# Patient Record
Sex: MALE | Race: WHITE | Employment: OTHER | URBAN - NONMETROPOLITAN AREA
[De-identification: names, ages, dates, MRNs, and addresses within clinical notes are randomized per-mention and may not be internally consistent; named-entity substitution may affect disease eponyms.]

---

## 2017-07-25 ENCOUNTER — APPOINTMENT (OUTPATIENT)
Dept: GENERAL RADIOLOGY | Age: 82
End: 2017-07-25
Payer: MEDICARE

## 2017-07-25 ENCOUNTER — HOSPITAL ENCOUNTER (OUTPATIENT)
Age: 82
Setting detail: OBSERVATION
Discharge: HOME | End: 2017-07-28
Attending: EMERGENCY MEDICINE | Admitting: FAMILY MEDICINE
Payer: MEDICARE

## 2017-07-25 DIAGNOSIS — R00.1 SINUS BRADYCARDIA: ICD-10-CM

## 2017-07-25 DIAGNOSIS — R61 DIAPHORESIS: ICD-10-CM

## 2017-07-25 DIAGNOSIS — I45.10 RBBB: ICD-10-CM

## 2017-07-25 DIAGNOSIS — Z95.1 HX OF CABG: ICD-10-CM

## 2017-07-25 DIAGNOSIS — R55 NEAR SYNCOPE: Primary | ICD-10-CM

## 2017-07-25 DIAGNOSIS — I50.9 CONGESTIVE HEART FAILURE, UNSPECIFIED CONGESTIVE HEART FAILURE CHRONICITY, UNSPECIFIED CONGESTIVE HEART FAILURE TYPE: ICD-10-CM

## 2017-07-25 LAB
ALBUMIN SERPL-MCNC: 3.8 G/DL (ref 3.5–5.2)
ALP BLD-CCNC: 87 U/L (ref 40–130)
ALT SERPL-CCNC: 7 U/L (ref 5–41)
ANION GAP SERPL CALCULATED.3IONS-SCNC: 13 MMOL/L (ref 7–19)
AST SERPL-CCNC: 12 U/L (ref 5–40)
BASOPHILS ABSOLUTE: 0.1 K/UL (ref 0–0.2)
BASOPHILS RELATIVE PERCENT: 0.7 % (ref 0–1)
BILIRUB SERPL-MCNC: 0.5 MG/DL (ref 0.2–1.2)
BUN BLDV-MCNC: 15 MG/DL (ref 8–23)
CALCIUM SERPL-MCNC: 9.1 MG/DL (ref 8.8–10.2)
CHLORIDE BLD-SCNC: 99 MMOL/L (ref 98–111)
CO2: 27 MMOL/L (ref 22–29)
CREAT SERPL-MCNC: 1.1 MG/DL (ref 0.5–1.2)
EOSINOPHILS ABSOLUTE: 0.2 K/UL (ref 0–0.6)
EOSINOPHILS RELATIVE PERCENT: 3.2 % (ref 0–5)
GFR NON-AFRICAN AMERICAN: >60
GLUCOSE BLD-MCNC: 124 MG/DL (ref 74–109)
HCT VFR BLD CALC: 43.1 % (ref 42–52)
HEMOGLOBIN: 14 G/DL (ref 14–18)
LIPASE: 29 U/L (ref 13–60)
LYMPHOCYTES ABSOLUTE: 2 K/UL (ref 1.1–4.5)
LYMPHOCYTES RELATIVE PERCENT: 28.3 % (ref 20–40)
MCH RBC QN AUTO: 29 PG (ref 27–31)
MCHC RBC AUTO-ENTMCNC: 32.5 G/DL (ref 33–37)
MCV RBC AUTO: 89.2 FL (ref 80–94)
MONOCYTES ABSOLUTE: 0.5 K/UL (ref 0–0.9)
MONOCYTES RELATIVE PERCENT: 7 % (ref 0–10)
NEUTROPHILS ABSOLUTE: 4.3 K/UL (ref 1.5–7.5)
NEUTROPHILS RELATIVE PERCENT: 60.4 % (ref 50–65)
PDW BLD-RTO: 13.9 % (ref 11.5–14.5)
PERFORMED ON: NORMAL
PLATELET # BLD: 156 K/UL (ref 130–400)
PMV BLD AUTO: 10.8 FL (ref 9.4–12.4)
POC TROPONIN I: 0.01 NG/ML (ref 0–0.08)
POTASSIUM SERPL-SCNC: 4 MMOL/L (ref 3.5–5)
PRO-BNP: 3514 PG/ML (ref 0–1800)
RBC # BLD: 4.83 M/UL (ref 4.7–6.1)
SODIUM BLD-SCNC: 139 MMOL/L (ref 136–145)
TOTAL PROTEIN: 7 G/DL (ref 6.6–8.7)
WBC # BLD: 7.2 K/UL (ref 4.8–10.8)

## 2017-07-25 PROCEDURE — 93005 ELECTROCARDIOGRAM TRACING: CPT

## 2017-07-25 PROCEDURE — 71010 XR CHEST PORTABLE: CPT

## 2017-07-25 PROCEDURE — 99285 EMERGENCY DEPT VISIT HI MDM: CPT

## 2017-07-25 RX ORDER — METOPROLOL TARTRATE 50 MG/1
50 TABLET, FILM COATED ORAL DAILY
Status: ON HOLD | COMMUNITY
End: 2019-12-09

## 2017-07-25 RX ORDER — OMEPRAZOLE 20 MG/1
40 CAPSULE, DELAYED RELEASE ORAL DAILY
Status: ON HOLD | COMMUNITY
End: 2019-12-09

## 2017-07-25 ASSESSMENT — ENCOUNTER SYMPTOMS
ABDOMINAL PAIN: 0
VOMITING: 0
DIARRHEA: 0
COUGH: 0
SHORTNESS OF BREATH: 0
NAUSEA: 0

## 2017-07-26 ENCOUNTER — APPOINTMENT (OUTPATIENT)
Dept: CT IMAGING | Age: 82
End: 2017-07-26
Payer: MEDICARE

## 2017-07-26 PROBLEM — I25.10 CAD (CORONARY ARTERY DISEASE): Status: ACTIVE | Noted: 2017-07-26

## 2017-07-26 PROBLEM — R55 NEUROCARDIOGENIC PRE-SYNCOPE: Status: ACTIVE | Noted: 2017-07-26

## 2017-07-26 LAB
ALBUMIN SERPL-MCNC: 3.6 G/DL (ref 3.5–5.2)
ALP BLD-CCNC: 86 U/L (ref 40–130)
ALT SERPL-CCNC: 6 U/L (ref 5–41)
ANION GAP SERPL CALCULATED.3IONS-SCNC: 12 MMOL/L (ref 7–19)
AST SERPL-CCNC: 10 U/L (ref 5–40)
BASE EXCESS ARTERIAL: -0.3 MMOL/L (ref -2–2)
BASOPHILS ABSOLUTE: 0 K/UL (ref 0–0.2)
BASOPHILS RELATIVE PERCENT: 0.5 % (ref 0–1)
BILIRUB SERPL-MCNC: 0.4 MG/DL (ref 0.2–1.2)
BUN BLDV-MCNC: 17 MG/DL (ref 8–23)
CALCIUM SERPL-MCNC: 8.9 MG/DL (ref 8.8–10.2)
CARBOXYHEMOGLOBIN ARTERIAL: 1.4 % (ref 0–5)
CHLORIDE BLD-SCNC: 99 MMOL/L (ref 98–111)
CO2: 26 MMOL/L (ref 22–29)
CREAT SERPL-MCNC: 0.9 MG/DL (ref 0.5–1.2)
EKG P AXIS: 18 DEGREES
EKG P AXIS: 36 DEGREES
EKG P AXIS: 43 DEGREES
EKG P-R INTERVAL: 166 MS
EKG P-R INTERVAL: 194 MS
EKG P-R INTERVAL: 202 MS
EKG Q-T INTERVAL: 508 MS
EKG Q-T INTERVAL: 520 MS
EKG Q-T INTERVAL: 534 MS
EKG QRS DURATION: 144 MS
EKG QRS DURATION: 146 MS
EKG QRS DURATION: 150 MS
EKG QTC CALCULATION (BAZETT): 500 MS
EKG QTC CALCULATION (BAZETT): 506 MS
EKG QTC CALCULATION (BAZETT): 517 MS
EKG T AXIS: 102 DEGREES
EKG T AXIS: 85 DEGREES
EKG T AXIS: 94 DEGREES
EOSINOPHILS ABSOLUTE: 0.2 K/UL (ref 0–0.6)
EOSINOPHILS RELATIVE PERCENT: 3.3 % (ref 0–5)
GFR NON-AFRICAN AMERICAN: >60
GLUCOSE BLD-MCNC: 124 MG/DL (ref 74–109)
HBA1C MFR BLD: 5.8 %
HCO3 ARTERIAL: 24.1 MMOL/L (ref 22–26)
HCT VFR BLD CALC: 40.3 % (ref 42–52)
HEMOGLOBIN, ART, EXTENDED: 14.5 G/DL (ref 14–18)
HEMOGLOBIN: 13 G/DL (ref 14–18)
INR BLD: 1.14 (ref 0.88–1.18)
LV EF: 50 %
LVEF MODALITY: NORMAL
LYMPHOCYTES ABSOLUTE: 1.6 K/UL (ref 1.1–4.5)
LYMPHOCYTES RELATIVE PERCENT: 25.7 % (ref 20–40)
MAGNESIUM: 1.8 MG/DL (ref 1.6–2.4)
MCH RBC QN AUTO: 28.4 PG (ref 27–31)
MCHC RBC AUTO-ENTMCNC: 32.3 G/DL (ref 33–37)
MCV RBC AUTO: 88 FL (ref 80–94)
METHEMOGLOBIN ARTERIAL: 0.5 %
MONOCYTES ABSOLUTE: 0.4 K/UL (ref 0–0.9)
MONOCYTES RELATIVE PERCENT: 6.2 % (ref 0–10)
NEUTROPHILS ABSOLUTE: 4 K/UL (ref 1.5–7.5)
NEUTROPHILS RELATIVE PERCENT: 63.8 % (ref 50–65)
O2 CONTENT ARTERIAL: 18.8 ML/DL
O2 SAT, ARTERIAL: 92.2 %
O2 THERAPY: ABNORMAL
PCO2 ARTERIAL: 38 MMHG (ref 35–45)
PDW BLD-RTO: 13.8 % (ref 11.5–14.5)
PERFORMED ON: NORMAL
PH ARTERIAL: 7.41 (ref 7.35–7.45)
PHOSPHORUS: 3.6 MG/DL (ref 2.5–4.5)
PLATELET # BLD: 133 K/UL (ref 130–400)
PMV BLD AUTO: 10.5 FL (ref 9.4–12.4)
PO2 ARTERIAL: 63 MMHG (ref 80–100)
POC TROPONIN I: 0.01 NG/ML (ref 0–0.08)
POTASSIUM SERPL-SCNC: 3.9 MMOL/L (ref 3.5–5)
POTASSIUM, WHOLE BLOOD: 3.9
PROTHROMBIN TIME: 14.5 SEC (ref 12–14.6)
RBC # BLD: 4.58 M/UL (ref 4.7–6.1)
SODIUM BLD-SCNC: 137 MMOL/L (ref 136–145)
TOTAL PROTEIN: 6.2 G/DL (ref 6.6–8.7)
TROPONIN: <0.01 NG/ML (ref 0–0.03)
WBC # BLD: 6.3 K/UL (ref 4.8–10.8)

## 2017-07-26 PROCEDURE — 83735 ASSAY OF MAGNESIUM: CPT

## 2017-07-26 PROCEDURE — 85025 COMPLETE CBC W/AUTO DIFF WBC: CPT

## 2017-07-26 PROCEDURE — 93005 ELECTROCARDIOGRAM TRACING: CPT

## 2017-07-26 PROCEDURE — G0378 HOSPITAL OBSERVATION PER HR: HCPCS

## 2017-07-26 PROCEDURE — 84132 ASSAY OF SERUM POTASSIUM: CPT

## 2017-07-26 PROCEDURE — 95819 EEG AWAKE AND ASLEEP: CPT

## 2017-07-26 PROCEDURE — 84100 ASSAY OF PHOSPHORUS: CPT

## 2017-07-26 PROCEDURE — 99224 PR SBSQ OBSERVATION CARE/DAY 15 MINUTES: CPT | Performed by: NURSE PRACTITIONER

## 2017-07-26 PROCEDURE — 6360000004 HC RX CONTRAST MEDICATION: Performed by: FAMILY MEDICINE

## 2017-07-26 PROCEDURE — 99219 PR INITIAL OBSERVATION CARE/DAY 50 MINUTES: CPT | Performed by: INTERNAL MEDICINE

## 2017-07-26 PROCEDURE — 83880 ASSAY OF NATRIURETIC PEPTIDE: CPT

## 2017-07-26 PROCEDURE — 36415 COLL VENOUS BLD VENIPUNCTURE: CPT

## 2017-07-26 PROCEDURE — 6370000000 HC RX 637 (ALT 250 FOR IP): Performed by: INTERNAL MEDICINE

## 2017-07-26 PROCEDURE — 83036 HEMOGLOBIN GLYCOSYLATED A1C: CPT

## 2017-07-26 PROCEDURE — 96366 THER/PROPH/DIAG IV INF ADDON: CPT

## 2017-07-26 PROCEDURE — 82803 BLOOD GASES ANY COMBINATION: CPT

## 2017-07-26 PROCEDURE — 95819 EEG AWAKE AND ASLEEP: CPT | Performed by: PSYCHIATRY & NEUROLOGY

## 2017-07-26 PROCEDURE — 84484 ASSAY OF TROPONIN QUANT: CPT

## 2017-07-26 PROCEDURE — 96365 THER/PROPH/DIAG IV INF INIT: CPT

## 2017-07-26 PROCEDURE — 36600 WITHDRAWAL OF ARTERIAL BLOOD: CPT

## 2017-07-26 PROCEDURE — 99284 EMERGENCY DEPT VISIT MOD MDM: CPT | Performed by: EMERGENCY MEDICINE

## 2017-07-26 PROCEDURE — 6360000002 HC RX W HCPCS: Performed by: CLINICAL NURSE SPECIALIST

## 2017-07-26 PROCEDURE — 85610 PROTHROMBIN TIME: CPT

## 2017-07-26 PROCEDURE — 6370000000 HC RX 637 (ALT 250 FOR IP)

## 2017-07-26 PROCEDURE — 93306 TTE W/DOPPLER COMPLETE: CPT

## 2017-07-26 PROCEDURE — 70450 CT HEAD/BRAIN W/O DYE: CPT

## 2017-07-26 PROCEDURE — 2700000000 HC OXYGEN THERAPY PER DAY

## 2017-07-26 PROCEDURE — 96372 THER/PROPH/DIAG INJ SC/IM: CPT

## 2017-07-26 PROCEDURE — 80053 COMPREHEN METABOLIC PANEL: CPT

## 2017-07-26 PROCEDURE — 71275 CT ANGIOGRAPHY CHEST: CPT

## 2017-07-26 PROCEDURE — 6370000000 HC RX 637 (ALT 250 FOR IP): Performed by: CLINICAL NURSE SPECIALIST

## 2017-07-26 PROCEDURE — 99232 SBSQ HOSP IP/OBS MODERATE 35: CPT | Performed by: CLINICAL NURSE SPECIALIST

## 2017-07-26 PROCEDURE — 99205 OFFICE O/P NEW HI 60 MIN: CPT | Performed by: INTERNAL MEDICINE

## 2017-07-26 PROCEDURE — 83690 ASSAY OF LIPASE: CPT

## 2017-07-26 PROCEDURE — 2500000003 HC RX 250 WO HCPCS

## 2017-07-26 RX ORDER — FINASTERIDE 5 MG/1
5 TABLET, FILM COATED ORAL DAILY
Status: DISCONTINUED | OUTPATIENT
Start: 2017-07-26 | End: 2017-07-28 | Stop reason: HOSPADM

## 2017-07-26 RX ORDER — ASPIRIN 81 MG/1
324 TABLET, CHEWABLE ORAL ONCE
Status: COMPLETED | OUTPATIENT
Start: 2017-07-26 | End: 2017-07-26

## 2017-07-26 RX ORDER — NITROGLYCERIN 0.4 MG/1
0.4 TABLET SUBLINGUAL EVERY 5 MIN PRN
Status: DISCONTINUED | OUTPATIENT
Start: 2017-07-26 | End: 2017-07-28 | Stop reason: HOSPADM

## 2017-07-26 RX ORDER — ASPIRIN 81 MG/1
TABLET, CHEWABLE ORAL
Status: DISPENSED
Start: 2017-07-26 | End: 2017-07-27

## 2017-07-26 RX ORDER — CLOPIDOGREL BISULFATE 75 MG/1
75 TABLET ORAL DAILY
Status: DISCONTINUED | OUTPATIENT
Start: 2017-07-26 | End: 2017-07-28 | Stop reason: HOSPADM

## 2017-07-26 RX ORDER — ACETAMINOPHEN 325 MG/1
650 TABLET ORAL EVERY 4 HOURS PRN
Status: DISCONTINUED | OUTPATIENT
Start: 2017-07-26 | End: 2017-07-28 | Stop reason: HOSPADM

## 2017-07-26 RX ORDER — METOPROLOL TARTRATE 50 MG/1
50 TABLET, FILM COATED ORAL 2 TIMES DAILY
Status: DISCONTINUED | OUTPATIENT
Start: 2017-07-26 | End: 2017-07-27 | Stop reason: SDUPTHER

## 2017-07-26 RX ORDER — ISOSORBIDE MONONITRATE 30 MG/1
30 TABLET, EXTENDED RELEASE ORAL DAILY
Status: DISCONTINUED | OUTPATIENT
Start: 2017-07-26 | End: 2017-07-28 | Stop reason: HOSPADM

## 2017-07-26 RX ORDER — ALLOPURINOL 100 MG/1
200 TABLET ORAL 2 TIMES DAILY
Status: DISCONTINUED | OUTPATIENT
Start: 2017-07-26 | End: 2017-07-28 | Stop reason: HOSPADM

## 2017-07-26 RX ORDER — NITROGLYCERIN 0.4 MG/1
TABLET SUBLINGUAL
Status: COMPLETED
Start: 2017-07-26 | End: 2017-07-26

## 2017-07-26 RX ORDER — SODIUM CHLORIDE 9 MG/ML
INJECTION, SOLUTION INTRAVENOUS CONTINUOUS
Status: DISCONTINUED | OUTPATIENT
Start: 2017-07-26 | End: 2017-07-26

## 2017-07-26 RX ORDER — NITROGLYCERIN 20 MG/100ML
INJECTION INTRAVENOUS
Status: COMPLETED
Start: 2017-07-26 | End: 2017-07-26

## 2017-07-26 RX ORDER — LISINOPRIL 20 MG/1
40 TABLET ORAL DAILY
Status: DISCONTINUED | OUTPATIENT
Start: 2017-07-26 | End: 2017-07-27 | Stop reason: SDUPTHER

## 2017-07-26 RX ORDER — SODIUM CHLORIDE 0.9 % (FLUSH) 0.9 %
10 SYRINGE (ML) INJECTION EVERY 12 HOURS SCHEDULED
Status: DISCONTINUED | OUTPATIENT
Start: 2017-07-26 | End: 2017-07-28 | Stop reason: HOSPADM

## 2017-07-26 RX ORDER — PANTOPRAZOLE SODIUM 40 MG/1
40 TABLET, DELAYED RELEASE ORAL
Status: DISCONTINUED | OUTPATIENT
Start: 2017-07-26 | End: 2017-07-27

## 2017-07-26 RX ORDER — SODIUM CHLORIDE 0.9 % (FLUSH) 0.9 %
10 SYRINGE (ML) INJECTION PRN
Status: DISCONTINUED | OUTPATIENT
Start: 2017-07-26 | End: 2017-07-28 | Stop reason: HOSPADM

## 2017-07-26 RX ORDER — NITROGLYCERIN 20 MG/100ML
5 INJECTION INTRAVENOUS CONTINUOUS
Status: DISCONTINUED | OUTPATIENT
Start: 2017-07-26 | End: 2017-07-28 | Stop reason: HOSPADM

## 2017-07-26 RX ORDER — ONDANSETRON 2 MG/ML
4 INJECTION INTRAMUSCULAR; INTRAVENOUS EVERY 6 HOURS PRN
Status: DISCONTINUED | OUTPATIENT
Start: 2017-07-26 | End: 2017-07-28 | Stop reason: HOSPADM

## 2017-07-26 RX ORDER — ACETAMINOPHEN 325 MG/1
650 TABLET ORAL EVERY 6 HOURS PRN
Status: DISCONTINUED | OUTPATIENT
Start: 2017-07-26 | End: 2017-07-26 | Stop reason: SDUPTHER

## 2017-07-26 RX ADMIN — METOPROLOL TARTRATE 50 MG: 50 TABLET, FILM COATED ORAL at 20:25

## 2017-07-26 RX ADMIN — ASPIRIN 81 MG CHEWABLE TABLET 324 MG: 81 TABLET CHEWABLE at 18:15

## 2017-07-26 RX ADMIN — PANTOPRAZOLE SODIUM 40 MG: 40 TABLET, DELAYED RELEASE ORAL at 05:59

## 2017-07-26 RX ADMIN — NITROGLYCERIN 5 MCG/MIN: 20 INJECTION INTRAVENOUS at 18:19

## 2017-07-26 RX ADMIN — FINASTERIDE 5 MG: 5 TABLET, FILM COATED ORAL at 20:24

## 2017-07-26 RX ADMIN — ENOXAPARIN SODIUM 40 MG: 40 INJECTION SUBCUTANEOUS at 20:23

## 2017-07-26 RX ADMIN — LISINOPRIL 40 MG: 20 TABLET ORAL at 20:25

## 2017-07-26 RX ADMIN — IOVERSOL 90 ML: 741 INJECTION INTRA-ARTERIAL; INTRAVENOUS at 14:30

## 2017-07-26 RX ADMIN — CLOPIDOGREL BISULFATE 75 MG: 75 TABLET, FILM COATED ORAL at 09:48

## 2017-07-26 RX ADMIN — NITROGLYCERIN: 0.4 TABLET SUBLINGUAL at 17:50

## 2017-07-26 RX ADMIN — ISOSORBIDE MONONITRATE 30 MG: 30 TABLET, EXTENDED RELEASE ORAL at 20:25

## 2017-07-26 RX ADMIN — ALLOPURINOL 200 MG: 100 TABLET ORAL at 20:24

## 2017-07-26 ASSESSMENT — PAIN SCALES - GENERAL
PAINLEVEL_OUTOF10: 0

## 2017-07-27 ENCOUNTER — APPOINTMENT (OUTPATIENT)
Dept: NUCLEAR MEDICINE | Age: 82
End: 2017-07-27
Payer: MEDICARE

## 2017-07-27 PROBLEM — I25.700 CORONARY ARTERY DISEASE INVOLVING CORONARY BYPASS GRAFT OF NATIVE HEART WITH UNSTABLE ANGINA PECTORIS (HCC): Status: ACTIVE | Noted: 2017-07-26

## 2017-07-27 PROBLEM — R09.02 HYPOXIA: Status: ACTIVE | Noted: 2017-07-27

## 2017-07-27 PROBLEM — K21.9 GASTROESOPHAGEAL REFLUX DISEASE WITHOUT ESOPHAGITIS: Status: ACTIVE | Noted: 2017-07-27

## 2017-07-27 PROBLEM — M1A.9XX0 CHRONIC GOUT WITHOUT TOPHUS: Status: ACTIVE | Noted: 2017-07-27

## 2017-07-27 PROBLEM — R07.9 CHEST PAIN: Status: ACTIVE | Noted: 2017-07-27

## 2017-07-27 PROBLEM — E78.5 DYSLIPIDEMIA: Status: ACTIVE | Noted: 2017-07-27

## 2017-07-27 PROBLEM — I10 ESSENTIAL HYPERTENSION: Status: ACTIVE | Noted: 2017-07-27

## 2017-07-27 PROBLEM — N40.1 BENIGN PROSTATIC HYPERPLASIA WITH LOWER URINARY TRACT SYMPTOMS: Status: ACTIVE | Noted: 2017-07-27

## 2017-07-27 LAB
ALBUMIN SERPL-MCNC: 3.5 G/DL (ref 3.5–5.2)
ALP BLD-CCNC: 78 U/L (ref 40–130)
ALT SERPL-CCNC: <5 U/L (ref 5–41)
ANION GAP SERPL CALCULATED.3IONS-SCNC: 10 MMOL/L (ref 7–19)
AST SERPL-CCNC: 9 U/L (ref 5–40)
BASOPHILS ABSOLUTE: 0 K/UL (ref 0–0.2)
BASOPHILS RELATIVE PERCENT: 0.7 % (ref 0–1)
BILIRUB SERPL-MCNC: 0.4 MG/DL (ref 0.2–1.2)
BILIRUBIN URINE: NEGATIVE
BLOOD, URINE: NEGATIVE
BUN BLDV-MCNC: 14 MG/DL (ref 8–23)
CALCIUM SERPL-MCNC: 8.5 MG/DL (ref 8.8–10.2)
CHLORIDE BLD-SCNC: 102 MMOL/L (ref 98–111)
CHOLESTEROL, TOTAL: 190 MG/DL (ref 160–199)
CLARITY: CLEAR
CO2: 27 MMOL/L (ref 22–29)
COLOR: YELLOW
CREAT SERPL-MCNC: 0.8 MG/DL (ref 0.5–1.2)
EKG P AXIS: 30 DEGREES
EKG P AXIS: 32 DEGREES
EKG P-R INTERVAL: 188 MS
EKG P-R INTERVAL: 210 MS
EKG Q-T INTERVAL: 426 MS
EKG Q-T INTERVAL: 500 MS
EKG QRS DURATION: 146 MS
EKG QRS DURATION: 150 MS
EKG QTC CALCULATION (BAZETT): 445 MS
EKG QTC CALCULATION (BAZETT): 486 MS
EKG T AXIS: 95 DEGREES
EKG T AXIS: 99 DEGREES
EOSINOPHILS ABSOLUTE: 0.3 K/UL (ref 0–0.6)
EOSINOPHILS RELATIVE PERCENT: 5 % (ref 0–5)
GFR NON-AFRICAN AMERICAN: >60
GLUCOSE BLD-MCNC: 98 MG/DL (ref 74–109)
GLUCOSE URINE: NEGATIVE MG/DL
HCT VFR BLD CALC: 40.6 % (ref 42–52)
HDLC SERPL-MCNC: 42 MG/DL (ref 55–121)
HEMOGLOBIN: 13 G/DL (ref 14–18)
KETONES, URINE: NEGATIVE MG/DL
LDL CHOLESTEROL CALCULATED: 124 MG/DL
LEUKOCYTE ESTERASE, URINE: NEGATIVE
LV EF: 33 %
LVEF MODALITY: NORMAL
LYMPHOCYTES ABSOLUTE: 2.3 K/UL (ref 1.1–4.5)
LYMPHOCYTES RELATIVE PERCENT: 37.6 % (ref 20–40)
MAGNESIUM: 1.8 MG/DL (ref 1.6–2.4)
MCH RBC QN AUTO: 28.4 PG (ref 27–31)
MCHC RBC AUTO-ENTMCNC: 32 G/DL (ref 33–37)
MCV RBC AUTO: 88.8 FL (ref 80–94)
MONOCYTES ABSOLUTE: 0.5 K/UL (ref 0–0.9)
MONOCYTES RELATIVE PERCENT: 7.4 % (ref 0–10)
NEUTROPHILS ABSOLUTE: 3 K/UL (ref 1.5–7.5)
NEUTROPHILS RELATIVE PERCENT: 49.1 % (ref 50–65)
NITRITE, URINE: NEGATIVE
PDW BLD-RTO: 14 % (ref 11.5–14.5)
PH UA: 6
PLATELET # BLD: 145 K/UL (ref 130–400)
PMV BLD AUTO: 10.8 FL (ref 9.4–12.4)
POTASSIUM SERPL-SCNC: 4 MMOL/L (ref 3.5–5)
PROTEIN UA: NEGATIVE MG/DL
RBC # BLD: 4.57 M/UL (ref 4.7–6.1)
SODIUM BLD-SCNC: 139 MMOL/L (ref 136–145)
SPECIFIC GRAVITY UA: 1.03
TOTAL PROTEIN: 6.1 G/DL (ref 6.6–8.7)
TRIGL SERPL-MCNC: 121 MG/DL (ref 150–199)
TROPONIN: <0.01 NG/ML (ref 0–0.03)
TROPONIN: <0.01 NG/ML (ref 0–0.03)
UROBILINOGEN, URINE: 1 E.U./DL
WBC # BLD: 6.1 K/UL (ref 4.8–10.8)

## 2017-07-27 PROCEDURE — 81003 URINALYSIS AUTO W/O SCOPE: CPT

## 2017-07-27 PROCEDURE — 84484 ASSAY OF TROPONIN QUANT: CPT

## 2017-07-27 PROCEDURE — 6360000002 HC RX W HCPCS: Performed by: INTERNAL MEDICINE

## 2017-07-27 PROCEDURE — 36415 COLL VENOUS BLD VENIPUNCTURE: CPT

## 2017-07-27 PROCEDURE — 83735 ASSAY OF MAGNESIUM: CPT

## 2017-07-27 PROCEDURE — G0378 HOSPITAL OBSERVATION PER HR: HCPCS

## 2017-07-27 PROCEDURE — 2700000000 HC OXYGEN THERAPY PER DAY

## 2017-07-27 PROCEDURE — 6370000000 HC RX 637 (ALT 250 FOR IP): Performed by: CLINICAL NURSE SPECIALIST

## 2017-07-27 PROCEDURE — 93005 ELECTROCARDIOGRAM TRACING: CPT

## 2017-07-27 PROCEDURE — 6370000000 HC RX 637 (ALT 250 FOR IP): Performed by: INTERNAL MEDICINE

## 2017-07-27 PROCEDURE — 96372 THER/PROPH/DIAG INJ SC/IM: CPT

## 2017-07-27 PROCEDURE — 6360000002 HC RX W HCPCS: Performed by: CLINICAL NURSE SPECIALIST

## 2017-07-27 PROCEDURE — A9500 TC99M SESTAMIBI: HCPCS | Performed by: INTERNAL MEDICINE

## 2017-07-27 PROCEDURE — 99226 PR SBSQ OBSERVATION CARE/DAY 35 MINUTES: CPT | Performed by: FAMILY MEDICINE

## 2017-07-27 PROCEDURE — 93017 CV STRESS TEST TRACING ONLY: CPT

## 2017-07-27 PROCEDURE — 3430000000 HC RX DIAGNOSTIC RADIOPHARMACEUTICAL: Performed by: INTERNAL MEDICINE

## 2017-07-27 PROCEDURE — 78452 HT MUSCLE IMAGE SPECT MULT: CPT

## 2017-07-27 PROCEDURE — 94762 N-INVAS EAR/PLS OXIMTRY CONT: CPT

## 2017-07-27 PROCEDURE — 80053 COMPREHEN METABOLIC PANEL: CPT

## 2017-07-27 PROCEDURE — 96366 THER/PROPH/DIAG IV INF ADDON: CPT

## 2017-07-27 PROCEDURE — 80061 LIPID PANEL: CPT

## 2017-07-27 PROCEDURE — 85025 COMPLETE CBC W/AUTO DIFF WBC: CPT

## 2017-07-27 RX ORDER — METOPROLOL TARTRATE 100 MG/1
50 TABLET ORAL 2 TIMES DAILY
Status: DISCONTINUED | OUTPATIENT
Start: 2017-07-27 | End: 2017-07-28 | Stop reason: HOSPADM

## 2017-07-27 RX ORDER — LISINOPRIL 40 MG/1
40 TABLET ORAL DAILY
Status: DISCONTINUED | OUTPATIENT
Start: 2017-07-27 | End: 2017-07-28 | Stop reason: HOSPADM

## 2017-07-27 RX ORDER — OMEPRAZOLE 20 MG/1
40 CAPSULE, DELAYED RELEASE ORAL DAILY
Status: DISCONTINUED | OUTPATIENT
Start: 2017-07-27 | End: 2017-07-28 | Stop reason: HOSPADM

## 2017-07-27 RX ADMIN — METOPROLOL TARTRATE 50 MG: 100 TABLET ORAL at 20:18

## 2017-07-27 RX ADMIN — OMEPRAZOLE 40 MG: 20 CAPSULE, DELAYED RELEASE ORAL at 06:06

## 2017-07-27 RX ADMIN — REGADENOSON 0.4 MG: 0.08 INJECTION, SOLUTION INTRAVENOUS at 13:29

## 2017-07-27 RX ADMIN — TETRAKIS(2-METHOXYISOBUTYLISOCYANIDE)COPPER(I) TETRAFLUOROBORATE 30 MILLICURIE: 1 INJECTION, POWDER, LYOPHILIZED, FOR SOLUTION INTRAVENOUS at 13:29

## 2017-07-27 RX ADMIN — ALLOPURINOL 200 MG: 100 TABLET ORAL at 08:50

## 2017-07-27 RX ADMIN — ISOSORBIDE MONONITRATE 30 MG: 30 TABLET, EXTENDED RELEASE ORAL at 08:49

## 2017-07-27 RX ADMIN — LISINOPRIL 40 MG: 40 TABLET ORAL at 08:50

## 2017-07-27 RX ADMIN — CLOPIDOGREL BISULFATE 75 MG: 75 TABLET, FILM COATED ORAL at 08:49

## 2017-07-27 RX ADMIN — FINASTERIDE 5 MG: 5 TABLET, FILM COATED ORAL at 08:49

## 2017-07-27 RX ADMIN — ALLOPURINOL 200 MG: 100 TABLET ORAL at 20:18

## 2017-07-27 RX ADMIN — TETRAKIS(2-METHOXYISOBUTYLISOCYANIDE)COPPER(I) TETRAFLUOROBORATE 10 MILLICURIE: 1 INJECTION, POWDER, LYOPHILIZED, FOR SOLUTION INTRAVENOUS at 13:29

## 2017-07-27 RX ADMIN — ASPIRIN 325 MG: 325 TABLET, DELAYED RELEASE ORAL at 08:49

## 2017-07-27 RX ADMIN — ENOXAPARIN SODIUM 40 MG: 40 INJECTION SUBCUTANEOUS at 17:47

## 2017-07-27 ASSESSMENT — PAIN SCALES - GENERAL
PAINLEVEL_OUTOF10: 0

## 2017-07-28 VITALS
TEMPERATURE: 97 F | BODY MASS INDEX: 25.96 KG/M2 | SYSTOLIC BLOOD PRESSURE: 151 MMHG | HEIGHT: 68 IN | OXYGEN SATURATION: 96 % | RESPIRATION RATE: 20 BRPM | HEART RATE: 52 BPM | WEIGHT: 171.3 LBS | DIASTOLIC BLOOD PRESSURE: 78 MMHG

## 2017-07-28 PROBLEM — J96.11 CHRONIC HYPOXEMIC RESPIRATORY FAILURE (HCC): Status: ACTIVE | Noted: 2017-07-27

## 2017-07-28 LAB
ALBUMIN SERPL-MCNC: 3.8 G/DL (ref 3.5–5.2)
ALP BLD-CCNC: 83 U/L (ref 40–130)
ALT SERPL-CCNC: 6 U/L (ref 5–41)
ANION GAP SERPL CALCULATED.3IONS-SCNC: 11 MMOL/L (ref 7–19)
AST SERPL-CCNC: 10 U/L (ref 5–40)
BASOPHILS ABSOLUTE: 0 K/UL (ref 0–0.2)
BASOPHILS RELATIVE PERCENT: 0.6 % (ref 0–1)
BILIRUB SERPL-MCNC: 0.5 MG/DL (ref 0.2–1.2)
BUN BLDV-MCNC: 11 MG/DL (ref 8–23)
CALCIUM SERPL-MCNC: 8.8 MG/DL (ref 8.8–10.2)
CHLORIDE BLD-SCNC: 103 MMOL/L (ref 98–111)
CO2: 25 MMOL/L (ref 22–29)
CREAT SERPL-MCNC: 0.8 MG/DL (ref 0.5–1.2)
EOSINOPHILS ABSOLUTE: 0.3 K/UL (ref 0–0.6)
EOSINOPHILS RELATIVE PERCENT: 5 % (ref 0–5)
GFR NON-AFRICAN AMERICAN: >60
GLUCOSE BLD-MCNC: 101 MG/DL (ref 74–109)
HCT VFR BLD CALC: 42.8 % (ref 42–52)
HEMOGLOBIN: 13.9 G/DL (ref 14–18)
LYMPHOCYTES ABSOLUTE: 2.2 K/UL (ref 1.1–4.5)
LYMPHOCYTES RELATIVE PERCENT: 34.4 % (ref 20–40)
MCH RBC QN AUTO: 28.6 PG (ref 27–31)
MCHC RBC AUTO-ENTMCNC: 32.5 G/DL (ref 33–37)
MCV RBC AUTO: 88.1 FL (ref 80–94)
MONOCYTES ABSOLUTE: 0.4 K/UL (ref 0–0.9)
MONOCYTES RELATIVE PERCENT: 6.8 % (ref 0–10)
NEUTROPHILS ABSOLUTE: 3.4 K/UL (ref 1.5–7.5)
NEUTROPHILS RELATIVE PERCENT: 52.9 % (ref 50–65)
PDW BLD-RTO: 14 % (ref 11.5–14.5)
PLATELET # BLD: 148 K/UL (ref 130–400)
PMV BLD AUTO: 10.5 FL (ref 9.4–12.4)
POTASSIUM SERPL-SCNC: 4.1 MMOL/L (ref 3.5–5)
RBC # BLD: 4.86 M/UL (ref 4.7–6.1)
SODIUM BLD-SCNC: 139 MMOL/L (ref 136–145)
TOTAL PROTEIN: 6.5 G/DL (ref 6.6–8.7)
TROPONIN: <0.01 NG/ML (ref 0–0.03)
WBC # BLD: 6.5 K/UL (ref 4.8–10.8)

## 2017-07-28 PROCEDURE — 2580000003 HC RX 258: Performed by: CLINICAL NURSE SPECIALIST

## 2017-07-28 PROCEDURE — G0378 HOSPITAL OBSERVATION PER HR: HCPCS

## 2017-07-28 PROCEDURE — 2700000000 HC OXYGEN THERAPY PER DAY

## 2017-07-28 PROCEDURE — 80053 COMPREHEN METABOLIC PANEL: CPT

## 2017-07-28 PROCEDURE — 96366 THER/PROPH/DIAG IV INF ADDON: CPT

## 2017-07-28 PROCEDURE — 6370000000 HC RX 637 (ALT 250 FOR IP): Performed by: INTERNAL MEDICINE

## 2017-07-28 PROCEDURE — 94761 N-INVAS EAR/PLS OXIMETRY MLT: CPT

## 2017-07-28 PROCEDURE — 36415 COLL VENOUS BLD VENIPUNCTURE: CPT

## 2017-07-28 PROCEDURE — 99999 PR OFFICE/OUTPT VISIT,PROCEDURE ONLY: CPT | Performed by: FAMILY MEDICINE

## 2017-07-28 PROCEDURE — 99217 PR OBSERVATION CARE DISCHARGE MANAGEMENT: CPT | Performed by: FAMILY MEDICINE

## 2017-07-28 PROCEDURE — 85025 COMPLETE CBC W/AUTO DIFF WBC: CPT

## 2017-07-28 PROCEDURE — 6370000000 HC RX 637 (ALT 250 FOR IP): Performed by: CLINICAL NURSE SPECIALIST

## 2017-07-28 PROCEDURE — 84484 ASSAY OF TROPONIN QUANT: CPT

## 2017-07-28 RX ORDER — NICOTINE 21 MG/24HR
1 PATCH, TRANSDERMAL 24 HOURS TRANSDERMAL DAILY
Status: DISCONTINUED | OUTPATIENT
Start: 2017-07-28 | End: 2017-07-28 | Stop reason: HOSPADM

## 2017-07-28 RX ORDER — ROSUVASTATIN CALCIUM 40 MG/1
40 TABLET, COATED ORAL EVERY EVENING
Qty: 30 TABLET | Refills: 3 | Status: SHIPPED | OUTPATIENT
Start: 2017-07-28 | End: 2019-11-25

## 2017-07-28 RX ADMIN — ASPIRIN 325 MG: 325 TABLET, DELAYED RELEASE ORAL at 08:20

## 2017-07-28 RX ADMIN — FINASTERIDE 5 MG: 5 TABLET, FILM COATED ORAL at 10:17

## 2017-07-28 RX ADMIN — LISINOPRIL 40 MG: 40 TABLET ORAL at 10:17

## 2017-07-28 RX ADMIN — ISOSORBIDE MONONITRATE 30 MG: 30 TABLET, EXTENDED RELEASE ORAL at 08:21

## 2017-07-28 RX ADMIN — Medication 10 ML: at 08:20

## 2017-07-28 RX ADMIN — ALLOPURINOL 200 MG: 100 TABLET ORAL at 10:07

## 2017-07-28 RX ADMIN — METOPROLOL TARTRATE 50 MG: 100 TABLET ORAL at 10:08

## 2017-07-28 RX ADMIN — OMEPRAZOLE 40 MG: 20 CAPSULE, DELAYED RELEASE ORAL at 05:59

## 2017-07-28 RX ADMIN — CLOPIDOGREL BISULFATE 75 MG: 75 TABLET, FILM COATED ORAL at 08:20

## 2017-07-28 ASSESSMENT — PAIN SCALES - GENERAL
PAINLEVEL_OUTOF10: 0
PAINLEVEL_OUTOF10: 0

## 2018-03-23 ENCOUNTER — APPOINTMENT (OUTPATIENT)
Dept: GENERAL RADIOLOGY | Age: 83
End: 2018-03-23
Payer: MEDICARE

## 2018-03-23 ENCOUNTER — HOSPITAL ENCOUNTER (EMERGENCY)
Age: 83
Discharge: HOME | End: 2018-03-23
Attending: EMERGENCY MEDICINE
Payer: MEDICARE

## 2018-03-23 VITALS
BODY MASS INDEX: 23.11 KG/M2 | SYSTOLIC BLOOD PRESSURE: 136 MMHG | HEART RATE: 55 BPM | WEIGHT: 156 LBS | HEIGHT: 69 IN | TEMPERATURE: 97.7 F | RESPIRATION RATE: 16 BRPM | OXYGEN SATURATION: 95 % | DIASTOLIC BLOOD PRESSURE: 70 MMHG

## 2018-03-23 DIAGNOSIS — R55 NEAR SYNCOPE: Primary | ICD-10-CM

## 2018-03-23 LAB
ALBUMIN SERPL-MCNC: 3.6 G/DL (ref 3.5–5.2)
ALP BLD-CCNC: 89 U/L (ref 40–130)
ALT SERPL-CCNC: 9 U/L (ref 5–41)
ANION GAP SERPL CALCULATED.3IONS-SCNC: 11 MMOL/L (ref 7–19)
AST SERPL-CCNC: 12 U/L (ref 5–40)
BACTERIA: NEGATIVE /HPF
BASOPHILS ABSOLUTE: 0.1 K/UL (ref 0–0.2)
BASOPHILS RELATIVE PERCENT: 1 % (ref 0–1)
BILIRUB SERPL-MCNC: 0.4 MG/DL (ref 0.2–1.2)
BILIRUBIN URINE: ABNORMAL
BLOOD, URINE: NEGATIVE
BUN BLDV-MCNC: 15 MG/DL (ref 8–23)
CALCIUM SERPL-MCNC: 8.4 MG/DL (ref 8.8–10.2)
CHLORIDE BLD-SCNC: 100 MMOL/L (ref 98–111)
CLARITY: ABNORMAL
CO2: 26 MMOL/L (ref 22–29)
COLOR: YELLOW
CREAT SERPL-MCNC: 1 MG/DL (ref 0.5–1.2)
EOSINOPHILS ABSOLUTE: 0.2 K/UL (ref 0–0.6)
EOSINOPHILS RELATIVE PERCENT: 3 % (ref 0–5)
EPITHELIAL CELLS, UA: 3 /HPF (ref 0–5)
GFR NON-AFRICAN AMERICAN: >60
GLUCOSE BLD-MCNC: 98 MG/DL (ref 74–109)
GLUCOSE URINE: NEGATIVE MG/DL
HCT VFR BLD CALC: 43 % (ref 42–52)
HEMOGLOBIN: 13.7 G/DL (ref 14–18)
HYALINE CASTS: 12 /HPF (ref 0–8)
KETONES, URINE: NEGATIVE MG/DL
LEUKOCYTE ESTERASE, URINE: ABNORMAL
LYMPHOCYTES ABSOLUTE: 2.3 K/UL (ref 1.1–4.5)
LYMPHOCYTES RELATIVE PERCENT: 35.9 % (ref 20–40)
MCH RBC QN AUTO: 28 PG (ref 27–31)
MCHC RBC AUTO-ENTMCNC: 31.9 G/DL (ref 33–37)
MCV RBC AUTO: 87.8 FL (ref 80–94)
MONOCYTES ABSOLUTE: 0.5 K/UL (ref 0–0.9)
MONOCYTES RELATIVE PERCENT: 8.6 % (ref 0–10)
NEUTROPHILS ABSOLUTE: 3.2 K/UL (ref 1.5–7.5)
NEUTROPHILS RELATIVE PERCENT: 51 % (ref 50–65)
NITRITE, URINE: NEGATIVE
PDW BLD-RTO: 14.6 % (ref 11.5–14.5)
PERFORMED ON: NORMAL
PH UA: 6
PLATELET # BLD: 169 K/UL (ref 130–400)
PMV BLD AUTO: 10.4 FL (ref 9.4–12.4)
POC TROPONIN I: 0.02 NG/ML (ref 0–0.08)
POTASSIUM SERPL-SCNC: 3.4 MMOL/L (ref 3.5–5)
PROTEIN UA: ABNORMAL MG/DL
RBC # BLD: 4.9 M/UL (ref 4.7–6.1)
RBC UA: 2 /HPF (ref 0–4)
SODIUM BLD-SCNC: 137 MMOL/L (ref 136–145)
SPECIFIC GRAVITY UA: 1.02
TOTAL PROTEIN: 6.4 G/DL (ref 6.6–8.7)
UROBILINOGEN, URINE: 1 E.U./DL
WBC # BLD: 6.3 K/UL (ref 4.8–10.8)
WBC UA: 29 /HPF (ref 0–5)

## 2018-03-23 PROCEDURE — 85025 COMPLETE CBC W/AUTO DIFF WBC: CPT

## 2018-03-23 PROCEDURE — 84484 ASSAY OF TROPONIN QUANT: CPT

## 2018-03-23 PROCEDURE — 80053 COMPREHEN METABOLIC PANEL: CPT

## 2018-03-23 PROCEDURE — 81001 URINALYSIS AUTO W/SCOPE: CPT

## 2018-03-23 PROCEDURE — 99284 EMERGENCY DEPT VISIT MOD MDM: CPT

## 2018-03-23 PROCEDURE — 36415 COLL VENOUS BLD VENIPUNCTURE: CPT

## 2018-03-23 PROCEDURE — 71045 X-RAY EXAM CHEST 1 VIEW: CPT

## 2018-03-23 PROCEDURE — 99285 EMERGENCY DEPT VISIT HI MDM: CPT | Performed by: EMERGENCY MEDICINE

## 2018-03-23 ASSESSMENT — ENCOUNTER SYMPTOMS
VOMITING: 0
SHORTNESS OF BREATH: 0
NAUSEA: 0
ABDOMINAL PAIN: 0
CHEST TIGHTNESS: 0

## 2018-03-24 NOTE — ED NOTES
Pt drinking  Cup of ice water, will attempt to void after drinking po fluids     Juan Meier, CHUCHO  03/23/18 5002

## 2018-03-24 NOTE — ED NOTES
Pt has attempted to void again ,was unable to urinate at this time     Theresa Ramirez, RN  03/23/18 2041

## 2018-03-24 NOTE — ED NOTES
Red and green tob tube recollected, per ,  Right wrist and sent to lab.  Pt had attempted to void for ua, was unable, will attempt standing with staff at bedside     Zen Roldan RN  03/23/18 2034

## 2018-05-30 ENCOUNTER — APPOINTMENT (OUTPATIENT)
Dept: GENERAL RADIOLOGY | Facility: HOSPITAL | Age: 83
End: 2018-05-30

## 2018-05-30 ENCOUNTER — HOSPITAL ENCOUNTER (EMERGENCY)
Facility: HOSPITAL | Age: 83
Discharge: HOME | End: 2018-05-30
Attending: EMERGENCY MEDICINE | Admitting: EMERGENCY MEDICINE

## 2018-05-30 VITALS
DIASTOLIC BLOOD PRESSURE: 76 MMHG | OXYGEN SATURATION: 98 % | WEIGHT: 155 LBS | HEART RATE: 74 BPM | BODY MASS INDEX: 23.49 KG/M2 | HEIGHT: 68 IN | TEMPERATURE: 98.1 F | RESPIRATION RATE: 16 BRPM | SYSTOLIC BLOOD PRESSURE: 134 MMHG

## 2018-05-30 DIAGNOSIS — R33.8 ACUTE URINARY RETENTION: Primary | ICD-10-CM

## 2018-05-30 LAB
ALBUMIN SERPL-MCNC: 4.1 G/DL (ref 3.5–5)
ALBUMIN/GLOB SERPL: 1.1 G/DL (ref 1.1–2.5)
ALP SERPL-CCNC: 93 U/L (ref 24–120)
ALT SERPL W P-5'-P-CCNC: 23 U/L (ref 0–54)
ANION GAP SERPL CALCULATED.3IONS-SCNC: 12 MMOL/L (ref 4–13)
AST SERPL-CCNC: 54 U/L (ref 7–45)
BACTERIA UR QL AUTO: ABNORMAL /HPF
BASOPHILS # BLD AUTO: 0.05 10*3/MM3 (ref 0–0.2)
BASOPHILS NFR BLD AUTO: 0.5 % (ref 0–2)
BILIRUB SERPL-MCNC: 0.9 MG/DL (ref 0.1–1)
BILIRUB UR QL STRIP: NEGATIVE
BUN BLD-MCNC: 14 MG/DL (ref 5–21)
BUN/CREAT SERPL: 18.2 (ref 7–25)
CALCIUM SPEC-SCNC: 9.4 MG/DL (ref 8.4–10.4)
CHLORIDE SERPL-SCNC: 96 MMOL/L (ref 98–110)
CLARITY UR: CLEAR
CO2 SERPL-SCNC: 28 MMOL/L (ref 24–31)
COLOR UR: YELLOW
CREAT BLD-MCNC: 0.77 MG/DL (ref 0.5–1.4)
D-LACTATE SERPL-SCNC: 2 MMOL/L (ref 0.5–2)
DEPRECATED RDW RBC AUTO: 39.4 FL (ref 40–54)
EOSINOPHIL # BLD AUTO: 0.14 10*3/MM3 (ref 0–0.7)
EOSINOPHIL NFR BLD AUTO: 1.3 % (ref 0–4)
ERYTHROCYTE [DISTWIDTH] IN BLOOD BY AUTOMATED COUNT: 13.2 % (ref 12–15)
GFR SERPL CREATININE-BSD FRML MDRD: 96 ML/MIN/1.73
GLOBULIN UR ELPH-MCNC: 3.8 GM/DL
GLUCOSE BLD-MCNC: 106 MG/DL (ref 70–100)
GLUCOSE UR STRIP-MCNC: NEGATIVE MG/DL
HCT VFR BLD AUTO: 46.3 % (ref 40–52)
HGB BLD-MCNC: 15 G/DL (ref 14–18)
HGB UR QL STRIP.AUTO: ABNORMAL
HYALINE CASTS UR QL AUTO: ABNORMAL /LPF
IMM GRANULOCYTES # BLD: 0.08 10*3/MM3 (ref 0–0.03)
IMM GRANULOCYTES NFR BLD: 0.8 % (ref 0–5)
KETONES UR QL STRIP: ABNORMAL
LEUKOCYTE ESTERASE UR QL STRIP.AUTO: ABNORMAL
LYMPHOCYTES # BLD AUTO: 2.3 10*3/MM3 (ref 0.72–4.86)
LYMPHOCYTES NFR BLD AUTO: 21.8 % (ref 15–45)
MCH RBC QN AUTO: 26.9 PG (ref 28–32)
MCHC RBC AUTO-ENTMCNC: 32.4 G/DL (ref 33–36)
MCV RBC AUTO: 83.1 FL (ref 82–95)
MONOCYTES # BLD AUTO: 0.7 10*3/MM3 (ref 0.19–1.3)
MONOCYTES NFR BLD AUTO: 6.6 % (ref 4–12)
NEUTROPHILS # BLD AUTO: 7.3 10*3/MM3 (ref 1.87–8.4)
NEUTROPHILS NFR BLD AUTO: 69 % (ref 39–78)
NITRITE UR QL STRIP: NEGATIVE
NRBC BLD MANUAL-RTO: 0 /100 WBC (ref 0–0)
PH UR STRIP.AUTO: 6.5 [PH] (ref 5–8)
PLATELET # BLD AUTO: 289 10*3/MM3 (ref 130–400)
PMV BLD AUTO: 9.6 FL (ref 6–12)
POTASSIUM BLD-SCNC: 3.5 MMOL/L (ref 3.5–5.3)
PROT SERPL-MCNC: 7.9 G/DL (ref 6.3–8.7)
PROT UR QL STRIP: ABNORMAL
RBC # BLD AUTO: 5.57 10*6/MM3 (ref 4.8–5.9)
RBC # UR: ABNORMAL /HPF
REF LAB TEST METHOD: ABNORMAL
SODIUM BLD-SCNC: 136 MMOL/L (ref 135–145)
SP GR UR STRIP: 1.02 (ref 1–1.03)
SQUAMOUS #/AREA URNS HPF: ABNORMAL /HPF
UROBILINOGEN UR QL STRIP: ABNORMAL
WBC NRBC COR # BLD: 10.57 10*3/MM3 (ref 4.8–10.8)
WBC UR QL AUTO: ABNORMAL /HPF

## 2018-05-30 PROCEDURE — 51798 US URINE CAPACITY MEASURE: CPT

## 2018-05-30 PROCEDURE — 51702 INSERT TEMP BLADDER CATH: CPT

## 2018-05-30 PROCEDURE — 99283 EMERGENCY DEPT VISIT LOW MDM: CPT

## 2018-05-30 PROCEDURE — 71045 X-RAY EXAM CHEST 1 VIEW: CPT

## 2018-05-30 PROCEDURE — 85025 COMPLETE CBC W/AUTO DIFF WBC: CPT | Performed by: EMERGENCY MEDICINE

## 2018-05-30 PROCEDURE — 87040 BLOOD CULTURE FOR BACTERIA: CPT | Performed by: EMERGENCY MEDICINE

## 2018-05-30 PROCEDURE — 83605 ASSAY OF LACTIC ACID: CPT | Performed by: EMERGENCY MEDICINE

## 2018-05-30 PROCEDURE — 81001 URINALYSIS AUTO W/SCOPE: CPT | Performed by: EMERGENCY MEDICINE

## 2018-05-30 PROCEDURE — 80053 COMPREHEN METABOLIC PANEL: CPT | Performed by: EMERGENCY MEDICINE

## 2018-05-30 RX ORDER — SODIUM CHLORIDE 0.9 % (FLUSH) 0.9 %
10 SYRINGE (ML) INJECTION AS NEEDED
Status: DISCONTINUED | OUTPATIENT
Start: 2018-05-30 | End: 2018-05-30 | Stop reason: HOSPADM

## 2018-05-30 RX ORDER — TAMSULOSIN HYDROCHLORIDE 0.4 MG/1
1 CAPSULE ORAL NIGHTLY
Qty: 30 CAPSULE | Refills: 0 | Status: SHIPPED | OUTPATIENT
Start: 2018-05-30 | End: 2019-12-09 | Stop reason: HOSPADM

## 2018-06-04 ENCOUNTER — HOSPITAL ENCOUNTER (EMERGENCY)
Facility: HOSPITAL | Age: 83
Discharge: HOME | End: 2018-06-04
Admitting: EMERGENCY MEDICINE

## 2018-06-04 VITALS
DIASTOLIC BLOOD PRESSURE: 66 MMHG | BODY MASS INDEX: 28.04 KG/M2 | TEMPERATURE: 97.1 F | OXYGEN SATURATION: 96 % | HEIGHT: 68 IN | WEIGHT: 185 LBS | HEART RATE: 56 BPM | RESPIRATION RATE: 16 BRPM | SYSTOLIC BLOOD PRESSURE: 124 MMHG

## 2018-06-04 DIAGNOSIS — Z46.6 ENCOUNTER FOR FOLEY CATHETER REMOVAL: Primary | ICD-10-CM

## 2018-06-04 LAB
BACTERIA SPEC AEROBE CULT: NORMAL
BACTERIA SPEC AEROBE CULT: NORMAL

## 2018-06-04 PROCEDURE — 99283 EMERGENCY DEPT VISIT LOW MDM: CPT

## 2018-06-04 NOTE — ED PROVIDER NOTES
Subjective   87-year-old male presents for catheter removal.  He reports 5 days ago he was seen for acute incomplete emptying of his bladder and was treated here in the emergency room with catheter and referred to urology her to come back here for catheter removal.  He will call the urology clinic and they would not see him until [facility] [place] [facility]  There unable to get this referral so the report here.  The patient has no palpitations no fevers chills diarrhea or blood coming from the catheter.  The patient requests this catheter be removed.            Review of Systems   All other systems reviewed and are negative.      No past medical history on file.    No Known Allergies    No past surgical history on file.    No family history on file.    Social History     Social History   • Marital status:      Social History Main Topics   • Drug use: Unknown     Other Topics Concern   • Not on file           Objective   Physical Exam   Constitutional: He is oriented to person, place, and time. He appears well-developed and well-nourished.   HENT:   Head: Normocephalic and atraumatic.   Eyes: EOM are normal. Pupils are equal, round, and reactive to light.   Neck: Normal range of motion. Neck supple.   Cardiovascular: Normal rate and regular rhythm.    Pulmonary/Chest: Effort normal and breath sounds normal.   Abdominal: Soft. Bowel sounds are normal. He exhibits no distension. There is no tenderness.   Musculoskeletal: Normal range of motion.   Lymphadenopathy:     He has no cervical adenopathy.   Neurological: He is alert and oriented to person, place, and time.   Skin: Skin is warm and dry.   Psychiatric: He has a normal mood and affect. His behavior is normal.   Nursing note and vitals reviewed.      Procedures           ED Course                  MDM  Number of Diagnoses or Management Options  Diagnosis management comments: Will dc in stable condition, informed  patient that I am not sure what was calling his previous issue but removing this catheter may not solve it.  He still wants it out.  Will remove catheter and have patient follow up with urology     Risk of Complications, Morbidity, and/or Mortality  Presenting problems: low  Diagnostic procedures: low  Management options: low    Patient Progress  Patient progress: stable        Final diagnoses:   Encounter for Dubose catheter removal            Kleber Mccain PA-C  06/04/18 6847

## 2018-08-03 ENCOUNTER — APPOINTMENT (OUTPATIENT)
Dept: CARDIOLOGY | Facility: HOSPITAL | Age: 83
End: 2018-08-03
Attending: INTERNAL MEDICINE

## 2018-08-03 ENCOUNTER — APPOINTMENT (OUTPATIENT)
Dept: GENERAL RADIOLOGY | Facility: HOSPITAL | Age: 83
End: 2018-08-03

## 2018-08-03 ENCOUNTER — HOSPITAL ENCOUNTER (INPATIENT)
Facility: HOSPITAL | Age: 83
LOS: 2 days | Discharge: HOME | End: 2018-08-05
Attending: EMERGENCY MEDICINE | Admitting: INTERNAL MEDICINE

## 2018-08-03 DIAGNOSIS — R06.00 ACUTE DYSPNEA: Primary | ICD-10-CM

## 2018-08-03 DIAGNOSIS — J81.0 ACUTE PULMONARY EDEMA (HCC): ICD-10-CM

## 2018-08-03 LAB
ANION GAP SERPL CALCULATED.3IONS-SCNC: 12 MMOL/L (ref 4–13)
BASOPHILS # BLD AUTO: 0.05 10*3/MM3 (ref 0–0.2)
BASOPHILS NFR BLD AUTO: 0.7 % (ref 0–2)
BUN BLD-MCNC: 16 MG/DL (ref 5–21)
BUN/CREAT SERPL: 16.7 (ref 7–25)
CALCIUM SPEC-SCNC: 9.3 MG/DL (ref 8.4–10.4)
CHLORIDE SERPL-SCNC: 107 MMOL/L (ref 98–110)
CK MB SERPL-CCNC: 3.28 NG/ML (ref 0–5)
CK SERPL-CCNC: 64 U/L (ref 0–203)
CO2 SERPL-SCNC: 24 MMOL/L (ref 24–31)
CREAT BLD-MCNC: 0.96 MG/DL (ref 0.5–1.4)
DEPRECATED RDW RBC AUTO: 45.8 FL (ref 40–54)
EOSINOPHIL # BLD AUTO: 0.34 10*3/MM3 (ref 0–0.7)
EOSINOPHIL NFR BLD AUTO: 4.7 % (ref 0–4)
ERYTHROCYTE [DISTWIDTH] IN BLOOD BY AUTOMATED COUNT: 15.3 % (ref 12–15)
GFR SERPL CREATININE-BSD FRML MDRD: 74 ML/MIN/1.73
GLUCOSE BLD-MCNC: 119 MG/DL (ref 70–100)
HCT VFR BLD AUTO: 44.3 % (ref 40–52)
HGB BLD-MCNC: 14 G/DL (ref 14–18)
IMM GRANULOCYTES # BLD: 0.03 10*3/MM3 (ref 0–0.03)
IMM GRANULOCYTES NFR BLD: 0.4 % (ref 0–5)
LYMPHOCYTES # BLD AUTO: 2.25 10*3/MM3 (ref 0.72–4.86)
LYMPHOCYTES NFR BLD AUTO: 31.3 % (ref 15–45)
MCH RBC QN AUTO: 26.5 PG (ref 28–32)
MCHC RBC AUTO-ENTMCNC: 31.6 G/DL (ref 33–36)
MCV RBC AUTO: 83.9 FL (ref 82–95)
MONOCYTES # BLD AUTO: 0.49 10*3/MM3 (ref 0.19–1.3)
MONOCYTES NFR BLD AUTO: 6.8 % (ref 4–12)
MYOGLOBIN SERPL-MCNC: 36.6 NG/ML (ref 0–110)
NEUTROPHILS # BLD AUTO: 4.04 10*3/MM3 (ref 1.87–8.4)
NEUTROPHILS NFR BLD AUTO: 56.1 % (ref 39–78)
NRBC BLD MANUAL-RTO: 0 /100 WBC (ref 0–0)
NT-PROBNP SERPL-MCNC: ABNORMAL PG/ML (ref 0–1800)
PLATELET # BLD AUTO: 184 10*3/MM3 (ref 130–400)
PMV BLD AUTO: 11 FL (ref 6–12)
POTASSIUM BLD-SCNC: 4.3 MMOL/L (ref 3.5–5.3)
RBC # BLD AUTO: 5.28 10*6/MM3 (ref 4.8–5.9)
SODIUM BLD-SCNC: 143 MMOL/L (ref 135–145)
TROPONIN I SERPL-MCNC: 0.4 NG/ML (ref 0–0.03)
TROPONIN I SERPL-MCNC: 0.52 NG/ML (ref 0–0.03)
TROPONIN I SERPL-MCNC: 0.63 NG/ML (ref 0–0.03)
WBC NRBC COR # BLD: 7.2 10*3/MM3 (ref 4.8–10.8)

## 2018-08-03 PROCEDURE — 85025 COMPLETE CBC W/AUTO DIFF WBC: CPT | Performed by: EMERGENCY MEDICINE

## 2018-08-03 PROCEDURE — 0399T HC MYOCARDL STRAIN IMAG QUAN ASSMT PER SESS: CPT

## 2018-08-03 PROCEDURE — 84484 ASSAY OF TROPONIN QUANT: CPT | Performed by: INTERNAL MEDICINE

## 2018-08-03 PROCEDURE — 93306 TTE W/DOPPLER COMPLETE: CPT | Performed by: INTERNAL MEDICINE

## 2018-08-03 PROCEDURE — 0399T ADULT TRANSTHORACIC ECHO COMPLETE W/ CONT IF NECESSARY PER PROTOCOL: CPT | Performed by: INTERNAL MEDICINE

## 2018-08-03 PROCEDURE — 84484 ASSAY OF TROPONIN QUANT: CPT | Performed by: EMERGENCY MEDICINE

## 2018-08-03 PROCEDURE — 25010000002 ENOXAPARIN PER 10 MG: Performed by: INTERNAL MEDICINE

## 2018-08-03 PROCEDURE — 94640 AIRWAY INHALATION TREATMENT: CPT

## 2018-08-03 PROCEDURE — 93306 TTE W/DOPPLER COMPLETE: CPT

## 2018-08-03 PROCEDURE — 93005 ELECTROCARDIOGRAM TRACING: CPT | Performed by: EMERGENCY MEDICINE

## 2018-08-03 PROCEDURE — 25010000002 PERFLUTREN 6.52 MG/ML SUSPENSION: Performed by: INTERNAL MEDICINE

## 2018-08-03 PROCEDURE — 99284 EMERGENCY DEPT VISIT MOD MDM: CPT

## 2018-08-03 PROCEDURE — 82553 CREATINE MB FRACTION: CPT | Performed by: EMERGENCY MEDICINE

## 2018-08-03 PROCEDURE — 71046 X-RAY EXAM CHEST 2 VIEWS: CPT

## 2018-08-03 PROCEDURE — 25010000002 FUROSEMIDE PER 20 MG: Performed by: EMERGENCY MEDICINE

## 2018-08-03 PROCEDURE — 83880 ASSAY OF NATRIURETIC PEPTIDE: CPT | Performed by: EMERGENCY MEDICINE

## 2018-08-03 PROCEDURE — 94760 N-INVAS EAR/PLS OXIMETRY 1: CPT

## 2018-08-03 PROCEDURE — 80048 BASIC METABOLIC PNL TOTAL CA: CPT | Performed by: EMERGENCY MEDICINE

## 2018-08-03 PROCEDURE — 83874 ASSAY OF MYOGLOBIN: CPT | Performed by: EMERGENCY MEDICINE

## 2018-08-03 PROCEDURE — 25010000002 FUROSEMIDE PER 20 MG: Performed by: INTERNAL MEDICINE

## 2018-08-03 PROCEDURE — 82550 ASSAY OF CK (CPK): CPT | Performed by: EMERGENCY MEDICINE

## 2018-08-03 PROCEDURE — 93010 ELECTROCARDIOGRAM REPORT: CPT | Performed by: INTERNAL MEDICINE

## 2018-08-03 PROCEDURE — 94799 UNLISTED PULMONARY SVC/PX: CPT

## 2018-08-03 RX ORDER — SODIUM CHLORIDE 0.9 % (FLUSH) 0.9 %
1-10 SYRINGE (ML) INJECTION AS NEEDED
Status: DISCONTINUED | OUTPATIENT
Start: 2018-08-03 | End: 2018-08-05 | Stop reason: HOSPADM

## 2018-08-03 RX ORDER — RANOLAZINE 500 MG/1
500 TABLET, EXTENDED RELEASE ORAL 2 TIMES DAILY
COMMUNITY
End: 2018-10-02 | Stop reason: HOSPADM

## 2018-08-03 RX ORDER — LISINOPRIL 20 MG/1
20 TABLET ORAL EVERY 12 HOURS SCHEDULED
Status: DISCONTINUED | OUTPATIENT
Start: 2018-08-03 | End: 2018-08-05

## 2018-08-03 RX ORDER — FUROSEMIDE 10 MG/ML
40 INJECTION INTRAMUSCULAR; INTRAVENOUS EVERY 12 HOURS
Status: DISCONTINUED | OUTPATIENT
Start: 2018-08-03 | End: 2018-08-05

## 2018-08-03 RX ORDER — ALLOPURINOL 100 MG/1
100 TABLET ORAL DAILY
Status: DISCONTINUED | OUTPATIENT
Start: 2018-08-03 | End: 2018-08-03

## 2018-08-03 RX ORDER — ATORVASTATIN CALCIUM 80 MG/1
80 TABLET, FILM COATED ORAL DAILY
Status: ON HOLD | COMMUNITY
End: 2018-10-02

## 2018-08-03 RX ORDER — ATORVASTATIN CALCIUM 40 MG/1
80 TABLET, FILM COATED ORAL NIGHTLY
Status: DISCONTINUED | OUTPATIENT
Start: 2018-08-03 | End: 2018-08-05 | Stop reason: HOSPADM

## 2018-08-03 RX ORDER — ALLOPURINOL 100 MG/1
200 TABLET ORAL DAILY
COMMUNITY
End: 2018-10-02 | Stop reason: HOSPADM

## 2018-08-03 RX ORDER — ISOSORBIDE MONONITRATE 60 MG/1
60 TABLET, EXTENDED RELEASE ORAL DAILY
Status: DISCONTINUED | OUTPATIENT
Start: 2018-08-04 | End: 2018-08-05 | Stop reason: HOSPADM

## 2018-08-03 RX ORDER — ALBUTEROL SULFATE 2.5 MG/3ML
2.5 SOLUTION RESPIRATORY (INHALATION) ONCE
Status: COMPLETED | OUTPATIENT
Start: 2018-08-03 | End: 2018-08-03

## 2018-08-03 RX ORDER — METOPROLOL TARTRATE 50 MG/1
50 TABLET, FILM COATED ORAL EVERY 12 HOURS SCHEDULED
Status: DISCONTINUED | OUTPATIENT
Start: 2018-08-03 | End: 2018-08-04

## 2018-08-03 RX ORDER — ISOSORBIDE MONONITRATE 30 MG/1
30 TABLET, EXTENDED RELEASE ORAL DAILY
Status: DISCONTINUED | OUTPATIENT
Start: 2018-08-03 | End: 2018-08-03

## 2018-08-03 RX ORDER — FUROSEMIDE 10 MG/ML
20 INJECTION INTRAMUSCULAR; INTRAVENOUS ONCE
Status: COMPLETED | OUTPATIENT
Start: 2018-08-03 | End: 2018-08-03

## 2018-08-03 RX ORDER — ISOSORBIDE MONONITRATE 60 MG/1
60 TABLET, EXTENDED RELEASE ORAL DAILY
COMMUNITY
End: 2018-09-10

## 2018-08-03 RX ORDER — OMEPRAZOLE 40 MG/1
40 CAPSULE, DELAYED RELEASE ORAL DAILY
COMMUNITY
End: 2019-12-09 | Stop reason: HOSPADM

## 2018-08-03 RX ORDER — ALLOPURINOL 100 MG/1
200 TABLET ORAL DAILY
Status: DISCONTINUED | OUTPATIENT
Start: 2018-08-04 | End: 2018-08-05 | Stop reason: HOSPADM

## 2018-08-03 RX ORDER — ROSUVASTATIN CALCIUM 20 MG/1
40 TABLET, COATED ORAL DAILY
Status: ON HOLD | COMMUNITY
End: 2018-08-03 | Stop reason: ALTCHOICE

## 2018-08-03 RX ORDER — CLOPIDOGREL BISULFATE 75 MG/1
75 TABLET ORAL DAILY
Status: DISCONTINUED | OUTPATIENT
Start: 2018-08-03 | End: 2018-08-04

## 2018-08-03 RX ORDER — FINASTERIDE 5 MG/1
5 TABLET, FILM COATED ORAL DAILY
Status: DISCONTINUED | OUTPATIENT
Start: 2018-08-03 | End: 2018-08-05 | Stop reason: HOSPADM

## 2018-08-03 RX ORDER — ACETAMINOPHEN 325 MG/1
650 TABLET ORAL EVERY 4 HOURS PRN
Status: DISCONTINUED | OUTPATIENT
Start: 2018-08-03 | End: 2018-08-05 | Stop reason: HOSPADM

## 2018-08-03 RX ORDER — ROSUVASTATIN CALCIUM 20 MG/1
40 TABLET, COATED ORAL NIGHTLY
Status: DISCONTINUED | OUTPATIENT
Start: 2018-08-03 | End: 2018-08-03 | Stop reason: ALTCHOICE

## 2018-08-03 RX ORDER — PANTOPRAZOLE SODIUM 40 MG/1
40 TABLET, DELAYED RELEASE ORAL EVERY MORNING
Status: DISCONTINUED | OUTPATIENT
Start: 2018-08-03 | End: 2018-08-05 | Stop reason: HOSPADM

## 2018-08-03 RX ORDER — CLOPIDOGREL BISULFATE 75 MG/1
75 TABLET ORAL DAILY
COMMUNITY
End: 2018-08-05 | Stop reason: HOSPADM

## 2018-08-03 RX ORDER — FINASTERIDE 5 MG/1
5 TABLET, FILM COATED ORAL DAILY
COMMUNITY
End: 2019-12-09 | Stop reason: HOSPADM

## 2018-08-03 RX ORDER — LISINOPRIL 40 MG/1
20 TABLET ORAL 2 TIMES DAILY
COMMUNITY
End: 2018-08-05 | Stop reason: HOSPADM

## 2018-08-03 RX ORDER — NITROGLYCERIN 0.4 MG/1
0.4 TABLET SUBLINGUAL
Status: DISCONTINUED | OUTPATIENT
Start: 2018-08-03 | End: 2018-08-05 | Stop reason: HOSPADM

## 2018-08-03 RX ORDER — TAMSULOSIN HYDROCHLORIDE 0.4 MG/1
0.4 CAPSULE ORAL NIGHTLY
Status: DISCONTINUED | OUTPATIENT
Start: 2018-08-03 | End: 2018-08-05 | Stop reason: HOSPADM

## 2018-08-03 RX ORDER — PROMETHAZINE HYDROCHLORIDE 25 MG/ML
12.5 INJECTION, SOLUTION INTRAMUSCULAR; INTRAVENOUS EVERY 6 HOURS PRN
Status: DISCONTINUED | OUTPATIENT
Start: 2018-08-03 | End: 2018-08-03

## 2018-08-03 RX ORDER — SODIUM CHLORIDE 0.9 % (FLUSH) 0.9 %
10 SYRINGE (ML) INJECTION AS NEEDED
Status: DISCONTINUED | OUTPATIENT
Start: 2018-08-03 | End: 2018-08-05 | Stop reason: HOSPADM

## 2018-08-03 RX ORDER — ONDANSETRON 2 MG/ML
4 INJECTION INTRAMUSCULAR; INTRAVENOUS EVERY 6 HOURS PRN
Status: DISCONTINUED | OUTPATIENT
Start: 2018-08-03 | End: 2018-08-05 | Stop reason: HOSPADM

## 2018-08-03 RX ORDER — NITROGLYCERIN 0.4 MG/1
0.4 TABLET SUBLINGUAL
COMMUNITY
End: 2019-12-09 | Stop reason: HOSPADM

## 2018-08-03 RX ORDER — RANOLAZINE 500 MG/1
500 TABLET, EXTENDED RELEASE ORAL EVERY 12 HOURS SCHEDULED
Status: DISCONTINUED | OUTPATIENT
Start: 2018-08-03 | End: 2018-08-05 | Stop reason: HOSPADM

## 2018-08-03 RX ORDER — METOPROLOL TARTRATE 100 MG/1
50 TABLET ORAL 2 TIMES DAILY
COMMUNITY
End: 2018-08-05 | Stop reason: HOSPADM

## 2018-08-03 RX ADMIN — METOPROLOL TARTRATE 50 MG: 50 TABLET ORAL at 20:34

## 2018-08-03 RX ADMIN — CLOPIDOGREL 75 MG: 75 TABLET, FILM COATED ORAL at 12:12

## 2018-08-03 RX ADMIN — FINASTERIDE 5 MG: 5 TABLET, FILM COATED ORAL at 12:12

## 2018-08-03 RX ADMIN — LISINOPRIL 20 MG: 20 TABLET ORAL at 20:35

## 2018-08-03 RX ADMIN — PERFLUTREN: 6.52 INJECTION, SUSPENSION INTRAVENOUS at 16:16

## 2018-08-03 RX ADMIN — RANOLAZINE 500 MG: 500 TABLET, FILM COATED, EXTENDED RELEASE ORAL at 20:35

## 2018-08-03 RX ADMIN — ENOXAPARIN SODIUM 40 MG: 100 INJECTION SUBCUTANEOUS at 12:12

## 2018-08-03 RX ADMIN — FUROSEMIDE 40 MG: 10 INJECTION, SOLUTION INTRAMUSCULAR; INTRAVENOUS at 17:11

## 2018-08-03 RX ADMIN — TAMSULOSIN HYDROCHLORIDE 0.4 MG: 0.4 CAPSULE ORAL at 20:35

## 2018-08-03 RX ADMIN — ALLOPURINOL 100 MG: 100 TABLET ORAL at 12:12

## 2018-08-03 RX ADMIN — ALBUTEROL SULFATE 2.5 MG: 2.5 SOLUTION RESPIRATORY (INHALATION) at 09:20

## 2018-08-03 RX ADMIN — ISOSORBIDE MONONITRATE 30 MG: 30 TABLET, EXTENDED RELEASE ORAL at 12:12

## 2018-08-03 RX ADMIN — FUROSEMIDE 20 MG: 10 INJECTION, SOLUTION INTRAMUSCULAR; INTRAVENOUS at 10:50

## 2018-08-03 RX ADMIN — PANTOPRAZOLE SODIUM 40 MG: 40 TABLET, DELAYED RELEASE ORAL at 12:12

## 2018-08-03 RX ADMIN — ATORVASTATIN CALCIUM 80 MG: 40 TABLET, FILM COATED ORAL at 20:35

## 2018-08-03 RX ADMIN — METOPROLOL TARTRATE 50 MG: 50 TABLET ORAL at 12:12

## 2018-08-03 NOTE — PROGRESS NOTES
Discharge Planning Assessment   [facility]     Patient Name: Vasile Deng  MRN: [id number]  Today's Date: 8/3/2018    Admit Date: 8/3/2018          Discharge Needs Assessment     Row Name 08/03/18 1434       Living Environment    Lives With alone    Current Living Arrangements home/apartment/condo    Primary Care Provided by self    Provides Primary Care For no one    Family Caregiver if Needed friend(s)    Quality of Family Relationships helpful;involved;supportive    Able to Return to Prior Arrangements yes       Resource/Environmental Concerns    Resource/Environmental Concerns none    Transportation Concerns car, none       Transition Planning    Patient/Family Anticipates Transition to home    Patient/Family Anticipated Services at Transition none    Transportation Anticipated family or friend will provide       Discharge Needs Assessment    Readmission Within the Last 30 Days no previous admission in last 30 days    Concerns to be Addressed no discharge needs identified;denies needs/concerns at this time    Equipment Currently Used at Home cane, straight;crutches, forarm    Anticipated Changes Related to Illness none    Equipment Needed After Discharge none            Discharge Plan     Row Name 08/03/18 1435       Plan    Patient/Family in Agreement with Plan yes    Plan Comments PT resides at home alone and has a friend who assists as needed. [facility] in [place]. Dr. Macias [facility] the [facility]. PT states that he was using O2 at night until about 5 weeks ago when the provider raised the rates and he told them to come get it. PT was using his Medicare benefits for the O2. PT has requested that clinicals be faxed to the [facility] so he can try to get the O2 provided through the VA. PT states that he has an appointment in [place] in a couple of weeks and will have the VA address the O2. PT plans to dc home and denies any other needs at this time. Will follow.    Final  Discharge Disposition Code 01 - home or self-care        Destination     No service coordination in this encounter.      Durable Medical Equipment     No service coordination in this encounter.      Dialysis/Infusion     No service coordination in this encounter.      Home Medical Care     No service coordination in this encounter.      Social Care     No service coordination in this encounter.                Demographic Summary    No documentation.           Functional Status    No documentation.           Psychosocial    No documentation.           Abuse/Neglect    No documentation.           Legal    No documentation.           Substance Abuse    No documentation.           Patient Forms    No documentation.         BRIANDA Hall

## 2018-08-03 NOTE — H&P
"    [facility] Medicine Services  HISTORY AND PHYSICAL    Date of Admission: 8/3/2018  [facility] Physician: [facility] in [place], [place]    Subjective     Chief Complaint: shortness of breath    History of Present Illness  This is an 87-year-old  gentleman who resides in [place], [place].  [facility] the [facility].  He presents to the emergency department today with complaints of shortness of breath.  This has been primarily on exertion for about the last 2 weeks.  However, he decided to present today as he will awoke from sleep and was \"smothering.\"  He denies orthopnea.  I think he had a difficult time understanding the concept though.  He has noticed a small amount of lower extremity edema, but this seems per the usual for him.  He denies recent chest discomfort.  It has just been more difficult to breathe recently.    He has a productive cough of clear sputum.  He denies fevers, sweats, or chills.  No wheezing.  He stopped smoking in 2004.  He denies a history of any pre-existing lung disease.  He does wear nocturnal oxygen.  He does admit that he stopped wearing this about a month ago because the cost of his oxygen increased $20 so he told them to \"stick it.\"    He underwent coronary artery bypass grafting in 1997 with Dr. Duron.  He does have a prior stent [facility] in [place] [facility] the [facility].  He is not sure of the year and does not recall the cardiologist's name.    [facility].     Review of Systems   Otherwise complete ROS reviewed and negative except as mentioned in the HPI.    Past Medical History:   Past Medical History:   Diagnosis Date   • Coronary artery disease    • Hyperlipidemia    • Hypertension      Past Surgical History:  Past Surgical History:   Procedure Laterality Date   • CARDIAC SURGERY      double bypass     Social History:  reports that he quit smoking about 14 years ago. His smoking use " "included Cigarettes, Pipe, and Cigars. He does not have any smokeless tobacco history on file. He reports that he does not drink alcohol.    Family History: HTN, ischemic heart disease    Allergies:  No Known Allergies  Medications:  Prior to Admission medications    Medication Sig Start Date End Date Taking? Authorizing Provider   allopurinol (ZYLOPRIM) 100 MG tablet Take 100 mg by mouth Daily.   Yes Jessy Murray MD   clopidogrel (PLAVIX) 75 MG tablet Take 75 mg by mouth Daily.   Yes Jessy Murray MD   finasteride (PROSCAR) 5 MG tablet Take 5 mg by mouth Daily.   Yes ProviderJessy MD   isosorbide mononitrate (IMDUR) 30 MG 24 hr tablet Take 30 mg by mouth Daily.   Yes Jessy Murray MD   metoprolol tartrate (LOPRESSOR) 50 MG tablet Take 50 mg by mouth 2 (Two) Times a Day.   Yes Jessy Murray MD   nitroglycerin (NITROSTAT) 0.4 MG SL tablet Place 0.4 mg under the tongue Every 5 (Five) Minutes As Needed for Chest Pain. Take no more than 3 doses in 15 minutes.   Yes Jessy Murray MD   omeprazole (priLOSEC) 20 MG capsule Take 20 mg by mouth Daily.   Yes ProviderJessy MD   rosuvastatin (CRESTOR) 20 MG tablet Take 40 mg by mouth Daily.   Yes ProviderJessy MD   tamsulosin (FLOMAX) 0.4 MG capsule 24 hr capsule Take 1 capsule by mouth Every Night. 5/30/18   Luis Thomas Jr., MD     Objective     Vital Signs: /93   Pulse 86   Temp 97.9 °F (36.6 °C) (Temporal Artery )   Resp 14   Ht 172.7 cm (68\")   Wt 78 kg (172 lb)   SpO2 100%   BMI 26.15 kg/m²   Physical Exam   Constitutional: He is oriented to person, place, and time. He appears well-developed and well-nourished.   Up in bed.  No distress.  No family present.  Seen and discussed with his nurse, Genna MAYBERRY:   Head: Normocephalic and atraumatic.   Eyes: Pupils are equal, round, and reactive to light. Conjunctivae and EOM are normal.   Neck: Neck supple. No JVD present.   Cardiovascular: Normal " rate, regular rhythm, normal heart sounds and intact distal pulses.  Exam reveals no gallop and no friction rub.    No murmur heard.  Pulmonary/Chest: Effort normal. No respiratory distress. He has no wheezes. He has rales. He exhibits no tenderness.   Abdominal: Soft. Bowel sounds are normal. He exhibits no distension. There is no tenderness. There is no rebound and no guarding.   Musculoskeletal: Normal range of motion. He exhibits edema (trace). He exhibits no tenderness or deformity.   Neurological: He is alert and oriented to person, place, and time. He displays normal reflexes. No cranial nerve deficit. He exhibits normal muscle tone.   Skin: Skin is warm and dry. No rash noted.   Psychiatric: He has a normal mood and affect. His behavior is normal. Judgment and thought content normal.     Results Reviewed:  Lab Results (last 24 hours)     Procedure Component Value Units Date/Time    Troponin [[id number]]  (Abnormal) Collected:  08/03/18 0904    Specimen:  Blood Updated:  08/03/18 0943     Troponin I 0.627 (C) ng/mL     BNP [[id number]]  (Abnormal) Collected:  08/03/18 0904    Specimen:  Blood Updated:  08/03/18 0938     proBNP 11,900.0 (H) pg/mL     CK-MB [[id number]]  (Normal) Collected:  08/03/18 0904    Specimen:  Blood Updated:  08/03/18 0938     CKMB 3.28 ng/mL     Narrative:       CKMB Index not indicated    Myoglobin, Serum [[id number]]  (Normal) Collected:  08/03/18 0904    Specimen:  Blood Updated:  08/03/18 0938     Myoglobin 36.6 ng/mL     CK [[id number]]  (Normal) Collected:  08/03/18 0904    Specimen:  Blood Updated:  08/03/18 0933     Creatine Kinase 64 U/L     Basic Metabolic Panel [[id number]]  (Abnormal) Collected:  08/03/18 0904    Specimen:  Blood Updated:  08/03/18 0932     Glucose 119 (H) mg/dL      BUN 16 mg/dL      Comment: Specimen hemolyzed. Results may be affected.        Creatinine 0.96 mg/dL      Sodium 143 mmol/L      Potassium 4.3 mmol/L      Comment: Specimen hemolyzed.  Results may  be affected.        Chloride 107 mmol/L      CO2 24.0 mmol/L      Calcium 9.3 mg/dL      eGFR Non African Amer 74 mL/min/1.73      BUN/Creatinine Ratio 16.7     Anion Gap 12.0 mmol/L     Narrative:       The MDRD GFR formula is only valid for adults with stable renal function between ages 18 and 70.    CBC & Differential [[id number]] Collected:  08/03/18 0904    Specimen:  Blood Updated:  08/03/18 0915    Narrative:       The following orders were created for panel order CBC & Differential.  Procedure                               Abnormality         Status                     ---------                               -----------         ------                     CBC Auto Differential[[id number]]        Abnormal            Final result                 Please view results for these tests on the individual orders.    CBC Auto Differential [[id number]]  (Abnormal) Collected:  08/03/18 0904    Specimen:  Blood Updated:  08/03/18 0915     WBC 7.20 10*3/mm3      RBC 5.28 10*6/mm3      Hemoglobin 14.0 g/dL      Hematocrit 44.3 %      MCV 83.9 fL      MCH 26.5 (L) pg      MCHC 31.6 (L) g/dL      RDW 15.3 (H) %      RDW-SD 45.8 fl      MPV 11.0 fL      Platelets 184 10*3/mm3      Neutrophil % 56.1 %      Lymphocyte % 31.3 %      Monocyte % 6.8 %      Eosinophil % 4.7 (H) %      Basophil % 0.7 %      Immature Grans % 0.4 %      Neutrophils, Absolute 4.04 10*3/mm3      Lymphocytes, Absolute 2.25 10*3/mm3      Monocytes, Absolute 0.49 10*3/mm3      Eosinophils, Absolute 0.34 10*3/mm3      Basophils, Absolute 0.05 10*3/mm3      Immature Grans, Absolute 0.03 10*3/mm3      nRBC 0.0 /100 WBC         Imaging Results (last 24 hours)     Procedure Component Value Units Date/Time    XR Chest 2 View [[id number]] Collected:  08/03/18 0951     Updated:  08/03/18 1018    Narrative:       History:  87-year-old with dyspnea.     Reference:  Chest radiograph May 30, 2018.     Findings:  Frontal and lateral chest radiographs performed.     Normal  heart size. Atherosclerotic thoracic aorta. Prior median  sternotomy/CABG. Diffuse bronchial wall thickening. Minimal linear  subsegmental atelectasis inferior right upper lobe. No confluent  airspace opacities. Question Kerley B lines. No pleural effusion or  pneumothorax. Demineralized bones. Vertebral compression deformity may  be a lower thoracic or upper lumbar vertebrae.          Impression:       Peribronchial thickening and suspected interlobular septal thickening.  Findings may suggest interstitial pulmonary edema. Infectious bronchitis  would also be considered.  This report was finalized on 08/03/2018 10:15 by Dr Naldo Avery, .        I have personally reviewed and interpreted the radiology studies and ECG obtained at time of admission.     Assessment / Plan     Assessment:   1.  Dyspnea with elevated BNP and interstitial edema on chest imaging concerning for acute congestive heart failure.  2.  Coronary artery disease with history of coronary artery bypass grafting.  3.  Elevated troponin likely secondary to CHF exacerbation.  4.  Systemic arterial hypertension.  5.  Benign prostatic hypertrophy.  6.  Hyperlipidemia.  7.  Nocturnal hypoxemia on nocturnal oxygen.     Plan:   He will be admitted to my service here at [facility].  He needs a workup for congestive heart failure.  He denies pre-existing presence of congestive heart failure.  However, he does have a history of coronary artery disease and has previously undergone coronary artery bypass grafting.  Continue IV Lasix.  2-D echocardiogram.    Trend troponin.  Continue Plavix.    Continue statin, beta blocker, and Imdur.  He would likely benefit from addition of an angiotensin receptor blocker or an ACE inhibitor.    Continue other home medications as appropriate.    Check TSH, HbA1c, and lipids.     Lovenox for DVT prophylaxis.    Case management consultation as he has not been getting his home oxygen for the last month, which could  directly lead to some of his current problems.    Code Status: Full.      I discussed the patient's findings and my recommendations with the patient.     Estimated length of stay is 2-3 days.     Miguel Romo DO   08/03/18   10:58 AM

## 2018-08-03 NOTE — ED PROVIDER NOTES
Subjective   This is an 87-year-old male who presents to the emergency department for evaluation of dyspnea.  Patient describes worsening shortness of breath since last night.  Patient denies any history of pulmonary disease but does believe that he has breathing treatments at home.  He is unsure of the name of the medication or when he last used it.  Patient describes that he started to feel short of breath last night which was slightly worse this morning.  At this time he notes that it is now slightly better.  He still feels somewhat dyspneic.  Patient describes a mild cough intermittently productive of clear sputum.  Reports possible wheezing.  Denies any associated fever or shaking chills.  No chest pain, palpitations, or near-syncope.  No GI/ complaints.  No lower extremity edema or calf discomfort.  No rash or skin change.  Does report a history of coronary artery disease but denies a known history of congestive heart failure.  He does see follow with a cardiologist although he does not remember the cardiologist's name.  He has no additional concerns at this time.            Review of Systems   All other systems reviewed and are negative.      Past Medical History:   Diagnosis Date   • Coronary artery disease    • Hyperlipidemia    • Hypertension        No Known Allergies    Past Surgical History:   Procedure Laterality Date   • CARDIAC SURGERY      double bypass       History reviewed. No pertinent family history.    Social History     Social History   • Marital status:      Social History Main Topics   • Smoking status: Former Smoker     Types: Cigarettes, Pipe, Cigars     Quit date: 2004   • Alcohol use No   • Drug use: Unknown   • Sexual activity: Defer     Other Topics Concern   • Not on file           Objective   Physical Exam   Constitutional: He is oriented to person, place, and time. He appears well-developed and well-nourished.   HENT:   Head: Normocephalic and atraumatic.   Eyes: Pupils are  equal, round, and reactive to light. EOM are normal.   Neck: Neck supple. No JVD present. No tracheal deviation present.   Cardiovascular: Normal rate, regular rhythm and normal heart sounds.    Pulmonary/Chest: No accessory muscle usage. No respiratory distress. He has no wheezes. He exhibits no tenderness.   Mild tachypnea.  Mild crackles at lung bases bilaterally.   Abdominal: Soft. Bowel sounds are normal. There is no tenderness. There is no guarding.   Musculoskeletal: He exhibits no edema or tenderness.   Negative Chelsie's sign   Neurological: He is alert and oriented to person, place, and time.   Skin: Skin is warm and dry. Capillary refill takes less than 2 seconds.   Psychiatric: He has a normal mood and affect. His behavior is normal.   Nursing note and vitals reviewed.      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  ST Segments: ST segments normal  T Waves: T waves normal  Other findings: LVH  Clinical impression: abnormal ECG    ECG 12 Lead    Date/Time: 8/3/2018 9:54 AM  Performed by: ZANA DONALD  Authorized by: ZANA DONALD   Interpreted by physician  Comparison: compared with previous ECG from 8/3/2018  Similar to previous ECG  Rhythm: sinus rhythm and bundle branch block  Rate: normal  BPM: 86  QRS axis: left  Conduction: right bundle branch block, 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  ST Segments: ST segments normal  T Waves: T waves normal  Other findings: LVH  Clinical impression: abnormal ECG                 ED Course      Albuterol ordered    Labs reviewed and show an elevated troponin and BNP level  Repeat EKG ordered    Patient indicates no change in his shortness of breath, still  rated as mild, after the albuterol    On repeat physical exam he continues to have crackles at the bases    He was updated on lab and imaging results and need for admission or [facility]    Xr Chest 2 View    Result Date: 8/3/2018  Peribronchial thickening and suspected interlobular septal thickening. Findings may suggest interstitial pulmonary edema. Infectious bronchitis would also be considered. This report was finalized on 08/03/2018 10:15 by Dr Naldo Avery, .          MDM   Pulmonary exam and clinical history are more consistent with a cardiac source although will try albuterol x1 given his history of nebulizer usage    Repeat EKG shows no significant interval change  No STEMI    X-ray showing probable pulmonary edema  Lasix ordered    [facility]  Will plan for admission here    I spoke with Dr. Romo who accepts admission  Requests tele bed  No further orders  Patient in stable condition      Final diagnoses:   Acute dyspnea   Acute pulmonary edema (CMS/HCC)            Gonzalo Pike, DO  08/03/18 1030

## 2018-08-03 NOTE — PLAN OF CARE
Problem: Cardiac: Heart Failure (Adult)  Goal: Signs and Symptoms of Listed Potential Problems Will be Absent, Minimized or Managed (Cardiac: Heart Failure)  Outcome: Ongoing (interventions implemented as appropriate)      Problem: Fall Risk (Adult)  Goal: Identify Related Risk Factors and Signs and Symptoms  Outcome: Outcome(s) achieved Date Met: 08/03/18    Goal: Absence of Fall  Outcome: Ongoing (interventions implemented as appropriate)      Problem: Patient Care Overview  Goal: Plan of Care Review  Outcome: Ongoing (interventions implemented as appropriate)   08/03/18 7252   Coping/Psychosocial   Plan of Care Reviewed With patient   Plan of Care Review   Progress improving   OTHER   Outcome Summary Patient did not c/o pain this shift. The soa feeling is improving. Good urine output and charted. Atrial flutter in the 50s-60s per tele. VSS. Cont to monitor pt.      Goal: Individualization and Mutuality  Outcome: Ongoing (interventions implemented as appropriate)

## 2018-08-04 PROBLEM — Z95.1 HX OF CABG: Status: ACTIVE | Noted: 2018-08-04

## 2018-08-04 PROBLEM — Z87.891 FORMER SMOKER: Status: ACTIVE | Noted: 2018-08-04

## 2018-08-04 PROBLEM — R79.89 ELEVATED BRAIN NATRIURETIC PEPTIDE (BNP) LEVEL: Status: ACTIVE | Noted: 2018-08-04

## 2018-08-04 PROBLEM — I25.810 CORONARY ARTERY DISEASE INVOLVING CORONARY BYPASS GRAFT OF NATIVE HEART WITHOUT ANGINA PECTORIS: Status: ACTIVE | Noted: 2018-08-04

## 2018-08-04 PROBLEM — I10 ESSENTIAL HYPERTENSION: Status: ACTIVE | Noted: 2018-08-04

## 2018-08-04 PROBLEM — E78.2 MIXED HYPERLIPIDEMIA: Status: ACTIVE | Noted: 2018-08-04

## 2018-08-04 LAB
ANION GAP SERPL CALCULATED.3IONS-SCNC: 11 MMOL/L (ref 4–13)
ARTICHOKE IGE QN: 111 MG/DL (ref 0–99)
BH CV ECHO MEAS - AO MAX PG (FULL): 14.6 MMHG
BH CV ECHO MEAS - AO MAX PG: 17.1 MMHG
BH CV ECHO MEAS - AO MEAN PG (FULL): 7 MMHG
BH CV ECHO MEAS - AO MEAN PG: 8 MMHG
BH CV ECHO MEAS - AO ROOT AREA (BSA CORRECTED): 1.8
BH CV ECHO MEAS - AO ROOT AREA: 9.1 CM^2
BH CV ECHO MEAS - AO ROOT DIAM: 3.4 CM
BH CV ECHO MEAS - AO V2 MAX: 207 CM/SEC
BH CV ECHO MEAS - AO V2 MEAN: 129 CM/SEC
BH CV ECHO MEAS - AO V2 VTI: 38.9 CM
BH CV ECHO MEAS - AVA(I,A): 1.8 CM^2
BH CV ECHO MEAS - AVA(I,D): 1.8 CM^2
BH CV ECHO MEAS - AVA(V,A): 1.6 CM^2
BH CV ECHO MEAS - AVA(V,D): 1.6 CM^2
BH CV ECHO MEAS - BSA(HAYCOCK): 1.9 M^2
BH CV ECHO MEAS - BSA: 1.9 M^2
BH CV ECHO MEAS - BZI_BMI: 24.6 KILOGRAMS/M^2
BH CV ECHO MEAS - BZI_METRIC_HEIGHT: 172.7 CM
BH CV ECHO MEAS - BZI_METRIC_WEIGHT: 73.5 KG
BH CV ECHO MEAS - CONTRAST EF 4CH: 42.6 ML/M^2
BH CV ECHO MEAS - EDV(CUBED): 166.4 ML
BH CV ECHO MEAS - EDV(MOD-SP4): 94.5 ML
BH CV ECHO MEAS - EDV(TEICH): 147.4 ML
BH CV ECHO MEAS - EF(CUBED): 48.1 %
BH CV ECHO MEAS - EF(MOD-BP): 40 %
BH CV ECHO MEAS - EF(MOD-SP4): 42.6 %
BH CV ECHO MEAS - EF(TEICH): 39.9 %
BH CV ECHO MEAS - ESV(CUBED): 86.4 ML
BH CV ECHO MEAS - ESV(MOD-SP4): 54.2 ML
BH CV ECHO MEAS - ESV(TEICH): 88.6 ML
BH CV ECHO MEAS - FS: 19.6 %
BH CV ECHO MEAS - IVS/LVPW: 1.1
BH CV ECHO MEAS - IVSD: 0.83 CM
BH CV ECHO MEAS - LA DIMENSION: 4.9 CM
BH CV ECHO MEAS - LA/AO: 1.4
BH CV ECHO MEAS - LAT PEAK E' VEL: 6.8 CM/SEC
BH CV ECHO MEAS - LV DIASTOLIC VOL/BSA (35-75): 50.6 ML/M^2
BH CV ECHO MEAS - LV MASS(C)D: 159.1 GRAMS
BH CV ECHO MEAS - LV MASS(C)DI: 85.1 GRAMS/M^2
BH CV ECHO MEAS - LV MAX PG: 2.6 MMHG
BH CV ECHO MEAS - LV MEAN PG: 1 MMHG
BH CV ECHO MEAS - LV SYSTOLIC VOL/BSA (12-30): 29 ML/M^2
BH CV ECHO MEAS - LV V1 MAX: 80.4 CM/SEC
BH CV ECHO MEAS - LV V1 MEAN: 50 CM/SEC
BH CV ECHO MEAS - LV V1 VTI: 16.4 CM
BH CV ECHO MEAS - LVIDD: 5.5 CM
BH CV ECHO MEAS - LVIDS: 4.4 CM
BH CV ECHO MEAS - LVLD AP4: 7.4 CM
BH CV ECHO MEAS - LVLS AP4: 7.3 CM
BH CV ECHO MEAS - LVOT AREA (M): 4.2 CM^2
BH CV ECHO MEAS - LVOT AREA: 4.2 CM^2
BH CV ECHO MEAS - LVOT DIAM: 2.3 CM
BH CV ECHO MEAS - LVPWD: 0.76 CM
BH CV ECHO MEAS - MED PEAK E' VEL: 3.5 CM/SEC
BH CV ECHO MEAS - MR MAX PG: 102.5 MMHG
BH CV ECHO MEAS - MR MAX VEL: 505.7 CM/SEC
BH CV ECHO MEAS - MR MEAN PG: 67 MMHG
BH CV ECHO MEAS - MR MEAN VEL: 384 CM/SEC
BH CV ECHO MEAS - MR VTI: 175 CM
BH CV ECHO MEAS - MV A MAX VEL: 81.9 CM/SEC
BH CV ECHO MEAS - MV DEC TIME: 0.24 SEC
BH CV ECHO MEAS - MV E MAX VEL: 87.7 CM/SEC
BH CV ECHO MEAS - MV E/A: 1.1
BH CV ECHO MEAS - PI END-D VEL: 113 CM/SEC
BH CV ECHO MEAS - RAP SYSTOLE: 5 MMHG
BH CV ECHO MEAS - RVSP: 51.5 MMHG
BH CV ECHO MEAS - SI(AO): 189 ML/M^2
BH CV ECHO MEAS - SI(CUBED): 42.8 ML/M^2
BH CV ECHO MEAS - SI(LVOT): 36.5 ML/M^2
BH CV ECHO MEAS - SI(MOD-SP4): 21.6 ML/M^2
BH CV ECHO MEAS - SI(TEICH): 31.5 ML/M^2
BH CV ECHO MEAS - SV(AO): 353.2 ML
BH CV ECHO MEAS - SV(CUBED): 80 ML
BH CV ECHO MEAS - SV(LVOT): 68.1 ML
BH CV ECHO MEAS - SV(MOD-SP4): 40.3 ML
BH CV ECHO MEAS - SV(TEICH): 58.8 ML
BH CV ECHO MEAS - TR MAX VEL: 341 CM/SEC
BH CV ECHO MEASUREMENTS AVERAGE E/E' RATIO: 17.03
BUN BLD-MCNC: 20 MG/DL (ref 5–21)
BUN/CREAT SERPL: 20.8 (ref 7–25)
CALCIUM SPEC-SCNC: 9.2 MG/DL (ref 8.4–10.4)
CHLORIDE SERPL-SCNC: 101 MMOL/L (ref 98–110)
CHOLEST SERPL-MCNC: 177 MG/DL (ref 130–200)
CO2 SERPL-SCNC: 28 MMOL/L (ref 24–31)
CREAT BLD-MCNC: 0.96 MG/DL (ref 0.5–1.4)
DEPRECATED RDW RBC AUTO: 44.9 FL (ref 40–54)
ERYTHROCYTE [DISTWIDTH] IN BLOOD BY AUTOMATED COUNT: 15.1 % (ref 12–15)
GFR SERPL CREATININE-BSD FRML MDRD: 74 ML/MIN/1.73
GLUCOSE BLD-MCNC: 116 MG/DL (ref 70–100)
HBA1C MFR BLD: 5.5 %
HCT VFR BLD AUTO: 41.5 % (ref 40–52)
HDLC SERPL-MCNC: 45 MG/DL
HGB BLD-MCNC: 13.4 G/DL (ref 14–18)
LDLC/HDLC SERPL: 2.39 {RATIO}
LEFT ATRIUM VOLUME INDEX: 48.9 ML/M2
LEFT ATRIUM VOLUME: 91.5 CM3
MCH RBC QN AUTO: 26.6 PG (ref 28–32)
MCHC RBC AUTO-ENTMCNC: 32.3 G/DL (ref 33–36)
MCV RBC AUTO: 82.5 FL (ref 82–95)
PLATELET # BLD AUTO: 163 10*3/MM3 (ref 130–400)
PMV BLD AUTO: 10.1 FL (ref 6–12)
POTASSIUM BLD-SCNC: 3.5 MMOL/L (ref 3.5–5.3)
RBC # BLD AUTO: 5.03 10*6/MM3 (ref 4.8–5.9)
SODIUM BLD-SCNC: 140 MMOL/L (ref 135–145)
TRIGL SERPL-MCNC: 123 MG/DL (ref 0–149)
TSH SERPL DL<=0.05 MIU/L-ACNC: 1.98 MIU/ML (ref 0.47–4.68)
WBC NRBC COR # BLD: 6.84 10*3/MM3 (ref 4.8–10.8)

## 2018-08-04 PROCEDURE — 80061 LIPID PANEL: CPT | Performed by: INTERNAL MEDICINE

## 2018-08-04 PROCEDURE — 80048 BASIC METABOLIC PNL TOTAL CA: CPT | Performed by: INTERNAL MEDICINE

## 2018-08-04 PROCEDURE — 94760 N-INVAS EAR/PLS OXIMETRY 1: CPT

## 2018-08-04 PROCEDURE — 94799 UNLISTED PULMONARY SVC/PX: CPT

## 2018-08-04 PROCEDURE — 25010000002 ENOXAPARIN PER 10 MG: Performed by: INTERNAL MEDICINE

## 2018-08-04 PROCEDURE — 99222 1ST HOSP IP/OBS MODERATE 55: CPT | Performed by: INTERNAL MEDICINE

## 2018-08-04 PROCEDURE — 85027 COMPLETE CBC AUTOMATED: CPT | Performed by: INTERNAL MEDICINE

## 2018-08-04 PROCEDURE — 83036 HEMOGLOBIN GLYCOSYLATED A1C: CPT | Performed by: INTERNAL MEDICINE

## 2018-08-04 PROCEDURE — 25010000002 FUROSEMIDE PER 20 MG: Performed by: INTERNAL MEDICINE

## 2018-08-04 PROCEDURE — 84443 ASSAY THYROID STIM HORMONE: CPT | Performed by: INTERNAL MEDICINE

## 2018-08-04 RX ORDER — SPIRONOLACTONE 25 MG/1
25 TABLET ORAL DAILY
Status: DISCONTINUED | OUTPATIENT
Start: 2018-08-04 | End: 2018-08-05 | Stop reason: HOSPADM

## 2018-08-04 RX ORDER — METOPROLOL SUCCINATE 100 MG/1
100 TABLET, EXTENDED RELEASE ORAL
Status: DISCONTINUED | OUTPATIENT
Start: 2018-08-04 | End: 2018-08-04

## 2018-08-04 RX ORDER — METOPROLOL SUCCINATE 50 MG/1
50 TABLET, EXTENDED RELEASE ORAL ONCE
Status: COMPLETED | OUTPATIENT
Start: 2018-08-04 | End: 2018-08-04

## 2018-08-04 RX ORDER — POTASSIUM CHLORIDE 750 MG/1
20 CAPSULE, EXTENDED RELEASE ORAL DAILY
Status: DISCONTINUED | OUTPATIENT
Start: 2018-08-04 | End: 2018-08-05 | Stop reason: HOSPADM

## 2018-08-04 RX ORDER — METOPROLOL SUCCINATE 100 MG/1
100 TABLET, EXTENDED RELEASE ORAL
Status: DISCONTINUED | OUTPATIENT
Start: 2018-08-05 | End: 2018-08-05 | Stop reason: HOSPADM

## 2018-08-04 RX ADMIN — METOPROLOL TARTRATE 50 MG: 50 TABLET ORAL at 09:16

## 2018-08-04 RX ADMIN — RANOLAZINE 500 MG: 500 TABLET, FILM COATED, EXTENDED RELEASE ORAL at 20:18

## 2018-08-04 RX ADMIN — ISOSORBIDE MONONITRATE 60 MG: 60 TABLET, EXTENDED RELEASE ORAL at 09:16

## 2018-08-04 RX ADMIN — LISINOPRIL 20 MG: 20 TABLET ORAL at 09:16

## 2018-08-04 RX ADMIN — RANOLAZINE 500 MG: 500 TABLET, FILM COATED, EXTENDED RELEASE ORAL at 09:16

## 2018-08-04 RX ADMIN — ALLOPURINOL 200 MG: 100 TABLET ORAL at 09:16

## 2018-08-04 RX ADMIN — LISINOPRIL 20 MG: 20 TABLET ORAL at 20:18

## 2018-08-04 RX ADMIN — CLOPIDOGREL 75 MG: 75 TABLET, FILM COATED ORAL at 09:44

## 2018-08-04 RX ADMIN — ATORVASTATIN CALCIUM 80 MG: 40 TABLET, FILM COATED ORAL at 20:19

## 2018-08-04 RX ADMIN — TAMSULOSIN HYDROCHLORIDE 0.4 MG: 0.4 CAPSULE ORAL at 20:19

## 2018-08-04 RX ADMIN — POTASSIUM CHLORIDE 20 MEQ: 750 CAPSULE, EXTENDED RELEASE ORAL at 11:13

## 2018-08-04 RX ADMIN — FUROSEMIDE 40 MG: 10 INJECTION, SOLUTION INTRAMUSCULAR; INTRAVENOUS at 17:39

## 2018-08-04 RX ADMIN — SPIRONOLACTONE 25 MG: 25 TABLET ORAL at 11:13

## 2018-08-04 RX ADMIN — PANTOPRAZOLE SODIUM 40 MG: 40 TABLET, DELAYED RELEASE ORAL at 09:15

## 2018-08-04 RX ADMIN — ENOXAPARIN SODIUM 40 MG: 100 INJECTION SUBCUTANEOUS at 12:34

## 2018-08-04 RX ADMIN — FUROSEMIDE 40 MG: 10 INJECTION, SOLUTION INTRAMUSCULAR; INTRAVENOUS at 05:13

## 2018-08-04 RX ADMIN — FINASTERIDE 5 MG: 5 TABLET, FILM COATED ORAL at 09:16

## 2018-08-04 RX ADMIN — METOPROLOL SUCCINATE 50 MG: 50 TABLET, FILM COATED, EXTENDED RELEASE ORAL at 11:13

## 2018-08-04 NOTE — PLAN OF CARE
Problem: Patient Care Overview  Goal: Plan of Care Review  Outcome: Ongoing (interventions implemented as appropriate)   08/04/18 0108   Coping/Psychosocial   Plan of Care Reviewed With patient   Plan of Care Review   Progress improving   OTHER   Outcome Summary VSS, no c/o pain. SOA improved. Adequate UOP. Cont to monitor     Goal: Individualization and Mutuality  Outcome: Ongoing (interventions implemented as appropriate)    Goal: Discharge Needs Assessment  Outcome: Ongoing (interventions implemented as appropriate)

## 2018-08-04 NOTE — PLAN OF CARE
Problem: Cardiac: Heart Failure (Adult)  Goal: Signs and Symptoms of Listed Potential Problems Will be Absent, Minimized or Managed (Cardiac: Heart Failure)  Outcome: Ongoing (interventions implemented as appropriate)   08/04/18 1806   Goal/Outcome Evaluation   Problems Assessed (Heart Failure) all   Problems Present (Heart Failure) cardiac pump dysfunction;decreased quality of life;dysrhythmia/arrhythmia;fluid/electrolyte imbalance;functional decline/self-care deficit;respiratory compromise;situational response;sleep-disordered breathing       Problem: Fall Risk (Adult)  Goal: Absence of Fall  Outcome: Ongoing (interventions implemented as appropriate)   08/04/18 1806   Fall Risk (Adult)   Absence of Fall achieves outcome       Problem: Patient Care Overview  Goal: Plan of Care Review  Outcome: Ongoing (interventions implemented as appropriate)   08/04/18 1806   Coping/Psychosocial   Plan of Care Reviewed With patient;spouse   Plan of Care Review   Progress improving   OTHER   Outcome Summary VSS. NO C/O PAIN. REPORTS IMPROVED SOA. GOOD URINE OUTPUT WITH IV LASIX. CHANGES MADE TO MEDS PER CARDIOLOGY. SAFETY MAINTAINED. CONTINUE TO MONITOR.     Goal: Individualization and Mutuality  Outcome: Ongoing (interventions implemented as appropriate)   08/04/18 1806   Individualization   Patient Specific Goals (Include Timeframe) NO SOA   Patient Specific Interventions STRICT I/O, DAILY WEIGHTS   Mutuality/Individual Preferences   What Anxieties, Fears, Concerns, or Questions Do You Have About Your Care? NONE

## 2018-08-04 NOTE — CONSULTS
[facility] HEART GROUP CONSULT NOTE    Referring Provider: Miguel Romo DO    Reason for Consultation: Shortness of breath    Chief Complaint   Patient presents with   • Shortness of Breath       Subjective .     History of present illness:  Vasile Deng is a 87 y.o. male with a known PMH significant for coronary artery disease s/p remote history of coronary artery bypass grafting, hypertension, hyperlipidemia and former smoking history. He presented to [facility] with complaints of two week history of non-productive cough, dyspnea with exertion, shortness of breath and orthopnea. He reports an episode of PND that awoke him from sleep 8/3/18, prompting presentation to the emergency department. He denies chest pain, palpitations, dizziness, syncope, swelling, weight gain or decreased stamina. EKG on presentation revealed sinus rhythm with 1st degree AV block, right bundle branch block and left anterior fascicular block. Initial troponin level elevated at 0.627 and trended down to 0.399. BNP elevated at 11,900. . Other labs unremarkable. Chest xray revealed findings consistent with pulmonary edema versus bronchitis. 2D echo pending.     History  Past Medical History:   Diagnosis Date   • Coronary artery disease    • Hyperlipidemia    • Hypertension    ,   Past Surgical History:   Procedure Laterality Date   • CARDIAC SURGERY      double bypass   ,   History reviewed. No pertinent family history.,   Social History   Substance Use Topics   • Smoking status: Former Smoker     Types: Cigarettes, Pipe, Cigars     Quit date: 2004   • Smokeless tobacco: Not on file   • Alcohol use No   ,     Medications  Current Facility-Administered Medications   Medication Dose Route Frequency Provider Last Rate Last Dose   • acetaminophen (TYLENOL) tablet 650 mg  650 mg Oral Q4H PRN Miguel Romo DO       • allopurinol (ZYLOPRIM) tablet 200 mg  200 mg Oral Daily Miguel Romo DO   200 mg at  08/04/18 0916   • atorvastatin (LIPITOR) tablet 80 mg  80 mg Oral Nightly Miguel Romo, DO   80 mg at 08/03/18 2035   • clopidogrel (PLAVIX) tablet 75 mg  75 mg Oral Daily Miguel Romo, DO   75 mg at 08/03/18 1212   • enoxaparin (LOVENOX) syringe 40 mg  40 mg Subcutaneous Q24H Miguel Romo, DO   40 mg at 08/03/18 1212   • finasteride (PROSCAR) tablet 5 mg  5 mg Oral Daily Miguel Romo, DO   5 mg at 08/04/18 0916   • furosemide (LASIX) injection 40 mg  40 mg Intravenous Q12H Miguel Romo, DO   40 mg at 08/04/18 0513   • isosorbide mononitrate (IMDUR) 24 hr tablet 60 mg  60 mg Oral Daily Miguel Romo, DO   60 mg at 08/04/18 0916   • lisinopril (PRINIVIL,ZESTRIL) tablet 20 mg  20 mg Oral Q12H Miguel Romo, DO   20 mg at 08/04/18 0916   • metoprolol succinate XL (TOPROL-XL) 24 hr tablet 100 mg  100 mg Oral Q24H Padmini Maldonado APRN       • nitroglycerin (NITROSTAT) SL tablet 0.4 mg  0.4 mg Sublingual Q5 Min PRN Miguel Romo, DO       • ondansetron (ZOFRAN) injection 4 mg  4 mg Intravenous Q6H PRN Miguel Romo, DO       • pantoprazole (PROTONIX) EC tablet 40 mg  40 mg Oral QAM Miguel Romo DO   40 mg at 08/04/18 0915   • ranolazine (RANEXA) 12 hr tablet 500 mg  500 mg Oral Q12H Miguel Romo, DO   500 mg at 08/04/18 0916   • sodium chloride 0.9 % flush 1-10 mL  1-10 mL Intravenous PRN Miguel Romo DO       • sodium chloride 0.9 % flush 10 mL  10 mL Intravenous PRN Gonzalo Pike, DO       • tamsulosin (FLOMAX) 24 hr capsule 0.4 mg  0.4 mg Oral Nightly Miguel Romo DO   0.4 mg at 08/03/18 2035       Allergies:  Patient has no known allergies.    Review of Systems  Review of Systems   Constitution: Negative for chills, decreased appetite, fever, weakness, malaise/fatigue, night sweats, weight gain and weight loss.   HENT: Negative for nosebleeds.    Eyes: Negative for visual disturbance.   Cardiovascular: Positive for dyspnea on exertion, orthopnea and paroxysmal nocturnal  "dyspnea. Negative for chest pain, leg swelling, near-syncope, palpitations and syncope.   Respiratory: Positive for cough and shortness of breath. Negative for hemoptysis, snoring and wheezing.    Endocrine: Negative for cold intolerance and heat intolerance.   Hematologic/Lymphatic: Does not bruise/bleed easily.   Skin: Negative for rash.   Musculoskeletal: Negative for back pain and falls.   Gastrointestinal: Negative for abdominal pain, change in bowel habit, constipation, diarrhea, dysphagia, heartburn, nausea and vomiting.   Genitourinary: Negative for hematuria.   Neurological: Negative for dizziness, headaches and light-headedness.   Psychiatric/Behavioral: Negative for altered mental status.   Allergic/Immunologic: Negative for persistent infections.       Objective     Physical Exam:  Patient Vitals for the past 24 hrs:   BP Temp Temp src Pulse Resp SpO2 Height Weight   08/04/18 0810 132/67 97.8 °F (36.6 °C) Temporal Art 64 18 97 % - -   08/04/18 0800 - - - - - 95 % - -   08/04/18 0350 112/68 97.7 °F (36.5 °C) Oral 63 18 94 % - -   08/03/18 2338 132/74 98.7 °F (37.1 °C) Oral 65 19 96 % - -   08/03/18 2146 - - - - - 96 % - -   08/03/18 2145 - - - - - (!) 89 % - -   08/03/18 1900 138/83 97.5 °F (36.4 °C) Oral 64 18 - - -   08/03/18 1400 - - - - - 96 % - -   08/03/18 1130 150/95 97.8 °F (36.6 °C) Temporal Art 87 16 94 % 172.7 cm (68\") 76.3 kg (168 lb 3.2 oz)   08/03/18 1050 150/93 - - 86 - - - -   08/03/18 0925 - - - 72 14 - - -     Physical Exam   Constitutional: He is oriented to person, place, and time. Vital signs are normal. He appears well-developed and well-nourished. No distress.   HENT:   Head: Normocephalic and atraumatic.   Right Ear: External ear normal.   Left Ear: External ear normal.   Eyes: Pupils are equal, round, and reactive to light. Conjunctivae are normal. Right eye exhibits no discharge. Left eye exhibits no discharge.   Neck: Normal range of motion. Neck supple. No JVD present. Carotid " bruit is not present. No thyromegaly present.   Cardiovascular: Normal rate, regular rhythm, normal heart sounds and intact distal pulses.  PMI is not displaced.  Exam reveals no gallop, no friction rub and no decreased pulses.    No murmur heard.  Pulses:       Radial pulses are 2+ on the right side, and 2+ on the left side.        Dorsalis pedis pulses are 2+ on the right side, and 2+ on the left side.        Posterior tibial pulses are 2+ on the right side, and 2+ on the left side.   Pulmonary/Chest: Effort normal. No respiratory distress. He has no decreased breath sounds. He has no wheezes. He has no rhonchi. He has rales in the right lower field, the left middle field and the left lower field. He exhibits no tenderness.   Abdominal: Soft. Bowel sounds are normal. He exhibits no distension. There is no tenderness.   Musculoskeletal: Normal range of motion. He exhibits no edema.   Neurological: He is alert and oriented to person, place, and time.   Skin: Skin is warm and dry. No rash noted. He is not diaphoretic. No erythema. No pallor.   Psychiatric: He has a normal mood and affect. His behavior is normal. Judgment and thought content normal.   Vitals reviewed.      Results Review:   I reviewed the patient's new clinical results.    Lab Results (last 72 hours)     Procedure Component Value Units Date/Time    TSH [[id number]]  (Normal) Collected:  08/04/18 0637    Specimen:  Blood Updated:  08/04/18 0805     TSH 1.980 mIU/mL     Lipid Panel [[id number]]  (Abnormal) Collected:  08/04/18 0637    Specimen:  Blood Updated:  08/04/18 0737     Total Cholesterol 177 mg/dL      Triglycerides 123 mg/dL      HDL Cholesterol 45 mg/dL      LDL Cholesterol  111 (H) mg/dL      LDL/HDL Ratio 2.39    Basic Metabolic Panel [[id number]]  (Abnormal) Collected:  08/04/18 0637    Specimen:  Blood Updated:  08/04/18 0727     Glucose 116 (H) mg/dL      BUN 20 mg/dL      Creatinine 0.96 mg/dL      Sodium 140 mmol/L      Potassium 3.5  mmol/L      Chloride 101 mmol/L      CO2 28.0 mmol/L      Calcium 9.2 mg/dL      eGFR Non African Amer 74 mL/min/1.73      BUN/Creatinine Ratio 20.8     Anion Gap 11.0 mmol/L     Narrative:       The MDRD GFR formula is only valid for adults with stable renal function between ages 18 and 70.    CBC (No Diff) [[id number]]  (Abnormal) Collected:  08/04/18 0637    Specimen:  Blood Updated:  08/04/18 0709     WBC 6.84 10*3/mm3      RBC 5.03 10*6/mm3      Hemoglobin 13.4 (L) g/dL      Hematocrit 41.5 %      MCV 82.5 fL      MCH 26.6 (L) pg      MCHC 32.3 (L) g/dL      RDW 15.1 (H) %      RDW-SD 44.9 fl      MPV 10.1 fL      Platelets 163 10*3/mm3     Hemoglobin A1c [[id number]] Collected:  08/04/18 0637    Specimen:  Blood Updated:  08/04/18 0703    Troponin [[id number]]  (Abnormal) Collected:  08/03/18 1708    Specimen:  Blood Updated:  08/03/18 1809     Troponin I 0.399 (H) ng/mL     Troponin [[id number]]  (Abnormal) Collected:  08/03/18 1134    Specimen:  Blood Updated:  08/03/18 1240     Troponin I 0.523 (H) ng/mL     Troponin [[id number]]  (Abnormal) Collected:  08/03/18 0904    Specimen:  Blood Updated:  08/03/18 0943     Troponin I 0.627 (C) ng/mL     BNP [[id number]]  (Abnormal) Collected:  08/03/18 0904    Specimen:  Blood Updated:  08/03/18 0938     proBNP 11,900.0 (H) pg/mL     CK-MB [[id number]]  (Normal) Collected:  08/03/18 0904    Specimen:  Blood Updated:  08/03/18 0938     CKMB 3.28 ng/mL     Narrative:       CKMB Index not indicated    Myoglobin, Serum [[id number]]  (Normal) Collected:  08/03/18 0904    Specimen:  Blood Updated:  08/03/18 0938     Myoglobin 36.6 ng/mL     CK [[id number]]  (Normal) Collected:  08/03/18 0904    Specimen:  Blood Updated:  08/03/18 0933     Creatine Kinase 64 U/L     Basic Metabolic Panel [[id number]]  (Abnormal) Collected:  08/03/18 0904    Specimen:  Blood Updated:  08/03/18 0932     Glucose 119 (H) mg/dL      BUN 16 mg/dL      Comment: Specimen hemolyzed. Results may be affected.         Creatinine 0.96 mg/dL      Sodium 143 mmol/L      Potassium 4.3 mmol/L      Comment: Specimen hemolyzed.  Results may be affected.        Chloride 107 mmol/L      CO2 24.0 mmol/L      Calcium 9.3 mg/dL      eGFR Non African Amer 74 mL/min/1.73      BUN/Creatinine Ratio 16.7     Anion Gap 12.0 mmol/L     Narrative:       The MDRD GFR formula is only valid for adults with stable renal function between ages 18 and 70.    CBC & Differential [[id number]] Collected:  08/03/18 0904    Specimen:  Blood Updated:  08/03/18 0915    Narrative:       The following orders were created for panel order CBC & Differential.  Procedure                               Abnormality         Status                     ---------                               -----------         ------                     CBC Auto Differential[[id number]]        Abnormal            Final result                 Please view results for these tests on the individual orders.    CBC Auto Differential [[id number]]  (Abnormal) Collected:  08/03/18 0904    Specimen:  Blood Updated:  08/03/18 0915     WBC 7.20 10*3/mm3      RBC 5.28 10*6/mm3      Hemoglobin 14.0 g/dL      Hematocrit 44.3 %      MCV 83.9 fL      MCH 26.5 (L) pg      MCHC 31.6 (L) g/dL      RDW 15.3 (H) %      RDW-SD 45.8 fl      MPV 11.0 fL      Platelets 184 10*3/mm3      Neutrophil % 56.1 %      Lymphocyte % 31.3 %      Monocyte % 6.8 %      Eosinophil % 4.7 (H) %      Basophil % 0.7 %      Immature Grans % 0.4 %      Neutrophils, Absolute 4.04 10*3/mm3      Lymphocytes, Absolute 2.25 10*3/mm3      Monocytes, Absolute 0.49 10*3/mm3      Eosinophils, Absolute 0.34 10*3/mm3      Basophils, Absolute 0.05 10*3/mm3      Immature Grans, Absolute 0.03 10*3/mm3      nRBC 0.0 /100 WBC             Imaging Results (last 72 hours)     Procedure Component Value Units Date/Time    XR Chest 2 View [[id number]] Collected:  08/03/18 0951     Updated:  08/03/18 1018    Narrative:       History:  87-year-old with  dyspnea.     Reference:  Chest radiograph May 30, 2018.     Findings:  Frontal and lateral chest radiographs performed.     Normal heart size. Atherosclerotic thoracic aorta. Prior median  sternotomy/CABG. Diffuse bronchial wall thickening. Minimal linear  subsegmental atelectasis inferior right upper lobe. No confluent  airspace opacities. Question Kerley B lines. No pleural effusion or  pneumothorax. Demineralized bones. Vertebral compression deformity may  be a lower thoracic or upper lumbar vertebrae.          Impression:       Peribronchial thickening and suspected interlobular septal thickening.  Findings may suggest interstitial pulmonary edema. Infectious bronchitis  would also be considered.  This report was finalized on 08/03/2018 10:15 by Dr Naldo Avery, .        Assessment     Principal Problem:    Acute dyspnea  Active Problems:    Elevated brain natriuretic peptide (BNP) level    Coronary artery disease involving coronary bypass graft of native heart without angina pectoris    Hx of CABG    Essential hypertension    Mixed hyperlipidemia    Former smoker      Plan      1. Likely acute congestive heart failure, 2D echo report pending. Continue IV diuresis.  2. BMP in am.  3. Stop metoprolol tartrate. Start metoprolol succinate 100 mg by mouth daily.  4. Daily weights.  5. Continue to monitor intake and output.  6. Cardiac/low sodium diet.  7. 1500 ml daily fluid restriction.   8. Continue lisinopril.  9. Consider adding Zetia at discharge, as LDL is 111 on max dose high-intensity statin with history of CAD and CABG.     Further orders per Dr. Wilkins upon his evaluation of the patient.     Thank you for the consultation, cardiology will gladly continue to follow.     LOC Lopez  8/4/2018  9:24 AM      Please note this cardiology consultation note is the result of a face to face consultation with the patient, in addition to reviewing medical records at length by myself, LOC Lopez.        Time: 45 minutes

## 2018-08-04 NOTE — PROGRESS NOTES
[facility] Medicine Services  INPATIENT PROGRESS NOTE    Patient Name: Vasile Dneg  Date of Admission: 8/3/2018  Today's Date: 08/04/18  Length of Stay: 1  [facility] [place] [place]     Subjective   Chief Complaint: shortness of breath  HPI   He feels like he can take deeper breaths today.  Less fatigued.  No complaints of chest discomfort.    He hopes to go home soon.     Review of Systems   All pertinent negatives and positives are as above. All other systems have been reviewed and are negative unless otherwise stated.     Objective    Temp:  [97.5 °F (36.4 °C)-98.7 °F (37.1 °C)] 97.8 °F (36.6 °C)  Heart Rate:  [63-87] 64  Resp:  [16-19] 18  BP: (112-150)/(67-95) 132/67  Physical Exam  Constitutional: He is oriented to person, place, and time. He appears well-developed and well-nourished.   Up in bed.  No distress. Girlfriend (Janeth) present.  Discussed with his nurse, Vanita.   Head: Normocephalic and atraumatic.   Eyes: Pupils are equal, round, and reactive to light. Conjunctivae and EOM are normal.   Neck: Neck supple. No JVD present.   Cardiovascular: Normal rate, regular rhythm, normal heart sounds and intact distal pulses.  Exam reveals no gallop and no friction rub.  No murmur heard.  Pulmonary/Chest: Effort normal. No respiratory distress. He has no wheezes. He has rales (improved). He exhibits no tenderness.   Abdominal: Soft. Bowel sounds are normal. He exhibits no distension. There is no tenderness. There is no rebound and no guarding.   Musculoskeletal: Normal range of motion. He exhibits edema (trace). He exhibits no tenderness or deformity.   Neurological: He is alert and oriented to person, place, and time. He displays normal reflexes. No cranial nerve deficit. He exhibits normal muscle tone.   Skin: Skin is warm and dry. No rash noted.   Psychiatric: He has a normal mood and affect. His behavior is normal. Judgment and thought  content normal.     Results Review:  I have reviewed the labs, radiology results, and diagnostic studies.    Laboratory Data:     Results from last 7 days  Lab Units 08/04/18  0637 08/03/18  0904   WBC 10*3/mm3 6.84 7.20   HEMOGLOBIN g/dL 13.4* 14.0   HEMATOCRIT % 41.5 44.3   PLATELETS 10*3/mm3 163 184       Results from last 7 days  Lab Units 08/04/18  0637 08/03/18  0904   SODIUM mmol/L 140 143   POTASSIUM mmol/L 3.5 4.3   CHLORIDE mmol/L 101 107   CO2 mmol/L 28.0 24.0   BUN mg/dL 20 16   CREATININE mg/dL 0.96 0.96   CALCIUM mg/dL 9.2 9.3   GLUCOSE mg/dL 116* 119*       Lab Results  Lab Value Date/Time   TROPONINI 0.399 (H) 08/03/2018 1708   TROPONINI 0.523 (H) 08/03/2018 1134   TROPONINI 0.627 (C) 08/03/2018 0904     Results for orders placed during the hospital encounter of 08/03/18   Adult Transthoracic Echo Complete W/ Cont if Necessary Per Protocol    Narrative · The left ventricular cavity is mildly dilated.  · Left atrial cavity size is moderately dilated.  · Right ventricular cavity is mildly dilated.  · Mild mitral valve regurgitation is present  · Left ventricular diastolic dysfunction.  · Mild aortic valve stenosis is present.  · The findings are consistent with dilated cardiomyopathy.  · The following left ventricular wall segments are hypokinetic: mid   anterolateral, apical lateral, apical inferior, mid inferior and basal   inferior.     DILATED CARDIOMYOPATHY WITH FOUR CHAMBER ENLARGEMENT  MILD AORTIC VALVE STENOSIS  MODERATE ISCHEMIC LV DYSFUNCTION  MILD AORTIC VALVE STENOSIS  MODERATE PULMONARY HTN  POSSIBLE PFO OR AVM         I have reviewed the patient's current medications.     Assessment/Plan   Assessment:   1.  Acute systolic heart failure with an ejection fraction of 40%.  2.  Ischemic cardiomyopathy.  3.  Coronary artery disease with history of coronary artery bypass grafting and stent placement.  4.  Elevated troponin likely secondary to CHF exacerbation.  5.  Mild aortic stenosis.  6.   Moderate pulmonary hypertension.  7.  Systemic arterial hypertension.  8.  Benign prostatic hypertrophy.  9.  Hyperlipidemia.  10.  Nocturnal hypoxemia on nocturnal oxygen.   11.  Remote tobacco abuse with likely underlying chronic obstructive pulmonary disease.     Plan:   Continue IV Lasix.  2-D echocardiogram shows ischemic cardiomyopathy with an ejection fraction of 40%.  He is already on lisinopril.  Metoprolol tartrate transitioned to metoprolol succinate. Continue Imdur.      Trended troponin; flat.  Continue Plavix. Dr. Wilkins to see as well.      TSH normal. HbA1c normal. Lipids show an LDL of 111 with normal TG. Continue statin and consider addition of Zetia at discharge.     Continue other home medications as appropriate.     Lovenox for DVT prophylaxis.     Case management consulted as he has not been getting his home oxygen for the last month, which could directly lead to some of his current problems. [facility].     Discharge Planning: I expect the patient to be discharged to home in 1-2 days.    Miguel Romo,    08/04/18   10:41 AM

## 2018-08-05 VITALS
TEMPERATURE: 98.2 F | BODY MASS INDEX: 25.76 KG/M2 | WEIGHT: 170 LBS | OXYGEN SATURATION: 93 % | DIASTOLIC BLOOD PRESSURE: 50 MMHG | SYSTOLIC BLOOD PRESSURE: 98 MMHG | RESPIRATION RATE: 18 BRPM | HEIGHT: 68 IN | HEART RATE: 65 BPM

## 2018-08-05 PROBLEM — I48.92 ATRIAL FLUTTER (HCC): Status: ACTIVE | Noted: 2018-08-05

## 2018-08-05 PROBLEM — I50.41 ACUTE COMBINED SYSTOLIC AND DIASTOLIC CONGESTIVE HEART FAILURE (HCC): Status: ACTIVE | Noted: 2018-08-04

## 2018-08-05 PROBLEM — I50.21 ACUTE SYSTOLIC CONGESTIVE HEART FAILURE (HCC): Status: ACTIVE | Noted: 2018-08-04

## 2018-08-05 LAB
ANION GAP SERPL CALCULATED.3IONS-SCNC: 12 MMOL/L (ref 4–13)
BUN BLD-MCNC: 26 MG/DL (ref 5–21)
BUN/CREAT SERPL: 22.4 (ref 7–25)
CALCIUM SPEC-SCNC: 8.9 MG/DL (ref 8.4–10.4)
CHLORIDE SERPL-SCNC: 99 MMOL/L (ref 98–110)
CO2 SERPL-SCNC: 28 MMOL/L (ref 24–31)
CREAT BLD-MCNC: 1.16 MG/DL (ref 0.5–1.4)
GFR SERPL CREATININE-BSD FRML MDRD: 60 ML/MIN/1.73
GLUCOSE BLD-MCNC: 106 MG/DL (ref 70–100)
POTASSIUM BLD-SCNC: 3.8 MMOL/L (ref 3.5–5.3)
SODIUM BLD-SCNC: 139 MMOL/L (ref 135–145)

## 2018-08-05 PROCEDURE — 25010000002 FUROSEMIDE PER 20 MG: Performed by: INTERNAL MEDICINE

## 2018-08-05 PROCEDURE — 80048 BASIC METABOLIC PNL TOTAL CA: CPT | Performed by: NURSE PRACTITIONER

## 2018-08-05 PROCEDURE — 99232 SBSQ HOSP IP/OBS MODERATE 35: CPT | Performed by: INTERNAL MEDICINE

## 2018-08-05 PROCEDURE — 94760 N-INVAS EAR/PLS OXIMETRY 1: CPT

## 2018-08-05 PROCEDURE — 94799 UNLISTED PULMONARY SVC/PX: CPT

## 2018-08-05 RX ORDER — LISINOPRIL 10 MG/1
10 TABLET ORAL EVERY 12 HOURS SCHEDULED
Status: DISCONTINUED | OUTPATIENT
Start: 2018-08-05 | End: 2018-08-05 | Stop reason: HOSPADM

## 2018-08-05 RX ORDER — METOPROLOL SUCCINATE 100 MG/1
100 TABLET, EXTENDED RELEASE ORAL
Qty: 30 TABLET | Refills: 1 | Status: SHIPPED | OUTPATIENT
Start: 2018-08-06 | End: 2018-09-21 | Stop reason: DRUGHIGH

## 2018-08-05 RX ORDER — LISINOPRIL 10 MG/1
10 TABLET ORAL EVERY 12 HOURS SCHEDULED
Qty: 60 TABLET | Refills: 1 | Status: SHIPPED | OUTPATIENT
Start: 2018-08-05 | End: 2018-09-03 | Stop reason: HOSPADM

## 2018-08-05 RX ORDER — SPIRONOLACTONE 25 MG/1
25 TABLET ORAL DAILY
Qty: 30 TABLET | Refills: 1 | Status: SHIPPED | OUTPATIENT
Start: 2018-08-06 | End: 2018-09-02

## 2018-08-05 RX ORDER — FUROSEMIDE 40 MG/1
40 TABLET ORAL DAILY
Qty: 30 TABLET | Refills: 1 | Status: ON HOLD | OUTPATIENT
Start: 2018-08-06 | End: 2018-09-30

## 2018-08-05 RX ORDER — FUROSEMIDE 40 MG/1
40 TABLET ORAL DAILY
Status: DISCONTINUED | OUTPATIENT
Start: 2018-08-06 | End: 2018-08-05 | Stop reason: HOSPADM

## 2018-08-05 RX ADMIN — ISOSORBIDE MONONITRATE 60 MG: 60 TABLET, EXTENDED RELEASE ORAL at 08:58

## 2018-08-05 RX ADMIN — METOPROLOL SUCCINATE 100 MG: 100 TABLET, FILM COATED, EXTENDED RELEASE ORAL at 08:58

## 2018-08-05 RX ADMIN — RANOLAZINE 500 MG: 500 TABLET, FILM COATED, EXTENDED RELEASE ORAL at 08:58

## 2018-08-05 RX ADMIN — FUROSEMIDE 40 MG: 10 INJECTION, SOLUTION INTRAMUSCULAR; INTRAVENOUS at 06:32

## 2018-08-05 RX ADMIN — SPIRONOLACTONE 25 MG: 25 TABLET ORAL at 08:58

## 2018-08-05 RX ADMIN — ALLOPURINOL 200 MG: 100 TABLET ORAL at 08:58

## 2018-08-05 RX ADMIN — PANTOPRAZOLE SODIUM 40 MG: 40 TABLET, DELAYED RELEASE ORAL at 08:58

## 2018-08-05 RX ADMIN — POTASSIUM CHLORIDE 20 MEQ: 750 CAPSULE, EXTENDED RELEASE ORAL at 08:58

## 2018-08-05 RX ADMIN — FINASTERIDE 5 MG: 5 TABLET, FILM COATED ORAL at 08:58

## 2018-08-05 RX ADMIN — LISINOPRIL 20 MG: 20 TABLET ORAL at 08:58

## 2018-08-05 NOTE — DISCHARGE INSTRUCTIONS
Reviewed signs and symptoms of CHF and what to report with the patient. Patient instructed to restrict sodium and weigh daily. Report weight gain of greater than 2 lbs overnight or 5 lbs in 1 week. Pt verbalized understanding of instructions and plan of care.   Report any signs of bleeding.  Report any worsening symptoms.

## 2018-08-05 NOTE — PLAN OF CARE
Problem: Cardiac: Heart Failure (Adult)  Goal: Signs and Symptoms of Listed Potential Problems Will be Absent, Minimized or Managed (Cardiac: Heart Failure)  Outcome: Ongoing (interventions implemented as appropriate)   08/04/18 1806   Goal/Outcome Evaluation   Problems Assessed (Heart Failure) all   Problems Present (Heart Failure) cardiac pump dysfunction;decreased quality of life;dysrhythmia/arrhythmia;fluid/electrolyte imbalance;functional decline/self-care deficit;respiratory compromise;situational response;sleep-disordered breathing      08/04/18 1806   Goal/Outcome Evaluation   Problems Assessed (Heart Failure) all   Problems Present (Heart Failure) cardiac pump dysfunction;decreased quality of life;dysrhythmia/arrhythmia;fluid/electrolyte imbalance;functional decline/self-care deficit;respiratory compromise;situational response;sleep-disordered breathing       Problem: Fall Risk (Adult)  Goal: Absence of Fall  Outcome: Ongoing (interventions implemented as appropriate)   08/05/18 0117   Fall Risk (Adult)   Absence of Fall achieves outcome       Problem: Patient Care Overview  Goal: Plan of Care Review  Outcome: Ongoing (interventions implemented as appropriate)   08/04/18 1806 08/04/18 1929 08/05/18 0117   Coping/Psychosocial   Plan of Care Reviewed With --  patient;spouse;family --    Plan of Care Review   Progress improving --  --    OTHER   Outcome Summary --  --  VSS, no c/o pain. lasix ongoing with adequate UOP. Meds changes tolerated. Possibly home soon. Cont to monitor     Goal: Individualization and Mutuality  Outcome: Ongoing (interventions implemented as appropriate)    Goal: Discharge Needs Assessment  Outcome: Ongoing (interventions implemented as appropriate)

## 2018-08-05 NOTE — PROGRESS NOTES
[facility] -  Progress Note     LOS: 2 days   Patient Care Team:  Provider, No Known as PCP - General    Chief Complaint: Shortness of breath    Subjective     Interval History: 2D echo revealed findings consistent with dilated, ischemic cardiomyopathy with ejection fraction of 40%, left ventricular diastolic dysfunction, mild valvular disease, moderate pulmonary hypertension and possible PFO or AVM. Continues in atrial flutter with controlled rates. He reports shortness of breath has improved. He denies chest pain, palpitations, dizziness, syncope, orthopnea, PND, swelling or signs of bleeding.     Patient Complaints: none        Review of Systems:     Review of Systems   Constitutional: Negative for chills, fatigue and fever.   HENT: Negative.    Eyes: Negative.    Respiratory: Negative for cough, chest tightness, shortness of breath, wheezing and stridor.    Cardiovascular: Negative for chest pain, palpitations and leg swelling.   Gastrointestinal: Negative for abdominal distention, abdominal pain, blood in stool, constipation, diarrhea, nausea and vomiting.   Endocrine: Negative.    Genitourinary: Negative for difficulty urinating, dysuria, flank pain and hematuria.   Musculoskeletal: Negative.    Skin: Negative for rash and wound.   Allergic/Immunologic: Negative.    Neurological: Negative for dizziness, syncope, weakness, light-headedness and headaches.   Hematological: Does not bruise/bleed easily.   Psychiatric/Behavioral: Negative for agitation, behavioral problems, confusion, hallucinations, sleep disturbance and suicidal ideas. The patient is not nervous/anxious.      Objective     Vital Sign Min/Max for last 24 hours  Temp  Min: 97.1 °F (36.2 °C)  Max: 98.6 °F (37 °C)   BP  Min: 98/50  Max: 129/68   Pulse  Min: 56  Max: 103   Resp  Min: 18  Max: 20   SpO2  Min: 93 %  Max: 98 %   No Data Recorded   Weight  Min: 77.1 kg (170 lb)  Max: 77.1 kg (170 lb)     Telemetry: Atrial flutter  with right bundle branch block 61-80     1    08/04/18 2000   Weight: 77.1 kg (170 lb)     1    08/03/18  0840 08/03/18  1130 08/04/18 2000   Weight: 78 kg (172 lb) 76.3 kg (168 lb 3.2 oz) 77.1 kg (170 lb)       Intake/Output Summary (Last 24 hours) at 08/05/18 1256  Last data filed at 08/05/18 0900   Gross per 24 hour   Intake             1160 ml   Output             2025 ml   Net             -865 ml         Physical Exam:    Physical Exam   Constitutional: He is oriented to person, place, and time. Vital signs are normal. He appears well-developed and well-nourished. No distress.   HENT:   Head: Normocephalic and atraumatic.   Right Ear: External ear normal.   Left Ear: External ear normal.   Eyes: Pupils are equal, round, and reactive to light. Conjunctivae are normal. Right eye exhibits no discharge. Left eye exhibits no discharge.   Neck: Normal range of motion. Neck supple. No JVD present. Carotid bruit is not present. No thyromegaly present.   Cardiovascular: Normal rate, normal heart sounds and intact distal pulses.  An irregular rhythm present. PMI is not displaced.  Exam reveals no gallop, no friction rub and no decreased pulses.    No murmur heard.  Pulses:       Radial pulses are 2+ on the right side, and 2+ on the left side.        Dorsalis pedis pulses are 2+ on the right side, and 2+ on the left side.        Posterior tibial pulses are 2+ on the right side, and 2+ on the left side.   Pulmonary/Chest: Effort normal and breath sounds normal. No respiratory distress. He has no decreased breath sounds. He has no wheezes. He has no rhonchi. He has no rales. He exhibits no tenderness.   Abdominal: Soft. Bowel sounds are normal. He exhibits no distension. There is no tenderness.   Musculoskeletal: Normal range of motion. He exhibits no edema.   Neurological: He is alert and oriented to person, place, and time.   Skin: Skin is warm and dry. No rash noted. He is not diaphoretic. No erythema. No pallor.    Psychiatric: He has a normal mood and affect. His behavior is normal. Judgment and thought content normal.   Vitals reviewed.    Results Review:   Lab Results (last 72 hours)     Procedure Component Value Units Date/Time    Basic Metabolic Panel [[id number]]  (Abnormal) Collected:  08/05/18 0526    Specimen:  Blood Updated:  08/05/18 0646     Glucose 106 (H) mg/dL      BUN 26 (H) mg/dL      Creatinine 1.16 mg/dL      Sodium 139 mmol/L      Potassium 3.8 mmol/L      Chloride 99 mmol/L      CO2 28.0 mmol/L      Calcium 8.9 mg/dL      eGFR Non African Amer 60 (L) mL/min/1.73      BUN/Creatinine Ratio 22.4     Anion Gap 12.0 mmol/L     Narrative:       The MDRD GFR formula is only valid for adults with stable renal function between ages 18 and 70.    Hemoglobin A1c [[id number]] Collected:  08/04/18 0637    Specimen:  Blood Updated:  08/04/18 0906     Hemoglobin A1C 5.5 %     Narrative:       Less than 6.0           Non-Diabetic Range  6.0-7.0                 ADA Therapeutic Target  Greater than 7.0        Action Suggested    TSH [[id number]]  (Normal) Collected:  08/04/18 0637    Specimen:  Blood Updated:  08/04/18 0805     TSH 1.980 mIU/mL     Lipid Panel [[id number]]  (Abnormal) Collected:  08/04/18 0637    Specimen:  Blood Updated:  08/04/18 0737     Total Cholesterol 177 mg/dL      Triglycerides 123 mg/dL      HDL Cholesterol 45 mg/dL      LDL Cholesterol  111 (H) mg/dL      LDL/HDL Ratio 2.39    Basic Metabolic Panel [[id number]]  (Abnormal) Collected:  08/04/18 0637    Specimen:  Blood Updated:  08/04/18 0727     Glucose 116 (H) mg/dL      BUN 20 mg/dL      Creatinine 0.96 mg/dL      Sodium 140 mmol/L      Potassium 3.5 mmol/L      Chloride 101 mmol/L      CO2 28.0 mmol/L      Calcium 9.2 mg/dL      eGFR Non African Amer 74 mL/min/1.73      BUN/Creatinine Ratio 20.8     Anion Gap 11.0 mmol/L     Narrative:       The MDRD GFR formula is only valid for adults with stable renal function between ages 18 and 70.    CBC  (No Diff) [[id number]]  (Abnormal) Collected:  08/04/18 0637    Specimen:  Blood Updated:  08/04/18 0709     WBC 6.84 10*3/mm3      RBC 5.03 10*6/mm3      Hemoglobin 13.4 (L) g/dL      Hematocrit 41.5 %      MCV 82.5 fL      MCH 26.6 (L) pg      MCHC 32.3 (L) g/dL      RDW 15.1 (H) %      RDW-SD 44.9 fl      MPV 10.1 fL      Platelets 163 10*3/mm3     Troponin [[id number]]  (Abnormal) Collected:  08/03/18 1708    Specimen:  Blood Updated:  08/03/18 1809     Troponin I 0.399 (H) ng/mL     Troponin [[id number]]  (Abnormal) Collected:  08/03/18 1134    Specimen:  Blood Updated:  08/03/18 1240     Troponin I 0.523 (H) ng/mL     Troponin [[id number]]  (Abnormal) Collected:  08/03/18 0904    Specimen:  Blood Updated:  08/03/18 0943     Troponin I 0.627 (C) ng/mL     BNP [[id number]]  (Abnormal) Collected:  08/03/18 0904    Specimen:  Blood Updated:  08/03/18 0938     proBNP 11,900.0 (H) pg/mL     CK-MB [[id number]]  (Normal) Collected:  08/03/18 0904    Specimen:  Blood Updated:  08/03/18 0938     CKMB 3.28 ng/mL     Narrative:       CKMB Index not indicated    Myoglobin, Serum [[id number]]  (Normal) Collected:  08/03/18 0904    Specimen:  Blood Updated:  08/03/18 0938     Myoglobin 36.6 ng/mL     CK [[id number]]  (Normal) Collected:  08/03/18 0904    Specimen:  Blood Updated:  08/03/18 0933     Creatine Kinase 64 U/L     Basic Metabolic Panel [[id number]]  (Abnormal) Collected:  08/03/18 0904    Specimen:  Blood Updated:  08/03/18 0932     Glucose 119 (H) mg/dL      BUN 16 mg/dL      Comment: Specimen hemolyzed. Results may be affected.        Creatinine 0.96 mg/dL      Sodium 143 mmol/L      Potassium 4.3 mmol/L      Comment: Specimen hemolyzed.  Results may be affected.        Chloride 107 mmol/L      CO2 24.0 mmol/L      Calcium 9.3 mg/dL      eGFR Non African Amer 74 mL/min/1.73      BUN/Creatinine Ratio 16.7     Anion Gap 12.0 mmol/L     Narrative:       The MDRD GFR formula is only valid for adults with stable renal  function between ages 18 and 70.    CBC & Differential [[id number]] Collected:  08/03/18 0904    Specimen:  Blood Updated:  08/03/18 0915    Narrative:       The following orders were created for panel order CBC & Differential.  Procedure                               Abnormality         Status                     ---------                               -----------         ------                     CBC Auto Differential[[id number]]        Abnormal            Final result                 Please view results for these tests on the individual orders.    CBC Auto Differential [[id number]]  (Abnormal) Collected:  08/03/18 0904    Specimen:  Blood Updated:  08/03/18 0915     WBC 7.20 10*3/mm3      RBC 5.28 10*6/mm3      Hemoglobin 14.0 g/dL      Hematocrit 44.3 %      MCV 83.9 fL      MCH 26.5 (L) pg      MCHC 31.6 (L) g/dL      RDW 15.3 (H) %      RDW-SD 45.8 fl      MPV 11.0 fL      Platelets 184 10*3/mm3      Neutrophil % 56.1 %      Lymphocyte % 31.3 %      Monocyte % 6.8 %      Eosinophil % 4.7 (H) %      Basophil % 0.7 %      Immature Grans % 0.4 %      Neutrophils, Absolute 4.04 10*3/mm3      Lymphocytes, Absolute 2.25 10*3/mm3      Monocytes, Absolute 0.49 10*3/mm3      Eosinophils, Absolute 0.34 10*3/mm3      Basophils, Absolute 0.05 10*3/mm3      Immature Grans, Absolute 0.03 10*3/mm3      nRBC 0.0 /100 WBC           Medication Review: yes  Current Facility-Administered Medications   Medication Dose Route Frequency Provider Last Rate Last Dose   • acetaminophen (TYLENOL) tablet 650 mg  650 mg Oral Q4H PRN Miguel Romo DO       • allopurinol (ZYLOPRIM) tablet 200 mg  200 mg Oral Daily Miguel Romo DO   200 mg at 08/05/18 0858   • atorvastatin (LIPITOR) tablet 80 mg  80 mg Oral Nightly Miguel Romo DO   80 mg at 08/04/18 2019   • finasteride (PROSCAR) tablet 5 mg  5 mg Oral Daily Miguel Romo DO   5 mg at 08/05/18 0858   • [START ON 8/6/2018] furosemide (LASIX) tablet 40 mg  40 mg Oral Daily Demetrius  Miguel WHITMAN DO       • isosorbide mononitrate (IMDUR) 24 hr tablet 60 mg  60 mg Oral Daily Miguel Romo DO   60 mg at 08/05/18 0858   • lisinopril (PRINIVIL,ZESTRIL) tablet 10 mg  10 mg Oral Q12H Miguel Romo DO       • metoprolol succinate XL (TOPROL-XL) 24 hr tablet 100 mg  100 mg Oral Q24H Padmini Maldonado APRN   100 mg at 08/05/18 0858   • nitroglycerin (NITROSTAT) SL tablet 0.4 mg  0.4 mg Sublingual Q5 Min PRN Miguel Romo DO       • ondansetron (ZOFRAN) injection 4 mg  4 mg Intravenous Q6H PRN Miguel Romo DO       • pantoprazole (PROTONIX) EC tablet 40 mg  40 mg Oral QAM Miguel Romo DO   40 mg at 08/05/18 0858   • potassium chloride (MICRO-K) CR capsule 20 mEq  20 mEq Oral Daily Miguel Romo DO   20 mEq at 08/05/18 0858   • ranolazine (RANEXA) 12 hr tablet 500 mg  500 mg Oral Q12H Miguel Romo DO   500 mg at 08/05/18 0858   • rivaroxaban (XARELTO) tablet 20 mg  20 mg Oral Daily With Dinner Huey Wilkins MD       • sodium chloride 0.9 % flush 1-10 mL  1-10 mL Intravenous PRN Miguel Romo DO       • sodium chloride 0.9 % flush 10 mL  10 mL Intravenous PRN Gonzalo Pike DO       • spironolactone (ALDACTONE) tablet 25 mg  25 mg Oral Daily Huey Wilkins MD   25 mg at 08/05/18 0858   • tamsulosin (FLOMAX) 24 hr capsule 0.4 mg  0.4 mg Oral Nightly Miguel Romo DO   0.4 mg at 08/04/18 2019         Assessment/Plan     Principal Problem:    Acute dyspnea  Active Problems:    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 Stable for discharge from cardiology standpoint.  2. Continue atorvastatin, lisinopril, imdur, lasix, metoprolol succinate, ranexa and spironolactone.   3. Reviewed signs and symptoms of CHF and  what to report with the patient. Patient instructed to restrict sodium and weigh daily. Report weight gain of greater than 2 lbs overnight or 5 lbs in 1 week. Pt verbalized understanding of instructions and plan of care.   4. [id number] score is 5, making pt high risk for thrombotic event with 6.7% yearly risk of stroke. Pt started on Xarelto 20 mg daily with dinner. Based on Cockroft-Gault method, creatinine clearance is 48.93. Will reduce Xarelto to 15 mg daily with dinner. Pt educated on taking Xarelto with biggest meal of the day. Reviewed signs and symptoms of bleeding. Pt instructed to report any signs of bleeding. He verbalized understanding.   5. Will need 2 week follow up with cardiology office of pt's choice. He is currently deciding between follow up with [facility] cardiology versus [facility]. Pt to contact heart group office Monday 8/6/18 for appointment, if he wishes to follow with the heart group.     Padmini Maldonado, LOC  08/05/18  12:56 PM    I have discussed the pt with the Midlevel practitioner. The pt was discharged prior to my arrival or not otherwise available for me to exam and talk to. I agree with the plans and assessment.     Huey Wilkins MD

## 2018-08-05 NOTE — DISCHARGE SUMMARY
[facility] Medicine Services  DISCHARGE SUMMARY       Date of Admission: 8/3/2018  Date of Discharge:  8/5/2018  [facility] Physician: [facility] [place] [place]     Presenting Problem/History of Present Illness:  Shortness of breath.     Final Discharge Diagnoses:  1.  Acute (likely chronic to a degree) systolic heart failure with an ejection fraction of 40%.  2.  Ischemic cardiomyopathy.  3.  Coronary artery disease with history of coronary artery bypass grafting and stent placement.  4.  Elevated troponin likely secondary to CHF exacerbation.  5.  Mild aortic stenosis.  6.  Moderate pulmonary hypertension.  7.  Systemic arterial hypertension.  8.  Benign prostatic hypertrophy.  9.  Hyperlipidemia.  10.  Nocturnal hypoxemia on nocturnal oxygen.   11.  Remote tobacco abuse with likely underlying chronic obstructive pulmonary disease.  12.  Paroxysmal atrial flutter to be anticoagulated with Xarelto per Dr. Wilkins.     Consults: Dr. Wilkins with cardiology.     Procedures Performed: None.     Pertinent Test Results:   Results for orders placed during the hospital encounter of 08/03/18   Adult Transthoracic Echo Complete W/ Cont if Necessary Per Protocol    Narrative · The left ventricular cavity is mildly dilated.  · Left atrial cavity size is moderately dilated.  · Right ventricular cavity is mildly dilated.  · Mild mitral valve regurgitation is present  · Left ventricular diastolic dysfunction.  · Mild aortic valve stenosis is present.  · The findings are consistent with dilated cardiomyopathy.  · The following left ventricular wall segments are hypokinetic: mid   anterolateral, apical lateral, apical inferior, mid inferior and basal   inferior.     DILATED CARDIOMYOPATHY WITH FOUR CHAMBER ENLARGEMENT  MILD AORTIC VALVE STENOSIS  MODERATE ISCHEMIC LV DYSFUNCTION  MILD AORTIC VALVE STENOSIS  MODERATE PULMONARY HTN  POSSIBLE PFO OR AVM         Imaging Results  (last 7 days)     Procedure Component Value Units Date/Time    XR Chest 2 View [[id number]] Collected:  08/03/18 0951     Updated:  08/03/18 1018    Narrative:       History:  87-year-old with dyspnea.     Reference:  Chest radiograph May 30, 2018.     Findings:  Frontal and lateral chest radiographs performed.     Normal heart size. Atherosclerotic thoracic aorta. Prior median  sternotomy/CABG. Diffuse bronchial wall thickening. Minimal linear  subsegmental atelectasis inferior right upper lobe. No confluent  airspace opacities. Question Kerley B lines. No pleural effusion or  pneumothorax. Demineralized bones. Vertebral compression deformity may  be a lower thoracic or upper lumbar vertebrae.          Impression:       Peribronchial thickening and suspected interlobular septal thickening.  Findings may suggest interstitial pulmonary edema. Infectious bronchitis  would also be considered.  This report was finalized on 08/03/2018 10:15 by Dr Naldo Avery, .        Lab Results (last 7 days)     Procedure Component Value Units Date/Time    Basic Metabolic Panel [[id number]]  (Abnormal) Collected:  08/05/18 0526    Specimen:  Blood Updated:  08/05/18 0646     Glucose 106 (H) mg/dL      BUN 26 (H) mg/dL      Creatinine 1.16 mg/dL      Sodium 139 mmol/L      Potassium 3.8 mmol/L      Chloride 99 mmol/L      CO2 28.0 mmol/L      Calcium 8.9 mg/dL      eGFR Non African Amer 60 (L) mL/min/1.73      BUN/Creatinine Ratio 22.4     Anion Gap 12.0 mmol/L     Narrative:       The MDRD GFR formula is only valid for adults with stable renal function between ages 18 and 70.    Hemoglobin A1c [[id number]] Collected:  08/04/18 0637    Specimen:  Blood Updated:  08/04/18 0906     Hemoglobin A1C 5.5 %     Narrative:       Less than 6.0           Non-Diabetic Range  6.0-7.0                 ADA Therapeutic Target  Greater than 7.0        Action Suggested    TSH [[id number]]  (Normal) Collected:  08/04/18 0637    Specimen:  Blood Updated:   08/04/18 0805     TSH 1.980 mIU/mL     Lipid Panel [[id number]]  (Abnormal) Collected:  08/04/18 0637    Specimen:  Blood Updated:  08/04/18 0737     Total Cholesterol 177 mg/dL      Triglycerides 123 mg/dL      HDL Cholesterol 45 mg/dL      LDL Cholesterol  111 (H) mg/dL      LDL/HDL Ratio 2.39    Basic Metabolic Panel [[id number]]  (Abnormal) Collected:  08/04/18 0637    Specimen:  Blood Updated:  08/04/18 0727     Glucose 116 (H) mg/dL      BUN 20 mg/dL      Creatinine 0.96 mg/dL      Sodium 140 mmol/L      Potassium 3.5 mmol/L      Chloride 101 mmol/L      CO2 28.0 mmol/L      Calcium 9.2 mg/dL      eGFR Non African Amer 74 mL/min/1.73      BUN/Creatinine Ratio 20.8     Anion Gap 11.0 mmol/L     Narrative:       The MDRD GFR formula is only valid for adults with stable renal function between ages 18 and 70.    CBC (No Diff) [[id number]]  (Abnormal) Collected:  08/04/18 0637    Specimen:  Blood Updated:  08/04/18 0709     WBC 6.84 10*3/mm3      RBC 5.03 10*6/mm3      Hemoglobin 13.4 (L) g/dL      Hematocrit 41.5 %      MCV 82.5 fL      MCH 26.6 (L) pg      MCHC 32.3 (L) g/dL      RDW 15.1 (H) %      RDW-SD 44.9 fl      MPV 10.1 fL      Platelets 163 10*3/mm3     Troponin [[id number]]  (Abnormal) Collected:  08/03/18 1708    Specimen:  Blood Updated:  08/03/18 1809     Troponin I 0.399 (H) ng/mL     Troponin [[id number]]  (Abnormal) Collected:  08/03/18 1134    Specimen:  Blood Updated:  08/03/18 1240     Troponin I 0.523 (H) ng/mL     Troponin [[id number]]  (Abnormal) Collected:  08/03/18 0904    Specimen:  Blood Updated:  08/03/18 0943     Troponin I 0.627 (C) ng/mL     BNP [[id number]]  (Abnormal) Collected:  08/03/18 0904    Specimen:  Blood Updated:  08/03/18 0938     proBNP 11,900.0 (H) pg/mL     CK-MB [[id number]]  (Normal) Collected:  08/03/18 0904    Specimen:  Blood Updated:  08/03/18 0938     CKMB 3.28 ng/mL     Narrative:       CKMB Index not indicated    Myoglobin, Serum [[id number]]  (Normal) Collected:   08/03/18 0904    Specimen:  Blood Updated:  08/03/18 0938     Myoglobin 36.6 ng/mL     CK [[id number]]  (Normal) Collected:  08/03/18 0904    Specimen:  Blood Updated:  08/03/18 0933     Creatine Kinase 64 U/L     Basic Metabolic Panel [[id number]]  (Abnormal) Collected:  08/03/18 0904    Specimen:  Blood Updated:  08/03/18 0932     Glucose 119 (H) mg/dL      BUN 16 mg/dL      Comment: Specimen hemolyzed. Results may be affected.        Creatinine 0.96 mg/dL      Sodium 143 mmol/L      Potassium 4.3 mmol/L      Comment: Specimen hemolyzed.  Results may be affected.        Chloride 107 mmol/L      CO2 24.0 mmol/L      Calcium 9.3 mg/dL      eGFR Non African Amer 74 mL/min/1.73      BUN/Creatinine Ratio 16.7     Anion Gap 12.0 mmol/L     Narrative:       The MDRD GFR formula is only valid for adults with stable renal function between ages 18 and 70.    CBC Auto Differential [[id number]]  (Abnormal) Collected:  08/03/18 0904    Specimen:  Blood Updated:  08/03/18 0915     WBC 7.20 10*3/mm3      RBC 5.28 10*6/mm3      Hemoglobin 14.0 g/dL      Hematocrit 44.3 %      MCV 83.9 fL      MCH 26.5 (L) pg      MCHC 31.6 (L) g/dL      RDW 15.3 (H) %      RDW-SD 45.8 fl      MPV 11.0 fL      Platelets 184 10*3/mm3      Neutrophil % 56.1 %      Lymphocyte % 31.3 %      Monocyte % 6.8 %      Eosinophil % 4.7 (H) %      Basophil % 0.7 %      Immature Grans % 0.4 %      Neutrophils, Absolute 4.04 10*3/mm3      Lymphocytes, Absolute 2.25 10*3/mm3      Monocytes, Absolute 0.49 10*3/mm3      Eosinophils, Absolute 0.34 10*3/mm3      Basophils, Absolute 0.05 10*3/mm3      Immature Grans, Absolute 0.03 10*3/mm3      nRBC 0.0 /100 WBC         Hospital Course:  Initial Visit:   This is an 87-year-old  gentleman who resides in [place], [place].  [facility]  He presents to the emergency department today with complaints of shortness of breath.  This has been primarily on exertion for about the last 2 weeks.   "However, he decided to present today as he will awoke from sleep and was \"smothering.\"  He denies orthopnea.  I think he had a difficult time understanding the concept though.  He has noticed a small amount of lower extremity edema, but this seems per the usual for him.  He denies recent chest discomfort.  It has just been more difficult to breathe recently.     He has a productive cough of clear sputum.  He denies fevers, sweats, or chills.  No wheezing.  He stopped smoking in 2004.  He denies a history of any pre-existing lung disease.  He does wear nocturnal oxygen.  He does admit that he stopped wearing this about a month ago because the cost of his oxygen increased $20 so he told them to \"stick it.\"     He underwent coronary artery bypass grafting in 1997 with Dr. Duron.  He does have [facility] in [place] [facility].  He is not sure of the year and does not recall the cardiologist's name.     [facility].     Subsequently:   He was admitted and diuresed with IV Lasix (which has been decreased and changed to oral).  2-D echocardiogram shows ischemic cardiomyopathy with an ejection fraction of 40%.  He is already on lisinopril, which has been decreased a bit as well to accommodate for the addition of spironolactone by cardiology. Metoprolol tartrate transitioned to metoprolol succinate. Continue Imdur and Ranexa.      Trended troponin; flat. No further ischemic workup planned at present.     Dr. Wilkins stopped his antiplatelets and placed him on Xarelto.      TSH normal. HbA1c normal. Lipids show an LDL of 111 with normal TG. Continue statin and adding Zetia at discharge.      Continued other home medications as appropriate.     Lovenox was used for DVT prophylaxis.     Case management consulted as he has not been getting his home oxygen for the last month, which could directly lead to some of his current problems. They are sending information to the [facility] to " "try and get this set up again.     He otherwise feels much better and wants to go home today. No chest pain. He will need to follow with his PCP at the [facility] in 1 week and either Dr. Wilkins or his cardiologist at the [facility] in 2-4 weeks.     Physical Exam on Discharge:  BP 98/50 (BP Location: Left arm, Patient Position: Lying)   Pulse 65   Temp 98.2 °F (36.8 °C) (Oral)   Resp 18   Ht 172.7 cm (68\")   Wt 77.1 kg (170 lb)   SpO2 93%   BMI 25.85 kg/m²   Physical Exam  Constitutional: He is oriented to person, place, and time. He appears well-developed and well-nourished.   Up in bed.  No distress. Niece and her  present. Seen and discussed with LOC Montoya with cardiology. Discussed with his nurse, Vanita.   Head: Normocephalic and atraumatic.   Eyes: Pupils are equal, round, and reactive to light. Conjunctivae and EOM are normal.   Neck: Neck supple. No JVD present.   Cardiovascular: Normal rate, regular rhythm, normal heart sounds and intact distal pulses.  Exam reveals no gallop and no friction rub.  No murmur heard.  Pulmonary/Chest: Effort normal. No respiratory distress. He has no wheezes. He has rales (no longer present). He exhibits no tenderness.   Abdominal: Soft. Bowel sounds are normal. He exhibits no distension. There is no tenderness. There is no rebound and no guarding.   Musculoskeletal: Normal range of motion. He exhibits edema (trace). He exhibits no tenderness or deformity.   Neurological: He is alert and oriented to person, place, and time. He displays normal reflexes. No cranial nerve deficit. He exhibits normal muscle tone.   Skin: Skin is warm and dry. No rash noted.   Psychiatric: He has a normal mood and affect. His behavior is normal. Judgment and thought content normal.     Condition on Discharge: Good.     Discharge Disposition: Home or Self Care     Discharge Medications:     Discharge Medications      New Medications      Instructions Start Date   furosemide 40 MG " tablet  Commonly known as:  LASIX   40 mg, Oral, Daily      metoprolol succinate  MG 24 hr tablet  Commonly known as:  TOPROL-XL   100 mg, Oral, Every 24 Hours Scheduled      rivaroxaban 15 MG tablet  Commonly known as:  XARELTO   15 mg, Oral, Daily With Dinner      spironolactone 25 MG tablet  Commonly known as:  ALDACTONE   25 mg, Oral, Daily         Changes to Medications      Instructions Start Date   lisinopril 10 MG tablet  Commonly known as:  PRINIVILZESTRIL  What changed:  · medication strength  · how much to take  · when to take this   10 mg, Oral, Every 12 Hours Scheduled         Continue These Medications      Instructions Start Date   allopurinol 100 MG tablet  Commonly known as:  ZYLOPRIM   200 mg, Oral, Daily      atorvastatin 80 MG tablet  Commonly known as:  LIPITOR   80 mg, Oral, Daily      finasteride 5 MG tablet  Commonly known as:  PROSCAR   5 mg, Oral, Daily      isosorbide mononitrate 120 MG 24 hr tablet  Commonly known as:  IMDUR   60 mg, Oral, Daily      nitroglycerin 0.4 MG SL tablet  Commonly known as:  NITROSTAT   0.4 mg, Sublingual, Every 5 Minutes PRN, Take no more than 3 doses in 15 minutes.      omeprazole 20 MG capsule  Commonly known as:  priLOSEC   40 mg, Oral, Daily      ranolazine 500 MG 12 hr tablet  Commonly known as:  RANEXA   500 mg, Oral, 2 Times Daily      tamsulosin 0.4 MG capsule 24 hr capsule  Commonly known as:  FLOMAX   1 capsule, Oral, Nightly         Stop These Medications    clopidogrel 75 MG tablet  Commonly known as:  PLAVIX     metoprolol tartrate 100 MG tablet  Commonly known as:  LOPRESSOR          Discharge Diet:   Diet Instructions     Diet: Cardiac, Specialty Diet; Thin Liquids, No Restrictions; Low Sodium; Thin       Discharge Diet:   Cardiac  Specialty Diet       Fluid Consistency:  Thin Liquids, No Restrictions    Specialty Diets:  Low Sodium    Fluid Consistency:  Thin         Activity at Discharge:   Activity Instructions     Activity as Tolerated            Follow-up Appointments:   1. [facility] [place] in 1 week.   2. Dr. Wilkins or [facility] cardiology in 2-4 weeks.     Test Results Pending at Discharge: None.     Miguel Romo DO  08/05/18  2:14 PM    Time: 35 minutes.

## 2018-08-14 ENCOUNTER — TELEPHONE (OUTPATIENT)
Dept: CARDIOLOGY | Facility: CLINIC | Age: 83
End: 2018-08-14

## 2018-08-14 NOTE — TELEPHONE ENCOUNTER
Ange,    Xarelto shouldn't cause dizziness, however it may be an issue to be on Xarelto if pt is having significant dizziness and/or falls. We will address this at office visit.

## 2018-08-14 NOTE — TELEPHONE ENCOUNTER
Friend of patient has called in today stating patient has been very dizzy here lately. I don't see where this patient has a cardiologist yet, and has appointment with NP soon. Would Xarelto cause this problem?

## 2018-08-27 ENCOUNTER — TELEPHONE (OUTPATIENT)
Dept: CARDIOLOGY | Facility: CLINIC | Age: 83
End: 2018-08-27

## 2018-09-02 ENCOUNTER — APPOINTMENT (OUTPATIENT)
Dept: GENERAL RADIOLOGY | Facility: HOSPITAL | Age: 83
End: 2018-09-02

## 2018-09-02 ENCOUNTER — HOSPITAL ENCOUNTER (OUTPATIENT)
Facility: HOSPITAL | Age: 83
Setting detail: OBSERVATION
Discharge: HOME | End: 2018-09-03
Attending: EMERGENCY MEDICINE | Admitting: INTERNAL MEDICINE

## 2018-09-02 ENCOUNTER — HOSPITAL ENCOUNTER (EMERGENCY)
Facility: HOSPITAL | Age: 83
Discharge: HOME | End: 2018-09-02
Attending: EMERGENCY MEDICINE | Admitting: EMERGENCY MEDICINE

## 2018-09-02 ENCOUNTER — APPOINTMENT (OUTPATIENT)
Dept: CT IMAGING | Facility: HOSPITAL | Age: 83
End: 2018-09-02

## 2018-09-02 VITALS
HEIGHT: 64 IN | OXYGEN SATURATION: 94 % | HEART RATE: 91 BPM | TEMPERATURE: 98.1 F | WEIGHT: 155.1 LBS | SYSTOLIC BLOOD PRESSURE: 106 MMHG | BODY MASS INDEX: 26.48 KG/M2 | DIASTOLIC BLOOD PRESSURE: 64 MMHG | RESPIRATION RATE: 18 BRPM

## 2018-09-02 DIAGNOSIS — R55 SYNCOPE, UNSPECIFIED SYNCOPE TYPE: Primary | ICD-10-CM

## 2018-09-02 DIAGNOSIS — I95.2 HYPOTENSION DUE TO DRUGS: ICD-10-CM

## 2018-09-02 LAB
ALBUMIN SERPL-MCNC: 3.8 G/DL (ref 3.5–5)
ALBUMIN/GLOB SERPL: 1.3 G/DL (ref 1.1–2.5)
ALP SERPL-CCNC: 87 U/L (ref 24–120)
ALT SERPL W P-5'-P-CCNC: <15 U/L (ref 0–54)
ANION GAP SERPL CALCULATED.3IONS-SCNC: 10 MMOL/L (ref 4–13)
APTT PPP: 29.8 SECONDS (ref 24.1–34.8)
AST SERPL-CCNC: 18 U/L (ref 7–45)
BASOPHILS # BLD AUTO: 0.05 10*3/MM3 (ref 0–0.2)
BASOPHILS NFR BLD AUTO: 0.7 % (ref 0–2)
BILIRUB SERPL-MCNC: 0.8 MG/DL (ref 0.1–1)
BUN BLD-MCNC: 18 MG/DL (ref 5–21)
BUN/CREAT SERPL: 16.8 (ref 7–25)
CALCIUM SPEC-SCNC: 8.9 MG/DL (ref 8.4–10.4)
CHLORIDE SERPL-SCNC: 105 MMOL/L (ref 98–110)
CO2 SERPL-SCNC: 24 MMOL/L (ref 24–31)
CREAT BLD-MCNC: 1.07 MG/DL (ref 0.5–1.4)
DEPRECATED RDW RBC AUTO: 46 FL (ref 40–54)
EOSINOPHIL # BLD AUTO: 0.2 10*3/MM3 (ref 0–0.7)
EOSINOPHIL NFR BLD AUTO: 2.6 % (ref 0–4)
ERYTHROCYTE [DISTWIDTH] IN BLOOD BY AUTOMATED COUNT: 15.3 % (ref 12–15)
GFR SERPL CREATININE-BSD FRML MDRD: 65 ML/MIN/1.73
GLOBULIN UR ELPH-MCNC: 3 GM/DL
GLUCOSE BLD-MCNC: 131 MG/DL (ref 70–100)
HCT VFR BLD AUTO: 41 % (ref 40–52)
HGB BLD-MCNC: 13.3 G/DL (ref 14–18)
IMM GRANULOCYTES # BLD: 0.03 10*3/MM3 (ref 0–0.03)
IMM GRANULOCYTES NFR BLD: 0.4 % (ref 0–5)
INR PPP: 1.03 (ref 0.91–1.09)
LYMPHOCYTES # BLD AUTO: 1.79 10*3/MM3 (ref 0.72–4.86)
LYMPHOCYTES NFR BLD AUTO: 23.6 % (ref 15–45)
MAGNESIUM SERPL-MCNC: 1.5 MG/DL (ref 1.4–2.2)
MCH RBC QN AUTO: 27.1 PG (ref 28–32)
MCHC RBC AUTO-ENTMCNC: 32.4 G/DL (ref 33–36)
MCV RBC AUTO: 83.5 FL (ref 82–95)
MONOCYTES # BLD AUTO: 0.45 10*3/MM3 (ref 0.19–1.3)
MONOCYTES NFR BLD AUTO: 5.9 % (ref 4–12)
NEUTROPHILS # BLD AUTO: 5.07 10*3/MM3 (ref 1.87–8.4)
NEUTROPHILS NFR BLD AUTO: 66.8 % (ref 39–78)
NRBC BLD MANUAL-RTO: 0 /100 WBC (ref 0–0)
NT-PROBNP SERPL-MCNC: 2710 PG/ML (ref 0–1800)
PLATELET # BLD AUTO: 153 10*3/MM3 (ref 130–400)
PMV BLD AUTO: 10.7 FL (ref 6–12)
POTASSIUM BLD-SCNC: 3.8 MMOL/L (ref 3.5–5.3)
PROT SERPL-MCNC: 6.8 G/DL (ref 6.3–8.7)
PROTHROMBIN TIME: 13.8 SECONDS (ref 11.9–14.6)
RBC # BLD AUTO: 4.91 10*6/MM3 (ref 4.8–5.9)
SODIUM BLD-SCNC: 139 MMOL/L (ref 135–145)
TROPONIN I SERPL-MCNC: <0.012 NG/ML (ref 0–0.03)
WBC NRBC COR # BLD: 7.59 10*3/MM3 (ref 4.8–10.8)

## 2018-09-02 PROCEDURE — 71045 X-RAY EXAM CHEST 1 VIEW: CPT

## 2018-09-02 PROCEDURE — 93005 ELECTROCARDIOGRAM TRACING: CPT | Performed by: EMERGENCY MEDICINE

## 2018-09-02 PROCEDURE — 85025 COMPLETE CBC W/AUTO DIFF WBC: CPT | Performed by: EMERGENCY MEDICINE

## 2018-09-02 PROCEDURE — 70450 CT HEAD/BRAIN W/O DYE: CPT

## 2018-09-02 PROCEDURE — G0378 HOSPITAL OBSERVATION PER HR: HCPCS

## 2018-09-02 PROCEDURE — 93010 ELECTROCARDIOGRAM REPORT: CPT | Performed by: INTERNAL MEDICINE

## 2018-09-02 PROCEDURE — 93005 ELECTROCARDIOGRAM TRACING: CPT

## 2018-09-02 PROCEDURE — 85730 THROMBOPLASTIN TIME PARTIAL: CPT | Performed by: EMERGENCY MEDICINE

## 2018-09-02 PROCEDURE — 84484 ASSAY OF TROPONIN QUANT: CPT | Performed by: EMERGENCY MEDICINE

## 2018-09-02 PROCEDURE — 85610 PROTHROMBIN TIME: CPT | Performed by: EMERGENCY MEDICINE

## 2018-09-02 PROCEDURE — 80053 COMPREHEN METABOLIC PANEL: CPT | Performed by: EMERGENCY MEDICINE

## 2018-09-02 PROCEDURE — 99285 EMERGENCY DEPT VISIT HI MDM: CPT

## 2018-09-02 PROCEDURE — 83880 ASSAY OF NATRIURETIC PEPTIDE: CPT | Performed by: EMERGENCY MEDICINE

## 2018-09-02 PROCEDURE — 83735 ASSAY OF MAGNESIUM: CPT | Performed by: EMERGENCY MEDICINE

## 2018-09-02 PROCEDURE — 96360 HYDRATION IV INFUSION INIT: CPT

## 2018-09-02 RX ORDER — LISINOPRIL 10 MG/1
10 TABLET ORAL
Status: DISCONTINUED | OUTPATIENT
Start: 2018-09-03 | End: 2018-09-03 | Stop reason: HOSPADM

## 2018-09-02 RX ORDER — PANTOPRAZOLE SODIUM 40 MG/1
40 TABLET, DELAYED RELEASE ORAL
Status: DISCONTINUED | OUTPATIENT
Start: 2018-09-03 | End: 2018-09-03 | Stop reason: HOSPADM

## 2018-09-02 RX ORDER — FINASTERIDE 5 MG/1
5 TABLET, FILM COATED ORAL DAILY
Status: DISCONTINUED | OUTPATIENT
Start: 2018-09-03 | End: 2018-09-03 | Stop reason: HOSPADM

## 2018-09-02 RX ORDER — SODIUM CHLORIDE 0.9 % (FLUSH) 0.9 %
1-10 SYRINGE (ML) INJECTION AS NEEDED
Status: DISCONTINUED | OUTPATIENT
Start: 2018-09-02 | End: 2018-09-03 | Stop reason: HOSPADM

## 2018-09-02 RX ORDER — ISOSORBIDE MONONITRATE 60 MG/1
60 TABLET, EXTENDED RELEASE ORAL DAILY
Status: DISCONTINUED | OUTPATIENT
Start: 2018-09-03 | End: 2018-09-03 | Stop reason: HOSPADM

## 2018-09-02 RX ORDER — RANOLAZINE 500 MG/1
500 TABLET, EXTENDED RELEASE ORAL EVERY 12 HOURS SCHEDULED
Status: DISCONTINUED | OUTPATIENT
Start: 2018-09-02 | End: 2018-09-03 | Stop reason: HOSPADM

## 2018-09-02 RX ORDER — METOPROLOL SUCCINATE 100 MG/1
100 TABLET, EXTENDED RELEASE ORAL
Status: DISCONTINUED | OUTPATIENT
Start: 2018-09-02 | End: 2018-09-03 | Stop reason: HOSPADM

## 2018-09-02 RX ORDER — ONDANSETRON 2 MG/ML
4 INJECTION INTRAMUSCULAR; INTRAVENOUS EVERY 6 HOURS PRN
Status: DISCONTINUED | OUTPATIENT
Start: 2018-09-02 | End: 2018-09-03 | Stop reason: HOSPADM

## 2018-09-02 RX ORDER — SODIUM CHLORIDE 0.9 % (FLUSH) 0.9 %
10 SYRINGE (ML) INJECTION AS NEEDED
Status: DISCONTINUED | OUTPATIENT
Start: 2018-09-02 | End: 2018-09-02 | Stop reason: HOSPADM

## 2018-09-02 RX ORDER — TAMSULOSIN HYDROCHLORIDE 0.4 MG/1
0.4 CAPSULE ORAL NIGHTLY
Status: DISCONTINUED | OUTPATIENT
Start: 2018-09-02 | End: 2018-09-03 | Stop reason: HOSPADM

## 2018-09-02 RX ORDER — ATORVASTATIN CALCIUM 40 MG/1
80 TABLET, FILM COATED ORAL NIGHTLY
Status: DISCONTINUED | OUTPATIENT
Start: 2018-09-02 | End: 2018-09-03 | Stop reason: HOSPADM

## 2018-09-02 RX ORDER — ALLOPURINOL 100 MG/1
200 TABLET ORAL DAILY
Status: DISCONTINUED | OUTPATIENT
Start: 2018-09-03 | End: 2018-09-03 | Stop reason: HOSPADM

## 2018-09-02 RX ORDER — ACETAMINOPHEN 325 MG/1
650 TABLET ORAL EVERY 4 HOURS PRN
Status: DISCONTINUED | OUTPATIENT
Start: 2018-09-02 | End: 2018-09-03 | Stop reason: HOSPADM

## 2018-09-02 RX ORDER — FUROSEMIDE 20 MG/1
20 TABLET ORAL DAILY
Status: DISCONTINUED | OUTPATIENT
Start: 2018-09-03 | End: 2018-09-03 | Stop reason: HOSPADM

## 2018-09-02 RX ORDER — NITROGLYCERIN 0.4 MG/1
0.4 TABLET SUBLINGUAL
Status: DISCONTINUED | OUTPATIENT
Start: 2018-09-02 | End: 2018-09-03 | Stop reason: HOSPADM

## 2018-09-02 RX ADMIN — SODIUM CHLORIDE 500 ML: 9 INJECTION, SOLUTION INTRAVENOUS at 11:49

## 2018-09-02 RX ADMIN — RANOLAZINE 500 MG: 500 TABLET, FILM COATED, EXTENDED RELEASE ORAL at 20:11

## 2018-09-02 RX ADMIN — RIVAROXABAN 15 MG: 15 TABLET, FILM COATED ORAL at 20:09

## 2018-09-02 RX ADMIN — TAMSULOSIN HYDROCHLORIDE 0.4 MG: 0.4 CAPSULE ORAL at 20:08

## 2018-09-02 RX ADMIN — ATORVASTATIN CALCIUM 80 MG: 40 TABLET, FILM COATED ORAL at 20:09

## 2018-09-02 NOTE — ED PROVIDER NOTES
Subjective   Patient was at a restaurant with his wife when she says he suddenly became sweaty and then passed out and became unresponsive.  This lasted for 2-3 minutes.  When the paramedics arrived the patient was hypotensive and they gave him a bolus of fluids.  He feels fine now but this is the second time this is happened today.  He was seen in the emergency room earlier with a negative workup and felt to be due to medication but he is back now.        History provided by:  Patient and spouse   used: No    Syncope   Episode history:  Single  Most recent episode:  Today  Duration:  3 minutes  Timing:  Constant  Progression:  Resolved  Chronicity:  Recurrent  Context: sitting down    Context: not blood draw, not bowel movement, not dehydration, not exertion, not inactivity, not medication change, not with normal activity, not sight of blood, not standing up and not urination    Witnessed: yes    Relieved by:  Nothing  Worsened by:  Nothing  Ineffective treatments:  None tried  Associated symptoms: diaphoresis    Associated symptoms: no anxiety, no chest pain, no confusion, no difficulty breathing, no dizziness, no fever, no focal sensory loss, no focal weakness, no headaches, no malaise/fatigue, no palpitations, no recent injury, no seizures, no shortness of breath and no visual change    Risk factors: no congenital heart disease, no coronary artery disease, no seizures and no vascular disease        Review of Systems   Constitutional: Positive for diaphoresis. Negative for fever and malaise/fatigue.   HENT: Negative.    Respiratory: Negative.  Negative for shortness of breath.    Cardiovascular: Positive for syncope. Negative for chest pain and palpitations.   Gastrointestinal: Negative.    Genitourinary: Negative.    Musculoskeletal: Negative.    Skin: Negative.    Neurological: Negative for dizziness, focal weakness, seizures and headaches.   Hematological: Negative.     Psychiatric/Behavioral: Negative.  Negative for confusion.   All other systems reviewed and are negative.      Past Medical History:   Diagnosis Date   • Atrial fibrillation (CMS/HCC)    • Coronary artery disease    • Hyperlipidemia    • Hypertension        No Known Allergies    Past Surgical History:   Procedure Laterality Date   • CARDIAC SURGERY      double bypass       History reviewed. No pertinent family history.    Social History     Social History   • Marital status:      Social History Main Topics   • Smoking status: Former Smoker     Types: Cigarettes, Pipe, Cigars     Quit date: 2004   • Alcohol use No   • Drug use: Unknown   • Sexual activity: Defer     Other Topics Concern   • Not on file       Prior to Admission medications    Medication Sig Start Date End Date Taking? Authorizing Provider   allopurinol (ZYLOPRIM) 100 MG tablet Take 200 mg by mouth Daily.   Yes Jessy Murray MD   atorvastatin (LIPITOR) 80 MG tablet Take 80 mg by mouth Daily.   Yes Jessy Murray MD   finasteride (PROSCAR) 5 MG tablet Take 5 mg by mouth Daily.   Yes Jessy Murray MD   furosemide (LASIX) 40 MG tablet Take 1 tablet by mouth Daily. 8/6/18  Yes Miguel Romo,    isosorbide mononitrate (IMDUR) 120 MG 24 hr tablet Take 60 mg by mouth Daily.   Yes Jessy Murray MD   lisinopril (PRINIVIL,ZESTRIL) 10 MG tablet Take 1 tablet by mouth Every 12 (Twelve) Hours. 8/5/18  Yes Miguel Romo DO   metoprolol succinate XL (TOPROL-XL) 100 MG 24 hr tablet Take 1 tablet by mouth Daily. 8/6/18  Yes Miguel Romo DO   nitroglycerin (NITROSTAT) 0.4 MG SL tablet Place 0.4 mg under the tongue Every 5 (Five) Minutes As Needed for Chest Pain. Take no more than 3 doses in 15 minutes.   Yes Jessy Murray MD   omeprazole (priLOSEC) 20 MG capsule Take 40 mg by mouth Daily.   Yes Jessy Murray MD   ranolazine (RANEXA) 500 MG 12 hr tablet Take 500 mg by mouth 2 (Two) Times a Day.   Yes  Provider, MD Jessy   rivaroxaban (XARELTO) 15 MG tablet Take 1 tablet by mouth Daily With Dinner. 8/23/18  Yes Huey Wilkins MD   spironolactone (ALDACTONE) 25 MG tablet Take 1 tablet by mouth Daily.  Patient taking differently: Take 25 mg by mouth Daily. Taking 1/2 12.5mg 8/6/18  Yes Miguel Romo DO   tamsulosin (FLOMAX) 0.4 MG capsule 24 hr capsule Take 1 capsule by mouth Every Night. 5/30/18  Yes Luis Thomas Jr., MD       Medications - No data to display    Vitals:    09/02/18 1514   BP: 101/72   Pulse: 87   Resp: 15   Temp: 97.1 °F (36.2 °C)   SpO2: 94%         Objective   Physical Exam   Constitutional: He is oriented to person, place, and time. He appears well-developed and well-nourished.   HENT:   Head: Normocephalic and atraumatic.   Neck: Normal range of motion. Neck supple.   Cardiovascular: Normal rate and regular rhythm.    Patient does have frequent ectopy.   Pulmonary/Chest: Effort normal and breath sounds normal.   Abdominal: Soft. Bowel sounds are normal.   Musculoskeletal: Normal range of motion.   Neurological: He is alert and oriented to person, place, and time.   Skin: Skin is warm and dry.   Psychiatric: He has a normal mood and affect. His behavior is normal.   Nursing note and vitals reviewed.      Procedures         Lab Results (last 24 hours)     Procedure Component Value Units Date/Time    CBC & Differential [[id number]] Collected:  09/02/18 1147    Specimen:  Blood Updated:  09/02/18 1209    Narrative:       The following orders were created for panel order CBC & Differential.  Procedure                               Abnormality         Status                     ---------                               -----------         ------                     CBC Auto Differential[[id number]]        Abnormal            Final result                 Please view results for these tests on the individual orders.    Comprehensive Metabolic Panel [[id number]]  (Abnormal) Collected:   09/02/18 1147    Specimen:  Blood Updated:  09/02/18 1214     Glucose 131 (H) mg/dL      BUN 18 mg/dL      Creatinine 1.07 mg/dL      Sodium 139 mmol/L      Potassium 3.8 mmol/L      Chloride 105 mmol/L      CO2 24.0 mmol/L      Calcium 8.9 mg/dL      Total Protein 6.8 g/dL      Albumin 3.80 g/dL      ALT (SGPT) <15 U/L      AST (SGOT) 18 U/L      Alkaline Phosphatase 87 U/L      Total Bilirubin 0.8 mg/dL      eGFR Non African Amer 65 mL/min/1.73      Globulin 3.0 gm/dL      A/G Ratio 1.3 g/dL      BUN/Creatinine Ratio 16.8     Anion Gap 10.0 mmol/L     Narrative:       The MDRD GFR formula is only valid for adults with stable renal function between ages 18 and 70.    Protime-INR [[id number]]  (Normal) Collected:  09/02/18 1147    Specimen:  Blood Updated:  09/02/18 1213     Protime 13.8 Seconds      INR 1.03    aPTT [[id number]]  (Normal) Collected:  09/02/18 1147    Specimen:  Blood Updated:  09/02/18 1213     PTT 29.8 seconds     Troponin [[id number]]  (Normal) Collected:  09/02/18 1147    Specimen:  Blood Updated:  09/02/18 1226     Troponin I <0.012 ng/mL     BNP [[id number]]  (Abnormal) Collected:  09/02/18 1147    Specimen:  Blood Updated:  09/02/18 1226     proBNP 2,710.0 (H) pg/mL     Magnesium [[id number]]  (Normal) Collected:  09/02/18 1147    Specimen:  Blood Updated:  09/02/18 1214     Magnesium 1.5 mg/dL     CBC Auto Differential [[id number]]  (Abnormal) Collected:  09/02/18 1147    Specimen:  Blood Updated:  09/02/18 1209     WBC 7.59 10*3/mm3      RBC 4.91 10*6/mm3      Hemoglobin 13.3 (L) g/dL      Hematocrit 41.0 %      MCV 83.5 fL      MCH 27.1 (L) pg      MCHC 32.4 (L) g/dL      RDW 15.3 (H) %      RDW-SD 46.0 fl      MPV 10.7 fL      Platelets 153 10*3/mm3      Neutrophil % 66.8 %      Lymphocyte % 23.6 %      Monocyte % 5.9 %      Eosinophil % 2.6 %      Basophil % 0.7 %      Immature Grans % 0.4 %      Neutrophils, Absolute 5.07 10*3/mm3      Lymphocytes, Absolute 1.79 10*3/mm3      Monocytes,  Absolute 0.45 10*3/mm3      Eosinophils, Absolute 0.20 10*3/mm3      Basophils, Absolute 0.05 10*3/mm3      Immature Grans, Absolute 0.03 10*3/mm3      nRBC 0.0 /100 WBC           No orders to display       ED Course  ED Course as of Sep 02 1546   Sun Sep 02, 2018   1545 Patient's EKG does reveal a more definite atrial flutter but this is a chronic problem from him.  The rate is well controlled.  Since this is his second time today to the emergency room for the same complaint we will put an observation bed overnight.  I spoke with Dr. Romo and he is aware.  [TR]      ED Course User Index  [TR] Luis Thomas Jr., MD          MDM  Number of Diagnoses or Management Options  Syncope, unspecified syncope type: new and requires workup     Amount and/or Complexity of Data Reviewed  Tests in the medicine section of CPT®: reviewed and ordered  Discuss the patient with other providers: yes    Risk of Complications, Morbidity, and/or Mortality  Presenting problems: moderate  Diagnostic procedures: moderate  Management options: moderate    Patient Progress  Patient progress: stable      Final diagnoses:   Syncope, unspecified syncope type          Luis Thomas Jr., MD  09/02/18 1546

## 2018-09-02 NOTE — ED NOTES
Pt is an 87 year old male who presented to the ED via EMS for a syncopal episode. Pt was accompanied by his girlfriend and his cousin. Pt girlfriend and EMS stated that pt was found unresponsive in his car in the [facility] parking lot this morning. Pt girlfriend stated that when people from the [facility] found him in his car the patient did not have a pulse so they began CPR. EMS stated that when they arrived pt was still unresponsive but did have a pulse and a non shockable rhythm, and within a minute or 2 the patient slowly came back around. Pt has a chronic history of A-fib, and an extensive cardiac history. Pt states he felt ok this morning just really dizzy and doesn't remember much after arriving at [facility].      Arelis Salinas RN  09/02/18 8900

## 2018-09-02 NOTE — ED PROVIDER NOTES
Subjective   Patient is brought to the emergency room with a report of a syncopal episode.  The patient says he remembers [place] and then he does not murmurs anything until he was waking up in the ambulance.  Family apparently found him passed out in his car.  He denies any chest pain or palpitations.  They do say he was sweaty in the car.  His family says that since he has had 2 new medications added which or Xarelto and Aldactone that he has been having dizzy spells over the past couple weeks.  They are concerned that may be part of the problem.        History provided by:  Patient and relative   used: No    Syncope   Episode history:  Single  Most recent episode:  Today  Duration:  5 minutes  Timing:  Constant  Progression:  Resolved  Chronicity:  New  Context: sitting down    Context: not blood draw, not bowel movement, not dehydration, not exertion, not inactivity, not medication change, not with normal activity, not sight of blood, not standing up and not urination    Witnessed: yes    Relieved by:  Nothing  Worsened by:  Nothing  Ineffective treatments:  None tried  Associated symptoms: no anxiety, no chest pain, no confusion, no diaphoresis, no difficulty breathing, no dizziness, no fever, no focal sensory loss, no focal weakness, no headaches, no malaise/fatigue, no nausea, no palpitations, no recent fall, no recent injury, no recent surgery, no rectal bleeding, no seizures, no shortness of breath, no visual change, no vomiting and no weakness    Risk factors: no congenital heart disease, no coronary artery disease, no seizures and no vascular disease        Review of Systems   Constitutional: Negative.  Negative for diaphoresis, fever and malaise/fatigue.   HENT: Negative.    Respiratory: Negative.  Negative for shortness of breath.    Cardiovascular: Positive for syncope. Negative for chest pain and palpitations.   Gastrointestinal: Negative.  Negative for  nausea and vomiting.   Genitourinary: Negative.    Musculoskeletal: Negative.    Skin: Negative.    Neurological: Negative for dizziness, focal weakness, seizures, weakness and headaches.   Hematological: Negative.    Psychiatric/Behavioral: Negative.  Negative for confusion.   All other systems reviewed and are negative.      Past Medical History:   Diagnosis Date   • Atrial fibrillation (CMS/HCC)    • Coronary artery disease    • Hyperlipidemia    • Hypertension        No Known Allergies    Past Surgical History:   Procedure Laterality Date   • CARDIAC SURGERY      double bypass       History reviewed. No pertinent family history.    Social History     Social History   • Marital status:      Social History Main Topics   • Smoking status: Former Smoker     Types: Cigarettes, Pipe, Cigars     Quit date: 2004   • Alcohol use No   • Drug use: Unknown   • Sexual activity: Defer     Other Topics Concern   • Not on file       Prior to Admission medications    Medication Sig Start Date End Date Taking? Authorizing Provider   allopurinol (ZYLOPRIM) 100 MG tablet Take 200 mg by mouth Daily.    Jessy Murray MD   atorvastatin (LIPITOR) 80 MG tablet Take 80 mg by mouth Daily.    Jessy Murray MD   finasteride (PROSCAR) 5 MG tablet Take 5 mg by mouth Daily.    Jessy Murray MD   furosemide (LASIX) 40 MG tablet Take 1 tablet by mouth Daily. 8/6/18   Miguel Romo,    isosorbide mononitrate (IMDUR) 120 MG 24 hr tablet Take 60 mg by mouth Daily.    Jesys Murray MD   lisinopril (PRINIVIL,ZESTRIL) 10 MG tablet Take 1 tablet by mouth Every 12 (Twelve) Hours. 8/5/18   Miguel Romo, DO   metoprolol succinate XL (TOPROL-XL) 100 MG 24 hr tablet Take 1 tablet by mouth Daily. 8/6/18   Miguel Romo, DO   nitroglycerin (NITROSTAT) 0.4 MG SL tablet Place 0.4 mg under the tongue Every 5 (Five) Minutes As Needed for Chest Pain. Take no more than 3 doses in 15 minutes.    Jessy Murray  MD   omeprazole (priLOSEC) 20 MG capsule Take 40 mg by mouth Daily.    Provider, MD Jessy   ranolazine (RANEXA) 500 MG 12 hr tablet Take 500 mg by mouth 2 (Two) Times a Day.    Provider, MD Jessy   rivaroxaban (XARELTO) 15 MG tablet Take 1 tablet by mouth Daily With Dinner. 8/23/18   Huey Wilkins MD   spironolactone (ALDACTONE) 25 MG tablet Take 1 tablet by mouth Daily. 8/6/18   Miguel Romo DO   tamsulosin (FLOMAX) 0.4 MG capsule 24 hr capsule Take 1 capsule by mouth Every Night. 5/30/18   Luis Thomas Jr., MD       Medications   sodium chloride 0.9 % flush 10 mL (not administered)   sodium chloride 0.9 % bolus 500 mL (0 mL Intravenous Stopped 9/2/18 1300)       Vitals:    09/02/18 1331   BP: 102/60   Pulse: 94   Resp:    Temp:    SpO2: (!) 76%         Objective   Physical Exam   Constitutional: He is oriented to person, place, and time. He appears well-developed and well-nourished.   HENT:   Head: Normocephalic and atraumatic.   Neck: Normal range of motion. Neck supple.   Cardiovascular: Normal rate and regular rhythm.    Patient does have frequent ectopy.   Pulmonary/Chest: Effort normal and breath sounds normal.   Abdominal: Soft. Bowel sounds are normal.   Musculoskeletal: Normal range of motion.   Neurological: He is alert and oriented to person, place, and time.   Skin: Skin is warm and dry.   Psychiatric: He has a normal mood and affect. His behavior is normal.   Nursing note and vitals reviewed.      Procedures         Lab Results (last 24 hours)     Procedure Component Value Units Date/Time    CBC & Differential [[id number]] Collected:  09/02/18 1147    Specimen:  Blood Updated:  09/02/18 1209    Narrative:       The following orders were created for panel order CBC & Differential.  Procedure                               Abnormality         Status                     ---------                               -----------         ------                     CBC Auto  Differential[[id number]]        Abnormal            Final result                 Please view results for these tests on the individual orders.    Comprehensive Metabolic Panel [[id number]]  (Abnormal) Collected:  09/02/18 1147    Specimen:  Blood Updated:  09/02/18 1214     Glucose 131 (H) mg/dL      BUN 18 mg/dL      Creatinine 1.07 mg/dL      Sodium 139 mmol/L      Potassium 3.8 mmol/L      Chloride 105 mmol/L      CO2 24.0 mmol/L      Calcium 8.9 mg/dL      Total Protein 6.8 g/dL      Albumin 3.80 g/dL      ALT (SGPT) <15 U/L      AST (SGOT) 18 U/L      Alkaline Phosphatase 87 U/L      Total Bilirubin 0.8 mg/dL      eGFR Non African Amer 65 mL/min/1.73      Globulin 3.0 gm/dL      A/G Ratio 1.3 g/dL      BUN/Creatinine Ratio 16.8     Anion Gap 10.0 mmol/L     Narrative:       The MDRD GFR formula is only valid for adults with stable renal function between ages 18 and 70.    Protime-INR [[id number]]  (Normal) Collected:  09/02/18 1147    Specimen:  Blood Updated:  09/02/18 1213     Protime 13.8 Seconds      INR 1.03    aPTT [[id number]]  (Normal) Collected:  09/02/18 1147    Specimen:  Blood Updated:  09/02/18 1213     PTT 29.8 seconds     Troponin [[id number]]  (Normal) Collected:  09/02/18 1147    Specimen:  Blood Updated:  09/02/18 1226     Troponin I <0.012 ng/mL     BNP [[id number]]  (Abnormal) Collected:  09/02/18 1147    Specimen:  Blood Updated:  09/02/18 1226     proBNP 2,710.0 (H) pg/mL     Magnesium [[id number]]  (Normal) Collected:  09/02/18 1147    Specimen:  Blood Updated:  09/02/18 1214     Magnesium 1.5 mg/dL     CBC Auto Differential [[id number]]  (Abnormal) Collected:  09/02/18 1147    Specimen:  Blood Updated:  09/02/18 1209     WBC 7.59 10*3/mm3      RBC 4.91 10*6/mm3      Hemoglobin 13.3 (L) g/dL      Hematocrit 41.0 %      MCV 83.5 fL      MCH 27.1 (L) pg      MCHC 32.4 (L) g/dL      RDW 15.3 (H) %      RDW-SD 46.0 fl      MPV 10.7 fL      Platelets 153 10*3/mm3      Neutrophil % 66.8 %       Lymphocyte % 23.6 %      Monocyte % 5.9 %      Eosinophil % 2.6 %      Basophil % 0.7 %      Immature Grans % 0.4 %      Neutrophils, Absolute 5.07 10*3/mm3      Lymphocytes, Absolute 1.79 10*3/mm3      Monocytes, Absolute 0.45 10*3/mm3      Eosinophils, Absolute 0.20 10*3/mm3      Basophils, Absolute 0.05 10*3/mm3      Immature Grans, Absolute 0.03 10*3/mm3      nRBC 0.0 /100 WBC           XR Chest 1 View   Final Result   1. Perihilar vascular prominence without overt edema. No focal   consolidation.   This report was finalized on 09/02/2018 12:53 by Dr Dilcia Connelly MD.      CT Head Without Contrast   Final Result   1. Prominence of the ventricles slightly greater than that of the   overlying sulci. Changes may be due to central atrophy. Normal pressure   hydrocephalus considered..   2. Periventricular hypodensities may be due to chronic small vessel   ischemia. Subependymal resorption of fluid a second possibility.   3. No intracranial hemorrhage, mass, or acute signs of ischemia.   This report was finalized on 09/02/2018 12:51 by Dr. Marquita Blanco MD.          ED Course  ED Course as of Sep 02 1351   Sun Sep 02, 2018   1350 I told the patient that his testing right now looks okay.  I can be pretty sure about what made him pass out and that is the fact that he was hypotensive but I cannot be sure what made him hypotensive.  It may very well be the addition of the Aldactone.  He was already on lisinopril and accommodation may be making him weaker.  Offer to put him in the hospital to watch him for a while since he was hypotensive initially but they seem not want to do that.  Instead we told him to cut his Aldactone in half and see if that does not resolve the problem and otherwise to follow-up with his regular physician for further evaluation.  [TR]      ED Course User Index  [TR] Luis Thomas Jr., MD          MDM  Number of Diagnoses or Management Options  Hypotension due to drugs: new and requires  workup  Syncope, unspecified syncope type:      Amount and/or Complexity of Data Reviewed  Clinical lab tests: ordered and reviewed  Tests in the radiology section of CPT®: ordered and reviewed  Tests in the medicine section of CPT®: reviewed and ordered    Risk of Complications, Morbidity, and/or Mortality  Presenting problems: moderate  Diagnostic procedures: moderate  Management options: moderate    Patient Progress  Patient progress: stable      Final diagnoses:   Syncope, unspecified syncope type   Hypotension due to drugs          Luis Thomas Jr., MD  09/02/18 6814

## 2018-09-02 NOTE — H&P
"    [facility]  HISTORY AND PHYSICAL    Date of Admission: 9/2/2018  Primary Care Physician: System, Provider Not In: [facility] [place]    Subjective     Chief Complaint: syncope     History of Present Illness  This is an 87-year-old  gentleman who resides in [place], [place].  [facility].  He presents to the emergency department today with complaints of recurrent syncope today in the setting of recent nearly daily dizziness.  I am familiar with the patient as I cared for him from 8/3 through 8/5.  He was also seen by Dr. Wilkins during the course of that admission. New medications to him on that stay were furosemide, spironolactone, lisinopril, Toprol-XL, and Xarelto.  We stopped his Lopressor and Plavix.    He came into the emergency department first thing this morning complaining of a syncopal episode.  This occurred at [employer].  He does not remember much of it. Dr. Thomas did a workup with nothing being out of order at that point in time.  He gave the patient 500 mL bolus of normal saline.  The patient felt better and ultimately wanted to go ahead and go home.  However, he and his girlfriend went to eat lunch and he had another syncopal episode.  This concerned him and he came back.    He tells me that he has been having dizzy spells intermittently over the time since he was last discharged.  It could be that he is being over diuresed for having too aggressive blood pressure coverage at this point in time.  Has dizziness is typically worse in the morning after \"taking a handful of medication.\"  He is unsure if his dizziness worsens with changes in posture.  His dizziness seems to dissipate by the mid afternoon.  His girlfriend tells me that they actually went dancing last night.  He will be admitted observation status for adjustment of his medications and monitoring.    He does not describe any increased shortness " of breath recently.  No orthopnea or paroxysmal nocturnal dyspnea.  No increased lower extremity edema.  No chest pain recently or in the last several weeks.    No rectal or urinary bleeding.  No change in weight or appetite.    No fever, sweats, or chills.    No weakness or sensation problems.    Review of Systems   Otherwise complete ROS reviewed and negative except as mentioned in the HPI.    Past Medical History:   Past Medical History:   Diagnosis Date   • Atrial fibrillation (CMS/HCC)    • Coronary artery disease    • Hyperlipidemia    • Hypertension      Past Surgical History:  Past Surgical History:   Procedure Laterality Date   • CARDIAC SURGERY      double bypass     Social History:  reports that he quit smoking about 14 years ago. His smoking use included Cigarettes, Pipe, and Cigars. He does not have any smokeless tobacco history on file. He reports that he does not drink alcohol.    Family History: HTN, ischemic heart disease    Allergies:  No Known Allergies  Medications:  Prior to Admission medications    Medication Sig Start Date End Date Taking? Authorizing Provider   allopurinol (ZYLOPRIM) 100 MG tablet Take 200 mg by mouth Daily.   Yes ProviderJessy MD   atorvastatin (LIPITOR) 80 MG tablet Take 80 mg by mouth Daily.   Yes ProviderJessy MD   finasteride (PROSCAR) 5 MG tablet Take 5 mg by mouth Daily.   Yes ProviderJessy MD   furosemide (LASIX) 40 MG tablet Take 1 tablet by mouth Daily. 8/6/18  Yes Miguel Romo DO   isosorbide mononitrate (IMDUR) 120 MG 24 hr tablet Take 60 mg by mouth Daily.   Yes ProviderJessy MD   lisinopril (PRINIVIL,ZESTRIL) 10 MG tablet Take 1 tablet by mouth Every 12 (Twelve) Hours. 8/5/18  Yes Miguel Romo DO   metoprolol succinate XL (TOPROL-XL) 100 MG 24 hr tablet Take 1 tablet by mouth Daily. 8/6/18  Yes Miguel Romo DO   nitroglycerin (NITROSTAT) 0.4 MG SL tablet Place 0.4 mg under the tongue Every 5 (Five) Minutes As Needed  "for Chest Pain. Take no more than 3 doses in 15 minutes.   Yes ProviderJessy MD   omeprazole (priLOSEC) 20 MG capsule Take 40 mg by mouth Daily.   Yes Jessy Murray MD   ranolazine (RANEXA) 500 MG 12 hr tablet Take 500 mg by mouth 2 (Two) Times a Day.   Yes Jessy Murray MD   rivaroxaban (XARELTO) 15 MG tablet Take 1 tablet by mouth Daily With Dinner. 8/23/18  Yes Huey Wilkins MD   spironolactone (ALDACTONE) 25 MG tablet Take 1 tablet by mouth Daily.  Patient taking differently: Take 25 mg by mouth Daily. Taking 1/2 12.5mg 8/6/18  Yes Miguel Romo DO   tamsulosin (FLOMAX) 0.4 MG capsule 24 hr capsule Take 1 capsule by mouth Every Night. 5/30/18  Yes Luis Thomas Jr., MD     Objective     Vital Signs: /50   Pulse 85   Temp 97.1 °F (36.2 °C) (Oral)   Resp 15   Ht 172.7 cm (68\")   Wt 75.3 kg (166 lb)   SpO2 99%   BMI 25.24 kg/m²   Physical Exam   Constitutional: He is oriented to person, place, and time. He appears well-developed and well-nourished.   Up in bed.  No distress.  His girlfriend is present with him.  He has on his [employer] clothes.   HENT:   Head: Normocephalic and atraumatic.   Somewhat hard of hearing even with hearing aids.   Eyes: Pupils are equal, round, and reactive to light. Conjunctivae and EOM are normal.   Neck: Neck supple. No JVD present.   Cardiovascular: Normal rate, regular rhythm, normal heart sounds and intact distal pulses.  Exam reveals no gallop and no friction rub.    No murmur heard.  Pulmonary/Chest: Effort normal and breath sounds normal. No respiratory distress. He has no wheezes. He has no rales. He exhibits no tenderness.   Abdominal: Soft. Bowel sounds are normal. He exhibits no distension. There is no tenderness. There is no rebound and no guarding.   Musculoskeletal: Normal range of motion. He exhibits no edema, tenderness or deformity.   Neurological: He is alert and oriented to person, place, and time. He displays " normal reflexes. No cranial nerve deficit. He exhibits normal muscle tone.   Skin: Skin is warm and dry. No rash noted.   Psychiatric: He has a normal mood and affect. His behavior is normal. Judgment and thought content normal.     Results Reviewed:    Results from last 7 days  Lab Units 09/02/18  1147   WBC 10*3/mm3 7.59   HEMOGLOBIN g/dL 13.3*   HEMATOCRIT % 41.0   PLATELETS 10*3/mm3 153       Results from last 7 days  Lab Units 09/02/18  1147   SODIUM mmol/L 139   POTASSIUM mmol/L 3.8   CHLORIDE mmol/L 105   CO2 mmol/L 24.0   BUN mg/dL 18   CREATININE mg/dL 1.07   CALCIUM mg/dL 8.9   BILIRUBIN mg/dL 0.8   ALK PHOS U/L 87   ALT (SGPT) U/L <15   AST (SGOT) U/L 18   GLUCOSE mg/dL 131*       Results from last 7 days  Lab Units 09/02/18  1147   INR  1.03     Magnesium 1.5.   BNP 2710.   Troponin < 0.012.     CT Head Without Contrast [[id number]] Fidencio as Reviewed   Order Status: Completed Collected: 09/02/18 1248    Updated: 09/02/18 1254   Narrative:     CT HEAD WO CONTRAST- 9/2/2018 12:14 PM CDT     HISTORY: Syncope/fainting      COMPARISON: None     DOSE LENGTH PRODUCT: 653 mGy cm. Automated exposure control was also  utilized to decrease patient radiation dose.     TECHNIQUE: Axial images of the brain are obtained without IV contrast.     FINDINGS:  There is a prominence of the ventricles slightly greater than  that of the overlying sulci. Central atrophy is favored. Normal pressure  hydrocephalus considered. Periventricular hypodensities are likely due  to chronic small vessel ischemia. Subependymal resorption of fluid a  second possibility. There is no intracranial hemorrhage. There are no  acute signs of ischemia. There is no extra-axial hematoma or  subarachnoid hemorrhage.     There is mucosal thickening of the posterior right ethmoid air cells.  The mastoid air cells are well-aerated. The bony calvarium appears  intact.      Impression:     1. Prominence of the ventricles slightly greater than that of  the  overlying sulci. Changes may be due to central atrophy. Normal pressure  hydrocephalus considered..  2. Periventricular hypodensities may be due to chronic small vessel  ischemia. Subependymal resorption of fluid a second possibility.  3. No intracranial hemorrhage, mass, or acute signs of ischemia.  This report was finalized on 09/02/2018 12:51 by Dr. Marquita Blanco MD.   XR Chest 1 View [[id number]] Fidencio as Reviewed   Order Status: Completed Collected: 09/02/18 1250    Updated: 09/02/18 1256   Narrative:     EXAM: XR CHEST 1 VW- - 9/2/2018 11:48 AM CDT     HISTORY: syncope       COMPARISON: 8/3/2019.      TECHNIQUE:  1 images.  Frontal view of the chest.     FINDINGS:    Perihilar vascular prominence without overt edema. No pneumothorax or  pleural effusion. No focal consolidation. Cardiac and mediastinal  silhouette within normal limits.      Fractured sternotomy wires. Surgical clips at the left cardiac border.  Calcified aortic atherosclerosis. No acute bony finding.      Impression:     1. Perihilar vascular prominence without overt edema. No focal  consolidation.  This report was finalized on 09/02/2018 12:53 by Dr Dilcia Connelly MD.        I have personally reviewed and interpreted the radiology studies and ECG obtained at time of admission.     Assessment / Plan     Assessment:   1.  Syncopal episodes, likely over treated blood pressure.   2.  Chronic systolic heart failure with an ejection fraction of 40%.  3.  Ischemic cardiomyopathy.  4.  Coronary artery disease with history of coronary artery bypass grafting and stent placement.  5.  Paroxysmal atrial flutter, on anticoagulation with Xarelto.  6.  Mild aortic stenosis.  7.  Moderate pulmonary hypertension.  8.  Systemic arterial hypertension.  9.  Benign prostatic hypertrophy.  10.  Hyperlipidemia.  11.  Nocturnal hypoxemia on nocturnal oxygen.   12.  Remote tobacco abuse with likely underlying chronic obstructive pulmonary disease.    Plan:    Admitted to observation status on my service [facility] at [facility].  There are concerns that the patient has been overmedicated with regards to blood pressure or even possibly over diuresed.  Prior to his last admission he was not on an ACE inhibitor, Aldactone, or diuretics.  His Lopressor was changed to Toprol-XL.  He was continued on his Imdur.  I would favor stopping Aldactone completely in decreasing his Lasix and lisinopril for now.  We may also need to change the timing of how he takes his antihypertensives.  It sounds like he is taking all most all of his medication together in the morning.  Check orthostatic vital signs.  Monitor on telemetry.  No need to repeat echocardiogram for now.  Additionally, his renal function is stable from last stay.    Dr. Thomas gave him a small bolus of IV fluid on his first ER visit today.  I do not want to continue additional fluids at this point in time.    Continue the bulk of his other home medications.    Continue on Xarelto.  Additionally, his hemoglobin is completely stable from his last stay.    Code Status: Full.      I discussed the patient's findings and my recommendations with the patient.     Estimated length of stay is at least overnight.     Miguel Romo,    09/02/18   4:29 PM

## 2018-09-03 VITALS
OXYGEN SATURATION: 99 % | BODY MASS INDEX: 25.16 KG/M2 | RESPIRATION RATE: 16 BRPM | HEART RATE: 79 BPM | DIASTOLIC BLOOD PRESSURE: 76 MMHG | WEIGHT: 166 LBS | SYSTOLIC BLOOD PRESSURE: 124 MMHG | TEMPERATURE: 98.4 F | HEIGHT: 68 IN

## 2018-09-03 LAB
ANION GAP SERPL CALCULATED.3IONS-SCNC: 8 MMOL/L (ref 4–13)
BUN BLD-MCNC: 20 MG/DL (ref 5–21)
BUN/CREAT SERPL: 19 (ref 7–25)
CALCIUM SPEC-SCNC: 8.5 MG/DL (ref 8.4–10.4)
CHLORIDE SERPL-SCNC: 107 MMOL/L (ref 98–110)
CO2 SERPL-SCNC: 24 MMOL/L (ref 24–31)
CREAT BLD-MCNC: 1.05 MG/DL (ref 0.5–1.4)
DEPRECATED RDW RBC AUTO: 45.3 FL (ref 40–54)
ERYTHROCYTE [DISTWIDTH] IN BLOOD BY AUTOMATED COUNT: 15.4 % (ref 12–15)
GFR SERPL CREATININE-BSD FRML MDRD: 67 ML/MIN/1.73
GLUCOSE BLD-MCNC: 106 MG/DL (ref 70–100)
HCT VFR BLD AUTO: 34.9 % (ref 40–52)
HGB BLD-MCNC: 11.5 G/DL (ref 14–18)
MCH RBC QN AUTO: 27.3 PG (ref 28–32)
MCHC RBC AUTO-ENTMCNC: 33 G/DL (ref 33–36)
MCV RBC AUTO: 82.7 FL (ref 82–95)
PLATELET # BLD AUTO: 128 10*3/MM3 (ref 130–400)
PMV BLD AUTO: 10.6 FL (ref 6–12)
POTASSIUM BLD-SCNC: 3.8 MMOL/L (ref 3.5–5.3)
RBC # BLD AUTO: 4.22 10*6/MM3 (ref 4.8–5.9)
SODIUM BLD-SCNC: 139 MMOL/L (ref 135–145)
WBC NRBC COR # BLD: 6.62 10*3/MM3 (ref 4.8–10.8)

## 2018-09-03 PROCEDURE — 80048 BASIC METABOLIC PNL TOTAL CA: CPT | Performed by: INTERNAL MEDICINE

## 2018-09-03 PROCEDURE — 85027 COMPLETE CBC AUTOMATED: CPT | Performed by: INTERNAL MEDICINE

## 2018-09-03 PROCEDURE — G0378 HOSPITAL OBSERVATION PER HR: HCPCS

## 2018-09-03 RX ORDER — LISINOPRIL 10 MG/1
10 TABLET ORAL DAILY
Qty: 30 TABLET | Refills: 2 | Status: ON HOLD | OUTPATIENT
Start: 2018-09-03 | End: 2019-12-03

## 2018-09-03 RX ADMIN — METOPROLOL SUCCINATE 100 MG: 100 TABLET, FILM COATED, EXTENDED RELEASE ORAL at 09:44

## 2018-09-03 RX ADMIN — FUROSEMIDE 20 MG: 20 TABLET ORAL at 09:44

## 2018-09-03 RX ADMIN — RANOLAZINE 500 MG: 500 TABLET, FILM COATED, EXTENDED RELEASE ORAL at 09:51

## 2018-09-03 RX ADMIN — PANTOPRAZOLE SODIUM 40 MG: 40 TABLET, DELAYED RELEASE ORAL at 06:23

## 2018-09-03 RX ADMIN — FINASTERIDE 5 MG: 5 TABLET, FILM COATED ORAL at 09:43

## 2018-09-03 RX ADMIN — ISOSORBIDE MONONITRATE 60 MG: 60 TABLET, EXTENDED RELEASE ORAL at 09:44

## 2018-09-03 RX ADMIN — ALLOPURINOL 200 MG: 100 TABLET ORAL at 09:49

## 2018-09-03 NOTE — DISCHARGE SUMMARY
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  He has had no further evidence of hypotension or position related blood pressure change.  He has had no further symptoms of syncope or presyncope since lisinopril dosage was decreased and Aldactone discontinued.  The patient is stable for discharge home.    Hospital Course  Patient is a 87 y.o. male presented with syncope.  This is an 87-year-old  gentleman who resides in [place], [place].  [facility].  He presents to the emergency department today with complaints of recurrent syncope today in the setting of recent nearly daily dizziness.  I am familiar with the patient as I cared for him from 8/3 through 8/5.  He was also seen by Dr. Wilkins during the course of that admission. New medications to him on that stay were furosemide, spironolactone, lisinopril, Toprol-XL, and Xarelto.  We stopped his Lopressor and Plavix.     He came into the emergency department first thing this morning complaining of a syncopal episode.  This occurred at [facility].  He does not remember much of it. Dr. Thomas did a workup with nothing being out of order at that point  "in time.  He gave the patient 500 mL bolus of normal saline.  The patient felt better and ultimately wanted to go ahead and go home.  However, he and his girlfriend went to eat lunch and he had another syncopal episode.  This concerned him and he came back.     He tells me that he has been having dizzy spells intermittently over the time since he was last discharged.  It could be that he is being over diuresed for having too aggressive blood pressure coverage at this point in time.  Has dizziness is typically worse in the morning after \"taking a handful of medication.\"  He is unsure if his dizziness worsens with changes in posture.  His dizziness seems to dissipate by the mid afternoon.  His girlfriend tells me that they actually went dancing last night.  He will be admitted observation status for adjustment of his medications and monitoring.     He does not describe any increased shortness of breath recently.  No orthopnea or paroxysmal nocturnal dyspnea.  No increased lower extremity edema.  No chest pain recently or in the last several weeks.    Plan:   Admitted to observation status on my service here at [facility].  There are concerns that the patient has been overmedicated with regards to blood pressure or even possibly over diuresed.  Prior to his last admission he was not on an ACE inhibitor, Aldactone, or diuretics.  His Lopressor was changed to Toprol-XL.  He was continued on his Imdur.  I would favor stopping Aldactone completely in decreasing his Lasix and lisinopril for now.  We may also need to change the timing of how he takes his antihypertensives.  It sounds like he is taking all most all of his medication together in the morning.  Check orthostatic vital signs.  Monitor on telemetry.  No need to repeat echocardiogram for now.  Additionally, his renal function is stable from last stay.     Dr. Thomas gave him a small bolus of IV fluid on his first ER visit today.  I do not want to " continue additional fluids at this point in time.     Continue the bulk of his other home medications.     Continue on Xarelto.  Additionally, his hemoglobin is completely stable from his last stay.    The patient is feeling much better.  He has had no further evidence of hypotension or position related blood pressure change.  He has had no further symptoms of syncope or presyncope since lisinopril dosage was decreased and Aldactone discontinued.  The patient is stable for discharge home.      Pertinent Test Results:   Basic Metabolic Panel [[id number]] (Abnormal) Collected: 09/03/18 0424   Lab Status: Final result Specimen: Blood Updated: 09/03/18 0512    Glucose 106 (H) mg/dL     BUN 20 mg/dL     Creatinine 1.05 mg/dL     Sodium 139 mmol/L     Potassium 3.8 mmol/L     Chloride 107 mmol/L     CO2 24.0 mmol/L     Calcium 8.5 mg/dL     eGFR Non African Amer 67 mL/min/1.73     BUN/Creatinine Ratio 19.0    Anion Gap 8.0 mmol/L    Narrative:     The MDRD GFR formula is only valid for adults with stable renal function between ages 18 and 70.   CBC (No Diff) [[id number]] (Abnormal) Collected: 09/03/18 0424   Lab Status: Final result Specimen: Blood Updated: 09/03/18 0455    WBC 6.62 10*3/mm3     RBC 4.22 (L) 10*6/mm3     Hemoglobin 11.5 (L) g/dL     Hematocrit 34.9 (L) %     MCV 82.7 fL     MCH 27.3 (L) pg     MCHC 33.0 g/dL     RDW 15.4 (H) %     RDW-SD 45.3 fl     MPV 10.6 fL     Platelets 128 (L) 10*3/mm3    Comprehensive Metabolic Panel [[id number]] (Abnormal) Collected: 09/02/18 1147   Lab Status: Final result Specimen: Blood Updated: 09/02/18 1214    Glucose 131 (H) mg/dL     BUN 18 mg/dL     Creatinine 1.07 mg/dL     Sodium 139 mmol/L     Potassium 3.8 mmol/L     Chloride 105 mmol/L     CO2 24.0 mmol/L     Calcium 8.9 mg/dL     Total Protein 6.8 g/dL     Albumin 3.80 g/dL     ALT (SGPT) <15 U/L     AST (SGOT) 18 U/L     Alkaline Phosphatase 87 U/L     Total Bilirubin 0.8 mg/dL     eGFR Non African Amer 65 mL/min/1.73      Globulin 3.0 gm/dL     A/G Ratio 1.3 g/dL     BUN/Creatinine Ratio 16.8    Anion Gap 10.0 mmol/L    Narrative:     The MDRD GFR formula is only valid for adults with stable renal function between ages 18 and 70.   Protime-INR [[id number]] (Normal) Collected: 09/02/18 1147   Lab Status: Final result Specimen: Blood Updated: 09/02/18 1213    Protime 13.8 Seconds     INR 1.03   aPTT [[id number]] (Normal) Collected: 09/02/18 1147   Lab Status: Final result Specimen: Blood Updated: 09/02/18 1213    PTT 29.8 seconds    Troponin [[id number]] (Normal) Collected: 09/02/18 1147   Lab Status: Final result Specimen: Blood Updated: 09/02/18 1226    Troponin I <0.012 ng/mL    BNP [[id number]] (Abnormal) Collected: 09/02/18 1147   Lab Status: Final result Specimen: Blood Updated: 09/02/18 1226    proBNP 2,710.0 (H) pg/mL    Magnesium [[id number]] (Normal) Collected: 09/02/18 1147   Lab Status: Final result Specimen: Blood Updated: 09/02/18 1214    Magnesium 1.5 mg/dL    CBC Auto Differential [[id number]] (Abnormal) Collected: 09/02/18 1147   Lab Status: Final result Specimen: Blood Updated: 09/02/18 1209    WBC 7.59 10*3/mm3     RBC 4.91 10*6/mm3     Hemoglobin 13.3 (L) g/dL     Hematocrit 41.0 %     MCV 83.5 fL     MCH 27.1 (L) pg     MCHC 32.4 (L) g/dL     RDW 15.3 (H) %     RDW-SD 46.0 fl     MPV 10.7 fL     Platelets 153 10*3/mm3     Neutrophil % 66.8 %     Lymphocyte % 23.6 %     Monocyte % 5.9 %     Eosinophil % 2.6 %     Basophil % 0.7 %     Immature Grans % 0.4 %     Neutrophils, Absolute 5.07 10*3/mm3     Lymphocytes, Absolute 1.79 10*3/mm3     Monocytes, Absolute 0.45 10*3/mm3     Eosinophils, Absolute 0.20 10*3/mm3     Basophils, Absolute 0.05 10*3/mm3     Immature Grans, Absolute 0.03 10*3/mm3     nRBC 0.0 /100 WBC            Narrative:     CT HEAD WO CONTRAST- 9/2/2018 12:14 PM CDT     HISTORY: Syncope/fainting      COMPARISON: None     DOSE LENGTH PRODUCT: 653 mGy cm. Automated exposure control was also  utilized to  decrease patient radiation dose.     TECHNIQUE: Axial images of the brain are obtained without IV contrast.     FINDINGS:  There is a prominence of the ventricles slightly greater than  that of the overlying sulci. Central atrophy is favored. Normal pressure  hydrocephalus considered. Periventricular hypodensities are likely due  to chronic small vessel ischemia. Subependymal resorption of fluid a  second possibility. There is no intracranial hemorrhage. There are no  acute signs of ischemia. There is no extra-axial hematoma or  subarachnoid hemorrhage.     There is mucosal thickening of the posterior right ethmoid air cells.  The mastoid air cells are well-aerated. The bony calvarium appears  intact.      Impression:     1. Prominence of the ventricles slightly greater than that of the  overlying sulci. Changes may be due to central atrophy. Normal pressure  hydrocephalus considered..  2. Periventricular hypodensities may be due to chronic small vessel  ischemia. Subependymal resorption of fluid a second possibility.  3. No intracranial hemorrhage, mass, or acute signs of ischemia.  This report was finalized on 09/02/2018 12:51 by Dr. Marquita Blanco MD.   XR Chest 1 View [[id number]] Fidencio as Reviewed   Order Status: Completed Collected: 09/02/18 1250    Updated: 09/02/18 1256   Narrative:     EXAM: XR CHEST 1 VW- - 9/2/2018 11:48 AM CDT     HISTORY: syncope       COMPARISON: 8/3/2019.      TECHNIQUE:  1 images.  Frontal view of the chest.     FINDINGS:    Perihilar vascular prominence without overt edema. No pneumothorax or  pleural effusion. No focal consolidation. Cardiac and mediastinal  silhouette within normal limits.      Fractured sternotomy wires. Surgical clips at the left cardiac border.  Calcified aortic atherosclerosis. No acute bony finding.      Impression:     1. Perihilar vascular prominence without overt edema. No focal  consolidation.  This report was finalized on 09/02/2018 12:53 by Dr Ha  "MD Godwin.     Condition on Discharge:    Stable    Physical Exam on Discharge:  /76 (BP Location: Left arm, Patient Position: Lying)   Pulse 79   Temp 98.4 °F (36.9 °C) (Oral)   Resp 16   Ht 172.7 cm (68\")   Wt 75.3 kg (166 lb)   SpO2 99%   BMI 25.24 kg/m²   Physical Exam  Constitutional: He is oriented to person, place, and time. He appears well-developed and well-nourished.   Up in bed.  No distress.  His girlfriend is present with him.   HENT:   Head: Normocephalic and atraumatic.   Somewhat hard of hearing even with hearing aids.   Eyes: Pupils are equal, round, and reactive to light. Conjunctivae and EOM are normal.   Neck: Neck supple. No JVD present.   Cardiovascular: Normal rate, regular rhythm, normal heart sounds and intact distal pulses.  Exam reveals no gallop and no friction rub.    No murmur heard.  Pulmonary/Chest: Effort normal and breath sounds normal. No respiratory distress. He has no wheezes. He has no rales. He exhibits no tenderness.   Abdominal: Soft. Bowel sounds are normal. He exhibits no distension. There is no tenderness. There is no rebound and no guarding.   Musculoskeletal: Normal range of motion. He exhibits no edema, tenderness or deformity.   Neurological: He is alert and oriented to person, place, and time. He displays normal reflexes. No cranial nerve deficit. He exhibits normal muscle tone.   Skin: Skin is warm and dry. No rash noted.   Psychiatric: He has a normal mood and affect. His behavior is normal. Judgment and thought content normal.     Discharge Disposition:  Home or Self Care    Discharge Medications:     Discharge Medications      Changes to Medications      Instructions Start Date   lisinopril 10 MG tablet  Commonly known as:  PRINIVIL,ZESTRIL  What changed:  when to take this   10 mg, Oral, Daily         Continue These Medications      Instructions Start Date   allopurinol 100 MG tablet  Commonly known as:  ZYLOPRIM   200 mg, Oral, Daily    "   atorvastatin 80 MG tablet  Commonly known as:  LIPITOR   80 mg, Oral, Daily      finasteride 5 MG tablet  Commonly known as:  PROSCAR   5 mg, Oral, Daily      furosemide 40 MG tablet  Commonly known as:  LASIX   40 mg, Oral, Daily      isosorbide mononitrate 120 MG 24 hr tablet  Commonly known as:  IMDUR   60 mg, Oral, Daily      metoprolol succinate  MG 24 hr tablet  Commonly known as:  TOPROL-XL   100 mg, Oral, Every 24 Hours Scheduled      nitroglycerin 0.4 MG SL tablet  Commonly known as:  NITROSTAT   0.4 mg, Sublingual, Every 5 Minutes PRN, Take no more than 3 doses in 15 minutes.      omeprazole 20 MG capsule  Commonly known as:  priLOSEC   40 mg, Oral, Daily      ranolazine 500 MG 12 hr tablet  Commonly known as:  RANEXA   500 mg, Oral, 2 Times Daily      rivaroxaban 15 MG tablet  Commonly known as:  XARELTO   15 mg, Oral, Daily With Dinner      tamsulosin 0.4 MG capsule 24 hr capsule  Commonly known as:  FLOMAX   1 capsule, Oral, Nightly             Discharge Diet:   Diet Instructions     Diet: Regular; Thin       Discharge Diet:  Regular    Fluid Consistency:  Thin          Discharge Care Plan / Instructions:   Discharge home    Activity at Discharge:   Activity Instructions     Activity as Tolerated             Follow-up Appointments:  Follow-up with Dr. Wilkins at the previously scheduled appointment       Demetrius Martínez DO  09/03/18  3:45 PM    Time: Discharge Less than 30 min    Please note that portions of this note may have been completed with a voice recognition program. Efforts were made to edit the dictations, but occasionally words are mistranscribed.

## 2018-09-03 NOTE — PLAN OF CARE
Problem: Patient Care Overview  Goal: Plan of Care Review  Outcome: Ongoing (interventions implemented as appropriate)   09/03/18 0306   Coping/Psychosocial   Plan of Care Reviewed With patient   Plan of Care Review   Progress no change   OTHER   Outcome Summary pt's ortho BPs were positive tonight, precautions have been taken to prevent any falls, pt resting well at this time, on 2l O2, safety maintained, will continue to monitor.       Problem: Fall Risk (Adult)  Goal: Identify Related Risk Factors and Signs and Symptoms  Outcome: Outcome(s) achieved Date Met: 09/03/18    Goal: Absence of Fall  Outcome: Ongoing (interventions implemented as appropriate)      Problem: Arrhythmia/Dysrhythmia (Symptomatic) (Adult)  Goal: Signs and Symptoms of Listed Potential Problems Will be Absent, Minimized or Managed (Arrhythmia/Dysrhythmia)  Outcome: Ongoing (interventions implemented as appropriate)

## 2018-09-04 ENCOUNTER — READMISSION MANAGEMENT (OUTPATIENT)
Dept: CALL CENTER | Facility: HOSPITAL | Age: 83
End: 2018-09-04

## 2018-09-04 NOTE — OUTREACH NOTE
Prep Survey      Responses   Facility patient discharged from?  [facility]   Is patient eligible?  Yes   Discharge diagnosis  CAD,  HTN,  dyspnea,  CHF   Does the patient have one of the following disease processes/diagnoses(primary or secondary)?  CHF   Does the patient have Home health ordered?  No   Is there a DME ordered?  No   Prep survey completed?  Yes          Cynthia Cruz RN

## 2018-09-05 ENCOUNTER — READMISSION MANAGEMENT (OUTPATIENT)
Dept: CALL CENTER | Facility: HOSPITAL | Age: 83
End: 2018-09-05

## 2018-09-05 NOTE — OUTREACH NOTE
CHF Week 1 Survey      Responses   Facility patient discharged from?  [facility]   Does the patient have one of the following disease processes/diagnoses(primary or secondary)?  CHF   Is there a successful TCM telephone encounter documented?  No   CHF Week 1 attempt successful?  No   Unsuccessful attempts  Attempt 1          Lacy Arce, RN

## 2018-09-07 ENCOUNTER — READMISSION MANAGEMENT (OUTPATIENT)
Dept: CALL CENTER | Facility: HOSPITAL | Age: 83
End: 2018-09-07

## 2018-09-07 NOTE — OUTREACH NOTE
CHF Week 1 Survey      Responses   Facility patient discharged from?  [facility]   Does the patient have one of the following disease processes/diagnoses(primary or secondary)?  CHF   Is there a successful TCM telephone encounter documented?  No   CHF Week 1 attempt successful?  No   Unsuccessful attempts  Attempt 2          Zofia De Jesus RN

## 2018-09-08 ENCOUNTER — READMISSION MANAGEMENT (OUTPATIENT)
Dept: CALL CENTER | Facility: HOSPITAL | Age: 83
End: 2018-09-08

## 2018-09-08 NOTE — OUTREACH NOTE
CHF Week 1 Survey      Responses   Facility patient discharged from?  [facility]   Does the patient have one of the following disease processes/diagnoses(primary or secondary)?  CHF   Is there a successful TCM telephone encounter documented?  No   CHF Week 1 attempt successful?  No   Unsuccessful attempts  Attempt 3          Lacy Arce, RN

## 2018-09-10 ENCOUNTER — OFFICE VISIT (OUTPATIENT)
Dept: CARDIOLOGY | Facility: CLINIC | Age: 83
End: 2018-09-10

## 2018-09-10 VITALS
DIASTOLIC BLOOD PRESSURE: 65 MMHG | HEIGHT: 68 IN | OXYGEN SATURATION: 98 % | BODY MASS INDEX: 24.55 KG/M2 | WEIGHT: 162 LBS | HEART RATE: 60 BPM | RESPIRATION RATE: 18 BRPM | SYSTOLIC BLOOD PRESSURE: 115 MMHG

## 2018-09-10 DIAGNOSIS — E78.2 MIXED HYPERLIPIDEMIA: ICD-10-CM

## 2018-09-10 DIAGNOSIS — I25.810 CORONARY ARTERY DISEASE INVOLVING CORONARY BYPASS GRAFT OF NATIVE HEART WITHOUT ANGINA PECTORIS: Primary | ICD-10-CM

## 2018-09-10 DIAGNOSIS — I48.3 TYPICAL ATRIAL FLUTTER (HCC): ICD-10-CM

## 2018-09-10 DIAGNOSIS — I10 ESSENTIAL HYPERTENSION: ICD-10-CM

## 2018-09-10 DIAGNOSIS — I50.42 CHRONIC COMBINED SYSTOLIC AND DIASTOLIC CONGESTIVE HEART FAILURE (HCC): ICD-10-CM

## 2018-09-10 PROCEDURE — 93000 ELECTROCARDIOGRAM COMPLETE: CPT | Performed by: PHYSICIAN ASSISTANT

## 2018-09-10 PROCEDURE — 99214 OFFICE O/P EST MOD 30 MIN: CPT | Performed by: PHYSICIAN ASSISTANT

## 2018-09-10 NOTE — PROGRESS NOTES
Subjective:     Encounter Date:09/10/2018      Patient ID: Vasile Deng is a 87 y.o. male who presents for 1 month discharge follow-up. He has been seen multiple times in our ED for syncope. He notes that he has been dizzy now for about 2-3 months. He states he is always a little dizzy, but this significantly worsens with position change. He notes average SBP of 90s at home. His syncopal episodes occurred just after feeling very dizzy, thus I suspect this to be secondary to his orthostasis. He did have work-up that was unrevealing in hospital.     Chief Complaint:  Coronary Artery Disease   Presents for follow-up visit. Symptoms include dizziness. Pertinent negatives include no chest pain, chest pressure, leg swelling, palpitations, shortness of breath or weight gain. His past medical history is significant for CHF. The symptoms have been stable. Compliance with diet is good. Compliance with exercise is variable. Compliance with medications is good.   Congestive Heart Failure   Presents for follow-up visit. Associated symptoms include near-syncope. Pertinent negatives include no chest pain, chest pressure, claudication, edema, orthopnea, palpitations or shortness of breath. The symptoms have been stable. His past medical history is significant for CAD. Compliance problems include adherence to exercise.    Dizziness   This is a recurrent problem. The current episode started more than 1 month ago. The problem occurs constantly. The problem has been unchanged. Pertinent negatives include no chest pain, myalgias, nausea or vomiting. The symptoms are aggravated by walking, twisting and exertion. He has tried rest and immobilization for the symptoms. The treatment provided significant relief.       The following portions of the patient's history were reviewed and updated as appropriate: allergies, current medications, past family history, past medical history, past social history, past surgical history and problem  list.    Review of Systems   Constitution: Positive for malaise/fatigue. Negative for weight gain.   Cardiovascular: Positive for near-syncope and syncope. Negative for chest pain, claudication, dyspnea on exertion, irregular heartbeat, leg swelling, orthopnea, palpitations and paroxysmal nocturnal dyspnea.   Respiratory: Negative for hemoptysis and shortness of breath.    Hematologic/Lymphatic: Negative for bleeding problem.   Skin: Negative for poor wound healing.   Musculoskeletal: Negative for myalgias.   Gastrointestinal: Negative for melena, nausea and vomiting.   Genitourinary: Negative for hematuria.   Neurological: Positive for dizziness and light-headedness. Negative for focal weakness.   Psychiatric/Behavioral: Negative for memory loss.   All other systems reviewed and are negative.        ECG 12 Lead  Date/Time: 9/10/2018 12:19 PM  Performed by: NEY FIGUEROA  Authorized by: NEY FIGUEROA   Comparison: compared with previous ECG from 9/2/2018  Similar to previous ECG  Rhythm: atrial flutter  Rate: normal  QRS axis: left  Clinical impression: abnormal ECG               Objective:     Physical Exam   Constitutional: He is oriented to person, place, and time. He appears well-developed and well-nourished.   HENT:   Head: Normocephalic and atraumatic.   Eyes: Pupils are equal, round, and reactive to light. Conjunctivae and EOM are normal.   Neck: Normal range of motion. Neck supple. No JVD present.   Cardiovascular: Normal rate, S1 normal, S2 normal, normal heart sounds, intact distal pulses and normal pulses.  An irregular rhythm present.   No murmur heard.  Pulmonary/Chest: Effort normal and breath sounds normal. No respiratory distress.   Abdominal: Soft. Bowel sounds are normal. He exhibits no distension.   Musculoskeletal: He exhibits no edema or tenderness.   Neurological: He is alert and oriented to person, place, and time.   Skin: Skin is warm and dry.   Psychiatric: He has a normal mood and affect.  "Judgment normal.   Vitals reviewed.      /65 (BP Location: Right arm, Patient Position: Sitting, Cuff Size: Adult)   Pulse 60   Resp 18   Ht 172.7 cm (68\")   Wt 73.5 kg (162 lb)   SpO2 98%   BMI 24.63 kg/m²     Current Outpatient Prescriptions:   •  atorvastatin (LIPITOR) 80 MG tablet, Take 80 mg by mouth Daily., Disp: , Rfl:   •  finasteride (PROSCAR) 5 MG tablet, Take 5 mg by mouth Daily., Disp: , Rfl:   •  furosemide (LASIX) 40 MG tablet, Take 1 tablet by mouth Daily., Disp: 30 tablet, Rfl: 1  •  lisinopril (PRINIVIL,ZESTRIL) 10 MG tablet, Take 1 tablet by mouth Daily., Disp: 30 tablet, Rfl: 2  •  metoprolol succinate XL (TOPROL-XL) 100 MG 24 hr tablet, Take 1 tablet by mouth Daily., Disp: 30 tablet, Rfl: 1  •  nitroglycerin (NITROSTAT) 0.4 MG SL tablet, Place 0.4 mg under the tongue Every 5 (Five) Minutes As Needed for Chest Pain. Take no more than 3 doses in 15 minutes., Disp: , Rfl:   •  omeprazole (priLOSEC) 40 MG capsule, Take 40 mg by mouth Daily., Disp: , Rfl:   •  ranolazine (RANEXA) 500 MG 12 hr tablet, Take 500 mg by mouth 2 (Two) Times a Day., Disp: , Rfl:   •  rivaroxaban (XARELTO) 15 MG tablet, Take 1 tablet by mouth Daily With Dinner., Disp: 90 tablet, Rfl: 3  •  tamsulosin (FLOMAX) 0.4 MG capsule 24 hr capsule, Take 1 capsule by mouth Every Night., Disp: 30 capsule, Rfl: 0  •  allopurinol (ZYLOPRIM) 100 MG tablet, Take 200 mg by mouth 2 (Two) Times a Day., Disp: , Rfl:   Past Medical History:   Diagnosis Date   • Atrial fibrillation (CMS/HCC)    • Coronary artery disease    • Hyperlipidemia    • Hypertension      Past Surgical History:   Procedure Laterality Date   • CARDIAC SURGERY      double bypass   • KNEE SURGERY       No Known Allergies  Social History     Social History   • Marital status:      Spouse name: N/A   • Number of children: N/A   • Years of education: N/A     Occupational History   • Not on file.     Social History Main Topics   • Smoking status: Former Smoker "     Types: Cigarettes, Pipe, Cigars     Quit date: 2004   • Smokeless tobacco: Never Used   • Alcohol use No   • Drug use: No   • Sexual activity: Defer     Other Topics Concern   • Not on file     Social History Narrative   • No narrative on file     History reviewed. No pertinent family history.     Lab review: 9/3/18 H/H: 11.5/34.9, BMP unremarkable        Assessment:          Diagnosis Plan   1. Coronary artery disease involving coronary bypass graft of native heart without angina pectoris      No signs/sxs of ischemia    2. Essential hypertension      Now with severe orthostatic hypotension and multiple syncopal episodes as a result   3. Mixed hyperlipidemia      On statin  Followed by PCP   4. Chronic combined systolic and diastolic congestive heart failure (CMS/HCC)      Compensated   5. Typical atrial flutter (CMS/HCC)      Rate controlled  Anticoagulated on Xarelto without bleeding issues           Plan:       1. Stop Imdur given orthostatic hypotension. Use Lasix only as needed for leg swelling, abdominal swelling, dyspnea, orthopnea or weight gain > 2 lbs overnight   2. Begin use of compression stockings 18-20 mmHg  3. Consider decreasing Metoprolol or lisinopril at next visit pending response to above.   4. Follow-up in 2 weeks, unless needed sooner. If syncope recurs call or present to ED.   5. Verbalized understanding of instructions.

## 2018-09-11 ENCOUNTER — READMISSION MANAGEMENT (OUTPATIENT)
Dept: CALL CENTER | Facility: HOSPITAL | Age: 83
End: 2018-09-11

## 2018-09-11 ENCOUNTER — TELEPHONE (OUTPATIENT)
Dept: CARDIOLOGY | Facility: CLINIC | Age: 83
End: 2018-09-11

## 2018-09-11 NOTE — TELEPHONE ENCOUNTER
Patients POA calling in wondering if the Lisinopril is once or twice daily?  - looked through chart did not see anything different in LOV than I did in the med list, I advised her it showed just once daily 10 mg.

## 2018-09-11 NOTE — OUTREACH NOTE
CHF Week 1 Survey      Responses   Facility patient discharged from?  [facility]   Does the patient have one of the following disease processes/diagnoses(primary or secondary)?  CHF   Is there a successful TCM telephone encounter documented?  No   CHF Week 1 attempt successful?  No   Unsuccessful attempts  Attempt 4          Kalin Montenegro RN

## 2018-09-18 ENCOUNTER — READMISSION MANAGEMENT (OUTPATIENT)
Dept: CALL CENTER | Facility: HOSPITAL | Age: 83
End: 2018-09-18

## 2018-09-18 NOTE — OUTREACH NOTE
CHF Week 2 Survey      Responses   Facility patient discharged from?  [facility]   Does the patient have one of the following disease processes/diagnoses(primary or secondary)?  CHF   Week 2 attempt successful?  No   Unsuccessful attempts  Attempt 1          Lacy Arce RN

## 2018-09-20 ENCOUNTER — READMISSION MANAGEMENT (OUTPATIENT)
Dept: CALL CENTER | Facility: HOSPITAL | Age: 83
End: 2018-09-20

## 2018-09-20 ENCOUNTER — TELEPHONE (OUTPATIENT)
Dept: CARDIOLOGY | Facility: CLINIC | Age: 83
End: 2018-09-20

## 2018-09-20 NOTE — OUTREACH NOTE
CHF Week 2 Survey      Responses   Facility patient discharged from?  [place]   Does the patient have one of the following disease processes/diagnoses(primary or secondary)?  CHF   Week 2 attempt successful?  No   Unsuccessful attempts  Attempt 2          Josue Cruz RN

## 2018-09-21 ENCOUNTER — READMISSION MANAGEMENT (OUTPATIENT)
Dept: CALL CENTER | Facility: HOSPITAL | Age: 83
End: 2018-09-21

## 2018-09-21 DIAGNOSIS — I10 ESSENTIAL HYPERTENSION: Primary | ICD-10-CM

## 2018-09-21 RX ORDER — METOPROLOL SUCCINATE 50 MG/1
50 TABLET, EXTENDED RELEASE ORAL DAILY
Qty: 30 TABLET | Refills: 11 | Status: ON HOLD | OUTPATIENT
Start: 2018-09-21 | End: 2019-12-03

## 2018-09-21 NOTE — OUTREACH NOTE
CHF Week 2 Survey      Responses   Facility patient discharged from?  [place]   Does the patient have one of the following disease processes/diagnoses(primary or secondary)?  CHF   Week 2 attempt successful?  No   Unsuccessful attempts  Attempt 3          Kalin Montenegro RN

## 2018-09-24 ENCOUNTER — OFFICE VISIT (OUTPATIENT)
Dept: CARDIOLOGY | Facility: CLINIC | Age: 83
End: 2018-09-24

## 2018-09-24 ENCOUNTER — TELEPHONE (OUTPATIENT)
Dept: CARDIOLOGY | Facility: CLINIC | Age: 83
End: 2018-09-24

## 2018-09-24 VITALS
OXYGEN SATURATION: 98 % | DIASTOLIC BLOOD PRESSURE: 75 MMHG | SYSTOLIC BLOOD PRESSURE: 118 MMHG | HEIGHT: 66 IN | WEIGHT: 168 LBS | BODY MASS INDEX: 27 KG/M2 | RESPIRATION RATE: 18 BRPM | HEART RATE: 64 BPM

## 2018-09-24 DIAGNOSIS — I10 ESSENTIAL HYPERTENSION: ICD-10-CM

## 2018-09-24 DIAGNOSIS — E78.2 MIXED HYPERLIPIDEMIA: ICD-10-CM

## 2018-09-24 DIAGNOSIS — I50.42 CHRONIC COMBINED SYSTOLIC AND DIASTOLIC CONGESTIVE HEART FAILURE (HCC): ICD-10-CM

## 2018-09-24 DIAGNOSIS — I48.3 TYPICAL ATRIAL FLUTTER (HCC): ICD-10-CM

## 2018-09-24 DIAGNOSIS — R55 SYNCOPE, UNSPECIFIED SYNCOPE TYPE: Primary | ICD-10-CM

## 2018-09-24 DIAGNOSIS — I25.810 CORONARY ARTERY DISEASE INVOLVING CORONARY BYPASS GRAFT OF NATIVE HEART WITHOUT ANGINA PECTORIS: ICD-10-CM

## 2018-09-24 DIAGNOSIS — R42 DIZZINESS: ICD-10-CM

## 2018-09-24 PROCEDURE — 99214 OFFICE O/P EST MOD 30 MIN: CPT | Performed by: NURSE PRACTITIONER

## 2018-09-24 PROCEDURE — 93000 ELECTROCARDIOGRAM COMPLETE: CPT | Performed by: NURSE PRACTITIONER

## 2018-09-24 NOTE — TELEPHONE ENCOUNTER
Patients wife came to our office and I gave her samples of Xarelto 20mg and told her to have him to stop his 15mg.She verbalized understanding and [facility] if not they will use [facility]    ----- Message from LOC Davison sent at 9/24/2018 11:36 AM CDT -----  Please let patient know that his renal function has improved he now needs to be on Xarelto 20 mg- I have sent script in he can also come to office and get more samples

## 2018-09-24 NOTE — PROGRESS NOTES
Subjective:     Encounter Date:09/24/2018      Patient ID: Vasile Deng is a 87 y.o. male.    Chief Complaint: Dizziness and Syncope  Coronary Artery Disease   Presents for follow-up visit. Symptoms include dizziness. Pertinent negatives include no chest pain, chest pressure, chest tightness, leg swelling, muscle weakness, palpitations, shortness of breath or weight gain. His past medical history is significant for CHF. The symptoms have been stable. Compliance with diet is good. Compliance with exercise is good. Compliance with medications is good.   Dizziness   This is a recurrent problem. The current episode started more than 1 month ago. The problem occurs intermittently. The problem has been gradually improving. Associated symptoms include fatigue. Pertinent negatives include no chest pain.   Congestive Heart Failure   Presents for follow-up visit. Associated symptoms include fatigue and near-syncope. Pertinent negatives include no chest pain, chest pressure, claudication, edema, muscle weakness, palpitations or shortness of breath. The symptoms have been stable. His past medical history is significant for CAD. Compliance with total regimen is %. Compliance problems include medication side effects (hypotension and dizziness due to medications).  Compliance with diet is %. Compliance with exercise is %. Compliance with medications is %.   Atrial Fibrillation   Presents for follow-up visit. Symptoms include dizziness and syncope. Symptoms are negative for an AICD problem, bradycardia, chest pain, palpitations, shortness of breath and tachycardia. The symptoms have been stable. Past medical history includes atrial fibrillation, CAD and CHF. There are no medication compliance problems.     Patient is here for follow up for congestive heart failure, atrial flutter, dizziness, hypertension, orthostatic hypotension, dizziness and syncope. Patient states he has been feeling some better.  Still with dizziness. There is some confusion on beta blockers and what dose he is to be taking. New script was sent in for Toprol XL 50- however patient is taking twice a day. No issues with swelling. Mild shortness of breath. Denies chest pain. No issues while on Xarelto- was started on Xarelto 15 mg due to reduced CrCl-  But most recent labs CrCl greater than 50. Blood Pressure seems to have improved off Imdur.     The following portions of the patient's history were reviewed and updated as appropriate: allergies, current medications, past family history, past medical history, past social history, past surgical history and problem list.   Prior to Admission medications    Medication Sig Start Date End Date Taking? Authorizing Provider   allopurinol (ZYLOPRIM) 100 MG tablet Take 100 mg by mouth 2 (Two) Times a Day.   Yes Jessy Murray MD   atorvastatin (LIPITOR) 80 MG tablet Take 80 mg by mouth Daily.   Yes Jessy Murray MD   finasteride (PROSCAR) 5 MG tablet Take 5 mg by mouth Daily.   Yes Jessy Murray MD   furosemide (LASIX) 40 MG tablet Take 1 tablet by mouth Daily.  Patient taking differently: Take 40 mg by mouth As Needed. 8/6/18  Yes Miguel Romo, DO   lisinopril (PRINIVIL,ZESTRIL) 10 MG tablet Take 1 tablet by mouth Daily. 9/3/18  Yes Demetrius Martínez, DO   metoprolol succinate XL (TOPROL-XL) 50 MG 24 hr tablet Take 1 tablet by mouth Daily.  Patient taking differently: Take 50 mg by mouth 2 (Two) Times a Day. 9/21/18  Yes Huey Wilkins MD   nitroglycerin (NITROSTAT) 0.4 MG SL tablet Place 0.4 mg under the tongue Every 5 (Five) Minutes As Needed for Chest Pain. Take no more than 3 doses in 15 minutes.   Yes Jessy Murray MD   omeprazole (priLOSEC) 40 MG capsule Take 40 mg by mouth Daily.   Yes Jessy Murray MD   ranolazine (RANEXA) 500 MG 12 hr tablet Take 500 mg by mouth 2 (Two) Times a Day.   Yes Jessy Murray MD   rivaroxaban (XARELTO) 15  MG tablet Take 1 tablet by mouth Daily With Dinner. 8/23/18  Yes Huey Wilkins MD   tamsulosin (FLOMAX) 0.4 MG capsule 24 hr capsule Take 1 capsule by mouth Every Night. 5/30/18  Yes Luis Thomas Jr., MD     Past Medical History:   Diagnosis Date   • Atrial fibrillation (CMS/HCC)    • Coronary artery disease    • Dizziness    • Hyperlipidemia    • Hypertension        Review of Systems   Constitution: Positive for fatigue. Negative for decreased appetite, malaise/fatigue, weight gain and weight loss.   HENT: Negative for nosebleeds.    Eyes: Negative for visual disturbance.   Cardiovascular: Positive for near-syncope and syncope. Negative for chest pain, claudication, dyspnea on exertion, leg swelling, orthopnea, palpitations and paroxysmal nocturnal dyspnea.   Respiratory: Negative for chest tightness, hemoptysis, shortness of breath and snoring.    Endocrine: Negative for cold intolerance and heat intolerance.   Hematologic/Lymphatic: Negative for bleeding problem. Does not bruise/bleed easily.   Musculoskeletal: Negative for back pain, falls and muscle weakness.   Gastrointestinal: Negative for constipation, diarrhea, heartburn and melena.   Genitourinary: Negative for hematuria.   Neurological: Positive for dizziness. Negative for light-headedness.   Psychiatric/Behavioral: Negative for altered mental status.   Allergic/Immunologic: Negative for persistent infections.         ECG 12 Lead  Date/Time: 9/24/2018 11:41 AM  Performed by: MARSHA MAHMOOD  Authorized by: MARSHA MAHMOOD   Comparison: compared with previous ECG from 9/10/2018  Similar to previous ECG  Rhythm: atrial flutter               Objective:     Physical Exam   Constitutional: He is oriented to person, place, and time. He appears well-developed and well-nourished.   HENT:   Head: Normocephalic and atraumatic.   Eyes: Pupils are equal, round, and reactive to light.   Neck: Normal range of motion. Neck supple. No JVD present. Carotid  "bruit is not present.   Cardiovascular: Normal rate, regular rhythm, normal heart sounds and intact distal pulses.    Pulmonary/Chest: Effort normal and breath sounds normal.   Abdominal: Soft. Bowel sounds are normal.   Musculoskeletal: Normal range of motion.   Neurological: He is alert and oriented to person, place, and time. He has normal reflexes.   Skin: Skin is warm and dry.   Psychiatric: He has a normal mood and affect. His behavior is normal. Judgment and thought content normal.     Blood pressure 118/75, pulse 64, resp. rate 18, height 167.6 cm (66\"), weight 76.2 kg (168 lb), SpO2 98 %.      Lab Review:       Assessment:          Diagnosis Plan   1. Syncope, unspecified syncope type  [facility]   2. Dizziness  [facility]   3. Typical atrial flutter (CMS/HCC)     4. Chronic combined systolic and diastolic congestive heart failure (CMS/HCC)     5. Coronary artery disease involving coronary bypass graft of native heart without angina pectoris     6. Essential hypertension     7. Mixed hyperlipidemia            Plan:       1. Syncope- patient had an additional admission for syncope since our consult. Will place patient in OT. Follow up in 3-4 weeks.   2. Dizziness- has improved some- much confusion on Toprol dosing. Will have patient take Toprol XL 50 at bedtime and reassess. OT  3. Atrial Flutter- rate controlled. Will increase Xarelto to 20 mg- will need to monitor renal function closely as Cr. Cl is borderline but is 53 today. Discussed importance of compliance with Xarelto with dinner meal- has missed multiple doses. Would consider cardioversion at next OV. Could be contributing to dizziness and syncope  4. Chronic combined CHF- euvolemic. Class II-III symptoms. On Toprol and Lisinopril. Educated patient on the importance of daily weights. Call office if 2-3 pound weight gain overnight or 5 pounds in a week. Discussed low sodium diet, less than 2g " daily.   5. Coronary Artery Disease- no clinical evidence of ischemia. Aspirin stopped once NOAC started.   6. Blood Pressure borderline low- issues with orthostatic hypotension.  7. Mixed hyperlipidemia- managed by PCP on statin

## 2018-09-26 ENCOUNTER — DOCUMENTATION (OUTPATIENT)
Dept: CARDIOLOGY | Facility: CLINIC | Age: 83
End: 2018-09-26

## 2018-09-26 NOTE — PROGRESS NOTES
Patient's wife has came to the office today regarding Xarelto. She was wondering since patient was increased to 20mg if he needed to. I spoke with JUNO Leonard regarding this and she explained that the increased dose is best for this patient. Wife was notified.

## 2018-09-28 ENCOUNTER — HOSPITAL ENCOUNTER (INPATIENT)
Facility: HOSPITAL | Age: 83
LOS: 3 days | Discharge: HOME | End: 2018-10-02
Attending: EMERGENCY MEDICINE | Admitting: FAMILY MEDICINE

## 2018-09-28 ENCOUNTER — NURSE TRIAGE (OUTPATIENT)
Dept: CALL CENTER | Facility: HOSPITAL | Age: 83
End: 2018-09-28

## 2018-09-28 ENCOUNTER — APPOINTMENT (OUTPATIENT)
Dept: GENERAL RADIOLOGY | Facility: HOSPITAL | Age: 83
End: 2018-09-28

## 2018-09-28 DIAGNOSIS — I25.810 CORONARY ARTERY DISEASE INVOLVING CORONARY BYPASS GRAFT OF NATIVE HEART WITHOUT ANGINA PECTORIS: ICD-10-CM

## 2018-09-28 DIAGNOSIS — R06.00 DYSPNEA, UNSPECIFIED TYPE: Primary | ICD-10-CM

## 2018-09-28 DIAGNOSIS — I21.4 NSTEMI (NON-ST ELEVATED MYOCARDIAL INFARCTION) (HCC): ICD-10-CM

## 2018-09-28 DIAGNOSIS — I48.91 ATRIAL FIBRILLATION WITH RAPID VENTRICULAR RESPONSE (HCC): ICD-10-CM

## 2018-09-28 DIAGNOSIS — Z95.1 HX OF CABG: ICD-10-CM

## 2018-09-28 DIAGNOSIS — I10 ESSENTIAL HYPERTENSION: Chronic | ICD-10-CM

## 2018-09-28 DIAGNOSIS — I50.43 ACUTE ON CHRONIC COMBINED SYSTOLIC AND DIASTOLIC CONGESTIVE HEART FAILURE (HCC): ICD-10-CM

## 2018-09-28 DIAGNOSIS — R77.8 ELEVATED TROPONIN: ICD-10-CM

## 2018-09-28 DIAGNOSIS — R00.0 TACHYARRHYTHMIA: ICD-10-CM

## 2018-09-28 DIAGNOSIS — E78.2 MIXED HYPERLIPIDEMIA: Chronic | ICD-10-CM

## 2018-09-28 DIAGNOSIS — R55 SYNCOPE, UNSPECIFIED SYNCOPE TYPE: ICD-10-CM

## 2018-09-28 DIAGNOSIS — R06.02 SHORTNESS OF BREATH: ICD-10-CM

## 2018-09-28 DIAGNOSIS — Z74.09 IMPAIRED FUNCTIONAL MOBILITY, BALANCE, GAIT, AND ENDURANCE: ICD-10-CM

## 2018-09-28 DIAGNOSIS — Z87.891 FORMER SMOKER: ICD-10-CM

## 2018-09-28 DIAGNOSIS — J18.9 PNEUMONIA OF BOTH LUNGS DUE TO INFECTIOUS ORGANISM, UNSPECIFIED PART OF LUNG: ICD-10-CM

## 2018-09-28 LAB
ANION GAP SERPL CALCULATED.3IONS-SCNC: 15 MMOL/L (ref 4–13)
APTT PPP: 51.8 SECONDS (ref 24.1–34.8)
BASOPHILS # BLD AUTO: 0.03 10*3/MM3 (ref 0–0.2)
BASOPHILS NFR BLD AUTO: 0.3 % (ref 0–2)
BUN BLD-MCNC: 13 MG/DL (ref 5–21)
BUN/CREAT SERPL: 14.4 (ref 7–25)
CALCIUM SPEC-SCNC: 9 MG/DL (ref 8.4–10.4)
CHLORIDE SERPL-SCNC: 99 MMOL/L (ref 98–110)
CK MB SERPL-CCNC: 5.95 NG/ML (ref 0–5)
CK MB SERPL-RTO: 8.2 % (ref 0–5.7)
CK SERPL-CCNC: 73 U/L (ref 0–203)
CO2 SERPL-SCNC: 22 MMOL/L (ref 24–31)
CREAT BLD-MCNC: 0.9 MG/DL (ref 0.5–1.4)
D DIMER PPP FEU-MCNC: 0.57 MG/L (FEU) (ref 0–0.5)
DEPRECATED RDW RBC AUTO: 46.5 FL (ref 40–54)
EOSINOPHIL # BLD AUTO: 0.01 10*3/MM3 (ref 0–0.7)
EOSINOPHIL NFR BLD AUTO: 0.1 % (ref 0–4)
ERYTHROCYTE [DISTWIDTH] IN BLOOD BY AUTOMATED COUNT: 15.5 % (ref 12–15)
GFR SERPL CREATININE-BSD FRML MDRD: 80 ML/MIN/1.73
GLUCOSE BLD-MCNC: 155 MG/DL (ref 70–100)
HCT VFR BLD AUTO: 38.9 % (ref 40–52)
HGB BLD-MCNC: 13 G/DL (ref 14–18)
IMM GRANULOCYTES # BLD: 0.05 10*3/MM3 (ref 0–0.03)
IMM GRANULOCYTES NFR BLD: 0.5 % (ref 0–5)
INR PPP: 1.62 (ref 0.91–1.09)
LYMPHOCYTES # BLD AUTO: 1.07 10*3/MM3 (ref 0.72–4.86)
LYMPHOCYTES NFR BLD AUTO: 10.8 % (ref 15–45)
MCH RBC QN AUTO: 27.7 PG (ref 28–32)
MCHC RBC AUTO-ENTMCNC: 33.4 G/DL (ref 33–36)
MCV RBC AUTO: 82.9 FL (ref 82–95)
MONOCYTES # BLD AUTO: 1.14 10*3/MM3 (ref 0.19–1.3)
MONOCYTES NFR BLD AUTO: 11.5 % (ref 4–12)
NEUTROPHILS # BLD AUTO: 7.64 10*3/MM3 (ref 1.87–8.4)
NEUTROPHILS NFR BLD AUTO: 76.8 % (ref 39–78)
NRBC BLD MANUAL-RTO: 0 /100 WBC (ref 0–0)
NT-PROBNP SERPL-MCNC: 9780 PG/ML (ref 0–1800)
PLATELET # BLD AUTO: 184 10*3/MM3 (ref 130–400)
PMV BLD AUTO: 10.7 FL (ref 6–12)
POTASSIUM BLD-SCNC: 3.8 MMOL/L (ref 3.5–5.3)
PROTHROMBIN TIME: 19.8 SECONDS (ref 11.9–14.6)
RBC # BLD AUTO: 4.69 10*6/MM3 (ref 4.8–5.9)
SODIUM BLD-SCNC: 136 MMOL/L (ref 135–145)
TROPONIN I SERPL-MCNC: 0.56 NG/ML (ref 0–0.03)
WBC NRBC COR # BLD: 9.94 10*3/MM3 (ref 4.8–10.8)

## 2018-09-28 PROCEDURE — 85025 COMPLETE CBC W/AUTO DIFF WBC: CPT | Performed by: EMERGENCY MEDICINE

## 2018-09-28 PROCEDURE — 82550 ASSAY OF CK (CPK): CPT | Performed by: EMERGENCY MEDICINE

## 2018-09-28 PROCEDURE — 82553 CREATINE MB FRACTION: CPT | Performed by: EMERGENCY MEDICINE

## 2018-09-28 PROCEDURE — 84484 ASSAY OF TROPONIN QUANT: CPT | Performed by: EMERGENCY MEDICINE

## 2018-09-28 PROCEDURE — 93010 ELECTROCARDIOGRAM REPORT: CPT | Performed by: INTERNAL MEDICINE

## 2018-09-28 PROCEDURE — 99285 EMERGENCY DEPT VISIT HI MDM: CPT

## 2018-09-28 PROCEDURE — 80048 BASIC METABOLIC PNL TOTAL CA: CPT | Performed by: EMERGENCY MEDICINE

## 2018-09-28 PROCEDURE — 83880 ASSAY OF NATRIURETIC PEPTIDE: CPT | Performed by: EMERGENCY MEDICINE

## 2018-09-28 PROCEDURE — 85730 THROMBOPLASTIN TIME PARTIAL: CPT | Performed by: EMERGENCY MEDICINE

## 2018-09-28 PROCEDURE — 85610 PROTHROMBIN TIME: CPT | Performed by: EMERGENCY MEDICINE

## 2018-09-28 PROCEDURE — 71045 X-RAY EXAM CHEST 1 VIEW: CPT

## 2018-09-28 PROCEDURE — 85379 FIBRIN DEGRADATION QUANT: CPT | Performed by: EMERGENCY MEDICINE

## 2018-09-28 PROCEDURE — 93005 ELECTROCARDIOGRAM TRACING: CPT | Performed by: EMERGENCY MEDICINE

## 2018-09-28 RX ORDER — SODIUM CHLORIDE 9 MG/ML
20 INJECTION, SOLUTION INTRAVENOUS CONTINUOUS
Status: DISCONTINUED | OUTPATIENT
Start: 2018-09-28 | End: 2018-09-29

## 2018-09-28 RX ORDER — SODIUM CHLORIDE 0.9 % (FLUSH) 0.9 %
10 SYRINGE (ML) INJECTION AS NEEDED
Status: DISCONTINUED | OUTPATIENT
Start: 2018-09-28 | End: 2018-09-30

## 2018-09-28 RX ORDER — ASPIRIN 325 MG
325 TABLET ORAL ONCE
Status: COMPLETED | OUTPATIENT
Start: 2018-09-28 | End: 2018-09-28

## 2018-09-28 RX ADMIN — ASPIRIN 325 MG: 325 TABLET ORAL at 23:08

## 2018-09-28 RX ADMIN — SODIUM CHLORIDE 500 ML: 9 INJECTION, SOLUTION INTRAVENOUS at 23:08

## 2018-09-28 RX ADMIN — SODIUM CHLORIDE 125 ML/HR: 9 INJECTION, SOLUTION INTRAVENOUS at 23:41

## 2018-09-29 ENCOUNTER — APPOINTMENT (OUTPATIENT)
Dept: GENERAL RADIOLOGY | Facility: HOSPITAL | Age: 83
End: 2018-09-29

## 2018-09-29 ENCOUNTER — APPOINTMENT (OUTPATIENT)
Dept: CT IMAGING | Facility: HOSPITAL | Age: 83
End: 2018-09-29

## 2018-09-29 ENCOUNTER — APPOINTMENT (OUTPATIENT)
Dept: CARDIOLOGY | Facility: HOSPITAL | Age: 83
End: 2018-09-29
Attending: FAMILY MEDICINE

## 2018-09-29 PROBLEM — I25.810 CORONARY ARTERY DISEASE INVOLVING CORONARY BYPASS GRAFT OF NATIVE HEART WITHOUT ANGINA PECTORIS: Chronic | Status: ACTIVE | Noted: 2018-08-04

## 2018-09-29 PROBLEM — I48.91 ATRIAL FIBRILLATION WITH RAPID VENTRICULAR RESPONSE (HCC): Status: ACTIVE | Noted: 2018-08-05

## 2018-09-29 PROBLEM — E78.2 MIXED HYPERLIPIDEMIA: Chronic | Status: ACTIVE | Noted: 2018-08-04

## 2018-09-29 PROBLEM — R77.8 ELEVATED TROPONIN: Status: ACTIVE | Noted: 2018-09-29

## 2018-09-29 PROBLEM — I50.43 ACUTE ON CHRONIC COMBINED SYSTOLIC AND DIASTOLIC CONGESTIVE HEART FAILURE (HCC): Status: ACTIVE | Noted: 2018-09-29

## 2018-09-29 PROBLEM — I10 ESSENTIAL HYPERTENSION: Chronic | Status: ACTIVE | Noted: 2018-08-04

## 2018-09-29 PROBLEM — R06.00 DYSPNEA: Status: ACTIVE | Noted: 2018-09-29

## 2018-09-29 PROBLEM — I50.42 CHRONIC COMBINED SYSTOLIC AND DIASTOLIC CONGESTIVE HEART FAILURE (HCC): Chronic | Status: ACTIVE | Noted: 2018-09-29

## 2018-09-29 PROBLEM — I50.9 CHF (CONGESTIVE HEART FAILURE) (HCC): Status: ACTIVE | Noted: 2018-09-29

## 2018-09-29 LAB
ALBUMIN SERPL-MCNC: 4.1 G/DL (ref 3.5–5)
ALBUMIN/GLOB SERPL: 1.2 G/DL (ref 1.1–2.5)
ALP SERPL-CCNC: 88 U/L (ref 24–120)
ALT SERPL W P-5'-P-CCNC: 15 U/L (ref 0–54)
ANION GAP SERPL CALCULATED.3IONS-SCNC: 14 MMOL/L (ref 4–13)
AST SERPL-CCNC: 79 U/L (ref 7–45)
BASOPHILS # BLD AUTO: 0.03 10*3/MM3 (ref 0–0.2)
BASOPHILS NFR BLD AUTO: 0.3 % (ref 0–2)
BH CV ECHO MEAS - AO MAX PG: 14.7 MMHG
BH CV ECHO MEAS - AO V2 MAX: 192 CM/SEC
BH CV ECHO MEAS - BSA(HAYCOCK): 1.9 M^2
BH CV ECHO MEAS - BSA: 1.8 M^2
BH CV ECHO MEAS - BZI_BMI: 26.3 KILOGRAMS/M^2
BH CV ECHO MEAS - BZI_METRIC_HEIGHT: 167.6 CM
BH CV ECHO MEAS - BZI_METRIC_WEIGHT: 73.9 KG
BH CV ECHO MEAS - EDV(MOD-SP4): 149 ML
BH CV ECHO MEAS - EF(MOD-SP4): 20.1 %
BH CV ECHO MEAS - ESV(MOD-SP4): 119 ML
BH CV ECHO MEAS - LV DIASTOLIC VOL/BSA (35-75): 81.3 ML/M^2
BH CV ECHO MEAS - LV SYSTOLIC VOL/BSA (12-30): 64.9 ML/M^2
BH CV ECHO MEAS - LVLD AP4: 7.8 CM
BH CV ECHO MEAS - LVLS AP4: 7.3 CM
BH CV ECHO MEAS - MR ALIAS VEL: 28.6 CM/SEC
BH CV ECHO MEAS - MR ERO: 0.13 CM^2
BH CV ECHO MEAS - MR FLOW RATE: 64.7 CM^3/SEC
BH CV ECHO MEAS - MR MAX PG: 98 MMHG
BH CV ECHO MEAS - MR MAX VEL: 495 CM/SEC
BH CV ECHO MEAS - MR MEAN PG: 63 MMHG
BH CV ECHO MEAS - MR MEAN VEL: 371 CM/SEC
BH CV ECHO MEAS - MR PISA RADIUS: 0.6 CM
BH CV ECHO MEAS - MR PISA: 2.3 CM^2
BH CV ECHO MEAS - MR VOLUME: 20 ML
BH CV ECHO MEAS - MR VTI: 153 CM
BH CV ECHO MEAS - RAP SYSTOLE: 10 MMHG
BH CV ECHO MEAS - RVSP: 56.5 MMHG
BH CV ECHO MEAS - SI(MOD-SP4): 16.4 ML/M^2
BH CV ECHO MEAS - SV(MOD-SP4): 30 ML
BH CV ECHO MEAS - TR MAX VEL: 341 CM/SEC
BILIRUB SERPL-MCNC: 1.5 MG/DL (ref 0.1–1)
BUN BLD-MCNC: 14 MG/DL (ref 5–21)
BUN/CREAT SERPL: 15.6 (ref 7–25)
CALCIUM SPEC-SCNC: 8.8 MG/DL (ref 8.4–10.4)
CHLORIDE SERPL-SCNC: 101 MMOL/L (ref 98–110)
CO2 SERPL-SCNC: 23 MMOL/L (ref 24–31)
CREAT BLD-MCNC: 0.9 MG/DL (ref 0.5–1.4)
D-LACTATE SERPL-SCNC: 1.5 MMOL/L (ref 0.5–2)
DEPRECATED RDW RBC AUTO: 47.5 FL (ref 40–54)
EOSINOPHIL # BLD AUTO: 0.01 10*3/MM3 (ref 0–0.7)
EOSINOPHIL NFR BLD AUTO: 0.1 % (ref 0–4)
ERYTHROCYTE [DISTWIDTH] IN BLOOD BY AUTOMATED COUNT: 15.5 % (ref 12–15)
GFR SERPL CREATININE-BSD FRML MDRD: 80 ML/MIN/1.73
GLOBULIN UR ELPH-MCNC: 3.4 GM/DL
GLUCOSE BLD-MCNC: 131 MG/DL (ref 70–100)
HCT VFR BLD AUTO: 39.3 % (ref 40–52)
HGB BLD-MCNC: 12.9 G/DL (ref 14–18)
HOLD SPECIMEN: NORMAL
HOLD SPECIMEN: NORMAL
IMM GRANULOCYTES # BLD: 0.04 10*3/MM3 (ref 0–0.03)
IMM GRANULOCYTES NFR BLD: 0.4 % (ref 0–5)
LYMPHOCYTES # BLD AUTO: 1.56 10*3/MM3 (ref 0.72–4.86)
LYMPHOCYTES NFR BLD AUTO: 17 % (ref 15–45)
MAGNESIUM SERPL-MCNC: 1.6 MG/DL (ref 1.4–2.2)
MAXIMAL PREDICTED HEART RATE: 133 BPM
MCH RBC QN AUTO: 27.7 PG (ref 28–32)
MCHC RBC AUTO-ENTMCNC: 32.8 G/DL (ref 33–36)
MCV RBC AUTO: 84.5 FL (ref 82–95)
MONOCYTES # BLD AUTO: 1.22 10*3/MM3 (ref 0.19–1.3)
MONOCYTES NFR BLD AUTO: 13.3 % (ref 4–12)
NEUTROPHILS # BLD AUTO: 6.32 10*3/MM3 (ref 1.87–8.4)
NEUTROPHILS NFR BLD AUTO: 68.9 % (ref 39–78)
NRBC BLD MANUAL-RTO: 0 /100 WBC (ref 0–0)
PHOSPHATE SERPL-MCNC: 3.5 MG/DL (ref 2.5–4.5)
PLATELET # BLD AUTO: 187 10*3/MM3 (ref 130–400)
PMV BLD AUTO: 11.1 FL (ref 6–12)
POTASSIUM BLD-SCNC: 3.7 MMOL/L (ref 3.5–5.3)
PROT SERPL-MCNC: 7.5 G/DL (ref 6.3–8.7)
RBC # BLD AUTO: 4.65 10*6/MM3 (ref 4.8–5.9)
SODIUM BLD-SCNC: 138 MMOL/L (ref 135–145)
STRESS TARGET HR: 113 BPM
TROPONIN I SERPL-MCNC: 14.5 NG/ML (ref 0–0.03)
TROPONIN I SERPL-MCNC: 19.1 NG/ML (ref 0–0.03)
WBC NRBC COR # BLD: 9.18 10*3/MM3 (ref 4.8–10.8)
WHOLE BLOOD HOLD SPECIMEN: NORMAL
WHOLE BLOOD HOLD SPECIMEN: NORMAL

## 2018-09-29 PROCEDURE — 94760 N-INVAS EAR/PLS OXIMETRY 1: CPT

## 2018-09-29 PROCEDURE — 0399T HC MYOCARDL STRAIN IMAG QUAN ASSMT PER SESS: CPT

## 2018-09-29 PROCEDURE — 25010000002 AZITHROMYCIN PER 500 MG: Performed by: EMERGENCY MEDICINE

## 2018-09-29 PROCEDURE — 25010000002 FUROSEMIDE PER 20 MG: Performed by: EMERGENCY MEDICINE

## 2018-09-29 PROCEDURE — 85025 COMPLETE CBC W/AUTO DIFF WBC: CPT | Performed by: FAMILY MEDICINE

## 2018-09-29 PROCEDURE — 83605 ASSAY OF LACTIC ACID: CPT | Performed by: EMERGENCY MEDICINE

## 2018-09-29 PROCEDURE — 25010000002 ENOXAPARIN PER 10 MG: Performed by: EMERGENCY MEDICINE

## 2018-09-29 PROCEDURE — 71275 CT ANGIOGRAPHY CHEST: CPT

## 2018-09-29 PROCEDURE — 93321 DOPPLER ECHO F-UP/LMTD STD: CPT

## 2018-09-29 PROCEDURE — 87040 BLOOD CULTURE FOR BACTERIA: CPT | Performed by: FAMILY MEDICINE

## 2018-09-29 PROCEDURE — 25010000002 CEFTRIAXONE PER 250 MG: Performed by: EMERGENCY MEDICINE

## 2018-09-29 PROCEDURE — 80053 COMPREHEN METABOLIC PANEL: CPT | Performed by: FAMILY MEDICINE

## 2018-09-29 PROCEDURE — 93321 DOPPLER ECHO F-UP/LMTD STD: CPT | Performed by: INTERNAL MEDICINE

## 2018-09-29 PROCEDURE — 94799 UNLISTED PULMONARY SVC/PX: CPT

## 2018-09-29 PROCEDURE — 93010 ELECTROCARDIOGRAM REPORT: CPT | Performed by: INTERNAL MEDICINE

## 2018-09-29 PROCEDURE — 0399T ADULT TRANSTHORACIC ECHO LIMITED W/ CONT IF NECESSARY PER PROTOCOL: CPT | Performed by: INTERNAL MEDICINE

## 2018-09-29 PROCEDURE — 93325 DOPPLER ECHO COLOR FLOW MAPG: CPT

## 2018-09-29 PROCEDURE — 99222 1ST HOSP IP/OBS MODERATE 55: CPT | Performed by: INTERNAL MEDICINE

## 2018-09-29 PROCEDURE — 83735 ASSAY OF MAGNESIUM: CPT | Performed by: FAMILY MEDICINE

## 2018-09-29 PROCEDURE — 84484 ASSAY OF TROPONIN QUANT: CPT | Performed by: FAMILY MEDICINE

## 2018-09-29 PROCEDURE — 93308 TTE F-UP OR LMTD: CPT

## 2018-09-29 PROCEDURE — 0 IOPAMIDOL PER 1 ML: Performed by: EMERGENCY MEDICINE

## 2018-09-29 PROCEDURE — 25010000002 CEFTRIAXONE PER 250 MG: Performed by: FAMILY MEDICINE

## 2018-09-29 PROCEDURE — 71045 X-RAY EXAM CHEST 1 VIEW: CPT

## 2018-09-29 PROCEDURE — 25010000002 PERFLUTREN 6.52 MG/ML SUSPENSION: Performed by: FAMILY MEDICINE

## 2018-09-29 PROCEDURE — 93005 ELECTROCARDIOGRAM TRACING: CPT | Performed by: FAMILY MEDICINE

## 2018-09-29 PROCEDURE — 84100 ASSAY OF PHOSPHORUS: CPT | Performed by: FAMILY MEDICINE

## 2018-09-29 PROCEDURE — 87040 BLOOD CULTURE FOR BACTERIA: CPT | Performed by: EMERGENCY MEDICINE

## 2018-09-29 PROCEDURE — 93325 DOPPLER ECHO COLOR FLOW MAPG: CPT | Performed by: INTERNAL MEDICINE

## 2018-09-29 PROCEDURE — 93308 TTE F-UP OR LMTD: CPT | Performed by: INTERNAL MEDICINE

## 2018-09-29 PROCEDURE — 36415 COLL VENOUS BLD VENIPUNCTURE: CPT | Performed by: FAMILY MEDICINE

## 2018-09-29 RX ORDER — FAMOTIDINE 20 MG/1
40 TABLET, FILM COATED ORAL DAILY
Status: DISCONTINUED | OUTPATIENT
Start: 2018-09-29 | End: 2018-09-29 | Stop reason: ALTCHOICE

## 2018-09-29 RX ORDER — FINASTERIDE 5 MG/1
5 TABLET, FILM COATED ORAL DAILY
Status: DISCONTINUED | OUTPATIENT
Start: 2018-09-29 | End: 2018-09-30

## 2018-09-29 RX ORDER — BUMETANIDE 0.25 MG/ML
2 INJECTION INTRAMUSCULAR; INTRAVENOUS EVERY 12 HOURS
Status: DISCONTINUED | OUTPATIENT
Start: 2018-09-29 | End: 2018-09-30

## 2018-09-29 RX ORDER — ALLOPURINOL 100 MG/1
100 TABLET ORAL 2 TIMES DAILY
Status: DISCONTINUED | OUTPATIENT
Start: 2018-09-29 | End: 2018-09-30

## 2018-09-29 RX ORDER — PANTOPRAZOLE SODIUM 40 MG/1
40 TABLET, DELAYED RELEASE ORAL EVERY MORNING
Status: DISCONTINUED | OUTPATIENT
Start: 2018-09-29 | End: 2018-09-30

## 2018-09-29 RX ORDER — SODIUM CHLORIDE 0.9 % (FLUSH) 0.9 %
1-10 SYRINGE (ML) INJECTION AS NEEDED
Status: DISCONTINUED | OUTPATIENT
Start: 2018-09-29 | End: 2018-09-30

## 2018-09-29 RX ORDER — RANOLAZINE 500 MG/1
500 TABLET, EXTENDED RELEASE ORAL EVERY 12 HOURS SCHEDULED
Status: DISCONTINUED | OUTPATIENT
Start: 2018-09-29 | End: 2018-09-30

## 2018-09-29 RX ORDER — FUROSEMIDE 10 MG/ML
20 INJECTION INTRAMUSCULAR; INTRAVENOUS DAILY
Status: DISCONTINUED | OUTPATIENT
Start: 2018-09-29 | End: 2018-09-29

## 2018-09-29 RX ORDER — ATORVASTATIN CALCIUM 40 MG/1
80 TABLET, FILM COATED ORAL DAILY
Status: DISCONTINUED | OUTPATIENT
Start: 2018-09-29 | End: 2018-09-30

## 2018-09-29 RX ORDER — METOPROLOL SUCCINATE 50 MG/1
50 TABLET, EXTENDED RELEASE ORAL DAILY
Status: DISCONTINUED | OUTPATIENT
Start: 2018-09-29 | End: 2018-10-02 | Stop reason: HOSPADM

## 2018-09-29 RX ORDER — FUROSEMIDE 10 MG/ML
20 INJECTION INTRAMUSCULAR; INTRAVENOUS ONCE
Status: COMPLETED | OUTPATIENT
Start: 2018-09-29 | End: 2018-09-29

## 2018-09-29 RX ORDER — ONDANSETRON 2 MG/ML
4 INJECTION INTRAMUSCULAR; INTRAVENOUS EVERY 6 HOURS PRN
Status: DISCONTINUED | OUTPATIENT
Start: 2018-09-29 | End: 2018-09-30

## 2018-09-29 RX ORDER — TAMSULOSIN HYDROCHLORIDE 0.4 MG/1
0.4 CAPSULE ORAL NIGHTLY
Status: DISCONTINUED | OUTPATIENT
Start: 2018-09-29 | End: 2018-09-30

## 2018-09-29 RX ORDER — LISINOPRIL 10 MG/1
10 TABLET ORAL DAILY
Status: DISCONTINUED | OUTPATIENT
Start: 2018-09-29 | End: 2018-10-02 | Stop reason: HOSPADM

## 2018-09-29 RX ORDER — DILTIAZEM HYDROCHLORIDE 5 MG/ML
15 INJECTION INTRAVENOUS ONCE
Status: COMPLETED | OUTPATIENT
Start: 2018-09-29 | End: 2018-09-29

## 2018-09-29 RX ADMIN — ATORVASTATIN CALCIUM 80 MG: 40 TABLET, FILM COATED ORAL at 08:31

## 2018-09-29 RX ADMIN — PERFLUTREN 8.48 MG: 6.52 INJECTION, SUSPENSION INTRAVENOUS at 14:21

## 2018-09-29 RX ADMIN — FINASTERIDE 5 MG: 5 TABLET, FILM COATED ORAL at 08:31

## 2018-09-29 RX ADMIN — PANTOPRAZOLE SODIUM 40 MG: 40 TABLET, DELAYED RELEASE ORAL at 08:31

## 2018-09-29 RX ADMIN — BUMETANIDE 2 MG: 0.25 INJECTION INTRAMUSCULAR; INTRAVENOUS at 09:04

## 2018-09-29 RX ADMIN — AZITHROMYCIN MONOHYDRATE 500 MG: 500 INJECTION, POWDER, LYOPHILIZED, FOR SOLUTION INTRAVENOUS at 01:37

## 2018-09-29 RX ADMIN — CEFTRIAXONE SODIUM 1 G: 1 INJECTION, POWDER, FOR SOLUTION INTRAMUSCULAR; INTRAVENOUS at 21:31

## 2018-09-29 RX ADMIN — RANOLAZINE 500 MG: 500 TABLET, FILM COATED, EXTENDED RELEASE ORAL at 08:31

## 2018-09-29 RX ADMIN — ENOXAPARIN SODIUM 70 MG: 80 INJECTION SUBCUTANEOUS at 02:32

## 2018-09-29 RX ADMIN — FUROSEMIDE 20 MG: 10 INJECTION, SOLUTION INTRAMUSCULAR; INTRAVENOUS at 01:37

## 2018-09-29 RX ADMIN — RANOLAZINE 500 MG: 500 TABLET, FILM COATED, EXTENDED RELEASE ORAL at 20:35

## 2018-09-29 RX ADMIN — TAMSULOSIN HYDROCHLORIDE 0.4 MG: 0.4 CAPSULE ORAL at 20:35

## 2018-09-29 RX ADMIN — DILTIAZEM HYDROCHLORIDE 5 MG/HR: 5 INJECTION INTRAVENOUS at 06:08

## 2018-09-29 RX ADMIN — FAMOTIDINE 40 MG: 20 TABLET, FILM COATED ORAL at 08:31

## 2018-09-29 RX ADMIN — CEFTRIAXONE SODIUM 2 G: 2 INJECTION, POWDER, FOR SOLUTION INTRAMUSCULAR; INTRAVENOUS at 01:35

## 2018-09-29 RX ADMIN — IOPAMIDOL 100 ML: 755 INJECTION, SOLUTION INTRAVENOUS at 00:10

## 2018-09-29 RX ADMIN — LISINOPRIL 10 MG: 10 TABLET ORAL at 08:31

## 2018-09-29 RX ADMIN — METOPROLOL SUCCINATE 50 MG: 50 TABLET, FILM COATED, EXTENDED RELEASE ORAL at 08:31

## 2018-09-29 RX ADMIN — DILTIAZEM HYDROCHLORIDE 15 MG: 5 INJECTION INTRAVENOUS at 00:24

## 2018-09-29 RX ADMIN — ALLOPURINOL 100 MG: 100 TABLET ORAL at 08:31

## 2018-09-29 RX ADMIN — ALLOPURINOL 100 MG: 100 TABLET ORAL at 20:35

## 2018-09-29 RX ADMIN — BUMETANIDE 2 MG: 0.25 INJECTION INTRAMUSCULAR; INTRAVENOUS at 20:36

## 2018-09-29 NOTE — TELEPHONE ENCOUNTER
"Reviewed guideline with caller, advised Mr Deng be seen in ED for evaluation of symptomatic tachycardia.    Reason for Disposition  • Dizziness, lightheadedness, or weakness    Additional Information  • Negative: Passed out (i.e., lost consciousness, collapsed and was not responding)  • Negative: Shock suspected (e.g., cold/pale/clammy skin, too weak to stand, low BP, rapid pulse)  • Negative: Difficult to awaken or acting confused (e.g., disoriented, slurred speech)  • Negative: Visible sweat on face or sweat dripping down face  • Negative: Unable to walk, or can only walk with assistance (e.g., requires support)  • Negative: [1] Received SHOCK from implantable cardiac defibrillator AND [2] persisting symptoms (i.e., palpitations, lightheadedness)  • Negative: Sounds like a life-threatening emergency to the triager  • Negative: Chest pain  • Negative: Difficulty breathing    Answer Assessment - Initial Assessment Questions  1. DESCRIPTION: \"Please describe your heart rate or heart beat that you are having\" (e.g., fast/slow, regular/irregular, skipped or extra beats, \"palpitations\")        2. ONSET: \"When did it start?\" (Minutes, hours or days)       30 minutes ago  3. DURATION: \"How long does it last\" (e.g., seconds, minutes, hours)      unknown  4. PATTERN \"Does it come and go, or has it been constant since it started?\"  \"Does it get worse with exertion?\"   \"Are you feeling it now?\"      Constant, feeling weak  5. TAP: \"Using your hand, can you tap out what you are feeling on a chair or table in front of you, so that I can hear?\" (Note: not all patients can do this)        no  6. HEART RATE: \"Can you tell me your heart rate?\" \"How many beats in 15 seconds?\"  (Note: not all patients can do this)        130  7. RECURRENT SYMPTOM: \"Have you ever had this before?\" If so, ask: \"When was the last time?\" and \"What happened that time?\"       NO  8. CAUSE: \"What do you think is causing the palpitations?\"      " "unknown  9. CARDIAC HISTORY: \"Do you have any history of heart disease?\" (e.g., heart attack, angina, bypass surgery, angioplasty, arrhythmia)       Heart attack, CABG 1985  10. OTHER SYMPTOMS: \"Do you have any other symptoms?\" (e.g., dizziness, chest pain, sweating, difficulty breathing)        Weakness,   11. PREGNANCY: \"Is there any chance you are pregnant?\" \"When was your last menstrual period?\"        NA    Protocols used: HEART RATE AND HEARTBEAT QUESTIONS-ADULT-      "

## 2018-09-30 PROBLEM — I21.4 NSTEMI (NON-ST ELEVATED MYOCARDIAL INFARCTION) (HCC): Status: ACTIVE | Noted: 2018-09-28

## 2018-09-30 LAB
ALBUMIN SERPL-MCNC: 3.6 G/DL (ref 3.5–5)
ALBUMIN/GLOB SERPL: 1.2 G/DL (ref 1.1–2.5)
ALP SERPL-CCNC: 82 U/L (ref 24–120)
ALT SERPL W P-5'-P-CCNC: 27 U/L (ref 0–54)
ANION GAP SERPL CALCULATED.3IONS-SCNC: 14 MMOL/L (ref 4–13)
AST SERPL-CCNC: 95 U/L (ref 7–45)
BILIRUB SERPL-MCNC: 1.4 MG/DL (ref 0.1–1)
BUN BLD-MCNC: 20 MG/DL (ref 5–21)
BUN/CREAT SERPL: 21.7 (ref 7–25)
CALCIUM SPEC-SCNC: 8.6 MG/DL (ref 8.4–10.4)
CHLORIDE SERPL-SCNC: 99 MMOL/L (ref 98–110)
CO2 SERPL-SCNC: 25 MMOL/L (ref 24–31)
CREAT BLD-MCNC: 0.92 MG/DL (ref 0.5–1.4)
GFR SERPL CREATININE-BSD FRML MDRD: 78 ML/MIN/1.73
GLOBULIN UR ELPH-MCNC: 3 GM/DL
GLUCOSE BLD-MCNC: 131 MG/DL (ref 70–100)
POTASSIUM BLD-SCNC: 3.8 MMOL/L (ref 3.5–5.3)
PROT SERPL-MCNC: 6.6 G/DL (ref 6.3–8.7)
SODIUM BLD-SCNC: 138 MMOL/L (ref 135–145)

## 2018-09-30 PROCEDURE — 51701 INSERT BLADDER CATHETER: CPT

## 2018-09-30 PROCEDURE — 4A023N7 MEASUREMENT OF CARDIAC SAMPLING AND PRESSURE, LEFT HEART, PERCUTANEOUS APPROACH: ICD-10-PCS | Performed by: INTERNAL MEDICINE

## 2018-09-30 PROCEDURE — 25010000002 HEPARIN (PORCINE) PER 1000 UNITS: Performed by: INTERNAL MEDICINE

## 2018-09-30 PROCEDURE — 94799 UNLISTED PULMONARY SVC/PX: CPT

## 2018-09-30 PROCEDURE — C1760 CLOSURE DEV, VASC: HCPCS | Performed by: INTERNAL MEDICINE

## 2018-09-30 PROCEDURE — 94760 N-INVAS EAR/PLS OXIMETRY 1: CPT

## 2018-09-30 PROCEDURE — C1769 GUIDE WIRE: HCPCS | Performed by: INTERNAL MEDICINE

## 2018-09-30 PROCEDURE — 99152 MOD SED SAME PHYS/QHP 5/>YRS: CPT | Performed by: INTERNAL MEDICINE

## 2018-09-30 PROCEDURE — 93459 L HRT ART/GRFT ANGIO: CPT | Performed by: INTERNAL MEDICINE

## 2018-09-30 PROCEDURE — 51798 US URINE CAPACITY MEASURE: CPT

## 2018-09-30 PROCEDURE — 0 IOPAMIDOL PER 1 ML: Performed by: INTERNAL MEDICINE

## 2018-09-30 PROCEDURE — B2151ZZ FLUOROSCOPY OF LEFT HEART USING LOW OSMOLAR CONTRAST: ICD-10-PCS | Performed by: INTERNAL MEDICINE

## 2018-09-30 PROCEDURE — B2111ZZ FLUOROSCOPY OF MULTIPLE CORONARY ARTERIES USING LOW OSMOLAR CONTRAST: ICD-10-PCS | Performed by: INTERNAL MEDICINE

## 2018-09-30 PROCEDURE — 25010000002 MIDAZOLAM PER 1 MG: Performed by: INTERNAL MEDICINE

## 2018-09-30 PROCEDURE — C1894 INTRO/SHEATH, NON-LASER: HCPCS | Performed by: INTERNAL MEDICINE

## 2018-09-30 PROCEDURE — 80053 COMPREHEN METABOLIC PANEL: CPT | Performed by: NURSE PRACTITIONER

## 2018-09-30 PROCEDURE — 25010000002 FENTANYL CITRATE (PF) 100 MCG/2ML SOLUTION: Performed by: INTERNAL MEDICINE

## 2018-09-30 PROCEDURE — 25010000002 ADENOSINE PER 6 MG: Performed by: INTERNAL MEDICINE

## 2018-09-30 RX ORDER — DIPHENHYDRAMINE HCL 25 MG
25 CAPSULE ORAL EVERY 6 HOURS PRN
Status: DISCONTINUED | OUTPATIENT
Start: 2018-09-30 | End: 2018-10-02 | Stop reason: HOSPADM

## 2018-09-30 RX ORDER — NITROGLYCERIN 0.4 MG/1
0.4 TABLET SUBLINGUAL
Status: DISCONTINUED | OUTPATIENT
Start: 2018-09-30 | End: 2018-09-30 | Stop reason: HOSPADM

## 2018-09-30 RX ORDER — SODIUM CHLORIDE 9 MG/ML
1-3 INJECTION, SOLUTION INTRAVENOUS CONTINUOUS
Status: DISCONTINUED | OUTPATIENT
Start: 2018-09-30 | End: 2018-10-02 | Stop reason: HOSPADM

## 2018-09-30 RX ORDER — LIDOCAINE HYDROCHLORIDE 20 MG/ML
INJECTION, SOLUTION INFILTRATION; PERINEURAL AS NEEDED
Status: DISCONTINUED | OUTPATIENT
Start: 2018-09-30 | End: 2018-09-30 | Stop reason: HOSPADM

## 2018-09-30 RX ORDER — ASPIRIN 81 MG/1
81 TABLET ORAL DAILY
Status: DISCONTINUED | OUTPATIENT
Start: 2018-09-30 | End: 2018-10-02 | Stop reason: HOSPADM

## 2018-09-30 RX ORDER — ONDANSETRON 2 MG/ML
4 INJECTION INTRAMUSCULAR; INTRAVENOUS EVERY 6 HOURS PRN
Status: DISCONTINUED | OUTPATIENT
Start: 2018-09-30 | End: 2018-09-30 | Stop reason: HOSPADM

## 2018-09-30 RX ORDER — FUROSEMIDE 40 MG/1
40 TABLET ORAL DAILY PRN
COMMUNITY
End: 2018-10-02 | Stop reason: HOSPADM

## 2018-09-30 RX ORDER — ACETAMINOPHEN 325 MG/1
650 TABLET ORAL EVERY 4 HOURS PRN
Status: DISCONTINUED | OUTPATIENT
Start: 2018-09-30 | End: 2018-09-30 | Stop reason: HOSPADM

## 2018-09-30 RX ORDER — SENNA AND DOCUSATE SODIUM 50; 8.6 MG/1; MG/1
2 TABLET, FILM COATED ORAL NIGHTLY
Status: DISCONTINUED | OUTPATIENT
Start: 2018-09-30 | End: 2018-10-02 | Stop reason: HOSPADM

## 2018-09-30 RX ORDER — SODIUM CHLORIDE 9 MG/ML
3 INJECTION, SOLUTION INTRAVENOUS CONTINUOUS
Status: DISPENSED | OUTPATIENT
Start: 2018-09-30 | End: 2018-09-30

## 2018-09-30 RX ORDER — SODIUM CHLORIDE 0.9 % (FLUSH) 0.9 %
1-10 SYRINGE (ML) INJECTION AS NEEDED
Status: DISCONTINUED | OUTPATIENT
Start: 2018-09-30 | End: 2018-09-30 | Stop reason: HOSPADM

## 2018-09-30 RX ORDER — ACETAMINOPHEN 325 MG/1
650 TABLET ORAL EVERY 4 HOURS PRN
Status: DISCONTINUED | OUTPATIENT
Start: 2018-09-30 | End: 2018-10-02 | Stop reason: HOSPADM

## 2018-09-30 RX ORDER — ADENOSINE 3 MG/ML
INJECTION, SOLUTION INTRAVENOUS AS NEEDED
Status: DISCONTINUED | OUTPATIENT
Start: 2018-09-30 | End: 2018-09-30 | Stop reason: HOSPADM

## 2018-09-30 RX ORDER — ONDANSETRON 4 MG/1
4 TABLET, ORALLY DISINTEGRATING ORAL EVERY 6 HOURS PRN
Status: DISCONTINUED | OUTPATIENT
Start: 2018-09-30 | End: 2018-10-02 | Stop reason: HOSPADM

## 2018-09-30 RX ORDER — MIDAZOLAM HYDROCHLORIDE 1 MG/ML
INJECTION INTRAMUSCULAR; INTRAVENOUS AS NEEDED
Status: DISCONTINUED | OUTPATIENT
Start: 2018-09-30 | End: 2018-09-30 | Stop reason: HOSPADM

## 2018-09-30 RX ORDER — ONDANSETRON 2 MG/ML
4 INJECTION INTRAMUSCULAR; INTRAVENOUS EVERY 6 HOURS PRN
Status: DISCONTINUED | OUTPATIENT
Start: 2018-09-30 | End: 2018-10-02 | Stop reason: HOSPADM

## 2018-09-30 RX ORDER — CALCIUM CARBONATE 200(500)MG
2 TABLET,CHEWABLE ORAL 2 TIMES DAILY PRN
Status: DISCONTINUED | OUTPATIENT
Start: 2018-09-30 | End: 2018-10-02 | Stop reason: HOSPADM

## 2018-09-30 RX ORDER — LIDOCAINE HYDROCHLORIDE 10 MG/ML
0.1 INJECTION, SOLUTION EPIDURAL; INFILTRATION; INTRACAUDAL; PERINEURAL ONCE AS NEEDED
Status: DISCONTINUED | OUTPATIENT
Start: 2018-09-30 | End: 2018-09-30 | Stop reason: HOSPADM

## 2018-09-30 RX ORDER — SODIUM CHLORIDE 9 MG/ML
100 INJECTION, SOLUTION INTRAVENOUS CONTINUOUS
Status: DISCONTINUED | OUTPATIENT
Start: 2018-09-30 | End: 2018-09-30

## 2018-09-30 RX ORDER — FENTANYL CITRATE 50 UG/ML
INJECTION, SOLUTION INTRAMUSCULAR; INTRAVENOUS AS NEEDED
Status: DISCONTINUED | OUTPATIENT
Start: 2018-09-30 | End: 2018-09-30 | Stop reason: HOSPADM

## 2018-09-30 RX ORDER — ONDANSETRON 4 MG/1
4 TABLET, FILM COATED ORAL EVERY 6 HOURS PRN
Status: DISCONTINUED | OUTPATIENT
Start: 2018-09-30 | End: 2018-10-02 | Stop reason: HOSPADM

## 2018-09-30 RX ADMIN — RANOLAZINE 500 MG: 500 TABLET, FILM COATED, EXTENDED RELEASE ORAL at 11:07

## 2018-09-30 RX ADMIN — METOPROLOL SUCCINATE 50 MG: 50 TABLET, FILM COATED, EXTENDED RELEASE ORAL at 08:05

## 2018-09-30 RX ADMIN — SODIUM CHLORIDE 3 ML/KG/HR: 9 INJECTION, SOLUTION INTRAVENOUS at 08:05

## 2018-09-30 RX ADMIN — SODIUM CHLORIDE 100 ML/HR: 9 INJECTION, SOLUTION INTRAVENOUS at 10:22

## 2018-09-30 RX ADMIN — ATORVASTATIN CALCIUM 80 MG: 40 TABLET, FILM COATED ORAL at 11:07

## 2018-09-30 RX ADMIN — ASPIRIN 81 MG: 81 TABLET ORAL at 11:07

## 2018-09-30 RX ADMIN — FINASTERIDE 5 MG: 5 TABLET, FILM COATED ORAL at 11:07

## 2018-09-30 RX ADMIN — LISINOPRIL 10 MG: 10 TABLET ORAL at 11:08

## 2018-09-30 RX ADMIN — ALLOPURINOL 100 MG: 100 TABLET ORAL at 11:07

## 2018-09-30 RX ADMIN — DOCUSATE SODIUM -SENNOSIDES 2 TABLET: 50; 8.6 TABLET, COATED ORAL at 21:01

## 2018-09-30 RX ADMIN — SODIUM CHLORIDE 100 ML/HR: 9 INJECTION, SOLUTION INTRAVENOUS at 13:02

## 2018-10-01 ENCOUNTER — READMISSION MANAGEMENT (OUTPATIENT)
Dept: CALL CENTER | Facility: HOSPITAL | Age: 83
End: 2018-10-01

## 2018-10-01 LAB
ANION GAP SERPL CALCULATED.3IONS-SCNC: 11 MMOL/L (ref 4–13)
BUN BLD-MCNC: 16 MG/DL (ref 5–21)
BUN/CREAT SERPL: 21.6 (ref 7–25)
CALCIUM SPEC-SCNC: 8.9 MG/DL (ref 8.4–10.4)
CHLORIDE SERPL-SCNC: 103 MMOL/L (ref 98–110)
CO2 SERPL-SCNC: 26 MMOL/L (ref 24–31)
CREAT BLD-MCNC: 0.74 MG/DL (ref 0.5–1.4)
DEPRECATED RDW RBC AUTO: 49 FL (ref 40–54)
ERYTHROCYTE [DISTWIDTH] IN BLOOD BY AUTOMATED COUNT: 15.6 % (ref 12–15)
GFR SERPL CREATININE-BSD FRML MDRD: 100 ML/MIN/1.73
GLUCOSE BLD-MCNC: 130 MG/DL (ref 70–100)
HCT VFR BLD AUTO: 37.5 % (ref 40–52)
HGB BLD-MCNC: 12.3 G/DL (ref 14–18)
MCH RBC QN AUTO: 28.2 PG (ref 28–32)
MCHC RBC AUTO-ENTMCNC: 32.8 G/DL (ref 33–36)
MCV RBC AUTO: 86 FL (ref 82–95)
PLATELET # BLD AUTO: 202 10*3/MM3 (ref 130–400)
PMV BLD AUTO: 10.6 FL (ref 6–12)
POTASSIUM BLD-SCNC: 3.5 MMOL/L (ref 3.5–5.3)
RBC # BLD AUTO: 4.36 10*6/MM3 (ref 4.8–5.9)
SODIUM BLD-SCNC: 140 MMOL/L (ref 135–145)
WBC NRBC COR # BLD: 9.74 10*3/MM3 (ref 4.8–10.8)

## 2018-10-01 PROCEDURE — 94760 N-INVAS EAR/PLS OXIMETRY 1: CPT

## 2018-10-01 PROCEDURE — 94640 AIRWAY INHALATION TREATMENT: CPT

## 2018-10-01 PROCEDURE — 80048 BASIC METABOLIC PNL TOTAL CA: CPT | Performed by: INTERNAL MEDICINE

## 2018-10-01 PROCEDURE — 94799 UNLISTED PULMONARY SVC/PX: CPT

## 2018-10-01 PROCEDURE — 85027 COMPLETE CBC AUTOMATED: CPT | Performed by: INTERNAL MEDICINE

## 2018-10-01 PROCEDURE — 99233 SBSQ HOSP IP/OBS HIGH 50: CPT | Performed by: INTERNAL MEDICINE

## 2018-10-01 PROCEDURE — 94762 N-INVAS EAR/PLS OXIMTRY CONT: CPT

## 2018-10-01 RX ORDER — AMIODARONE HYDROCHLORIDE 200 MG/1
200 TABLET ORAL EVERY 12 HOURS SCHEDULED
Status: DISCONTINUED | OUTPATIENT
Start: 2018-10-01 | End: 2018-10-02 | Stop reason: HOSPADM

## 2018-10-01 RX ORDER — TAMSULOSIN HYDROCHLORIDE 0.4 MG/1
0.4 CAPSULE ORAL DAILY
Status: DISCONTINUED | OUTPATIENT
Start: 2018-10-01 | End: 2018-10-02 | Stop reason: HOSPADM

## 2018-10-01 RX ORDER — AMLODIPINE BESYLATE 5 MG/1
5 TABLET ORAL
Status: DISCONTINUED | OUTPATIENT
Start: 2018-10-01 | End: 2018-10-02 | Stop reason: HOSPADM

## 2018-10-01 RX ORDER — FUROSEMIDE 20 MG/1
20 TABLET ORAL DAILY
Status: DISCONTINUED | OUTPATIENT
Start: 2018-10-01 | End: 2018-10-02 | Stop reason: HOSPADM

## 2018-10-01 RX ADMIN — RIVAROXABAN 15 MG: 15 TABLET, FILM COATED ORAL at 17:40

## 2018-10-01 RX ADMIN — AMIODARONE HYDROCHLORIDE 200 MG: 200 TABLET ORAL at 20:22

## 2018-10-01 RX ADMIN — LISINOPRIL 10 MG: 10 TABLET ORAL at 09:33

## 2018-10-01 RX ADMIN — FUROSEMIDE 20 MG: 20 TABLET ORAL at 10:45

## 2018-10-01 RX ADMIN — AMIODARONE HYDROCHLORIDE 200 MG: 200 TABLET ORAL at 10:45

## 2018-10-01 RX ADMIN — IPRATROPIUM BROMIDE 0.5 MG: 0.5 SOLUTION RESPIRATORY (INHALATION) at 13:57

## 2018-10-01 RX ADMIN — METOPROLOL SUCCINATE 50 MG: 50 TABLET, FILM COATED, EXTENDED RELEASE ORAL at 09:33

## 2018-10-01 RX ADMIN — ASPIRIN 81 MG: 81 TABLET ORAL at 09:33

## 2018-10-01 RX ADMIN — AMLODIPINE BESYLATE 5 MG: 5 TABLET ORAL at 10:45

## 2018-10-01 RX ADMIN — TAMSULOSIN HYDROCHLORIDE 0.4 MG: 0.4 CAPSULE ORAL at 13:37

## 2018-10-01 NOTE — PROGRESS NOTES
[facility] Medicine Services  INPATIENT PROGRESS NOTE    Length of Stay: 2  Date of Admission: 9/28/2018  Primary Care Physician: Provider, No Known    Subjective   Chief Complaint: Weakness    HPI   Shortness of breath is much better.  Patient still with very weak.  Patient used to take oxygen at night.  Plan to reevaluate overnight O2 sat discussed with nursing.  Continue physical therapy.    Review of Systems   Constitutional: Positive for activity change, appetite change and fatigue. Negative for chills and fever.   HENT: Negative for hearing loss, nosebleeds, tinnitus and trouble swallowing.    Eyes: Negative for visual disturbance.   Respiratory: Positive for shortness of breath. Negative for cough, chest tightness and wheezing.    Cardiovascular: Negative for chest pain, palpitations and leg swelling.   Gastrointestinal: Negative for abdominal distention, abdominal pain, blood in stool, constipation, diarrhea, nausea and vomiting.   Endocrine: Negative for cold intolerance, heat intolerance, polydipsia, polyphagia and polyuria.   Genitourinary: Negative for decreased urine volume, difficulty urinating, dysuria, flank pain, frequency and hematuria.   Musculoskeletal: Negative for arthralgias, joint swelling and myalgias.   Skin: Negative for rash.   Allergic/Immunologic: Negative for immunocompromised state.   Neurological: Positive for weakness. Negative for dizziness, syncope, light-headedness and headaches.   Hematological: Negative for adenopathy. Does not bruise/bleed easily.   Psychiatric/Behavioral: Negative for confusion and sleep disturbance. The patient is not nervous/anxious.           All pertinent negatives and positives are as above. All other systems have been reviewed and are negative unless otherwise stated.     Objective    Temp:  [97.7 °F (36.5 °C)-98.2 °F (36.8 °C)] 98.2 °F (36.8 °C)  Heart Rate:  [] 85  Resp:  [16-18] 16  BP: ()/(53-83)  121/68    Intake/Output Summary (Last 24 hours) at 10/01/18 1437  Last data filed at 10/01/18 1337   Gross per 24 hour   Intake                0 ml   Output              925 ml   Net             -925 ml     Physical Exam   Constitutional: He is oriented to person, place, and time. He appears well-developed.   HENT:   Head: Normocephalic and atraumatic.   Eyes: Pupils are equal, round, and reactive to light. Conjunctivae and EOM are normal. No scleral icterus.   Neck: Normal range of motion. Neck supple. No JVD present. Carotid bruit is not present. No tracheal deviation present. No thyromegaly present.   Cardiovascular: Normal rate, normal heart sounds and intact distal pulses.  Exam reveals no gallop and no friction rub.    No murmur heard.  Irregular regular, rates control   Pulmonary/Chest: Effort normal and breath sounds normal. No respiratory distress. He has no wheezes. He has no rales. He exhibits no tenderness.   Diminish with some bilateral   Abdominal: Soft. Bowel sounds are normal. He exhibits no distension. There is no tenderness. There is no rebound and no guarding.   Musculoskeletal: Normal range of motion. He exhibits no edema, tenderness or deformity.   Generalized weakness.  History of multiple back fracture.   Lymphadenopathy:     He has no cervical adenopathy.   Neurological: He is alert and oriented to person, place, and time. He displays normal reflexes. No cranial nerve deficit. He exhibits abnormal muscle tone. Coordination abnormal.   Skin: Skin is warm and dry. Capillary refill takes 2 to 3 seconds. No rash noted.   Psychiatric: He has a normal mood and affect. His behavior is normal. Thought content normal.       Results Review:  Lab Results (last 24 hours)     Procedure Component Value Units Date/Time    Blood Culture - Blood, [[id number]]  (Normal) Collected:  09/29/18 0545    Specimen:  Blood from Arm, Right Updated:  10/01/18 0630     Blood Culture No growth at 2 days    Basic Metabolic  Panel [[id number]]  (Abnormal) Collected:  10/01/18 0547    Specimen:  Blood Updated:  10/01/18 0621     Glucose 130 (H) mg/dL      BUN 16 mg/dL      Creatinine 0.74 mg/dL      Sodium 140 mmol/L      Potassium 3.5 mmol/L      Chloride 103 mmol/L      CO2 26.0 mmol/L      Calcium 8.9 mg/dL      eGFR Non African Amer 100 mL/min/1.73      BUN/Creatinine Ratio 21.6     Anion Gap 11.0 mmol/L     Narrative:       The MDRD GFR formula is only valid for adults with stable renal function between ages 18 and 70.    CBC (No Diff) [[id number]]  (Abnormal) Collected:  10/01/18 0547    Specimen:  Blood Updated:  10/01/18 0610     WBC 9.74 10*3/mm3      RBC 4.36 (L) 10*6/mm3      Hemoglobin 12.3 (L) g/dL      Hematocrit 37.5 (L) %      MCV 86.0 fL      MCH 28.2 pg      MCHC 32.8 (L) g/dL      RDW 15.6 (H) %      RDW-SD 49.0 fl      MPV 10.6 fL      Platelets 202 10*3/mm3     Blood Culture - Blood, [[id number]]  (Normal) Collected:  09/29/18 0450    Specimen:  Blood from Arm, Left Updated:  10/01/18 0516     Blood Culture No growth at 2 days    Blood Culture - Blood, Blood, Venous Line [[id number]]  (Normal) Collected:  09/29/18 0134    Specimen:  Blood from Arm, Left Updated:  10/01/18 0146     Blood Culture No growth at 2 days    Blood Culture - Blood, Blood, Venous Line [[id number]]  (Normal) Collected:  09/29/18 0134    Specimen:  Blood from Arm, Left Updated:  10/01/18 0146     Blood Culture No growth at 2 days           Cultures:  Blood Culture   Date Value Ref Range Status   09/29/2018 No growth at 2 days  Preliminary   09/29/2018 No growth at 2 days  Preliminary   09/29/2018 No growth at 2 days  Preliminary   09/29/2018 No growth at 2 days  Preliminary       Radiology Data:    Imaging Results (last 24 hours)     ** No results found for the last 24 hours. **          No Known Allergies    Scheduled meds:     amiodarone 200 mg Oral Q12H   amLODIPine 5 mg Oral Q24H   aspirin 81 mg Oral Daily   furosemide 20 mg Oral Daily    lisinopril 10 mg Oral Daily   metoprolol succinate XL 50 mg Oral Daily   rivaroxaban 15 mg Oral Daily With Dinner   sennosides-docusate sodium 2 tablet Oral Nightly   tamsulosin 0.4 mg Oral Daily       PRN meds:  •  acetaminophen  •  calcium carbonate  •  diphenhydrAMINE  •  influenza vaccine  •  ipratropium  •  magnesium hydroxide  •  ondansetron **OR** ondansetron ODT **OR** ondansetron    Assessment/Plan     Principal Problem:    Dyspnea  Active Problems:    Coronary artery disease involving coronary bypass graft of native heart without angina pectoris    Essential hypertension    Mixed hyperlipidemia    Atrial fibrillation with rapid ventricular response (CMS/HCC)    Acute on chronic combined systolic and diastolic congestive heart failure (CMS/HCC)    Elevated troponin    NSTEMI (non-ST elevated myocardial infarction) (CMS/[facility])      Plan:  Dyspnea/CHF exacerbation.  CTA shows bilateral pleural effusion with possible underlying infiltrate, no pulmonary embolus. Continue Lasix.  Atrovent nebs    Atrial fibrillation/elevated troponin/hypertension/CAD/CHF exacerbation- hx status post CABG.  Continue amiodarone, Norvasc, aspirin, Toprol,  Xarelto, lasix  .  Continue telemetry.  Cardiology consult.  Echocardiogram-ejection fraction 26%, left ventricle- global hypokinesis, moderate calcification of aortic valve, left atrial cavity moderately dilated, severe dilated cardiomyopathy, biatrial enlargement, MR and TR appear to be secondary.  Statin intolerance.    Status post heart cath 18.  Severe and diffuse and INOP CAD, patent grafts, moderate to severe ischemic left ventricle dysfunction, normal hemodynamic including LVEDP.    Benign prostate hypertrophy.  Continue Flomax.    Blood culture no growth in 2 days.    Discharge Plannin-3 days.  Patient probably need home health.    Daniel Haile MD   10/01/18   2:37 PM

## 2018-10-01 NOTE — NURSING NOTE
Pt returned from cath lab with auto MAR holds. I was unsure of how to resume these auto held orders because there was not a release transfer orders button available d/t meds not being resumed per provider on transfer back to floor. Contacted Dr. Wilkins to resume the holds, was instructed to resume them or he would on 10/01/18 and he had no access to this at home. I somehow was able to release them, but I do not think it was through the appropriate channel and he and I did not discuss specific meds or dosages. I d/c the meds that I had unheld just for safety precaution, and will let him resume those meds or put in new orders for those on 10/01/2018.

## 2018-10-01 NOTE — PLAN OF CARE
Problem: Patient Care Overview  Goal: Plan of Care Review   10/01/18 6534   Coping/Psychosocial   Plan of Care Reviewed With patient   Plan of Care Review   Progress improving   OTHER   Outcome Summary overnight sleep study tonight, patient also currently is wearing MCOT from previous office visit with Dr. Wilkins, patient is currently on room air with saturations in the mid 90s, no complaints of pain, patient is stable with a poor prognosis, will continue to monitor       Problem: Cardiac: Heart Failure (Adult)  Goal: Signs and Symptoms of Listed Potential Problems Will be Absent, Minimized or Managed (Cardiac: Heart Failure)  Outcome: Ongoing (interventions implemented as appropriate)      Problem: Pneumonia (Adult)  Goal: Signs and Symptoms of Listed Potential Problems Will be Absent, Minimized or Managed (Pneumonia)  Outcome: Ongoing (interventions implemented as appropriate)      Problem: Fall Risk (Adult)  Goal: Absence of Fall  Outcome: Ongoing (interventions implemented as appropriate)

## 2018-10-01 NOTE — PLAN OF CARE
Problem: Patient Care Overview  Goal: Plan of Care Review  Outcome: Ongoing (interventions implemented as appropriate)   10/01/18 3044   Coping/Psychosocial   Plan of Care Reviewed With patient   Plan of Care Review   Progress no change   OTHER   Outcome Summary VSS, pt cath site cdi. Pt has had output this shift, minimal but output. Please see nursing note regarding home medications that need to be resumed. Will continue to monitor

## 2018-10-01 NOTE — PROGRESS NOTES
[facility] -  Progress Note     LOS: 2 days   Patient Care Team:  Provider, No Known as PCP - General    Chief Complaint: shortness of breath    Subjective     Interval History: shortness of breath has improved minimally. He is post cardiac catherization with severe cad and patent grafts. Rates improved currently atrial fibrillation per monitor    Patient Complaints: shortness of breath      Review of Systems:     Review of Systems   Constitutional: Positive for fatigue. Negative for chills and diaphoresis.   Respiratory: Positive for cough and shortness of breath. Negative for chest tightness.    Cardiovascular: Negative for chest pain, palpitations and leg swelling.   Gastrointestinal: Negative for abdominal distention and abdominal pain.   Skin: Negative for color change and pallor.   Neurological: Positive for dizziness (with position changes) and weakness. Negative for syncope and light-headedness.   Hematological: Does not bruise/bleed easily.   Psychiatric/Behavioral: Negative for behavioral problems and confusion.   All other systems reviewed and are negative.    Objective     Vital Sign Min/Max for last 24 hours  Temp  Min: 97.7 °F (36.5 °C)  Max: 98.2 °F (36.8 °C)   BP  Min: 98/59  Max: 142/83   Pulse  Min: 78  Max: 101   Resp  Min: 18  Max: 18   SpO2  Min: 93 %  Max: 98 %   No Data Recorded   Weight  Min: 76.7 kg (169 lb)  Max: 76.7 kg (169 lb)     1    09/30/18  1900   Weight: 76.7 kg (169 lb)       Physical Exam:    Physical Exam   Constitutional: He is oriented to person, place, and time. Vital signs are normal. He appears well-developed and well-nourished. No distress. Nasal cannula in place.   HENT:   Head: Normocephalic and atraumatic.   Nose: Nose normal.   Mouth/Throat: Oropharynx is clear and moist.   Eyes: Conjunctivae are normal. No scleral icterus.   Neck: Normal range of motion. Neck supple. No thyromegaly present.   Cardiovascular: Normal rate.  Exam reveals no gallop  and no friction rub.    No murmur heard.  Pulmonary/Chest: Effort normal. No respiratory distress. He has wheezes.   Abdominal: Soft. Normal appearance and bowel sounds are normal. He exhibits no distension. There is no tenderness.   Musculoskeletal: Normal range of motion. He exhibits no edema.   Neurological: He is alert and oriented to person, place, and time.   Skin: Skin is warm, dry and intact. No rash noted. He is not diaphoretic. No erythema. No pallor.   Psychiatric: He has a normal mood and affect. His behavior is normal.   Vitals reviewed.    Results Review:   Lab Results (last 72 hours)     Procedure Component Value Units Date/Time    Blood Culture - Blood, [[id number]]  (Normal) Collected:  09/29/18 0545    Specimen:  Blood from Arm, Right Updated:  10/01/18 0630     Blood Culture No growth at 2 days    Basic Metabolic Panel [[id number]]  (Abnormal) Collected:  10/01/18 0547    Specimen:  Blood Updated:  10/01/18 0621     Glucose 130 (H) mg/dL      BUN 16 mg/dL      Creatinine 0.74 mg/dL      Sodium 140 mmol/L      Potassium 3.5 mmol/L      Chloride 103 mmol/L      CO2 26.0 mmol/L      Calcium 8.9 mg/dL      eGFR Non African Amer 100 mL/min/1.73      BUN/Creatinine Ratio 21.6     Anion Gap 11.0 mmol/L     Narrative:       The MDRD GFR formula is only valid for adults with stable renal function between ages 18 and 70.    CBC (No Diff) [[id number]]  (Abnormal) Collected:  10/01/18 0547    Specimen:  Blood Updated:  10/01/18 0610     WBC 9.74 10*3/mm3      RBC 4.36 (L) 10*6/mm3      Hemoglobin 12.3 (L) g/dL      Hematocrit 37.5 (L) %      MCV 86.0 fL      MCH 28.2 pg      MCHC 32.8 (L) g/dL      RDW 15.6 (H) %      RDW-SD 49.0 fl      MPV 10.6 fL      Platelets 202 10*3/mm3     Blood Culture - Blood, [[id number]]  (Normal) Collected:  09/29/18 0450    Specimen:  Blood from Arm, Left Updated:  10/01/18 0516     Blood Culture No growth at 2 days    Blood Culture - Blood, Blood, Venous Line [[id number]]  (Normal)  Collected:  09/29/18 0134    Specimen:  Blood from Arm, Left Updated:  10/01/18 0146     Blood Culture No growth at 2 days    Blood Culture - Blood, Blood, Venous Line [[id number]]  (Normal) Collected:  09/29/18 0134    Specimen:  Blood from Arm, Left Updated:  10/01/18 0146     Blood Culture No growth at 2 days    Comprehensive Metabolic Panel [[id number]]  (Abnormal) Collected:  09/30/18 0712    Specimen:  Blood Updated:  09/30/18 0736     Glucose 131 (H) mg/dL      BUN 20 mg/dL      Creatinine 0.92 mg/dL      Sodium 138 mmol/L      Potassium 3.8 mmol/L      Chloride 99 mmol/L      CO2 25.0 mmol/L      Calcium 8.6 mg/dL      Total Protein 6.6 g/dL      Albumin 3.60 g/dL      ALT (SGPT) 27 U/L      AST (SGOT) 95 (H) U/L      Alkaline Phosphatase 82 U/L      Total Bilirubin 1.4 (H) mg/dL      eGFR Non African Amer 78 mL/min/1.73      Globulin 3.0 gm/dL      A/G Ratio 1.2 g/dL      BUN/Creatinine Ratio 21.7     Anion Gap 14.0 (H) mmol/L     Narrative:       The MDRD GFR formula is only valid for adults with stable renal function between ages 18 and 70.    Troponin [[id number]]  (Abnormal) Collected:  09/29/18 1501    Specimen:  Blood Updated:  09/29/18 1530     Troponin I 19.100 (C) ng/mL     Troponin [[id number]]  (Abnormal) Collected:  09/29/18 0459    Specimen:  Blood Updated:  09/29/18 0532     Troponin I 14.500 (C) ng/mL     Comprehensive Metabolic Panel [[id number]]  (Abnormal) Collected:  09/29/18 0459    Specimen:  Blood Updated:  09/29/18 0516     Glucose 131 (H) mg/dL      BUN 14 mg/dL      Creatinine 0.90 mg/dL      Sodium 138 mmol/L      Potassium 3.7 mmol/L      Chloride 101 mmol/L      CO2 23.0 (L) mmol/L      Calcium 8.8 mg/dL      Total Protein 7.5 g/dL      Albumin 4.10 g/dL      ALT (SGPT) 15 U/L      AST (SGOT) 79 (H) U/L      Alkaline Phosphatase 88 U/L      Total Bilirubin 1.5 (H) mg/dL      eGFR Non African Amer 80 mL/min/1.73      Globulin 3.4 gm/dL      A/G Ratio 1.2 g/dL      BUN/Creatinine Ratio  15.6     Anion Gap 14.0 (H) mmol/L     Narrative:       The MDRD GFR formula is only valid for adults with stable renal function between ages 18 and 70.    Magnesium [[id number]]  (Normal) Collected:  09/29/18 0459    Specimen:  Blood Updated:  09/29/18 0516     Magnesium 1.6 mg/dL     Phosphorus [[id number]]  (Normal) Collected:  09/29/18 0459    Specimen:  Blood Updated:  09/29/18 0516     Phosphorus 3.5 mg/dL     CBC Auto Differential [[id number]]  (Abnormal) Collected:  09/29/18 0459    Specimen:  Blood Updated:  09/29/18 0503     WBC 9.18 10*3/mm3      RBC 4.65 (L) 10*6/mm3      Hemoglobin 12.9 (L) g/dL      Hematocrit 39.3 (L) %      MCV 84.5 fL      MCH 27.7 (L) pg      MCHC 32.8 (L) g/dL      RDW 15.5 (H) %      RDW-SD 47.5 fl      MPV 11.1 fL      Platelets 187 10*3/mm3      Neutrophil % 68.9 %      Lymphocyte % 17.0 %      Monocyte % 13.3 (H) %      Eosinophil % 0.1 %      Basophil % 0.3 %      Immature Grans % 0.4 %      Neutrophils, Absolute 6.32 10*3/mm3      Lymphocytes, Absolute 1.56 10*3/mm3      Monocytes, Absolute 1.22 10*3/mm3      Eosinophils, Absolute 0.01 10*3/mm3      Basophils, Absolute 0.03 10*3/mm3      Immature Grans, Absolute 0.04 (H) 10*3/mm3      nRBC 0.0 /100 WBC     Lactic Acid, Plasma [[id number]]  (Normal) Collected:  09/29/18 0134    Specimen:  Blood Updated:  09/29/18 0157     Lactate 1.5 mmol/L     BNP [[id number]]  (Abnormal) Collected:  09/28/18 2259    Specimen:  Blood Updated:  09/28/18 2328     proBNP 9,780.0 (H) pg/mL     CK [[id number]]  (Normal) Collected:  09/28/18 2259    Specimen:  Blood Updated:  09/28/18 2328     Creatine Kinase 73 U/L     CK-MB [[id number]]  (Abnormal) Collected:  09/28/18 2259    Specimen:  Blood Updated:  09/28/18 2328     CKMB 5.95 (H) ng/mL     Troponin [[id number]]  (Abnormal) Collected:  09/28/18 2259    Specimen:  Blood Updated:  09/28/18 2328     Troponin I 0.556 (H) ng/mL     CK-MB Index [[id number]]  (Abnormal) Collected:  09/28/18 2259     Specimen:  Blood Updated:  09/28/18 2328     CK-MB Index 8.2 (H) %     Protime-INR [[id number]]  (Abnormal) Collected:  09/28/18 2259    Specimen:  Blood Updated:  09/28/18 2317     Protime 19.8 (H) Seconds      INR 1.62 (H)    aPTT [[id number]]  (Abnormal) Collected:  09/28/18 2259    Specimen:  Blood Updated:  09/28/18 2317     PTT 51.8 (H) seconds     D-dimer, Quantitative [[id number]]  (Abnormal) Collected:  09/28/18 2259    Specimen:  Blood Updated:  09/28/18 2317     D-Dimer, Quantitative 0.57 (H) mg/L (FEU)     Narrative:       Reference Range is 0-0.50 mg/L FEU. However, results <0.50 mg/L FEU tends to rule out DVT or PE. Results >0.50 mg/L FEU are not useful in predicting absence or presence of DVT or PE.    Basic Metabolic Panel [[id number]]  (Abnormal) Collected:  09/28/18 2259    Specimen:  Blood Updated:  09/28/18 2316     Glucose 155 (H) mg/dL      BUN 13 mg/dL      Creatinine 0.90 mg/dL      Sodium 136 mmol/L      Potassium 3.8 mmol/L      Chloride 99 mmol/L      CO2 22.0 (L) mmol/L      Calcium 9.0 mg/dL      eGFR Non African Amer 80 mL/min/1.73      BUN/Creatinine Ratio 14.4     Anion Gap 15.0 (H) mmol/L     Narrative:       The MDRD GFR formula is only valid for adults with stable renal function between ages 18 and 70.    CBC & Differential [[id number]] Collected:  09/28/18 2259    Specimen:  Blood Updated:  09/28/18 2309    Narrative:       The following orders were created for panel order CBC & Differential.  Procedure                               Abnormality         Status                     ---------                               -----------         ------                     CBC Auto Differential[[id number]]        Abnormal            Final result                 Please view results for these tests on the individual orders.    CBC Auto Differential [[id number]]  (Abnormal) Collected:  09/28/18 2259    Specimen:  Blood Updated:  09/28/18 2309     WBC 9.94 10*3/mm3      RBC 4.69 (L) 10*6/mm3       Hemoglobin 13.0 (L) g/dL      Hematocrit 38.9 (L) %      MCV 82.9 fL      MCH 27.7 (L) pg      MCHC 33.4 g/dL      RDW 15.5 (H) %      RDW-SD 46.5 fl      MPV 10.7 fL      Platelets 184 10*3/mm3      Neutrophil % 76.8 %      Lymphocyte % 10.8 (L) %      Monocyte % 11.5 %      Eosinophil % 0.1 %      Basophil % 0.3 %      Immature Grans % 0.5 %      Neutrophils, Absolute 7.64 10*3/mm3      Lymphocytes, Absolute 1.07 10*3/mm3      Monocytes, Absolute 1.14 10*3/mm3      Eosinophils, Absolute 0.01 10*3/mm3      Basophils, Absolute 0.03 10*3/mm3      Immature Grans, Absolute 0.05 (H) 10*3/mm3      nRBC 0.0 /100 WBC              Medication Review: yes  Current Facility-Administered Medications   Medication Dose Route Frequency Provider Last Rate Last Dose   • acetaminophen (TYLENOL) tablet 650 mg  650 mg Oral Q4H PRN Huey Wilkins MD       • amiodarone (PACERONE) tablet 200 mg  200 mg Oral Q12H Huey Wilkins MD   200 mg at 10/01/18 1045   • amLODIPine (NORVASC) tablet 5 mg  5 mg Oral Q24H Huey Wilkins MD   5 mg at 10/01/18 1045   • aspirin EC tablet 81 mg  81 mg Oral Daily Huey Wilkins MD   81 mg at 10/01/18 0933   • calcium carbonate (TUMS) chewable tablet 500 mg (200 mg elemental)  2 tablet Oral BID PRN Huey Wilkins MD       • diphenhydrAMINE (BENADRYL) capsule 25 mg  25 mg Oral Q6H PRN Huey Wilkins MD       • furosemide (LASIX) tablet 20 mg  20 mg Oral Daily Huey Wilkins MD   20 mg at 10/01/18 1045   • Influenza Vac Subunit Quad (FLUCELVAX) injection 0.5 mL  0.5 mL Intramuscular During Hospitalization Colleen Mcnair MD       • lisinopril (PRINIVIL,ZESTRIL) tablet 10 mg  10 mg Oral Daily Colleen Mcnair MD   10 mg at 10/01/18 0933   • magnesium hydroxide (MILK OF MAGNESIA) suspension 2400 mg/10mL 10 mL  10 mL Oral Daily PRN Huey Wilkins MD       • metoprolol succinate XL (TOPROL-XL) 24 hr tablet 50 mg  50 mg Oral Daily  Colleen Mcnair MD   50 mg at 10/01/18 0933   • ondansetron (ZOFRAN) tablet 4 mg  4 mg Oral Q6H PRN Huey Wilkins MD        Or   • ondansetron ODT (ZOFRAN-ODT) disintegrating tablet 4 mg  4 mg Oral Q6H PRN Huey Wilkins MD        Or   • ondansetron (ZOFRAN) injection 4 mg  4 mg Intravenous Q6H PRN Huey Wilkins MD       • rivaroxaban (XARELTO) tablet 15 mg  15 mg Oral Daily With Dinner Huey Wilkins MD       • sennosides-docusate sodium (SENOKOT-S) 8.6-50 MG tablet 2 tablet  2 tablet Oral Nightly Huey Wilkins MD   2 tablet at 09/30/18 2101   • sodium chloride 0.9 % infusion  1-3 mL/kg/hr Intravenous Continuous Huey Wilkins .9 mL/hr at 09/30/18 0805 3 mL/kg/hr at 09/30/18 0805   • tamsulosin (FLOMAX) 24 hr capsule 0.4 mg  0.4 mg Oral Daily Daniel Haile MD             Assessment/Plan     1. Acute systolic heart failure: compensated  2. Dyspnea: secondary to #1 and #2  3. Paroxysmal Atrial Fibrillation  4. Native Coronary Artery Disease: s/p CABG  5. Essential Hypertension: with c/o orthostatic hypotension  6. Dyslipidemia:   7. Statin intolerance    Plan  - continue OMT with ace, beta blocker, diuretic  - continue amiodarone, xarelto, and aspirin  - per Dr. Wilkins continue xarelto dose of 15 mg with the addition of aspirin 81 mg following recent catherization  - medical therapy for CAD and sysCHF  - continue monitor of heart rate (patient also currently is wearing MCOT from previous office visit)  - atrovent prn wheezing, with a treatment now  - encouraged ambulation  - post cath routine orders            Terri Dumont, LOC  10/01/18  12:46 PM

## 2018-10-02 VITALS
DIASTOLIC BLOOD PRESSURE: 68 MMHG | RESPIRATION RATE: 18 BRPM | TEMPERATURE: 97.7 F | BODY MASS INDEX: 25.75 KG/M2 | OXYGEN SATURATION: 94 % | HEART RATE: 82 BPM | SYSTOLIC BLOOD PRESSURE: 116 MMHG | HEIGHT: 66 IN | WEIGHT: 160.2 LBS

## 2018-10-02 LAB
ALBUMIN SERPL-MCNC: 3.4 G/DL (ref 3.5–5)
ALBUMIN/GLOB SERPL: 1.1 G/DL (ref 1.1–2.5)
ALP SERPL-CCNC: 86 U/L (ref 24–120)
ALT SERPL W P-5'-P-CCNC: 27 U/L (ref 0–54)
ANION GAP SERPL CALCULATED.3IONS-SCNC: 12 MMOL/L (ref 4–13)
ARTICHOKE IGE QN: 68 MG/DL (ref 0–99)
AST SERPL-CCNC: 36 U/L (ref 7–45)
BASOPHILS # BLD AUTO: 0.04 10*3/MM3 (ref 0–0.2)
BASOPHILS NFR BLD AUTO: 0.4 % (ref 0–2)
BILIRUB SERPL-MCNC: 1.2 MG/DL (ref 0.1–1)
BUN BLD-MCNC: 15 MG/DL (ref 5–21)
BUN/CREAT SERPL: 21.1 (ref 7–25)
CALCIUM SPEC-SCNC: 8.7 MG/DL (ref 8.4–10.4)
CHLORIDE SERPL-SCNC: 101 MMOL/L (ref 98–110)
CHOLEST SERPL-MCNC: 122 MG/DL (ref 130–200)
CO2 SERPL-SCNC: 25 MMOL/L (ref 24–31)
CREAT BLD-MCNC: 0.71 MG/DL (ref 0.5–1.4)
DEPRECATED RDW RBC AUTO: 47.4 FL (ref 40–54)
EOSINOPHIL # BLD AUTO: 0.13 10*3/MM3 (ref 0–0.7)
EOSINOPHIL NFR BLD AUTO: 1.4 % (ref 0–4)
ERYTHROCYTE [DISTWIDTH] IN BLOOD BY AUTOMATED COUNT: 15.4 % (ref 12–15)
GFR SERPL CREATININE-BSD FRML MDRD: 105 ML/MIN/1.73
GLOBULIN UR ELPH-MCNC: 3.1 GM/DL
GLUCOSE BLD-MCNC: 134 MG/DL (ref 70–100)
HBA1C MFR BLD: 5.7 %
HCT VFR BLD AUTO: 34.8 % (ref 40–52)
HDLC SERPL-MCNC: 35 MG/DL
HGB BLD-MCNC: 11.4 G/DL (ref 14–18)
IMM GRANULOCYTES # BLD: 0.05 10*3/MM3 (ref 0–0.03)
IMM GRANULOCYTES NFR BLD: 0.6 % (ref 0–5)
LDLC/HDLC SERPL: 2.21 {RATIO}
LYMPHOCYTES # BLD AUTO: 1.37 10*3/MM3 (ref 0.72–4.86)
LYMPHOCYTES NFR BLD AUTO: 15.1 % (ref 15–45)
MCH RBC QN AUTO: 27.8 PG (ref 28–32)
MCHC RBC AUTO-ENTMCNC: 32.8 G/DL (ref 33–36)
MCV RBC AUTO: 84.9 FL (ref 82–95)
MONOCYTES # BLD AUTO: 0.99 10*3/MM3 (ref 0.19–1.3)
MONOCYTES NFR BLD AUTO: 10.9 % (ref 4–12)
NEUTROPHILS # BLD AUTO: 6.51 10*3/MM3 (ref 1.87–8.4)
NEUTROPHILS NFR BLD AUTO: 71.6 % (ref 39–78)
NRBC BLD MANUAL-RTO: 0 /100 WBC (ref 0–0)
PLATELET # BLD AUTO: 190 10*3/MM3 (ref 130–400)
PMV BLD AUTO: 10.9 FL (ref 6–12)
POTASSIUM BLD-SCNC: 3.2 MMOL/L (ref 3.5–5.3)
PROT SERPL-MCNC: 6.5 G/DL (ref 6.3–8.7)
RBC # BLD AUTO: 4.1 10*6/MM3 (ref 4.8–5.9)
SODIUM BLD-SCNC: 138 MMOL/L (ref 135–145)
TRIGL SERPL-MCNC: 48 MG/DL (ref 0–149)
TSH SERPL DL<=0.05 MIU/L-ACNC: 1.04 MIU/ML (ref 0.47–4.68)
WBC NRBC COR # BLD: 9.09 10*3/MM3 (ref 4.8–10.8)

## 2018-10-02 PROCEDURE — 84443 ASSAY THYROID STIM HORMONE: CPT | Performed by: FAMILY MEDICINE

## 2018-10-02 PROCEDURE — 25010000002 INFLUENZA VAC SUBUNIT QUAD 0.5 ML SUSPENSION PREFILLED SYRINGE: Performed by: FAMILY MEDICINE

## 2018-10-02 PROCEDURE — G0008 ADMIN INFLUENZA VIRUS VAC: HCPCS | Performed by: FAMILY MEDICINE

## 2018-10-02 PROCEDURE — 85025 COMPLETE CBC W/AUTO DIFF WBC: CPT | Performed by: FAMILY MEDICINE

## 2018-10-02 PROCEDURE — G8978 MOBILITY CURRENT STATUS: HCPCS | Performed by: PHYSICAL THERAPIST

## 2018-10-02 PROCEDURE — 94799 UNLISTED PULMONARY SVC/PX: CPT

## 2018-10-02 PROCEDURE — G8979 MOBILITY GOAL STATUS: HCPCS | Performed by: PHYSICAL THERAPIST

## 2018-10-02 PROCEDURE — 80061 LIPID PANEL: CPT | Performed by: FAMILY MEDICINE

## 2018-10-02 PROCEDURE — 80053 COMPREHEN METABOLIC PANEL: CPT | Performed by: FAMILY MEDICINE

## 2018-10-02 PROCEDURE — 83036 HEMOGLOBIN GLYCOSYLATED A1C: CPT | Performed by: FAMILY MEDICINE

## 2018-10-02 PROCEDURE — 90661 CCIIV3 VAC ABX FR 0.5 ML IM: CPT | Performed by: FAMILY MEDICINE

## 2018-10-02 PROCEDURE — 97161 PT EVAL LOW COMPLEX 20 MIN: CPT

## 2018-10-02 PROCEDURE — 99232 SBSQ HOSP IP/OBS MODERATE 35: CPT | Performed by: INTERNAL MEDICINE

## 2018-10-02 RX ORDER — AMLODIPINE BESYLATE 5 MG/1
5 TABLET ORAL
Qty: 30 TABLET | Refills: 2 | Status: ON HOLD | OUTPATIENT
Start: 2018-10-03 | End: 2019-12-03

## 2018-10-02 RX ORDER — SENNA AND DOCUSATE SODIUM 50; 8.6 MG/1; MG/1
2 TABLET, FILM COATED ORAL AS NEEDED
Qty: 60 TABLET | Refills: 0 | Status: ON HOLD | OUTPATIENT
Start: 2018-10-02 | End: 2019-12-03

## 2018-10-02 RX ORDER — CALCIUM CARBONATE 200(500)MG
2 TABLET,CHEWABLE ORAL 2 TIMES DAILY PRN
Qty: 60 TABLET | Refills: 2 | Status: SHIPPED | OUTPATIENT
Start: 2018-10-02

## 2018-10-02 RX ORDER — POTASSIUM CHLORIDE 750 MG/1
40 CAPSULE, EXTENDED RELEASE ORAL 2 TIMES DAILY
Status: DISCONTINUED | OUTPATIENT
Start: 2018-10-02 | End: 2018-10-02 | Stop reason: HOSPADM

## 2018-10-02 RX ORDER — ASPIRIN 81 MG/1
81 TABLET ORAL DAILY
Qty: 30 TABLET | Refills: 2 | Status: SHIPPED | OUTPATIENT
Start: 2018-10-03 | End: 2019-03-11

## 2018-10-02 RX ORDER — POTASSIUM CHLORIDE 750 MG/1
10 CAPSULE, EXTENDED RELEASE ORAL DAILY
Qty: 30 CAPSULE | Refills: 2 | Status: SHIPPED | OUTPATIENT
Start: 2018-10-02 | End: 2018-10-03

## 2018-10-02 RX ORDER — ATORVASTATIN CALCIUM 80 MG/1
40 TABLET, FILM COATED ORAL DAILY
Qty: 30 TABLET | Refills: 2 | Status: SHIPPED | OUTPATIENT
Start: 2018-10-02 | End: 2019-12-09 | Stop reason: HOSPADM

## 2018-10-02 RX ORDER — AMIODARONE HYDROCHLORIDE 200 MG/1
200 TABLET ORAL EVERY 12 HOURS SCHEDULED
Qty: 60 TABLET | Refills: 2 | Status: SHIPPED | OUTPATIENT
Start: 2018-10-02 | End: 2019-12-09 | Stop reason: HOSPADM

## 2018-10-02 RX ORDER — FUROSEMIDE 20 MG/1
20 TABLET ORAL DAILY
Qty: 30 TABLET | Refills: 2 | Status: SHIPPED | OUTPATIENT
Start: 2018-10-03 | End: 2019-12-09 | Stop reason: HOSPADM

## 2018-10-02 RX ADMIN — INFLUENZA A VIRUS A/SINGAPORE/GP1908/2015 IVR-180 (H1N1) ANTIGEN (MDCK CELL DERIVED, PROPIOLACTONE INACTIVATED), INFLUENZA A VIRUS A/NORTH CAROLINA/04/2016 (H3N2) HEMAGGLUTININ ANTIGEN (MDCK CELL DERIVED, PROPIOLACTONE INACTIVATED), INFLUENZA B VIRUS B/IOWA/06/2017 HEMAGGLUTININ ANTIGEN (MDCK CELL DERIVED, PROPIOLACTONE INACTIVATED), INFLUENZA B VIRUS B/SINGAPORE/INFTT-16-0610/2016 HEMAGGLUTININ ANTIGEN (MDCK CELL DERIVED, PROPIOLACTONE INACTIVATED) 0.5 ML: 15; 15; 15; 15 INJECTION, SUSPENSION INTRAMUSCULAR at 15:40

## 2018-10-02 RX ADMIN — AMLODIPINE BESYLATE 5 MG: 5 TABLET ORAL at 08:22

## 2018-10-02 RX ADMIN — LISINOPRIL 10 MG: 10 TABLET ORAL at 08:22

## 2018-10-02 RX ADMIN — FUROSEMIDE 20 MG: 20 TABLET ORAL at 08:22

## 2018-10-02 RX ADMIN — ASPIRIN 81 MG: 81 TABLET ORAL at 08:22

## 2018-10-02 RX ADMIN — AMIODARONE HYDROCHLORIDE 200 MG: 200 TABLET ORAL at 08:22

## 2018-10-02 RX ADMIN — POTASSIUM CHLORIDE 40 MEQ: 750 CAPSULE, EXTENDED RELEASE ORAL at 10:53

## 2018-10-02 RX ADMIN — TAMSULOSIN HYDROCHLORIDE 0.4 MG: 0.4 CAPSULE ORAL at 08:22

## 2018-10-02 RX ADMIN — METOPROLOL SUCCINATE 50 MG: 50 TABLET, FILM COATED, EXTENDED RELEASE ORAL at 08:22

## 2018-10-02 NOTE — PROGRESS NOTES
"[facility], [facility] Progress Note     LOS: 3 days   Patient Care Team:  Provider, No Known as PCP - General    Chief Complaint:  SOB    Subjective     Improved and ready to go home    Review of Systems:   A 10-point review of systems is obtained and negative except for otherwise mentioned above.    Objective     Vital Sign Min/Max for last 24 hours  Temp  Min: 97.6 °F (36.4 °C)  Max: 98.6 °F (37 °C)   BP  Min: 112/72  Max: 123/58   Pulse  Min: 82  Max: 87   Resp  Min: 16  Max: 18   SpO2  Min: 94 %  Max: 96 %   No Data Recorded   Weight  Min: 72.7 kg (160 lb 3.2 oz)  Max: 72.7 kg (160 lb 3.2 oz)     Flowsheet Rows      First Filed Value   Admission Height  172.7 cm (68\") Documented at 09/28/2018 2252   Admission Weight  72.6 kg (160 lb) Documented at 09/28/2018 2252          Physical Exam:   General Appearance: alert, appears stated age and cooperative  Lungs: clear to auscultation, respirations regular, respirations even and respirations unlabored  Heart: regular rhythm & normal rate, normal S1, S2, no murmur, no gallop, no rub and no click     Results Review:     I reviewed the patient's new clinical results.      Results from last 7 days  Lab Units 10/02/18  0542   WBC 10*3/mm3 9.09   HEMOGLOBIN g/dL 11.4*   HEMATOCRIT % 34.8*   PLATELETS 10*3/mm3 190       Results from last 7 days  Lab Units 10/02/18  0542   SODIUM mmol/L 138   POTASSIUM mmol/L 3.2*   CHLORIDE mmol/L 101   CO2 mmol/L 25.0   BUN mg/dL 15   CREATININE mg/dL 0.71   GLUCOSE mg/dL 134*   CALCIUM mg/dL 8.7       Results from last 7 days  Lab Units 10/02/18  0542   SODIUM mmol/L 138   POTASSIUM mmol/L 3.2*   CHLORIDE mmol/L 101   CO2 mmol/L 25.0   BUN mg/dL 15   CREATININE mg/dL 0.71   CALCIUM mg/dL 8.7   BILIRUBIN mg/dL 1.2*   ALK PHOS U/L 86   ALT (SGPT) U/L 27   AST (SGOT) U/L 36   GLUCOSE mg/dL 134*       Results from last 7 days  Lab Units 09/29/18  1501 09/29/18  0459 09/28/18  4099   CK TOTAL U/L  --   --  73 "   TROPONIN I ng/mL 19.100* 14.500* 0.556*   CK MB INDEX %  --   --  8.2*       Results from last 7 days  Lab Units 10/02/18  0542   TSH mIU/mL 1.040       Results from last 7 days  Lab Units 10/02/18  0542   CHOLESTEROL mg/dL 122*   TRIGLYCERIDES mg/dL 48   HDL CHOL mg/dL 35*   LDL CHOL mg/dL 68       Medication Review: yes    Assessment/Plan     Principal Problem:    Dyspnea  Active Problems:    Coronary artery disease involving coronary bypass graft of native heart without angina pectoris, inoperable    Essential hypertension    Mixed hyperlipidemia    Atrial fibrillation with rapid ventricular response (CMS/HCC)    Acute on chronic combined systolic and diastolic congestive heart failure (CMS/HCC), compensated    Elevated troponin    NSTEMI (non-ST elevated myocardial infarction) (CMS/HCC)    Continue OMT.  Ok to home from cardiology standpoint.  F/u in 4-6 weeks with Dr. Wilkins.    Melissa Toledo PA-C  10/02/18  11:52 AM

## 2018-10-02 NOTE — THERAPY EVALUATION
Acute Care - Physical Therapy Initial Evaluation  [facility]     Patient Name: Vasile Deng  : 3/27/1931  MRN: [id number]  Today's Date: 10/2/2018   Onset of Illness/Injury or Date of Surgery: 18  Date of Referral to PT: 10/01/18  Referring Physician: Dr. Haile      Admit Date: 2018    Visit Dx:     ICD-10-CM ICD-9-CM   1. Dyspnea, unspecified type R06.00 786.09   2. Tachyarrhythmia R00.0 785.0   3. Pneumonia of both lungs due to infectious organism, unspecified part of lung J18.9 483.8   4. Acute on chronic combined systolic and diastolic congestive heart failure (CMS/HCC) I50.43 428.43     428.0   5. NSTEMI (non-ST elevated myocardial infarction) (CMS/HCC) I21.4 410.70   6. Coronary artery disease involving coronary bypass graft of native heart without angina pectoris I25.810 414.05   7. Atrial fibrillation with rapid ventricular response (CMS/HCC) I48.91 427.31   8. Impaired functional mobility, balance, gait, and endurance Z74.09 V49.89     Patient Active Problem List   Diagnosis   • Coronary artery disease involving coronary bypass graft of native heart without angina pectoris   • Hx of CABG   • Essential hypertension   • Mixed hyperlipidemia   • Former smoker   • Atrial fibrillation with rapid ventricular response (CMS/HCC)   • Syncope   • Acute on chronic combined systolic and diastolic congestive heart failure (CMS/HCC)   • Dyspnea   • Elevated troponin   • NSTEMI (non-ST elevated myocardial infarction) (CMS/HCC)     Past Medical History:   Diagnosis Date   • Atrial fibrillation (CMS/HCC)    • Coronary artery disease    • Dizziness    • Hyperlipidemia    • Hypertension      Past Surgical History:   Procedure Laterality Date   • CARDIAC CATHETERIZATION Left 2018    Procedure: Cardiac Catheterization/Vascular Study;  Surgeon: Huey Wilkins MD;  Location: [facility] INVASIVE LOCATION;  Service: Cardiology   • CARDIAC CATHETERIZATION N/A 2018    Procedure: Left  ventriculography;  Surgeon: Huey Wilkins MD;  Location:  [facility] CATH INVASIVE LOCATION;  Service: Cardiology   • CARDIAC SURGERY      double bypass   • KNEE SURGERY          PT ASSESSMENT (last 12 hours)      Physical Therapy Evaluation     Row Name 10/02/18 0800          PT Evaluation Time/Intention    Subjective Information complains of;fatigue  -MS (r) TD (t) MS (c)     Document Type evaluation  -MS (r) TD (t) MS (c)     Mode of Treatment physical therapy  -MS (r) TD (t) MS (c)     Patient Effort good  -MS (r) TD (t) MS (c)     Comment  Pt reports being really tired. Hasn't been able to sleep the past two nights. Evaluation 8:50 am  -MS (r) TD (t) MS (c)     Row Name 10/02/18 0800          General Information    Patient Profile Reviewed? yes  -MS (r) TD (t) MS (c)     Onset of Illness/Injury or Date of Surgery 09/28/18  -MS (r) TD (t) MS (c)     Referring Physician Dr. Haile  -MS (r) TD (t) MS (c)     Patient Observations alert;cooperative;agree to therapy  -MS (r) TD (t) MS (c)     Patient/Family Observations Family is present  -MS (r) TD (t) MS (c)     General Observations of Patient fowlers in bed. 2.5 L of O2 via nc  -MS (r) TD (t) MS (c)     Prior Level of Function independent:;all household mobility;community mobility;transfer;bed mobility;ADL's;gait  -MS (r) TD (t) MS (c)     Equipment Currently Used at Home cane, straight  -MS (r) TD (t) MS (c)     Pertinent History of Current Functional Problem Pt presents to ED for eval of SOB on 9/28. Pt reports having chronic dizziness in last 3 months. PMH: atrial fibrillation, coronary artery disease, dizziness, hyperlipedemia, HTN. Previous surgeries include cardiac and knee.   -MS (r) TD (t) MS (c)     Equipment Issued to Patient --   none  -MS (r) TD (t) MS (c)     Risks Reviewed patient and family:;LOB;nausea/vomiting;dizziness;increased discomfort;change in vital signs  -MS (r) TD (t) MS (c)     Benefits Reviewed patient and family:;improve  function;increase strength;increase independence;decrease pain;increase balance;decrease risk of DVT  -MS (r) TD (t) MS (c)     Barriers to Rehab none identified  -MS (r) TD (t) MS (c)     Row Name 10/02/18 0800          Relationship/Environment    Primary Source of Support/Comfort --   family  -MS (r) TD (t) MS (c)     Lives With alone  -MS (r) TD (t) MS (c)     Row Name 10/02/18 0800          Resource/Environmental Concerns    Current Living Arrangements home/apartment/condo  -MS (r) TD (t) MS (c)     Resource/Environmental Concerns none  -MS (r) TD (t) MS (c)     Row Name 10/02/18 00          Home Main Entrance    Number of Stairs, Main Entrance one  -MS (r) TD (t) MS (c)     Row Name 10/02/18 0800          Cognitive Assessment/Interventions    Additional Documentation Cognitive Assessment/Intervention (Group)  -MS (r) TD (t) MS (c)     Row Name 10/02/18 00          Cognitive Assessment/Intervention- PT/OT    Affect/Mental Status (Cognitive) WNL  -MS (r) TD (t) MS (c)     Orientation Status (Cognition) oriented x 4  -MS (r) TD (t) MS (c)     Follows Commands (Cognition) WNL  -MS (r) TD (t) MS (c)     Personal Safety Interventions fall prevention program maintained;gait belt;muscle strengthening facilitated;nonskid shoes/slippers when out of bed;supervised activity  -MS (r) TD (t) MS (c)     Row Name 10/02/18 0800          Bed Mobility Assessment/Treatment    Bed Mobility Assessment/Treatment supine-sit;sit-supine  -MS (r) TD (t) MS (c)     Supine-Sit [place] (Bed Mobility) supervision  -MS (r) TD (t) MS (c)     Sit-Supine [place] (Bed Mobility) supervision  -MS (r) TD (t) MS (c)     Assistive Device (Bed Mobility) head of bed elevated  -MS (r) TD (t) MS (c)     Row Name 10/02/18 0800          Transfer Assessment/Treatment    Transfer Assessment/Treatment sit-stand transfer;stand-sit transfer  -MS (r) TD (t) MS (c)     Sit-Stand [place] (Transfers) contact guard  -MS (r) TD (t) MS (c)      Stand-Sit [place] (Transfers) contact guard  -MS (r) TD (t) MS (c)     Row Name 10/02/18 0800          Gait/Stairs Assessment/Training    Gait/Stairs Assessment/Training gait/ambulation independence  -MS (r) TD (t) MS (c)     [place] Level (Gait) contact guard  -MS (r) TD (t) MS (c)     Distance in Feet (Gait) 50 feet  -MS (r) TD (t) MS (c)     Pattern (Gait) step-through  -MS (r) TD (t) MS (c)     Deviations/Abnormal Patterns (Gait) base of support, wide;stride length decreased  -MS (r) TD (t) MS (c)     Comment (Gait/Stairs) Pt was able to ambulate 25 feet with CGA before requesting to turn around.   -MS (r) TD (t) MS (c)     Row Name 10/02/18 0800          General ROM    GENERAL ROM COMMENTS BUE and BLE AROM WFL  -MS (r) TD (t) MS (c)     Row Name 10/02/18 0800          MMT (Manual Muscle Testing)    General MMT Comments BUE strength 4+/5, BLE strength 4/5  -MS (r) TD (t) MS (c)     Row Name 10/02/18 0800          Motor Assessment/Intervention    Additional Documentation Balance (Group)  -MS (r) TD (t) MS (c)     Row Name 10/02/18 0800          Balance    Balance static sitting balance;static standing balance;dynamic sitting balance;dynamic standing balance  -MS (r) TD (t) MS (c)     Row Name 10/02/18 0800          Static Sitting Balance    Level of [place] (Unsupported Sitting, Static Balance) supervision  -MS (r) TD (t) MS (c)     Sitting Position (Unsupported Sitting, Static Balance) sitting on edge of bed  -MS (r) TD (t) MS (c)     Row Name 10/02/18 0800          Dynamic Sitting Balance    Level of [place], Reaches Outside Midline (Sitting, Dynamic Balance) contact guard assist  -MS (r) TD (t) MS (c)     Sitting Position, Reaches Outside Midline (Sitting, Dynamic Balance) sitting on edge of bed  -MS (r) TD (t) MS (c)     Comment, Reaches Outside Midline (Sitting, Dynamic Balance) Demonstrated posterior lean during LE MMT. was able to reach outside midline with CGA.   -MS (r) TD (t) MS (c)      Row Name 10/02/18 08          Static Standing Balance    Level of [place] (Supported Standing, Static Balance) contact guard assist  -MS (r) TD (t) MS (c)     Row Name 10/02/18 08          Dynamic Standing Balance    Level of [place], Reaches Outside Midline (Standing, Dynamic Balance) contact guard assist  -MS (r) TD (t) MS (c)     Row Name 10/02/18 08          Sensory Assessment/Intervention    Sensory General Assessment no sensation deficits identified  -MS (r) TD (t) MS (c)     Row Name 10/02/18 [facility]          Pain Assessment    Additional Documentation Pain Scale: Numbers Pre/Post-Treatment (Group)  -MS (r) TD (t) MS (c)     Row Name 10/02/18 08          Pain Scale: Numbers Pre/Post-Treatment    Pain Scale: Numbers, Pretreatment 0/10 - no pain  -MS (r) TD (t) MS (c)     Pain Scale: Numbers, Post-Treatment 0/10 - no pain  -MS (r) TD (t) MS (c)     Row Name 10/02/18 [facility]          Plan of Care Review    Plan of Care Reviewed With patient;family  -MS (r) TD (t) MS (c)     Row Name 10/02/18 [facility]          Physical Therapy Clinical Impression    Date of Referral to PT 10/01/18  -MS (r) TD (t) MS (c)     PT Diagnosis (PT Clinical Impression) Impaired endurance, gait, and balance  -MS (r) TD (t) MS (c)     Patient/Family Goals Statement (PT Clinical Impression) Return home  -MS (r) TD (t) MS (c)     Criteria for Skilled Interventions Met (PT Clinical Impression) yes;treatment indicated  -MS (r) TD (t) MS (c)     Pathology/Pathophysiology Noted (Describe Specifically for Each System) musculoskeletal;cardiovascular  -MS (r) TD (t) MS (c)     Impairments Found (describe specific impairments) aerobic capacity/endurance;gait, locomotion, and balance  -MS (r) TD (t) MS (c)     Rehab Potential (PT Clinical Summary) good, to achieve stated therapy goals  -MS (r) TD (t) MS (c)     Predicted Duration of Therapy (PT) until d/c  -MS (r) TD (t) MS (c)     Care Plan Review (PT) evaluation/treatment results  reviewed;care plan/treatment goals reviewed;risks/benefits reviewed;current/potential barriers reviewed;patient/other agree to care plan  -MS (r) TD (t) MS (c)     Care Plan Review, Other Participant (PT Clinical Impression) family  -MS (r) TD (t) MS (c)     Row Name 10/02/18 0800          Physical Therapy Goals    Transfer Goal Selection (PT) transfer, PT goal 1  -MS (r) TD (t) MS (c)     Gait Training Goal Selection (PT) gait training, PT goal 1  -MS (r) TD (t) MS (c)     Balance Goal Selection (PT) balance, PT goal 1  -MS (r) TD (t) MS (c)     Additional Documentation Balance Goal Selection (PT) (Row)  -MS (r) TD (t) MS (c)     Row Name 10/02/18 0800          Transfer Goal 1 (PT)    Activity/Assistive Device (Transfer Goal 1, PT) sit-to-stand/stand-to-sit  -MS (r) TD (t) MS (c)     [place] Level/Cues Needed (Transfer Goal 1, PT) independent  -MS (r) TD (t) MS (c)     Time Frame (Transfer Goal 1, PT) by discharge  -MS (r) TD (t) MS (c)     Progress/Outcome (Transfer Goal 1, PT) goal ongoing  -MS (r) TD (t) MS (c)     Row Name 10/02/18 0800          Gait Training Goal 1 (PT)    Activity/Assistive Device (Gait Training Goal 1, PT) gait (walking locomotion);improve balance and speed;increase endurance/gait distance;increase energy conservation;normalize weight shifts;decrease fall risk;diminish gait deviation  -MS (r) TD (t) MS (c)     [place] Level (Gait Training Goal 1, PT) independent  -MS (r) TD (t) MS (c)     Distance (Gait Goal 1, PT) 150 feet  -MS (r) TD (t) MS (c)     Time Frame (Gait Training Goal 1, PT) by discharge  -MS (r) TD (t) MS (c)     Progress/Outcome (Gait Training Goal 1, PT) goal ongoing  -MS (r) TD (t) MS (c)     Row Name 10/02/18 0800          Balance Goal 1 (PT)    Activity/Assistive Device (Balance Goal 1, PT) standing, static;sitting, dynamic;standing, dynamic  -MS (r) TD (t) MS (c)     [place] Level/Cues Needed (Balance Goal 1, PT) independent  -MS (r) TD (t) MS (c)     Time  Frame (Balance Goal 1, PT) by discharge  -MS (r) TD (t) MS (c)     Progress/Outcomes (Balance Goal 1, PT) goal ongoing  -MS (r) TD (t) MS (c)     Row Name 10/02/18 0800          Positioning and Restraints    Pre-Treatment Position in bed  -MS (r) TD (t) MS (c)     Post Treatment Position bed  -MS (r) TD (t) MS (c)     In Bed fowlers;call light within reach;encouraged to call for assist  -MS (r) TD (t) MS (c)     Row Name 10/02/18 0800          Living Environment    Home Accessibility stairs to enter home;tub/shower is not walk in  -MS (r) TD (t) MS (c)       User Key  (r) = Recorded By, (t) = Taken By, (c) = Cosigned By    Initials Name Provider Type    MS Dunn Rosaura HEIDI, PT, DPT, NCS Physical Therapist    TD Zara Martínez, PT Student PT Student          Physical Therapy Education     Title: PT OT SLP Therapies (Done)     Topic: Physical Therapy (Done)     Point: Mobility training (Done)    Learning Progress Summary     Learner Status Readiness Method Response Comment Documented by    Patient Done Acceptance E VU Pt was edcuated on benefits of PT and PT POC. Pt was educated on safety precautions when getting in and out of bed as well as body mechanics. TD 10/02/18 0948    Family Done Acceptance E VU Pt was edcuated on benefits of PT and PT POC. Pt was educated on safety precautions when getting in and out of bed as well as body mechanics. TD 10/02/18 0948          Point: Body mechanics (Done)    Learning Progress Summary     Learner Status Readiness Method Response Comment Documented by    Patient Done Acceptance E VU Pt was edcuated on benefits of PT and PT POC. Pt was educated on safety precautions when getting in and out of bed as well as body mechanics. TD 10/02/18 0948    Family Done Acceptance E VU Pt was edcuated on benefits of PT and PT POC. Pt was educated on safety precautions when getting in and out of bed as well as body mechanics. TD 10/02/18 0948          Point: Precautions (Done)    Learning  Progress Summary     Learner Status Readiness Method Response Comment Documented by    Patient Done Acceptance E VU Pt was edcuated on benefits of PT and PT POC. Pt was educated on safety precautions when getting in and out of bed as well as body mechanics. TD 10/02/18 0948    Family Done Acceptance E VU Pt was edcuated on benefits of PT and PT POC. Pt was educated on safety precautions when getting in and out of bed as well as body mechanics. TD 10/02/18 0948                      User Key     Initials Effective Dates Name Provider Type Discipline    TD 09/07/18 -  Zara Martínez, PT Student PT Student PT                PT Recommendation and Plan  Anticipated Discharge Disposition (PT): home with assist, home with home health  Planned Therapy Interventions (PT Eval): balance training, gait training, transfer training, strengthening  Therapy Frequency (PT Clinical Impression): 2 times/day  Outcome Summary/Treatment Plan (PT)  Anticipated Discharge Disposition (PT): home with assist, home with home health  Plan of Care Reviewed With: patient, family  Progress: improving  Outcome Summary: PT eval complete. Pt demonstrates decreased endurance, balance, and gait impairments. Pt demonstrates a step-through, wide base of support and decreased stride length. Pt required CGA with transfers, dynamic sitting/standing balance, and ambulation. Pt  would benefit from skilled PT in order to improved pt's safety, gait pattern, and independence before returning home. Recommend pt return home with assist or home health after d/c at this time.           Outcome Measures     Row Name 10/02/18 0900             How much help from another person do you currently need...    Turning from your back to your side while in flat bed without using bedrails? 4  -MS (r) TD (t) MS (c)      Moving from lying on back to sitting on the side of a flat bed without bedrails? 4  -MS (r) TD (t) MS (c)      Moving to and from a bed to a chair (including a  wheelchair)? 4  -MS (r) TD (t) MS (c)      Standing up from a chair using your arms (e.g., wheelchair, bedside chair)? 4  -MS (r) TD (t) MS (c)      Climbing 3-5 steps with a railing? 3  -MS (r) TD (t) MS (c)      To walk in hospital room? 3  -MS (r) TD (t) MS (c)      AM-PAC 6 Clicks Score 22  -MS (r) TD (t)         Functional Assessment    Outcome Measure Options AM-PAC 6 Clicks Basic Mobility (PT)  -MS (r) TD (t) MS (c)        User Key  (r) = Recorded By, (t) = Taken By, (c) = Cosigned By    Initials Name Provider Type    MS Rosaura Dunn, PT, DPT, NCS Physical Therapist    Zara Peña, PT Student PT Student           Time Calculation:         PT Charges     Row Name 10/02/18 0949             Time Calculation    Start Time 0850   Chart review: 7:57 - 8:10  -MS (r) TD (t) MS (c)      Stop Time 0915  -MS (r) TD (t) MS (c)      Time Calculation (min) 25 min  -MS (r) TD (t)      PT Received On 10/02/18  -MS (r) TD (t) MS (c)      PT Goal Re-Cert Due Date 10/12/18  -MS (r) TD (t) MS (c)        User Key  (r) = Recorded By, (t) = Taken By, (c) = Cosigned By    Initials Name Provider Type    MS Rosaura Dunn, PT, DPT, NCS Physical Therapist    Zara Peña, PT Student PT Student        Therapy Suggested Charges     Code   Minutes Charges    None           Therapy Charges for Today     Code Description Service Date Service Provider Modifiers Qty    71560864099 HC PT EVAL LOW COMPLEXITY 3 10/2/2018 Zara Martínez, PT Student GP 1          PT G-Codes  PT Professional Judgement Used?: Yes  Outcome Measure Options: AM-PAC 6 Clicks Basic Mobility (PT)  AM-PAC 6 Clicks Score: 22  Functional Limitation: Mobility: Walking and moving around  Mobility: Walking and Moving Around Current Status (): At least 20 percent but less than 40 percent impaired, limited or restricted  Mobility: Walking and Moving Around Goal Status (): At least 1 percent but less than 20 percent impaired, limited or  rianna Martínez, PT Student  10/2/2018

## 2018-10-02 NOTE — NURSING NOTE
Pt wife states Pt primary care physician is Marisabel Ribeiro from [facility] in [place], [place].

## 2018-10-02 NOTE — PLAN OF CARE
Problem: Patient Care Overview  Goal: Plan of Care Review   10/02/18 0951   Coping/Psychosocial   Plan of Care Reviewed With patient;family   Plan of Care Review   Progress improving   OTHER   Outcome Summary PT eval complete. Pt demonstrates decreased endurance, balance, and gait impairments. Pt demonstrates a step-through, wide base of support and decreased stride length. Pt required CGA with transfers, dynamic sitting/standing balance, and ambulation. Pt would benefit from skilled PT in order to improved pt's safety, gait pattern, and independence before returning home. Recommend pt return home with assist or home health after d/c at this time.

## 2018-10-02 NOTE — PROGRESS NOTES
Continued Stay Note   [facility]     Patient Name: Vasile Deng  MRN: [id number]  Today's Date: 10/2/2018    Admit Date: 9/28/2018          Discharge Plan     Row Name 10/02/18 1538       Plan    Plan Home    Patient/Family in Agreement with Plan yes    Plan Comments [facility]s along with need for O2 at [facility], Dr. Aiken [phone].    Final Discharge Disposition Code 01 - home or self-care    Row Name 10/02/18 1411       Plan    Plan Home    Patient/Family in Agreement with Plan yes    Plan Comments Spoke with pt to assess for home needs. chadd' at bedside.  Pt does live alone at present time.  He says he does not feel HH needed for him at d/c.  He denies need for further DME at present time.  He does say he gets meds via [facility], d/c summary will need faxed there at d/c.  MD saying O2 will need needed at d/c, please order to see if he qualifies so this can be set up. Will follow.               Discharge Codes    No documentation.       Expected Discharge Date and Time     Expected Discharge Date Expected Discharge Time    Oct 2, 2018             ATA Garcia

## 2018-10-02 NOTE — DISCHARGE SUMMARY
[facility] Medicine Services  DISCHARGE SUMMARY       Date of Admission: 9/28/2018  Date of Discharge:  10/2/2018  Primary Care Physician: Provider, No Known    Presenting Problem/History of Present Illness:  CHF (congestive heart failure) (CMS/[facility]) [I50.9]  Dyspnea, unspecified type [R06.00]     Final Discharge Diagnoses:  Hospital Problem List     * (Principal)Dyspnea    Coronary artery disease involving coronary bypass graft of native heart without angina pectoris (Chronic)    Essential hypertension (Chronic)    Mixed hyperlipidemia (Chronic)    Atrial fibrillation with rapid ventricular response (CMS/[facility])    Acute on chronic combined systolic and diastolic congestive heart failure (CMS/[facility])    Elevated troponin    NSTEMI (non-ST elevated myocardial infarction) (CMS/[facility])    Overview Signed 9/30/2018  8:42 AM by Huey Wilkins MD     Added automatically from request for surgery [id number]               Consults: Cardiology.  Impression: Heart cath  1. SEVERE AND DIFFUSE AND INOP CAD  2. PATENT GRAFTS  3. MODERATE TO SEVERE ISCHEMIC LV DYSFUNCTION  4. NORMAL HEMODYNAMICS INCLUDING LVEDP    Pertinent Test Results:   IMPRESSION: Chest x-ray  Mixed interstitial lung disease and interstitial edema, not  significantly changed from 12 hours previous.    IMPRESSION: CT angiogram of the chest  1. No pulmonary embolus.  2. Acute CHF.  3. Minimal change of a lingular nodule compared to July 2017 CT at  [facility]. Continued follow-up is advised however. New tiny 4 mm nodule  right apex. Attention at follow-up.  4. Increasing mediastinal and hilar adenopathy may be reactive nodes  superimposed upon granulomatous nodes. Reevaluate at follow-up    Interpretation Summary echocardiogram       · There is moderate calcification of the aortic valve mainly affecting the non coronary cusp(s).  · Mild mitral valve regurgitation is present  · Mild to moderate tricuspid valve regurgitation is  present.  · The left ventricular cavity is mildly dilated.  · Right ventricular cavity is mildly dilated.  · Left atrial cavity size is moderately dilated.  · Mild dilation of the aortic root is present.     SEVERE DILATED CARDIOMYOPATHY  MR AND TR APPEAR TO BE SECONDARY  BI-ATRIAL ENLARGEMENT   Echocardiogram Findings     Left Ventricle Estimated EF appears to be in the range of 26 - 30%. Global Longitudinal LV strain = -7%. Normal left ventricular wall thickness noted. Global left ventricular wall motion appears abnormal. There is left ventricular global hypokinesis noted.   The left ventricular cavity is mildly dilated.        Chief Complaint on Day of Discharge: none    History of Present Illness on Day of Discharge:   87-year-old male comes to the ER with complaints of shortness of breath.  Patient states this started yesterday and progressively getting worse.  In the ER patient was noted to be tachypnea.  ProBNP and d-dimer was elevated.  Troponin was also noted to be elevated.  EKG showed some nonspecific changes.  On-call cardiologist looked at the EKG and recommended rate control.  Patient was noted to be on A. fib.  Patient was given Cardizem in the ER which controlled his heart rate.  CTA showed bilateral pleural effusion and possible underlying infiltrate.  Patient received IV antibiotic, aspirin, IV therapeutic dose of Lovenox and IV Lasix in the ER.  Patient will be admitted for further evaluation and treatment.    Hospital Course:  The patient is a 87 y.o. male who presented to  with dyspnea/CHF exacerbation.  Patient's Lasix and change to by mouth.      Dyspnea/CHF exacerbation.  CTA shows bilateral pleural effusion with possible underlying infiltrate, no pulmonary embolus.  Patient started an IV Lasix and Atrovent nebs.  Patient's symptoms continued to improve with IV Lasix.  Patient breathing also continued improvement.  Patient continue by mouth Lasix at home.     Atrial  "fibrillation/elevated troponin/hypertension/CAD/CHF exacerbation-  patient was admitted to the telemetry.  Patient with history of status post CABG. Patient continue  amiodarone, Norvasc, aspirin, Toprol,  Xarelto, lasix  .    Cardiology was consulted.  Echocardiogram-ejection fraction 26%, left ventricle- global hypokinesis, moderate calcification of aortic valve, left atrial cavity moderately dilated, severe dilated cardiomyopathy, biatrial enlargement, MR and TR appear to be secondary.  Statin intolerance. Patient underwent heart cath 9/30/18.  Severe and diffuse and INOP CAD, patent grafts, moderate to severe ischemic left ventricle dysfunction, normal hemodynamic including LVEDP.  Follow with cardiology one month.     Hypokalemia.   Patient continue by mouth potassium at home.     Benign prostate hypertrophy.  Patient seen with and Proscar.     Follow-up with PCP in 1 week.  Follow with cardiology one month.  Patient go home with home health.    Condition on Discharge:  stable    Physical Exam on Discharge:  /68 (BP Location: Left arm, Patient Position: Lying)   Pulse 82   Temp 97.7 °F (36.5 °C) (Oral)   Resp 18   Ht 167.6 cm (65.98\")   Wt 72.7 kg (160 lb 3.2 oz)   SpO2 94%   BMI 25.87 kg/m²   Physical Exam  Head: Normocephalic and atraumatic.   Eyes: Pupils are equal, round, and reactive to light. Conjunctivae and EOM are normal. No scleral icterus.   Neck: Normal range of motion. Neck supple. No JVD present. Carotid bruit is not present. No tracheal deviation present. No thyromegaly present.   Cardiovascular: Normal rate, normal heart sounds and intact distal pulses.  Exam reveals no gallop and no friction rub.    No murmur heard.  Irregular regular, rates control   Pulmonary/Chest: Effort normal and breath sounds normal. No respiratory distress. He has no wheezes. He has no rales. He exhibits no tenderness.   Diminish with some bilateral   Abdominal: Soft. Bowel sounds are normal. He exhibits no " distension. There is no tenderness. There is no rebound and no guarding.   Musculoskeletal: Normal range of motion. He exhibits no edema, tenderness or deformity.   Generalized weakness.  History of multiple back fracture.   Lymphadenopathy:     He has no cervical adenopathy.   Neurological: He is alert and oriented to person, place, and time. He displays normal reflexes. No cranial nerve deficit. He exhibits abnormal muscle tone. Coordination abnormal.   Skin: Skin is warm and dry. Capillary refill takes 2 to 3 seconds. No rash noted.   Psychiatric: He has a normal mood and affect. His behavior is normal. Thought content normal.     Discharge Disposition:  Home or Self Care    Discharge Medications:     Discharge Medications      New Medications      Instructions Start Date   amiodarone 200 MG tablet  Commonly known as:  PACERONE   200 mg, Oral, Every 12 Hours Scheduled      amLODIPine 5 MG tablet  Commonly known as:  NORVASC   5 mg, Oral, Every 24 Hours Scheduled      aspirin 81 MG EC tablet   81 mg, Oral, Daily      calcium carbonate 500 MG chewable tablet  Commonly known as:  TUMS   2 tablets, Oral, 2 Times Daily PRN      Influenza Vac Subunit Quad 0.5 ML suspension prefilled syringe injection  Commonly known as:  FLUCELVAX   0.5 mL, Intramuscular, Once      potassium chloride 10 MEQ CR capsule  Commonly known as:  MICRO-K   10 mEq, Oral, Daily      sennosides-docusate sodium 8.6-50 MG tablet  Commonly known as:  SENOKOT-S   2 tablets, Oral, As Needed         Changes to Medications      Instructions Start Date   atorvastatin 80 MG tablet  Commonly known as:  LIPITOR  What changed:  how much to take   40 mg, Oral, Daily      furosemide 20 MG tablet  Commonly known as:  LASIX  What changed:  · medication strength  · how much to take  · when to take this  · reasons to take this   20 mg, Oral, Daily      rivaroxaban 15 MG tablet  Commonly known as:  XARELTO  What changed:  · medication strength  · how much to take    15 mg, Oral, Daily With Dinner         Continue These Medications      Instructions Start Date   finasteride 5 MG tablet  Commonly known as:  PROSCAR   5 mg, Oral, Daily      lisinopril 10 MG tablet  Commonly known as:  PRINIVIL,ZESTRIL   10 mg, Oral, Daily      metoprolol succinate XL 50 MG 24 hr tablet  Commonly known as:  TOPROL-XL   50 mg, Oral, Daily      nitroglycerin 0.4 MG SL tablet  Commonly known as:  NITROSTAT   0.4 mg, Sublingual, Every 5 Minutes PRN, Take no more than 3 doses in 15 minutes.      omeprazole 40 MG capsule  Commonly known as:  priLOSEC   40 mg, Oral, Daily      tamsulosin 0.4 MG capsule 24 hr capsule  Commonly known as:  FLOMAX   1 capsule, Oral, Nightly         Stop These Medications    allopurinol 100 MG tablet  Commonly known as:  ZYLOPRIM     ranolazine 500 MG 12 hr tablet  Commonly known as:  RANEXA            Discharge Diet:   Diet Instructions     Advance Diet As Tolerated             Activity at Discharge:   Activity Instructions     Activity as Tolerated             Discharge Care Plan/Instructions: Patient go home with home health.  Patient will go home with home oxygen.  Patient also states will be scheduled for outpatient sleep study.    Follow-up Appointments:   Future Appointments  Date Time Provider Department Center   10/15/2018 8:30 AM [facility]   For cardiology one month.  Follow up primary care doctor 1 week.  Patient will go home with home health.    Daniel Haile MD  10/02/18  3:00 PM    Time: Greater than 30 minutes

## 2018-10-02 NOTE — PROGRESS NOTES
[facility] Medicine Services  INPATIENT PROGRESS NOTE    Length of Stay: 3  Date of Admission: 9/28/2018  Primary Care Physician: Provider, No Known    Subjective   Chief Complaint: Weakness    HPI   Patient's going resting.  Patient still remained weak.  Patient denies any chest pain or shortness of breath.    Review of Systems   Constitutional: Positive for activity change, appetite change and fatigue. Negative for chills and fever.   HENT: Negative for hearing loss, nosebleeds, tinnitus and trouble swallowing.    Eyes: Negative for visual disturbance.   Respiratory: Positive for shortness of breath with ambulating. Negative for cough, chest tightness and wheezing.    Cardiovascular: Negative for chest pain, palpitations and leg swelling.   Gastrointestinal: Negative for abdominal distention, abdominal pain, blood in stool, constipation, diarrhea, nausea and vomiting.   Endocrine: Negative for cold intolerance, heat intolerance, polydipsia, polyphagia and polyuria.   Genitourinary: Negative for decreased urine volume, difficulty urinating, dysuria, flank pain, frequency and hematuria.   Musculoskeletal: Negative for arthralgias, joint swelling and myalgias.   Skin: Negative for rash.   Allergic/Immunologic: Negative for immunocompromised state.   Neurological: Positive for weakness. Negative for dizziness, syncope, light-headedness and headaches.   Hematological: Negative for adenopathy. Does not bruise/bleed easily.   Psychiatric/Behavioral: Negative for confusion and sleep disturbance. The patient is not nervous/anxious.      All pertinent negatives and positives are as above. All other systems have been reviewed and are negative unless otherwise stated.     Objective    Temp:  [97.6 °F (36.4 °C)-98.6 °F (37 °C)] 97.6 °F (36.4 °C)  Heart Rate:  [84-94] 87  Resp:  [16-18] 18  BP: (112-142)/(58-83) 123/58    Intake/Output Summary (Last 24 hours) at 10/02/18 1004  Last data  filed at 10/02/18 0900   Gross per 24 hour   Intake              120 ml   Output              725 ml   Net             -605 ml     Physical Exam  Head: Normocephalic and atraumatic.   Eyes: Pupils are equal, round, and reactive to light. Conjunctivae and EOM are normal. No scleral icterus.   Neck: Normal range of motion. Neck supple. No JVD present. Carotid bruit is not present. No tracheal deviation present. No thyromegaly present.   Cardiovascular: Normal rate, normal heart sounds and intact distal pulses.  Exam reveals no gallop and no friction rub.    No murmur heard.  Irregular regular, rates control   Pulmonary/Chest: Effort normal and breath sounds normal. No respiratory distress. He has no wheezes. He has no rales. He exhibits no tenderness.   Diminish with some bilateral   Abdominal: Soft. Bowel sounds are normal. He exhibits no distension. There is no tenderness. There is no rebound and no guarding.   Musculoskeletal: Normal range of motion. He exhibits no edema, tenderness or deformity.   Generalized weakness.  History of multiple back fracture.   Lymphadenopathy:     He has no cervical adenopathy.   Neurological: He is alert and oriented to person, place, and time. He displays normal reflexes. No cranial nerve deficit. He exhibits abnormal muscle tone. Coordination abnormal.   Skin: Skin is warm and dry. Capillary refill takes 2 to 3 seconds. No rash noted.   Psychiatric: He has a normal mood and affect. His behavior is normal. Thought content normal.   Results Review:  Lab Results (last 24 hours)     Procedure Component Value Units Date/Time    TSH [[id number]]  (Normal) Collected:  10/02/18 0542    Specimen:  Blood Updated:  10/02/18 0711     TSH 1.040 mIU/mL     Hemoglobin A1c [[id number]] Collected:  10/02/18 0542    Specimen:  Blood Updated:  10/02/18 0707     Hemoglobin A1C 5.7 %     Narrative:       Less than 6.0           Non-Diabetic Range  6.0-7.0                 ADA Therapeutic Target  Greater  than 7.0        Action Suggested    Lipid Panel [[id number]]  (Abnormal) Collected:  10/02/18 0542    Specimen:  Blood Updated:  10/02/18 0651     Total Cholesterol 122 (L) mg/dL      Triglycerides 48 mg/dL      HDL Cholesterol 35 (L) mg/dL      LDL Cholesterol  68 mg/dL      LDL/HDL Ratio 2.21    Comprehensive Metabolic Panel [[id number]]  (Abnormal) Collected:  10/02/18 0542    Specimen:  Blood Updated:  10/02/18 0640     Glucose 134 (H) mg/dL      BUN 15 mg/dL      Creatinine 0.71 mg/dL      Sodium 138 mmol/L      Potassium 3.2 (L) mmol/L      Chloride 101 mmol/L      CO2 25.0 mmol/L      Calcium 8.7 mg/dL      Total Protein 6.5 g/dL      Albumin 3.40 (L) g/dL      ALT (SGPT) 27 U/L      AST (SGOT) 36 U/L      Alkaline Phosphatase 86 U/L      Total Bilirubin 1.2 (H) mg/dL      eGFR Non African Amer 105 mL/min/1.73      Globulin 3.1 gm/dL      A/G Ratio 1.1 g/dL      BUN/Creatinine Ratio 21.1     Anion Gap 12.0 mmol/L     Narrative:       The MDRD GFR formula is only valid for adults with stable renal function between ages 18 and 70.    CBC & Differential [[id number]] Collected:  10/02/18 0542    Specimen:  Blood Updated:  10/02/18 0635    Narrative:       The following orders were created for panel order CBC & Differential.  Procedure                               Abnormality         Status                     ---------                               -----------         ------                     CBC Auto Differential[[id number]]        Abnormal            Final result                 Please view results for these tests on the individual orders.    CBC Auto Differential [[id number]]  (Abnormal) Collected:  10/02/18 0542    Specimen:  Blood Updated:  10/02/18 0635     WBC 9.09 10*3/mm3      RBC 4.10 (L) 10*6/mm3      Hemoglobin 11.4 (L) g/dL      Hematocrit 34.8 (L) %      MCV 84.9 fL      MCH 27.8 (L) pg      MCHC 32.8 (L) g/dL      RDW 15.4 (H) %      RDW-SD 47.4 fl      MPV 10.9 fL      Platelets 190 10*3/mm3       Neutrophil % 71.6 %      Lymphocyte % 15.1 %      Monocyte % 10.9 %      Eosinophil % 1.4 %      Basophil % 0.4 %      Immature Grans % 0.6 %      Neutrophils, Absolute 6.51 10*3/mm3      Lymphocytes, Absolute 1.37 10*3/mm3      Monocytes, Absolute 0.99 10*3/mm3      Eosinophils, Absolute 0.13 10*3/mm3      Basophils, Absolute 0.04 10*3/mm3      Immature Grans, Absolute 0.05 (H) 10*3/mm3      nRBC 0.0 /100 WBC     Blood Culture - Blood, [[id number]]  (Normal) Collected:  09/29/18 0545    Specimen:  Blood from Arm, Right Updated:  10/02/18 0631     Blood Culture No growth at 3 days    Blood Culture - Blood, [[id number]]  (Normal) Collected:  09/29/18 0450    Specimen:  Blood from Arm, Left Updated:  10/02/18 0515     Blood Culture No growth at 3 days    Blood Culture - Blood, Blood, Venous Line [[id number]]  (Normal) Collected:  09/29/18 0134    Specimen:  Blood from Arm, Left Updated:  10/02/18 0146     Blood Culture No growth at 3 days    Blood Culture - Blood, Blood, Venous Line [[id number]]  (Normal) Collected:  09/29/18 0134    Specimen:  Blood from Arm, Left Updated:  10/02/18 0146     Blood Culture No growth at 3 days           Cultures:  Blood Culture   Date Value Ref Range Status   09/29/2018 No growth at 3 days  Preliminary   09/29/2018 No growth at 3 days  Preliminary   09/29/2018 No growth at 3 days  Preliminary   09/29/2018 No growth at 3 days  Preliminary       Radiology Data:    Imaging Results (last 24 hours)     ** No results found for the last 24 hours. **          No Known Allergies    Scheduled meds:     amiodarone 200 mg Oral Q12H   amLODIPine 5 mg Oral Q24H   aspirin 81 mg Oral Daily   furosemide 20 mg Oral Daily   lisinopril 10 mg Oral Daily   metoprolol succinate XL 50 mg Oral Daily   rivaroxaban 15 mg Oral Daily With Dinner   sennosides-docusate sodium 2 tablet Oral Nightly   tamsulosin 0.4 mg Oral Daily       PRN meds:  •  acetaminophen  •  calcium carbonate  •  diphenhydrAMINE  •  influenza  vaccine  •  ipratropium  •  magnesium hydroxide  •  ondansetron **OR** ondansetron ODT **OR** ondansetron    Assessment/Plan     Principal Problem:    Dyspnea  Active Problems:    Coronary artery disease involving coronary bypass graft of native heart without angina pectoris    Essential hypertension    Mixed hyperlipidemia    Atrial fibrillation with rapid ventricular response (CMS/HCC)    Acute on chronic combined systolic and diastolic congestive heart failure (CMS/HCC)    Elevated troponin    NSTEMI (non-ST elevated myocardial infarction) (CMS/[facility])      Plan:  Dyspnea/CHF exacerbation.  CTA shows bilateral pleural effusion with possible underlying infiltrate, no pulmonary embolus. Continue Lasix.  Atrovent nebs     Atrial fibrillation/elevated troponin/hypertension/CAD/CHF exacerbation- hx status post CABG.  Continue amiodarone, Norvasc, aspirin, Toprol,  Xarelto, lasix  .  Continue telemetry.  Cardiology consult.  Echocardiogram-ejection fraction 26%, left ventricle- global hypokinesis, moderate calcification of aortic valve, left atrial cavity moderately dilated, severe dilated cardiomyopathy, biatrial enlargement, MR and TR appear to be secondary.  Statin intolerance.     Status post heart cath 9/30/18.  Severe and diffuse and INOP CAD, patent grafts, moderate to severe ischemic left ventricle dysfunction, normal hemodynamic including LVEDP.    Hypokalemia.  Magnesium level in a.m.  Potassium by mouth.     Benign prostate hypertrophy.  Continue Flomax.     Blood culture no growth in 3 days.     Discharge Planning:  Pending cardiology.  Patient will  need home health.    Daniel Haile MD   10/02/18   10:04 AM

## 2018-10-03 ENCOUNTER — READMISSION MANAGEMENT (OUTPATIENT)
Dept: CALL CENTER | Facility: HOSPITAL | Age: 83
End: 2018-10-03

## 2018-10-03 NOTE — THERAPY DISCHARGE NOTE
Acute Care - Physical Therapy Discharge Summary  [facility]       Patient Name: Vasile Deng  : 3/27/1931  MRN: [id number]    Today's Date: 10/3/2018  Onset of Illness/Injury or Date of Surgery: 18    Date of Referral to PT: 10/01/18  Referring Physician: Dr. Haile      Admit Date: 2018      PT Recommendation and Plan    Visit Dx:    ICD-10-CM ICD-9-CM   1. Dyspnea, unspecified type R06.00 786.09   2. Tachyarrhythmia R00.0 785.0   3. Pneumonia of both lungs due to infectious organism, unspecified part of lung J18.9 483.8   4. Acute on chronic combined systolic and diastolic congestive heart failure (CMS/[facility]) I50.43 428.43     428.0   5. NSTEMI (non-ST elevated myocardial infarction) (CMS/[facility]) I21.4 410.70   6. Coronary artery disease involving coronary bypass graft of native heart without angina pectoris I25.810 414.05   7. Atrial fibrillation with rapid ventricular response (CMS/[facility]) I48.91 427.31   8. Impaired functional mobility, balance, gait, and endurance Z74.09 V49.89   9. Essential hypertension I10 401.9   10. Mixed hyperlipidemia E78.2 272.2   11. Shortness of breath R06.02 786.05   12. Elevated troponin R74.8 790.6   13. Hx of CABG Z95.1 V45.81   14. Former smoker Z87.891 V15.82   15. Syncope, unspecified syncope type R55 780.2             Outcome Measures     Row Name 10/02/18 0900             How much help from another person do you currently need...    Turning from your back to your side while in flat bed without using bedrails? 4  -MS (r) TD (t) MS (c)      Moving from lying on back to sitting on the side of a flat bed without bedrails? 4  -MS (r) TD (t) MS (c)      Moving to and from a bed to a chair (including a wheelchair)? 4  -MS (r) TD (t) MS (c)      Standing up from a chair using your arms (e.g., wheelchair, bedside chair)? 4  -MS (r) TD (t) MS (c)      Climbing 3-5 steps with a railing? 3  -MS (r) TD (t) MS (c)      To walk in hospital room? 3  -MS (r) TD (t) MS (c)       AM-PAC 6 Clicks Score 22  -MS (r) TD (t)         Functional Assessment    Outcome Measure Options AM-PAC 6 Clicks Basic Mobility (PT)  -MS (r) TD (t) MS (c)        User Key  (r) = Recorded By, (t) = Taken By, (c) = Cosigned By    Initials Name Provider Type    Rosaura Santana R, PT, DPT, NCS Physical Therapist    TD Zara Martínez, PT Student PT Student            Therapy Suggested Charges     Code   Minutes Charges    None                   PT Rehab Goals     Row Name 10/03/18 0856             Transfer Goal 1 (PT)    Activity/Assistive Device (Transfer Goal 1, PT) sit-to-stand/stand-to-sit  -NW      [place] Level/Cues Needed (Transfer Goal 1, PT) independent  -NW      Time Frame (Transfer Goal 1, PT) by discharge  -NW      Progress/Outcome (Transfer Goal 1, PT) goal not met  -NW         Gait Training Goal 1 (PT)    Activity/Assistive Device (Gait Training Goal 1, PT) gait (walking locomotion);improve balance and speed;increase endurance/gait distance;increase energy conservation;normalize weight shifts;decrease fall risk;diminish gait deviation  -NW      [place] Level (Gait Training Goal 1, PT) independent  -NW      Distance (Gait Goal 1, PT) 150 feet  -NW      Time Frame (Gait Training Goal 1, PT) by discharge  -NW      Progress/Outcome (Gait Training Goal 1, PT) goal not met  -NW        User Key  (r) = Recorded By, (t) = Taken By, (c) = Cosigned By    Initials Name Provider Type Discipline    Sherine Montana PTA Physical Therapy Assistant PT              PT Discharge Summary  Anticipated Discharge Disposition (PT): home  Reason for Discharge: Discharge from facility  Outcomes Achieved: Discharge from facility occurred on same date as evluation  Discharge Destination: Home      Sherine Myers PTA   10/3/2018

## 2018-10-03 NOTE — OUTREACH NOTE
Prep Survey      Responses   Facility patient discharged from?  [place]   Is patient eligible?  Yes   Discharge diagnosis  Dyspnea, CAD involving coronary bypass graft of native heart without angina pectoris, Essential HTN, mixed hyperlipidemia, Afib with RVR, Acute on chronic combined systolic and diastolic CHF, Elevated troponin, NSTEM   Does the patient have one of the following disease processes/diagnoses(primary or secondary)?  CHF   Does the patient have Home health ordered?  Yes   What is the Home health agency?   No agency named in AVS.   Is there a DME ordered?  Yes   What DME was ordered?  Oxygen therapy, no agency named.    Comments regarding appointments  See AVS   Prep survey completed?  Yes          Zofia Julian RN

## 2018-10-03 NOTE — PAYOR COMM NOTE
"MA HOME 10-2-18  UR PHONE     Vasile Deng (87 y.o. Male)     Date of Birth Social Security Number Address Home Phone MRN    03/27/1931  [facility] [address] [phone] [id number]    Oriental orthodox Marital Status          Other        Admission Date Admission Type Admitting Provider Attending Provider Department, Room/Bed    9/28/18 Emergency Daniel Haile MD  [facility] 4C, 487/1    Discharge Date Discharge Disposition Discharge Destination        10/2/2018 Home or Self Care              Attending Provider:  (none)   Allergies:  No Known Allergies    Isolation:  None   Infection:  None   Code Status:  Prior    Ht:  167.6 cm (65.98\")   Wt:  72.7 kg (160 lb 3.2 oz)    Admission Cmt:  None   Principal Problem:  Dyspnea [R06.00]                 Active Insurance as of 9/28/2018     Primary Coverage     Payor Plan Insurance Group Employer/Plan Group    [facility]      Payor Plan Address Payor Plan Phone Number Effective From Effective To    [address] [phone] 6/1/2018     [facility] [address]       Subscriber Name Subscriber Birth Date Member ID       VASILE DENG 3/27/1931 [id number]                 Emergency Contacts      (Rel.) Home Phone Work Phone Mobile Phone    Leida Rollins (Power of ) [phone] -- --               Discharge Summary      Daniel Haile MD at 10/2/2018  3:00 PM              [facility]  DISCHARGE SUMMARY       Date of Admission: 9/28/2018  Date of Discharge:  10/2/2018  Primary Care Physician: Provider, No Known    Presenting Problem/History of Present Illness:  CHF (congestive heart failure) (CMS/HCC) [I50.9]  Dyspnea, unspecified type [R06.00]     Final Discharge Diagnoses:  Hospital Problem List     * (Principal)Dyspnea    Coronary artery disease involving coronary bypass graft of native heart without angina pectoris " (Chronic)    Essential hypertension (Chronic)    Mixed hyperlipidemia (Chronic)    Atrial fibrillation with rapid ventricular response (CMS/HCC)    Acute on chronic combined systolic and diastolic congestive heart failure (CMS/HCC)    Elevated troponin    NSTEMI (non-ST elevated myocardial infarction) (CMS/HCC)    Overview Signed 9/30/2018  8:42 AM by Huey Wilkins MD     Added automatically from request for surgery [id number]               Consults: Cardiology.  Impression: Heart cath  1. SEVERE AND DIFFUSE AND INOP CAD  2. PATENT GRAFTS  3. MODERATE TO SEVERE ISCHEMIC LV DYSFUNCTION  4. NORMAL HEMODYNAMICS INCLUDING LVEDP    Pertinent Test Results:   IMPRESSION: Chest x-ray  Mixed interstitial lung disease and interstitial edema, not  significantly changed from 12 hours previous.    IMPRESSION: CT angiogram of the chest  1. No pulmonary embolus.  2. Acute CHF.  3. Minimal change of a lingular nodule compared to July 2017 CT at  [facility]. Continued follow-up is advised however. New tiny 4 mm nodule  right apex. Attention at follow-up.  4. Increasing mediastinal and hilar adenopathy may be reactive nodes  superimposed upon granulomatous nodes. Reevaluate at follow-up    Interpretation Summary echocardiogram       · There is moderate calcification of the aortic valve mainly affecting the non coronary cusp(s).  · Mild mitral valve regurgitation is present  · Mild to moderate tricuspid valve regurgitation is present.  · The left ventricular cavity is mildly dilated.  · Right ventricular cavity is mildly dilated.  · Left atrial cavity size is moderately dilated.  · Mild dilation of the aortic root is present.     SEVERE DILATED CARDIOMYOPATHY  MR AND TR APPEAR TO BE SECONDARY  BI-ATRIAL ENLARGEMENT   Echocardiogram Findings     Left Ventricle Estimated EF appears to be in the range of 26 - 30%. Global Longitudinal LV strain = -7%. Normal left ventricular wall thickness noted. Global left ventricular wall motion  appears abnormal. There is left ventricular global hypokinesis noted.   The left ventricular cavity is mildly dilated.        Chief Complaint on Day of Discharge: none    History of Present Illness on Day of Discharge:   87-year-old male comes to the ER with complaints of shortness of breath.  Patient states this started yesterday and progressively getting worse.  In the ER patient was noted to be tachypnea.  ProBNP and d-dimer was elevated.  Troponin was also noted to be elevated.  EKG showed some nonspecific changes.  On-call cardiologist looked at the EKG and recommended rate control.  Patient was noted to be on A. fib.  Patient was given Cardizem in the ER which controlled his heart rate.  CTA showed bilateral pleural effusion and possible underlying infiltrate.  Patient received IV antibiotic, aspirin, IV therapeutic dose of Lovenox and IV Lasix in the ER.  Patient will be admitted for further evaluation and treatment.    Hospital Course:  The patient is a 87 y.o. male who presented to [facility] with dyspnea/CHF exacerbation.  Patient's Lasix and change to by mouth.      Dyspnea/CHF exacerbation.  CTA shows bilateral pleural effusion with possible underlying infiltrate, no pulmonary embolus.  Patient started an IV Lasix and Atrovent nebs.  Patient's symptoms continued to improve with IV Lasix.  Patient breathing also continued improvement.  Patient continue by mouth Lasix at home.     Atrial fibrillation/elevated troponin/hypertension/CAD/CHF exacerbation-   patient was admitted to the telemetry.  Patient with history of status post CABG. Patient continue  amiodarone, Norvasc, aspirin, Toprol,  Xarelto, lasix  .    Cardiology was consulted.  Echocardiogram-ejection fraction 26%, left ventricle- global hypokinesis, moderate calcification of aortic valve, left atrial cavity moderately dilated, severe dilated cardiomyopathy, biatrial enlargement, MR and TR appear to be secondary.  Statin intolerance.  "Patient underwent heart cath 9/30/18.  Severe and diffuse and INOP CAD, patent grafts, moderate to severe ischemic left ventricle dysfunction, normal hemodynamic including LVEDP.  Follow with cardiology one month.     Hypokalemia.   Patient continue by mouth potassium at home.     Benign prostate hypertrophy.  Patient seen with and Proscar.     Follow-up with PCP in 1 week.  Follow with cardiology one month.  Patient go home with home health.    Condition on Discharge:  stable    Physical Exam on Discharge:  /68 (BP Location: Left arm, Patient Position: Lying)   Pulse 82   Temp 97.7 °F (36.5 °C) (Oral)   Resp 18   Ht 167.6 cm (65.98\")   Wt 72.7 kg (160 lb 3.2 oz)   SpO2 94%   BMI 25.87 kg/m²    Physical Exam  Head: Normocephalic and atraumatic.   Eyes: Pupils are equal, round, and reactive to light. Conjunctivae and EOM are normal. No scleral icterus.   Neck: Normal range of motion. Neck supple. No JVD present. Carotid bruit is not present. No tracheal deviation present. No thyromegaly present.   Cardiovascular: Normal rate, normal heart sounds and intact distal pulses.  Exam reveals no gallop and no friction rub.    No murmur heard.  Irregular regular, rates control   Pulmonary/Chest: Effort normal and breath sounds normal. No respiratory distress. He has no wheezes. He has no rales. He exhibits no tenderness.   Diminish with some bilateral   Abdominal: Soft. Bowel sounds are normal. He exhibits no distension. There is no tenderness. There is no rebound and no guarding.   Musculoskeletal: Normal range of motion. He exhibits no edema, tenderness or deformity.   Generalized weakness.  History of multiple back fracture.   Lymphadenopathy:     He has no cervical adenopathy.   Neurological: He is alert and oriented to person, place, and time. He displays normal reflexes. No cranial nerve deficit. He exhibits abnormal muscle tone. Coordination abnormal.   Skin: Skin is warm and dry. Capillary refill takes 2 " to 3 seconds. No rash noted.   Psychiatric: He has a normal mood and affect. His behavior is normal. Thought content normal.     Discharge Disposition:  Home or Self Care    Discharge Medications:     Discharge Medications      New Medications      Instructions Start Date   amiodarone 200 MG tablet  Commonly known as:  PACERONE   200 mg, Oral, Every 12 Hours Scheduled      amLODIPine 5 MG tablet  Commonly known as:  NORVASC   5 mg, Oral, Every 24 Hours Scheduled      aspirin 81 MG EC tablet   81 mg, Oral, Daily      calcium carbonate 500 MG chewable tablet  Commonly known as:  TUMS   2 tablets, Oral, 2 Times Daily PRN      Influenza Vac Subunit Quad 0.5 ML suspension prefilled syringe injection  Commonly known as:  FLUCELVAX   0.5 mL, Intramuscular, Once      potassium chloride 10 MEQ CR capsule  Commonly known as:  MICRO-K   10 mEq, Oral, Daily      sennosides-docusate sodium 8.6-50 MG tablet  Commonly known as:  SENOKOT-S   2 tablets, Oral, As Needed         Changes to Medications      Instructions Start Date   atorvastatin 80 MG tablet  Commonly known as:  LIPITOR  What changed:  how much to take   40 mg, Oral, Daily      furosemide 20 MG tablet  Commonly known as:  LASIX  What changed:  · medication strength  · how much to take  · when to take this  · reasons to take this   20 mg, Oral, Daily      rivaroxaban 15 MG tablet  Commonly known as:  XARELTO  What changed:  · medication strength  · how much to take   15 mg, Oral, Daily With Dinner         Continue These Medications      Instructions Start Date   finasteride 5 MG tablet  Commonly known as:  PROSCAR   5 mg, Oral, Daily      lisinopril 10 MG tablet  Commonly known as:  PRINIVIL,ZESTRIL   10 mg, Oral, Daily      metoprolol succinate XL 50 MG 24 hr tablet  Commonly known as:  TOPROL-XL   50 mg, Oral, Daily      nitroglycerin 0.4 MG SL tablet  Commonly known as:  NITROSTAT   0.4 mg, Sublingual, Every 5 Minutes PRN, Take no more than 3 doses in 15 minutes.       omeprazole 40 MG capsule  Commonly known as:  priLOSEC   40 mg, Oral, Daily      tamsulosin 0.4 MG capsule 24 hr capsule  Commonly known as:  FLOMAX   1 capsule, Oral, Nightly         Stop These Medications    allopurinol 100 MG tablet  Commonly known as:  ZYLOPRIM     ranolazine 500 MG 12 hr tablet  Commonly known as:  RANEXA            Discharge Diet:   Diet Instructions     Advance Diet As Tolerated             Activity at Discharge:   Activity Instructions     Activity as Tolerated             Discharge Care Plan/Instructions: Patient go home with home health.  Patient will go home with home oxygen.  Patient also states will be scheduled for outpatient sleep study.    Follow-up Appointments:   Future Appointments  Date Time Provider Department Center   10/15/2018 8:30 AM [facility]   For cardiology one month.  Follow up primary care doctor 1 week.  Patient will go home with home health.    Daniel Haile MD  10/02/18  3:00 PM    Time: Greater than 30 minutes        Electronically signed by Daniel Haile MD at 10/2/2018  3:14 PM

## 2018-10-04 LAB
BACTERIA SPEC AEROBE CULT: NORMAL

## 2018-10-06 ENCOUNTER — READMISSION MANAGEMENT (OUTPATIENT)
Dept: CALL CENTER | Facility: HOSPITAL | Age: 83
End: 2018-10-06

## 2018-10-06 NOTE — OUTREACH NOTE
CHF Week 1 Survey      Responses   Facility patient discharged from?  [facility]   Does the patient have one of the following disease processes/diagnoses(primary or secondary)?  CHF   Is there a successful TCM telephone encounter documented?  No   CHF Week 1 attempt successful?  Yes   Call start time  1103   Call end time  1115   Discharge diagnosis  Dyspnea, CAD involving coronary bypass graft of native heart without angina pectoris, Essential HTN, mixed hyperlipidemia, Afib with RVR, Acute on chronic combined systolic and diastolic CHF, Elevated troponin, NSTEM   Is patient permission given to speak with other caregiver?  Yes   List who call center can speak with  POA   Person spoke with today (if not patient) and relationship  POA   Meds reviewed with patient/caregiver?  Yes   Is the patient having any side effects they believe may be caused by any medication additions or changes?  No   Does the patient have all medications ordered at discharge?  Yes   Is the patient taking all medications as directed (includes completed medication regime)?  No   What is preventing the patient from taking all medications as directed?  Other   Nursing Interventions  Nurse provided patient education   Medication comments  POA was unaware that the DC instructions were to stop the Allopurinol and Ranexa. She will hold until f/u appt 10-11.   Does the patient have a primary care provider?   Yes   Does the patient have an appointment with their PCP within 7 days of discharge?  Yes   Has the patient kept scheduled appointments due by today?  N/A   What D[facility] was [facility]   Has all DME been delivered?  No   DME interventions  Other   Psychosocial issues?  No   Notified Case Management  DME   Did the patient receive a copy of their discharge instructions?  Yes   Nursing interventions  Reviewed instructions with patient   What is the patient's perception of their health status since discharge?  Improving   Nursing  interventions  Nurse provided patient education, Advised patient to call provider   Is the patient weighing daily?  Yes   Does the patient have scales?  Yes   Daily weight interventions  Education provided on importance of daily weight   Is the patient able to teach back Heart Failure diet management?  Yes   Is the patient able to teach back Heart Failure Zones?  Yes   Is the patient able to teach back signs and symptoms of worsening condition? (i.e. weight gain, shortness of air, etc.)  Yes   Is the patient/caregiver able to teach back the hierarchy of who to call/visit for symptoms/problems? PCP, Specialist, Home health nurse, Urgent Care, ED, 911  Yes    CHF Week 1 call completed?  Yes          Lacy Arce RN

## 2018-10-09 NOTE — PROGRESS NOTES
Continued Stay Note  ELI Miramontes     Patient Name: Vasile Deng  MRN: [id number]  Today's Date: 10/9/2018    Admit Date: 9/28/2018          Discharge Plan     Row Name 10/09/18 1336       Plan    Plan Comments [facility] AT [phone] ASKING HER TO RETURN MY CALL ABOUT OXYGEN APPROVAL FOR HOME              Discharge Codes    No documentation.       Expected Discharge Date and Time     Expected Discharge Date Expected Discharge Time    Oct 2, 2018             Amaya Javed RN

## 2018-10-11 NOTE — PROGRESS NOTES
Continued Stay Note   Kul[facility]     Patient Name: Vasile Deng  MRN: [id number]  Today's Date: 10/11/2018    Admit Date: 9/28/2018          Discharge Plan     Row Name 10/11/18 1425       Plan    Plan Comments [facility] AND [facility] NOW DEANDRE WILL MAKE SURE OXYGEN IS ORDERED FOR PATIENT AT NIGHT              Discharge Codes    No documentation.       Expected Discharge Date and Time     Expected Discharge Date Expected Discharge Time    Oct 2, 2018             Amaya Javed RN

## 2018-10-11 NOTE — PROGRESS NOTES
Continued Stay Note  [facility]     Patient Name: Vasile Deng  MRN: [id number]  Today's Date: 10/11/2018    Admit Date: 9/28/2018          Discharge Plan    No documentation.             Discharge Codes    No documentation.       Expected Discharge Date and Time     Expected Discharge Date Expected Discharge Time    Oct 2, 2018             Amaya Javed RN

## 2018-10-15 ENCOUNTER — READMISSION MANAGEMENT (OUTPATIENT)
Dept: CALL CENTER | Facility: HOSPITAL | Age: 83
End: 2018-10-15

## 2018-10-15 NOTE — OUTREACH NOTE
CHF Week 2 Survey      Responses   Facility patient discharged from?  [facility]   Does the patient have one of the following disease processes/diagnoses(primary or secondary)?  CHF   Week 2 attempt successful?  No   Call start time  1125   Unsuccessful attempts  Attempt 1          Kami Ortiz RN

## 2018-10-17 ENCOUNTER — READMISSION MANAGEMENT (OUTPATIENT)
Dept: CALL CENTER | Facility: HOSPITAL | Age: 83
End: 2018-10-17

## 2018-10-17 NOTE — OUTREACH NOTE
CHF Week 2 Survey      Responses   Facility patient discharged from?  [facility]   Does the patient have one of the following disease processes/diagnoses(primary or secondary)?  CHF   Week 2 attempt successful?  No   Unsuccessful attempts  Attempt 2          Kalin Montenegro RN

## 2018-10-22 ENCOUNTER — READMISSION MANAGEMENT (OUTPATIENT)
Dept: CALL CENTER | Facility: HOSPITAL | Age: 83
End: 2018-10-22

## 2018-11-07 DIAGNOSIS — I48.19 PERSISTENT ATRIAL FIBRILLATION (HCC): Primary | ICD-10-CM

## 2018-11-21 ENCOUNTER — OFFICE VISIT (OUTPATIENT)
Dept: CARDIOLOGY | Facility: CLINIC | Age: 83
End: 2018-11-21

## 2018-11-21 VITALS
WEIGHT: 173.8 LBS | DIASTOLIC BLOOD PRESSURE: 72 MMHG | HEART RATE: 61 BPM | SYSTOLIC BLOOD PRESSURE: 140 MMHG | BODY MASS INDEX: 27.93 KG/M2 | HEIGHT: 66 IN

## 2018-11-21 DIAGNOSIS — R42 DIZZINESS: ICD-10-CM

## 2018-11-21 DIAGNOSIS — I25.810 CORONARY ARTERY DISEASE INVOLVING CORONARY BYPASS GRAFT OF NATIVE HEART WITHOUT ANGINA PECTORIS: Primary | Chronic | ICD-10-CM

## 2018-11-21 DIAGNOSIS — I50.22 CHRONIC SYSTOLIC CONGESTIVE HEART FAILURE (HCC): ICD-10-CM

## 2018-11-21 DIAGNOSIS — E78.2 MIXED HYPERLIPIDEMIA: Chronic | ICD-10-CM

## 2018-11-21 DIAGNOSIS — I48.0 PAF (PAROXYSMAL ATRIAL FIBRILLATION) (HCC): ICD-10-CM

## 2018-11-21 DIAGNOSIS — I10 ESSENTIAL HYPERTENSION: Chronic | ICD-10-CM

## 2018-11-21 PROBLEM — I50.43 ACUTE ON CHRONIC COMBINED SYSTOLIC AND DIASTOLIC CONGESTIVE HEART FAILURE (HCC): Status: RESOLVED | Noted: 2018-09-29 | Resolved: 2018-11-21

## 2018-11-21 PROCEDURE — 93000 ELECTROCARDIOGRAM COMPLETE: CPT | Performed by: PHYSICIAN ASSISTANT

## 2018-11-21 PROCEDURE — 99214 OFFICE O/P EST MOD 30 MIN: CPT | Performed by: PHYSICIAN ASSISTANT

## 2018-11-21 RX ORDER — RANOLAZINE 1000 MG/1
1000 TABLET, EXTENDED RELEASE ORAL 2 TIMES DAILY
Qty: 60 TABLET | Refills: 11 | Status: SHIPPED | OUTPATIENT
Start: 2018-11-21 | End: 2019-03-11 | Stop reason: DRUGHIGH

## 2018-11-21 RX ORDER — RANOLAZINE 500 MG/1
500 TABLET, EXTENDED RELEASE ORAL 2 TIMES DAILY
COMMUNITY
End: 2018-11-21 | Stop reason: SDUPTHER

## 2018-11-21 NOTE — PROGRESS NOTES
Subjective:     Encounter Date:11/21/2018      Patient ID: Vasile Deng is a 87 y.o. male who presents for 3 week hospital follow-up. He had a cardiac cath that revealed severe inoperable disease. A 1 week monitor was placed due to occasional fast heart rates and this has not yet been resulted. He reports continued occasional chest discomfort with exertion, relieved with rest. He describes this as substernal without radiation. He denies any significant increase in his chronic, stable exertional dyspnea.     Chief Complaint: chest pain    Coronary Artery Disease   Presents for follow-up visit. Symptoms include chest pain. Pertinent negatives include no dizziness (with position), leg swelling, palpitations, shortness of breath or weight gain. His past medical history is significant for CHF. The symptoms have been stable. Compliance with diet is good. Compliance with exercise is variable. Compliance with medications is good.   Atrial Fibrillation   Presents for follow-up visit. Symptoms include chest pain. Symptoms are negative for dizziness (with position), palpitations, shortness of breath and syncope. The symptoms have been stable. Past medical history includes atrial fibrillation, CAD and CHF. There are no medication compliance problems.   Congestive Heart Failure   Presents for follow-up visit. Associated symptoms include chest pain. Pertinent negatives include no claudication, near-syncope, palpitations or shortness of breath. The symptoms have been stable. His past medical history is significant for CAD. Compliance problems include adherence to exercise.        The following portions of the patient's history were reviewed and updated as appropriate: allergies, current medications, past family history, past medical history, past social history, past surgical history and problem list.    Review of Systems   Constitution: Positive for malaise/fatigue. Negative for weight gain.   Cardiovascular: Positive for  chest pain and dyspnea on exertion. Negative for claudication, irregular heartbeat, leg swelling, near-syncope, orthopnea, palpitations, paroxysmal nocturnal dyspnea and syncope.   Respiratory: Negative for hemoptysis and shortness of breath.    Hematologic/Lymphatic: Negative for bleeding problem.   Skin: Negative for poor wound healing.   Musculoskeletal: Negative for myalgias.   Gastrointestinal: Negative for melena, nausea and vomiting.   Genitourinary: Negative for hematuria.   Neurological: Positive for light-headedness. Negative for dizziness (with position) and focal weakness.   Psychiatric/Behavioral: Negative for memory loss.   All other systems reviewed and are negative.        ECG 12 Lead  Date/Time: 11/21/2018 11:43 AM  Performed by: Mickie Brandt PA-C  Authorized by: Mickie Brandt PA-C   Comparison: compared with previous ECG from 9/29/2018  Comparison to previous ECG: NSR has replaced AF  Rhythm: sinus rhythm  Rate: normal  Conduction: right bundle branch block  QRS axis: left  Clinical impression: abnormal ECG               Objective:     Physical Exam   Constitutional: He is oriented to person, place, and time. He appears well-developed and well-nourished.   HENT:   Head: Normocephalic and atraumatic.   Eyes: Conjunctivae and EOM are normal. Pupils are equal, round, and reactive to light.   Neck: Normal range of motion. Neck supple. No JVD present.   Cardiovascular: Normal rate, regular rhythm, S1 normal, S2 normal, normal heart sounds, intact distal pulses and normal pulses.   No murmur heard.  Pulmonary/Chest: Effort normal and breath sounds normal. No respiratory distress.   Abdominal: Soft. Bowel sounds are normal. He exhibits no distension.   Musculoskeletal: He exhibits no edema or tenderness.   Neurological: He is alert and oriented to person, place, and time.   Skin: Skin is warm and dry.   Psychiatric: He has a normal mood and affect. Judgment normal.   Vitals reviewed.      /72   " Pulse 61   Ht 167.6 cm (66\")   Wt 78.8 kg (173 lb 12.8 oz)   BMI 28.05 kg/m²     Current Outpatient Medications:   •  amiodarone (PACERONE) 200 MG tablet, Take 1 tablet by mouth Every 12 (Twelve) Hours., Disp: 60 tablet, Rfl: 2  •  amLODIPine (NORVASC) 5 MG tablet, Take 1 tablet by mouth Daily., Disp: 30 tablet, Rfl: 2  •  atorvastatin (LIPITOR) 80 MG tablet, Take 0.5 tablets by mouth Daily., Disp: 30 tablet, Rfl: 2  •  finasteride (PROSCAR) 5 MG tablet, Take 5 mg by mouth Daily., Disp: , Rfl:   •  furosemide (LASIX) 20 MG tablet, Take 1 tablet by mouth Daily. (Patient taking differently: Take 20 mg by mouth As Needed.), Disp: 30 tablet, Rfl: 2  •  lisinopril (PRINIVIL,ZESTRIL) 10 MG tablet, Take 1 tablet by mouth Daily., Disp: 30 tablet, Rfl: 2  •  metoprolol succinate XL (TOPROL-XL) 50 MG 24 hr tablet, Take 1 tablet by mouth Daily., Disp: 30 tablet, Rfl: 11  •  nitroglycerin (NITROSTAT) 0.4 MG SL tablet, Place 0.4 mg under the tongue Every 5 (Five) Minutes As Needed for Chest Pain. Take no more than 3 doses in 15 minutes., Disp: , Rfl:   •  omeprazole (priLOSEC) 40 MG capsule, Take 40 mg by mouth Daily., Disp: , Rfl:   •  ranolazine (RANEXA) 1000 MG 12 hr tablet, Take 1 tablet by mouth 2 (Two) Times a Day., Disp: 60 tablet, Rfl: 11  •  rivaroxaban (XARELTO) 15 MG tablet, Take 1 tablet by mouth Daily With Dinner., Disp: 30 tablet, Rfl: 2  •  sennosides-docusate sodium (SENOKOT-S) 8.6-50 MG tablet, Take 2 tablets by mouth As Needed for Constipation., Disp: 60 tablet, Rfl: 0  •  tamsulosin (FLOMAX) 0.4 MG capsule 24 hr capsule, Take 1 capsule by mouth Every Night., Disp: 30 capsule, Rfl: 0  •  aspirin 81 MG EC tablet, Take 1 tablet by mouth Daily., Disp: 30 tablet, Rfl: 2  •  calcium carbonate (TUMS) 500 MG chewable tablet, Chew 1,000 mg 2 (Two) Times a Day As Needed for Heartburn., Disp: 60 tablet, Rfl: 2  Past Medical History:   Diagnosis Date   • Acute on chronic combined systolic and diastolic congestive " heart failure (CMS/[facility]) 2018   • Atrial fibrillation (CMS/[facility])    • Coronary artery disease    • Dizziness    • Hyperlipidemia    • Hypertension      Past Surgical History:   Procedure Laterality Date   • CARDIAC SURGERY      double bypass   • KNEE SURGERY       No Known Allergies  Social History     Socioeconomic History   • Marital status:      Spouse name: Not on file   • Number of children: Not on file   • Years of education: Not on file   • Highest education level: Not on file   Social Needs   • Financial resource strain: Not on file   • Food insecurity - worry: Not on file   • Food insecurity - inability: Not on file   • Transportation needs - medical: Not on file   • Transportation needs - non-medical: Not on file   Occupational History   • Not on file   Tobacco Use   • Smoking status: Former Smoker     Types: Cigarettes, Pipe, Cigars     Last attempt to quit: 2004     Years since quittin.8   • Smokeless tobacco: Never Used   Substance and Sexual Activity   • Alcohol use: No   • Drug use: No   • Sexual activity: Defer   Other Topics Concern   • Not on file   Social History Narrative   • Not on file     History reviewed. No pertinent family history.        Assessment:          Diagnosis Plan   1. Coronary artery disease involving coronary bypass graft of native heart without angina pectoris      No signs/sxs of ischemia    2. Essential hypertension      Well controlled  Isolated high reading today      3. Mixed hyperlipidemia     4. PAF (paroxysmal atrial fibrillation) (CMS/[facility])      On Xarelto without bleeding issue.    5. Chronic systolic congestive heart failure (CMS/[facility])      Compensated.   6. Dizziness      Likely orthostatic hypotension  Patient to monitor at home and let us know what his BP are           Plan:       1. Increase Ranexa to 1000 mg BID. Continue present therapy otherwise  2. Counseled on slow position changes. Monitor BP and record. Will call with holter results once  resulted  3.  Follow-up in 3 months, unless needed sooner.   4. Verbalized understanding of instructions.

## 2018-12-07 ENCOUNTER — HOSPITAL ENCOUNTER (OUTPATIENT)
Dept: SLEEP MEDICINE | Facility: HOSPITAL | Age: 83
End: 2018-12-07
Attending: FAMILY MEDICINE

## 2019-03-11 ENCOUNTER — OFFICE VISIT (OUTPATIENT)
Dept: CARDIOLOGY | Facility: CLINIC | Age: 84
End: 2019-03-11

## 2019-03-11 VITALS
BODY MASS INDEX: 27.58 KG/M2 | HEART RATE: 58 BPM | WEIGHT: 182 LBS | HEIGHT: 68 IN | SYSTOLIC BLOOD PRESSURE: 132 MMHG | DIASTOLIC BLOOD PRESSURE: 60 MMHG

## 2019-03-11 DIAGNOSIS — I50.22 CHRONIC SYSTOLIC CONGESTIVE HEART FAILURE (HCC): ICD-10-CM

## 2019-03-11 DIAGNOSIS — I10 ESSENTIAL HYPERTENSION: Chronic | ICD-10-CM

## 2019-03-11 DIAGNOSIS — I48.0 PAF (PAROXYSMAL ATRIAL FIBRILLATION) (HCC): ICD-10-CM

## 2019-03-11 DIAGNOSIS — E78.2 MIXED HYPERLIPIDEMIA: Chronic | ICD-10-CM

## 2019-03-11 DIAGNOSIS — Z95.1 HX OF CABG: ICD-10-CM

## 2019-03-11 DIAGNOSIS — R42 DIZZINESS: ICD-10-CM

## 2019-03-11 DIAGNOSIS — Z79.01 ON CONTINUOUS ORAL ANTICOAGULATION: ICD-10-CM

## 2019-03-11 DIAGNOSIS — I25.810 CORONARY ARTERY DISEASE INVOLVING CORONARY BYPASS GRAFT OF NATIVE HEART WITHOUT ANGINA PECTORIS: Primary | Chronic | ICD-10-CM

## 2019-03-11 PROCEDURE — 93000 ELECTROCARDIOGRAM COMPLETE: CPT | Performed by: NURSE PRACTITIONER

## 2019-03-11 PROCEDURE — 99214 OFFICE O/P EST MOD 30 MIN: CPT | Performed by: NURSE PRACTITIONER

## 2019-03-11 RX ORDER — ALLOPURINOL 100 MG/1
100 TABLET ORAL DAILY
COMMUNITY

## 2019-03-11 RX ORDER — ACYCLOVIR 400 MG/1
400 TABLET ORAL
Status: ON HOLD | COMMUNITY
End: 2019-12-03

## 2019-03-11 RX ORDER — RANOLAZINE 500 MG/1
500 TABLET, EXTENDED RELEASE ORAL 2 TIMES DAILY
Status: ON HOLD | COMMUNITY
End: 2019-12-03 | Stop reason: ALTCHOICE

## 2019-03-11 NOTE — PROGRESS NOTES
Subjective:     Encounter Date:03/11/2019      Patient ID: Vasile Deng is a 87 y.o. male with a hx of CAD, chronic systolic heart failure, HTN, HLD, PAF, chronic anticoagulation with Xarelto, and recent dizziness for months.  He presents today for follow up.     Chief Complaint: Follow up  Dizziness   This is a chronic problem. The current episode started more than 1 year ago. Associated symptoms include weakness. Pertinent negatives include no abdominal pain, chest pain, chills, coughing, diaphoresis, fever, headaches, nausea or vomiting.   Coronary Artery Disease   Presents for follow-up visit. Symptoms include dizziness. Pertinent negatives include no chest pain, chest pressure, chest tightness, leg swelling, palpitations or shortness of breath. His past medical history is significant for CHF. The symptoms have been stable. Compliance with diet is good. Compliance with exercise is good. Compliance with medications is good.   Hypertension   This is a chronic problem. The current episode started more than 1 year ago. The problem is controlled. Pertinent negatives include no chest pain, headaches, malaise/fatigue, orthopnea, palpitations, peripheral edema, PND or shortness of breath. Risk factors for coronary artery disease include dyslipidemia and male gender. Current antihypertension treatment includes beta blockers, alpha 1 blockers, ACE inhibitors, diuretics and calcium channel blockers. Hypertensive end-organ damage includes CAD/MI and heart failure. There is no history of angina.   Congestive Heart Failure   Presents for follow-up visit. Pertinent negatives include no abdominal pain, chest pain, chest pressure, edema, near-syncope, palpitations, shortness of breath or unexpected weight change. The symptoms have been stable. His past medical history is significant for CAD. Compliance problems include adherence to diet.  Compliance with diet is %. Compliance with exercise is 0-25%. Compliance with  medications is 51-75%.     Mr. Deng presents for follow up. He complains of ongoing dizziness. This has been occurring for 5 months or so, worse with change of position. He uses cane for gait assistance. He has to take minutes at a time before position changes early in the morning. He denies chest pain. He denies shortness of breath. He denies swelling or weight gain. He has had no orthopnea or PND.    The following portions of the patient's history were reviewed and updated as appropriate: allergies, current medications, past family history, past medical history, past social history and past surgical history.     No Known Allergies      Current Outpatient Medications:   •  acyclovir (ZOVIRAX) 400 MG tablet, Take 400 mg by mouth Every 4 (Four) Hours While Awake. Take no more than 5 doses a day., Disp: , Rfl:   •  allopurinol (ZYLOPRIM) 100 MG tablet, Take 100 mg by mouth Daily., Disp: , Rfl:   •  amiodarone (PACERONE) 200 MG tablet, Take 1 tablet by mouth Every 12 (Twelve) Hours., Disp: 60 tablet, Rfl: 2  •  amLODIPine (NORVASC) 5 MG tablet, Take 1 tablet by mouth Daily., Disp: 30 tablet, Rfl: 2  •  atorvastatin (LIPITOR) 80 MG tablet, Take 0.5 tablets by mouth Daily., Disp: 30 tablet, Rfl: 2  •  calcium carbonate (TUMS) 500 MG chewable tablet, Chew 1,000 mg 2 (Two) Times a Day As Needed for Heartburn., Disp: 60 tablet, Rfl: 2  •  finasteride (PROSCAR) 5 MG tablet, Take 5 mg by mouth Daily., Disp: , Rfl:   •  furosemide (LASIX) 20 MG tablet, Take 1 tablet by mouth Daily. (Patient taking differently: Take 20 mg by mouth As Needed.), Disp: 30 tablet, Rfl: 2  •  lisinopril (PRINIVIL,ZESTRIL) 10 MG tablet, Take 1 tablet by mouth Daily., Disp: 30 tablet, Rfl: 2  •  metoprolol succinate XL (TOPROL-XL) 50 MG 24 hr tablet, Take 1 tablet by mouth Daily., Disp: 30 tablet, Rfl: 11  •  nitroglycerin (NITROSTAT) 0.4 MG SL tablet, Place 0.4 mg under the tongue Every 5 (Five) Minutes As Needed for Chest Pain. Take no more than 3  doses in 15 minutes., Disp: , Rfl:   •  omeprazole (priLOSEC) 40 MG capsule, Take 40 mg by mouth Daily., Disp: , Rfl:   •  ranolazine (RANEXA) 500 MG 12 hr tablet, Take 500 mg by mouth 2 (Two) Times a Day., Disp: , Rfl:   •  rivaroxaban (XARELTO) 15 MG tablet, Take 1 tablet by mouth Daily With Dinner., Disp: 30 tablet, Rfl: 2  •  sennosides-docusate sodium (SENOKOT-S) 8.6-50 MG tablet, Take 2 tablets by mouth As Needed for Constipation., Disp: 60 tablet, Rfl: 0  •  tamsulosin (FLOMAX) 0.4 MG capsule 24 hr capsule, Take 1 capsule by mouth Every Night., Disp: 30 capsule, Rfl: 0    Past Medical History:   Diagnosis Date   • Acute on chronic combined systolic and diastolic congestive heart failure (CMS/HCC) 9/29/2018   • Atrial fibrillation (CMS/HCC)    • Coronary artery disease    • Dizziness    • Hyperlipidemia    • Hypertension      History reviewed. No pertinent family history.  Social History     Socioeconomic History   • Marital status:      Spouse name: Not on file   • Number of children: Not on file   • Years of education: Not on file   • Highest education level: Not on file   Social Needs   • Financial resource strain: Not on file   • Food insecurity - worry: Not on file   • Food insecurity - inability: Not on file   • Transportation needs - medical: Not on file   • Transportation needs - non-medical: Not on file   Occupational History   • Not on file   Tobacco Use   • Smoking status: Former Smoker     Types: Cigarettes, Pipe, Cigars     Last attempt to quit: 2004     Years since quitting: 15.2   • Smokeless tobacco: Never Used   Substance and Sexual Activity   • Alcohol use: No   • Drug use: No   • Sexual activity: Defer   Other Topics Concern   • Not on file   Social History Narrative   • Not on file     Past Surgical History:   Procedure Laterality Date   • CARDIAC CATHETERIZATION Left 9/30/2018    Procedure: Cardiac Catheterization/Vascular Study;  Surgeon: Huey Wilkins MD;  Location:   "[facility];  Service: Cardiology   • CARDIAC CATHETERIZATION N/A 9/30/2018    Procedure: Left ventriculography;  Surgeon: Huey Wilkins MD;  Location:  [facility];  Service: Cardiology   • CARDIAC SURGERY      double bypass   • KNEE SURGERY         Review of Systems   Constitution: Positive for weakness. Negative for chills, diaphoresis, fever, malaise/fatigue and unexpected weight change.   HENT: Positive for hearing loss.    Eyes: Negative for visual disturbance.   Cardiovascular: Negative for chest pain, dyspnea on exertion, leg swelling, near-syncope, orthopnea, palpitations, paroxysmal nocturnal dyspnea and syncope.   Respiratory: Negative for chest tightness, cough, shortness of breath, sputum production and wheezing.    Hematologic/Lymphatic: Negative for bleeding problem. Bruises/bleeds easily.   Gastrointestinal: Negative for bloating, abdominal pain, hematochezia, melena, nausea and vomiting.   Genitourinary: Negative for hematuria.   Neurological: Positive for dizziness. Negative for focal weakness and headaches.   Psychiatric/Behavioral: Negative for altered mental status.         ECG 12 Lead  Date/Time: 3/11/2019 4:25 PM  Performed by: Terri Dumont APRN  Authorized by: Terri Dumont APRN   Comparison: compared with previous ECG from 11/21/2018  Similar to previous ECG  Rhythm: sinus bradycardia  Rate: bradycardic  BPM: 58  Conduction: right bundle branch block  ST Segments: ST segments normal  T Waves: T waves normal  QRS axis: left    Clinical impression: abnormal EKG          /60   Pulse 58   Ht 172.7 cm (68\")   Wt 82.6 kg (182 lb)   BMI 27.67 kg/m²        Objective:     Physical Exam   Constitutional: He is oriented to person, place, and time. He appears well-developed and well-nourished. No distress.   HENT:   Head: Normocephalic and atraumatic.   Mouth/Throat: Oropharynx is clear and moist. No oropharyngeal exudate.   Eyes: Conjunctivae " are normal. No scleral icterus.   Neck: Normal range of motion. Neck supple.   Cardiovascular: Normal rate and regular rhythm. Exam reveals no gallop and no friction rub.   No murmur heard.  Pulmonary/Chest: Effort normal and breath sounds normal. No respiratory distress. He has no wheezes. He has no rales.   Abdominal: Soft. Normal appearance. He exhibits no distension. There is no tenderness.   Musculoskeletal: Normal range of motion. He exhibits no edema.   Neurological: He is alert and oriented to person, place, and time.   Skin: Skin is warm, dry and intact. No rash noted. He is not diaphoretic. No erythema. No pallor.   Psychiatric: He has a normal mood and affect. His behavior is normal.   Vitals reviewed.      Lab Review:   Cardiac Catherization 09.30.2018  Impression:  1. SEVERE AND DIFFUSE AND INOP CAD  2. PATENT GRAFTS  3. MODERATE TO SEVERE ISCHEMIC LV DYSFUNCTION  4. NORMAL HEMODYNAMICS INCLUDING LVEDP    Interpretation Summary   Echo 09.29.2018     · There is moderate calcification of the aortic valve mainly affecting the non coronary cusp(s).  · Mild mitral valve regurgitation is present  · Mild to moderate tricuspid valve regurgitation is present.  · The left ventricular cavity is mildly dilated.  · Right ventricular cavity is mildly dilated.  · Left atrial cavity size is moderately dilated.  · Mild dilation of the aortic root is present.     SEVERE DILATED CARDIOMYOPATHY  MR AND TR APPEAR TO BE SECONDARY  BI-ATRIAL ENLARGEMENT           Assessment:          Diagnosis Plan   1. Coronary artery disease involving coronary bypass graft of native heart without angina pectoris     2. Chronic systolic congestive heart failure (CMS/HCC)     3. Essential hypertension     4. Mixed hyperlipidemia     5. PAF (paroxysmal atrial fibrillation) (CMS/HCC)     6. Hx of CABG     7. On continuous oral anticoagulation            Plan:       - CAD: stable. On ranexa. Without chest pain. No clinical indication of  ischemia. Off asa due to anticoagulation. No need for NTG use recently. Ranexa was at 1000 mg BID. His friend who is with him today states that [facility] the [facility].    - CHF: no weight gain, c/o shortness of breath, orthopnea. Stable. He is on beta blocker and ace inhibitor. He takes lasix as needed.    -HTN: well controlled    - HLD: on statin    - PAF: on amiodarone. SB per EKG, anticoagulated.    - CABG: remote    - anticoagulation: Xarelto.  Patient POA states that due to bruising he has been taking it once every other day with dinner. Discussed rationale behind anticoagulation and need for daily dosing. Patient and POA verbalized understanding.     - dizziness: chronic complaint. Could be a side effect from meds. Worse with position change. Orthostatic hypotension, probable.  Discuss discontinuation of flomax or proscar to see if this improves symptoms with PCP. Patient has had Ranexa cut back. Also rule out inner ear issue vs vertigo. [facility] in [place], [place].    Follow up in 3 months

## 2019-03-11 NOTE — PATIENT INSTRUCTIONS
Coronary Artery Disease, Male  Coronary artery disease (CAD) is a condition in which the arteries that lead to the heart (coronary arteries) become narrow or blocked. The narrowing or blockage can lead to decreased blood flow to the heart. Prolonged reduced blood flow can cause a heart attack (myocardial infarction or MI). This condition may also be called coronary heart disease.  Because CAD is the leading cause of death in men, it is important to understand what causes this condition and how it is treated.  What are the causes?  CAD is most often caused by atherosclerosis. This is the buildup of fat and cholesterol (plaque) on the inside of the arteries. Over time, the plaque may narrow or block the artery, reducing blood flow to the heart. Plaque can also become weak and break off within a coronary artery and cause a sudden blockage. Other less common causes of CAD include:  · An embolism or blood clot in a coronary artery.  · A tearing of the artery (spontaneous coronary artery dissection).  · An aneurysm.  · Inflammation (vasculitis) in the artery wall.    What increases the risk?  The following factors may make you more likely to develop this condition:  · Age. Men over age 45 are at a greater risk of CAD.  · Family history of CAD.  · Gender. Men often develop CAD earlier in life than women.  · High blood pressure (hypertension).  · Diabetes.  · High cholesterol levels.  · Tobacco use.  · Excessive alcohol use.  · Lack of exercise.  · A diet high in saturated and trans fats, such as fried food and processed meat.    Other possible risk factors include:  · High stress levels.  · Depression.  · Obesity.  · Sleep apnea.    What are the signs or symptoms?  Many people do not have any symptoms during the early stages of CAD. As the condition progresses, symptoms may include:  · Chest pain (angina). The pain can:  ? Feel like a crushing or squeezing, or a tightness, pressure, fullness, or heaviness in the  chest.  ? Last more than a few minutes or can stop and recur. The pain tends to get worse with exercise or stress and to fade with rest.  · Pain in the arms, neck, jaw, or back.  · Unexplained heartburn or indigestion.  · Shortness of breath.  · Nausea or vomiting.  · Sudden light-headedness.  · Sudden cold sweats.  · Fluttering or fast heartbeat (palpitations).    How is this diagnosed?  This condition is diagnosed based on:  · Your family and medical history.  · A physical exam.  · Tests, including:  ? A test to check the electrical signals in your heart (electrocardiogram).  ? Exercise stress test. This looks for signs of blockage when the heart is stressed with exercise, such as running on a treadmill.  ? Pharmacologic stress test. This test looks for signs of blockage when the heart is being stressed with a medicine.  ? Blood tests.  ? Coronary angiogram. This is a procedure to look at the coronary arteries to see if there is any blockage. During this test, a dye is injected into your arteries so they appear on an X-ray.  ? A test that uses sound waves to take a picture of your heart (echocardiogram).  ? Chest X-ray.    How is this treated?  This condition may be treated by:  · Healthy lifestyle changes to reduce risk factors.  · Medicines such as:  ? Antiplatelet medicines and blood-thinning medicines, such as aspirin. These help to prevent blood clots.  ? Nitroglycerin.  ? Blood pressure medicines.  ? Cholesterol-lowering medicine.  · Coronary angioplasty and stenting. During this procedure, a thin, flexible tube is inserted through a blood vessel and into a blocked artery. A balloon or similar device on the end of the tube is inflated to open up the artery. In some cases, a small, mesh tube (stent) is inserted into the artery to keep it open.  · Coronary artery bypass surgery. During this surgery, veins or arteries from other parts of the body are used to create a bypass around the blockage and allow blood  to reach your heart.    Follow these instructions at home:  Medicines  · Take over-the-counter and prescription medicines only as told by your health care provider.  · Do not take the following medicines unless your health care provider approves:  ? NSAIDs, such as ibuprofen, naproxen, or celecoxib.  ? Vitamin supplements that contain vitamin A, vitamin E, or both.  Lifestyle  · Follow an exercise program approved by your health care provider. Aim for 150 minutes of moderate exercise or 75 minutes of vigorous exercise each week.  · Maintain a healthy weight or lose weight as approved by your health care provider.  · Rest when you are tired.  · Learn to manage stress or try to limit your stress. Ask your health care provider for suggestions if you need help.  · Get screened for depression and seek treatment, if needed.  · Do not use any products that contain nicotine or tobacco, such as cigarettes and e-cigarettes. If you need help quitting, ask your health care provider.  · Do not use illegal drugs.  Eating and drinking  · Follow a heart-healthy diet. A dietitian can help educate you about healthy food options and changes. In general, eat plenty of fruits and vegetables, lean meats, and whole grains.  · Avoid foods high in:  ? Sugar.  ? Salt (sodium).  ? Saturated fat, such as processed or fatty meat.  ? Trans fat, such as fried foods.  · Use healthy cooking methods such as roasting, grilling, broiling, baking, poaching, steaming, or stir-frying.  · If you drink alcohol, and your health care provider approves, limit your alcohol intake to no more than 2 drinks per day. One drink equals 12 ounces of beer, 5 ounces of wine, or 1½ ounces of hard liquor.  General instructions  · Manage any other health conditions, such as hypertension and diabetes. These conditions affect your heart.  · Your health care provider may ask you to monitor your blood pressure. Ideally, your blood pressure should be below 130/80.  · Keep all  "follow-up visits as told by your health care provider. This is important.  Get help right away if:  · You have pain in your chest, neck, arm, jaw, stomach, or back that:  ? Lasts more than a few minutes.  ? Is recurring.  ? Is not relieved by taking medicine under your tongue (sublingualnitroglycerin).  · You have too much (profuse) sweating without cause.  · You have unexplained:  ? Heartburn or indigestion.  ? Shortness of breath or difficulty breathing.  ? Fluttering or fast heartbeat (palpitations).  ? Nausea or vomiting.  ? Fatigue.  ? Feelings of nervousness or anxiety.  ? Weakness.  ? Diarrhea.  · You have sudden light-headedness or dizziness.  · You faint.  · You feel like hurting yourself or think about taking your own life.  These symptoms may represent a serious problem that is an emergency. Do not wait to see if the symptoms will go away. Get medical help right away. Call your local emergency services (911 in the [place] Do not drive yourself to the hospital.  Summary  · Coronary artery disease (CAD) is a process in which the arteries that lead to the heart (coronary arteries) become narrow or blocked. The narrowing or blockage can lead to a heart attack.  · Many people do not have any symptoms during the early stages of CAD. This is called \"silent CAD.\"  · CAD can be treated with lifestyle changes, medicines, surgery, or a combination of these treatments.  This information is not intended to replace advice given to you by your health care provider. Make sure you discuss any questions you have with your health care provider.  Document Released: 07/15/2015 Document Revised: 12/08/2017 Document Reviewed: 12/08/2017  [employer] Interactive Patient Education © 2019 [employer]  Bleeding Precautions When on Anticoagulant Therapy  WHAT IS ANTICOAGULANT THERAPY?  Anticoagulant therapy is taking medicine to prevent or reduce blood clots. It is also called blood thinner therapy. Blood clots that form in your blood " vessels can be dangerous. They can break loose and travel to your heart, lungs, or brain. This increases your risk of a heart attack or stroke. Anticoagulant therapy causes blood to clot more slowly.  You may need anticoagulant therapy if you have:  · A medical condition that increases the likelihood that blood clots will form.  · A heart defect or a problem with heart rhythm.  It is also a common treatment after heart surgery, such as valve replacement.  WHAT ARE COMMON TYPES OF ANTICOAGULANT THERAPY?  Anticoagulant medicine can be injected or taken by mouth. If you need anticoagulant therapy quickly at the hospital, the medicine may be injected under your skin or given through an IV tube. Heparin is a common example of an anticoagulant that you may get at the hospital.  Most anticoagulant therapy is in the form of pills that you take at home every day. These may include:  · Aspirin. This common blood thinner works by preventing blood cells (platelets) from sticking together to form a clot. Aspirin is not as strong as anticoagulants that slow down the time that it takes for your body to form a clot.  · Clopidogrel. This is a newer type of drug that affects platelets. It is stronger than aspirin.  · Warfarin. This is the most common anticoagulant. It changes the way your body uses vitamin K, a vitamin that helps your blood to clot. The risk of bleeding is higher with warfarin than with aspirin. You will need frequent blood tests to make sure you are taking the safest amount.  · New anticoagulants. Several new drugs have been approved. They are all taken by mouth. Studies show that these drugs work as well as warfarin. They do not require blood testing. They may cause less bleeding risk than warfarin.  WHAT DO I NEED TO REMEMBER WHEN TAKING ANTICOAGULANT THERAPY?  Anticoagulant therapy decreases your risk of forming a blood clot, but it increases your risk of bleeding. Work closely with your health care provider to  make sure you are taking your medicine safely. These tips can help:  · Learn ways to reduce your risk of bleeding.  · If you are taking warfarin:  ? Have blood tests as ordered by your health care provider.  ? Do not make any sudden changes to your diet. Vitamin K in your diet can make warfarin less effective.  ? Do not get pregnant. This medicine may cause birth defects.  · Take your medicine at the same time every day. If you forget to take your medicine, take it as soon as you remember. If you miss a whole day, do not double your dose of medicine. Take your normal dose and call your health care provider to check in.  · Do not stop taking your medicine on your own.  · Tell your health care provider before you start taking any new medicine, vitamin, or herbal product. Some of these could interfere with your therapy.  · Tell all of your health care providers that you are on anticoagulant therapy.  · Do not have surgery, medical procedures, or dental work until you tell your health care provider that you are on anticoagulant therapy.  WHAT CAN AFFECT HOW ANTICOAGULANTS WORK?  Certain foods, vitamins, medicines, supplements, and herbal medicines change the way that anticoagulant therapy works. They may increase or decrease the effects of your anticoagulant therapy. Either result can be dangerous for you.  · Many over-the-counter medicines for pain, colds, or stomach problems interfere with anticoagulant therapy. Take these only as told by your health care provider.  · Do not drink alcohol. It can interfere with your medicine and increase your risk of an injury that causes bleeding.  · If you are taking warfarin, do not begin eating more foods that contain vitamin K. These include leafy green vegetables. Ask your health care provider if you should avoid any foods.  WHAT ARE SOME WAYS TO PREVENT BLEEDING?  You can prevent bleeding by taking certain precautions:  · Be extra careful when you use knives, scissors, or other  sharp objects.  · Use an electric razor instead of a blade.  · Do not use toothpicks.  · Use a soft toothbrush.  · Wear shoes that have nonskid soles.  · Use bath mats and handrails in your bathroom.  · Wear gloves while you do yard work.  · Wear a helmet when you ride a bike.  · Wear your seat belt.  · Prevent falls by removing loose rugs and extension cords from areas where you walk.  · Do not play contact sports or participate in other activities that have a high risk of injury.  WHEN SHOULD I CONTACT MY HEALTH CARE PROVIDER?  Call your health care provider if:  · You miss a dose of medicine:  ? And you are not sure what to do.  ? For more than one day.  · You have:  ? Menstrual bleeding that is heavier than normal.  ? Blood in your urine.  ? A bloody nose or bleeding gums.  ? Easy bruising.  ? Blood in your stool (feces) or have black and tarry stool.  ? Side effects from your medicine.  · You feel weak or dizzy.  · You become pregnant.  Seek immediate medical care if:  · You have bleeding that will not stop.  · You have sudden and severe headache or belly pain.  · You vomit or you cough up bright red blood.  · You have a severe blow to your head.  WHAT ARE SOME QUESTIONS TO ASK MY HEALTH CARE PROVIDER?  · What is the best anticoagulant therapy for my condition?  · What side effects should I watch for?  · When should I take my medicine? What should I do if I forget to take it?  · Will I need to have regular blood tests?  · Do I need to change my diet? Are there foods or drinks that I should avoid?  · What activities are safe for me?  · What should I do if I want to get pregnant?  This information is not intended to replace advice given to you by your health care provider. Make sure you discuss any questions you have with your health care provider.  Document Released: 11/28/2016 Document Reviewed: 11/28/2016  [employer] Interactive Patient Education © 2017 [employer]

## 2019-03-24 NOTE — TELEPHONE ENCOUNTER
Patient seen PCP recently and they were wanting to change metoprolol to 50 mg BID with tartrate because BP has been running low. Do you agree with these changes?   Patient/Caregiver provided printed discharge information.

## 2019-05-03 ENCOUNTER — TELEPHONE (OUTPATIENT)
Dept: CARDIOLOGY | Facility: CLINIC | Age: 84
End: 2019-05-03

## 2019-05-03 NOTE — TELEPHONE ENCOUNTER
POA has called in today wondering if we could place order for [facility], patient has been having trouble with light headedness/dizziness and balance. Please advise.

## 2019-11-25 ENCOUNTER — APPOINTMENT (OUTPATIENT)
Dept: GENERAL RADIOLOGY | Age: 84
End: 2019-11-25
Payer: MEDICARE

## 2019-11-25 ENCOUNTER — APPOINTMENT (OUTPATIENT)
Dept: CT IMAGING | Age: 84
End: 2019-11-25
Payer: MEDICARE

## 2019-11-25 ENCOUNTER — HOSPITAL ENCOUNTER (EMERGENCY)
Age: 84
Discharge: HOME | End: 2019-11-25
Attending: EMERGENCY MEDICINE
Payer: MEDICARE

## 2019-11-25 VITALS
TEMPERATURE: 98.2 F | RESPIRATION RATE: 18 BRPM | OXYGEN SATURATION: 95 % | HEART RATE: 64 BPM | SYSTOLIC BLOOD PRESSURE: 120 MMHG | HEIGHT: 68 IN | DIASTOLIC BLOOD PRESSURE: 68 MMHG | BODY MASS INDEX: 27.28 KG/M2 | WEIGHT: 180 LBS

## 2019-11-25 DIAGNOSIS — S70.01XA CONTUSION OF RIGHT HIP, INITIAL ENCOUNTER: Primary | ICD-10-CM
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
## 2019-11-25 LAB
ALBUMIN SERPL-MCNC: 4 G/DL (ref 3.5–5.2)
ALP BLD-CCNC: 110 U/L (ref 40–130)
ALT SERPL-CCNC: 41 U/L (ref 5–41)
ANION GAP SERPL CALCULATED.3IONS-SCNC: 10 MMOL/L (ref 7–19)
AST SERPL-CCNC: 38 U/L (ref 5–40)
BASOPHILS ABSOLUTE: 0 K/UL (ref 0–0.2)
BASOPHILS RELATIVE PERCENT: 0.4 % (ref 0–1)
BILIRUB SERPL-MCNC: 0.6 MG/DL (ref 0.2–1.2)
BUN BLDV-MCNC: 16 MG/DL (ref 8–23)
CALCIUM SERPL-MCNC: 8.9 MG/DL (ref 8.8–10.2)
CHLORIDE BLD-SCNC: 97 MMOL/L (ref 98–111)
CO2: 25 MMOL/L (ref 22–29)
CREAT SERPL-MCNC: 0.9 MG/DL (ref 0.5–1.2)
EOSINOPHILS ABSOLUTE: 0.2 K/UL (ref 0–0.6)
EOSINOPHILS RELATIVE PERCENT: 3 % (ref 0–5)
GFR NON-AFRICAN AMERICAN: >60
GLUCOSE BLD-MCNC: 106 MG/DL (ref 74–109)
HCT VFR BLD CALC: 40.1 % (ref 42–52)
HEMOGLOBIN: 13.1 G/DL (ref 14–18)
IMMATURE GRANULOCYTES #: 0 K/UL
INR BLD: 1.21 (ref 0.88–1.18)
LACTIC ACID: 1.3 MMOL/L (ref 0.5–1.9)
LYMPHOCYTES ABSOLUTE: 1.5 K/UL (ref 1.1–4.5)
LYMPHOCYTES RELATIVE PERCENT: 21.9 % (ref 20–40)
MCH RBC QN AUTO: 28.5 PG (ref 27–31)
MCHC RBC AUTO-ENTMCNC: 32.7 G/DL (ref 33–37)
MCV RBC AUTO: 87.4 FL (ref 80–94)
MONOCYTES ABSOLUTE: 0.5 K/UL (ref 0–0.9)
MONOCYTES RELATIVE PERCENT: 6.9 % (ref 0–10)
NEUTROPHILS ABSOLUTE: 4.7 K/UL (ref 1.5–7.5)
NEUTROPHILS RELATIVE PERCENT: 67.4 % (ref 50–65)
PDW BLD-RTO: 14.3 % (ref 11.5–14.5)
PLATELET # BLD: 149 K/UL (ref 130–400)
PMV BLD AUTO: 9.7 FL (ref 9.4–12.4)
POTASSIUM SERPL-SCNC: 4.7 MMOL/L (ref 3.5–5)
PROTHROMBIN TIME: 14.7 SEC (ref 12–14.6)
RBC # BLD: 4.59 M/UL (ref 4.7–6.1)
SODIUM BLD-SCNC: 132 MMOL/L (ref 136–145)
TOTAL PROTEIN: 7.7 G/DL (ref 6.6–8.7)
TROPONIN: <0.01 NG/ML (ref 0–0.03)
WBC # BLD: 7 K/UL (ref 4.8–10.8)

## 2019-11-25 PROCEDURE — 84484 ASSAY OF TROPONIN QUANT: CPT

## 2019-11-25 PROCEDURE — 93005 ELECTROCARDIOGRAM TRACING: CPT | Performed by: NURSE PRACTITIONER

## 2019-11-25 PROCEDURE — 99284 EMERGENCY DEPT VISIT MOD MDM: CPT

## 2019-11-25 PROCEDURE — 83605 ASSAY OF LACTIC ACID: CPT

## 2019-11-25 PROCEDURE — 85025 COMPLETE CBC W/AUTO DIFF WBC: CPT

## 2019-11-25 PROCEDURE — 73502 X-RAY EXAM HIP UNI 2-3 VIEWS: CPT

## 2019-11-25 PROCEDURE — 71045 X-RAY EXAM CHEST 1 VIEW: CPT

## 2019-11-25 PROCEDURE — 96374 THER/PROPH/DIAG INJ IV PUSH: CPT

## 2019-11-25 PROCEDURE — 85610 PROTHROMBIN TIME: CPT

## 2019-11-25 PROCEDURE — 70450 CT HEAD/BRAIN W/O DYE: CPT

## 2019-11-25 PROCEDURE — 6360000002 HC RX W HCPCS: Performed by: EMERGENCY MEDICINE

## 2019-11-25 PROCEDURE — 72192 CT PELVIS W/O DYE: CPT

## 2019-11-25 PROCEDURE — 6370000000 HC RX 637 (ALT 250 FOR IP): Performed by: NURSE PRACTITIONER

## 2019-11-25 PROCEDURE — 6360000002 HC RX W HCPCS: Performed by: NURSE PRACTITIONER

## 2019-11-25 PROCEDURE — 80053 COMPREHEN METABOLIC PANEL: CPT

## 2019-11-25 PROCEDURE — 36415 COLL VENOUS BLD VENIPUNCTURE: CPT

## 2019-11-25 RX ORDER — ONDANSETRON 4 MG/1
4 TABLET, ORALLY DISINTEGRATING ORAL ONCE
Status: COMPLETED | OUTPATIENT
Start: 2019-11-25 | End: 2019-11-25

## 2019-11-25 RX ORDER — AMIODARONE HYDROCHLORIDE 200 MG/1
200 TABLET ORAL 2 TIMES DAILY
Status: ON HOLD | COMMUNITY
End: 2019-12-09

## 2019-11-25 RX ORDER — ACETAMINOPHEN 500 MG
1000 TABLET ORAL ONCE
Status: COMPLETED | OUTPATIENT
Start: 2019-11-25 | End: 2019-11-25

## 2019-11-25 RX ORDER — AMLODIPINE BESYLATE 5 MG/1
5 TABLET ORAL NIGHTLY
Status: ON HOLD | COMMUNITY
End: 2019-12-09

## 2019-11-25 RX ORDER — TAMSULOSIN HYDROCHLORIDE 0.4 MG/1
0.4 CAPSULE ORAL NIGHTLY
Status: ON HOLD | COMMUNITY
End: 2020-01-05 | Stop reason: HOSPADM

## 2019-11-25 RX ORDER — FUROSEMIDE 20 MG/1
20 TABLET ORAL DAILY PRN
Status: ON HOLD | COMMUNITY
End: 2019-12-09

## 2019-11-25 RX ADMIN — ACETAMINOPHEN 1000 MG: 500 TABLET ORAL at 02:32

## 2019-11-25 RX ADMIN — HYDROMORPHONE HYDROCHLORIDE 0.5 MG: 1 INJECTION, SOLUTION INTRAMUSCULAR; INTRAVENOUS; SUBCUTANEOUS at 04:11

## 2019-11-25 RX ADMIN — ONDANSETRON 4 MG: 4 TABLET, ORALLY DISINTEGRATING ORAL at 04:11

## 2019-11-25 RX ADMIN — HYDROMORPHONE HYDROCHLORIDE 1 MG: 1 INJECTION, SOLUTION INTRAMUSCULAR; INTRAVENOUS; SUBCUTANEOUS at 07:22

## 2019-11-25 ASSESSMENT — PAIN SCALES - GENERAL
PAINLEVEL_OUTOF10: 3
PAINLEVEL_OUTOF10: 4
PAINLEVEL_OUTOF10: 3
PAINLEVEL_OUTOF10: 4
PAINLEVEL_OUTOF10: 8

## 2019-11-25 ASSESSMENT — ENCOUNTER SYMPTOMS
ABDOMINAL PAIN: 0
SHORTNESS OF BREATH: 0

## 2019-11-25 ASSESSMENT — PAIN DESCRIPTION - LOCATION: LOCATION: HIP

## 2019-11-25 ASSESSMENT — PAIN DESCRIPTION - ORIENTATION: ORIENTATION: RIGHT

## 2019-11-25 ASSESSMENT — PAIN DESCRIPTION - PAIN TYPE: TYPE: ACUTE PAIN

## 2019-11-26 LAB
EKG P AXIS: -3 DEGREES
EKG P-R INTERVAL: 164 MS
EKG Q-T INTERVAL: 484 MS
EKG QRS DURATION: 156 MS
EKG QTC CALCULATION (BAZETT): 487 MS
EKG T AXIS: 86 DEGREES

## 2019-11-26 PROCEDURE — 93010 ELECTROCARDIOGRAM REPORT: CPT | Performed by: INTERNAL MEDICINE

## 2019-11-27 ENCOUNTER — APPOINTMENT (OUTPATIENT)
Dept: CT IMAGING | Facility: HOSPITAL | Age: 84
End: 2019-11-27

## 2019-11-27 ENCOUNTER — HOSPITAL ENCOUNTER (INPATIENT)
Facility: HOSPITAL | Age: 84
LOS: 12 days | Discharge: NURSING, REHAB OR LONG-TERM CARE | End: 2019-12-09
Attending: EMERGENCY MEDICINE | Admitting: ORTHOPAEDIC SURGERY

## 2019-11-27 DIAGNOSIS — Z74.09 IMPAIRED MOBILITY: ICD-10-CM

## 2019-11-27 DIAGNOSIS — S72.141A CLOSED INTERTROCHANTERIC FRACTURE OF HIP, RIGHT, INITIAL ENCOUNTER (HCC): ICD-10-CM

## 2019-11-27 DIAGNOSIS — Z74.09 IMPAIRED MOBILITY AND ADLS: ICD-10-CM

## 2019-11-27 DIAGNOSIS — R55 SYNCOPE, UNSPECIFIED SYNCOPE TYPE: ICD-10-CM

## 2019-11-27 DIAGNOSIS — R42 POSTURAL DIZZINESS WITH NEAR SYNCOPE: ICD-10-CM

## 2019-11-27 DIAGNOSIS — M25.551 RIGHT HIP PAIN: ICD-10-CM

## 2019-11-27 DIAGNOSIS — Z79.01 ON CONTINUOUS ORAL ANTICOAGULATION: ICD-10-CM

## 2019-11-27 DIAGNOSIS — I25.810 CORONARY ARTERY DISEASE INVOLVING CORONARY BYPASS GRAFT OF NATIVE HEART WITHOUT ANGINA PECTORIS: Chronic | ICD-10-CM

## 2019-11-27 DIAGNOSIS — Z78.9 IMPAIRED MOBILITY AND ADLS: ICD-10-CM

## 2019-11-27 DIAGNOSIS — I10 ESSENTIAL HYPERTENSION: Chronic | ICD-10-CM

## 2019-11-27 DIAGNOSIS — R55 POSTURAL DIZZINESS WITH NEAR SYNCOPE: ICD-10-CM
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
## 2019-11-27 DIAGNOSIS — E87.1 HYPONATREMIA: ICD-10-CM

## 2019-11-27 DIAGNOSIS — M25.551 ACUTE PAIN OF RIGHT HIP: ICD-10-CM

## 2019-11-27 DIAGNOSIS — I21.4 NSTEMI (NON-ST ELEVATED MYOCARDIAL INFARCTION) (HCC): ICD-10-CM

## 2019-11-27 DIAGNOSIS — Z78.9 DECREASED ACTIVITIES OF DAILY LIVING (ADL): ICD-10-CM

## 2019-11-27 DIAGNOSIS — R06.02 SHORTNESS OF BREATH: ICD-10-CM

## 2019-11-27 DIAGNOSIS — I48.0 PAF (PAROXYSMAL ATRIAL FIBRILLATION) (HCC): ICD-10-CM

## 2019-11-27 DIAGNOSIS — S72.001C: ICD-10-CM

## 2019-11-27 DIAGNOSIS — Z87.891 FORMER SMOKER: ICD-10-CM

## 2019-11-27 DIAGNOSIS — R52 INTRACTABLE PAIN: ICD-10-CM

## 2019-11-27 DIAGNOSIS — Z95.1 HX OF CABG: ICD-10-CM

## 2019-11-27 DIAGNOSIS — I50.22 CHRONIC SYSTOLIC CONGESTIVE HEART FAILURE (HCC): ICD-10-CM
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
## 2019-11-27 DIAGNOSIS — R77.8 ELEVATED TROPONIN: ICD-10-CM

## 2019-11-27 DIAGNOSIS — E78.2 MIXED HYPERLIPIDEMIA: Chronic | ICD-10-CM

## 2019-11-27 LAB
ALBUMIN SERPL-MCNC: 3.7 G/DL (ref 3.5–5.2)
ALBUMIN/GLOB SERPL: 1 G/DL
ALP SERPL-CCNC: 110 U/L (ref 39–117)
ALT SERPL W P-5'-P-CCNC: 35 U/L (ref 1–41)
ANION GAP SERPL CALCULATED.3IONS-SCNC: 10 MMOL/L (ref 5–15)
AST SERPL-CCNC: 29 U/L (ref 1–40)
BASOPHILS # BLD AUTO: 0.03 10*3/MM3 (ref 0–0.2)
BASOPHILS NFR BLD AUTO: 0.5 % (ref 0–1.5)
BILIRUB SERPL-MCNC: 0.7 MG/DL (ref 0.2–1.2)
BUN BLD-MCNC: 16 MG/DL (ref 8–23)
BUN/CREAT SERPL: 20 (ref 7–25)
CALCIUM SPEC-SCNC: 8.7 MG/DL (ref 8.6–10.5)
CHLORIDE SERPL-SCNC: 93 MMOL/L (ref 98–107)
CO2 SERPL-SCNC: 27 MMOL/L (ref 22–29)
CREAT BLD-MCNC: 0.8 MG/DL (ref 0.76–1.27)
DEPRECATED RDW RBC AUTO: 42.8 FL (ref 37–54)
EOSINOPHIL # BLD AUTO: 0.26 10*3/MM3 (ref 0–0.4)
EOSINOPHIL NFR BLD AUTO: 4 % (ref 0.3–6.2)
ERYTHROCYTE [DISTWIDTH] IN BLOOD BY AUTOMATED COUNT: 14.1 % (ref 12.3–15.4)
GFR SERPL CREATININE-BSD FRML MDRD: 91 ML/MIN/1.73
GLOBULIN UR ELPH-MCNC: 3.7 GM/DL
GLUCOSE BLD-MCNC: 126 MG/DL (ref 65–99)
HCT VFR BLD AUTO: 40 % (ref 37.5–51)
HGB BLD-MCNC: 13.7 G/DL (ref 13–17.7)
IMM GRANULOCYTES # BLD AUTO: 0.03 10*3/MM3 (ref 0–0.05)
IMM GRANULOCYTES NFR BLD AUTO: 0.5 % (ref 0–0.5)
LYMPHOCYTES # BLD AUTO: 1.5 10*3/MM3 (ref 0.7–3.1)
LYMPHOCYTES NFR BLD AUTO: 23 % (ref 19.6–45.3)
MCH RBC QN AUTO: 28.6 PG (ref 26.6–33)
MCHC RBC AUTO-ENTMCNC: 34.3 G/DL (ref 31.5–35.7)
MCV RBC AUTO: 83.5 FL (ref 79–97)
MONOCYTES # BLD AUTO: 0.72 10*3/MM3 (ref 0.1–0.9)
MONOCYTES NFR BLD AUTO: 11.1 % (ref 5–12)
NEUTROPHILS # BLD AUTO: 3.97 10*3/MM3 (ref 1.7–7)
NEUTROPHILS NFR BLD AUTO: 60.9 % (ref 42.7–76)
NRBC BLD AUTO-RTO: 0 /100 WBC (ref 0–0.2)
PLATELET # BLD AUTO: 172 10*3/MM3 (ref 140–450)
PMV BLD AUTO: 9.3 FL (ref 6–12)
POTASSIUM BLD-SCNC: 4 MMOL/L (ref 3.5–5.2)
PROT SERPL-MCNC: 7.4 G/DL (ref 6–8.5)
RBC # BLD AUTO: 4.79 10*6/MM3 (ref 4.14–5.8)
SODIUM BLD-SCNC: 130 MMOL/L (ref 136–145)
TROPONIN T SERPL-MCNC: <0.01 NG/ML (ref 0–0.03)
WBC NRBC COR # BLD: 6.51 10*3/MM3 (ref 3.4–10.8)

## 2019-11-27 PROCEDURE — 80053 COMPREHEN METABOLIC PANEL: CPT | Performed by: EMERGENCY MEDICINE

## 2019-11-27 PROCEDURE — 93010 ELECTROCARDIOGRAM REPORT: CPT | Performed by: INTERNAL MEDICINE

## 2019-11-27 PROCEDURE — 72131 CT LUMBAR SPINE W/O DYE: CPT

## 2019-11-27 PROCEDURE — 99285 EMERGENCY DEPT VISIT HI MDM: CPT

## 2019-11-27 PROCEDURE — 72192 CT PELVIS W/O DYE: CPT

## 2019-11-27 PROCEDURE — G0378 HOSPITAL OBSERVATION PER HR: HCPCS

## 2019-11-27 PROCEDURE — 84484 ASSAY OF TROPONIN QUANT: CPT | Performed by: EMERGENCY MEDICINE

## 2019-11-27 PROCEDURE — 85025 COMPLETE CBC W/AUTO DIFF WBC: CPT | Performed by: EMERGENCY MEDICINE

## 2019-11-27 PROCEDURE — 93005 ELECTROCARDIOGRAM TRACING: CPT | Performed by: EMERGENCY MEDICINE

## 2019-11-27 PROCEDURE — 73700 CT LOWER EXTREMITY W/O DYE: CPT

## 2019-11-27 PROCEDURE — 94799 UNLISTED PULMONARY SVC/PX: CPT

## 2019-11-27 PROCEDURE — 94760 N-INVAS EAR/PLS OXIMETRY 1: CPT

## 2019-11-27 RX ORDER — METOPROLOL SUCCINATE 25 MG/1
25 TABLET, EXTENDED RELEASE ORAL
Status: DISCONTINUED | OUTPATIENT
Start: 2019-11-28 | End: 2019-12-08

## 2019-11-27 RX ORDER — ONDANSETRON 4 MG/1
4 TABLET, FILM COATED ORAL EVERY 6 HOURS PRN
Status: DISCONTINUED | OUTPATIENT
Start: 2019-11-27 | End: 2019-12-09 | Stop reason: HOSPADM

## 2019-11-27 RX ORDER — IPRATROPIUM BROMIDE AND ALBUTEROL SULFATE 2.5; .5 MG/3ML; MG/3ML
3 SOLUTION RESPIRATORY (INHALATION) EVERY 6 HOURS PRN
Status: DISCONTINUED | OUTPATIENT
Start: 2019-11-27 | End: 2019-12-09 | Stop reason: HOSPADM

## 2019-11-27 RX ORDER — SODIUM CHLORIDE 9 MG/ML
50 INJECTION, SOLUTION INTRAVENOUS CONTINUOUS
Status: DISCONTINUED | OUTPATIENT
Start: 2019-11-28 | End: 2019-12-04

## 2019-11-27 RX ORDER — ACETAMINOPHEN 160 MG/5ML
650 SOLUTION ORAL EVERY 4 HOURS PRN
Status: DISCONTINUED | OUTPATIENT
Start: 2019-11-27 | End: 2019-12-09 | Stop reason: HOSPADM

## 2019-11-27 RX ORDER — ACETAMINOPHEN 650 MG/1
650 SUPPOSITORY RECTAL EVERY 4 HOURS PRN
Status: DISCONTINUED | OUTPATIENT
Start: 2019-11-27 | End: 2019-12-09 | Stop reason: HOSPADM

## 2019-11-27 RX ORDER — SODIUM CHLORIDE 0.9 % (FLUSH) 0.9 %
10 SYRINGE (ML) INJECTION AS NEEDED
Status: DISCONTINUED | OUTPATIENT
Start: 2019-11-27 | End: 2019-12-09 | Stop reason: HOSPADM

## 2019-11-27 RX ORDER — PANTOPRAZOLE SODIUM 40 MG/1
40 TABLET, DELAYED RELEASE ORAL DAILY
Status: DISCONTINUED | OUTPATIENT
Start: 2019-11-28 | End: 2019-12-09 | Stop reason: HOSPADM

## 2019-11-27 RX ORDER — ONDANSETRON 2 MG/ML
4 INJECTION INTRAMUSCULAR; INTRAVENOUS EVERY 6 HOURS PRN
Status: DISCONTINUED | OUTPATIENT
Start: 2019-11-27 | End: 2019-12-09 | Stop reason: HOSPADM

## 2019-11-27 RX ORDER — NITROGLYCERIN 0.4 MG/1
0.4 TABLET SUBLINGUAL
Status: DISCONTINUED | OUTPATIENT
Start: 2019-11-27 | End: 2019-12-09 | Stop reason: HOSPADM

## 2019-11-27 RX ORDER — SODIUM CHLORIDE 0.9 % (FLUSH) 0.9 %
10 SYRINGE (ML) INJECTION EVERY 12 HOURS SCHEDULED
Status: DISCONTINUED | OUTPATIENT
Start: 2019-11-27 | End: 2019-12-09 | Stop reason: HOSPADM

## 2019-11-27 RX ORDER — AMIODARONE HYDROCHLORIDE 200 MG/1
200 TABLET ORAL EVERY 12 HOURS SCHEDULED
Status: DISCONTINUED | OUTPATIENT
Start: 2019-11-27 | End: 2019-11-29

## 2019-11-27 RX ORDER — ASPIRIN 300 MG/1
300 SUPPOSITORY RECTAL DAILY
Status: DISCONTINUED | OUTPATIENT
Start: 2019-11-28 | End: 2019-11-29

## 2019-11-27 RX ORDER — CALCIUM CARBONATE 200(500)MG
2 TABLET,CHEWABLE ORAL 2 TIMES DAILY PRN
Status: DISCONTINUED | OUTPATIENT
Start: 2019-11-27 | End: 2019-12-09 | Stop reason: HOSPADM

## 2019-11-27 RX ORDER — ACETAMINOPHEN 325 MG/1
650 TABLET ORAL EVERY 4 HOURS PRN
Status: DISCONTINUED | OUTPATIENT
Start: 2019-11-27 | End: 2019-12-09 | Stop reason: HOSPADM

## 2019-11-27 RX ORDER — ALLOPURINOL 100 MG/1
100 TABLET ORAL DAILY
Status: DISCONTINUED | OUTPATIENT
Start: 2019-11-28 | End: 2019-12-09 | Stop reason: HOSPADM

## 2019-11-27 RX ORDER — FINASTERIDE 5 MG/1
5 TABLET, FILM COATED ORAL DAILY
Status: DISCONTINUED | OUTPATIENT
Start: 2019-11-28 | End: 2019-12-09 | Stop reason: HOSPADM

## 2019-11-27 RX ORDER — SENNA AND DOCUSATE SODIUM 50; 8.6 MG/1; MG/1
2 TABLET, FILM COATED ORAL DAILY PRN
Status: DISCONTINUED | OUTPATIENT
Start: 2019-11-28 | End: 2019-12-09 | Stop reason: HOSPADM

## 2019-11-27 RX ORDER — LISINOPRIL 10 MG/1
10 TABLET ORAL DAILY
Status: DISCONTINUED | OUTPATIENT
Start: 2019-11-28 | End: 2019-11-29

## 2019-11-27 RX ORDER — ASPIRIN 325 MG
325 TABLET ORAL DAILY
Status: DISCONTINUED | OUTPATIENT
Start: 2019-11-28 | End: 2019-11-29

## 2019-11-27 RX ORDER — HYDROCODONE BITARTRATE AND ACETAMINOPHEN 5; 325 MG/1; MG/1
1 TABLET ORAL EVERY 6 HOURS PRN
Status: DISPENSED | OUTPATIENT
Start: 2019-11-27 | End: 2019-12-07

## 2019-11-27 RX ORDER — TAMSULOSIN HYDROCHLORIDE 0.4 MG/1
0.4 CAPSULE ORAL NIGHTLY
Status: DISCONTINUED | OUTPATIENT
Start: 2019-11-27 | End: 2019-12-09 | Stop reason: HOSPADM

## 2019-11-27 RX ADMIN — RIVAROXABAN 15 MG: 15 TABLET, FILM COATED ORAL at 23:06

## 2019-11-27 RX ADMIN — HYDROMORPHONE HYDROCHLORIDE 1 MG: 1 INJECTION, SOLUTION INTRAMUSCULAR; INTRAVENOUS; SUBCUTANEOUS at 20:33

## 2019-11-27 RX ADMIN — HYDROMORPHONE HYDROCHLORIDE 1 MG: 1 INJECTION, SOLUTION INTRAMUSCULAR; INTRAVENOUS; SUBCUTANEOUS at 15:58

## 2019-11-27 RX ADMIN — TAMSULOSIN HYDROCHLORIDE 0.4 MG: 0.4 CAPSULE ORAL at 23:06

## 2019-11-27 RX ADMIN — AMIODARONE HYDROCHLORIDE 200 MG: 200 TABLET ORAL at 23:06

## 2019-11-27 RX ADMIN — SODIUM CHLORIDE, PRESERVATIVE FREE 10 ML: 5 INJECTION INTRAVENOUS at 23:06

## 2019-11-28 ENCOUNTER — APPOINTMENT (OUTPATIENT)
Dept: ULTRASOUND IMAGING | Facility: HOSPITAL | Age: 84
End: 2019-11-28

## 2019-11-28 ENCOUNTER — APPOINTMENT (OUTPATIENT)
Dept: CARDIOLOGY | Facility: HOSPITAL | Age: 84
End: 2019-11-28

## 2019-11-28 LAB
ANION GAP SERPL CALCULATED.3IONS-SCNC: 12 MMOL/L (ref 5–15)
BH CV ECHO MEAS - AO MAX PG (FULL): 7.1 MMHG
BH CV ECHO MEAS - AO MAX PG: 10.4 MMHG
BH CV ECHO MEAS - AO MEAN PG (FULL): 4 MMHG
BH CV ECHO MEAS - AO MEAN PG: 6 MMHG
BH CV ECHO MEAS - AO ROOT AREA (BSA CORRECTED): 1.8
BH CV ECHO MEAS - AO ROOT AREA: 9.1 CM^2
BH CV ECHO MEAS - AO ROOT DIAM: 3.4 CM
BH CV ECHO MEAS - AO V2 MAX: 161 CM/SEC
BH CV ECHO MEAS - AO V2 MEAN: 114 CM/SEC
BH CV ECHO MEAS - AO V2 VTI: 31.7 CM
BH CV ECHO MEAS - AVA(I,A): 2.2 CM^2
BH CV ECHO MEAS - AVA(I,D): 2.2 CM^2
BH CV ECHO MEAS - AVA(V,A): 1.8 CM^2
BH CV ECHO MEAS - AVA(V,D): 1.8 CM^2
BH CV ECHO MEAS - BSA(HAYCOCK): 1.9 M^2
BH CV ECHO MEAS - BSA: 1.9 M^2
BH CV ECHO MEAS - BZI_BMI: 24.9 KILOGRAMS/M^2
BH CV ECHO MEAS - BZI_METRIC_HEIGHT: 172.7 CM
BH CV ECHO MEAS - BZI_METRIC_WEIGHT: 74.4 KG
BH CV ECHO MEAS - EDV(CUBED): 182.3 ML
BH CV ECHO MEAS - EDV(MOD-SP4): 135 ML
BH CV ECHO MEAS - EDV(TEICH): 158.1 ML
BH CV ECHO MEAS - EF(CUBED): 51 %
BH CV ECHO MEAS - EF(MOD-SP4): 45.2 %
BH CV ECHO MEAS - EF(TEICH): 42.4 %
BH CV ECHO MEAS - ESV(CUBED): 89.3 ML
BH CV ECHO MEAS - ESV(MOD-SP4): 74 ML
BH CV ECHO MEAS - ESV(TEICH): 91 ML
BH CV ECHO MEAS - FS: 21.2 %
BH CV ECHO MEAS - IVS/LVPW: 0.95
BH CV ECHO MEAS - IVSD: 1.2 CM
BH CV ECHO MEAS - LA DIMENSION: 4.3 CM
BH CV ECHO MEAS - LA/AO: 1.3
BH CV ECHO MEAS - LAT PEAK E' VEL: 6.4 CM/SEC
BH CV ECHO MEAS - LV DIASTOLIC VOL/BSA (35-75): 71.9 ML/M^2
BH CV ECHO MEAS - LV MASS(C)D: 286.2 GRAMS
BH CV ECHO MEAS - LV MASS(C)DI: 152.3 GRAMS/M^2
BH CV ECHO MEAS - LV MAX PG: 3.3 MMHG
BH CV ECHO MEAS - LV MEAN PG: 2 MMHG
BH CV ECHO MEAS - LV SYSTOLIC VOL/BSA (12-30): 39.4 ML/M^2
BH CV ECHO MEAS - LV V1 MAX: 90.9 CM/SEC
BH CV ECHO MEAS - LV V1 MEAN: 63.7 CM/SEC
BH CV ECHO MEAS - LV V1 VTI: 21.7 CM
BH CV ECHO MEAS - LVIDD: 5.7 CM
BH CV ECHO MEAS - LVIDS: 4.5 CM
BH CV ECHO MEAS - LVLD AP4: 8.5 CM
BH CV ECHO MEAS - LVLS AP4: 7.4 CM
BH CV ECHO MEAS - LVOT AREA (M): 3.1 CM^2
BH CV ECHO MEAS - LVOT AREA: 3.1 CM^2
BH CV ECHO MEAS - LVOT DIAM: 2 CM
BH CV ECHO MEAS - LVPWD: 1.2 CM
BH CV ECHO MEAS - MED PEAK E' VEL: 5.87 CM/SEC
BH CV ECHO MEAS - MV A MAX VEL: 115 CM/SEC
BH CV ECHO MEAS - MV DEC TIME: 0.32 SEC
BH CV ECHO MEAS - MV E MAX VEL: 56.3 CM/SEC
BH CV ECHO MEAS - MV E/A: 0.49
BH CV ECHO MEAS - RAP SYSTOLE: 5 MMHG
BH CV ECHO MEAS - RVSP: 33.7 MMHG
BH CV ECHO MEAS - SI(AO): 153.2 ML/M^2
BH CV ECHO MEAS - SI(CUBED): 49.5 ML/M^2
BH CV ECHO MEAS - SI(LVOT): 36.3 ML/M^2
BH CV ECHO MEAS - SI(MOD-SP4): 32.5 ML/M^2
BH CV ECHO MEAS - SI(TEICH): 35.7 ML/M^2
BH CV ECHO MEAS - SV(AO): 287.8 ML
BH CV ECHO MEAS - SV(CUBED): 93 ML
BH CV ECHO MEAS - SV(LVOT): 68.2 ML
BH CV ECHO MEAS - SV(MOD-SP4): 61 ML
BH CV ECHO MEAS - SV(TEICH): 67.1 ML
BH CV ECHO MEAS - TR MAX VEL: 268 CM/SEC
BH CV ECHO MEASUREMENTS AVERAGE E/E' RATIO: 9.18
BUN BLD-MCNC: 17 MG/DL (ref 8–23)
BUN/CREAT SERPL: 20 (ref 7–25)
CALCIUM SPEC-SCNC: 8.7 MG/DL (ref 8.6–10.5)
CHLORIDE SERPL-SCNC: 93 MMOL/L (ref 98–107)
CHOLEST SERPL-MCNC: 189 MG/DL (ref 0–200)
CO2 SERPL-SCNC: 25 MMOL/L (ref 22–29)
CREAT BLD-MCNC: 0.85 MG/DL (ref 0.76–1.27)
DEPRECATED RDW RBC AUTO: 43.4 FL (ref 37–54)
ERYTHROCYTE [DISTWIDTH] IN BLOOD BY AUTOMATED COUNT: 14.1 % (ref 12.3–15.4)
GFR SERPL CREATININE-BSD FRML MDRD: 85 ML/MIN/1.73
GLUCOSE BLD-MCNC: 171 MG/DL (ref 65–99)
HBA1C MFR BLD: 5.6 % (ref 4.8–5.6)
HCT VFR BLD AUTO: 38.7 % (ref 37.5–51)
HDLC SERPL-MCNC: 45 MG/DL (ref 40–60)
HGB BLD-MCNC: 12.9 G/DL (ref 13–17.7)
LDLC SERPL CALC-MCNC: 122 MG/DL (ref 0–100)
LDLC/HDLC SERPL: 2.71 {RATIO}
LEFT ATRIUM VOLUME INDEX: 28.8 ML/M2
LEFT ATRIUM VOLUME: 54.2 CM3
LV EF 2D ECHO EST: 45 %
MCH RBC QN AUTO: 28.2 PG (ref 26.6–33)
MCHC RBC AUTO-ENTMCNC: 33.3 G/DL (ref 31.5–35.7)
MCV RBC AUTO: 84.7 FL (ref 79–97)
OSMOLALITY UR: 625 MOSM/KG (ref 601–850)
PLATELET # BLD AUTO: 161 10*3/MM3 (ref 140–450)
PMV BLD AUTO: 9.5 FL (ref 6–12)
POTASSIUM BLD-SCNC: 3.8 MMOL/L (ref 3.5–5.2)
RBC # BLD AUTO: 4.57 10*6/MM3 (ref 4.14–5.8)
SODIUM BLD-SCNC: 130 MMOL/L (ref 136–145)
SODIUM UR-SCNC: 55 MMOL/L
TRIGL SERPL-MCNC: 111 MG/DL (ref 0–150)
VLDLC SERPL-MCNC: 22.2 MG/DL
WBC NRBC COR # BLD: 5.66 10*3/MM3 (ref 3.4–10.8)

## 2019-11-28 PROCEDURE — 80048 BASIC METABOLIC PNL TOTAL CA: CPT | Performed by: NURSE PRACTITIONER

## 2019-11-28 PROCEDURE — 83935 ASSAY OF URINE OSMOLALITY: CPT | Performed by: NURSE PRACTITIONER

## 2019-11-28 PROCEDURE — 93306 TTE W/DOPPLER COMPLETE: CPT | Performed by: INTERNAL MEDICINE

## 2019-11-28 PROCEDURE — G0378 HOSPITAL OBSERVATION PER HR: HCPCS

## 2019-11-28 PROCEDURE — 93880 EXTRACRANIAL BILAT STUDY: CPT

## 2019-11-28 PROCEDURE — 94799 UNLISTED PULMONARY SVC/PX: CPT

## 2019-11-28 PROCEDURE — 84300 ASSAY OF URINE SODIUM: CPT | Performed by: NURSE PRACTITIONER

## 2019-11-28 PROCEDURE — 93306 TTE W/DOPPLER COMPLETE: CPT

## 2019-11-28 PROCEDURE — 94760 N-INVAS EAR/PLS OXIMETRY 1: CPT

## 2019-11-28 PROCEDURE — 80061 LIPID PANEL: CPT | Performed by: NURSE PRACTITIONER

## 2019-11-28 PROCEDURE — 25010000002 PERFLUTREN 6.52 MG/ML SUSPENSION: Performed by: FAMILY MEDICINE

## 2019-11-28 PROCEDURE — 82570 ASSAY OF URINE CREATININE: CPT | Performed by: NURSE PRACTITIONER

## 2019-11-28 PROCEDURE — 84540 ASSAY OF URINE/UREA-N: CPT | Performed by: NURSE PRACTITIONER

## 2019-11-28 PROCEDURE — 85027 COMPLETE CBC AUTOMATED: CPT | Performed by: NURSE PRACTITIONER

## 2019-11-28 PROCEDURE — 93880 EXTRACRANIAL BILAT STUDY: CPT | Performed by: SURGERY

## 2019-11-28 PROCEDURE — 83036 HEMOGLOBIN GLYCOSYLATED A1C: CPT | Performed by: NURSE PRACTITIONER

## 2019-11-28 RX ADMIN — SODIUM CHLORIDE, PRESERVATIVE FREE 10 ML: 5 INJECTION INTRAVENOUS at 00:52

## 2019-11-28 RX ADMIN — PERFLUTREN 8.48 MG: 6.52 INJECTION, SUSPENSION INTRAVENOUS at 13:25

## 2019-11-28 RX ADMIN — SODIUM CHLORIDE 50 ML/HR: 9 INJECTION, SOLUTION INTRAVENOUS at 00:52

## 2019-11-28 RX ADMIN — PANTOPRAZOLE SODIUM 40 MG: 40 TABLET, DELAYED RELEASE ORAL at 09:41

## 2019-11-28 RX ADMIN — METOPROLOL SUCCINATE 25 MG: 25 TABLET, FILM COATED, EXTENDED RELEASE ORAL at 09:41

## 2019-11-28 RX ADMIN — LISINOPRIL 10 MG: 10 TABLET ORAL at 09:41

## 2019-11-28 RX ADMIN — RIVAROXABAN 15 MG: 15 TABLET, FILM COATED ORAL at 17:45

## 2019-11-28 RX ADMIN — SODIUM CHLORIDE, PRESERVATIVE FREE 10 ML: 5 INJECTION INTRAVENOUS at 21:11

## 2019-11-28 RX ADMIN — ALLOPURINOL 100 MG: 100 TABLET ORAL at 09:41

## 2019-11-28 RX ADMIN — ASPIRIN 325 MG: 325 TABLET ORAL at 09:41

## 2019-11-28 RX ADMIN — TAMSULOSIN HYDROCHLORIDE 0.4 MG: 0.4 CAPSULE ORAL at 21:11

## 2019-11-28 RX ADMIN — AMIODARONE HYDROCHLORIDE 200 MG: 200 TABLET ORAL at 09:41

## 2019-11-28 RX ADMIN — FINASTERIDE 5 MG: 5 TABLET, FILM COATED ORAL at 09:41

## 2019-11-29 ENCOUNTER — APPOINTMENT (OUTPATIENT)
Dept: MRI IMAGING | Facility: HOSPITAL | Age: 84
End: 2019-11-29

## 2019-11-29 ENCOUNTER — APPOINTMENT (OUTPATIENT)
Dept: GENERAL RADIOLOGY | Facility: HOSPITAL | Age: 84
End: 2019-11-29

## 2019-11-29 PROBLEM — S72.141A CLOSED INTERTROCHANTERIC FRACTURE OF HIP, RIGHT, INITIAL ENCOUNTER (HCC): Status: ACTIVE | Noted: 2019-11-27

## 2019-11-29 PROBLEM — S72.001C: Status: ACTIVE | Noted: 2019-11-29

## 2019-11-29 LAB
ALBUMIN SERPL-MCNC: 3.1 G/DL (ref 3.5–5.2)
ALBUMIN/GLOB SERPL: 0.9 G/DL
ALP SERPL-CCNC: 97 U/L (ref 39–117)
ALT SERPL W P-5'-P-CCNC: 32 U/L (ref 1–41)
ANION GAP SERPL CALCULATED.3IONS-SCNC: 11 MMOL/L (ref 5–15)
APTT PPP: 41.9 SECONDS (ref 24.1–35)
AST SERPL-CCNC: 23 U/L (ref 1–40)
BASOPHILS # BLD AUTO: 0.05 10*3/MM3 (ref 0–0.2)
BASOPHILS NFR BLD AUTO: 1 % (ref 0–1.5)
BILIRUB SERPL-MCNC: 0.6 MG/DL (ref 0.2–1.2)
BUN BLD-MCNC: 16 MG/DL (ref 8–23)
BUN/CREAT SERPL: 22.2 (ref 7–25)
CALCIUM SPEC-SCNC: 8.7 MG/DL (ref 8.6–10.5)
CHLORIDE SERPL-SCNC: 95 MMOL/L (ref 98–107)
CO2 SERPL-SCNC: 24 MMOL/L (ref 22–29)
CREAT BLD-MCNC: 0.72 MG/DL (ref 0.76–1.27)
CREAT UR-MCNC: 155 MG/DL
DEPRECATED RDW RBC AUTO: 43.3 FL (ref 37–54)
EOSINOPHIL # BLD AUTO: 0.28 10*3/MM3 (ref 0–0.4)
EOSINOPHIL NFR BLD AUTO: 5.7 % (ref 0.3–6.2)
ERYTHROCYTE [DISTWIDTH] IN BLOOD BY AUTOMATED COUNT: 14 % (ref 12.3–15.4)
GFR SERPL CREATININE-BSD FRML MDRD: 103 ML/MIN/1.73
GLOBULIN UR ELPH-MCNC: 3.3 GM/DL
GLUCOSE BLD-MCNC: 111 MG/DL (ref 65–99)
HCT VFR BLD AUTO: 36.7 % (ref 37.5–51)
HGB BLD-MCNC: 12.4 G/DL (ref 13–17.7)
IMM GRANULOCYTES # BLD AUTO: 0.02 10*3/MM3 (ref 0–0.05)
IMM GRANULOCYTES NFR BLD AUTO: 0.4 % (ref 0–0.5)
INR PPP: 1.34 (ref 0.91–1.09)
LYMPHOCYTES # BLD AUTO: 1.22 10*3/MM3 (ref 0.7–3.1)
LYMPHOCYTES NFR BLD AUTO: 24.7 % (ref 19.6–45.3)
MCH RBC QN AUTO: 28.6 PG (ref 26.6–33)
MCHC RBC AUTO-ENTMCNC: 33.8 G/DL (ref 31.5–35.7)
MCV RBC AUTO: 84.6 FL (ref 79–97)
MONOCYTES # BLD AUTO: 0.57 10*3/MM3 (ref 0.1–0.9)
MONOCYTES NFR BLD AUTO: 11.6 % (ref 5–12)
NEUTROPHILS # BLD AUTO: 2.79 10*3/MM3 (ref 1.7–7)
NEUTROPHILS NFR BLD AUTO: 56.6 % (ref 42.7–76)
NRBC BLD AUTO-RTO: 0 /100 WBC (ref 0–0.2)
PLATELET # BLD AUTO: 156 10*3/MM3 (ref 140–450)
PMV BLD AUTO: 9.6 FL (ref 6–12)
POTASSIUM BLD-SCNC: 4 MMOL/L (ref 3.5–5.2)
PROT SERPL-MCNC: 6.4 G/DL (ref 6–8.5)
PROTHROMBIN TIME: 17 SECONDS (ref 11.9–14.6)
RBC # BLD AUTO: 4.34 10*6/MM3 (ref 4.14–5.8)
SODIUM BLD-SCNC: 130 MMOL/L (ref 136–145)
TSH SERPL DL<=0.05 MIU/L-ACNC: 1.67 UIU/ML (ref 0.27–4.2)
UUN 24H UR-MCNC: 1001 MG/DL
WBC NRBC COR # BLD: 4.93 10*3/MM3 (ref 3.4–10.8)

## 2019-11-29 PROCEDURE — 99222 1ST HOSP IP/OBS MODERATE 55: CPT | Performed by: INTERNAL MEDICINE

## 2019-11-29 PROCEDURE — 71045 X-RAY EXAM CHEST 1 VIEW: CPT

## 2019-11-29 PROCEDURE — 85730 THROMBOPLASTIN TIME PARTIAL: CPT | Performed by: FAMILY MEDICINE

## 2019-11-29 PROCEDURE — 80053 COMPREHEN METABOLIC PANEL: CPT | Performed by: FAMILY MEDICINE

## 2019-11-29 PROCEDURE — 73721 MRI JNT OF LWR EXTRE W/O DYE: CPT

## 2019-11-29 PROCEDURE — 84443 ASSAY THYROID STIM HORMONE: CPT | Performed by: FAMILY MEDICINE

## 2019-11-29 PROCEDURE — 85025 COMPLETE CBC W/AUTO DIFF WBC: CPT | Performed by: FAMILY MEDICINE

## 2019-11-29 PROCEDURE — 85610 PROTHROMBIN TIME: CPT | Performed by: FAMILY MEDICINE

## 2019-11-29 RX ORDER — AMIODARONE HYDROCHLORIDE 200 MG/1
100 TABLET ORAL EVERY 12 HOURS SCHEDULED
Status: DISCONTINUED | OUTPATIENT
Start: 2019-11-29 | End: 2019-12-09 | Stop reason: HOSPADM

## 2019-11-29 RX ORDER — ASPIRIN 81 MG/1
81 TABLET ORAL DAILY
Status: DISCONTINUED | OUTPATIENT
Start: 2019-11-30 | End: 2019-12-09 | Stop reason: HOSPADM

## 2019-11-29 RX ORDER — LISINOPRIL 5 MG/1
5 TABLET ORAL DAILY
Status: DISCONTINUED | OUTPATIENT
Start: 2019-11-30 | End: 2019-12-06

## 2019-11-29 RX ADMIN — ALLOPURINOL 100 MG: 100 TABLET ORAL at 09:07

## 2019-11-29 RX ADMIN — ASPIRIN 325 MG: 325 TABLET ORAL at 09:07

## 2019-11-29 RX ADMIN — FINASTERIDE 5 MG: 5 TABLET, FILM COATED ORAL at 09:06

## 2019-11-29 RX ADMIN — PANTOPRAZOLE SODIUM 40 MG: 40 TABLET, DELAYED RELEASE ORAL at 09:06

## 2019-11-29 RX ADMIN — SODIUM CHLORIDE, PRESERVATIVE FREE 10 ML: 5 INJECTION INTRAVENOUS at 20:34

## 2019-11-29 RX ADMIN — SODIUM CHLORIDE 50 ML/HR: 9 INJECTION, SOLUTION INTRAVENOUS at 06:16

## 2019-11-29 RX ADMIN — TAMSULOSIN HYDROCHLORIDE 0.4 MG: 0.4 CAPSULE ORAL at 20:33

## 2019-11-29 RX ADMIN — AMIODARONE HYDROCHLORIDE 100 MG: 200 TABLET ORAL at 20:33

## 2019-11-30 ENCOUNTER — APPOINTMENT (OUTPATIENT)
Dept: GENERAL RADIOLOGY | Facility: HOSPITAL | Age: 84
End: 2019-11-30

## 2019-11-30 LAB
ALBUMIN SERPL-MCNC: 3.3 G/DL (ref 3.5–5.2)
ALBUMIN/GLOB SERPL: 1 G/DL
ALP SERPL-CCNC: 106 U/L (ref 39–117)
ALT SERPL W P-5'-P-CCNC: 34 U/L (ref 1–41)
ANION GAP SERPL CALCULATED.3IONS-SCNC: 9 MMOL/L (ref 5–15)
AST SERPL-CCNC: 23 U/L (ref 1–40)
BASOPHILS # BLD AUTO: 0.03 10*3/MM3 (ref 0–0.2)
BASOPHILS NFR BLD AUTO: 0.5 % (ref 0–1.5)
BILIRUB SERPL-MCNC: 0.5 MG/DL (ref 0.2–1.2)
BUN BLD-MCNC: 16 MG/DL (ref 8–23)
BUN/CREAT SERPL: 21.3 (ref 7–25)
CALCIUM SPEC-SCNC: 8.5 MG/DL (ref 8.6–10.5)
CHLORIDE SERPL-SCNC: 95 MMOL/L (ref 98–107)
CO2 SERPL-SCNC: 24 MMOL/L (ref 22–29)
CREAT BLD-MCNC: 0.75 MG/DL (ref 0.76–1.27)
DEPRECATED RDW RBC AUTO: 42.9 FL (ref 37–54)
EOSINOPHIL # BLD AUTO: 0.18 10*3/MM3 (ref 0–0.4)
EOSINOPHIL NFR BLD AUTO: 2.7 % (ref 0.3–6.2)
ERYTHROCYTE [DISTWIDTH] IN BLOOD BY AUTOMATED COUNT: 13.9 % (ref 12.3–15.4)
GFR SERPL CREATININE-BSD FRML MDRD: 98 ML/MIN/1.73
GLOBULIN UR ELPH-MCNC: 3.3 GM/DL
GLUCOSE BLD-MCNC: 128 MG/DL (ref 65–99)
HCT VFR BLD AUTO: 35.3 % (ref 37.5–51)
HGB BLD-MCNC: 12.1 G/DL (ref 13–17.7)
IMM GRANULOCYTES # BLD AUTO: 0.03 10*3/MM3 (ref 0–0.05)
IMM GRANULOCYTES NFR BLD AUTO: 0.5 % (ref 0–0.5)
LYMPHOCYTES # BLD AUTO: 0.97 10*3/MM3 (ref 0.7–3.1)
LYMPHOCYTES NFR BLD AUTO: 14.6 % (ref 19.6–45.3)
MCH RBC QN AUTO: 28.9 PG (ref 26.6–33)
MCHC RBC AUTO-ENTMCNC: 34.3 G/DL (ref 31.5–35.7)
MCV RBC AUTO: 84.2 FL (ref 79–97)
MONOCYTES # BLD AUTO: 0.53 10*3/MM3 (ref 0.1–0.9)
MONOCYTES NFR BLD AUTO: 8 % (ref 5–12)
NEUTROPHILS # BLD AUTO: 4.92 10*3/MM3 (ref 1.7–7)
NEUTROPHILS NFR BLD AUTO: 73.7 % (ref 42.7–76)
NRBC BLD AUTO-RTO: 0 /100 WBC (ref 0–0.2)
PLATELET # BLD AUTO: 182 10*3/MM3 (ref 140–450)
PMV BLD AUTO: 9.5 FL (ref 6–12)
POTASSIUM BLD-SCNC: 4 MMOL/L (ref 3.5–5.2)
PROT SERPL-MCNC: 6.6 G/DL (ref 6–8.5)
RBC # BLD AUTO: 4.19 10*6/MM3 (ref 4.14–5.8)
SODIUM BLD-SCNC: 128 MMOL/L (ref 136–145)
WBC NRBC COR # BLD: 6.66 10*3/MM3 (ref 3.4–10.8)

## 2019-11-30 PROCEDURE — 97161 PT EVAL LOW COMPLEX 20 MIN: CPT

## 2019-11-30 PROCEDURE — 71045 X-RAY EXAM CHEST 1 VIEW: CPT

## 2019-11-30 PROCEDURE — 80053 COMPREHEN METABOLIC PANEL: CPT | Performed by: FAMILY MEDICINE

## 2019-11-30 PROCEDURE — 85025 COMPLETE CBC W/AUTO DIFF WBC: CPT | Performed by: FAMILY MEDICINE

## 2019-11-30 PROCEDURE — 97110 THERAPEUTIC EXERCISES: CPT

## 2019-11-30 PROCEDURE — 97166 OT EVAL MOD COMPLEX 45 MIN: CPT | Performed by: OCCUPATIONAL THERAPIST

## 2019-11-30 RX ORDER — IPRATROPIUM BROMIDE AND ALBUTEROL SULFATE 2.5; .5 MG/3ML; MG/3ML
3 SOLUTION RESPIRATORY (INHALATION)
Status: DISCONTINUED | OUTPATIENT
Start: 2019-11-30 | End: 2019-12-02

## 2019-11-30 RX ADMIN — AMIODARONE HYDROCHLORIDE 100 MG: 200 TABLET ORAL at 21:13

## 2019-11-30 RX ADMIN — ALLOPURINOL 100 MG: 100 TABLET ORAL at 10:11

## 2019-11-30 RX ADMIN — SODIUM CHLORIDE 50 ML/HR: 9 INJECTION, SOLUTION INTRAVENOUS at 21:14

## 2019-11-30 RX ADMIN — LISINOPRIL 5 MG: 5 TABLET ORAL at 10:10

## 2019-11-30 RX ADMIN — SODIUM CHLORIDE 50 ML/HR: 9 INJECTION, SOLUTION INTRAVENOUS at 01:31

## 2019-11-30 RX ADMIN — TAMSULOSIN HYDROCHLORIDE 0.4 MG: 0.4 CAPSULE ORAL at 21:12

## 2019-11-30 RX ADMIN — SODIUM CHLORIDE, PRESERVATIVE FREE 10 ML: 5 INJECTION INTRAVENOUS at 10:11

## 2019-11-30 RX ADMIN — FINASTERIDE 5 MG: 5 TABLET, FILM COATED ORAL at 10:10

## 2019-11-30 RX ADMIN — HYDROCODONE BITARTRATE AND ACETAMINOPHEN 1 TABLET: 5; 325 TABLET ORAL at 10:10

## 2019-11-30 RX ADMIN — METOPROLOL SUCCINATE 25 MG: 25 TABLET, FILM COATED, EXTENDED RELEASE ORAL at 10:10

## 2019-11-30 RX ADMIN — AMIODARONE HYDROCHLORIDE 100 MG: 200 TABLET ORAL at 10:11

## 2019-11-30 RX ADMIN — PANTOPRAZOLE SODIUM 40 MG: 40 TABLET, DELAYED RELEASE ORAL at 10:10

## 2019-11-30 RX ADMIN — ASPIRIN 81 MG: 81 TABLET ORAL at 10:11

## 2019-12-01 LAB
ALBUMIN SERPL-MCNC: 3.1 G/DL (ref 3.5–5.2)
ALBUMIN/GLOB SERPL: 1 G/DL
ALP SERPL-CCNC: 101 U/L (ref 39–117)
ALT SERPL W P-5'-P-CCNC: 31 U/L (ref 1–41)
ANION GAP SERPL CALCULATED.3IONS-SCNC: 8 MMOL/L (ref 5–15)
ANION GAP SERPL CALCULATED.3IONS-SCNC: 8 MMOL/L (ref 5–15)
AST SERPL-CCNC: 25 U/L (ref 1–40)
BASOPHILS # BLD AUTO: 0.03 10*3/MM3 (ref 0–0.2)
BASOPHILS NFR BLD AUTO: 0.4 % (ref 0–1.5)
BILIRUB SERPL-MCNC: 0.7 MG/DL (ref 0.2–1.2)
BUN BLD-MCNC: 11 MG/DL (ref 8–23)
BUN BLD-MCNC: 14 MG/DL (ref 8–23)
BUN/CREAT SERPL: 15.9 (ref 7–25)
BUN/CREAT SERPL: 18.4 (ref 7–25)
CALCIUM SPEC-SCNC: 8.4 MG/DL (ref 8.6–10.5)
CALCIUM SPEC-SCNC: 8.6 MG/DL (ref 8.6–10.5)
CHLORIDE SERPL-SCNC: 94 MMOL/L (ref 98–107)
CHLORIDE SERPL-SCNC: 95 MMOL/L (ref 98–107)
CO2 SERPL-SCNC: 25 MMOL/L (ref 22–29)
CO2 SERPL-SCNC: 26 MMOL/L (ref 22–29)
CREAT BLD-MCNC: 0.69 MG/DL (ref 0.76–1.27)
CREAT BLD-MCNC: 0.76 MG/DL (ref 0.76–1.27)
DEPRECATED RDW RBC AUTO: 42.3 FL (ref 37–54)
EOSINOPHIL # BLD AUTO: 0.26 10*3/MM3 (ref 0–0.4)
EOSINOPHIL NFR BLD AUTO: 3.6 % (ref 0.3–6.2)
ERYTHROCYTE [DISTWIDTH] IN BLOOD BY AUTOMATED COUNT: 13.8 % (ref 12.3–15.4)
GFR SERPL CREATININE-BSD FRML MDRD: 108 ML/MIN/1.73
GFR SERPL CREATININE-BSD FRML MDRD: 97 ML/MIN/1.73
GLOBULIN UR ELPH-MCNC: 3.1 GM/DL
GLUCOSE BLD-MCNC: 139 MG/DL (ref 65–99)
GLUCOSE BLD-MCNC: 98 MG/DL (ref 65–99)
HCT VFR BLD AUTO: 33.7 % (ref 37.5–51)
HGB BLD-MCNC: 11.6 G/DL (ref 13–17.7)
IMM GRANULOCYTES # BLD AUTO: 0.01 10*3/MM3 (ref 0–0.05)
IMM GRANULOCYTES NFR BLD AUTO: 0.1 % (ref 0–0.5)
LYMPHOCYTES # BLD AUTO: 1.54 10*3/MM3 (ref 0.7–3.1)
LYMPHOCYTES NFR BLD AUTO: 21.3 % (ref 19.6–45.3)
MCH RBC QN AUTO: 28.9 PG (ref 26.6–33)
MCHC RBC AUTO-ENTMCNC: 34.4 G/DL (ref 31.5–35.7)
MCV RBC AUTO: 83.8 FL (ref 79–97)
MONOCYTES # BLD AUTO: 0.62 10*3/MM3 (ref 0.1–0.9)
MONOCYTES NFR BLD AUTO: 8.6 % (ref 5–12)
NEUTROPHILS # BLD AUTO: 4.76 10*3/MM3 (ref 1.7–7)
NEUTROPHILS NFR BLD AUTO: 66 % (ref 42.7–76)
NRBC BLD AUTO-RTO: 0 /100 WBC (ref 0–0.2)
PLATELET # BLD AUTO: 166 10*3/MM3 (ref 140–450)
PMV BLD AUTO: 9.5 FL (ref 6–12)
POTASSIUM BLD-SCNC: 3.9 MMOL/L (ref 3.5–5.2)
POTASSIUM BLD-SCNC: 3.9 MMOL/L (ref 3.5–5.2)
PROT SERPL-MCNC: 6.2 G/DL (ref 6–8.5)
RBC # BLD AUTO: 4.02 10*6/MM3 (ref 4.14–5.8)
SODIUM BLD-SCNC: 128 MMOL/L (ref 136–145)
SODIUM BLD-SCNC: 128 MMOL/L (ref 136–145)
WBC NRBC COR # BLD: 7.22 10*3/MM3 (ref 3.4–10.8)

## 2019-12-01 PROCEDURE — 97110 THERAPEUTIC EXERCISES: CPT

## 2019-12-01 PROCEDURE — 80048 BASIC METABOLIC PNL TOTAL CA: CPT | Performed by: FAMILY MEDICINE

## 2019-12-01 PROCEDURE — 97530 THERAPEUTIC ACTIVITIES: CPT

## 2019-12-01 PROCEDURE — 94799 UNLISTED PULMONARY SVC/PX: CPT

## 2019-12-01 PROCEDURE — 80053 COMPREHEN METABOLIC PANEL: CPT | Performed by: FAMILY MEDICINE

## 2019-12-01 PROCEDURE — 85025 COMPLETE CBC W/AUTO DIFF WBC: CPT | Performed by: FAMILY MEDICINE

## 2019-12-01 PROCEDURE — 94760 N-INVAS EAR/PLS OXIMETRY 1: CPT

## 2019-12-01 PROCEDURE — 94640 AIRWAY INHALATION TREATMENT: CPT

## 2019-12-01 RX ORDER — TOLVAPTAN 15 MG/1
15 TABLET ORAL ONCE
Status: DISCONTINUED | OUTPATIENT
Start: 2019-12-01 | End: 2019-12-01

## 2019-12-01 RX ORDER — TOLVAPTAN 15 MG/1
7.5 TABLET ORAL ONCE
Status: COMPLETED | OUTPATIENT
Start: 2019-12-01 | End: 2019-12-01

## 2019-12-01 RX ADMIN — AMIODARONE HYDROCHLORIDE 100 MG: 200 TABLET ORAL at 08:42

## 2019-12-01 RX ADMIN — IPRATROPIUM BROMIDE AND ALBUTEROL SULFATE 3 ML: 2.5; .5 SOLUTION RESPIRATORY (INHALATION) at 11:22

## 2019-12-01 RX ADMIN — IPRATROPIUM BROMIDE AND ALBUTEROL SULFATE 3 ML: 2.5; .5 SOLUTION RESPIRATORY (INHALATION) at 07:22

## 2019-12-01 RX ADMIN — RIVAROXABAN 15 MG: 15 TABLET, FILM COATED ORAL at 17:28

## 2019-12-01 RX ADMIN — TOLVAPTAN 7.5 MG: 15 TABLET ORAL at 17:29

## 2019-12-01 RX ADMIN — FINASTERIDE 5 MG: 5 TABLET, FILM COATED ORAL at 08:42

## 2019-12-01 RX ADMIN — ASPIRIN 81 MG: 81 TABLET ORAL at 08:42

## 2019-12-01 RX ADMIN — TAMSULOSIN HYDROCHLORIDE 0.4 MG: 0.4 CAPSULE ORAL at 20:01

## 2019-12-01 RX ADMIN — ALLOPURINOL 100 MG: 100 TABLET ORAL at 08:42

## 2019-12-01 RX ADMIN — IPRATROPIUM BROMIDE AND ALBUTEROL SULFATE 3 ML: 2.5; .5 SOLUTION RESPIRATORY (INHALATION) at 14:50

## 2019-12-01 RX ADMIN — SODIUM CHLORIDE 50 ML/HR: 9 INJECTION, SOLUTION INTRAVENOUS at 16:27

## 2019-12-01 RX ADMIN — AMIODARONE HYDROCHLORIDE 100 MG: 200 TABLET ORAL at 20:00

## 2019-12-01 RX ADMIN — IPRATROPIUM BROMIDE AND ALBUTEROL SULFATE 3 ML: 2.5; .5 SOLUTION RESPIRATORY (INHALATION) at 22:22

## 2019-12-01 RX ADMIN — PANTOPRAZOLE SODIUM 40 MG: 40 TABLET, DELAYED RELEASE ORAL at 08:42

## 2019-12-01 NOTE — PLAN OF CARE
Problem: Patient Care Overview  Goal: Plan of Care Review  Outcome: Ongoing (interventions implemented as appropriate)   12/01/19 0306   Coping/Psychosocial   Plan of Care Reviewed With patient;significant other   Plan of Care Review   Progress improving   OTHER   Outcome Summary No c/o pain this shift. Pt using urinal when needed. SO at bedside at all times. IS use encouraged. VSS.        Problem: Fall Risk (Adult)  Goal: Identify Related Risk Factors and Signs and Symptoms  Outcome: Ongoing (interventions implemented as appropriate)    Goal: Absence of Fall  Outcome: Ongoing (interventions implemented as appropriate)      Problem: Pain, Chronic (Adult)  Goal: Identify Related Risk Factors and Signs and Symptoms  Outcome: Ongoing (interventions implemented as appropriate)    Goal: Acceptable Pain/Comfort Level and Functional Ability  Outcome: Ongoing (interventions implemented as appropriate)      Problem: Fracture Orthopaedic (Adult)  Goal: Signs and Symptoms of Listed Potential Problems Will be Absent, Minimized or Managed (Fracture Orthopaedic)  Outcome: Ongoing (interventions implemented as appropriate)

## 2019-12-01 NOTE — PROGRESS NOTES
Orthopedic Surgery Progress Note    Vasile Deng  12/1/2019      Subjective:     No acute events overnight. Pain controlled.  Worked with therapy yesterday but does not remember working with him today.  He would still like to proceed with conservative management as he feels his pain is relatively well controlled.    Objective:     Patient Vitals for the past 24 hrs:   BP Temp Temp src Pulse Resp SpO2 Weight   12/01/19 0727 -- -- -- 56 18 -- --   12/01/19 0722 -- -- -- 57 18 91 % --   12/01/19 0416 124/61 97.8 °F (36.6 °C) Oral 60 18 92 % --   11/30/19 2340 146/59 98.8 °F (37.1 °C) Oral 65 18 93 % --   11/30/19 1900 122/53 98 °F (36.7 °C) Oral 57 16 93 % 76.7 kg (169 lb 3.2 oz)   11/30/19 1600 109/51 97.6 °F (36.4 °C) Oral 59 16 92 % --   11/30/19 1157 130/51 -- -- 58 16 90 % --   11/30/19 1025 138/59 -- -- 66 -- -- --   11/30/19 1019 115/58 -- -- 83 -- -- --   11/30/19 1016 147/56 -- -- 69 -- -- --       General: Awake, alert  Respiratory: Nonlabored  CV: Regular rate  Extremity: RLE: No gross deformity to the right lower extremity.  On palpation, minimal tenderness to the proximal femur.  No significant tenderness to the mid-aspect of the femur, distal femur, knee, tibia/fibula, ankle or foot.  No significant discomfort with logroll. EHL, FHL, tibialis anterior and gastrocsoleus complex are motor intact.  Gross sensation is intact to the right foot.  Foot is warm and well-perfused.       Data Review:  Lab Results (last 24 hours)     Procedure Component Value Units Date/Time    Comprehensive Metabolic Panel [[id number]]  (Abnormal) Collected:  12/01/19 0408    Specimen:  Blood Updated:  12/01/19 0545     Glucose 98 mg/dL      BUN 11 mg/dL      Creatinine 0.69 mg/dL      Sodium 128 mmol/L      Potassium 3.9 mmol/L      Chloride 95 mmol/L      CO2 25.0 mmol/L      Calcium 8.6 mg/dL      Total Protein 6.2 g/dL      Albumin 3.10 g/dL      ALT (SGPT) 31 U/L      AST (SGOT) 25 U/L      Alkaline Phosphatase 101 U/L       Total Bilirubin 0.7 mg/dL      eGFR Non African Amer 108 mL/min/1.73      Globulin 3.1 gm/dL      A/G Ratio 1.0 g/dL      BUN/Creatinine Ratio 15.9     Anion Gap 8.0 mmol/L     Narrative:       GFR Normal >60  Chronic Kidney Disease <60  Kidney Failure <15    CBC & Differential [[id number]] Collected:  12/01/19 0408    Specimen:  Blood Updated:  12/01/19 0527    Narrative:       The following orders were created for panel order CBC & Differential.  Procedure                               Abnormality         Status                     ---------                               -----------         ------                     CBC Auto Differential[[id number]]        Abnormal            Final result                 Please view results for these tests on the individual orders.    CBC Auto Differential [[id number]]  (Abnormal) Collected:  12/01/19 0408    Specimen:  Blood Updated:  12/01/19 0527     WBC 7.22 10*3/mm3      RBC 4.02 10*6/mm3      Hemoglobin 11.6 g/dL      Hematocrit 33.7 %      MCV 83.8 fL      MCH 28.9 pg      MCHC 34.4 g/dL      RDW 13.8 %      RDW-SD 42.3 fl      MPV 9.5 fL      Platelets 166 10*3/mm3      Neutrophil % 66.0 %      Lymphocyte % 21.3 %      Monocyte % 8.6 %      Eosinophil % 3.6 %      Basophil % 0.4 %      Immature Grans % 0.1 %      Neutrophils, Absolute 4.76 10*3/mm3      Lymphocytes, Absolute 1.54 10*3/mm3      Monocytes, Absolute 0.62 10*3/mm3      Eosinophils, Absolute 0.26 10*3/mm3      Basophils, Absolute 0.03 10*3/mm3      Immature Grans, Absolute 0.01 10*3/mm3      nRBC 0.0 /100 WBC         Imaging Results (Last 24 Hours)     Procedure Component Value Units Date/Time    XR Chest 1 View [[id number]] Collected:  11/30/19 1856     Updated:  11/30/19 1900    Narrative:       EXAMINATION: XR CHEST 1 VW-     11/30/2019 6:47 PM CST     HISTORY: left rib pain, coarse lung sounds; W19.XXXA-Unspecified fall,  initial encounter; M25.551-Pain in right hip; R52-Pain,  unspecified;  S72.141A-Displaced intertrochanteric fracture of right femur, initial  encounter for closed fracture; Z74.09-Other reduced mobility;  Z78.9-Other specified health status     1 view chest x-ray compared with 11/29/2019.     CABG changes.  Magnified heart size.     Chronic interstitial lung disease and low lung volumes.     There is no focal consolidation.  No pneumothorax or heart failure.     Summary:  1. Chronic lung changes.  This report was finalized on 11/30/2019 18:57 by Dr. Kristofer Sanchez MD.          Assessment:     88-year-old male with an incomplete right intertrochanteric femur fracture and significant medical history for atrial fibrillation on anticoagulation, coronary artery disease, congestive heart failure and hypertension    Plan:   1.  Right intertrochanteric fracture: Con't with conservative management per patient's desire, despite PT reports of difficulty.   2. Activity: TTWB to RLE  3. PT/OT eval and treat- recommend home with assist and HHT vs transitional care  4. Pain control, minimize opioids as able  5. DVT ppx: Ok to resume Xarelto as he desire to continue with conservative management.  6. Dispo: Ok for d/c from ortho standpoint when arrangements made. F/u with me in 2 weeks.    Daniel Carrion MD

## 2019-12-01 NOTE — PROGRESS NOTES
[facility] Medicine Services  INPATIENT PROGRESS NOTE    Length of Stay: 2  Date of Admission: 11/27/2019  Primary Care Physician: Provider, No Known    Subjective   Chief Complaint:  Right hip fracture/right hip pain    HPI   Right hip pain.  Patient need to stand with help but unable to move.  Patient denies any chest pain or shortness of breath.  Patient be back on Xarelto today.  Hyponatremia, low dose of samsca.     Review of Systems   Constitutional: Positive for activity change, appetite change and fatigue. Negative for chills and fever.   HENT: Positive for hearing loss. Negative for nosebleeds, tinnitus and trouble swallowing.         Patient is very hard of hearing.   Eyes: Negative for visual disturbance.   Respiratory: Negative for cough, chest tightness, shortness of breath and wheezing.    Cardiovascular: Negative for chest pain, palpitations and leg swelling.   Gastrointestinal: Negative for abdominal distention, abdominal pain, blood in stool, constipation, diarrhea, nausea and vomiting.   Endocrine: Negative for cold intolerance, heat intolerance, polydipsia, polyphagia and polyuria.   Genitourinary: Negative for decreased urine volume, difficulty urinating, dysuria, flank pain, frequency and hematuria.   Musculoskeletal: Positive for arthralgias, gait problem and myalgias. Negative for joint swelling.        Right hip pain.   Skin: Negative for rash.   Allergic/Immunologic: Negative for immunocompromised state.   Neurological: Positive for weakness. Negative for dizziness, syncope, light-headedness and headaches.   Hematological: Negative for adenopathy. Does not bruise/bleed easily.   Psychiatric/Behavioral: Positive for confusion. Negative for sleep disturbance. The patient is not nervous/anxious.     All pertinent negatives and positives are as above. All other systems have been reviewed and are negative unless otherwise stated.     Objective    Temp:  [97.6  °F (36.4 °C)-98.8 °F (37.1 °C)] 98 °F (36.7 °C)  Heart Rate:  [52-65] 58  Resp:  [16-18] 18  BP: ()/(49-66) 118/52    Intake/Output Summary (Last 24 hours) at 12/1/2019 1528  Last data filed at 12/1/2019 0840  Gross per 24 hour   Intake --   Output 1050 ml   Net -1050 ml     Physical Exam  Constitutional: He is oriented to person, place, and time. He appears well-developed.   HENT:   Head: Normocephalic.   Patient is very hard of hearing.   Eyes: Conjunctivae are normal. Pupils are equal, round, and reactive to light.   Neck: Neck supple. No JVD present. No thyromegaly present.   Cardiovascular: Normal rate, regular rhythm, normal heart sounds and intact distal pulses. Exam reveals no gallop and no friction rub.   No murmur heard.  Pulmonary/Chest: No respiratory distress. He has no wheezes. He has no rales. He exhibits no tenderness.   Decreased breath sound bilateral, clear.   Abdominal: Soft. Bowel sounds are normal. He exhibits no distension. There is no tenderness. There is no rebound and no guarding.   Right hip pain.   Musculoskeletal: Normal range of motion. He exhibits no edema, tenderness or deformity.   Right hip pain.  Reproducible.   Lymphadenopathy:     He has no cervical adenopathy.   Neurological: He is alert and oriented to person, place, and time. He displays normal reflexes. No cranial nerve deficit. He exhibits abnormal muscle tone. Coordination abnormal.   Skin: Skin is warm and dry. Capillary refill takes 2 to 3 seconds. No rash noted.   Psychiatric: He has a normal mood and affect. His behavior is normal. Judgment and thought content normal.   Nursing note and vitals reviewed.  Results Review:  Lab Results (last 24 hours)     Procedure Component Value Units Date/Time    Comprehensive Metabolic Panel [[id number]]  (Abnormal) Collected:  12/01/19 0408    Specimen:  Blood Updated:  12/01/19 0545     Glucose 98 mg/dL      BUN 11 mg/dL      Creatinine 0.69 mg/dL      Sodium 128 mmol/L       Potassium 3.9 mmol/L      Chloride 95 mmol/L      CO2 25.0 mmol/L      Calcium 8.6 mg/dL      Total Protein 6.2 g/dL      Albumin 3.10 g/dL      ALT (SGPT) 31 U/L      AST (SGOT) 25 U/L      Alkaline Phosphatase 101 U/L      Total Bilirubin 0.7 mg/dL      eGFR Non African Amer 108 mL/min/1.73      Globulin 3.1 gm/dL      A/G Ratio 1.0 g/dL      BUN/Creatinine Ratio 15.9     Anion Gap 8.0 mmol/L     Narrative:       GFR Normal >60  Chronic Kidney Disease <60  Kidney Failure <15    CBC & Differential [[id number]] Collected:  12/01/19 0408    Specimen:  Blood Updated:  12/01/19 0527    Narrative:       The following orders were created for panel order CBC & Differential.  Procedure                               Abnormality         Status                     ---------                               -----------         ------                     CBC Auto Differential[[id number]]        Abnormal            Final result                 Please view results for these tests on the individual orders.    CBC Auto Differential [[id number]]  (Abnormal) Collected:  12/01/19 0408    Specimen:  Blood Updated:  12/01/19 0527     WBC 7.22 10*3/mm3      RBC 4.02 10*6/mm3      Hemoglobin 11.6 g/dL      Hematocrit 33.7 %      MCV 83.8 fL      MCH 28.9 pg      MCHC 34.4 g/dL      RDW 13.8 %      RDW-SD 42.3 fl      MPV 9.5 fL      Platelets 166 10*3/mm3      Neutrophil % 66.0 %      Lymphocyte % 21.3 %      Monocyte % 8.6 %      Eosinophil % 3.6 %      Basophil % 0.4 %      Immature Grans % 0.1 %      Neutrophils, Absolute 4.76 10*3/mm3      Lymphocytes, Absolute 1.54 10*3/mm3      Monocytes, Absolute 0.62 10*3/mm3      Eosinophils, Absolute 0.26 10*3/mm3      Basophils, Absolute 0.03 10*3/mm3      Immature Grans, Absolute 0.01 10*3/mm3      nRBC 0.0 /100 WBC            Cultures:       Radiology Data:    Imaging Results (Last 24 Hours)     Procedure Component Value Units Date/Time    XR Chest 1 View [[id number]] Collected:  11/30/19 6337      Updated:  11/30/19 1900    Narrative:       EXAMINATION: XR CHEST 1 VW-     11/30/2019 6:47 PM CST     HISTORY: left rib pain, coarse lung sounds; W19.XXXA-Unspecified fall,  initial encounter; M25.551-Pain in right hip; R52-Pain, unspecified;  S72.141A-Displaced intertrochanteric fracture of right femur, initial  encounter for closed fracture; Z74.09-Other reduced mobility;  Z78.9-Other specified health status     1 view chest x-ray compared with 11/29/2019.     CABG changes.  Magnified heart size.     Chronic interstitial lung disease and low lung volumes.     There is no focal consolidation.  No pneumothorax or heart failure.     Summary:  1. Chronic lung changes.  This report was finalized on 11/30/2019 18:57 by Dr. Kristofer Sanchez MD.          No Known Allergies    Scheduled meds:     allopurinol 100 mg Oral Daily   amiodarone 100 mg Oral Q12H   aspirin 81 mg Oral Daily   finasteride 5 mg Oral Daily   ipratropium-albuterol 3 mL Nebulization 4x Daily - RT   lisinopril 5 mg Oral Daily   metoprolol succinate XL 25 mg Oral Q24H   pantoprazole 40 mg Oral Daily   rivaroxaban 15 mg Oral Daily With Dinner   sodium chloride 10 mL Intravenous Q12H   tamsulosin 0.4 mg Oral Nightly       PRN meds:  •  acetaminophen **OR** acetaminophen **OR** acetaminophen  •  calcium carbonate  •  HYDROcodone-acetaminophen  •  ipratropium-albuterol  •  nitroglycerin  •  ondansetron **OR** ondansetron  •  senna-docusate sodium  •  sodium chloride    Assessment/Plan       Acute pain of right hip    Coronary artery disease involving coronary bypass graft of native heart without angina pectoris    Essential hypertension    PAF (paroxysmal atrial fibrillation) (CMS/[facility])    Chronic systolic congestive heart failure (CMS/[facility])    On continuous oral anticoagulation    Postural dizziness with near syncope    Fall    Hyponatremia    Open right hip fracture, type III, initial encounter (CMS/[facility])    Closed intertrochanteric fracture of hip, right,  initial encounter (CMS/[facility])      Plan:  Right hip fracture/Right hip pain/inability to walk.  MRI of the right hip.  CT scan lumbar spine-no CT evidence of acute bony injury to the lumbar spine, extensive degenerative changes throughout the lower thoracic and lumbar spine as described above in detail.  CT scan of pelvic-no acute fracture- no dislocation, left inguinal hernia, large and bowel-containing without bowel obstruction or evidence for qxlhfbduxc-wcbiecwj-owrhixhqnm fat stranding in the right gluteal tissues (image 42, series 2) may reflect contusion.  CT scan of right lower extremity- no acute fracture, no dislocation..  MRI of the right hip/edema of the intertrochanteric right femoral neck highly suggestive of nondisplaced fracture, edema and adjacent tissues.  Conservative management for now.  Recommendation from orthopedics- TTWB to RLE, PT and OT evaluation and treat, pain control-minimum opiate.  Follow with orthopedics in 2 weeks.     Chronic CHF/atrial fibrillation/coronary artery disease-inoperable disease /hyperlipidemia /hypertension.  Troponin was negative.  Hold Norvasc.  Continue aspirin.  Nitro as needed.  Continue lisinopril. Decrease dose of metoprolol.  Continue amiodarone.  Continue Xarelto.  Continue Lipitor.  Cardiac monitor.  Daily weight.  Echocardiogram-ejection fraction 45%, diastolic dysfunction, mild concentric hypertrophy, no evidence of pulmonary hypertension, moderate patent foramen ovale present right to left shunt indicated by saline contrast.  Carotid duplex - less than 50% stenosis of the right internal carotid artery, 50-69% stenosis of the left internal carotid artery, antegrade flow is demonstrated in bilateral vertebral arteries.     COPD.  Incentive spirometer.  RT to to initiate breathing treatment protocol.  Duo nebs PRN.     Hyponatremia.  Samsca x 1 7.5mg .  Slow IV hydration.     Constipation.  Senokot as needed.     Chronic pain.  Norco PRN.      Nausea.  Zofran  as needed.  Tums as needed.     Gout.  Continue allopurinol.     Benign prostate hypertrophy.  Continue Proscar.  Continue Flomax.     Anemia.  Hemoglobin stable.  No sign of acute bleed.     Chronic dizziness.  Patient followed by [facility].      Nutrition.  Regular diet.     Deconditioning/falling.  PT and OT consult.     Discharge Plannin-3 days. Pt will need rehab placement.  Wife wants to go [facility].    Daniel Haile MD   19   3:28 PM

## 2019-12-01 NOTE — PLAN OF CARE
Problem: Patient Care Overview  Goal: Plan of Care Review  Outcome: Ongoing (interventions implemented as appropriate)   12/01/19 0906   Coping/Psychosocial   Plan of Care Reviewed With patient   Plan of Care Review   Progress improving   OTHER   Outcome Summary monitor bp orthos dropped in standing 82/66. Bed mobility min/mod 1-2. Upon sitting EOB pt c/o dizziness had to sit for several minutes before able to stand. sit-stand min/mod 1-2. Pt able to take few steps to chair cues to maintain TTWB. Pt tolerated AROM RLE ex's x15. Would benefit from snf upon d/c

## 2019-12-02 LAB
ALBUMIN SERPL-MCNC: 3.5 G/DL (ref 3.5–5.2)
ALBUMIN/GLOB SERPL: 1 G/DL
ALP SERPL-CCNC: 110 U/L (ref 39–117)
ALT SERPL W P-5'-P-CCNC: 41 U/L (ref 1–41)
ANION GAP SERPL CALCULATED.3IONS-SCNC: 9 MMOL/L (ref 5–15)
AST SERPL-CCNC: 31 U/L (ref 1–40)
BASOPHILS # BLD AUTO: 0.04 10*3/MM3 (ref 0–0.2)
BASOPHILS NFR BLD AUTO: 0.7 % (ref 0–1.5)
BILIRUB SERPL-MCNC: 0.5 MG/DL (ref 0.2–1.2)
BUN BLD-MCNC: 12 MG/DL (ref 8–23)
BUN BLD-MCNC: 14 MG/DL (ref 8–23)
BUN BLD-MCNC: 14 MG/DL (ref 8–23)
BUN/CREAT SERPL: 15 (ref 7–25)
BUN/CREAT SERPL: 17.1 (ref 7–25)
BUN/CREAT SERPL: 18.2 (ref 7–25)
CALCIUM SPEC-SCNC: 8.4 MG/DL (ref 8.6–10.5)
CALCIUM SPEC-SCNC: 8.8 MG/DL (ref 8.6–10.5)
CALCIUM SPEC-SCNC: 9 MG/DL (ref 8.6–10.5)
CHLORIDE SERPL-SCNC: 101 MMOL/L (ref 98–107)
CHLORIDE SERPL-SCNC: 101 MMOL/L (ref 98–107)
CHLORIDE SERPL-SCNC: 98 MMOL/L (ref 98–107)
CO2 SERPL-SCNC: 24 MMOL/L (ref 22–29)
CO2 SERPL-SCNC: 27 MMOL/L (ref 22–29)
CO2 SERPL-SCNC: 27 MMOL/L (ref 22–29)
CREAT BLD-MCNC: 0.77 MG/DL (ref 0.76–1.27)
CREAT BLD-MCNC: 0.8 MG/DL (ref 0.76–1.27)
CREAT BLD-MCNC: 0.82 MG/DL (ref 0.76–1.27)
DEPRECATED RDW RBC AUTO: 43.6 FL (ref 37–54)
EOSINOPHIL # BLD AUTO: 0.21 10*3/MM3 (ref 0–0.4)
EOSINOPHIL NFR BLD AUTO: 3.5 % (ref 0.3–6.2)
ERYTHROCYTE [DISTWIDTH] IN BLOOD BY AUTOMATED COUNT: 14 % (ref 12.3–15.4)
GFR SERPL CREATININE-BSD FRML MDRD: 89 ML/MIN/1.73
GFR SERPL CREATININE-BSD FRML MDRD: 91 ML/MIN/1.73
GFR SERPL CREATININE-BSD FRML MDRD: 95 ML/MIN/1.73
GLOBULIN UR ELPH-MCNC: 3.4 GM/DL
GLUCOSE BLD-MCNC: 130 MG/DL (ref 65–99)
GLUCOSE BLD-MCNC: 133 MG/DL (ref 65–99)
GLUCOSE BLD-MCNC: 143 MG/DL (ref 65–99)
HCT VFR BLD AUTO: 37 % (ref 37.5–51)
HGB BLD-MCNC: 12.4 G/DL (ref 13–17.7)
IMM GRANULOCYTES # BLD AUTO: 0.02 10*3/MM3 (ref 0–0.05)
IMM GRANULOCYTES NFR BLD AUTO: 0.3 % (ref 0–0.5)
LYMPHOCYTES # BLD AUTO: 0.89 10*3/MM3 (ref 0.7–3.1)
LYMPHOCYTES NFR BLD AUTO: 14.7 % (ref 19.6–45.3)
MCH RBC QN AUTO: 28.6 PG (ref 26.6–33)
MCHC RBC AUTO-ENTMCNC: 33.5 G/DL (ref 31.5–35.7)
MCV RBC AUTO: 85.3 FL (ref 79–97)
MONOCYTES # BLD AUTO: 0.55 10*3/MM3 (ref 0.1–0.9)
MONOCYTES NFR BLD AUTO: 9.1 % (ref 5–12)
NEUTROPHILS # BLD AUTO: 4.36 10*3/MM3 (ref 1.7–7)
NEUTROPHILS NFR BLD AUTO: 71.7 % (ref 42.7–76)
NRBC BLD AUTO-RTO: 0 /100 WBC (ref 0–0.2)
PLATELET # BLD AUTO: 172 10*3/MM3 (ref 140–450)
PMV BLD AUTO: 9.2 FL (ref 6–12)
POTASSIUM BLD-SCNC: 4.1 MMOL/L (ref 3.5–5.2)
POTASSIUM BLD-SCNC: 4.1 MMOL/L (ref 3.5–5.2)
POTASSIUM BLD-SCNC: 4.2 MMOL/L (ref 3.5–5.2)
PROT SERPL-MCNC: 6.9 G/DL (ref 6–8.5)
RBC # BLD AUTO: 4.34 10*6/MM3 (ref 4.14–5.8)
SODIUM BLD-SCNC: 131 MMOL/L (ref 136–145)
SODIUM BLD-SCNC: 137 MMOL/L (ref 136–145)
SODIUM BLD-SCNC: 137 MMOL/L (ref 136–145)
WBC NRBC COR # BLD: 6.07 10*3/MM3 (ref 3.4–10.8)

## 2019-12-02 PROCEDURE — 97530 THERAPEUTIC ACTIVITIES: CPT

## 2019-12-02 PROCEDURE — 94799 UNLISTED PULMONARY SVC/PX: CPT

## 2019-12-02 PROCEDURE — 97535 SELF CARE MNGMENT TRAINING: CPT

## 2019-12-02 PROCEDURE — 85025 COMPLETE CBC W/AUTO DIFF WBC: CPT | Performed by: FAMILY MEDICINE

## 2019-12-02 PROCEDURE — 80048 BASIC METABOLIC PNL TOTAL CA: CPT | Performed by: FAMILY MEDICINE

## 2019-12-02 PROCEDURE — 94760 N-INVAS EAR/PLS OXIMETRY 1: CPT

## 2019-12-02 PROCEDURE — 97110 THERAPEUTIC EXERCISES: CPT

## 2019-12-02 PROCEDURE — 80053 COMPREHEN METABOLIC PANEL: CPT | Performed by: FAMILY MEDICINE

## 2019-12-02 RX ORDER — IPRATROPIUM BROMIDE AND ALBUTEROL SULFATE 2.5; .5 MG/3ML; MG/3ML
3 SOLUTION RESPIRATORY (INHALATION) EVERY 4 HOURS PRN
Status: DISCONTINUED | OUTPATIENT
Start: 2019-12-02 | End: 2019-12-09 | Stop reason: HOSPADM

## 2019-12-02 RX ADMIN — ALLOPURINOL 100 MG: 100 TABLET ORAL at 12:33

## 2019-12-02 RX ADMIN — AMIODARONE HYDROCHLORIDE 100 MG: 200 TABLET ORAL at 21:50

## 2019-12-02 RX ADMIN — SODIUM CHLORIDE, PRESERVATIVE FREE 10 ML: 5 INJECTION INTRAVENOUS at 09:21

## 2019-12-02 RX ADMIN — METOPROLOL SUCCINATE 25 MG: 25 TABLET, FILM COATED, EXTENDED RELEASE ORAL at 09:20

## 2019-12-02 RX ADMIN — SODIUM CHLORIDE, PRESERVATIVE FREE 10 ML: 5 INJECTION INTRAVENOUS at 21:50

## 2019-12-02 RX ADMIN — PANTOPRAZOLE SODIUM 40 MG: 40 TABLET, DELAYED RELEASE ORAL at 12:31

## 2019-12-02 RX ADMIN — IPRATROPIUM BROMIDE AND ALBUTEROL SULFATE 3 ML: 2.5; .5 SOLUTION RESPIRATORY (INHALATION) at 07:21

## 2019-12-02 RX ADMIN — FINASTERIDE 5 MG: 5 TABLET, FILM COATED ORAL at 12:32

## 2019-12-02 RX ADMIN — AMIODARONE HYDROCHLORIDE 100 MG: 200 TABLET ORAL at 12:32

## 2019-12-02 RX ADMIN — TAMSULOSIN HYDROCHLORIDE 0.4 MG: 0.4 CAPSULE ORAL at 21:50

## 2019-12-02 NOTE — PROGRESS NOTES
Patient has decided to proceed with surgery for right hip incomplete intertrochanteric hip fracture. Will plan for surgery tomorrow afternoon.    NPO at midnight  Hold Xarelto this evening  Obtain consent

## 2019-12-02 NOTE — THERAPY TREATMENT NOTE
Acute Care - Occupational Therapy Treatment Note  [facility]     Patient Name: Vasile Deng  : 3/27/1931  MRN: [id number]  Today's Date: 2019  Onset of Illness/Injury or Date of Surgery: 19  Date of Referral to OT: 19  Referring Physician: Dr. Carrion     Admit Date: 2019       ICD-10-CM ICD-9-CM   1. Fall, initial encounter W19.XXXA E888.9   2. Right hip pain M25.551 719.45   3. Intractable pain R52 780.96   4. Closed intertrochanteric fracture of hip, right, initial encounter (CMS/[facility]) S72.141A 820.21   5. Impaired mobility Z74.09 799.89   6. Decreased activities of daily living (ADL) Z78.9 V49.89     Patient Active Problem List   Diagnosis   • Coronary artery disease involving coronary bypass graft of native heart without angina pectoris   • Hx of CABG   • Essential hypertension   • Mixed hyperlipidemia   • Former smoker   • PAF (paroxysmal atrial fibrillation) (CMS/[facility])   • Syncope   • Dyspnea   • Elevated troponin   • NSTEMI (non-ST elevated myocardial infarction) (CMS/[facility])   • Chronic systolic congestive heart failure (CMS/[facility])   • On continuous oral anticoagulation   • Postural dizziness with near syncope   • Fall   • Acute pain of right hip   • Hyponatremia   • Open right hip fracture, type III, initial encounter (CMS/[facility])   • Closed intertrochanteric fracture of hip, right, initial encounter (CMS/[facility])     Past Medical History:   Diagnosis Date   • Acute on chronic combined systolic and diastolic congestive heart failure (CMS/[facility]) 2018   • Atrial fibrillation (CMS/HCC)    • Coronary artery disease    • Dizziness    • Hyperlipidemia    • Hypertension      Past Surgical History:   Procedure Laterality Date   • CARDIAC CATHETERIZATION Left 2018    Procedure: Cardiac Catheterization/Vascular Study;  Surgeon: Huey Wilkins MD;  Location: [facility] INVASIVE LOCATION;  Service: Cardiology   • CARDIAC CATHETERIZATION N/A 2018    Procedure: Left  ventriculography;  Surgeon: Huey Wilkins MD;  Location:  [facility] CATH INVASIVE LOCATION;  Service: Cardiology   • CARDIAC SURGERY      double bypass   • KNEE SURGERY         Therapy Treatment    Rehabilitation Treatment Summary     Row Name 12/02/19 1316 12/02/19 1106 12/02/19 1043       Treatment Time/Intention    Discipline  physical therapy assistant  -NW  physical therapy assistant  -NW  occupational therapy assistant  -TS    Document Type  therapy note (daily note)  -NW  --  therapy note (daily note)  -TS    Patient Effort  --  --  good  -TS2    Comment  --  working w/ OT  -NW  Pt s.o. asked to go over sx vs no sx with pt. DE LOS SANOTS discussed ambulation/safety/ADLs with pt having sx vs no sx.   -TS2    Reason Treatment Not Performed  --  unavailable for treatment  -NW  --    Existing Precautions/Restrictions  --  --  fall;partial weight bearing  -TS3    Treatment Considerations/Comments  --  --  [place]  -TS3    Recorded by [NW] Sherine Myers, PTA 12/02/19 1317 [NW] Sherine Myers, PTA 12/02/19 1108 [TS] Carmen Mcgraw, DE LOS SANTOS/L 12/02/19 1043  [TS2] Carmen Mcgraw, DE LOS SANTOS/L 12/02/19 1148  [TS3] Carmen Mcgraw, DE LOS SANTOS/L 12/02/19 1253    Row Name 12/02/19 1043             Cognitive Assessment/Intervention    Additional Documentation  Cognitive Assessment/Intervention (Group)  -TS      Recorded by [TS] Carmen Mcgraw DE LOS SANTOS/L 12/02/19 1253      Row Name 12/02/19 1043             Cognitive Assessment/Intervention- PT/OT    Personal Safety Interventions  fall prevention program maintained;gait belt  -TS      Recorded by [TS] Carmen Mcgraw DE LOS SANTOS/L 12/02/19 1253      Row Name 12/02/19 1043             Bed Mobility Assessment/Treatment    Bed Mobility Assessment/Treatment  supine-sit  -TS      Scooting/Bridging [place] (Bed Mobility)  contact guard  -TS      Supine-Sit [place] (Bed Mobility)  minimum assist (75% patient effort)  -TS      Assistive Device (Bed Mobility)  bed  rails;head of bed elevated  -TS      Recorded by [TS] Carmen Mcgraw DE LOS SANTOS/L 12/02/19 1316      Row Name 12/02/19 1043             Transfer Assessment/Treatment    Transfer Assessment/Treatment  sit-stand transfer;stand-sit transfer;bed-chair transfer  -TS      Maintains Weight-bearing Status (Transfers)  verbal cues to maintain;unable to maintain weight-bearing status  -TS      Recorded by [TS] Carmen Mcgraw DE LOS SANTOS/L 12/02/19 1316      Row Name 12/02/19 1043             Sit-Stand Transfer    Sit-Stand [place] (Transfers)  contact guard;minimum assist (75% patient effort)  -TS      Assistive Device (Sit-Stand Transfers)  walker, front-wheeled  -TS      Recorded by [TS] Carmen Mcgraw DE LOS SANTOS/L 12/02/19 1316      Row Name 12/02/19 1043             Stand-Sit Transfer    Stand-Sit [place] (Transfers)  contact guard  -TS      Assistive Device (Stand-Sit Transfers)  walker, front-wheeled  -TS      Recorded by [TS] Carmen Mcgraw DE LOS SANTOS/L 12/02/19 1316      Row Name 12/02/19 1043             Toileting Assessment/Training    [place] Level (Toileting)  set up;supervision  -TS      Assistive Devices (Toileting)  urinal  -TS      Toileting Position  supported sitting  -TS      Recorded by [TS] Carmen Mcgraw DE LOS SANTOS/L 12/02/19 1316      Row Name 12/02/19 1043             Positioning and Restraints    Pre-Treatment Position  in bed  -TS      Post Treatment Position  chair  -TS      In Chair  sitting;call light within reach;encouraged to call for assist;with family/caregiver  -TS      Recorded by [TS] Carmen Mcgraw DE LOS SANTOS/L 12/02/19 1316      Row Name 12/02/19 1043             Pain Scale: Numbers Pre/Post-Treatment    Pain Scale: Numbers, Pretreatment  5/10  -TS      Pain Location - Side  Right  -TS      Pain Location  hip  -TS      Pain Intervention(s)  Repositioned  -TS      Recorded by [TS] Carmen Mcgraw DE LOS SANTOS/L 12/02/19 1316      Row Name 12/02/19 1043             Outcome  Summary/Treatment Plan (OT)    Daily Summary of Progress (OT)  progress towards functional goals is fair  -TS      Recorded by [TS] Carmen Mcgraw, [place] LOS SANTOS/L 12/02/19 1316        User Key  (r) = Recorded By, (t) = Taken By, (c) = Cosigned By    Initials Name Effective Dates Discipline    TS Carmen Mcgraw, [place] LOS SANTOS/L 08/02/16 -  OT    NW Sherine Myers PTA 08/02/16 -  PT             Occupational Therapy Education     Title: PT OT SLP Therapies (In Progress)     Topic: Occupational Therapy (In Progress)     Point: ADL training (Done)     Description: Instruct learner(s) on proper safety adaptation and remediation techniques during self care or transfers.   Instruct in proper use of assistive devices.    Learning Progress Summary           Patient Acceptance, E,D, VU,NR by TR at 11/30/2019 12:06 PM    Comment:  Education provided on purpose of OT eval, R LE TTWB status, need for OT and d/c planning.                   Point: Precautions (Done)     Description: Instruct learner(s) on prescribed precautions during self-care and functional transfers.    Learning Progress Summary           Patient Acceptance, E,D, VU,NR by TR at 11/30/2019 12:06 PM    Comment:  Education provided on purpose of OT eval, R LE TTWB status, need for OT and d/c planning.                               User Key     Initials Effective Dates Name Provider Type Discipline    TR 06/22/15 -  Carmen Zamarripa, OTR/L Occupational Therapist OT                OT Recommendation and Plan  Outcome Summary/Treatment Plan (OT)  Daily Summary of Progress (OT): progress towards functional goals is fair  Daily Summary of Progress (OT): progress towards functional goals is fair  Plan of Care Review  Plan of Care Reviewed With: patient  Plan of Care Reviewed With: patient  Outcome Summary: Pt min A for bed mobility with HOB elevated. Pt min A for t/f from elevated EOB but is unable to maintain WB status. Pt pivots to L side to sit in chair with assist of  RW. Pt scheduled for sx 12/3. OT to re eval post sx. Continue OT POC   Outcome Measures     Row Name 12/02/19 1300 12/01/19 0900 11/30/19 1200       How much help from another person do you currently need...    Turning from your back to your side while in flat bed without using bedrails?  --  3  -NW  --    Moving from lying on back to sitting on the side of a flat bed without bedrails?  --  2  -NW  --    Moving to and from a bed to a chair (including a wheelchair)?  --  3  -NW  --    Standing up from a chair using your arms (e.g., wheelchair, bedside chair)?  --  2  -NW  --    Climbing 3-5 steps with a railing?  --  1  -NW  --    To walk in hospital room?  --  2  -NW  --    AM-PAC 6 Clicks Score (PT)  --  13  -NW  --       How much help from another is currently needed...    Putting on and taking off regular lower body clothing?  2  -TS  --  2  -TR    Bathing (including washing, rinsing, and drying)  2  -TS  --  2  -TR    Toileting (which includes using toilet bed pan or urinal)  3  -TS  --  2  -TR    Putting on and taking off regular upper body clothing  3  -TS  --  3  -TR    Taking care of personal grooming (such as brushing teeth)  3  -TS  --  3  -TR    Eating meals  4  -TS  --  4  -TR    AM-PAC 6 Clicks Score (OT)  17  -TS  --  16  -TR       Functional Assessment    Outcome Measure Options  AM-PAC 6 Clicks Daily Activity (OT)  -TS  AM-PAC 6 Clicks Basic Mobility (PT)  -NW  AM-PAC 6 Clicks Daily Activity (OT)  -TR      User Key  (r) = Recorded By, (t) = Taken By, (c) = Cosigned By    Initials Name Provider Type    TS Carmen Mcgraw, [place] SANTOS/L Occupational Therapy Assistant    NW Sherine Myers, MIHAI Physical Therapy Assistant    TR Carmen Zamarripa, OTR/L Occupational Therapist           Time Calculation:   Time Calculation- OT     Row Name 12/02/19 1317             Time Calculation- OT    OT Start Time  1043  -TS      OT Stop Time  1140  -TS      OT Time Calculation (min)  57 min  -TS      Total Timed Code  Minutes- OT  57 minute(s)  -TS      OT Received On  12/02/19  -         Timed Charges    96096 - OT Self Care/Mgmt Minutes  57  -TS        User Key  (r) = Recorded By, (t) = Taken By, (c) = Cosigned By    Initials Name Provider Type     Carmen Mcgraw COTA/L Occupational Therapy Assistant        Therapy Charges for Today     Code Description Service Date Service Provider Modifiers Qty    [id number] HC OT SELF CARE/MGMT/TRAIN EA 15 MIN 12/2/2019 Carmen Mcgraw COTA/L GO 4               Carmen DURÁN. NAZ Mcgraw  12/2/2019

## 2019-12-02 NOTE — PROGRESS NOTES
Continued Stay Note   [place]     Patient Name: Vasile Deng  MRN: [id number]  Today's Date: 12/2/2019    Admit Date: 11/27/2019    Discharge Plan     Row Name 12/02/19 1158       Plan    Plan Comments  NOTED PT MAY BE GOING FOR SURGERY TODAY. WILL FOLLOW POST OP AND AFTER THERAPY EVALUATES TO HELP DETERMINE APPROPRIATE DC PLAN. PT DOES HAVE MEDICARE AND IS OPEN TO USING  FOR SNF/REHAB POST DC        Discharge Codes    No documentation.             BRIANDA Aguirre

## 2019-12-02 NOTE — PLAN OF CARE
Problem: Patient Care Overview  Goal: Plan of Care Review  Outcome: Ongoing (interventions implemented as appropriate)   12/02/19 2970   Coping/Psychosocial   Plan of Care Reviewed With patient   Plan of Care Review   Progress no change   OTHER   Outcome Summary Pt min A for bed mobility with HOB elevated. Pt min A for t/f from elevated EOB but is unable to maintain WB status. Pt pivots to L side to sit in chair with assist of RW. Pt scheduled for sx 12/3. OT to re eval post sx. Continue OT POC

## 2019-12-02 NOTE — PLAN OF CARE
Problem: Patient Care Overview  Goal: Plan of Care Review  Outcome: Ongoing (interventions implemented as appropriate)   12/01/19 3229   Coping/Psychosocial   Plan of Care Reviewed With patient   Plan of Care Review   Progress no change   OTHER   Outcome Summary Pt a& o x4. Will c/o pain with movement but refuses any pain medication. Is reconsidering surgical intervention now. Up x2, walker. Ortho bp as ordered. Will cont to monitor.        Problem: Fall Risk (Adult)  Goal: Identify Related Risk Factors and Signs and Symptoms  Outcome: Ongoing (interventions implemented as appropriate)    Goal: Absence of Fall  Outcome: Ongoing (interventions implemented as appropriate)      Problem: Pain, Chronic (Adult)  Goal: Identify Related Risk Factors and Signs and Symptoms  Outcome: Ongoing (interventions implemented as appropriate)    Goal: Acceptable Pain/Comfort Level and Functional Ability  Outcome: Ongoing (interventions implemented as appropriate)      Problem: Fracture Orthopaedic (Adult)  Goal: Signs and Symptoms of Listed Potential Problems Will be Absent, Minimized or Managed (Fracture Orthopaedic)  Outcome: Ongoing (interventions implemented as appropriate)

## 2019-12-02 NOTE — PROGRESS NOTES
Orthopedic Surgery Progress Note    Vasile Deng  12/2/2019      Subjective:     No acute events overnight.  Per nursing report, patient decided to proceed with operative intervention yesterday.  However, on questioning this morning he is still hesitant about proceeding with surgery, specifically, he is concerned about anesthesia.  We had a lengthy discussion again today, reviewing what was discussed on Saturday regarding treatment options consisting of conservative and operative intervention.  He will speak with his significant other today about his decision.  Denies significant discomfort at rest but increased pain with attempted activity and difficulty working with physical therapy.  He denies any chest pain, shortness of breath.  Denies numbness or tingling to the right lower extremity.    Objective:     Patient Vitals for the past 24 hrs:   BP Temp Temp src Pulse Resp SpO2   12/02/19 0400 131/82 98.3 °F (36.8 °C) Oral 76 18 97 %   12/02/19 0000 126/80 98.5 °F (36.9 °C) Oral 74 16 98 %   12/01/19 2230 -- -- -- 72 18 --   12/01/19 2222 -- -- -- 72 18 96 %   12/01/19 2133 170/64 -- -- 72 -- --   12/01/19 2129 160/74 -- -- 74 -- --   12/01/19 2121 140/54 97.4 °F (36.3 °C) Oral 59 18 95 %   12/01/19 1500 124/50 98.2 °F (36.8 °C) Oral 60 18 97 %   12/01/19 1455 -- -- -- 58 18 --   12/01/19 1450 -- -- -- 55 18 96 %   12/01/19 1125 -- -- -- 52 18 --   12/01/19 1122 -- -- -- 62 18 98 %   12/01/19 1106 118/52 98 °F (36.7 °C) Oral 58 18 94 %   12/01/19 0850 (!) 82/66 -- -- 60 18 --   12/01/19 0843 124/49 -- -- 57 18 93 %   12/01/19 0840 115/50 97.9 °F (36.6 °C) Oral 55 18 95 %   12/01/19 0727 -- -- -- 56 18 --   12/01/19 0722 -- -- -- 57 18 91 %       General: Awake, alert  Respiratory: Nonlabored  CV: Regular rate  Extremity:  RLE: No gross deformity to the right lower extremity.  On palpation, minimal tenderness to the proximal femur.  No significant tenderness to the mid-aspect of the femur, distal femur, knee,  tibia/fibula, ankle or foot.  No significant discomfort with logroll. EHL, FHL, tibialis anterior and gastrocsoleus complex are motor intact.  Gross sensation is intact to the right foot.  Foot is warm and well-perfused.       Data Review:  Lab Results (last 24 hours)     Procedure Component Value Units Date/Time    Comprehensive Metabolic Panel [[id number]]  (Abnormal) Collected:  12/02/19 0440    Specimen:  Blood Updated:  12/02/19 0553     Glucose 130 mg/dL      BUN 14 mg/dL      Creatinine 0.77 mg/dL      Sodium 137 mmol/L      Potassium 4.2 mmol/L      Chloride 101 mmol/L      CO2 27.0 mmol/L      Calcium 8.8 mg/dL      Total Protein 6.9 g/dL      Albumin 3.50 g/dL      ALT (SGPT) 41 U/L      AST (SGOT) 31 U/L      Alkaline Phosphatase 110 U/L      Total Bilirubin 0.5 mg/dL      eGFR Non African Amer 95 mL/min/1.73      Globulin 3.4 gm/dL      A/G Ratio 1.0 g/dL      BUN/Creatinine Ratio 18.2     Anion Gap 9.0 mmol/L     Narrative:       GFR Normal >60  Chronic Kidney Disease <60  Kidney Failure <15    CBC & Differential [[id number]] Collected:  12/02/19 0440    Specimen:  Blood Updated:  12/02/19 0533    Narrative:       The following orders were created for panel order CBC & Differential.  Procedure                               Abnormality         Status                     ---------                               -----------         ------                     CBC Auto Differential[[id number]]        Abnormal            Final result                 Please view results for these tests on the individual orders.    CBC Auto Differential [[id number]]  (Abnormal) Collected:  12/02/19 0440    Specimen:  Blood Updated:  12/02/19 0533     WBC 6.07 10*3/mm3      RBC 4.34 10*6/mm3      Hemoglobin 12.4 g/dL      Hematocrit 37.0 %      MCV 85.3 fL      MCH 28.6 pg      MCHC 33.5 g/dL      RDW 14.0 %      RDW-SD 43.6 fl      MPV 9.2 fL      Platelets 172 10*3/mm3      Neutrophil % 71.7 %      Lymphocyte % 14.7 %      Monocyte  % 9.1 %      Eosinophil % 3.5 %      Basophil % 0.7 %      Immature Grans % 0.3 %      Neutrophils, Absolute 4.36 10*3/mm3      Lymphocytes, Absolute 0.89 10*3/mm3      Monocytes, Absolute 0.55 10*3/mm3      Eosinophils, Absolute 0.21 10*3/mm3      Basophils, Absolute 0.04 10*3/mm3      Immature Grans, Absolute 0.02 10*3/mm3      nRBC 0.0 /100 WBC     Basic Metabolic Panel [[id number]]  (Abnormal) Collected:  12/02/19 0011    Specimen:  Blood Updated:  12/02/19 0041     Glucose 143 mg/dL      BUN 14 mg/dL      Creatinine 0.82 mg/dL      Sodium 131 mmol/L      Potassium 4.1 mmol/L      Chloride 98 mmol/L      CO2 24.0 mmol/L      Calcium 8.4 mg/dL      eGFR Non African Amer 89 mL/min/1.73      BUN/Creatinine Ratio 17.1     Anion Gap 9.0 mmol/L     Narrative:       GFR Normal >60  Chronic Kidney Disease <60  Kidney Failure <15    Basic Metabolic Panel [[id number]]  (Abnormal) Collected:  12/01/19 1800    Specimen:  Blood Updated:  12/01/19 1829     Glucose 139 mg/dL      BUN 14 mg/dL      Creatinine 0.76 mg/dL      Sodium 128 mmol/L      Potassium 3.9 mmol/L      Chloride 94 mmol/L      CO2 26.0 mmol/L      Calcium 8.4 mg/dL      eGFR Non African Amer 97 mL/min/1.73      BUN/Creatinine Ratio 18.4     Anion Gap 8.0 mmol/L     Narrative:       GFR Normal >60  Chronic Kidney Disease <60  Kidney Failure <15          Assessment:     88-year-old male with an incomplete right intertrochanteric femur fracture and significant medical history for atrial fibrillation on anticoagulation, coronary artery disease, congestive heart failure and hypertension    Plan:   1. Right intertrochanteric fracture:  He will discuss his decision with his significant other this morning with possibility of proceeding with surgery either this evening or tomorrow.  Will make NPO with possibility of surgery today.   2. Activity: TTWB to RLE  3. PT/OT eval and treat- recommend home with assist and HHT vs transitional care  4. Pain control, minimize  opioids as able  5. DVT ppx: Will hold Xarelto this evening if we proceed with surgery. Ok to continue ASA 81mg.   6. Dispo: Await decision regarding surgery.      Daniel Carrion MD

## 2019-12-02 NOTE — PROGRESS NOTES
[facility] Medicine Services  INPATIENT PROGRESS NOTE    Patient Name: Vasile Deng  Date of Admission: 11/27/2019  Today's Date: 12/02/19  Length of Stay: 3  Primary Care Physician: Provider, No Known    Subjective   Chief Complaint: Right hip pain  HPI   Doing ok.  Still having a lot of pain in right hip  Was on the fence this AM, but apparently has decided to pursue surgery.  Ortho planning OR tomorrow afternoon.        Review of Systems   Constitutional: Negative for fatigue and fever.   HENT: Negative for congestion and ear pain.    Eyes: Negative for redness and visual disturbance.   Respiratory: Negative for cough, shortness of breath and wheezing.    Cardiovascular: Negative for chest pain and palpitations.   Gastrointestinal: Negative for abdominal pain, diarrhea, nausea and vomiting.   Endocrine: Negative for cold intolerance and heat intolerance.   Genitourinary: Negative for dysuria and frequency.   Musculoskeletal: Positive for arthralgias. Negative for back pain.   Skin: Negative for rash and wound.   Neurological: Negative for dizziness and headaches.   Psychiatric/Behavioral: Negative for confusion. The patient is not nervous/anxious.         All pertinent negatives and positives are as above. All other systems have been reviewed and are negative unless otherwise stated.     Objective    Temp:  [97.4 °F (36.3 °C)-98.5 °F (36.9 °C)] 97.6 °F (36.4 °C)  Heart Rate:  [58-76] 58  Resp:  [16-18] 16  BP: (114-170)/(50-84) 114/57  Physical Exam   Constitutional: He is oriented to person, place, and time. He appears well-developed and well-nourished.   HENT:   Head: Normocephalic and atraumatic.   Right Ear: External ear normal.   Left Ear: External ear normal.   Nose: Nose normal.   Mouth/Throat: Oropharynx is clear and moist.   Eyes: Conjunctivae and EOM are normal. Pupils are equal, round, and reactive to light. Right eye exhibits no discharge. Left eye exhibits no  discharge. No scleral icterus.   Neck: Normal range of motion. Neck supple. No tracheal deviation present. No thyromegaly present.   Cardiovascular: Normal rate, regular rhythm, normal heart sounds and intact distal pulses. Exam reveals no gallop and no friction rub.   No murmur heard.  Pulmonary/Chest: Effort normal and breath sounds normal. No stridor. No respiratory distress. He has no wheezes. He has no rales. He exhibits no tenderness.   Abdominal: Soft. Bowel sounds are normal. He exhibits no distension and no mass. There is no tenderness. There is no rebound and no guarding. No hernia.   Musculoskeletal: He exhibits no edema or deformity.        Right hip: He exhibits decreased range of motion, decreased strength, tenderness and bony tenderness.   Lymphadenopathy:     He has no cervical adenopathy.   Neurological: He is alert and oriented to person, place, and time. He has normal reflexes. He displays normal reflexes. No cranial nerve deficit. He exhibits normal muscle tone. Coordination normal.   Skin: Skin is warm and dry. No rash noted. No erythema. No pallor.   Psychiatric: He has a normal mood and affect. His behavior is normal. Judgment and thought content normal.   Vitals reviewed.          Results Review:  I have reviewed the labs, radiology results, and diagnostic studies.    Laboratory Data:   Results from last 7 days   Lab Units 12/02/19  0440 12/01/19  0408 11/30/19  0425   WBC 10*3/mm3 6.07 7.22 6.66   HEMOGLOBIN g/dL 12.4* 11.6* 12.1*   HEMATOCRIT % 37.0* 33.7* 35.3*   PLATELETS 10*3/mm3 172 166 182        Results from last 7 days   Lab Units 12/02/19  1224 12/02/19  0440 12/02/19  0011  12/01/19  0408 11/30/19  0425   SODIUM mmol/L 137 137 131*   < > 128* 128*   POTASSIUM mmol/L 4.1 4.2 4.1   < > 3.9 4.0   CHLORIDE mmol/L 101 101 98   < > 95* 95*   CO2 mmol/L 27.0 27.0 24.0   < > 25.0 24.0   BUN mg/dL 12 14 14   < > 11 16   CREATININE mg/dL 0.80 0.77 0.82   < > 0.69* 0.75*   CALCIUM mg/dL 9.0  8.8 8.4*   < > 8.6 8.5*   BILIRUBIN mg/dL  --  0.5  --   --  0.7 0.5   ALK PHOS U/L  --  110  --   --  101 106   ALT (SGPT) U/L  --  41  --   --  31 34   AST (SGOT) U/L  --  31  --   --  25 23   GLUCOSE mg/dL 133* 130* 143*   < > 98 128*    < > = values in this interval not displayed.       Culture Data:        Radiology Data:   Imaging Results (Last 24 Hours)     ** No results found for the last 24 hours. **          I have reviewed the patient's current medications.     Assessment/Plan     Active Hospital Problems    Diagnosis   • **Acute pain of right hip   • Open right hip fracture, type III, initial encounter (CMS/[facility])   • Fall   • Hyponatremia   • Closed intertrochanteric fracture of hip, right, initial encounter (CMS/[facility])     Added automatically from request for surgery [id number]     • Postural dizziness with near syncope   • On continuous oral anticoagulation   • Chronic systolic congestive heart failure (CMS/[facility])   • PAF (paroxysmal atrial fibrillation) (CMS/[facility])   • Essential hypertension   • Coronary artery disease involving coronary bypass graft of native heart without angina pectoris       1.  R hip fracture  -Ortho following    2.  Hyponatremia  -resolved with IVF    3.  A-fib  -metoprolol  -Amio  -Xarelto    4.  HTN  -Metoprolol    5.  CAD  -ASA  -Metoprolol                Discharge Planning: I expect the patient to be discharged to rehab vs SNf in ? days    Bob Haider MD   12/02/19   2:59 PM

## 2019-12-02 NOTE — PLAN OF CARE
Problem: Patient Care Overview  Goal: Plan of Care Review  Outcome: Ongoing (interventions implemented as appropriate)   12/02/19 0152   Coping/Psychosocial   Plan of Care Reviewed With patient;significant other   Plan of Care Review   Progress no change   OTHER   Outcome Summary A&Ox4. Pt requesting to talk with ortho surgeon about possibly getting surgery done. No c/o pain this shift. VSS.        Problem: Fall Risk (Adult)  Goal: Identify Related Risk Factors and Signs and Symptoms  Outcome: Ongoing (interventions implemented as appropriate)    Goal: Absence of Fall  Outcome: Ongoing (interventions implemented as appropriate)      Problem: Pain, Chronic (Adult)  Goal: Identify Related Risk Factors and Signs and Symptoms  Outcome: Ongoing (interventions implemented as appropriate)    Goal: Acceptable Pain/Comfort Level and Functional Ability  Outcome: Ongoing (interventions implemented as appropriate)      Problem: Fracture Orthopaedic (Adult)  Goal: Signs and Symptoms of Listed Potential Problems Will be Absent, Minimized or Managed (Fracture Orthopaedic)  Outcome: Ongoing (interventions implemented as appropriate)

## 2019-12-02 NOTE — PROGRESS NOTES
RT Nebulizer Protocol     0  1  2  3  4      Respiratory History   No Smoking      Smoking History   X   1 Pack/Day      Pulmonary Disease      Exacerbation        Respiratory Rate   Normal   X   20-25      Dyspneic      Accessory Muscles      Severe Dyspnea        Breath Sounds   Clear   X   Crackles      Crackles/ Rhonchi      Wheezing      Absent/ Severe Wheezing        Chest   X-ray   Clear   X   1 Lobe Infiltration/ Consolidation/ PE      2 Lobe Same Lung Infiltration/ Consolidation/ PE       2 Lobe Infiltration/ Both Lungs/ Consolidation/ PE      Both Lungs/ More Than 1 Lobe/ Atelectasis/ Consolidation/  PE        Cough   Strong Non- Productive   X   Excessive Secretions/ Strong Cough      Excessive Secretions/ Weak Cough      Thick Bronchial Secretions/ Weak Cough      Thick Bronchial Secretions/ No Cough        Total Patient Score =  1  0-4=Q4 PRN  5-9=TID and Q4 PRN  10-14=QID and Q3 PRN  15-19=Q4 and Q2 PRN  20=Q3 and Q2 PRN    Bronchopulmonary Hygiene (CPT)   Q4 ATC Copious secretions, dyspnea, unable to sleep, mucus plug    QID & Q4 PRN Moderate secretions    TID Small amounts of secretions w/ poor cough and history of secretions    BID Unable to deep breathe and cough spontaneously       Lung Expansion Therapy (PEP)   Q4 & PRN at night Severe atelectasis, poor oxygenation    QID  High risk for persistent atelectasis, existence of same    TID At risk for developing atelectasis    BID Unable to deep breathe and cough spontaneously     Instruct, 1 follow up Patients able to perform well on their own

## 2019-12-02 NOTE — PROGRESS NOTES
RT Nebulizer Protocol                        Assessment tool to be used for patients with existing breathing treatments                                                              ordered by hospitalists   0  1  2  3  4      Respiratory History   No Smoking      Smoking History   x   1 Pack/Day      Pulmonary Disease      Exacerbation        Respiratory Rate   Normal   x   20-25      Dyspneic      Accessory Muscles      Severe Dyspnea        Breath Sounds   Clear      Crackles      Crackles/ Rhonchi      Wheezing   3   Absent/ Severe Wheezing        Chest   X-ray   Clear      1 Lobe Infiltration/ Consolidation/ PE      2 Lobe Same Lung Infiltration/ Consolidation/ PE       2 Lobe Infiltration/ Both Lungs/ Consolidation/ PE      Both Lungs/ More Than 1 Lobe/ Atelectasis/ Consolidation/  PE        Cough   Strong Non- Productive   x   Excessive Secretions/ Strong Cough      Excessive Secretions/ Weak Cough      Thick Bronchial Secretions/ Weak Cough      Thick Bronchial Secretions/ No Cough        Total Patient Score =  4  0-4=Q4 PRN  5-9=TID and Q4 PRN  10-14=QID and Q3 PRN  15-19=Q4 and Q2 PRN  20=Q3 and Q2 PRN    Bronchopulmonary Hygiene (CPT)   Q4 ATC Copious secretions, dyspnea, unable to sleep, mucus plug    QID & Q4 PRN Moderate secretions    TID Small amounts of secretions w/ poor cough and history of secretions    BID Unable to deep breathe and cough spontaneously       Lung Expansion Therapy (PEP)   Q4 & PRN at night Severe atelectasis, poor oxygenation    QID  High risk for persistent atelectasis, existence of same    TID At risk for developing atelectasis    BID Unable to deep breathe and cough spontaneously     Instruct, 1 follow up Patients able to perform well on their own        Slight wheezing cleared with cough. Made prn.

## 2019-12-02 NOTE — PLAN OF CARE
Problem: Patient Care Overview  Goal: Plan of Care Review  Outcome: Ongoing (interventions implemented as appropriate)   12/02/19 1506   Coping/Psychosocial   Plan of Care Reviewed With patient   Plan of Care Review   Progress no change   OTHER   Outcome Summary A&Ox4 but forgetful at times. C/o mild pain in R hip. Pt has agreed to sx for tomorrow. NPO at midnight. Holding scheduled xarelto. PPP. Tele- sinus 61. MD notified about pt Na+ levels per orders. Family at bedside. VSS. Safety maintained. Will continue to monitor.      Goal: Individualization and Mutuality  Outcome: Ongoing (interventions implemented as appropriate)    Goal: Discharge Needs Assessment  Outcome: Ongoing (interventions implemented as appropriate)    Goal: Interprofessional Rounds/Family Conf  Outcome: Ongoing (interventions implemented as appropriate)      Problem: Fall Risk (Adult)  Goal: Identify Related Risk Factors and Signs and Symptoms  Outcome: Ongoing (interventions implemented as appropriate)    Goal: Absence of Fall  Outcome: Ongoing (interventions implemented as appropriate)      Problem: Pain, Chronic (Adult)  Goal: Identify Related Risk Factors and Signs and Symptoms  Outcome: Ongoing (interventions implemented as appropriate)    Goal: Acceptable Pain/Comfort Level and Functional Ability  Outcome: Ongoing (interventions implemented as appropriate)      Problem: Fracture Orthopaedic (Adult)  Goal: Signs and Symptoms of Listed Potential Problems Will be Absent, Minimized or Managed (Fracture Orthopaedic)  Outcome: Ongoing (interventions implemented as appropriate)

## 2019-12-03 ENCOUNTER — ANESTHESIA EVENT (OUTPATIENT)
Dept: PERIOP | Facility: HOSPITAL | Age: 84
End: 2019-12-03

## 2019-12-03 ENCOUNTER — ANESTHESIA (OUTPATIENT)
Dept: PERIOP | Facility: HOSPITAL | Age: 84
End: 2019-12-03

## 2019-12-03 ENCOUNTER — APPOINTMENT (OUTPATIENT)
Dept: GENERAL RADIOLOGY | Facility: HOSPITAL | Age: 84
End: 2019-12-03

## 2019-12-03 LAB
ALBUMIN SERPL-MCNC: 3.1 G/DL (ref 3.5–5.2)
ALBUMIN/GLOB SERPL: 0.9 G/DL
ALP SERPL-CCNC: 110 U/L (ref 39–117)
ALT SERPL W P-5'-P-CCNC: 41 U/L (ref 1–41)
ANION GAP SERPL CALCULATED.3IONS-SCNC: 8 MMOL/L (ref 5–15)
AST SERPL-CCNC: 27 U/L (ref 1–40)
BACTERIA UR QL AUTO: ABNORMAL /HPF
BASOPHILS # BLD AUTO: 0.05 10*3/MM3 (ref 0–0.2)
BASOPHILS NFR BLD AUTO: 0.9 % (ref 0–1.5)
BILIRUB SERPL-MCNC: 0.5 MG/DL (ref 0.2–1.2)
BILIRUB UR QL STRIP: NEGATIVE
BUN BLD-MCNC: 15 MG/DL (ref 8–23)
BUN/CREAT SERPL: 18.1 (ref 7–25)
CALCIUM SPEC-SCNC: 9 MG/DL (ref 8.6–10.5)
CHLORIDE SERPL-SCNC: 99 MMOL/L (ref 98–107)
CLARITY UR: CLEAR
CO2 SERPL-SCNC: 27 MMOL/L (ref 22–29)
COLOR UR: YELLOW
CREAT BLD-MCNC: 0.83 MG/DL (ref 0.76–1.27)
DEPRECATED RDW RBC AUTO: 45 FL (ref 37–54)
EOSINOPHIL # BLD AUTO: 0.33 10*3/MM3 (ref 0–0.4)
EOSINOPHIL NFR BLD AUTO: 5.7 % (ref 0.3–6.2)
ERYTHROCYTE [DISTWIDTH] IN BLOOD BY AUTOMATED COUNT: 14.3 % (ref 12.3–15.4)
GFR SERPL CREATININE-BSD FRML MDRD: 87 ML/MIN/1.73
GLOBULIN UR ELPH-MCNC: 3.5 GM/DL
GLUCOSE BLD-MCNC: 116 MG/DL (ref 65–99)
GLUCOSE UR STRIP-MCNC: NEGATIVE MG/DL
HCT VFR BLD AUTO: 37 % (ref 37.5–51)
HGB BLD-MCNC: 12.3 G/DL (ref 13–17.7)
HGB UR QL STRIP.AUTO: ABNORMAL
HYALINE CASTS UR QL AUTO: ABNORMAL /LPF
IMM GRANULOCYTES # BLD AUTO: 0.03 10*3/MM3 (ref 0–0.05)
IMM GRANULOCYTES NFR BLD AUTO: 0.5 % (ref 0–0.5)
KETONES UR QL STRIP: NEGATIVE
LEUKOCYTE ESTERASE UR QL STRIP.AUTO: NEGATIVE
LYMPHOCYTES # BLD AUTO: 1.37 10*3/MM3 (ref 0.7–3.1)
LYMPHOCYTES NFR BLD AUTO: 23.6 % (ref 19.6–45.3)
MCH RBC QN AUTO: 28.6 PG (ref 26.6–33)
MCHC RBC AUTO-ENTMCNC: 33.2 G/DL (ref 31.5–35.7)
MCV RBC AUTO: 86 FL (ref 79–97)
MONOCYTES # BLD AUTO: 0.56 10*3/MM3 (ref 0.1–0.9)
MONOCYTES NFR BLD AUTO: 9.7 % (ref 5–12)
NEUTROPHILS # BLD AUTO: 3.46 10*3/MM3 (ref 1.7–7)
NEUTROPHILS NFR BLD AUTO: 59.6 % (ref 42.7–76)
NITRITE UR QL STRIP: NEGATIVE
NRBC BLD AUTO-RTO: 0 /100 WBC (ref 0–0.2)
PH UR STRIP.AUTO: 6.5 [PH] (ref 5–8)
PLATELET # BLD AUTO: 189 10*3/MM3 (ref 140–450)
PMV BLD AUTO: 9.4 FL (ref 6–12)
POTASSIUM BLD-SCNC: 4.2 MMOL/L (ref 3.5–5.2)
PROT SERPL-MCNC: 6.6 G/DL (ref 6–8.5)
PROT UR QL STRIP: NEGATIVE
RBC # BLD AUTO: 4.3 10*6/MM3 (ref 4.14–5.8)
RBC # UR: ABNORMAL /HPF
REF LAB TEST METHOD: ABNORMAL
SODIUM BLD-SCNC: 134 MMOL/L (ref 136–145)
SP GR UR STRIP: 1.01 (ref 1–1.03)
SQUAMOUS #/AREA URNS HPF: ABNORMAL /HPF
UROBILINOGEN UR QL STRIP: ABNORMAL
WBC NRBC COR # BLD: 5.8 10*3/MM3 (ref 3.4–10.8)
WBC UR QL AUTO: ABNORMAL /HPF

## 2019-12-03 PROCEDURE — 25010000002 FENTANYL CITRATE (PF) 100 MCG/2ML SOLUTION: Performed by: ANESTHESIOLOGY

## 2019-12-03 PROCEDURE — 73502 X-RAY EXAM HIP UNI 2-3 VIEWS: CPT

## 2019-12-03 PROCEDURE — C1713 ANCHOR/SCREW BN/BN,TIS/BN: HCPCS | Performed by: ORTHOPAEDIC SURGERY

## 2019-12-03 PROCEDURE — 25010000002 ONDANSETRON PER 1 MG: Performed by: NURSE ANESTHETIST, CERTIFIED REGISTERED

## 2019-12-03 PROCEDURE — 25010000002 PROPOFOL 10 MG/ML EMULSION: Performed by: NURSE ANESTHETIST, CERTIFIED REGISTERED

## 2019-12-03 PROCEDURE — 80053 COMPREHEN METABOLIC PANEL: CPT | Performed by: FAMILY MEDICINE

## 2019-12-03 PROCEDURE — 0QH636Z INSERTION OF INTRAMEDULLARY INTERNAL FIXATION DEVICE INTO RIGHT UPPER FEMUR, PERCUTANEOUS APPROACH: ICD-10-PCS | Performed by: ORTHOPAEDIC SURGERY

## 2019-12-03 PROCEDURE — 85025 COMPLETE CBC W/AUTO DIFF WBC: CPT | Performed by: FAMILY MEDICINE

## 2019-12-03 PROCEDURE — 25010000002 FENTANYL CITRATE (PF) 100 MCG/2ML SOLUTION: Performed by: NURSE ANESTHETIST, CERTIFIED REGISTERED

## 2019-12-03 PROCEDURE — 25010000002 SUCCINYLCHOLINE PER 20 MG: Performed by: NURSE ANESTHETIST, CERTIFIED REGISTERED

## 2019-12-03 PROCEDURE — C1769 GUIDE WIRE: HCPCS | Performed by: ORTHOPAEDIC SURGERY

## 2019-12-03 PROCEDURE — 94799 UNLISTED PULMONARY SVC/PX: CPT

## 2019-12-03 PROCEDURE — 76000 FLUOROSCOPY <1 HR PHYS/QHP: CPT

## 2019-12-03 PROCEDURE — 81001 URINALYSIS AUTO W/SCOPE: CPT | Performed by: FAMILY MEDICINE

## 2019-12-03 PROCEDURE — 25010000002 NEOSTIGMINE 10 MG/10ML SOLUTION 10 ML VIAL: Performed by: NURSE ANESTHETIST, CERTIFIED REGISTERED

## 2019-12-03 DEVICE — SCRW LK STRDRV TI 5X40M STRL: Type: IMPLANTABLE DEVICE | Site: HIP | Status: FUNCTIONAL

## 2019-12-03 DEVICE — NAIL FEM TFN ADV PROX 130D SHT 11X170MM STRL: Type: IMPLANTABLE DEVICE | Site: HIP | Status: FUNCTIONAL

## 2019-12-03 DEVICE — BLD FEM FIX HELI TFN ADV PERF 100MM STRL: Type: IMPLANTABLE DEVICE | Site: HIP | Status: FUNCTIONAL

## 2019-12-03 RX ORDER — MORPHINE SULFATE 2 MG/ML
2 INJECTION, SOLUTION INTRAMUSCULAR; INTRAVENOUS
Status: ACTIVE | OUTPATIENT
Start: 2019-12-03 | End: 2019-12-04

## 2019-12-03 RX ORDER — NEOSTIGMINE METHYLSULFATE 1 MG/ML
INJECTION, SOLUTION INTRAVENOUS AS NEEDED
Status: DISCONTINUED | OUTPATIENT
Start: 2019-12-03 | End: 2019-12-03 | Stop reason: SURG

## 2019-12-03 RX ORDER — PROPOFOL 10 MG/ML
VIAL (ML) INTRAVENOUS AS NEEDED
Status: DISCONTINUED | OUTPATIENT
Start: 2019-12-03 | End: 2019-12-03 | Stop reason: SURG

## 2019-12-03 RX ORDER — METOPROLOL SUCCINATE 100 MG/1
50 TABLET, EXTENDED RELEASE ORAL DAILY
COMMUNITY
End: 2019-12-09 | Stop reason: HOSPADM

## 2019-12-03 RX ORDER — FENTANYL CITRATE 50 UG/ML
INJECTION, SOLUTION INTRAMUSCULAR; INTRAVENOUS AS NEEDED
Status: DISCONTINUED | OUTPATIENT
Start: 2019-12-03 | End: 2019-12-03 | Stop reason: SURG

## 2019-12-03 RX ORDER — LIDOCAINE HYDROCHLORIDE 20 MG/ML
INJECTION, SOLUTION INFILTRATION; PERINEURAL AS NEEDED
Status: DISCONTINUED | OUTPATIENT
Start: 2019-12-03 | End: 2019-12-03 | Stop reason: SURG

## 2019-12-03 RX ORDER — GLYCOPYRROLATE 0.2 MG/ML
INJECTION INTRAMUSCULAR; INTRAVENOUS AS NEEDED
Status: DISCONTINUED | OUTPATIENT
Start: 2019-12-03 | End: 2019-12-03 | Stop reason: SURG

## 2019-12-03 RX ORDER — LANOLIN ALCOHOL/MO/W.PET/CERES
1000 CREAM (GRAM) TOPICAL DAILY
COMMUNITY

## 2019-12-03 RX ORDER — DOCUSATE SODIUM 100 MG/1
100 CAPSULE, LIQUID FILLED ORAL DAILY PRN
COMMUNITY
End: 2019-12-09 | Stop reason: HOSPADM

## 2019-12-03 RX ORDER — ACETAMINOPHEN 500 MG
1000 TABLET ORAL ONCE
Status: COMPLETED | OUTPATIENT
Start: 2019-12-03 | End: 2019-12-03

## 2019-12-03 RX ORDER — LISINOPRIL 20 MG/1
10 TABLET ORAL DAILY
COMMUNITY
End: 2019-12-09 | Stop reason: HOSPADM

## 2019-12-03 RX ORDER — LABETALOL HYDROCHLORIDE 5 MG/ML
5 INJECTION, SOLUTION INTRAVENOUS
Status: DISCONTINUED | OUTPATIENT
Start: 2019-12-03 | End: 2019-12-03 | Stop reason: HOSPADM

## 2019-12-03 RX ORDER — IPRATROPIUM BROMIDE AND ALBUTEROL SULFATE 2.5; .5 MG/3ML; MG/3ML
3 SOLUTION RESPIRATORY (INHALATION) ONCE AS NEEDED
Status: DISCONTINUED | OUTPATIENT
Start: 2019-12-03 | End: 2019-12-03 | Stop reason: HOSPADM

## 2019-12-03 RX ORDER — AMLODIPINE BESYLATE 10 MG/1
5 TABLET ORAL DAILY
COMMUNITY
End: 2019-12-09 | Stop reason: HOSPADM

## 2019-12-03 RX ORDER — MAGNESIUM HYDROXIDE 1200 MG/15ML
LIQUID ORAL AS NEEDED
Status: DISCONTINUED | OUTPATIENT
Start: 2019-12-03 | End: 2019-12-03 | Stop reason: HOSPADM

## 2019-12-03 RX ORDER — NALOXONE HCL 0.4 MG/ML
0.4 VIAL (ML) INJECTION
Status: DISCONTINUED | OUTPATIENT
Start: 2019-12-03 | End: 2019-12-09 | Stop reason: HOSPADM

## 2019-12-03 RX ORDER — ROCURONIUM BROMIDE 10 MG/ML
INJECTION, SOLUTION INTRAVENOUS AS NEEDED
Status: DISCONTINUED | OUTPATIENT
Start: 2019-12-03 | End: 2019-12-03 | Stop reason: SURG

## 2019-12-03 RX ORDER — NALOXONE HCL 0.4 MG/ML
0.4 VIAL (ML) INJECTION AS NEEDED
Status: DISCONTINUED | OUTPATIENT
Start: 2019-12-03 | End: 2019-12-03 | Stop reason: HOSPADM

## 2019-12-03 RX ORDER — OXYCODONE AND ACETAMINOPHEN 7.5; 325 MG/1; MG/1
1 TABLET ORAL EVERY 4 HOURS PRN
Status: DISCONTINUED | OUTPATIENT
Start: 2019-12-03 | End: 2019-12-09 | Stop reason: HOSPADM

## 2019-12-03 RX ORDER — SODIUM CHLORIDE 0.9 % (FLUSH) 0.9 %
3-10 SYRINGE (ML) INJECTION AS NEEDED
Status: DISCONTINUED | OUTPATIENT
Start: 2019-12-03 | End: 2019-12-03 | Stop reason: HOSPADM

## 2019-12-03 RX ORDER — EPHEDRINE SULFATE 50 MG/ML
INJECTION INTRAVENOUS AS NEEDED
Status: DISCONTINUED | OUTPATIENT
Start: 2019-12-03 | End: 2019-12-03 | Stop reason: SURG

## 2019-12-03 RX ORDER — ERGOCALCIFEROL 1.25 MG/1
50000 CAPSULE ORAL WEEKLY
COMMUNITY

## 2019-12-03 RX ORDER — ONDANSETRON 2 MG/ML
4 INJECTION INTRAMUSCULAR; INTRAVENOUS ONCE AS NEEDED
Status: DISCONTINUED | OUTPATIENT
Start: 2019-12-03 | End: 2019-12-03 | Stop reason: HOSPADM

## 2019-12-03 RX ORDER — LIDOCAINE HYDROCHLORIDE 40 MG/ML
SOLUTION TOPICAL AS NEEDED
Status: DISCONTINUED | OUTPATIENT
Start: 2019-12-03 | End: 2019-12-03 | Stop reason: SURG

## 2019-12-03 RX ORDER — PHENYLEPHRINE HCL IN 0.9% NACL 0.8MG/10ML
SYRINGE (ML) INTRAVENOUS AS NEEDED
Status: DISCONTINUED | OUTPATIENT
Start: 2019-12-03 | End: 2019-12-03 | Stop reason: SURG

## 2019-12-03 RX ORDER — ONDANSETRON 2 MG/ML
INJECTION INTRAMUSCULAR; INTRAVENOUS AS NEEDED
Status: DISCONTINUED | OUTPATIENT
Start: 2019-12-03 | End: 2019-12-03 | Stop reason: SURG

## 2019-12-03 RX ORDER — METOCLOPRAMIDE HYDROCHLORIDE 5 MG/ML
5 INJECTION INTRAMUSCULAR; INTRAVENOUS
Status: DISCONTINUED | OUTPATIENT
Start: 2019-12-03 | End: 2019-12-03 | Stop reason: HOSPADM

## 2019-12-03 RX ORDER — BUPIVACAINE HCL/0.9 % NACL/PF 0.1 %
2 PLASTIC BAG, INJECTION (ML) EPIDURAL EVERY 8 HOURS
Status: COMPLETED | OUTPATIENT
Start: 2019-12-04 | End: 2019-12-04

## 2019-12-03 RX ORDER — SODIUM CHLORIDE 0.9 % (FLUSH) 0.9 %
3 SYRINGE (ML) INJECTION EVERY 12 HOURS SCHEDULED
Status: DISCONTINUED | OUTPATIENT
Start: 2019-12-03 | End: 2019-12-03 | Stop reason: HOSPADM

## 2019-12-03 RX ORDER — FENTANYL CITRATE 50 UG/ML
25 INJECTION, SOLUTION INTRAMUSCULAR; INTRAVENOUS AS NEEDED
Status: DISCONTINUED | OUTPATIENT
Start: 2019-12-03 | End: 2019-12-03 | Stop reason: HOSPADM

## 2019-12-03 RX ORDER — SUCCINYLCHOLINE CHLORIDE 20 MG/ML
INJECTION INTRAMUSCULAR; INTRAVENOUS AS NEEDED
Status: DISCONTINUED | OUTPATIENT
Start: 2019-12-03 | End: 2019-12-03 | Stop reason: SURG

## 2019-12-03 RX ORDER — SODIUM CHLORIDE, SODIUM LACTATE, POTASSIUM CHLORIDE, CALCIUM CHLORIDE 600; 310; 30; 20 MG/100ML; MG/100ML; MG/100ML; MG/100ML
100 INJECTION, SOLUTION INTRAVENOUS CONTINUOUS
Status: DISCONTINUED | OUTPATIENT
Start: 2019-12-03 | End: 2019-12-04

## 2019-12-03 RX ORDER — OXYCODONE HYDROCHLORIDE AND ACETAMINOPHEN 5; 325 MG/1; MG/1
1 TABLET ORAL ONCE AS NEEDED
Status: DISCONTINUED | OUTPATIENT
Start: 2019-12-03 | End: 2019-12-03 | Stop reason: HOSPADM

## 2019-12-03 RX ADMIN — LIDOCAINE HYDROCHLORIDE 100 MG: 20 INJECTION, SOLUTION INFILTRATION; PERINEURAL at 16:16

## 2019-12-03 RX ADMIN — NEOSTIGMINE METHYLSULFATE 3 MG: 1 INJECTION INTRAVENOUS at 17:04

## 2019-12-03 RX ADMIN — Medication 80 MCG: at 16:57

## 2019-12-03 RX ADMIN — TAMSULOSIN HYDROCHLORIDE 0.4 MG: 0.4 CAPSULE ORAL at 20:30

## 2019-12-03 RX ADMIN — ACETAMINOPHEN 1000 MG: 500 TABLET, FILM COATED ORAL at 15:02

## 2019-12-03 RX ADMIN — ROCURONIUM BROMIDE 15 MG: 10 INJECTION INTRAVENOUS at 16:44

## 2019-12-03 RX ADMIN — SUCCINYLCHOLINE CHLORIDE 120 MG: 20 INJECTION, SOLUTION INTRAMUSCULAR; INTRAVENOUS at 16:17

## 2019-12-03 RX ADMIN — SODIUM CHLORIDE 2 G: 9 INJECTION, SOLUTION INTRAVENOUS at 23:56

## 2019-12-03 RX ADMIN — CEFAZOLIN 1 G: 1 INJECTION, POWDER, FOR SOLUTION INTRAMUSCULAR; INTRAVENOUS; PARENTERAL at 16:27

## 2019-12-03 RX ADMIN — LIDOCAINE HYDROCHLORIDE 1 EACH: 40 SOLUTION TOPICAL at 16:20

## 2019-12-03 RX ADMIN — EPHEDRINE SULFATE 15 MG: 50 INJECTION INTRAVENOUS at 16:28

## 2019-12-03 RX ADMIN — EPHEDRINE SULFATE 15 MG: 50 INJECTION INTRAVENOUS at 16:25

## 2019-12-03 RX ADMIN — SODIUM CHLORIDE 50 ML/HR: 9 INJECTION, SOLUTION INTRAVENOUS at 08:58

## 2019-12-03 RX ADMIN — ONDANSETRON HYDROCHLORIDE 4 MG: 2 SOLUTION INTRAMUSCULAR; INTRAVENOUS at 17:04

## 2019-12-03 RX ADMIN — SODIUM CHLORIDE, PRESERVATIVE FREE 10 ML: 5 INJECTION INTRAVENOUS at 08:58

## 2019-12-03 RX ADMIN — VASOPRESSIN 1 ML: 20 INJECTION INTRAVENOUS at 16:24

## 2019-12-03 RX ADMIN — Medication 80 MCG: at 17:00

## 2019-12-03 RX ADMIN — ACETAMINOPHEN 650 MG: 325 TABLET, FILM COATED ORAL at 20:30

## 2019-12-03 RX ADMIN — FENTANYL CITRATE 100 MCG: 50 INJECTION, SOLUTION INTRAMUSCULAR; INTRAVENOUS at 16:16

## 2019-12-03 RX ADMIN — Medication 120 MCG: at 16:21

## 2019-12-03 RX ADMIN — SODIUM CHLORIDE, POTASSIUM CHLORIDE, SODIUM LACTATE AND CALCIUM CHLORIDE 100 ML/HR: 600; 310; 30; 20 INJECTION, SOLUTION INTRAVENOUS at 15:02

## 2019-12-03 RX ADMIN — PROPOFOL 100 MG: 10 INJECTION, EMULSION INTRAVENOUS at 16:16

## 2019-12-03 RX ADMIN — SODIUM CHLORIDE, PRESERVATIVE FREE 10 ML: 5 INJECTION INTRAVENOUS at 20:31

## 2019-12-03 RX ADMIN — GLYCOPYRROLATE 0.4 MG: 0.2 INJECTION, SOLUTION INTRAMUSCULAR; INTRAVENOUS at 17:04

## 2019-12-03 RX ADMIN — VASOPRESSIN 1 ML: 20 INJECTION INTRAVENOUS at 16:23

## 2019-12-03 RX ADMIN — METOPROLOL SUCCINATE 25 MG: 25 TABLET, FILM COATED, EXTENDED RELEASE ORAL at 08:58

## 2019-12-03 RX ADMIN — FENTANYL CITRATE 25 MCG: 50 INJECTION INTRAMUSCULAR; INTRAVENOUS at 18:05

## 2019-12-03 RX ADMIN — ROCURONIUM BROMIDE 5 MG: 10 INJECTION INTRAVENOUS at 16:16

## 2019-12-03 NOTE — ANESTHESIA PROCEDURE NOTES
Airway  Urgency: elective    Date/Time: 12/3/2019 4:21 PM  Airway not difficult    General Information and Staff    Patient location during procedure: OR  CRNA: Abdoulaye Page CRNA    Indications and Patient Condition  Indications for airway management: airway protection    Preoxygenated: yes  Mask difficulty assessment: 1 - vent by mask    Final Airway Details  Final airway type: endotracheal airway      Successful airway: ETT  Cuffed: yes   Successful intubation technique: direct laryngoscopy  Facilitating devices/methods: intubating stylet  Endotracheal tube insertion site: oral  Blade: Dent  Blade size: 2  ETT size (mm): 8.0  Cormack-Lehane Classification: grade I - full view of glottis  Placement verified by: chest auscultation and capnometry   Cuff volume (mL): 6  Measured from: lips  ETT/EBT  to lips (cm): 22  Number of attempts at approach: 1  Assessment: lips, teeth, and gum same as pre-op and atraumatic intubation

## 2019-12-03 NOTE — ANESTHESIA PREPROCEDURE EVALUATION
Anesthesia Evaluation     Patient summary reviewed and Nursing notes reviewed   no history of anesthetic complications:  NPO Solid Status: > 8 hours  NPO Liquid Status: > 8 hours           Airway   Mallampati: I  TM distance: >3 FB  Neck ROM: full  Dental      Pulmonary    (+) shortness of breath,   Cardiovascular   Exercise tolerance: poor (<4 METS)    (+) hypertension, CAD, CABG >6 Months, dysrhythmias Atrial Fib, CHF Systolic <55%, hyperlipidemia,     ROS comment: Carotid U/s  IMPRESSION:  Impression:  1. There is less than 50% stenosis of the right internal carotid artery.  2. There is 50-69% stenosis of the left internal carotid artery.  3. Antegrade flow is demonstrated in bilateral vertebral arteries.    Echo  · Estimated EF = 45%.  · Left ventricular diastolic dysfunction.  · Left ventricular wall thickness is consistent with mild concentric hypertrophy.  · No evidence of pulmonary hypertension is present.  · Moderate patent foramen ovale present with right to left shunting indicated by saline contrast.    Cath      2018  Impression:  1. SEVERE AND DIFFUSE AND INOP CAD  2. PATENT GRAFTS  3. MODERATE TO SEVERE ISCHEMIC LV DYSFUNCTION  4. NORMAL HEMODYNAMICS INCLUDING LVEDP       Neuro/Psych  (-) seizures, TIA  GI/Hepatic/Renal/Endo    (-) liver disease, no renal disease, diabetes    Musculoskeletal     Abdominal    Substance History      OB/GYN          Other                        Anesthesia Plan    ASA 3     general   (Spinal contraindicated as pt received xarelto within 48 hours. Discussed fascia iliaca with surgeon, he would like GA only. Avoid benzodiazepines.)  intravenous induction     Anesthetic plan, all risks, benefits, and alternatives have been provided, discussed and informed consent has been obtained with: patient.

## 2019-12-03 NOTE — PLAN OF CARE
Problem: Patient Care Overview  Goal: Plan of Care Review  Outcome: Ongoing (interventions implemented as appropriate)   12/03/19 0606   Coping/Psychosocial   Plan of Care Reviewed With patient   Plan of Care Review   Progress no change   OTHER   Outcome Summary VSS, only c/o hip pain with movement, MASD noted to bilateral buttocks, refuses turns, i/o cath performed due to urinary retention w/ 650ml output, PPP, forgetful at times, sx scheduled for this afternoon.        Problem: Fall Risk (Adult)  Goal: Identify Related Risk Factors and Signs and Symptoms  Outcome: Outcome(s) achieved Date Met: 12/03/19    Goal: Absence of Fall  Outcome: Ongoing (interventions implemented as appropriate)      Problem: Pain, Chronic (Adult)  Goal: Identify Related Risk Factors and Signs and Symptoms  Outcome: Outcome(s) achieved Date Met: 12/03/19    Goal: Acceptable Pain/Comfort Level and Functional Ability  Outcome: Ongoing (interventions implemented as appropriate)      Problem: Fracture Orthopaedic (Adult)  Goal: Signs and Symptoms of Listed Potential Problems Will be Absent, Minimized or Managed (Fracture Orthopaedic)  Outcome: Ongoing (interventions implemented as appropriate)      Problem: Skin Injury Risk (Adult)  Goal: Identify Related Risk Factors and Signs and Symptoms  Outcome: Outcome(s) achieved Date Met: 12/03/19    Goal: Skin Health and Integrity  Outcome: Ongoing (interventions implemented as appropriate)      Problem: Wound (Includes Pressure Injury) (Adult)  Goal: Signs and Symptoms of Listed Potential Problems Will be Absent, Minimized or Managed (Wound)  Outcome: Ongoing (interventions implemented as appropriate)

## 2019-12-03 NOTE — OP NOTE
Patient Name: Tamia  MRN: [id number]  : 3/27/1931    DATE of SURGERY: 12/3/2019    SURGEON: Daniel Carrion MD    ASSISTANT: NONE     PREOPERATIVE DIAGNOSIS:   1.  Right incomplete intertrochanteric femur fracture  2.  Atrial fibrillation on anticoagulation  3.  Coronary artery disease  4.  Congestive heart failure  5.  Hypertension    POSTOPERATIVE DIAGNOSIS:   1.  Right incomplete intertrochanteric femur fracture  2.  Atrial fibrillation on anticoagulation  3.  Coronary artery disease  4.  Congestive heart failure  5.  Hypertension    PROCEDURE PERFORMED:   Cephalomedullary Nailing Right closed displaced Intertrochanteric hip fracture      IMPLANTS: Synthes short TFNA, [id number]    ANESTHESIA USED: General endotrachial anesthesia    OPERATIVE INDICATIONS: 88 y.o. male status post recent fall approximately 1 week ago.  He presented to an [facility] facility where initial radiographs were negative.  He subsequently presented to [facility] 2 days later with ongoing pain and difficulty bearing weight.  A CT scan of the pelvis was negative but an MRI revealed findings consistent with an incomplete intertrochanteric proximal femur fracture.  I discussed treatment options with both he and his significant other.  We attempted to proceed with initial conservative treatment but he was unable to participate with therapy and had ongoing, significant discomfort electing to proceed with operative intervention with the goal being to advance his ability to mobilize and work with therapy.  We also hope that stabilization of his incomplete fracture will improve his pain.  Risks include, but are not limited to, anesthesia, bleeding, infection, pain, damage to local structures, need for further surgery, malunion, nonunion, fracture displacement, failure of hardware, intraoperative death.  Risks, benefits, and alternative were discussed and the patient wishes to proceed with surgery.  Written and informed consent were  obtained.  His power of  also signed the consent form.    ESTIMATED BLOOD LOSS: 150 mL    DRAINS: None     COMPLICATIONS: No intra-operative complications    SPECIMENS: None    PROCEDURE in DETAIL:  The patient was seen in the preoperative holding room, the informed consent was reviewed and signed, and the correct operative extremity marked with the patient’s agreement.  The patient was transported to the operating room, where a timeout was performed identifying the correct patient and operative site.  Perioperative antibiotics were administered prior to incision. Once anesthetized, operative foot was placed into a well-padded boot and the patient was positioned on the fracture table.  The non-operative extremity was placed into a well padded well leg junior with care to protect pressure over the proximal tibia and peroneal nerve. Slight raction was applied to the operative extremity and the fracture was noted to remain nondisplaced on 2-plane fluoroscopy. A sterile prep and drape was then performed.    A guidepin was placed at the tip of the greater trochanter on the AP plane and in line with the intramedullary canal on the lateral view.  The wire was advanced to the level of the lesser trochanter followed by introduction of the starting reamer. A reaming sim was then placed with the intramedullary canal, which was confirmed by palpation and fluoroscopy.  A 12.5 mm reamer was then placed into the proximal portion of the femur and passed uneventfully.    The nail of choice was then placed into the intramedullary canal.  Utilizing the attachment jig, a guidepin was then placed into the central aspect of the femoral head on both the lateral view and slightly superior portion of the head on the AP, I believe due to a varus neck angle. Unfortunately, I did not have a 125 degree angle nail. Therefore, I accepted the position.  Length was measured for the cephalomedullary helical blade and the path of the blade  was drilled to the appropriate depth.  The blade was placed without complication and the set screw was placed proximally, loosened a 1/2 turn to allow for compression of the fracture.    Attention was then turned to the distal interlocking screw which was inserted using the outrigger.  The trocar was used to make a billy on the skin.  Sharp dissection was carried through the skin, subtenons tissue and IT band onto the lateral aspect of the femur.  The distal interlock screw was then drilled, measured and inserted uneventfully. Final AP and lateral confirmed adequate placement and length of the distal interlock.    Incisions were irrigated, closed in layers, and the skin was stapled.  A sterile dressing was applied, the patient awakened, transported the recovery room in stable condition.    POSTOPERATIVE PLAN:  1) WBAT to the RLE  2) DVT prophylaxis- will resume home dose of Xarelto  3) PT/OT  4) D/c planning    Electronically signed by Daniel Carrion MD on 12/3/2019 at 3:47 PM

## 2019-12-03 NOTE — PROGRESS NOTES
RT Nebulizer Protocol    Assessment tool to be used for patients with existing breathing treatments ordered by hospitalists                                                                  0  1  2  3  4      Respiratory History   No Smoking    Smoking History x   1 Pack/Day    Pulmonary Disease    Exacerbation      Respiratory Rate   Normal x   20-25    Dyspneic    Accessory Muscles    Severe Dyspnea      Breath Sounds   Clear x   Crackles    Crackles/ Rhonchi    Wheezing    Absent/ Severe Wheezing      Chest   X-ray   Clear    1 Lobe Infiltration/ Consolidation/ PE    2 Lobe Same Lung Infiltration/ Consolidation/ PE     2 Lobe Infiltration/ Both Lungs/ Consolidation/ PE    Both Lungs/ More Than 1 Lobe/ Atelectasis/ Consolidation/  PE      Cough   Strong Non- Productive x   Excessive Secretions/ Strong Cough    Excessive Secretions/ Weak Cough    Thick Bronchial Secretions/ Weak Cough    Thick Bronchial Secretions/ No Cough      Total Patient Score =  1  0-4=Q4 PRN  5-9=TID and Q4 PRN  10-14=QID and Q3 PRN  15-19=Q4 and Q2 PRN  20=Q3 and Q2 PRN    Bronchopulmonary Hygiene (CPT)   Q4 ATC Copious secretions, dyspnea, unable to sleep, mucus plug    QID & Q4 PRN Moderate secretions    TID Small amounts of secretions w/ poor cough and history of secretions    BID Unable to deep breathe and cough spontaneously       Lung Expansion Therapy (PEP)   Q4 & PRN at night Severe atelectasis, poor oxygenation    QID  High risk for persistent atelectasis, existence of same    TID At risk for developing atelectasis      BID Unable to deep breathe and cough spontaneously     Instruct, 1 follow up Patients able to perform well on their own      No chest xray with in last 24 hours.  Maintain current therapy.

## 2019-12-03 NOTE — NURSING NOTE
WOC[facility] Note      Patient: Vasile Deng  MRN: [id number] : 3/27/1931         Problem List:   Patient Active Problem List    Diagnosis   • *Acute pain of right hip [M25.551]   • Open right hip fracture, type III, initial encounter (CMS/[facility]) [S72.001C]   • Fall [W19.XXXA]   • Hyponatremia [E87.1]   • Closed intertrochanteric fracture of hip, right, initial encounter (CMS/[facility]) [S72.141A]   • On continuous oral anticoagulation [Z79.01]   • Postural dizziness with near syncope [R42, R55]   • Chronic systolic congestive heart failure (CMS/[facility]) [I50.22]   • Dyspnea [R06.00]   • Elevated troponin [R79.89]   • NSTEMI (non-ST elevated myocardial infarction) (CMS/[facility]) [I21.4]   • Syncope [R55]   • PAF (paroxysmal atrial fibrillation) (CMS/[facility]) [I48.0]   • Coronary artery disease 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 male, being seen by [facility] for MASD.  Pt admitted MASD to bilat gluteal regions.  Staff have been apply Z-Guard and erythema has decreased.  Pt unable to void and has a bella cath at this time.  Pt going to Surgery for repair today.  Will cont to follow.                               Recommendations:  Please see wound-ostomy order set for recommended orders.      )Amanda Layton RN 12/3/2019

## 2019-12-04 LAB
ALBUMIN SERPL-MCNC: 3.1 G/DL (ref 3.5–5.2)
ALBUMIN/GLOB SERPL: 0.9 G/DL
ALP SERPL-CCNC: 111 U/L (ref 39–117)
ALT SERPL W P-5'-P-CCNC: 38 U/L (ref 1–41)
ANION GAP SERPL CALCULATED.3IONS-SCNC: 10 MMOL/L (ref 5–15)
AST SERPL-CCNC: 28 U/L (ref 1–40)
BASOPHILS # BLD AUTO: 0.02 10*3/MM3 (ref 0–0.2)
BASOPHILS NFR BLD AUTO: 0.4 % (ref 0–1.5)
BILIRUB SERPL-MCNC: 0.5 MG/DL (ref 0.2–1.2)
BUN BLD-MCNC: 14 MG/DL (ref 8–23)
BUN/CREAT SERPL: 16.9 (ref 7–25)
CALCIUM SPEC-SCNC: 8.6 MG/DL (ref 8.6–10.5)
CHLORIDE SERPL-SCNC: 97 MMOL/L (ref 98–107)
CO2 SERPL-SCNC: 26 MMOL/L (ref 22–29)
CREAT BLD-MCNC: 0.83 MG/DL (ref 0.76–1.27)
DEPRECATED RDW RBC AUTO: 44.2 FL (ref 37–54)
EOSINOPHIL # BLD AUTO: 0.21 10*3/MM3 (ref 0–0.4)
EOSINOPHIL NFR BLD AUTO: 3.8 % (ref 0.3–6.2)
ERYTHROCYTE [DISTWIDTH] IN BLOOD BY AUTOMATED COUNT: 14.2 % (ref 12.3–15.4)
GFR SERPL CREATININE-BSD FRML MDRD: 87 ML/MIN/1.73
GLOBULIN UR ELPH-MCNC: 3.4 GM/DL
GLUCOSE BLD-MCNC: 113 MG/DL (ref 65–99)
HCT VFR BLD AUTO: 34.2 % (ref 37.5–51)
HGB BLD-MCNC: 11.5 G/DL (ref 13–17.7)
IMM GRANULOCYTES # BLD AUTO: 0.03 10*3/MM3 (ref 0–0.05)
IMM GRANULOCYTES NFR BLD AUTO: 0.5 % (ref 0–0.5)
LYMPHOCYTES # BLD AUTO: 0.91 10*3/MM3 (ref 0.7–3.1)
LYMPHOCYTES NFR BLD AUTO: 16.5 % (ref 19.6–45.3)
MCH RBC QN AUTO: 28.7 PG (ref 26.6–33)
MCHC RBC AUTO-ENTMCNC: 33.6 G/DL (ref 31.5–35.7)
MCV RBC AUTO: 85.3 FL (ref 79–97)
MONOCYTES # BLD AUTO: 0.48 10*3/MM3 (ref 0.1–0.9)
MONOCYTES NFR BLD AUTO: 8.7 % (ref 5–12)
NEUTROPHILS # BLD AUTO: 3.88 10*3/MM3 (ref 1.7–7)
NEUTROPHILS NFR BLD AUTO: 70.1 % (ref 42.7–76)
NRBC BLD AUTO-RTO: 0 /100 WBC (ref 0–0.2)
PLATELET # BLD AUTO: 191 10*3/MM3 (ref 140–450)
PMV BLD AUTO: 9.3 FL (ref 6–12)
POTASSIUM BLD-SCNC: 4.3 MMOL/L (ref 3.5–5.2)
PROT SERPL-MCNC: 6.5 G/DL (ref 6–8.5)
RBC # BLD AUTO: 4.01 10*6/MM3 (ref 4.14–5.8)
SODIUM BLD-SCNC: 133 MMOL/L (ref 136–145)
WBC NRBC COR # BLD: 5.53 10*3/MM3 (ref 3.4–10.8)

## 2019-12-04 PROCEDURE — 97530 THERAPEUTIC ACTIVITIES: CPT

## 2019-12-04 PROCEDURE — 85025 COMPLETE CBC W/AUTO DIFF WBC: CPT | Performed by: FAMILY MEDICINE

## 2019-12-04 PROCEDURE — 97168 OT RE-EVAL EST PLAN CARE: CPT

## 2019-12-04 PROCEDURE — 25010000002 CEFAZOLIN PER 500 MG: Performed by: ORTHOPAEDIC SURGERY

## 2019-12-04 PROCEDURE — 94799 UNLISTED PULMONARY SVC/PX: CPT

## 2019-12-04 PROCEDURE — 97164 PT RE-EVAL EST PLAN CARE: CPT

## 2019-12-04 PROCEDURE — 97116 GAIT TRAINING THERAPY: CPT

## 2019-12-04 PROCEDURE — 80053 COMPREHEN METABOLIC PANEL: CPT | Performed by: FAMILY MEDICINE

## 2019-12-04 RX ADMIN — ALLOPURINOL 100 MG: 100 TABLET ORAL at 10:40

## 2019-12-04 RX ADMIN — AMIODARONE HYDROCHLORIDE 100 MG: 200 TABLET ORAL at 21:13

## 2019-12-04 RX ADMIN — AMIODARONE HYDROCHLORIDE 100 MG: 200 TABLET ORAL at 10:40

## 2019-12-04 RX ADMIN — SODIUM CHLORIDE 2 G: 9 INJECTION, SOLUTION INTRAVENOUS at 10:40

## 2019-12-04 RX ADMIN — PANTOPRAZOLE SODIUM 40 MG: 40 TABLET, DELAYED RELEASE ORAL at 10:40

## 2019-12-04 RX ADMIN — TAMSULOSIN HYDROCHLORIDE 0.4 MG: 0.4 CAPSULE ORAL at 21:13

## 2019-12-04 RX ADMIN — LISINOPRIL 5 MG: 5 TABLET ORAL at 10:41

## 2019-12-04 RX ADMIN — METOPROLOL SUCCINATE 25 MG: 25 TABLET, FILM COATED, EXTENDED RELEASE ORAL at 10:40

## 2019-12-04 RX ADMIN — ASPIRIN 81 MG: 81 TABLET ORAL at 10:40

## 2019-12-04 RX ADMIN — FINASTERIDE 5 MG: 5 TABLET, FILM COATED ORAL at 10:40

## 2019-12-04 RX ADMIN — SODIUM CHLORIDE, PRESERVATIVE FREE 10 ML: 5 INJECTION INTRAVENOUS at 21:13

## 2019-12-04 RX ADMIN — RIVAROXABAN 15 MG: 15 TABLET, FILM COATED ORAL at 19:30

## 2019-12-04 NOTE — PLAN OF CARE
Problem: Patient Care Overview  Goal: Plan of Care Review  Outcome: Ongoing (interventions implemented as appropriate)   12/04/19 0836   Coping/Psychosocial   Plan of Care Reviewed With patient   Plan of Care Review   Progress improving   OTHER   Outcome Summary VSS, O2 @ 2L, dressing cdi, Tylenol given for pain with relief, PPP, turned q2hrs, rested well, F/C d/c this AM, DTV.

## 2019-12-04 NOTE — THERAPY RE-EVALUATION
Acute Care - Occupational Therapy Re-Evaluation  [facility]     Patient Name: Vasile Deng  : 3/27/1931  MRN: [id number]  Today's Date: 2019  Onset of Illness/Injury or Date of Surgery: 19  Date of Referral to OT: 19  Referring Physician: Daniel Carrion MD    Admit Date: 2019       ICD-10-CM ICD-9-CM   1. Fall, initial encounter W19.XXXA E888.9   2. Right hip pain M25.551 719.45   3. Intractable pain R52 780.96   4. Closed intertrochanteric fracture of hip, right, initial encounter (CMS/[facility]) S72.141A 820.21   5. Impaired mobility Z74.09 799.89   6. Decreased activities of daily living (ADL) Z78.9 V49.89   7. Impaired mobility and ADLs Z74.09 799.89     Patient Active Problem List   Diagnosis   • Coronary artery disease involving coronary bypass graft of native heart without angina pectoris   • Hx of CABG   • Essential hypertension   • Mixed hyperlipidemia   • Former smoker   • PAF (paroxysmal atrial fibrillation) (CMS/[facility])   • Syncope   • Dyspnea   • Elevated troponin   • NSTEMI (non-ST elevated myocardial infarction) (CMS/[facility])   • Chronic systolic congestive heart failure (CMS/[facility])   • On continuous oral anticoagulation   • Postural dizziness with near syncope   • Fall   • Acute pain of right hip   • Hyponatremia   • Open right hip fracture, type III, initial encounter (CMS/[facility])   • Closed intertrochanteric fracture of hip, right, initial encounter (CMS/[facility])     Past Medical History:   Diagnosis Date   • Acute on chronic combined systolic and diastolic congestive heart failure (CMS/[facility]) 2018   • Atrial fibrillation (CMS/[facility])    • Coronary artery disease    • Dizziness    • Hyperlipidemia    • Hypertension      Past Surgical History:   Procedure Laterality Date   • CARDIAC CATHETERIZATION Left 2018    Procedure: Cardiac Catheterization/Vascular Study;  Surgeon: Huey Wilkins MD;  Location: [facility] INVASIVE LOCATION;  Service: Cardiology   • CARDIAC  CATHETERIZATION N/A 9/30/2018    Procedure: Left ventriculography;  Surgeon: Huey Wilkins MD;  Location:  [facility] CATH INVASIVE LOCATION;  Service: Cardiology   • CARDIAC SURGERY      double bypass   • KNEE SURGERY            OT ASSESSMENT FLOWSHEET (last 12 hours)      Occupational Therapy Evaluation     Row Name 12/04/19 1000                   OT Evaluation Time/Intention    Subjective Information  complains of;pain  -MM (r) CN (t) MM (c)        Document Type  re-evaluation see MAR  -MM (r) CN (t) MM (c)        Mode of Treatment  occupational therapy  -MM (r) CN (t) MM (c)        Patient Effort  good  -MM (r) CN (t) MM (c)        Symptoms Noted During/After Treatment  increased pain  -MM (r) CN (t) MM (c)           General Information    Patient Profile Reviewed?  yes  -MM (r) CN (t) MM (c)        Onset of Illness/Injury or Date of Surgery  11/27/19  -MM (r) CN (t) MM (c)        Referring Physician  Daniel Carrion MD  -MM (r) CN (t) MM (c)        Patient Observations  alert;cooperative;agree to therapy  -MM (r) CN (t) MM (c)        Patient/Family Observations  girlfriend present  -MM        General Observations of Patient  fowlers, IV L arm, 2L O2/NC  -MM (r) CN (t) MM (c)        Prior Level of Function  independent:;all household mobility;community mobility;ADL's  -MM (r) CN (t) MM (c)        Equipment Currently Used at Home  cane, straight;walker, rolling  -MM (r) CN (t) MM (c)        Pertinent History of Current Functional Problem  s/p R hip trochanteric nailing short w/ intermedullary hip screw (12/3/19). PMH: chronic dizziness, hard of hearing.  -MM (r) CN (t) MM (c)        Existing Precautions/Restrictions  fall;other (see comments) WBAT  -MM (r) CN (t) MM (c)        Limitations/Impairments  hearing;safety/cognitive  -MM (r) CN (t) MM (c)        Risks Reviewed  patient and family:;LOB;nausea/vomiting;dizziness;increased discomfort;change in vital signs  -MM (r) CN (t) MM (c)        Benefits  Reviewed  patient and family:;improve function;increase strength;increase independence;increase balance;decrease pain;decrease risk of DVT;increase knowledge  -MM (r) CN (t) MM (c)        Barriers to Rehab  hearing deficit  -MM (r) CN (t) MM (c)           Relationship/Environment    Lives With  alone  -MM (r) CN (t) MM (c)           Resource/Environmental Concerns    Current Living Arrangements  home/apartment/condo  -MM (r) CN (t) MM (c)        Resource/Environmental Concerns  none  -MM (r) CN (t) MM (c)           Home Main Entrance    Number of Stairs, Main Entrance  none  -MM (r) CN (t) MM (c)           Cognitive Assessment/Interventions    Additional Documentation  Cognitive Assessment/Intervention (Group)  -MM (r) CN (t) MM (c)           Cognitive Assessment/Intervention- PT/OT    Affect/Mental Status (Cognitive)  WFL  -MM (r) CN (t) MM (c)        Orientation Status (Cognition)  oriented x 4  -MM (r) CN (t) MM (c)        Follows Commands (Cognition)  WFL  -MM (r) CN (t) MM (c)        Cognitive Function (Cognitive)  WFL  -MM (r) CN (t) MM (c)        Safety Deficit (Cognitive)  mild deficit;awareness of need for assistance;insight into deficits/self awareness  -MM (r) CN (t) MM (c)        Personal Safety Interventions  fall prevention program maintained;elopement precautions initiated;gait belt;nonskid shoes/slippers when out of bed;muscle strengthening facilitated;supervised activity  -MM (r) CN (t) MM (c)           Safety Issues, Functional Mobility    Safety Issues Affecting Function (Mobility)  awareness of need for assistance;insight into deficits/self awareness  -MM (r) CN (t) MM (c)        Impairments Affecting Function (Mobility)  range of motion (ROM);endurance/activity tolerance;pain;balance;strength  -MM (r) CN (t) MM (c)           Mobility Assessment/Treatment    Extremity Weight-bearing Status  left lower extremity  -MM (r) CN (t) MM (c)        Left Lower Extremity (Weight-bearing Status)   weight-bearing as tolerated (WBAT)  -MM (r) CN (t) MM (c)           Bed Mobility Assessment/Treatment    Bed Mobility Assessment/Treatment  scooting/bridging;supine-sit-supine  -MM (r) CN (t) MM (c)        Scooting/Bridging Addison (Bed Mobility)  contact guard;verbal cues  -MM (r) CN (t) MM (c)        Supine-Sit-Supine Addison (Bed Mobility)  verbal cues;contact guard  -MM (r) CN (t) MM (c)        Assistive Device (Bed Mobility)  head of bed elevated;bed rails  -MM (r) CN (t) MM (c)           Functional Mobility    Functional Mobility- Ind. Level  contact guard assist;verbal cues required  -MM (r) CN (t) MM (c)        Functional Mobility- Device  standard walker  -MM (r) CN (t) MM (c)        Functional Mobility- Comment  walked to door of room  -MM (r) CN (t) MM (c)           Transfer Assessment/Treatment    Transfer Assessment/Treatment  sit-stand transfer;stand-sit transfer  -MM (r) CN (t) MM (c)        Maintains Weight-bearing Status (Transfers)  able to maintain  -MM (r) CN (t) MM (c)           Sit-Stand Transfer    Sit-Stand Addison (Transfers)  minimum assist (75% patient effort);verbal cues  -MM (r) CN (t) MM (c)           Stand-Sit Transfer    Stand-Sit Addison (Transfers)  verbal cues;contact guard  -MM (r) CN (t) MM (c)           ADL Assessment/Intervention    BADL Assessment/Intervention  lower body dressing  -MM (r) CN (t) MM (c)           Lower Body Dressing Assessment/Training    Lower Body Dressing Addison Level  don;socks;maximum assist (25% patient effort)  -MM (r) CN (t) MM (c)        Lower Body Dressing Position  supine  -MM (r) CN (t) MM (c)           BADL Safety/Performance    Impairments, BADL Safety/Performance  balance;endurance/activity tolerance;pain;range of motion;strength  -MM (r) CN (t) MM (c)           General ROM    GENERAL ROM COMMENTS  BUE AROM WFL  -MM (r) CN (t) MM (c)           MMT (Manual Muscle Testing)    General MMT Comments  BUE 4/5   -MM (r) CN (t)  MM (c)           Motor Assessment/Interventions    Additional Documentation  Balance (Group)  -MM (r) CN (t) MM (c)           Balance    Balance  static sitting balance;static standing balance  -MM (r) CN (t) MM (c)           Static Sitting Balance    Level of Howard (Unsupported Sitting, Static Balance)  supervision  -MM (r) CN (t) MM (c)        Sitting Position (Unsupported Sitting, Static Balance)  sitting on edge of bed  -MM (r) CN (t) MM (c)           Static Standing Balance    Level of Howard (Supported Standing, Static Balance)  contact guard assist  -MM (r) CN (t) MM (c)        Assistive Device Utilized (Supported Standing, Static Balance)  walker, rolling  -MM (r) CN (t) MM (c)           Sensory Assessment/Intervention    Sensory General Assessment  no sensation deficits identified  -MM (r) CN (t) MM (c)           Positioning and Restraints    Pre-Treatment Position  in bed  -MM (r) CN (t) MM (c)        Post Treatment Position  bed  -MM (r) CN (t) MM (c)        In Bed  fowlers;call light within reach;encouraged to call for assist;side rails up x2;with family/caregiver  -MM (r) CN (t) MM (c)           Pain Assessment    Additional Documentation  Pain Scale: Numbers Pre/Post-Treatment (Group)  -MM (r) CN (t) MM (c)           Pain Scale: Numbers Pre/Post-Treatment    Pain Scale: Numbers, Pretreatment  1/10  -MM (r) CN (t) MM (c)        Pain Scale: Numbers, Post-Treatment  9/10  -MM (r) CN (t) MM (c)        Pain Location - Side  Right  -MM (r) CN (t) MM (c)        Pain Location  hip hip and leg  -MM (r) CN (t) MM (c)        Pain Intervention(s)  Medication (See MAR);Repositioned;Ambulation/increased activity  -MM (r) CN (t) MM (c)           Wound 12/02/19 0045 Bilateral gluteal MASD ([place] associated skin damage)    Wound - Properties Group Date first assessed: 12/02/19  -EL Time first assessed: 0045  -EL Present on Hospital Admission: N  -EL Side: Bilateral  -EL Location: gluteal  -EL Primary  Wound Type: MASD  -EL       Wound 12/03/19 1711 Right hip Incision    Wound - Properties Group Date first assessed: 12/03/19  -SVETLANA Time first assessed: 1711  -SVETLANA Side: Right  -SVETLANA Location: hip  -SVETLANA Primary Wound Type: Incision  -SVETLANA       Plan of Care Review    Plan of Care Reviewed With  patient;significant other  -MM (r) CN (t) MM (c)           Clinical Impression (OT)    Date of Referral to OT  12/03/19  -MM (r) CN (t) MM (c)        OT Diagnosis  decreased ADLs and mobilty   -MM (r) CN (t) MM (c)        Prognosis (OT Eval)  good  -MM (r) CN (t) MM (c)        Functional Level at Time of Evaluation (OT Eval)  good  -MM (r) CN (t) MM (c)        Patient/Family Goals Statement (OT Eval)  be more independent   -MM (r) CN (t) MM (c)        Criteria for Skilled Therapeutic Interventions Met (OT Eval)  yes;treatment indicated  -MM (r) CN (t) MM (c)        Rehab Potential (OT Eval)  good, to achieve stated therapy goals  -MM (r) CN (t) MM (c)        Therapy Frequency (OT Eval)  5 times/wk  -MM (r) CN (t) MM (c)        Predicted Duration of Therapy Intervention (Therapy Eval)  until d/c  -MM (r) CN (t) MM (c)        Care Plan Review (OT)  evaluation/treatment results reviewed;care plan/treatment goals reviewed;risks/benefits reviewed;current/potential barriers reviewed;patient/other agree to care plan  -MM (r) CN (t) MM (c)        Care Plan Review, Other Participant (OT Eval)  significant other  -MM (r) CN (t) MM (c)        Anticipated Discharge Disposition (OT)  home with home health  -MM (r) CN (t) MM (c)           Vital Signs    O2 Delivery Pre Treatment  nasal cannula 2L O2/NC  -MM (r) CN (t) MM (c)        O2 Delivery Intra Treatment  nasal cannula  -MM (r) CN (t) MM (c)        O2 Delivery Post Treatment  nasal cannula  -MM (r) CN (t) MM (c)        Pre Patient Position  Supine  -MM (r) CN (t) MM (c)        Intra Patient Position  Standing  -MM (r) CN (t) MM (c)        Post Patient Position  Supine  -MM (r) CN (t) MM (c)            Planned OT Interventions    Planned Therapy Interventions (OT Eval)  activity tolerance training;BADL retraining;adaptive equipment training;functional balance retraining;occupation/activity based interventions;patient/caregiver education/training;ROM/therapeutic exercise;strengthening exercise;transfer/mobility retraining  -MM (r) CN (t) MM (c)           OT Goals    Transfer Goal Selection (OT)  transfer, OT goal 1  -MM (r) CN (t) MM (c)        Dressing Goal Selection (OT)  dressing, OT goal 1  -MM (r) CN (t) MM (c)        Toileting Goal Selection (OT)  toileting, OT goal 1  -MM (r) CN (t) MM (c)           Transfer Goal 1 (OT)    Activity/Assistive Device (Transfer Goal 1, OT)  sit-to-stand/stand-to-sit;bed-to-chair/chair-to-bed;toilet;shower chair;walker, rolling  -MM (r) CN (t) MM (c)        Gibson Level/Cues Needed (Transfer Goal 1, OT)  contact guard assist  -MM (r) CN (t) MM (c)        Time Frame (Transfer Goal 1, OT)  10 days  -MM (r) CN (t) MM (c)        Progress/Outcome (Transfer Goal 1, OT)  goal partially met;goal ongoing  -MM (r) CN (t) MM (c)           Dressing Goal 1 (OT)    Activity/Assistive Device (Dressing Goal 1, OT)  lower body dressing  -MM (r) CN (t) MM (c)        Gibson/Cues Needed (Dressing Goal 1, OT)  minimum assist (75% or more patient effort)  -MM (r) CN (t) MM (c)        Time Frame (Dressing Goal 1, OT)  10 days  -MM (r) CN (t) MM (c)        Progress/Outcome (Dressing Goal 1, OT)  goal revised this date;goal ongoing  -MM (r) CN (t) MM (c)           Toileting Goal 1 (OT)    Activity/Device (Toileting Goal 1, OT)  toileting skills, all;commode  -MM (r) CN (t) MM (c)        Gibson Level/Cues Needed (Toileting Goal 1, OT)  contact guard assist  -MM (r) CN (t) MM (c)        Time Frame (Toileting Goal 1, OT)  10 days  -MM (r) CN (t) MM (c)        Progress/Outcome (Toileting Goal 1, OT)  goal revised this date;goal ongoing  -MM (r) CN (t) MM (c)           Living  Environment    Home Accessibility  tub/shower is not walk in  -MM (r) CN (t) MM (c)          User Key  (r) = Recorded By, (t) = Taken By, (c) = Cosigned By    Initials Name Effective Dates    Christy Carpio RN 08/02/16 -     Silvana Mcgraw, RNA 06/09/17 -     MM Ky Holliday, OTR/L 04/03/18 -     Isha Reyes, OT Student 09/03/19 -          Occupational Therapy Education     Title: PT OT SLP Therapies (In Progress)     Topic: Occupational Therapy (In Progress)     Point: ADL training (Done)     Description: Instruct learner(s) on proper safety adaptation and remediation techniques during self care or transfers.   Instruct in proper use of assistive devices.    Learning Progress Summary           Patient Acceptance, E, VU by CN at 12/4/2019  4:03 PM    Comment:  OT roles and benefits, POC, and importance of participation w/ therapy.    Acceptance, E,D, VU,NR by TR at 11/30/2019 12:06 PM    Comment:  Education provided on purpose of OT eval, R LE TTWB status, need for OT and d/c planning.   Significant Other Acceptance, E, VU by CN at 12/4/2019  4:03 PM    Comment:  OT roles and benefits, POC, and importance of participation w/ therapy.                   Point: Precautions (Done)     Description: Instruct learner(s) on prescribed precautions during self-care and functional transfers.    Learning Progress Summary           Patient Acceptance, E, VU by CN at 12/4/2019  4:03 PM    Comment:  OT roles and benefits, POC, and importance of participation w/ therapy.    Acceptance, E,D, VU,NR by TR at 11/30/2019 12:06 PM    Comment:  Education provided on purpose of OT eval, R LE TTWB status, need for OT and d/c planning.   Significant Other Acceptance, E, VU by CN at 12/4/2019  4:03 PM    Comment:  OT roles and benefits, POC, and importance of participation w/ therapy.                   Point: Body mechanics (Done)     Description: Instruct learner(s) on proper positioning and spine alignment  during self-care, functional mobility activities and/or exercises.    Learning Progress Summary           Patient Acceptance, E, VU by CN at 12/4/2019  4:03 PM    Comment:  OT roles and benefits, POC, and importance of participation w/ therapy.   Significant Other Acceptance, E, VU by CN at 12/4/2019  4:03 PM    Comment:  OT roles and benefits, POC, and importance of participation w/ therapy.                               User Key     Initials Effective Dates Name Provider Type Discipline    TR 06/22/15 -  Carmen Zamarripa, OTR/L Occupational Therapist OT    STEFFANIE 09/03/19 -  sIha Adames OT Student OT Student OT                  OT Recommendation and Plan  Outcome Summary/Treatment Plan (OT)  Anticipated Discharge Disposition (OT): home with home health  Planned Therapy Interventions (OT Eval): activity tolerance training, BADL retraining, adaptive equipment training, functional balance retraining, occupation/activity based interventions, patient/caregiver education/training, ROM/therapeutic exercise, strengthening exercise, transfer/mobility retraining  Therapy Frequency (OT Eval): 5 times/wk  Plan of Care Review  Plan of Care Reviewed With: patient, significant other  Plan of Care Reviewed With: patient, significant other  Outcome Summary: OT re-evaluation completed on this date. Pt reports 1/10 pain in R hip and leg, but pain increased to 9/10 duirng activity. Pt was max A for LB in supine. Pt performed bed mobility w/ CGA and VCs. Pt completed sit>stand t/f min A and VCs and stand>sit t/f w/ CGA and VCs. Pt was CGA for functional mobility using RWX. Pt will benefit from skilled OT services to address ADLs and mobilty. Recommended d/c home w/ HH.    Outcome Measures     Row Name 12/04/19 1600 12/02/19 1500 12/02/19 1300       How much help from another is currently needed...    Putting on and taking off regular lower body clothing?  2  -MM (r) CN (t) MM (c)  --  2  -TS    Bathing (including washing,  rinsing, and drying)  2  -MM (r) CN (t) MM (c)  --  2  -TS    Toileting (which includes using toilet bed pan or urinal)  3  -MM (r) CN (t) MM (c)  --  3  -TS    Putting on and taking off regular upper body clothing  3  -MM (r) CN (t) MM (c)  --  3  -TS    Taking care of personal grooming (such as brushing teeth)  4  -MM (r) CN (t) MM (c)  --  3  -TS    Eating meals  4  -MM (r) CN (t) MM (c)  --  4  -TS    AM-PAC 6 Clicks Score (OT)  18  -MM (r) CN (t)  --  17  -TS       Functional Assessment    Outcome Measure Options  AM-PAC 6 Clicks Daily Activity (OT)  -MM (r) CN (t) MM (c)  AM-PAC 6 Clicks Basic Mobility (PT)  -NW  AM-PAC 6 Clicks Daily Activity (OT)  -TS      User Key  (r) = Recorded By, (t) = Taken By, (c) = Cosigned By    Initials Name Provider Type    TS Carmen Mcgraw, [place]/L Occupational Therapy Assistant    NW Sherine Myers, PTA Physical Therapy Assistant    MM Ky Holliday, OTR/L Occupational Therapist    CN Isha Adames, EDMAR Student OT Student          Time Calculation:   Time Calculation- OT     Row Name 12/04/19 1616 12/04/19 1603          Time Calculation- OT    OT Start Time  --  1013  -MM (r) CN (t) MM (c)     OT Stop Time  --  1110  -MM (r) CN (t) MM (c)     OT Time Calculation (min)  --  57 min  -MM (r) CN (t)     Total Timed Code Minutes- OT  --  12 minute(s)  -MM (r) CN (t) MM (c)     OT Non-Billable Time (min)  --  15 min  -MM (r) CN (t) MM (c)     OT Received On  --  12/04/19  -MM (r) CN (t) MM (c)     OT Goal Re-Cert Due Date  --  12/14/19  -MM (r) CN (t) MM (c)        Timed Charges    79256 - Gait Training Minutes   11  -NW  --     00077 - OT Therapeutic Activity Minutes  --  12  -MM (r) CN (t) MM (c)       User Key  (r) = Recorded By, (t) = Taken By, (c) = Cosigned By    Initials Name Provider Type    NW Sherine Myers, PTA Physical Therapy Assistant    MM Ky Holliday, OTR/L Occupational Therapist    CN Isha Adames, OT Student OT Student        Therapy  Charges for Today     Code Description Service Date Service Provider Modifiers Qty    [id number]  OT THERAPEUTIC ACT EA 15 MIN 12/4/2019 Isha Adames, OT Student GO 1    [id number]  OT RE-EVAL 2 12/4/2019 Isha Adames, OT Student GO 1               Isha Adames, OT Student  12/4/2019

## 2019-12-04 NOTE — PROGRESS NOTES
Orthopedic Surgery Progress Note    Vasile Deng  12/4/2019      Subjective:     Per nursing, no acute events overnight, seems better able to tolerate re-positioning. Pain controlled- only required Tylenol. Denies chest pain, shortness of breath. Denies numbness/tingling to RLE.    Objective:     Patient Vitals for the past 24 hrs:   BP Temp Temp src Pulse Resp SpO2   12/04/19 0530 122/49 97.7 °F (36.5 °C) Oral 63 19 94 %   12/04/19 0003 133/49 98.1 °F (36.7 °C) Oral 64 18 94 %   12/03/19 2117 -- -- -- 58 -- 100 %   12/03/19 2000 124/46 97.4 °F (36.3 °C) Oral 59 16 92 %   12/03/19 1930 133/53 97.5 °F (36.4 °C) Oral 60 18 92 %   12/03/19 1900 130/57 97.6 °F (36.4 °C) Oral 57 18 94 %   12/03/19 1845 125/60 98.3 °F (36.8 °C) -- 57 18 100 %   12/03/19 1830 145/61 -- -- 60 19 100 %   12/03/19 1825 141/66 -- -- 79 -- 97 %   12/03/19 1820 -- -- -- -- -- 98 %   12/03/19 1815 141/66 -- -- 65 26 99 %   12/03/19 1800 127/63 -- -- 67 24 98 %   12/03/19 1745 103/59 -- -- 81 17 99 %   12/03/19 1722 131/57 99.1 °F (37.3 °C) Temporal 65 17 97 %   12/03/19 1437 -- -- -- 52 16 96 %   12/03/19 1100 125/61 98.5 °F (36.9 °C) Oral 64 15 92 %   12/03/19 0718 123/56 97.8 °F (36.6 °C) Oral 59 16 93 %       General: Awake, alert  Respiratory: Nonlabored  CV: Regular rate  Extremity: RLE: Dressing in place, c/d/i. No strikethrough. Foot pumps to BLE. EHL,FHL,TA,GS motor intact. Gross sensation intact to right foot. Foot warm, perfused.       Data Review:  Lab Results (last 24 hours)     Procedure Component Value Units Date/Time    Comprehensive Metabolic Panel [[id number]]  (Abnormal) Collected:  12/04/19 0403    Specimen:  Blood Updated:  12/04/19 0548     Glucose 113 mg/dL      BUN 14 mg/dL      Creatinine 0.83 mg/dL      Sodium 133 mmol/L      Potassium 4.3 mmol/L      Chloride 97 mmol/L      CO2 26.0 mmol/L      Calcium 8.6 mg/dL      Total Protein 6.5 g/dL      Albumin 3.10 g/dL      ALT (SGPT) 38 U/L      AST (SGOT) 28 U/L       Alkaline Phosphatase 111 U/L      Total Bilirubin 0.5 mg/dL      eGFR Non African Amer 87 mL/min/1.73      Globulin 3.4 gm/dL      A/G Ratio 0.9 g/dL      BUN/Creatinine Ratio 16.9     Anion Gap 10.0 mmol/L     Narrative:       GFR Normal >60  Chronic Kidney Disease <60  Kidney Failure <15    CBC & Differential [[id number]] Collected:  12/04/19 0403    Specimen:  Blood Updated:  12/04/19 0508    Narrative:       The following orders were created for panel order CBC & Differential.  Procedure                               Abnormality         Status                     ---------                               -----------         ------                     CBC Auto Differential[[id number]]        Abnormal            Final result                 Please view results for these tests on the individual orders.    CBC Auto Differential [[id number]]  (Abnormal) Collected:  12/04/19 0403    Specimen:  Blood Updated:  12/04/19 0508     WBC 5.53 10*3/mm3      RBC 4.01 10*6/mm3      Hemoglobin 11.5 g/dL      Hematocrit 34.2 %      MCV 85.3 fL      MCH 28.7 pg      MCHC 33.6 g/dL      RDW 14.2 %      RDW-SD 44.2 fl      MPV 9.3 fL      Platelets 191 10*3/mm3      Neutrophil % 70.1 %      Lymphocyte % 16.5 %      Monocyte % 8.7 %      Eosinophil % 3.8 %      Basophil % 0.4 %      Immature Grans % 0.5 %      Neutrophils, Absolute 3.88 10*3/mm3      Lymphocytes, Absolute 0.91 10*3/mm3      Monocytes, Absolute 0.48 10*3/mm3      Eosinophils, Absolute 0.21 10*3/mm3      Basophils, Absolute 0.02 10*3/mm3      Immature Grans, Absolute 0.03 10*3/mm3      nRBC 0.0 /100 WBC     Urinalysis, Microscopic Only - Urine, Catheter [[id number]]  (Abnormal) Collected:  12/03/19 1248    Specimen:  Urine, Catheter Updated:  12/03/19 1310     RBC, UA 3-5 /HPF      WBC, UA 0-2 /HPF      Bacteria, UA None Seen /HPF      Squamous Epithelial Cells, UA 0-2 /HPF      Hyaline Casts, UA None Seen /LPF      Methodology Automated Microscopy    Urinalysis With Culture  If Indicated - Urine, Catheter [[id number]]  (Abnormal) Collected:  12/03/19 1248    Specimen:  Urine, Catheter Updated:  12/03/19 1310     Color, UA Yellow     Appearance, UA Clear     pH, UA 6.5     Specific Gravity, UA 1.013     Glucose, UA Negative     Ketones, UA Negative     Bilirubin, UA Negative     Blood, UA Trace     Protein, UA Negative     Leuk Esterase, UA Negative     Nitrite, UA Negative     Urobilinogen, UA 1.0 E.U./dL        Imaging Results (Last 24 Hours)     Procedure Component Value Units Date/Time    XR Hip With or Without Pelvis 2 - 3 View Right [[id number]] Collected:  12/03/19 1753     Updated:  12/03/19 1756    Narrative:       EXAMINATION: XR HIP W OR WO PELVIS 2-3 VIEW RIGHT-     12/3/2019 4:25 PM CST     HISTORY: TFN of Right Hip; W19.XXXA-Unspecified fall, initial encounter;  M25.551-Pain in right hip; R52-Pain, unspecified; S72.141A-Displaced  intertrochanteric fracture of right femur, initial encounter for closed  fracture; Z74.09-Other reduced mobility; Z78.9-Other specified health  status     Number of fluoroscopic spot images obtained = 4.     Fluoroscopy dose in Air Kerma mGy = 4.83.     Fluoroscopy time = 46 seconds.     Spot images document hardware fixation of the proximal right femur.  This report was finalized on 12/03/2019 17:53 by Dr. Kristofer Sanchez MD.    FL C Arm During Surgery [[id number]] Updated:  12/03/19 4619          Assessment:     87 y/o M POD#1 s/p R CMN, significant medical history for atrial fibrillation on anticoagulation, coronary artery disease, congestive heart failure and hypertension    Plan:     1:  Post-op acute blood loss anemia: H/H 11.5/34.2, VSS, asymptomatic  2:  Pain: Controlled  3:  Activity: WBAT to RLE  4:  Dressing: c/d/i. Plan to leave in place for 2 weeks- may change if it becomes saturated.  5:  Post-op abx  6:  Physical therapy/Occupational therapy  7:  DVT ppx: PAS boots, ambulation, will resume home dose Xarelto this evening  8:  Dispo: D/c  planning, ok for d/c from my standpoint when arrangements made. F/u in 2 weeks.    Daniel Carrion MD

## 2019-12-04 NOTE — PROGRESS NOTES
Continued Stay Note  ELI Miramontes     Patient Name: Vasile Deng  MRN: [id number]  Today's Date: 12/4/2019    Admit Date: 11/27/2019    Discharge Plan     Row Name 12/04/19 1246       Plan    Plan Comments  SPOKE TO PT AND FAMILY IN ROOM REGARDING DC PLANS. DISCUSSED SHORT TERM REHAB AND PROVIDED PT WITH MEDICARE COMPARE LISTS FOR SNF. PT STATES HE WANTS TO SPEAK TO HIS ORTHO PHYSICIAN IN AM BEFORE MAKING ANY DECISION ON SNF PLACEMENT. WILL FOLLOW UP TOMORROW.         Discharge Codes    No documentation.             BRIANDA Aguirre

## 2019-12-04 NOTE — PROGRESS NOTES
RT Nebulizer Protocol    Assessment tool to be used for patients with existing breathing treatments ordered by hospitalists                                                                  0  1  2  3  4      Respiratory History   No Smoking      Smoking History X   1 Pack/Day    Pulmonary Disease    Exacerbation      Respiratory Rate   Normal   X   20-25    Dyspneic    Accessory Muscles    Severe Dyspnea      Breath Sounds   Clear X   Crackles    Crackles/ Rhonchi    Wheezing    Absent/ Severe Wheezing      Chest   X-ray   Clear/No current  X   1 Lobe Infiltration/ Consolidation/ PE    2 Lobe Same Lung Infiltration/ Consolidation/ PE     2 Lobe Infiltration/ Both Lungs/ Consolidation/ PE    Both Lungs/ More Than 1 Lobe/ Atelectasis/ Consolidation/  PE      Cough   Strong Non- Productive X   Excessive Secretions/ Strong Cough    Excessive Secretions/ Weak Cough    Thick Bronchial Secretions/ Weak Cough    Thick Bronchial Secretions/ No Cough      Total Patient Score =  1  0-4=Q4 PRN  5-9=TID and Q4 PRN  10-14=QID and Q3 PRN  15-19=Q4 and Q2 PRN  20=Q3 and Q2 PRN    Bronchopulmonary Hygiene (CPT)   Q4 ATC Copious secretions, dyspnea, unable to sleep, mucus plug    QID & Q4 PRN Moderate secretions    TID Small amounts of secretions w/ poor cough and history of secretions    BID Unable to deep breathe and cough spontaneously       Lung Expansion Therapy (PEP)   Q4 & PRN at night Severe atelectasis, poor oxygenation    QID  High risk for persistent atelectasis, existence of same    TID At risk for developing atelectasis    BID Unable to deep breathe and cough spontaneously     Instruct, 1 follow up Patients able to perform well on their own        Pt has no current xray. Pt quit smoking Cigarettes, Pipe, Cigars in 2004. Continue PRN per protocol and will continue to protocol until discharge.

## 2019-12-04 NOTE — PLAN OF CARE
Problem: Patient Care Overview  Goal: Plan of Care Review  Outcome: Ongoing (interventions implemented as appropriate)   12/03/19 1939   Coping/Psychosocial   Plan of Care Reviewed With patient   Plan of Care Review   Progress improving   OTHER   Outcome Summary A&Ox4. Surgery today R hip. Dressing c/d/i (mepilex x2). Denies pain at this time. Dubose in place. VSS. Safety maintained.       Problem: Fall Risk (Adult)  Goal: Absence of Fall  Outcome: Ongoing (interventions implemented as appropriate)      Problem: Pain, Chronic (Adult)  Goal: Acceptable Pain/Comfort Level and Functional Ability  Outcome: Ongoing (interventions implemented as appropriate)      Problem: Fracture Orthopaedic (Adult)  Goal: Signs and Symptoms of Listed Potential Problems Will be Absent, Minimized or Managed (Fracture Orthopaedic)  Outcome: Ongoing (interventions implemented as appropriate)      Problem: Skin Injury Risk (Adult)  Goal: Skin Health and Integrity  Outcome: Ongoing (interventions implemented as appropriate)      Problem: Wound (Includes Pressure Injury) (Adult)  Goal: Signs and Symptoms of Listed Potential Problems Will be Absent, Minimized or Managed (Wound)  Outcome: Ongoing (interventions implemented as appropriate)

## 2019-12-04 NOTE — THERAPY RE-EVALUATION
Patient Name: Vasile Deng  : 3/27/1931    MRN: [id number]                              Today's Date: 2019       Admit Date: 2019    Visit Dx:     ICD-10-CM ICD-9-CM   1. Fall, initial encounter W19.XXXA E888.9   2. Right hip pain M25.551 719.45   3. Intractable pain R52 780.96   4. Closed intertrochanteric fracture of hip, right, initial encounter (CMS/HCC) S72.141A 820.21   5. Impaired mobility Z74.09 799.89   6. Decreased activities of daily living (ADL) Z78.9 V49.89     Patient Active Problem List   Diagnosis   • Coronary artery disease involving coronary bypass graft of native heart without angina pectoris   • Hx of CABG   • Essential hypertension   • Mixed hyperlipidemia   • Former smoker   • PAF (paroxysmal atrial fibrillation) (CMS/HCC)   • Syncope   • Dyspnea   • Elevated troponin   • NSTEMI (non-ST elevated myocardial infarction) (CMS/HCC)   • Chronic systolic congestive heart failure (CMS/HCC)   • On continuous oral anticoagulation   • Postural dizziness with near syncope   • Fall   • Acute pain of right hip   • Hyponatremia   • Open right hip fracture, type III, initial encounter (CMS/HCC)   • Closed intertrochanteric fracture of hip, right, initial encounter (CMS/HCC)     Past Medical History:   Diagnosis Date   • Acute on chronic combined systolic and diastolic congestive heart failure (CMS/HCC) 2018   • Atrial fibrillation (CMS/HCC)    • Coronary artery disease    • Dizziness    • Hyperlipidemia    • Hypertension      Past Surgical History:   Procedure Laterality Date   • CARDIAC CATHETERIZATION Left 2018    Procedure: Cardiac Catheterization/Vascular Study;  Surgeon: Huey Wilkins MD;  Location:  [facility];  Service: Cardiology   • CARDIAC CATHETERIZATION N/A 2018    Procedure: Left ventriculography;  Surgeon: Huey Wilkins MD;  Location:  [facility];  Service: Cardiology   • CARDIAC SURGERY      double bypass    • KNEE SURGERY       General Information     Row Name 12/04/19 1116          PT Evaluation Time/Intention    Document Type  re-evaluation pt presents post cephalomedullary nailing R closed displaced intertrochanteric hip Fx  -MS (r) DE (t) MS (c)     Mode of Treatment  physical therapy  -MS (r) DE (t) MS (c)     Row Name 12/04/19 1116          General Information    Patient Profile Reviewed?  yes  -MS (r) DE (t) MS (c)     Prior Level of Function  independent:;all household mobility;community mobility;ADL's;driving;transfer;bed mobility  -MS (r) DE (t) MS (c)     Existing Precautions/Restrictions  fall;other (see comments) WBAT RLE  -MS (r) DE (t) MS (c)     Barriers to Rehab  hearing deficit  -MS (r) DE (t) MS (c)     Row Name 12/04/19 1116          Relationship/Environment    Lives With  alone  -MS (r) DE (t) MS (c)     Row Name 12/04/19 1116          Resource/Environmental Concerns    Current Living Arrangements  home/apartment/condo  -MS (r) DE (t) MS (c)     Row Name 12/04/19 1116          Home Main Entrance    Number of Stairs, Main Entrance  none  -MS (r) DE (t) MS (c)     Row Name 12/04/19 1116          Cognitive Assessment/Intervention- PT/OT    Orientation Status (Cognition)  oriented x 4  -MS (r) DE (t) MS (c)     Personal Safety Interventions  safety round/check completed;gait belt;muscle strengthening facilitated;fall prevention program maintained;nonskid shoes/slippers when out of bed;supervised activity  -MS (r) DE (t) MS (c)     Row Name 12/04/19 1116          Safety Issues, Functional Mobility    Impairments Affecting Function (Mobility)  range of motion (ROM);pain;endurance/activity tolerance  -MS (r) DE (t) MS (c)       User Key  (r) = Recorded By, (t) = Taken By, (c) = Cosigned By    Initials Name Provider Type    Rosaura Santana, PT, DPT, NCS Physical Therapist    Abdoulaye Brito, PT Student PT Student        Mobility     Row Name 12/04/19 1119          Bed Mobility Assessment/Treatment     Bed Mobility Assessment/Treatment  supine-sit;sit-supine;scooting/bridging  -MS (r) DE (t) MS (c)     Scooting/Bridging [place] (Bed Mobility)  contact guard  -MS (r) DE (t) MS (c)     Supine-Sit [place] (Bed Mobility)  contact guard;verbal cues  -MS (r) DE (t) MS (c)     Sit-Supine [place] (Bed Mobility)  verbal cues;contact guard  -MS (r) DE (t) MS (c)     Assistive Device (Bed Mobility)  bed rails;head of bed elevated  -MS (r) DE (t) MS (c)     Row Name 12/04/19 1119          Sit-Stand Transfer    Sit-Stand [place] (Transfers)  verbal cues;minimum assist (75% patient effort)  -MS (r) DE (t) MS (c)     Assistive Device (Sit-Stand Transfers)  walker, front-wheeled  -MS (r) DE (t) MS (c)     Row Name 12/04/19 1119          Gait/Stairs Assessment/Training    [place] Level (Gait)  verbal cues;contact guard  -MS (r) DE (t) MS (c)     Assistive Device (Gait)  walker, front-wheeled  -MS (r) DE (t) MS (c)     Distance in Feet (Gait)  15 feet around room  -MS (r) DE (t) MS (c)     Pattern (Gait)  step-to  -MS (r) DE (t) MS (c)     Deviations/Abnormal Patterns (Gait)  venus decreased;stride length decreased;base of support, narrow  -MS (r) DE (t) MS (c)     Bilateral Gait Deviations  heel strike decreased  -MS (r) DE (t) MS (c)     Row Name 12/04/19 1119          Mobility Assessment/Intervention    Extremity Weight-bearing Status  right lower extremity  -MS (r) DE (t) MS (c)     Right Lower Extremity (Weight-bearing Status)  weight-bearing as tolerated (WBAT)  -MS (r) DE (t) MS (c)       User Key  (r) = Recorded By, (t) = Taken By, (c) = Cosigned By    Initials Name Provider Type    Rosaura Santana, PT, DPT, NCS Physical Therapist    Abdoulaye Brito, SMILEY Student PT Student        Obj/Interventions     Row Name 12/04/19 1120          General ROM    GENERAL ROM COMMENTS  AROM LLE WNL, AROM L HIP decreased due to pain, L ankle and knee AROM WFL  -MS (r) DE (t) MS (c)     Row Name 12/04/19  1120          MMT (Manual Muscle Testing)    General MMT Comments  L LE WNL, R hip not formally tested but able to move through 75% of range against gravity, all other joints WFL  -MS (r) DE (t) MS (c)     Row Name 12/04/19 1120          Static Sitting Balance    Level of [place] (Unsupported Sitting, Static Balance)  supervision  -MS (r) DE (t) MS (c)     Sitting Position (Unsupported Sitting, Static Balance)  sitting on edge of bed  -MS (r) DE (t) MS (c)     Row Name 12/04/19 1120          Dynamic Sitting Balance    Level of [place], Reaches Outside Midline (Sitting, Dynamic Balance)  contact guard assist  -MS (r) DE (t) MS (c)     Sitting Position, Reaches Outside Midline (Sitting, Dynamic Balance)  sitting on edge of bed  -MS (r) DE (t) MS (c)     [facility] Name 12/04/19 1120          Static Standing Balance    Level of [place] (Supported Standing, Static Balance)  contact guard assist  -MS (r) DE (t) MS (c)     Assistive Device Utilized (Supported Standing, Static Balance)  walker, rolling  -MS (r) DE (t) MS (c)     [facility] Name 12/04/19 1120          Sensory Assessment/Intervention    Sensory General Assessment  no sensation deficits identified  -MS (r) DE (t) MS (c)       User Key  (r) = Recorded By, (t) = Taken By, (c) = Cosigned By    Initials Name Provider Type    Rosaura Santana R, PT, DPT, NCS Physical Therapist    Abdoulaye Brito, PT Student PT Student        Goals/Plan     Row Name 12/04/19 1124          Bed Mobility Goal 1 (PT)    [place] Level/Cues Needed (Bed Mobility Goal 1, PT)  conditional independence  -MS (r) DE (t) MS (c)     Time Frame (Bed Mobility Goal 1, PT)  by discharge  -MS (r) DE (t) MS (c)     Progress/Outcomes (Bed Mobility Goal 1, PT)  goal ongoing  -MS (r) DE (t) MS (c)     [facility] Name 12/04/19 1124          Transfer Goal 1 (PT)    Activity/Assistive Device (Transfer Goal 1, PT)  sit-to-stand/stand-to-sit;bed-to-chair/chair-to-bed;walker, rolling  -MS (r) DE (t) MS (c)      [place] Level/Cues Needed (Transfer Goal 1, PT)  supervision required  -MS (r) DE (t) MS (c)     Time Frame (Transfer Goal 1, PT)  by discharge  -MS (r) DE (t) MS (c)     Progress/Outcome (Transfer Goal 1, PT)  goal ongoing  -MS (r) DE (t) MS (c)     Row Name 12/04/19 1124          Gait Training Goal 1 (PT)    Activity/Assistive Device (Gait Training Goal 1, PT)  gait (walking locomotion);assistive device use;decrease fall risk;increase endurance/gait distance;normalize weight shifts  -MS (r) DE (t) MS (c)     [place] Level (Gait Training Goal 1, PT)  supervision required  -MS (r) DE (t) MS (c)     Distance (Gait Goal 1, PT)  75 feet   -MS (r) DE (t) MS (c)     Time Frame (Gait Training Goal 1, PT)  by discharge  -MS (r) DE (t) MS (c)     Progress/Outcome (Gait Training Goal 1, PT)  goal ongoing  -MS (r) DE (t) MS (c)       User Key  (r) = Recorded By, (t) = Taken By, (c) = Cosigned By    Initials Name Provider Type    Rosaura Santana, PT, DPT, NCS Physical Therapist    Abdoulaye Brito, PT Student PT Student        Clinical Impression     Row Name 12/04/19 1122          Pain Assessment    Additional Documentation  Pain Scale: Numbers Pre/Post-Treatment (Group)  -MS (r) DE (t) MS (c)     Row Name 12/04/19 1122          Pain Scale: Numbers Pre/Post-Treatment    Pain Scale: Numbers, Pretreatment  1/10  -MS (r) DE (t) MS (c)     Pain Scale: Numbers, Post-Treatment  9/10  -MS (r) DE (t) MS (c)     Pain Location - Side  Right  -MS (r) DE (t) MS (c)     Pain Location  hip  -MS (r) DE (t) MS (c)     Pain Intervention(s)  Medication (See MAR);Ambulation/increased activity;Repositioned  -MS (r) DE (t) MS (c)     Row Name 12/04/19 1122          Plan of Care Review    Plan of Care Reviewed With  patient  -MS (r) DE (t) MS (c)     Row Name 12/04/19 1122          Physical Therapy Clinical Impression    Patient/Family Goals Statement (PT Clinical Impression)  return home  -MS (r) DE (t) MS (c)     Criteria for  Skilled Interventions Met (PT Clinical Impression)  yes;treatment indicated  -MS (r) DE (t) MS (c)     Rehab Potential (PT Clinical Summary)  fair, will monitor progress closely  -MS (r) DE (t) MS (c)     Predicted Duration of Therapy (PT)  until d/c  -MS (r) DE (t) MS (c)     Row Name 12/04/19 1122          Positioning and Restraints    Pre-Treatment Position  in bed  -MS (r) DE (t) MS (c)     Post Treatment Position  bed  -MS (r) DE (t) MS (c)     In Bed  fowlers;with family/caregiver;encouraged to call for assist;call light within reach;side rails up x2  -MS (r) DE (t) MS (c)       User Key  (r) = Recorded By, (t) = Taken By, (c) = Cosigned By    Initials Name Provider Type    Rosaura Santana, PT, DPT, NCS Physical Therapist    Abdoulaye Brito, PT Student PT Student        Outcome Measures     Row Name 12/04/19 1125          How much help from another person do you currently need...    Turning from your back to your side while in flat bed without using bedrails?  4  -MS (r) DE (t) MS (c)     Moving from lying on back to sitting on the side of a flat bed without bedrails?  3  -MS (r) DE (t) MS (c)     Moving to and from a bed to a chair (including a wheelchair)?  3  -MS (r) DE (t) MS (c)     Standing up from a chair using your arms (e.g., wheelchair, bedside chair)?  3  -MS (r) DE (t) MS (c)     Climbing 3-5 steps with a railing?  1  -MS (r) DE (t) MS (c)     To walk in hospital room?  3  -MS (r) DE (t) MS (c)     AM-PAC 6 Clicks Score (PT)  17  -SL (r) DE (t)       User Key  (r) = Recorded By, (t) = Taken By, (c) = Cosigned By    Initials Name Provider Type    Rosaura Santana, PT, DPT, NCS Physical Therapist    Madeleine Flanagan RN Registered Nurse    Abdoulaye Brito, PT Student PT Student        Physical Therapy Education     Title: PT OT SLP Therapies (In Progress)     Topic: Physical Therapy (Done)     Point: Mobility training (Done)     Learning Progress Summary           Patient Acceptance,  E,D, RADHA,DU by DE at 12/4/2019 11:26 AM    Comment:  pt educated on current precautions and strategies to adhere to these. pt educated on proper safety and technique for functional mobility    Acceptance, VANDANA, DU by  at 11/30/2019 10:45 AM    Comment:  benefits of PT and POC, call for assist OOB, TTWB RLE                   Point: Precautions (Done)     Learning Progress Summary           Patient Acceptance, E,D, RADHA,DU by DE at 12/4/2019 11:26 AM    Comment:  pt educated on current precautions and strategies to adhere to these. pt educated on proper safety and technique for functional mobility    Acceptance, D, DU by  at 11/30/2019 10:45 AM    Comment:  benefits of PT and POC, call for assist OOB, TTWB RLE                               User Key     Initials Effective Dates Name Provider Type Discipline     08/02/16 -  Олег Martel, PT Physical Therapist PT    DE 10/10/19 -  Abdoulaye Pitts PT Student PT Student PT              PT Recommendation and Plan  Planned Therapy Interventions (PT Eval): balance training, bed mobility training, gait training, patient/family education, strengthening, transfer training  Outcome Summary/Treatment Plan (PT)  Anticipated Equipment Needs at Discharge (PT): front wheeled walker  Anticipated Discharge Disposition (PT): home with home health, transitional care  Plan of Care Reviewed With: patient  Progress: improving  Outcome Summary: PT re-eval completed on this date. pt reported 1/10 pain at start of session, but reported pain increased to 9/10 with movement. pt reported no numbness or tingling with testing. pt was able to move in bed with CGA, perform sit-stand with Min A, and ambulate around room with RW and CGA for safety. pt exhibited an impaired activity tolerance and notable muscular fatigue with activity. pt also demonstrates decreased safety and balance in gait. Skilled PT warranted to adress noted impairments and educate on strategies to promote safety and functional  independence. Recommended D/C to transitional care or home with HH with continued therapy services.      Time Calculation:   PT Charges     Row Name 12/04/19 1127             Time Calculation    Start Time  1030 chart review [id number]  -MS (r) DE (t) MS (c)      Stop Time  1110  -MS (r) DE (t) MS (c)      Time Calculation (min)  40 min  -MS (r) DE (t)      PT Received On  12/04/19  -MS (r) DE (t) MS (c)      PT Goal Re-Cert Due Date  12/14/19  -MS (r) DE (t) MS (c)         Timed Charges    40390 - PT Therapeutic Activity Minutes  15  -MS (r) DE (t) MS (c)        User Key  (r) = Recorded By, (t) = Taken By, (c) = Cosigned By    Initials Name Provider Type    Rosaura Santana, PT, DPT, NCS Physical Therapist    Abdoulaye Brito, PT Student PT Student        Therapy Charges for Today     Code Description Service Date Service Provider Modifiers Qty    [id number]  PT THERAPEUTIC ACT EA 15 MIN 12/4/2019 Abdoulaye Pitts, PT Student GP 1    [id number]  PT RE-EVAL ESTABLISHED PLAN 2 12/4/2019 Abdoulaye Pitts, PT Student GP 1          PT G-Codes  Outcome Measure Options: AM-PAC 6 Clicks Basic Mobility (PT)  AM-PAC 6 Clicks Score (PT): 17  AM-PAC 6 Clicks Score (OT): 17    Abdoulaye Pitts PT Student  12/4/2019

## 2019-12-04 NOTE — PLAN OF CARE
Problem: Patient Care Overview  Goal: Plan of Care Review  Outcome: Ongoing (interventions implemented as appropriate)   12/04/19 1106   Coping/Psychosocial   Plan of Care Reviewed With patient;significant other   Plan of Care Review   Progress no change   OTHER   Outcome Summary OT re-evaluation completed on this date. Pt reports 1/10 pain in R hip and leg, but pain increased to 9/10 duirng activity. Pt was max A for LB in supine. Pt performed bed mobility w/ CGA and VCs. Pt completed sit>stand t/f min A and VCs and stand>sit t/f w/ CGA and VCs. Pt was CGA for functional mobility using RWX. Pt will benefit from skilled OT services to address ADLs and mobilty. Recommended d/c home w/ HH.

## 2019-12-04 NOTE — PROGRESS NOTES
[facility] Medicine Services  INPATIENT PROGRESS NOTE    Patient Name: Vasile Deng  Date of Admission: 11/27/2019  Today's Date: 12/03/19  Length of Stay: 4  Primary Care Physician: Provider, No Known    Subjective   Chief Complaint: confused  HPI   Doing ok post-op.  Surgery reportedly went well.  Pt confused, likely from anesthesia.  Says he feels SOA, but sats are good        Review of Systems   Unable to perform ROS: Mental status change        All pertinent negatives and positives are as above. All other systems have been reviewed and are negative unless otherwise stated.     Objective    Temp:  [97.3 °F (36.3 °C)-99.1 °F (37.3 °C)] 99.1 °F (37.3 °C)  Heart Rate:  [52-81] 65  Resp:  [15-26] 26  BP: (103-141)/(53-66) 141/66  Physical Exam   Constitutional: He appears well-developed and well-nourished.   HENT:   Head: Normocephalic and atraumatic.   Right Ear: External ear normal.   Left Ear: External ear normal.   Nose: Nose normal.   Mouth/Throat: Oropharynx is clear and moist.   Eyes: Conjunctivae and EOM are normal. Pupils are equal, round, and reactive to light. Right eye exhibits no discharge. Left eye exhibits no discharge. No scleral icterus.   Neck: Normal range of motion. Neck supple. No tracheal deviation present. No thyromegaly present.   Cardiovascular: Normal rate, regular rhythm, normal heart sounds and intact distal pulses. Exam reveals no gallop and no friction rub.   No murmur heard.  Pulmonary/Chest: Effort normal and breath sounds normal. No stridor. No respiratory distress. He has no wheezes. He has no rales. He exhibits no tenderness.   Abdominal: Soft. Bowel sounds are normal. He exhibits no distension and no mass. There is no tenderness. There is no rebound and no guarding. No hernia.   Musculoskeletal: He exhibits no edema or deformity.        Right hip: He exhibits tenderness and bony tenderness.   Lymphadenopathy:     He has no cervical  adenopathy.   Neurological: He is alert. He has normal reflexes. He is disoriented. He displays normal reflexes. No cranial nerve deficit. He exhibits normal muscle tone. Coordination normal.   Skin: Skin is warm and dry. No rash noted. No erythema. No pallor.   Psychiatric: He has a normal mood and affect. His behavior is normal. Judgment and thought content normal.   Vitals reviewed.          Results Review:  I have reviewed the labs, radiology results, and diagnostic studies.    Laboratory Data:   Results from last 7 days   Lab Units 12/03/19  0403 12/02/19  0440 12/01/19  0408   WBC 10*3/mm3 5.80 6.07 7.22   HEMOGLOBIN g/dL 12.3* 12.4* 11.6*   HEMATOCRIT % 37.0* 37.0* 33.7*   PLATELETS 10*3/mm3 189 172 166        Results from last 7 days   Lab Units 12/03/19  0403 12/02/19  1224 12/02/19  0440  12/01/19  0408   SODIUM mmol/L 134* 137 137   < > 128*   POTASSIUM mmol/L 4.2 4.1 4.2   < > 3.9   CHLORIDE mmol/L 99 101 101   < > 95*   CO2 mmol/L 27.0 27.0 27.0   < > 25.0   BUN mg/dL 15 12 14   < > 11   CREATININE mg/dL 0.83 0.80 0.77   < > 0.69*   CALCIUM mg/dL 9.0 9.0 8.8   < > 8.6   BILIRUBIN mg/dL 0.5  --  0.5  --  0.7   ALK PHOS U/L 110  --  110  --  101   ALT (SGPT) U/L 41  --  41  --  31   AST (SGOT) U/L 27  --  31  --  25   GLUCOSE mg/dL 116* 133* 130*   < > 98    < > = values in this interval not displayed.       Culture Data:        Radiology Data:   Imaging Results (Last 24 Hours)     Procedure Component Value Units Date/Time    XR Hip With or Without Pelvis 2 - 3 View Right [[id number]] Collected:  12/03/19 1753     Updated:  12/03/19 1756    Narrative:       EXAMINATION: XR HIP W OR WO PELVIS 2-3 VIEW RIGHT-     12/3/2019 4:25 PM CST     HISTORY: TFN of Right Hip; W19.XXXA-Unspecified fall, initial encounter;  M25.551-Pain in right hip; R52-Pain, unspecified; S72.141A-Displaced  intertrochanteric fracture of right femur, initial encounter for closed  fracture; Z74.09-Other reduced mobility; Z78.9-Other  specified health  status     Number of fluoroscopic spot images obtained = 4.     Fluoroscopy dose in Air Kerma mGy = 4.83.     Fluoroscopy time = 46 seconds.     Spot images document hardware fixation of the proximal right femur.  This report was finalized on 12/03/2019 17:53 by Dr. Kristofer Sanchez MD.    FL C Arm During Surgery [[id number]] Updated:  12/03/19 7772          I have reviewed the patient's current medications.     Assessment/Plan     Active Hospital Problems    Diagnosis   • **Acute pain of right hip   • Open right hip fracture, type III, initial encounter (CMS/[facility])   • Fall   • Hyponatremia   • Closed intertrochanteric fracture of hip, right, initial encounter (CMS/[facility])     Added automatically from request for surgery [id number]     • Postural dizziness with near syncope   • On continuous oral anticoagulation   • Chronic systolic congestive heart failure (CMS/[facility])   • PAF (paroxysmal atrial fibrillation) (CMS/[facility])   • Essential hypertension   • Coronary artery disease involving coronary bypass graft of native heart without angina pectoris       1.  R hip fracture  -Ortho following  -S/P repair     2.  Hyponatremia  -resolved with IVF     3.  A-fib  -metoprolol  -Amio  -Xarelto     4.  HTN  -Metoprolol     5.  CAD  -ASA  -Metoprolol             Discharge Planning: I expect the patient to be discharged to rehab vs snf in ? days    Bob Haider MD   12/03/19   6:31 PM

## 2019-12-04 NOTE — ANESTHESIA POSTPROCEDURE EVALUATION
"Patient: Vasile Deng    Procedure Summary     Date:  12/03/19 Room / Location:   [facility] OR  /  [facility] OR    Anesthesia Start:  1610 Anesthesia Stop:  1724    Procedure:  HIP TROCHANTERIC NAILING SHORT WITH INTRAMEDULLARY HIP SCREW (Right Hip) Diagnosis:       Closed intertrochanteric fracture of hip, right, initial encounter (CMS/[facility])      (Closed intertrochanteric fracture of hip, right, initial encounter (CMS/[facility]) [S72.141A])    Surgeon:  Daniel Carrion MD Provider:  Slime Alejandra CRNA    Anesthesia Type:  general ASA Status:  3          Anesthesia Type: general  Last vitals  BP   124/46(notified nurse) (12/03/19 2000)   Temp   97.4 °F (36.3 °C) (12/03/19 2000)   Pulse   58 (12/03/19 2117)   Resp   16 (12/03/19 2000)     SpO2   100 % (12/03/19 2117)     Post Anesthesia Care and Evaluation    Patient location during evaluation: PACU  Patient participation: complete - patient participated  Level of consciousness: awake and alert  Pain management: adequate  Airway patency: patent  Anesthetic complications: No anesthetic complications  PONV Status: none  Cardiovascular status: acceptable and hemodynamically stable  Respiratory status: acceptable  Hydration status: acceptable    Comments: Blood pressure 124/46, pulse 58, temperature 97.4 °F (36.3 °C), temperature source Oral, resp. rate 16, height 172.7 cm (67.99\"), weight 77.2 kg (170 lb 1.6 oz), SpO2 100 %.    Patient discharged from PACU based upon Latanya score. Please see RN notes for further details      "

## 2019-12-04 NOTE — PLAN OF CARE
Problem: Patient Care Overview  Goal: Plan of Care Review   12/04/19 1129   Coping/Psychosocial   Plan of Care Reviewed With patient   Plan of Care Review   Progress improving   OTHER   Outcome Summary PT re-eval completed on this date. pt reported 1/10 pain at start of session, but reported pain increased to 9/10 with movement. pt reported no numbness or tingling with testing. pt was able to move in bed with CGA, perform sit-stand with Min A, and ambulate around room with RW and CGA for safety. pt exhibited an impaired activity tolerance and notable muscular fatigue with activity. pt also demonstrates decreased safety and balance in gait. Skilled PT warranted to adress noted impairments and educate on strategies to promote safety and functional independence. Recommended D/C to transitional care or home with HH with continued therapy services.

## 2019-12-04 NOTE — DISCHARGE INSTRUCTIONS
1.  Activity: WBAT to the right lower extremity  2.  Physical and occupational therapy as prescribed.  3.  Pain control  4.  Call office number with any worsening pain at rest or with attempted therapy.  5.  May leave dressing in place for 2 weeks. Apply new Mepilex dressings if current dressings become saturated and get wet beneath dressings.  6.  DVT ppx: Home dose Xarelto. Increase activity as able with assistance.

## 2019-12-04 NOTE — PROGRESS NOTES
[facility] Medicine Services  INPATIENT PROGRESS NOTE    Patient Name: Vasile Deng  Date of Admission: 11/27/2019  Today's Date: 12/04/19  Length of Stay: 5  Primary Care Physician: Provider, No Known    Subjective   Chief Complaint: right hip pain  HPI   Doing ok.  Pain is tolerable.  No longer feels SOA.  No black or bloody stools.          Review of Systems   Constitutional: Positive for fatigue. Negative for fever.   HENT: Negative for congestion and ear pain.    Eyes: Negative for redness and visual disturbance.   Respiratory: Negative for cough, shortness of breath and wheezing.    Cardiovascular: Negative for chest pain and palpitations.   Gastrointestinal: Negative for abdominal pain, diarrhea, nausea and vomiting.   Endocrine: Negative for cold intolerance and heat intolerance.   Genitourinary: Negative for dysuria and frequency.   Musculoskeletal: Positive for arthralgias. Negative for back pain.   Skin: Negative for rash and wound.   Neurological: Negative for dizziness and headaches.   Psychiatric/Behavioral: Negative for confusion. The patient is not nervous/anxious.         All pertinent negatives and positives are as above. All other systems have been reviewed and are negative unless otherwise stated.     Objective    Temp:  [97.4 °F (36.3 °C)-99.1 °F (37.3 °C)] 97.8 °F (36.6 °C)  Heart Rate:  [52-81] 64  Resp:  [16-26] 18  BP: (103-145)/(44-66) 113/44  Physical Exam   Constitutional: He is oriented to person, place, and time. He appears well-developed and well-nourished.   HENT:   Head: Normocephalic and atraumatic.   Right Ear: External ear normal.   Left Ear: External ear normal.   Nose: Nose normal.   Mouth/Throat: Oropharynx is clear and moist.   Eyes: Conjunctivae and EOM are normal. Pupils are equal, round, and reactive to light. Right eye exhibits no discharge. Left eye exhibits no discharge. No scleral icterus.   Neck: Normal range of motion. Neck  supple. No tracheal deviation present. No thyromegaly present.   Cardiovascular: Normal rate, regular rhythm, normal heart sounds and intact distal pulses. Exam reveals no gallop and no friction rub.   No murmur heard.  Pulmonary/Chest: Effort normal and breath sounds normal. No stridor. No respiratory distress. He has no wheezes. He has no rales. He exhibits no tenderness.   Abdominal: Soft. Bowel sounds are normal. He exhibits no distension and no mass. There is no tenderness. There is no rebound and no guarding. No hernia.   Musculoskeletal: He exhibits no edema or deformity.        Right hip: He exhibits decreased range of motion, tenderness and bony tenderness.   Lymphadenopathy:     He has no cervical adenopathy.   Neurological: He is alert and oriented to person, place, and time. He has normal reflexes. He displays normal reflexes. No cranial nerve deficit. He exhibits normal muscle tone. Coordination normal.   Skin: Skin is warm and dry. No rash noted. No erythema. No pallor.   Psychiatric: He has a normal mood and affect. His behavior is normal. Judgment and thought content normal.   Vitals reviewed.        Results Review:  I have reviewed the labs, radiology results, and diagnostic studies.    Laboratory Data:   Results from last 7 days   Lab Units 12/04/19  0403 12/03/19  0403 12/02/19  0440   WBC 10*3/mm3 5.53 5.80 6.07   HEMOGLOBIN g/dL 11.5* 12.3* 12.4*   HEMATOCRIT % 34.2* 37.0* 37.0*   PLATELETS 10*3/mm3 191 189 172        Results from last 7 days   Lab Units 12/04/19  0403 12/03/19  0403 12/02/19  1224 12/02/19  0440   SODIUM mmol/L 133* 134* 137 137   POTASSIUM mmol/L 4.3 4.2 4.1 4.2   CHLORIDE mmol/L 97* 99 101 101   CO2 mmol/L 26.0 27.0 27.0 27.0   BUN mg/dL 14 15 12 14   CREATININE mg/dL 0.83 0.83 0.80 0.77   CALCIUM mg/dL 8.6 9.0 9.0 8.8   BILIRUBIN mg/dL 0.5 0.5  --  0.5   ALK PHOS U/L 111 110  --  110   ALT (SGPT) U/L 38 41  --  41   AST (SGOT) U/L 28 27  --  31   GLUCOSE mg/dL 113* 116* 133*  130*       Culture Data:        Radiology Data:   Imaging Results (Last 24 Hours)     Procedure Component Value Units Date/Time    XR Hip With or Without Pelvis 2 - 3 View Right [[id number]] Collected:  12/03/19 1753     Updated:  12/04/19 0701    Narrative:       EXAMINATION: XR HIP W OR WO PELVIS 2-3 VIEW RIGHT-     12/3/2019 4:25 PM CST     HISTORY: TFN of Right Hip; W19.XXXA-Unspecified fall, initial encounter;  M25.551-Pain in right hip; R52-Pain, unspecified; S72.141A-Displaced  intertrochanteric fracture of right femur, initial encounter for closed  fracture; Z74.09-Other reduced mobility; Z78.9-Other specified health  status     Number of fluoroscopic spot images obtained = 4.     Fluoroscopy dose in Air Kerma mGy = 4.83.     Fluoroscopy time = 46 seconds.     Spot images document hardware fixation of the proximal right femur.  This report was finalized on 12/03/2019 17:53 by Dr. Kristofer Sanchez MD.    FL C Arm During Surgery [[id number]] Collected:  12/03/19 1753     Updated:  12/04/19 0701    Narrative:       EXAMINATION: XR HIP W OR WO PELVIS 2-3 VIEW RIGHT-     12/3/2019 4:25 PM CST     HISTORY: TFN of Right Hip; W19.XXXA-Unspecified fall, initial encounter;  M25.551-Pain in right hip; R52-Pain, unspecified; S72.141A-Displaced  intertrochanteric fracture of right femur, initial encounter for closed  fracture; Z74.09-Other reduced mobility; Z78.9-Other specified health  status     Number of fluoroscopic spot images obtained = 4.     Fluoroscopy dose in Air Kerma mGy = 4.83.     Fluoroscopy time = 46 seconds.     Spot images document hardware fixation of the proximal right femur.  This report was finalized on 12/03/2019 17:53 by Dr. Kristofer Sanchez MD.          I have reviewed the patient's current medications.     Assessment/Plan     Active Hospital Problems    Diagnosis   • **Acute pain of right hip   • Open right hip fracture, type III, initial encounter (CMS/[facility])   • Fall   • Hyponatremia   • Closed  intertrochanteric fracture of hip, right, initial encounter (CMS/[facility])     Added automatically from request for surgery [id number]     • Postural dizziness with near syncope   • On continuous oral anticoagulation   • Chronic systolic congestive heart failure (CMS/[facility])   • PAF (paroxysmal atrial fibrillation) (CMS/[facility])   • Essential hypertension   • Coronary artery disease involving coronary bypass graft of native heart without angina pectoris          1.  R hip fracture  -Ortho following  -S/P repair  -PT/OT  -DVT PPX with Xarelto     2.  Hyponatremia  -resolved with IVF     3.  A-fib  -metoprolol  -Amio  -Xarelto     4.  HTN  -Metoprolol     5.  CAD  -ASA  -Metoprolol               Discharge Planning: I expect the patient to be discharged to rehab in ? days  Bob Haider MD   12/04/19   11:43 AM

## 2019-12-05 LAB
ALBUMIN SERPL-MCNC: 3.1 G/DL (ref 3.5–5.2)
ALBUMIN/GLOB SERPL: 1 G/DL
ALP SERPL-CCNC: 109 U/L (ref 39–117)
ALT SERPL W P-5'-P-CCNC: 24 U/L (ref 1–41)
ANION GAP SERPL CALCULATED.3IONS-SCNC: 9 MMOL/L (ref 5–15)
AST SERPL-CCNC: 20 U/L (ref 1–40)
BASOPHILS # BLD AUTO: 0.04 10*3/MM3 (ref 0–0.2)
BASOPHILS NFR BLD AUTO: 0.7 % (ref 0–1.5)
BILIRUB SERPL-MCNC: 0.5 MG/DL (ref 0.2–1.2)
BUN BLD-MCNC: 13 MG/DL (ref 8–23)
BUN/CREAT SERPL: 17.8 (ref 7–25)
CALCIUM SPEC-SCNC: 8.5 MG/DL (ref 8.6–10.5)
CHLORIDE SERPL-SCNC: 93 MMOL/L (ref 98–107)
CO2 SERPL-SCNC: 26 MMOL/L (ref 22–29)
CREAT BLD-MCNC: 0.73 MG/DL (ref 0.76–1.27)
DEPRECATED RDW RBC AUTO: 42.5 FL (ref 37–54)
EOSINOPHIL # BLD AUTO: 0.31 10*3/MM3 (ref 0–0.4)
EOSINOPHIL NFR BLD AUTO: 5.6 % (ref 0.3–6.2)
ERYTHROCYTE [DISTWIDTH] IN BLOOD BY AUTOMATED COUNT: 13.7 % (ref 12.3–15.4)
GFR SERPL CREATININE-BSD FRML MDRD: 101 ML/MIN/1.73
GLOBULIN UR ELPH-MCNC: 3 GM/DL
GLUCOSE BLD-MCNC: 103 MG/DL (ref 65–99)
HCT VFR BLD AUTO: 32 % (ref 37.5–51)
HGB BLD-MCNC: 10.8 G/DL (ref 13–17.7)
IMM GRANULOCYTES # BLD AUTO: 0.03 10*3/MM3 (ref 0–0.05)
IMM GRANULOCYTES NFR BLD AUTO: 0.5 % (ref 0–0.5)
LYMPHOCYTES # BLD AUTO: 1.43 10*3/MM3 (ref 0.7–3.1)
LYMPHOCYTES NFR BLD AUTO: 25.8 % (ref 19.6–45.3)
MCH RBC QN AUTO: 28.5 PG (ref 26.6–33)
MCHC RBC AUTO-ENTMCNC: 33.8 G/DL (ref 31.5–35.7)
MCV RBC AUTO: 84.4 FL (ref 79–97)
MONOCYTES # BLD AUTO: 0.67 10*3/MM3 (ref 0.1–0.9)
MONOCYTES NFR BLD AUTO: 12.1 % (ref 5–12)
NEUTROPHILS # BLD AUTO: 3.07 10*3/MM3 (ref 1.7–7)
NEUTROPHILS NFR BLD AUTO: 55.3 % (ref 42.7–76)
NRBC BLD AUTO-RTO: 0 /100 WBC (ref 0–0.2)
PLATELET # BLD AUTO: 177 10*3/MM3 (ref 140–450)
PMV BLD AUTO: 9.6 FL (ref 6–12)
POTASSIUM BLD-SCNC: 3.6 MMOL/L (ref 3.5–5.2)
PROT SERPL-MCNC: 6.1 G/DL (ref 6–8.5)
RBC # BLD AUTO: 3.79 10*6/MM3 (ref 4.14–5.8)
SODIUM BLD-SCNC: 128 MMOL/L (ref 136–145)
WBC NRBC COR # BLD: 5.55 10*3/MM3 (ref 3.4–10.8)

## 2019-12-05 PROCEDURE — 97110 THERAPEUTIC EXERCISES: CPT

## 2019-12-05 PROCEDURE — 97116 GAIT TRAINING THERAPY: CPT

## 2019-12-05 PROCEDURE — 97535 SELF CARE MNGMENT TRAINING: CPT

## 2019-12-05 PROCEDURE — 94799 UNLISTED PULMONARY SVC/PX: CPT

## 2019-12-05 PROCEDURE — 80053 COMPREHEN METABOLIC PANEL: CPT | Performed by: FAMILY MEDICINE

## 2019-12-05 PROCEDURE — 85025 COMPLETE CBC W/AUTO DIFF WBC: CPT | Performed by: FAMILY MEDICINE

## 2019-12-05 PROCEDURE — 94760 N-INVAS EAR/PLS OXIMETRY 1: CPT

## 2019-12-05 PROCEDURE — 97530 THERAPEUTIC ACTIVITIES: CPT

## 2019-12-05 RX ADMIN — ASPIRIN 81 MG: 81 TABLET ORAL at 09:13

## 2019-12-05 RX ADMIN — TAMSULOSIN HYDROCHLORIDE 0.4 MG: 0.4 CAPSULE ORAL at 21:56

## 2019-12-05 RX ADMIN — AMIODARONE HYDROCHLORIDE 100 MG: 200 TABLET ORAL at 09:09

## 2019-12-05 RX ADMIN — FINASTERIDE 5 MG: 5 TABLET, FILM COATED ORAL at 09:21

## 2019-12-05 RX ADMIN — SODIUM CHLORIDE 500 ML: 9 INJECTION, SOLUTION INTRAVENOUS at 11:16

## 2019-12-05 RX ADMIN — SODIUM CHLORIDE, PRESERVATIVE FREE 10 ML: 5 INJECTION INTRAVENOUS at 21:58

## 2019-12-05 RX ADMIN — PANTOPRAZOLE SODIUM 40 MG: 40 TABLET, DELAYED RELEASE ORAL at 09:14

## 2019-12-05 RX ADMIN — AMIODARONE HYDROCHLORIDE 100 MG: 200 TABLET ORAL at 21:57

## 2019-12-05 RX ADMIN — RIVAROXABAN 15 MG: 15 TABLET, FILM COATED ORAL at 17:44

## 2019-12-05 RX ADMIN — ALLOPURINOL 100 MG: 100 TABLET ORAL at 09:13

## 2019-12-05 NOTE — THERAPY TREATMENT NOTE
Acute Care - Occupational Therapy Treatment Note  [facility]     Patient Name: Vasile Deng  : 3/27/1931  MRN: [id number]  Today's Date: 2019  Onset of Illness/Injury or Date of Surgery: 19  Date of Referral to OT: 19  Referring Physician: Daniel Carrion MD    Admit Date: 2019       ICD-10-CM ICD-9-CM   1. Fall, initial encounter W19.XXXA E888.9   2. Right hip pain M25.551 719.45   3. Intractable pain R52 780.96   4. Closed intertrochanteric fracture of hip, right, initial encounter (CMS/[facility]) S72.141A 820.21   5. Impaired mobility Z74.09 799.89   6. Decreased activities of daily living (ADL) Z78.9 V49.89   7. Impaired mobility and ADLs Z74.09 799.89     Patient Active Problem List   Diagnosis   • Coronary artery disease involving coronary bypass graft of native heart without angina pectoris   • Hx of CABG   • Essential hypertension   • Mixed hyperlipidemia   • Former smoker   • PAF (paroxysmal atrial fibrillation) (CMS/[facility])   • Syncope   • Dyspnea   • Elevated troponin   • NSTEMI (non-ST elevated myocardial infarction) (CMS/[facility])   • Chronic systolic congestive heart failure (CMS/[facility])   • On continuous oral anticoagulation   • Postural dizziness with near syncope   • Fall   • Acute pain of right hip   • Hyponatremia   • Open right hip fracture, type III, initial encounter (CMS/[facility])   • Closed intertrochanteric fracture of hip, right, initial encounter (CMS/[facility])     Past Medical History:   Diagnosis Date   • Acute on chronic combined systolic and diastolic congestive heart failure (CMS/[facility]) 2018   • Atrial fibrillation (CMS/[facility])    • Coronary artery disease    • Dizziness    • Hyperlipidemia    • Hypertension      Past Surgical History:   Procedure Laterality Date   • CARDIAC CATHETERIZATION Left 2018    Procedure: Cardiac Catheterization/Vascular Study;  Surgeon: Huey Wilkins MD;  Location: [facility] INVASIVE LOCATION;  Service: Cardiology   • CARDIAC  CATHETERIZATION N/A 9/30/2018    Procedure: Left ventriculography;  Surgeon: Huey Wilkins MD;  Location:  [facility] CATH INVASIVE LOCATION;  Service: Cardiology   • CARDIAC SURGERY      double bypass   • KNEE SURGERY         Therapy Treatment    Rehabilitation Treatment Summary     Row Name 12/05/19 1009 12/05/19 0821          Treatment Time/Intention    Discipline  physical therapy assistant  -NW  occupational therapy assistant  -TS     Document Type  therapy note (daily note)  -NW2  therapy note (daily note)  -TS     Subjective Information  complains of;pain;weakness;dizziness  -NW2  complains of;pain;dizziness;weakness  -TS2     Patient/Family Observations  wife present  -NW2  spouse present  -TS2     Patient Effort  good  -NW2  adequate  -TS2     Comment  [place]  -NW2  [place]  -TS2     Existing Precautions/Restrictions  fall;right;hip  -NW2  fall;right;hip  -TS2     Treatment Considerations/Comments  monitor BP  -NW2  monitor BP  -TS2     Recorded by [NW] Sherine Myers, PTA 12/05/19 1019  [NW2] Sherine Myers, PTA 12/05/19 1046 [TS] Carmen Mcgraw DE LOS SANTOS/L 12/05/19 0821  [TS2] Carmen Mcgraw DE [place] S[place]/L 12/05/19 1237     Row Name 12/05/19 1009 12/05/19 0821          Vital Signs    Pre Systolic BP Rehab  97  -NW  --     Pre Treatment Diastolic BP  43  -NW  --     Intra Systolic BP Rehab  93  -NW  --     Intra Treatment Diastolic BP  43  -NW  --     Post Systolic BP Rehab  79  -NW  90  -TS     Post Treatment Diastolic BP  45  -NW  47  -TS     Posttreatment Heart Rate (beats/min)  --  53  -TS     Post SpO2 (%)  --  96  -TS     O2 Delivery Post Treatment  --  room air  -TS     Post Patient Position  --  Sitting  -TS     Recorded by [NW] Sherine Myers, PTA 12/05/19 1046 [TS] Carmen Mcgraw DE [place] SANTOS/L 12/05/19 1237     Row Name 12/05/19 0821             Cognitive Assessment/Intervention- PT/OT    Personal Safety Interventions  fall prevention program maintained;gait belt;nonskid shoes/slippers when  out of bed  -TS      Recorded by [TS] Carmen Mcgraw COTA/L 12/05/19 1237      Row Name 12/05/19 1009             Safety Issues, Functional Mobility    Safety Issues Affecting Function (Mobility)  safety precaution awareness  -NW      Impairments Affecting Function (Mobility)  pain;strength  -NW      Recorded by [NW] Sherine Myers, PTA 12/05/19 1046      Row Name 12/05/19 1009             Mobility Assessment/Intervention    Extremity Weight-bearing Status  right lower extremity  -NW      Left Lower Extremity (Weight-bearing Status)  weight-bearing as tolerated (WBAT)  -NW      Right Lower Extremity (Weight-bearing Status)  weight-bearing as tolerated (WBAT)  -NW      Recorded by [NW] Sherine Myers, PTA 12/05/19 1046      Row Name 12/05/19 1009 12/05/19 0821          Bed Mobility Assessment/Treatment    Scooting/Bridging [place] (Bed Mobility)  moderate assist (50% patient effort);2 person assist  -NW  --     Supine-Sit [place] (Bed Mobility)  --  contact guard;minimum assist (75% patient effort)  -TS     Sit-Supine [place] (Bed Mobility)  verbal cues;moderate assist (50% patient effort)  -NW  --     Assistive Device (Bed Mobility)  bed rails  -NW  bed rails;head of bed elevated  -TS     Comment (Bed Mobility)  --  increased time and encouragement for pt to complete bed mobility as independently as possible  -TS     Recorded by [NW] Sherine Myers, PTA 12/05/19 1046 [TS] Carmen Mcgraw COTA/L 12/05/19 1237     Row Name 12/05/19 0821             Functional Mobility    Functional Mobility- Ind. Level  contact guard assist;moderate assist (50% patient effort)  -TS      Functional Mobility- Device  rolling walker  -TS      Functional Mobility- Comment  pt ambulated in room, made it to door of  before stating he was feeling dizzy and felt vision was blury. Pt began to lean forward and become unsafe with RW. DE LOS SANTOS pulled BSC under pt and had pt sit immediately. Pt then stood from [facility]  with mod A and RW and recliner was placed under pt for pt to sit.   -TS      Recorded by [TS] Carmen Mcgraw DE LOS SANTOS/L 12/05/19 1237      Row Name 12/05/19 0821             Transfer Assessment/Treatment    Transfer Assessment/Treatment  sit-stand transfer;stand-sit transfer  -TS      Recorded by [TS] Carmen Mcgraw DE [place] SANTOS/L 12/05/19 1237      Row Name 12/05/19 1009 12/05/19 0821          Sit-Stand Transfer    Sit-Stand [place] (Transfers)  verbal cues;moderate assist (50% patient effort)  -NW  contact guard;moderate assist (50% patient effort)  -TS     Assistive Device (Sit-Stand Transfers)  walker, front-wheeled  (Significant)  posterior lean upon standing  -NW  walker, front-wheeled  -TS     Recorded by [NW] Sherine Myers, PTA 12/05/19 1046 [TS] Carmen Mcgraw DE LOS SANTOS/L 12/05/19 1237     Row Name 12/05/19 1009 12/05/19 0821          Stand-Sit Transfer    Stand-Sit [place] (Transfers)  verbal cues;minimum assist (75% patient effort)  -NW  contact guard;minimum assist (75% patient effort);verbal cues  -TS     Assistive Device (Stand-Sit Transfers)  walker, front-wheeled  -NW  walker, front-wheeled  -TS     Recorded by [NW] Sherine Myers, PTA 12/05/19 1046 [TS] Carmen Mcgraw DE LOS SANTOS/L 12/05/19 1237     Row Name 12/05/19 1009             Gait/Stairs Assessment/Training    [place] Level (Gait)  verbal cues;minimum assist (75% patient effort)  -NW      Assistive Device (Gait)  walker, front-wheeled  -NW      Distance in Feet (Gait)  10x2 follow w/ chair mult standing rests  -NW      Pattern (Gait)  step-to  -NW      Deviations/Abnormal Patterns (Gait)  antalgic;venus decreased;stride length decreased  -NW      Bilateral Gait Deviations  heel strike decreased  -NW      Recorded by [NW] Sherine Myers, PTA 12/05/19 1046      Row Name 12/05/19 0821             ADL Assessment/Intervention    BADL Assessment/Intervention  lower body dressing;grooming  -TS      Recorded by [TS]  Carmen Mcgraw DE LOS SANTOS/L 12/05/19 1237      Row Name 12/05/19 0821             Lower Body Dressing Assessment/Training    Lower Body Dressing [place] Level  socks;maximum assist (25% patient effort)  -TS      Lower Body Dressing Position  supine  -TS      Comment (Lower Body Dressing)  max A to adjust socks while supine  -TS      Recorded by [TS] Carmen Mcgraw DE LOS SANTOS/L 12/05/19 1237      Row Name 12/05/19 0821             Grooming Assessment/Training    [place] Level (Grooming)  set up;supervision;wash face, hands  -TS      Grooming Position  supported sitting  -TS      Recorded by [TS] Carmen Mcgraw DE LOS SANTOS/L 12/05/19 1237      Row Name 12/05/19 1009             General ROM    GENERAL ROM COMMENTS  AROM BLEs sitting x10  -NW      Recorded by [NW] Sherine Myers, PTA 12/05/19 1046      Row Name 12/05/19 1009 12/05/19 0821          Positioning and Restraints    Pre-Treatment Position  sitting in chair/recliner  -NW  in bed  -TS     Post Treatment Position  bed  -NW  chair  -TS     In Bed  fowlers;call light within reach;encouraged to call for assist;with family/caregiver  -NW  --     In Chair  --  reclined;call light within reach;encouraged to call for assist;with family/caregiver;notified nsg  -TS     Recorded by [NW] Sherine Myers, PTA 12/05/19 1046 [TS] Carmen Mcgraw DE [place] SANTOS/L 12/05/19 1237     Row Name 12/05/19 1009 12/05/19 0821          Pain Scale: Numbers Pre/Post-Treatment    Pain Scale: Numbers, Pretreatment  --  5/10  -TS     Pain Scale: Numbers, Post-Treatment  10/10  -NW  7/10  -TS     Pain Location - Side  Right  -NW  Right  -TS     Pain Location  hip  -NW  hip  -TS     Pain Intervention(s)  Repositioned  -NW  Repositioned  -TS     Recorded by [NW] Sherine Myers, PTA 12/05/19 1046 [TS] Carmen Mcgraw DE LOS SANTOS/L 12/05/19 1237     Row Name                Wound 12/02/19 0045 Bilateral gluteal MASD (Moisutre associated skin damage)    Wound - Properties Group Date  first assessed: 12/02/19 [EL] Time first assessed: 0045 [EL] Present on Hospital Admission: N [EL] Side: Bilateral [EL] Location: gluteal [EL] Primary Wound Type: MASD [EL] Recorded by:  [EL] Silvana James, RNA 12/03/19 0148    Row Name                Wound 12/03/19 1711 Right hip Incision    Wound - Properties Group Date first assessed: 12/03/19 [SVETLANA] Time first assessed: 1711 [SVETLANA] Side: Right [SVETLANA] Location: hip [SVETLANA] Primary Wound Type: Incision [SVETLANA] Recorded by:  [SVETLANA] Christy Bryan RN 12/03/19 1711    Row Name 12/05/19 0821             Outcome Summary/Treatment Plan (OT)    Daily Summary of Progress (OT)  progress toward functional goals is good  -TS      Recorded by [TS] Carmen Mcgraw DE LOS SANTOS/L 12/05/19 1237        User Key  (r) = Recorded By, (t) = Taken By, (c) = Cosigned By    Initials Name Effective Dates Discipline    TS Carmen Mcgraw, DE LOS SANTOS/L 08/02/16 -  OT    NW Sherine Myers, PTA 08/02/16 -  PT    Chrisyt Carpio RN 08/02/16 -  Nurse    Silvana Mcgraw RNA 06/09/17 -  Nurse        Wound 12/02/19 0045 Bilateral gluteal MASD (Moisutre associated skin damage) (Active)   Dressing Appearance no drainage;open to air 12/5/2019  8:10 AM   Closure BALJINDER 12/5/2019  8:10 AM   Base dry;red 12/5/2019  8:10 AM   Periwound dry;pink 12/4/2019  7:20 PM   Drainage Amount none 12/5/2019  8:10 AM   Care, Wound barrier applied 12/4/2019  7:20 PM   Dressing Care, Wound open to air 12/5/2019  8:10 AM       Wound 12/03/19 1711 Right hip Incision (Active)   Dressing Appearance dry;intact;no drainage 12/5/2019  8:10 AM   Closure BALJINDER 12/5/2019  8:10 AM   Base dressing in place, unable to visualize 12/5/2019  8:10 AM   Drainage Amount none 12/5/2019  8:10 AM   Dressing Care, Wound foam 12/5/2019  8:10 AM       Occupational Therapy Education     Title: PT OT SLP Therapies (In Progress)     Topic: Occupational Therapy (In Progress)     Point: ADL training (Done)     Description: Instruct  learner(s) on proper safety adaptation and remediation techniques during self care or transfers.   Instruct in proper use of assistive devices.    Learning Progress Summary           Patient Acceptance, E, VU by CN at 12/4/2019  4:03 PM    Comment:  OT roles and benefits, POC, and importance of participation w/ therapy.    Acceptance, E,D, VU,NR by TR at 11/30/2019 12:06 PM    Comment:  Education provided on purpose of OT eval, R LE TTWB status, need for OT and d/c planning.   Significant Other Acceptance, E, VU by CN at 12/4/2019  4:03 PM    Comment:  OT roles and benefits, POC, and importance of participation w/ therapy.                   Point: Precautions (Done)     Description: Instruct learner(s) on prescribed precautions during self-care and functional transfers.    Learning Progress Summary           Patient Acceptance, E, VU by CN at 12/4/2019  4:03 PM    Comment:  OT roles and benefits, POC, and importance of participation w/ therapy.    Acceptance, E,D, VU,NR by TR at 11/30/2019 12:06 PM    Comment:  Education provided on purpose of OT eval, R LE TTWB status, need for OT and d/c planning.   Significant Other Acceptance, E, VU by CN at 12/4/2019  4:03 PM    Comment:  OT roles and benefits, POC, and importance of participation w/ therapy.                   Point: Body mechanics (Done)     Description: Instruct learner(s) on proper positioning and spine alignment during self-care, functional mobility activities and/or exercises.    Learning Progress Summary           Patient Acceptance, E, VU by CN at 12/4/2019  4:03 PM    Comment:  OT roles and benefits, POC, and importance of participation w/ therapy.   Significant Other Acceptance, E, VU by CN at 12/4/2019  4:03 PM    Comment:  OT roles and benefits, POC, and importance of participation w/ therapy.                               User Key     Initials Effective Dates Name Provider Type Discipline    TR 06/22/15 -  Carmen Zamarripa OTR/EZEKIEL Occupational  Therapist OT    CN 09/03/19 -  Isha Adames OT Student OT Student OT                OT Recommendation and Plan  Outcome Summary/Treatment Plan (OT)  Daily Summary of Progress (OT): progress toward functional goals is good  Daily Summary of Progress (OT): progress toward functional goals is good  Plan of Care Review  Plan of Care Reviewed With: patient  Plan of Care Reviewed With: patient  Outcome Summary: Pt CGA/min A for supine to sit with HOB elevated and bed rail with increased time. Pt transfers CGA with RW initially but due to low BP required mod A after ambulating. Pt was able to ambulate in room a few steps to door of BR before feeling dizzy and having vision blur. Pt sat in recliner and BP checked, 90/47. Per significant other she feels pt fall may have something to do with low BP prior to admission. Significant other states pt would take BP medication without checking BP and then would take other medications which have the ability to lower BP. Pt would benefit from acute rehab prior to discharge home. Continue OT POC   Outcome Measures     Row Name 12/05/19 1200 12/05/19 1000 12/04/19 1600       How much help from another person do you currently need...    Turning from your back to your side while in flat bed without using bedrails?  --  3  -NW  --    Moving from lying on back to sitting on the side of a flat bed without bedrails?  --  2  -NW  --    Moving to and from a bed to a chair (including a wheelchair)?  --  3  -NW  --    Standing up from a chair using your arms (e.g., wheelchair, bedside chair)?  --  2  -NW  --    Climbing 3-5 steps with a railing?  --  1  -NW  --    To walk in hospital room?  --  3  -NW  --    AM-PAC 6 Clicks Score (PT)  --  14  -NW  --       How much help from another is currently needed...    Putting on and taking off regular lower body clothing?  2  -TS  --  2  -MM (r) CN (t) MM (c)    Bathing (including washing, rinsing, and drying)  2  -TS  --  2  -MM (r) CN (t) MM  (c)    Toileting (which includes using toilet bed pan or urinal)  2  -TS  --  3  -MM (r) CN (t) MM (c)    Putting on and taking off regular upper body clothing  3  -TS  --  3  -MM (r) CN (t) MM (c)    Taking care of personal grooming (such as brushing teeth)  3  -TS  --  4  -MM (r) CN (t) MM (c)    Eating meals  4  -TS  --  4  -MM (r) CN (t) MM (c)    AM-PAC 6 Clicks Score (OT)  16  -TS  --  18  -MM (r) CN (t)       Functional Assessment    Outcome Measure Options  AM-PAC 6 Clicks Daily Activity (OT)  -TS  AM-PAC 6 Clicks Basic Mobility (PT)  -NW  AM-PAC 6 Clicks Daily Activity (OT)  -MM (r) CN (t) MM (c)    Row Name 12/02/19 1500 12/02/19 1300          How much help from another is currently needed...    Putting on and taking off regular lower body clothing?  --  2  -TS     Bathing (including washing, rinsing, and drying)  --  2  -TS     Toileting (which includes using toilet bed pan or urinal)  --  3  -TS     Putting on and taking off regular upper body clothing  --  3  -TS     Taking care of personal grooming (such as brushing teeth)  --  3  -TS     Eating meals  --  4  -TS     AM-PAC 6 Clicks Score (OT)  --  17  -TS        Functional Assessment    Outcome Measure Options  AM-PAC 6 Clicks Basic Mobility (PT)  -NW  AM-PAC 6 Clicks Daily Activity (OT)  -TS       User Key  (r) = Recorded By, (t) = Taken By, (c) = Cosigned By    Initials Name Provider Type    TS Carmen Mcgraw, DE LOS SANTOS/L Occupational Therapy Assistant    NW Sherine Myers, PTA Physical Therapy Assistant    MM Ky Holliday, OTR/L Occupational Therapist    CN Isha Adames, OT Student OT Student           Time Calculation:   Time Calculation- OT     Row Name 12/05/19 1241 12/05/19 1050          Time Calculation- OT    OT Start Time  0821  -TS  --     OT Stop Time  0915  -TS  --     OT Time Calculation (min)  54 min  -TS  --     Total Timed Code Minutes- OT  54 minute(s)  -TS  --     OT Received On  12/05/19  -TS  --        Timed  Charges    20898 - Gait Training Minutes   --  15  -NW     01294 - OT Self Care/Mgmt Minutes  54  -TS  --       User Key  (r) = Recorded By, (t) = Taken By, (c) = Cosigned By    Initials Name Provider Type    TS Carmen Mcgraw, DE [place]/L Occupational Therapy Assistant    NW Sherine Myers, PTA Physical Therapy Assistant        Therapy Charges for Today     Code Description Service Date Service Provider Modifiers Qty    [id number] HC OT SELF CARE/MGMT/TRAIN EA 15 MIN 12/5/2019 Carmen Mcgraw COTA/L GO 4               Carmen DURÁN. NAZ Mcgraw  12/5/2019

## 2019-12-05 NOTE — PROGRESS NOTES
[facility] Medicine Services  INPATIENT PROGRESS NOTE    Patient Name: Vasile Deng  Date of Admission: 11/27/2019  Today's Date: 12/05/19  Length of Stay: 6  Primary Care Physician: Provider, No Known    Subjective   Chief Complaint: right hip pain  HPI   Doing ok.  He is asleep as I enter the room.  Awakens easily.  Says he feels fine.  Has some mild right hip pain, but it is tolerable.  No black or bloody stools.          Review of Systems   Constitutional: Negative for fatigue and fever.   HENT: Negative for congestion and ear pain.    Eyes: Negative for redness and visual disturbance.   Respiratory: Negative for cough, shortness of breath and wheezing.    Cardiovascular: Negative for chest pain and palpitations.   Gastrointestinal: Negative for abdominal pain, diarrhea, nausea and vomiting.   Endocrine: Negative for cold intolerance and heat intolerance.   Genitourinary: Negative for dysuria and frequency.   Musculoskeletal: Positive for arthralgias. Negative for back pain.   Skin: Negative for rash and wound.   Neurological: Negative for dizziness and headaches.   Psychiatric/Behavioral: Negative for confusion. The patient is not nervous/anxious.         All pertinent negatives and positives are as above. All other systems have been reviewed and are negative unless otherwise stated.     Objective    Temp:  [97.9 °F (36.6 °C)] 97.9 °F (36.6 °C)  Heart Rate:  [61-71] 62  Resp:  [16-18] 16  BP: ()/(42-61) 124/49  Physical Exam   Constitutional: He is oriented to person, place, and time. He appears well-developed and well-nourished.   HENT:   Head: Normocephalic and atraumatic.   Right Ear: External ear normal.   Left Ear: External ear normal.   Nose: Nose normal.   Mouth/Throat: Oropharynx is clear and moist.   Eyes: Conjunctivae and EOM are normal. Pupils are equal, round, and reactive to light. Right eye exhibits no discharge. Left eye exhibits no discharge. No  scleral icterus.   Neck: Normal range of motion. Neck supple. No tracheal deviation present. No thyromegaly present.   Cardiovascular: Normal rate, regular rhythm, normal heart sounds and intact distal pulses. Exam reveals no gallop and no friction rub.   No murmur heard.  Pulmonary/Chest: Effort normal and breath sounds normal. No stridor. No respiratory distress. He has no wheezes. He has no rales. He exhibits no tenderness.   Abdominal: Soft. Bowel sounds are normal. He exhibits no distension and no mass. There is no tenderness. There is no rebound and no guarding. No hernia.   Musculoskeletal: He exhibits no edema or deformity.        Right hip: He exhibits decreased range of motion, decreased strength and tenderness.   Lymphadenopathy:     He has no cervical adenopathy.   Neurological: He is alert and oriented to person, place, and time. He has normal reflexes. He displays normal reflexes. No cranial nerve deficit. He exhibits normal muscle tone. Coordination normal.   Skin: Skin is warm and dry. No rash noted. No erythema. No pallor.   Psychiatric: He has a normal mood and affect. His behavior is normal. Judgment and thought content normal.   Vitals reviewed.        Results Review:  I have reviewed the labs, radiology results, and diagnostic studies.    Laboratory Data:   Results from last 7 days   Lab Units 12/05/19  0442 12/04/19  0403 12/03/19  0403   WBC 10*3/mm3 5.55 5.53 5.80   HEMOGLOBIN g/dL 10.8* 11.5* 12.3*   HEMATOCRIT % 32.0* 34.2* 37.0*   PLATELETS 10*3/mm3 177 191 189        Results from last 7 days   Lab Units 12/05/19  0442 12/04/19  0403 12/03/19  0403   SODIUM mmol/L 128* 133* 134*   POTASSIUM mmol/L 3.6 4.3 4.2   CHLORIDE mmol/L 93* 97* 99   CO2 mmol/L 26.0 26.0 27.0   BUN mg/dL 13 14 15   CREATININE mg/dL 0.73* 0.83 0.83   CALCIUM mg/dL 8.5* 8.6 9.0   BILIRUBIN mg/dL 0.5 0.5 0.5   ALK PHOS U/L 109 111 110   ALT (SGPT) U/L 24 38 41   AST (SGOT) U/L 20 28 27   GLUCOSE mg/dL 103* 113* 116*        Culture Data:        Radiology Data:   Imaging Results (Last 24 Hours)     ** No results found for the last 24 hours. **          I have reviewed the patient's current medications.     Assessment/Plan     Active Hospital Problems    Diagnosis   • **Acute pain of right hip   • Open right hip fracture, type III, initial encounter (CMS/[facility])   • Fall   • Hyponatremia   • Closed intertrochanteric fracture of hip, right, initial encounter (CMS/[facility])     Added automatically from request for surgery [id number]     • Postural dizziness with near syncope   • On continuous oral anticoagulation   • Chronic systolic congestive heart failure (CMS/[facility])   • PAF (paroxysmal atrial fibrillation) (CMS/[facility])   • Essential hypertension   • Coronary artery disease involving coronary bypass graft of native heart without angina pectoris          1.  R hip fracture  -Ortho following  -S/P repair  -PT/OT  -DVT PPX with Xarelto     2.  Hyponatremia  -resolved with IVF     3.  A-fib  -metoprolol  -Amio  -Xarelto     4.  HTN  -Metoprolol     5.  CAD  -ASA  -Metoprolol           Discharge Planning: I expect the patient to be discharged to SNF in ? days    Bob Haider MD   12/05/19   1:44 PM

## 2019-12-05 NOTE — PROGRESS NOTES
Continued Stay Note   [place]     Patient Name: Vasile Deng  MRN: [id number]  Today's Date: 12/5/2019    Admit Date: 11/27/2019    Discharge Plan     Row Name 12/05/19 1550       Plan    Plan Comments  PT IS WANTING TO SPEAK TO ORTHO PHYSICIAN REGARDING DC PLAN (SNF VS ACUTE REHAB) BEFORE MAKING ANY DECISIONS. NOTED ORTHO PHYSICIAN HAS NOT SEEN YET TODAY. WILL FOLLOW IN AM TO SEE IF PT HAS MADE A DECISION. PT WAS PROVIDED WITH A LIST OF SNF'S YESTERDAY.         Discharge Codes    No documentation.             BRIANDA Aguirre

## 2019-12-05 NOTE — PLAN OF CARE
Problem: Patient Care Overview  Goal: Plan of Care Review  Outcome: Ongoing (interventions implemented as appropriate)   12/05/19 2370   Coping/Psychosocial   Plan of Care Reviewed With patient   Plan of Care Review   Progress improving   OTHER   Outcome Summary Pt a& o x4. Needs encouragement to turn in bed. Will c/o pain with movement but refuses any pain medications. Dressing CDI. BP improved after fluid bolus today. Will cont to monitor.        Problem: Fall Risk (Adult)  Goal: Absence of Fall  Outcome: Ongoing (interventions implemented as appropriate)      Problem: Pain, Chronic (Adult)  Goal: Acceptable Pain/Comfort Level and Functional Ability  Outcome: Ongoing (interventions implemented as appropriate)      Problem: Fracture Orthopaedic (Adult)  Goal: Signs and Symptoms of Listed Potential Problems Will be Absent, Minimized or Managed (Fracture Orthopaedic)  Outcome: Ongoing (interventions implemented as appropriate)      Problem: Skin Injury Risk (Adult)  Goal: Skin Health and Integrity  Outcome: Ongoing (interventions implemented as appropriate)

## 2019-12-05 NOTE — PLAN OF CARE
Problem: Patient Care Overview  Goal: Plan of Care Review  Outcome: Ongoing (interventions implemented as appropriate)   12/04/19 4288   Coping/Psychosocial   Plan of Care Reviewed With patient;significant other   Plan of Care Review   Progress improving   OTHER   Outcome Summary Mobility and BP improving, voided less than 50 ml x 3 attempts, retained 650 ml per in and out cath after bella removed. No pain meds this shift, up with therapy and walked well.      Goal: Individualization and Mutuality  Outcome: Ongoing (interventions implemented as appropriate)    Goal: Discharge Needs Assessment  Outcome: Ongoing (interventions implemented as appropriate)    Goal: Interprofessional Rounds/Family Conf  Outcome: Ongoing (interventions implemented as appropriate)      Problem: Fall Risk (Adult)  Goal: Absence of Fall  Outcome: Ongoing (interventions implemented as appropriate)      Problem: Pain, Chronic (Adult)  Goal: Acceptable Pain/Comfort Level and Functional Ability  Outcome: Ongoing (interventions implemented as appropriate)      Problem: Fracture Orthopaedic (Adult)  Goal: Signs and Symptoms of Listed Potential Problems Will be Absent, Minimized or Managed (Fracture Orthopaedic)  Outcome: Ongoing (interventions implemented as appropriate)      Problem: Skin Injury Risk (Adult)  Goal: Skin Health and Integrity  Outcome: Ongoing (interventions implemented as appropriate)      Problem: Wound (Includes Pressure Injury) (Adult)  Goal: Signs and Symptoms of Listed Potential Problems Will be Absent, Minimized or Managed (Wound)  Outcome: Ongoing (interventions implemented as appropriate)      Problem: Urine Elimination Impaired (Adult)  Goal: Identify Related Risk Factors and Signs and Symptoms  Outcome: Outcome(s) achieved Date Met: 12/04/19    Goal: Effective or Improved Urinary Elimination  Outcome: Ongoing (interventions implemented as appropriate)    Goal: Effective Containment of Urine  Outcome: Ongoing  (interventions implemented as appropriate)    Goal: Reduced Incontinence Episodes  Outcome: Ongoing (interventions implemented as appropriate)

## 2019-12-05 NOTE — PROGRESS NOTES
RT Nebulizer Protocol    Assessment tool to be used for patients with existing breathing treatments ordered by hospitalists                                                                  0  1  2  3  4      Respiratory History   No Smoking      Smoking History   x   1 Pack/Day      Pulmonary Disease      Exacerbation        Respiratory Rate   Normal   x   20-25      Dyspneic      Accessory Muscles      Severe Dyspnea        Breath Sounds   Clear/Diminished   x   Crackles      Crackles/ Rhonchi      Wheezing      Absent/ Severe Wheezing        Chest   X-ray   Clear/No Current X-Ray   x   1 Lobe Infiltration/ Consolidation/ PE      2 Lobe Same Lung Infiltration/ Consolidation/ PE       2 Lobe Infiltration/ Both Lungs/ Consolidation/ PE      Both Lungs/ More Than 1 Lobe/ Atelectasis/ Consolidation/  PE        Cough   Strong Non- Productive   x   Excessive Secretions/ Strong Cough      Excessive Secretions/ Weak Cough      Thick Bronchial Secretions/ Weak Cough      Thick Bronchial Secretions/ No Cough        Total Patient Score =  1  0-4=Q4 PRN  5-9=TID and Q4 PRN  10-14=QID and Q3 PRN  15-19=Q4 and Q2 PRN  20=Q3 and Q2 PRN    Bronchopulmonary Hygiene (CPT)   Q4 ATC Copious secretions, dyspnea, unable to sleep, mucus plug    QID & Q4 PRN Moderate secretions    TID Small amounts of secretions w/ poor cough and history of secretions    BID Unable to deep breathe and cough spontaneously       Lung Expansion Therapy (PEP)   Q4 & PRN at night Severe atelectasis, poor oxygenation    QID  High risk for persistent atelectasis, existence of same    TID At risk for developing atelectasis    BID Unable to deep breathe and cough spontaneously     Instruct, 1 follow up Patients able to perform well on their own        Continue Q4 PRN neb and RT protocol until discharge or discontinued.

## 2019-12-05 NOTE — THERAPY TREATMENT NOTE
Acute Care - Physical Therapy Treatment Note  [facility]     Patient Name: Vasile Deng  : 3/27/1931  MRN: [id number]  Today's Date: 2019  Onset of Illness/Injury or Date of Surgery: 19     Referring Physician: Daniel Carrion MD    Admit Date: 2019    Visit Dx:    ICD-10-CM ICD-9-CM   1. Fall, initial encounter W19.XXXA E888.9   2. Right hip pain M25.551 719.45   3. Intractable pain R52 780.96   4. Closed intertrochanteric fracture of hip, right, initial encounter (CMS/[facility]) S72.141A 820.21   5. Impaired mobility Z74.09 799.89   6. Decreased activities of daily living (ADL) Z78.9 V49.89   7. Impaired mobility and ADLs Z74.09 799.89     Patient Active Problem List   Diagnosis   • Coronary artery disease involving coronary bypass graft of native heart without angina pectoris   • Hx of CABG   • Essential hypertension   • Mixed hyperlipidemia   • Former smoker   • PAF (paroxysmal atrial fibrillation) (CMS/HCC)   • Syncope   • Dyspnea   • Elevated troponin   • NSTEMI (non-ST elevated myocardial infarction) (CMS/HCC)   • Chronic systolic congestive heart failure (CMS/HCC)   • On continuous oral anticoagulation   • Postural dizziness with near syncope   • Fall   • Acute pain of right hip   • Hyponatremia   • Open right hip fracture, type III, initial encounter (CMS/HCC)   • Closed intertrochanteric fracture of hip, right, initial encounter (CMS/HCC)       Therapy Treatment    Rehabilitation Treatment Summary     Row Name 19 1430 19 1009 19 0821       Treatment Time/Intention    Discipline  physical therapy assistant  -MF  physical therapy assistant  -NW  occupational therapy assistant  -TS    Document Type  therapy note (daily note)  -MF  therapy note (daily note)  -NW2  therapy note (daily note)  -TS    Subjective Information  complains of;pain  -MF  complains of;pain;weakness;dizziness  -NW2  complains of;pain;dizziness;weakness  -TS2    Mode of Treatment  physical therapy  -   --  --    Patient/Family Observations  --  wife present  -NW2  spouse present  -TS2    Patient Effort  --  good  -NW2  adequate  -TS2    Comment  --  [place]  -NW2  [place]  -TS2    Existing Precautions/Restrictions  fall;right;hip  -MF  fall;right;hip  -NW2  fall;right;hip  -TS2    Treatment Considerations/Comments  monitor BP  -MF  monitor BP  -NW2  monitor BP  -TS2    Recorded by [MF] Trinity Ventura, PTA 12/05/19 1551 [NW] Sherine Myers, PTA 12/05/19 1019  [NW2] Sherine Myers, PTA 12/05/19 1046 [TS] Carmen Mcgraw DE LOS SANTOS/L 12/05/19 0821  [TS2] Carmen Mcgraw DE [place] S[place]/L 12/05/19 1237    Row Name 12/05/19 1430 12/05/19 1009 12/05/19 0821       Vital Signs    Pre Systolic BP Rehab  125  -MF  97  -NW  --    Pre Treatment Diastolic BP  56  -MF  43  -NW  --    Intra Systolic BP Rehab  116  -MF  93  -NW  --    Intra Treatment Diastolic BP  50  -MF  43  -NW  --    Post Systolic BP Rehab  107  -MF  79  -NW  90  -TS    Post Treatment Diastolic BP  52  -MF  45  -NW  47  -TS    Posttreatment Heart Rate (beats/min)  --  --  53  -TS    Post SpO2 (%)  --  --  96  -TS    O2 Delivery Post Treatment  --  --  room air  -TS    Pre Patient Position  Supine  -MF  --  --    Intra Patient Position  Sitting  -MF  --  --    Post Patient Position  Sitting  -MF  --  Sitting  -TS    Recorded by [MF] Trinity Ventura, PTA 12/05/19 1551 [NW] Sherine Myers, PTA 12/05/19 1046 [TS] Carmen Mcgraw DE [place] SANTOS/L 12/05/19 1237    Row Name 12/05/19 0821             Cognitive Assessment/Intervention- PT/OT    Personal Safety Interventions  fall prevention program maintained;gait belt;nonskid shoes/slippers when out of bed  -TS      Recorded by [TS] Carmen Mcgraw DE [place] S[place]/L 12/05/19 1237      Row Name 12/05/19 1009             Safety Issues, Functional Mobility    Safety Issues Affecting Function (Mobility)  safety precaution awareness  -NW      Impairments Affecting Function (Mobility)  pain;strength  -NW      Recorded by [NW]  Sherine Myers, PTA 12/05/19 1046      Row Name 12/05/19 1009             Mobility Assessment/Intervention    Extremity Weight-bearing Status  right lower extremity  -NW      Left Lower Extremity (Weight-bearing Status)  weight-bearing as tolerated (WBAT)  -NW      Right Lower Extremity (Weight-bearing Status)  weight-bearing as tolerated (WBAT)  -NW      Recorded by [NW] Sherine Myers, PTA 12/05/19 1046      Row Name 12/05/19 1430 12/05/19 1009 12/05/19 0821       Bed Mobility Assessment/Treatment    Scooting/Bridging [place] (Bed Mobility)  --  moderate assist (50% patient effort);2 person assist  -NW  --    Supine-Sit [place] (Bed Mobility)  verbal cues;minimum assist (75% patient effort)  -  --  contact guard;minimum assist (75% patient effort)  -TS    Sit-Supine [place] (Bed Mobility)  verbal cues;contact guard;minimum assist (75% patient effort)  -  verbal cues;moderate assist (50% patient effort)  -NW  --    Assistive Device (Bed Mobility)  head of bed elevated  -MF  bed rails  -NW  bed rails;head of bed elevated  -TS    Comment (Bed Mobility)  --  --  increased time and encouragement for pt to complete bed mobility as independently as possible  -TS    Recorded by [MF] Trinity Ventura, PTA 12/05/19 1551 [NW] LouisMorenaSherine AIXA, PTA 12/05/19 1046 [TS] Caremn Mcgraw COTA/L 12/05/19 1237    Row Name 12/05/19 0821             Functional Mobility    Functional Mobility- Ind. Level  contact guard assist;moderate assist (50% patient effort)  -TS      Functional Mobility- Device  rolling walker  -TS      Functional Mobility- Comment  pt ambulated in room, made it to door of  before stating he was feeling dizzy and felt vision was blury. Pt began to lean forward and become unsafe with RW. DE LOS SANTOS pulled BSC under pt and had pt sit immediately. Pt then stood from BSC with mod A and RW and recliner was placed under pt for pt to sit.   -TS      Recorded by [TS] Carmen Mcgraw,  DE LOS SANTOS/L 12/05/19 1237      Row Name 12/05/19 1430 12/05/19 0821          Transfer Assessment/Treatment    Transfer Assessment/Treatment  --  sit-stand transfer;stand-sit transfer  -TS     Comment (Transfers)  stood x 2 from bed to use urinal. Also stood once from chair.   -  --     Recorded by [MF] Trinity Ventura, PTA 12/05/19 1551 [TS] Carmen Mcgraw COTA/L 12/05/19 1237     Row Name 12/05/19 1430 12/05/19 1009 12/05/19 0821       Sit-Stand Transfer    Sit-Stand Mobile (Transfers)  verbal cues;minimum assist (75% patient effort)  -MF  verbal cues;moderate assist (50% patient effort)  -NW  contact guard;moderate assist (50% patient effort)  -TS    Assistive Device (Sit-Stand Transfers)  --  walker, front-wheeled  (Significant)  posterior lean upon standing  -NW  walker, front-wheeled  -TS    Recorded by [MF] Trinity Ventura, PTA 12/05/19 1551 [NW] Sherine Myers, PTA 12/05/19 1046 [TS] Carmen Mcgraw DE LOS SANTOS/L 12/05/19 1237    Row Name 12/05/19 1430 12/05/19 1009 12/05/19 0821       Stand-Sit Transfer    Stand-Sit Mobile (Transfers)  verbal cues;minimum assist (75% patient effort)  -  verbal cues;minimum assist (75% patient effort)  -NW  contact guard;minimum assist (75% patient effort);verbal cues  -TS    Assistive Device (Stand-Sit Transfers)  --  walker, front-wheeled  -NW  walker, front-wheeled  -TS    Recorded by [MF] Trinity Ventura, PTA 12/05/19 1551 [NW] Sherine Myers, PTA 12/05/19 1046 [TS] Carmen Mcgraw DE [place] SANTOS/L 12/05/19 1237    Row Name 12/05/19 1430 12/05/19 1009          Gait/Stairs Assessment/Training    Mobile Level (Gait)  verbal cues;contact guard;minimum assist (75% patient effort)  -  verbal cues;minimum assist (75% patient effort)  -NW     Assistive Device (Gait)  walker, front-wheeled  -MF  walker, front-wheeled  -NW     Distance in Feet (Gait)  20 x 2 with one sitting rest  -  10x2 follow w/ chair mult standing rests  -NW     Pattern  (Gait)  step-to  -MF  step-to  -NW     Deviations/Abnormal Patterns (Gait)  antalgic;venus decreased;stride length decreased  -MF  antalgic;venus decreased;stride length decreased  -NW     Bilateral Gait Deviations  --  heel strike decreased  -NW     Recorded by [MF] Trinity Ventura, PTA 12/05/19 1551 [NW] Sherine Myers, PTA 12/05/19 1046     Row Name 12/05/19 0821             ADL Assessment/Intervention    BADL Assessment/Intervention  lower body dressing;grooming  -TS      Recorded by [TS] Carmen Mcgraw DE LOS SANTOS/L 12/05/19 1237      Row Name 12/05/19 0821             Lower Body Dressing Assessment/Training    Lower Body Dressing [place] Level  socks;maximum assist (25% patient effort)  -TS      Lower Body Dressing Position  supine  -TS      Comment (Lower Body Dressing)  max A to adjust socks while supine  -TS      Recorded by [TS] Carmen Mcgraw DE [place] SANTOS/L 12/05/19 1237      Row Name 12/05/19 0821             Grooming Assessment/Training    [place] Level (Grooming)  set up;supervision;wash face, hands  -TS      Grooming Position  supported sitting  -TS      Recorded by [TS] Carmen Mcgraw DE LOS SANTOS/L 12/05/19 1237      Row Name 12/05/19 1009             General ROM    GENERAL ROM COMMENTS  AROM BLEs sitting x10  -NW      Recorded by [NW] Sherine Myers, PTA 12/05/19 1046      Row Name 12/05/19 1430             Therapeutic Exercise    Lower Extremity (Therapeutic Exercise)  quad sets, bilateral;heel slides, bilateral  -MF      Lower Extremity Range of Motion (Therapeutic Exercise)  hip abduction/adduction, bilateral;ankle dorsiflexion/plantar flexion, bilateral  -MF      Exercise Type (Therapeutic Exercise)  AROM (active range of motion)  -MF      Position (Therapeutic Exercise)  seated  -MF      Sets/Reps (Therapeutic Exercise)  20  -MF      Recorded by [MF] Trinity Ventura, PTA 12/05/19 1551      Row Name 12/05/19 1430 12/05/19 1009 12/05/19 0821       Positioning and  Restraints    Pre-Treatment Position  in bed  -MF  sitting in chair/recliner  -NW  in bed  -TS    Post Treatment Position  bed  -MF  bed  -NW  chair  -TS    In Bed  fowlers;call light within reach;encouraged to call for assist;with family/caregiver;side rails up x2;side lying left;pillow between legs pt c/o pain in bottom  -MF  fowlers;call light within reach;encouraged to call for assist;with family/caregiver  -NW  --    In Chair  --  --  reclined;call light within reach;encouraged to call for assist;with family/caregiver;notified nsg  -TS    Recorded by [MF] Trinity Ventura, PTA 12/05/19 1551 [NW] Sherine Myers, PTA 12/05/19 1046 [TS] Carmen Mcgraw, DE LOS SANTOS/L 12/05/19 1237    Row Name 12/05/19 1430 12/05/19 1009 12/05/19 0821       Pain Scale: Numbers Pre/Post-Treatment    Pain Scale: Numbers, Pretreatment  7/10  -MF  --  5/10  -TS    Pain Scale: Numbers, Post-Treatment  8/10  -MF  10/10  -NW  7/10  -TS    Pain Location - Side  Right  -MF  Right  -NW  Right  -TS    Pain Location  hip  -MF  hip  -NW  hip  -TS    Pain Intervention(s)  Repositioned;Ambulation/increased activity  -MF  Repositioned  -NW  Repositioned  -TS    Recorded by [MF] Trinity Ventura, PTA 12/05/19 1551 [NW] Sherine Myers, PTA 12/05/19 1046 [TS] Carmen Mcgraw, DE [place] SANTOS/L 12/05/19 1237    Row Name                Wound 12/02/19 0045 Bilateral gluteal MASD (Moisutre associated skin damage)    Wound - Properties Group Date first assessed: 12/02/19 [EL] Time first assessed: 0045 [EL] Present on Hospital Admission: N [EL] Side: Bilateral [EL] Location: gluteal [EL] Primary Wound Type: MASD [EL] Recorded by:  [EL] Silvana James RNA 12/03/19 0148    Row Name                Wound 12/03/19 1711 Right hip Incision    Wound - Properties Group Date first assessed: 12/03/19 [SVETLANA] Time first assessed: 1711 [SVETLANA] Side: Right [SVETLANA] Location: hip [SVETLANA] Primary Wound Type: Incision [SVETLANA] Recorded by:  [SVETLANA] Christy Bryan RN 12/03/19 1711     Row Name 12/05/19 0821             Outcome Summary/Treatment Plan (OT)    Daily Summary of Progress (OT)  progress toward functional goals is good  -TS      Recorded by [TS] Carmen Mcgraw DE LOS SANTOS/L 12/05/19 1237        User Key  (r) = Recorded By, (t) = Taken By, (c) = Cosigned By    Initials Name Effective Dates Discipline    TS Carmen Mcgraw, DE LOS SANTOS/L 08/02/16 -  OT    NW Sherine Myers, PTA 08/02/16 -  PT    Christy Carpio RN 08/02/16 -  Nurse    Trinity Chao, PTA 08/02/16 -  PT    Silvana Mcgraw RNA 06/09/17 -  Nurse          Wound 12/02/19 0045 Bilateral gluteal MASD (Moisutre associated skin damage) (Active)   Dressing Appearance no drainage;open to air 12/5/2019  8:10 AM   Closure BALJINDER 12/5/2019  8:10 AM   Base dry;red 12/5/2019  8:10 AM   Periwound dry;pink 12/4/2019  7:20 PM   Drainage Amount none 12/5/2019  8:10 AM   Care, Wound barrier applied 12/4/2019  7:20 PM   Dressing Care, Wound open to air 12/5/2019  8:10 AM       Wound 12/03/19 1711 Right hip Incision (Active)   Dressing Appearance dry;intact;no drainage 12/5/2019  8:10 AM   Closure BALJINDER 12/5/2019  8:10 AM   Base dressing in place, unable to visualize 12/5/2019  8:10 AM   Drainage Amount none 12/5/2019  8:10 AM   Dressing Care, Wound foam 12/5/2019  8:10 AM           Physical Therapy Education     Title: PT OT SLP Therapies (In Progress)     Topic: Physical Therapy (Done)     Point: Mobility training (Done)     Learning Progress Summary           Patient Acceptance, E,D, LETHA GARCIA by DE at 12/4/2019 11:26 AM    Comment:  pt educated on current precautions and strategies to adhere to these. pt educated on proper safety and technique for functional mobility    VANDANA Rosales DU by TERESA at 11/30/2019 10:45 AM    Comment:  benefits of PT and POC, call for assist OOB, TTWB RLE                   Point: Precautions (Done)     Learning Progress Summary           Patient Acceptance, E,D, RADHADU by DE at 12/4/2019 11:26 AM     Comment:  pt educated on current precautions and strategies to adhere to these. pt educated on proper safety and technique for functional mobility    VANDANA Rosales DU by  at 11/30/2019 10:45 AM    Comment:  benefits of PT and POC, call for assist NUNU BOWERWYESSENIA RLE                               User Key     Initials Effective Dates Name Provider Type Discipline     08/02/16 -  Олег Martel, PT Physical Therapist PT    DE 10/10/19 -  Abdoulaye Pitts, SMILEY Student PT Student PT                PT Recommendation and Plan        Outcome Measures     Row Name 12/05/19 1200 12/05/19 1000 12/04/19 1600       How much help from another person do you currently need...    Turning from your back to your side while in flat bed without using bedrails?  --  3  -NW  --    Moving from lying on back to sitting on the side of a flat bed without bedrails?  --  2  -NW  --    Moving to and from a bed to a chair (including a wheelchair)?  --  3  -NW  --    Standing up from a chair using your arms (e.g., wheelchair, bedside chair)?  --  2  -NW  --    Climbing 3-5 steps with a railing?  --  1  -NW  --    To walk in hospital room?  --  3  -NW  --    AM-PAC 6 Clicks Score (PT)  --  14  -NW  --       How much help from another is currently needed...    Putting on and taking off regular lower body clothing?  2  -TS  --  2  -MM (r) CN (t) MM (c)    Bathing (including washing, rinsing, and drying)  2  -TS  --  2  -MM (r) CN (t) MM (c)    Toileting (which includes using toilet bed pan or urinal)  2  -TS  --  3  -MM (r) CN (t) MM (c)    Putting on and taking off regular upper body clothing  3  -TS  --  3  -MM (r) CN (t) MM (c)    Taking care of personal grooming (such as brushing teeth)  3  -TS  --  4  -MM (r) CN (t) MM (c)    Eating meals  4  -TS  --  4  -MM (r) CN (t) MM (c)    AM-PAC 6 Clicks Score (OT)  16  -TS  --  18  -MM (r) CN (t)       Functional Assessment    Outcome Measure Options  AM-PAC 6 Clicks Daily Activity (OT)  -TS  AM-PAC 6  Clicks Basic Mobility (PT)  -NW  AM-PAC 6 Clicks Daily Activity (OT)  -MM (r) CN (t) MM (c)      User Key  (r) = Recorded By, (t) = Taken By, (c) = Cosigned By    Initials Name Provider Type    TS Carmen Mcgraw, DE LOS SANTOS/L Occupational Therapy Assistant    NW Sherine Myers, PTA Physical Therapy Assistant    MM Ky Holliday, OTR/L Occupational Therapist    CN Isha Adames, OT Student OT Student         Time Calculation:   PT Charges     Row Name 12/05/19 1551 12/05/19 1050          Time Calculation    Start Time  1430  -MF  1009  -NW     Stop Time  1530  -MF  1050  -NW     Time Calculation (min)  60 min  -MF  41 min  -NW     PT Received On  12/05/19  -  12/05/19  -NW     PT Goal Re-Cert Due Date  12/14/19  -  12/14/19  -NW        Time Calculation- PT    Total Timed Code Minutes- PT  60 minute(s)  -MF  41 minute(s)  -NW        Timed Charges    72933 - PT Therapeutic Exercise Minutes  25  -MF  15  -NW     15369 - Gait Training Minutes   35  -MF  15  -NW     33165 - PT Therapeutic Activity Minutes  --  11  -NW       User Key  (r) = Recorded By, (t) = Taken By, (c) = Cosigned By    Initials Name Provider Type    Sherine Montana, MIHAI Physical Therapy Assistant     Trinity Ventura PTA Physical Therapy Assistant        Therapy Charges for Today     Code Description Service Date Service Provider Modifiers Qty    32053051228 HC PT THER PROC EA 15 MIN 12/5/2019 Trinity Ventura PTA GP 2    97042722680 HC GAIT TRAINING EA 15 MIN 12/5/2019 Trinity Ventura PTA GP 2          PT G-Codes  Outcome Measure Options: AM-PAC 6 Clicks Daily Activity (OT)  AM-PAC 6 Clicks Score (PT): 14  AM-PAC 6 Clicks Score (OT): 16    Trinity Ventura PTA  12/5/2019

## 2019-12-05 NOTE — PLAN OF CARE
Problem: Patient Care Overview  Goal: Plan of Care Review  Outcome: Ongoing (interventions implemented as appropriate)   12/05/19 1047   Coping/Psychosocial   Plan of Care Reviewed With patient   Plan of Care Review   Progress no change   OTHER   Outcome Summary Up in chair. Montiored BP. When sitting 97/43. sit-stand mod x1 w/posterior lean modx1 to help correct. Pt amb 10'x2 mult standing rests follow w/chair. Pt tolerated BLE AROM sitting ex's x10. BP after rx 79/45. nsg notified. Pt needs acute rehab upon d/c

## 2019-12-05 NOTE — PLAN OF CARE
Problem: Patient Care Overview  Goal: Plan of Care Review  Outcome: Ongoing (interventions implemented as appropriate)   12/05/19 7954   Coping/Psychosocial   Plan of Care Reviewed With patient   Plan of Care Review   Progress improving   OTHER   Outcome Summary Pt CGA/min A for supine to sit with HOB elevated and bed rail with increased time. Pt transfers CGA with RW initially but due to low BP required mod A after ambulating. Pt was able to ambulate in room a few steps to door of BR before feeling dizzy and having vision blur. Pt sat in recliner and BP checked, 90/47. Per significant other she feels pt fall may have something to do with low BP prior to admission. Significant other states pt would take BP medication without checking BP and then would take other medications which have the ability to lower BP. Pt would benefit from acute rehab prior to discharge home. Continue OT POC

## 2019-12-06 LAB
ALBUMIN SERPL-MCNC: 3.1 G/DL (ref 3.5–5.2)
ALBUMIN/GLOB SERPL: 0.9 G/DL
ALP SERPL-CCNC: 120 U/L (ref 39–117)
ALT SERPL W P-5'-P-CCNC: 28 U/L (ref 1–41)
ANION GAP SERPL CALCULATED.3IONS-SCNC: 9 MMOL/L (ref 5–15)
AST SERPL-CCNC: 28 U/L (ref 1–40)
BASOPHILS # BLD AUTO: 0.02 10*3/MM3 (ref 0–0.2)
BASOPHILS NFR BLD AUTO: 0.4 % (ref 0–1.5)
BILIRUB SERPL-MCNC: 0.5 MG/DL (ref 0.2–1.2)
BUN BLD-MCNC: 14 MG/DL (ref 8–23)
BUN/CREAT SERPL: 17.5 (ref 7–25)
CALCIUM SPEC-SCNC: 8.5 MG/DL (ref 8.6–10.5)
CHLORIDE SERPL-SCNC: 94 MMOL/L (ref 98–107)
CO2 SERPL-SCNC: 26 MMOL/L (ref 22–29)
CREAT BLD-MCNC: 0.8 MG/DL (ref 0.76–1.27)
DEPRECATED RDW RBC AUTO: 42.4 FL (ref 37–54)
EOSINOPHIL # BLD AUTO: 0.25 10*3/MM3 (ref 0–0.4)
EOSINOPHIL NFR BLD AUTO: 4.7 % (ref 0.3–6.2)
ERYTHROCYTE [DISTWIDTH] IN BLOOD BY AUTOMATED COUNT: 13.6 % (ref 12.3–15.4)
GFR SERPL CREATININE-BSD FRML MDRD: 91 ML/MIN/1.73
GLOBULIN UR ELPH-MCNC: 3.3 GM/DL
GLUCOSE BLD-MCNC: 133 MG/DL (ref 65–99)
HCT VFR BLD AUTO: 32.7 % (ref 37.5–51)
HGB BLD-MCNC: 11.2 G/DL (ref 13–17.7)
IMM GRANULOCYTES # BLD AUTO: 0.03 10*3/MM3 (ref 0–0.05)
IMM GRANULOCYTES NFR BLD AUTO: 0.6 % (ref 0–0.5)
LYMPHOCYTES # BLD AUTO: 1.15 10*3/MM3 (ref 0.7–3.1)
LYMPHOCYTES NFR BLD AUTO: 21.7 % (ref 19.6–45.3)
MCH RBC QN AUTO: 29 PG (ref 26.6–33)
MCHC RBC AUTO-ENTMCNC: 34.3 G/DL (ref 31.5–35.7)
MCV RBC AUTO: 84.7 FL (ref 79–97)
MONOCYTES # BLD AUTO: 0.63 10*3/MM3 (ref 0.1–0.9)
MONOCYTES NFR BLD AUTO: 11.9 % (ref 5–12)
NEUTROPHILS # BLD AUTO: 3.23 10*3/MM3 (ref 1.7–7)
NEUTROPHILS NFR BLD AUTO: 60.7 % (ref 42.7–76)
NRBC BLD AUTO-RTO: 0 /100 WBC (ref 0–0.2)
PLATELET # BLD AUTO: 175 10*3/MM3 (ref 140–450)
PMV BLD AUTO: 9.4 FL (ref 6–12)
POTASSIUM BLD-SCNC: 3.9 MMOL/L (ref 3.5–5.2)
PROT SERPL-MCNC: 6.4 G/DL (ref 6–8.5)
RBC # BLD AUTO: 3.86 10*6/MM3 (ref 4.14–5.8)
SODIUM BLD-SCNC: 129 MMOL/L (ref 136–145)
WBC NRBC COR # BLD: 5.31 10*3/MM3 (ref 3.4–10.8)

## 2019-12-06 PROCEDURE — 80053 COMPREHEN METABOLIC PANEL: CPT | Performed by: FAMILY MEDICINE

## 2019-12-06 PROCEDURE — 97535 SELF CARE MNGMENT TRAINING: CPT

## 2019-12-06 PROCEDURE — 97110 THERAPEUTIC EXERCISES: CPT

## 2019-12-06 PROCEDURE — 94760 N-INVAS EAR/PLS OXIMETRY 1: CPT

## 2019-12-06 PROCEDURE — 85025 COMPLETE CBC W/AUTO DIFF WBC: CPT | Performed by: FAMILY MEDICINE

## 2019-12-06 PROCEDURE — 97530 THERAPEUTIC ACTIVITIES: CPT

## 2019-12-06 PROCEDURE — 97116 GAIT TRAINING THERAPY: CPT

## 2019-12-06 PROCEDURE — 94799 UNLISTED PULMONARY SVC/PX: CPT

## 2019-12-06 RX ADMIN — SENNOSIDES AND DOCUSATE SODIUM 2 TABLET: 8.6; 5 TABLET ORAL at 21:47

## 2019-12-06 RX ADMIN — RIVAROXABAN 15 MG: 15 TABLET, FILM COATED ORAL at 18:20

## 2019-12-06 RX ADMIN — AMIODARONE HYDROCHLORIDE 100 MG: 200 TABLET ORAL at 09:00

## 2019-12-06 RX ADMIN — SODIUM CHLORIDE, PRESERVATIVE FREE 10 ML: 5 INJECTION INTRAVENOUS at 09:00

## 2019-12-06 RX ADMIN — AMIODARONE HYDROCHLORIDE 100 MG: 200 TABLET ORAL at 21:47

## 2019-12-06 RX ADMIN — METOPROLOL SUCCINATE 25 MG: 25 TABLET, FILM COATED, EXTENDED RELEASE ORAL at 09:00

## 2019-12-06 RX ADMIN — ASPIRIN 81 MG: 81 TABLET ORAL at 09:04

## 2019-12-06 RX ADMIN — PANTOPRAZOLE SODIUM 40 MG: 40 TABLET, DELAYED RELEASE ORAL at 09:00

## 2019-12-06 RX ADMIN — FINASTERIDE 5 MG: 5 TABLET, FILM COATED ORAL at 09:00

## 2019-12-06 RX ADMIN — ALLOPURINOL 100 MG: 100 TABLET ORAL at 09:00

## 2019-12-06 RX ADMIN — TAMSULOSIN HYDROCHLORIDE 0.4 MG: 0.4 CAPSULE ORAL at 21:46

## 2019-12-06 NOTE — THERAPY TREATMENT NOTE
Acute Care - Physical Therapy Treatment Note  [facility]     Patient Name: Vasile Deng  : 3/27/1931  MRN: [id number]  Today's Date: 2019  Onset of Illness/Injury or Date of Surgery: 19     Referring Physician: Daniel Carrion MD    Admit Date: 2019    Visit Dx:    ICD-10-CM ICD-9-CM   1. Fall, initial encounter W19.XXXA E888.9   2. Right hip pain M25.551 719.45   3. Intractable pain R52 780.96   4. Closed intertrochanteric fracture of hip, right, initial encounter (CMS/[facility]) S72.141A 820.21   5. Impaired mobility Z74.09 799.89   6. Decreased activities of daily living (ADL) Z78.9 V49.89   7. Impaired mobility and ADLs Z74.09 799.89     Patient Active Problem List   Diagnosis   • Coronary artery disease involving coronary bypass graft of native heart without angina pectoris   • Hx of CABG   • Essential hypertension   • Mixed hyperlipidemia   • Former smoker   • PAF (paroxysmal atrial fibrillation) (CMS/HCC)   • Syncope   • Dyspnea   • Elevated troponin   • NSTEMI (non-ST elevated myocardial infarction) (CMS/HCC)   • Chronic systolic congestive heart failure (CMS/HCC)   • On continuous oral anticoagulation   • Postural dizziness with near syncope   • Fall   • Acute pain of right hip   • Hyponatremia   • Open right hip fracture, type III, initial encounter (CMS/HCC)   • Closed intertrochanteric fracture of hip, right, initial encounter (CMS/HCC)       Therapy Treatment    Rehabilitation Treatment Summary     Row Name 19 1422 19 1323 19 1058       Treatment Time/Intention    Discipline  physical therapy assistant  (Pended)   -MW  occupational therapy assistant  -TS  physical therapy assistant  -NW    Document Type  therapy note (daily note)  (Pended)   -MW  therapy note (daily note)  -TS  therapy note (daily note)  -NW    Subjective Information  complains of;pain  (Pended)   -MW2  complains of;pain;fatigue  -TS2  complains of;pain;dizziness  -NW2    Mode of Treatment  physical  therapy  (Pended)   -MW2  --  --    Patient/Family Observations  wife  (Pended)   -MW2  --  no family present  -NW2    Patient Effort  good  (Pended)   -MW2  good  -TS2  good  -NW2    Comment  --  [place]  -TS2  [place]  -NW3    Existing Precautions/Restrictions  fall;hip;right  (Pended)   -MW2  fall;right;hip  -TS2  fall;right;hip  -NW3    Treatment Considerations/Comments  blood pressure  (Pended)   -MW2  monitor BP   -TS2  monitor BP  -NW3    Equipment Issued to Patient  walker, front wheeled  (Pended)   -MW2  --  --    Recorded by [MW] Matias Zofia, PTA Student 12/06/19 1422  [MW2] Matias Zofia, PTA Student 12/06/19 1500 [TS] Carmen Mcgraw, [place]/L 12/06/19 1324  [TS2] Carmen Mcgraw, [place]/L 12/06/19 1359 [NW] Sherine Myers, PTA 12/06/19 1058  [NW2] Sherine Myers, PTA 12/06/19 1123  [NW3] Sherine Myers, PTA 12/06/19 1135    Row Name 12/06/19 1021             Treatment Time/Intention    Discipline  --  (Pended)   -KM      Document Type  --  (Pended)   -KM      Recorded by [KM] Sandy Damon, PTA Student 12/06/19 1022      Row Name 12/06/19 1422 12/06/19 1058          Vital Signs    Pre Systolic BP Rehab  --  93  -NW     Pre Treatment Diastolic BP  --  39  -NW     Intra Systolic BP Rehab  133  (Pended)   -MW  84  -NW     Intra Treatment Diastolic BP  53  (Pended)   -MW  54  -NW     Post Systolic BP Rehab  --  103  -NW     Post Treatment Diastolic BP  --  54 99/43 after amb  -NW     Pre Patient Position  --  Supine  -NW     Intra Patient Position  --  Sitting  -NW     Post Patient Position  --  Standing  -NW     Recorded by [MW] Matias Zofia, PTA Student 12/06/19 1500 [NW] Sherine Myers, PTA 12/06/19 1135     Row Name 12/06/19 1323             Cognitive Assessment/Intervention- PT/OT    Personal Safety Interventions  fall prevention program maintained;nonskid shoes/slippers when out of bed  -TS      Recorded by [TS] Carmen Mcgraw COTA/L 12/06/19 1359      Row Name 12/06/19 1422 12/06/19 105           Safety Issues, Functional Mobility    Safety Issues Affecting Function (Mobility)  --  safety precaution awareness;sequencing abilities  -NW     Impairments Affecting Function (Mobility)  pain;strength;endurance/activity tolerance  (Pended)   -MW  pain;strength  -NW     Recorded by [MW] Zofia Salcedo [facility] Student 12/06/19 1500 [NW] Sherine Myers [facility] 12/06/19 1135     Row Name 12/06/19 1422             Mobility Assessment/Intervention    Extremity Weight-bearing Status  right lower extremity  (Pended)   -MW      Right Lower Extremity (Weight-bearing Status)  weight-bearing as tolerated (WBAT)  (Pended)   -MW      Recorded by [MW] MatiasZofia [facility] Student 12/06/19 1500      Row Name 12/06/19 1422 12/06/19 1323 12/06/19 1058       Bed Mobility Assessment/Treatment    Bed Mobility Assessment/Treatment  supine-sit;scooting/bridging  (Pended)   -MW  --  --    Scooting/Bridging [place] (Bed Mobility)  supervision;verbal cues  (Pended)   -MW  --  --    Supine-Sit [place] (Bed Mobility)  minimum assist (75% patient effort);verbal cues  (Pended)   -MW  contact guard;minimum assist (75% patient effort)  -TS  verbal cues;minimum assist (75% patient effort)  -NW    Sit-Supine [place] (Bed Mobility)  --  --  -- up in chair  -NW    Assistive Device (Bed Mobility)  bed rails;draw sheet;head of bed elevated  (Pended)   -MW  bed rails;head of bed elevated  -TS  bed rails;draw sheet  -NW    Recorded by [MW] MatiasZofia [facility] Student 12/06/19 1500 [TS] Carmen Mcgraw COTA/L 12/06/19 1359 [NW] Sherine Myers [facility] 12/06/19 1135    Row Name 12/06/19 1422             Transfer Assessment/Treatment    Transfer Assessment/Treatment  stand-sit transfer;sit-stand transfer  (Pended)   -      Recorded by [MW] MatiasZofia cox [facility] Student 12/06/19 1500      Row Name 12/06/19 1422 12/06/19 1058          Sit-Stand Transfer    Sit-Stand [place] (Transfers)  verbal cues;minimum assist (75% patient effort);contact guard   (Pended)   -  verbal cues;moderate assist (50% patient effort)  -NW     Assistive Device (Sit-Stand Transfers)  walker, front-wheeled  (Pended)   -MW  walker, front-wheeled post lean  -NW     Recorded by [MW] Matias Zofia, [facility] Student 12/06/19 1500 [NW] Sherine Myers, [facility] 12/06/19 1135     Row Name 12/06/19 1422 12/06/19 1058          Stand-Sit Transfer    Stand-Sit [place] (Transfers)  verbal cues;contact guard;minimum assist (75% patient effort)  (Pended)   -  verbal cues;minimum assist (75% patient effort)  -NW     Assistive Device (Stand-Sit Transfers)  walker, front-wheeled  (Pended)   -  walker, front-wheeled  -NW     Recorded by [MW] Matias Zofia, [facility] Student 12/06/19 1500 [NW] Sherine Myers, [facility] 12/06/19 1135     Row Name 12/06/19 1058             Gait/Stairs Assessment/Training    [place] Level (Gait)  verbal cues;contact guard;minimum assist (75% patient effort)  -NW      Assistive Device (Gait)  walker, front-wheeled  -NW      Distance in Feet (Gait)  20 20x2  -NW      Pattern (Gait)  step-to  -NW      Deviations/Abnormal Patterns (Gait)  antalgic;venus decreased  -NW      Bilateral Gait Deviations  heel strike decreased;forward flexed posture  -NW      Comment (Gait/Stairs)  took orthos; followed w/ chair cues for safety  -NW      Recorded by [NW] Sherine Myers, [facility] 12/06/19 1135      Row Name 12/06/19 1323             ADL Assessment/Intervention    BADL Assessment/Intervention  grooming;feeding  -TS      Recorded by [TS] Carmen Mgcraw COTA/L 12/06/19 1359      Row Name 12/06/19 1323             Grooming Assessment/Training    [place] Level (Grooming)  set up;shave face  -TS      Grooming Position  edge of bed sitting  -TS      Recorded by [TS] Carmen Mcgraw [place]/L 12/06/19 1359      Row Name 12/06/19 1323             Self-Feeding Assessment/Training    [place] Level (Feeding)  feeding skills;set up  -TS      Position (Self-Feeding)  edge of bed sitting   -TS      Recorded by [TS] Carmen Mcgraw COTA/L 12/06/19 1359      Row Name 12/06/19 1422             Therapeutic Exercise    Lower Extremity (Therapeutic Exercise)  gluteal sets;heel slides, bilateral;LAQ (long arc quad), bilateral;marching while seated;SLR (straight leg raise), bilateral  (Pended)   -MW      Lower Extremity Range of Motion (Therapeutic Exercise)  hip abduction/adduction, bilateral;ankle dorsiflexion/plantar flexion, bilateral  (Pended)   -MW      Exercise Type (Therapeutic Exercise)  AROM (active range of motion);isometric contraction, static  (Pended)   -MW      Position (Therapeutic Exercise)  supine;seated  (Pended)   -MW      Sets/Reps (Therapeutic Exercise)  20  (Pended)   -MW      Recorded by [MW] Zofia Salcedo PTA Student 12/06/19 1500      Row Name 12/06/19 1422 12/06/19 1323 12/06/19 1058       Positioning and Restraints    Pre-Treatment Position  in bed  (Pended)   -MW  in bed  -TS  in bed  -NW    Post Treatment Position  bed  (Pended)   -MW  bed  -TS  bed  -NW    In Bed  encouraged to call for assist;call light within reach;exit alarm on;side rails up x2  (Pended)   -MW  sitting EOB;call light within reach;encouraged to call for assist;side rails up x2;notified nsg  -TS  fowlers;call light within reach;encouraged to call for assist  -NW    Recorded by [MW] Zofia Salcedo PTA Student 12/06/19 1500 [TS] Carmen Mcgraw COTA/L 12/06/19 1359 [NW] Sheirne Myers PTA 12/06/19 1135    Row Name 12/06/19 1422             Pain Assessment    Additional Documentation  Pain Scale: Numbers Pre/Post-Treatment (Group)  (Pended)   -MW      Recorded by [MW] Zofia Salcedo PTA Student 12/06/19 1500      Row Name 12/06/19 1422 12/06/19 1323 12/06/19 1058       Pain Scale: Numbers Pre/Post-Treatment    Pain Scale: Numbers, Pretreatment  2/10  (Pended)   -MW  2/10  -TS  --    Pain Scale: Numbers, Post-Treatment  3/10  (Pended)   -MW  4/10  -TS  10/10  -NW    Pain Location - Side  Right  (Pended)    -MW  Right  -TS  Right  -NW    Pain Location  hip  (Pended)   -MW  hip  -TS  hip  -NW    Pain Intervention(s)  --  Repositioned  -TS  Repositioned  -NW    Recorded by [MW] Zofia Salcedo, PTA Student 12/06/19 1501 [TS] Carmen Mcgraw, [place]/L 12/06/19 1359 [NW] Sherine Myers, PTA 12/06/19 1135    Row Name                Wound 12/02/19 0045 Bilateral gluteal MASD (Moisutre associated skin damage)    Wound - Properties Group Date first assessed: 12/02/19 [EL] Time first assessed: 0045 [EL] Present on Hospital Admission: N [EL] Side: Bilateral [EL] Location: gluteal [EL] Primary Wound Type: MASD [EL] Recorded by:  [EL] Silvana James, RNA 12/03/19 0148    Row Name                Wound 12/03/19 1711 Right hip Incision    Wound - Properties Group Date first assessed: 12/03/19 [SVETLANA] Time first assessed: 1711 [SVETLANA] Side: Right [SVETLANA] Location: hip [SVETLANA] Primary Wound Type: Incision [SVETLANA] Recorded by:  [SVETLANA] Christy Bryan RN 12/03/19 1711    Row Name 12/06/19 1323             Outcome Summary/Treatment Plan (OT)    Daily Summary of Progress (OT)  progress toward functional goals is good  -TS      Recorded by [TS] Carmen Mcgraw, [place]/L 12/06/19 1359        User Key  (r) = Recorded By, (t) = Taken By, (c) = Cosigned By    Initials Name Effective Dates Discipline    TS Carmen Mcgraw, [place]/L 08/02/16 -  OT    NW Sherine Myers, PTA 08/02/16 -  PT    Christy Carpio RN 08/02/16 -  Nurse    Silvana Mcgraw RNA 06/09/17 -  Nurse    Zofia Delaney, PTA Student 11/06/19 -  PT    KM Sandy Damon, PTA Student 11/06/19 -  PT          Wound 12/02/19 0045 Bilateral gluteal MASD (M[place]utre associated skin damage) (Active)   Dressing Appearance no drainage;open to air 12/5/2019  8:18 PM   Closure BALJINDER 12/5/2019  8:18 PM   Base dry;red 12/5/2019  8:18 PM   Drainage Amount none 12/6/2019  8:15 AM   Dressing Care, Wound open to air 12/6/2019  8:15 AM       Wound 12/03/19 1711 Right hip Incision (Active)    Dressing Appearance dry;intact;no drainage 12/6/2019  8:15 AM   Closure BALJINDER 12/6/2019  8:15 AM   Base dressing in place, unable to visualize 12/6/2019  8:15 AM   Drainage Amount none 12/6/2019  8:15 AM   Dressing Care, Wound foam 12/6/2019  8:15 AM           Physical Therapy Education     Title: PT OT SLP Therapies (In Progress)     Topic: Physical Therapy (Done)     Point: Mobility training (Done)     Learning Progress Summary           Patient Acceptance, E,D, VU,DU by DE at 12/4/2019 11:26 AM    Comment:  pt educated on current precautions and strategies to adhere to these. pt educated on proper safety and technique for functional mobility    Acceptance, D, DU by  at 11/30/2019 10:45 AM    Comment:  benefits of PT and POC, call for assist OOB, TTWB RLE                   Point: Precautions (Done)     Learning Progress Summary           Patient Acceptance, E,D, VU,DU by DE at 12/4/2019 11:26 AM    Comment:  pt educated on current precautions and strategies to adhere to these. pt educated on proper safety and technique for functional mobility    Acceptance, D, DU by  at 11/30/2019 10:45 AM    Comment:  benefits of PT and POC, call for assist OOB, TTWB RLE                               User Key     Initials Effective Dates Name Provider Type Discipline     08/02/16 -  Олег Martel, PT Physical Therapist PT    DE 10/10/19 -  Abdoulaye Pitts, SMILEY Student PT Student PT                PT Recommendation and Plan        Outcome Measures     Row Name 12/06/19 1300 12/06/19 1100 12/05/19 1200       How much help from another person do you currently need...    Turning from your back to your side while in flat bed without using bedrails?  --  3  -NW  --    Moving from lying on back to sitting on the side of a flat bed without bedrails?  --  3  -NW  --    Moving to and from a bed to a chair (including a wheelchair)?  --  3  -NW  --    Standing up from a chair using your arms (e.g., wheelchair, bedside chair)?  --  2   -NW  --    Climbing 3-5 steps with a railing?  --  1  -NW  --    To walk in hospital room?  --  3  -NW  --    AM-PAC 6 Clicks Score (PT)  --  15  -NW  --       How much help from another is currently needed...    Putting on and taking off regular lower body clothing?  2  -TS  --  2  -TS    Bathing (including washing, rinsing, and drying)  2  -TS  --  2  -TS    Toileting (which includes using toilet bed pan or urinal)  2  -TS  --  2  -TS    Putting on and taking off regular upper body clothing  3  -TS  --  3  -TS    Taking care of personal grooming (such as brushing teeth)  3  -TS  --  3  -TS    Eating meals  4  -TS  --  4  -TS    AM-PAC 6 Clicks Score (OT)  16  -TS  --  16  -TS       Functional Assessment    Outcome Measure Options  AM-PAC 6 Clicks Daily Activity (OT)  -TS  AM-PAC 6 Clicks Basic Mobility (PT)  -NW  AM-PAC 6 Clicks Daily Activity (OT)  -TS    Row Name 12/05/19 1000 12/04/19 1600          How much help from another person do you currently need...    Turning from your back to your side while in flat bed without using bedrails?  3  -NW  --     Moving from lying on back to sitting on the side of a flat bed without bedrails?  2  -NW  --     Moving to and from a bed to a chair (including a wheelchair)?  3  -NW  --     Standing up from a chair using your arms (e.g., wheelchair, bedside chair)?  2  -NW  --     Climbing 3-5 steps with a railing?  1  -NW  --     To walk in hospital room?  3  -NW  --     AM-PAC 6 Clicks Score (PT)  14  -NW  --        How much help from another is currently needed...    Putting on and taking off regular lower body clothing?  --  2  -MM (r) CN (t) MM (c)     Bathing (including washing, rinsing, and drying)  --  2  -MM (r) CN (t) MM (c)     Toileting (which includes using toilet bed pan or urinal)  --  3  -MM (r) CN (t) MM (c)     Putting on and taking off regular upper body clothing  --  3  -MM (r) CN (t) MM (c)     Taking care of personal grooming (such as brushing teeth)  --  4   -MM (r) CN (t) MM (c)     Eating meals  --  4  -MM (r) CN (t) MM (c)     AM-PAC 6 Clicks Score (OT)  --  18  -MM (r) CN (t)        Functional Assessment    Outcome Measure Options  AM-PAC 6 Clicks Basic Mobility (PT)  -NW  AM-PAC 6 Clicks Daily Activity (OT)  -MM (r) CN (t) MM (c)       User Key  (r) = Recorded By, (t) = Taken By, (c) = Cosigned By    Initials Name Provider Type    TS Carmen Mcgraw, [place]/L Occupational Therapy Assistant    NW Sherine Myers, PTA Physical Therapy Assistant    MM Ky Holilday, OTR/L Occupational Therapist    CN Isha Adames, OT Student OT Student         Time Calculation:   PT Charges     Row Name 12/06/19 1501 12/06/19 1138          Time Calculation    Start Time  1422  (Pended)   -MW  1058  -NW     Stop Time  1452  (Pended)   -MW  1130  -NW     Time Calculation (min)  30 min  (Pended)   -MW  32 min  -NW     PT Received On  12/06/19  (Pended)   -MW  12/06/19  -NW     PT Goal Re-Cert Due Date  12/14/19  (Pended)   -MW  12/14/19  -NW        Time Calculation- PT    Total Timed Code Minutes- PT  30 minute(s)  (Pended)   -MW  32 minute(s)  -NW        Timed Charges    69370 - PT Therapeutic Exercise Minutes  15  (Pended)   -MW  --     18179 - Gait Training Minutes   --  32  -NW     84502 - PT Therapeutic Activity Minutes  15  (Pended)   -MW  --       User Key  (r) = Recorded By, (t) = Taken By, (c) = Cosigned By    Initials Name Provider Type    Sherine Montana PTA Physical Therapy Assistant    Zofia Delaney PTA Student PTA Student        Therapy Charges for Today     Code Description Service Date Service Provider Modifiers Qty    54328416551 HC PT THER PROC EA 15 MIN 12/6/2019 Zofia Salcedo PTA Student GP 1    59109964142 HC PT THERAPEUTIC ACT EA 15 MIN 12/6/2019 Zofia Salcedo PTA Student GP 1          PT G-Codes  Outcome Measure Options: AM-PAC 6 Clicks Daily Activity (OT)  AM-PAC 6 Clicks Score (PT): 15  AM-PAC 6 Clicks Score (OT): 16    Zofia Salcedo, PTA  Student  12/6/2019

## 2019-12-06 NOTE — PROGRESS NOTES
"  Orthopedic Surgery Progress Note    Vasile Deng  12/6/2019      Subjective:     No acute events overnight. Pain controlled, slowly improving. Denies chest pain, shortness of breath, numbness/tingling to RLE.    Objective:     Patient Vitals for the past 24 hrs:   BP Temp Temp src Pulse Resp SpO2 Height Weight   12/06/19 0838 -- -- -- -- -- -- 172.7 cm (67.99\") --   12/06/19 0833 -- -- -- -- -- -- 172.7 cm (67.99\") 77.1 kg (169 lb 15.6 oz)   12/06/19 0758 112/56 97.6 °F (36.4 °C) Oral 58 16 96 % -- --   12/06/19 0709 -- -- -- 66 16 96 % -- --   12/05/19 2017 128/49 98.1 °F (36.7 °C) Oral 67 16 97 % -- 77.1 kg (169 lb 14.4 oz)   12/05/19 2015 -- -- -- -- -- 97 % -- --   12/05/19 1155 124/49 -- -- 62 16 96 % -- --   12/05/19 1021 (!) 79/45 -- -- -- -- -- -- --   12/05/19 1018 93/43 -- -- -- -- -- -- --   12/05/19 1015 97/43 -- -- -- -- -- -- --   12/05/19 0920 -- -- -- -- -- 92 % -- --   12/05/19 0909 (!) 86/42 -- -- 62 -- -- -- --       General: Awake, alert  Respiratory: Nonlabored  CV: Regular rate  Extremity:  RLE: Dressing in place, c/d/i. No strikethrough. Foot pumps to BLE. EHL,FHL,TA,GS motor intact. Gross sensation intact to right foot. Foot warm, perfused.      Data Review:  Lab Results (last 24 hours)     Procedure Component Value Units Date/Time    Comprehensive Metabolic Panel [[id number]]  (Abnormal) Collected:  12/06/19 0620    Specimen:  Blood Updated:  12/06/19 0703     Glucose 133 mg/dL      BUN 14 mg/dL      Creatinine 0.80 mg/dL      Sodium 129 mmol/L      Potassium 3.9 mmol/L      Chloride 94 mmol/L      CO2 26.0 mmol/L      Calcium 8.5 mg/dL      Total Protein 6.4 g/dL      Albumin 3.10 g/dL      ALT (SGPT) 28 U/L      AST (SGOT) 28 U/L      Alkaline Phosphatase 120 U/L      Total Bilirubin 0.5 mg/dL      eGFR Non African Amer 91 mL/min/1.73      Globulin 3.3 gm/dL      A/G Ratio 0.9 g/dL      BUN/Creatinine Ratio 17.5     Anion Gap 9.0 mmol/L     Narrative:       GFR Normal >60  Chronic " Kidney Disease <60  Kidney Failure <15    CBC & Differential [[id number]] Collected:  12/06/19 0620    Specimen:  Blood Updated:  12/06/19 0656    Narrative:       The following orders were created for panel order CBC & Differential.  Procedure                               Abnormality         Status                     ---------                               -----------         ------                     CBC Auto Differential[[id number]]        Abnormal            Final result                 Please view results for these tests on the individual orders.    CBC Auto Differential [[id number]]  (Abnormal) Collected:  12/06/19 0620    Specimen:  Blood Updated:  12/06/19 0656     WBC 5.31 10*3/mm3      RBC 3.86 10*6/mm3      Hemoglobin 11.2 g/dL      Hematocrit 32.7 %      MCV 84.7 fL      MCH 29.0 pg      MCHC 34.3 g/dL      RDW 13.6 %      RDW-SD 42.4 fl      MPV 9.4 fL      Platelets 175 10*3/mm3      Neutrophil % 60.7 %      Lymphocyte % 21.7 %      Monocyte % 11.9 %      Eosinophil % 4.7 %      Basophil % 0.4 %      Immature Grans % 0.6 %      Neutrophils, Absolute 3.23 10*3/mm3      Lymphocytes, Absolute 1.15 10*3/mm3      Monocytes, Absolute 0.63 10*3/mm3      Eosinophils, Absolute 0.25 10*3/mm3      Basophils, Absolute 0.02 10*3/mm3      Immature Grans, Absolute 0.03 10*3/mm3      nRBC 0.0 /100 WBC             Assessment:     87 y/o M POD#3  s/p R CMN, significant medical history for atrial fibrillation on anticoagulation, coronary artery disease, congestive heart failure and hypertension    Plan:      1:  Post-op acute blood loss anemia: H/H 11.2/32.7, VSS, asymptomatic  2:  Pain: Controlled  3:  Activity: WBAT to RLE  4:  Dressing: c/d/i. Plan to leave in place for 2 weeks- may change if it becomes saturated.  5:  Physical therapy/Occupational therapy rec'd transitional care vs home with HHT  6:  DVT ppx: PAS boots, ambulation, Xarelto  7:  Dispo: D/c planning, ok for d/c from my standpoint when arrangements  made. F/u in 2 weeks. Discussed d/c rec's with patient and S/O this morning.    Daniel Carrion MD

## 2019-12-06 NOTE — PLAN OF CARE
Problem: Patient Care Overview  Goal: Plan of Care Review  Outcome: Ongoing (interventions implemented as appropriate)   12/06/19 8273   Coping/Psychosocial   Plan of Care Reviewed With patient   Plan of Care Review   Progress no change   OTHER   Outcome Summary Patient has refused pain medications today despite c/o pain. Up to chair with assistance x2. BP improving. Patient is deciding on rehab placement       Problem: Fall Risk (Adult)  Goal: Absence of Fall  Outcome: Ongoing (interventions implemented as appropriate)      Problem: Pain, Chronic (Adult)  Goal: Acceptable Pain/Comfort Level and Functional Ability  Outcome: Ongoing (interventions implemented as appropriate)      Problem: Fracture Orthopaedic (Adult)  Goal: Signs and Symptoms of Listed Potential Problems Will be Absent, Minimized or Managed (Fracture Orthopaedic)  Outcome: Ongoing (interventions implemented as appropriate)      Problem: Skin Injury Risk (Adult)  Goal: Skin Health and Integrity  Outcome: Ongoing (interventions implemented as appropriate)      Problem: Wound (Includes Pressure Injury) (Adult)  Goal: Signs and Symptoms of Listed Potential Problems Will be Absent, Minimized or Managed (Wound)  Outcome: Ongoing (interventions implemented as appropriate)      Problem: Urine Elimination Impaired (Adult)  Goal: Effective or Improved Urinary Elimination  Outcome: Ongoing (interventions implemented as appropriate)    Goal: Effective Containment of Urine  Outcome: Ongoing (interventions implemented as appropriate)    Goal: Reduced Incontinence Episodes  Outcome: Ongoing (interventions implemented as appropriate)

## 2019-12-06 NOTE — PROGRESS NOTES
Continued Stay Note  [facility]     Patient Name: Vasile Deng  MRN: [id number]  Today's Date: 12/6/2019    Admit Date: 11/27/2019    Discharge Plan     Row Name 12/06/19 1455       Plan    Plan Comments  BOTH [facility] AND [facility] OFFERED A BED. INFORMED PT AND HIS SIGN OTHER (SIERRA). SIERRA DID GO AND TOUR [facility] AND THEY OFFERED A BED AS WELL. INFORMED PT/SIGN OTHER THAT PT IS READY FOR DC PENDING BED. THEY WILL NOT PROVIDE AN ANSWER AT THE MOMENT. SIERRA STATES SHE IS HEADED TO [facility] TO TOUR BEFORE MAKING A DECISION.         Discharge Codes    No documentation.             BRIANDA Aguirre

## 2019-12-06 NOTE — PROGRESS NOTES
[facility] Medicine Services  INPATIENT PROGRESS NOTE    Patient Name: Vasile Deng  Date of Admission: 11/27/2019  Today's Date: 12/06/19  Length of Stay: 7  Primary Care Physician: Provider, No Known    Subjective   Chief Complaint: feeling better  HPI   Doing ok.  BP better this AM.  No light-headedness or dizziness  No black or bloody stools  Hip pain is tolerable  Has had difficulty urinating.        Review of Systems   Constitutional: Negative for fatigue and fever.   HENT: Negative for congestion and ear pain.    Eyes: Negative for redness and visual disturbance.   Respiratory: Negative for cough, shortness of breath and wheezing.    Cardiovascular: Negative for chest pain and palpitations.   Gastrointestinal: Negative for abdominal pain, diarrhea, nausea and vomiting.   Endocrine: Negative for cold intolerance and heat intolerance.   Genitourinary: Negative for dysuria and frequency.   Musculoskeletal: Positive for arthralgias. Negative for back pain.   Skin: Negative for rash and wound.   Neurological: Negative for dizziness and headaches.   Psychiatric/Behavioral: Negative for confusion. The patient is not nervous/anxious.         All pertinent negatives and positives are as above. All other systems have been reviewed and are negative unless otherwise stated.     Objective    Temp:  [97.6 °F (36.4 °C)-98.1 °F (36.7 °C)] 97.6 °F (36.4 °C)  Heart Rate:  [58-67] 58  Resp:  [16] 16  BP: ()/(43-56) 112/56  Physical Exam   Constitutional: He is oriented to person, place, and time. He appears well-developed and well-nourished.   HENT:   Head: Normocephalic and atraumatic.   Right Ear: External ear normal.   Left Ear: External ear normal.   Nose: Nose normal.   Mouth/Throat: Oropharynx is clear and moist.   Eyes: Conjunctivae and EOM are normal. Pupils are equal, round, and reactive to light. Right eye exhibits no discharge. Left eye exhibits no discharge. No scleral  icterus.   Neck: Normal range of motion. Neck supple. No tracheal deviation present. No thyromegaly present.   Cardiovascular: Normal rate, regular rhythm, normal heart sounds and intact distal pulses. Exam reveals no gallop and no friction rub.   No murmur heard.  Pulmonary/Chest: Effort normal and breath sounds normal. No stridor. No respiratory distress. He has no wheezes. He has no rales. He exhibits no tenderness.   Abdominal: Soft. Bowel sounds are normal. He exhibits no distension and no mass. There is no tenderness. There is no rebound and no guarding. No hernia.   Musculoskeletal: He exhibits no edema or deformity.        Right shoulder: He exhibits tenderness and bony tenderness.   Lymphadenopathy:     He has no cervical adenopathy.   Neurological: He is alert and oriented to person, place, and time. He has normal reflexes. He displays normal reflexes. No cranial nerve deficit. He exhibits normal muscle tone. Coordination normal.   Skin: Skin is warm and dry. No rash noted. No erythema. No pallor.   Psychiatric: He has a normal mood and affect. His behavior is normal. Judgment and thought content normal.   Vitals reviewed.          Results Review:  I have reviewed the labs, radiology results, and diagnostic studies.    Laboratory Data:   Results from last 7 days   Lab Units 12/06/19  0620 12/05/19 0442 12/04/19  0403   WBC 10*3/mm3 5.31 5.55 5.53   HEMOGLOBIN g/dL 11.2* 10.8* 11.5*   HEMATOCRIT % 32.7* 32.0* 34.2*   PLATELETS 10*3/mm3 175 177 191        Results from last 7 days   Lab Units 12/06/19  0620 12/05/19 0442 12/04/19  0403   SODIUM mmol/L 129* 128* 133*   POTASSIUM mmol/L 3.9 3.6 4.3   CHLORIDE mmol/L 94* 93* 97*   CO2 mmol/L 26.0 26.0 26.0   BUN mg/dL 14 13 14   CREATININE mg/dL 0.80 0.73* 0.83   CALCIUM mg/dL 8.5* 8.5* 8.6   BILIRUBIN mg/dL 0.5 0.5 0.5   ALK PHOS U/L 120* 109 111   ALT (SGPT) U/L 28 24 38   AST (SGOT) U/L 28 20 28   GLUCOSE mg/dL 133* 103* 113*       Culture Data:         Radiology Data:   Imaging Results (Last 24 Hours)     ** No results found for the last 24 hours. **          I have reviewed the patient's current medications.     Assessment/Plan     Active Hospital Problems    Diagnosis   • **Acute pain of right hip   • Open right hip fracture, type III, initial encounter (CMS/[facility])   • Fall   • Hyponatremia   • Closed intertrochanteric fracture of hip, right, initial encounter (CMS/[facility])     Added automatically from request for surgery [id number]     • Postural dizziness with near syncope   • On continuous oral anticoagulation   • Chronic systolic congestive heart failure (CMS/[facility])   • PAF (paroxysmal atrial fibrillation) (CMS/[facility])   • Essential hypertension   • Coronary artery disease involving 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  R hip fracture  -Ortho following  -S/P repair  -PT/OT  -DVT PPX with Xarelto     2.  Hyponatremia  -resolved with IVF     3.  A-fib  -metoprolol  -Amio  -Xarelto     4.  HTN  -Metoprolol     5.  CAD  -ASA  -Metoprolol      Discharge Planning: I expect the patient to be discharged to SNF pending placement    Bob Haider MD   12/06/19   9:47 AM

## 2019-12-06 NOTE — PROGRESS NOTES
Continued Stay Note   [facility]     Patient Name: Vasile Deng  MRN: [id number]  Today's Date: 12/6/2019    Admit Date: 11/27/2019    Discharge Plan     Row Name 12/06/19 1033       Plan    Plan Comments  SPOKE TO PT AGAIN REGARDING PLACEMENT. PT PREFERS TO USE IS MEDICARE AND PREFERS TO STAY IN [facility]. FAXED REFERRALS TO BOTH [facility] AND [facility]. AWAIT ANSWERS. PT MEDICARE NUMBER IS [id number]        Discharge Codes    No documentation.             BRIANDA Aguirre

## 2019-12-06 NOTE — PROGRESS NOTES
Continued Stay Note  [facility]     Patient Name: Vasile Deng  MRN: [id number]  Today's Date: 12/6/2019    Admit Date: 11/27/2019    Discharge Plan     Row Name 12/06/19 1656       Plan    Plan Comments  PATT SPOKE WITH SIERRA (SIGN OTHER) REGARDING COPAY THAT WOULD BE DUE UP FRONT BEFORE [facility] COULD ACCEPT HIM. PT IS [place] BUT WILL BE USING HIS MEDICARE FOR REHAB. SINCE PT HAS NOT USED HIS MEDICARE THIS YEAR HE WILL HAVE TO PAY A COPAY UPFRONT. SIERRA INFORMED PATT SHE WOULD HAVE TO TALK TO PT FIRST ABOUT COPAY. PHYSICIAN INFORMED THAT DC IS ON HOLD UNTIL THIS IS FIGURED OUT.     Row Name 12/06/19 1640       Plan    Final Discharge Disposition Code  62 - inpatient rehab facility    Row Name 12/06/19 1634       Plan    Plan Comments  PT/SIGN OTHER HAVE DECIDED ON [facility]. PATT WITH [facility] IS AWARE AND TRYING TO FIND OUT IF THERAPY CAN EVAL OVER WEEKEND. AWAIT RETURN CALL FROM [facility].     Row Name 12/06/19 4883       Plan    Plan Comments  BOTH [place] AND [facility] OFFERED A BED. INFORMED PT AND HIS SIGN OTHER (SIERRA). SIERRA DID GO AND TOUR [facility] AND THEY OFFERED A BED AS WELL. INFORMED PT/SIGN OTHER THAT PT IS READY FOR DC PENDING BED. THEY WILL NOT PROVIDE AN ANSWER AT THE MOMENT. SIERRA STATES SHE IS HEADED TO [facility] TO TOUR BEFORE MAKING A DECISION.         Discharge Codes    No documentation.             BRIANDA Aguirre

## 2019-12-06 NOTE — DISCHARGE PLACEMENT REQUEST
"Jemaltanisha [place] E (88 y.o. Male)     Date of Birth Social Security Number Address Home Phone MRN    03/27/1931  [address] [address] [phone] [id number]    [place] Marital Status          Other        Admission Date Admission Type Admitting Provider Attending Provider Department, Room/Bed    11/27/19 Emergency Daniel Carrion MD Moore, Jonathan Scott, MD [facility] 3A, 356/1    Discharge Date Discharge Disposition Discharge Destination                       Attending Provider:  Bob Haider MD    Allergies:  No Known Allergies    Isolation:  None   Infection:  None   Code Status:  CPR    Ht:  172.7 cm (67.99\")   Wt:  77.1 kg (169 lb 15.6 oz)    Admission Cmt:  None   Principal Problem:  Acute pain of right hip [M25.551]                 Active Insurance as of 11/27/2019     Primary Coverage     Payor Plan Insurance Group Employer/Plan Group    [employer]      Payor Plan Address Payor Plan Phone Number Payor Plan Fax Number Effective Dates    [address] [phone]  6/1/2018 - None Entered    [facility] [id number]       Subscriber Name Subscriber Birth Date Member ID       LEAH WHITE 3/27/1931 [id number]                 Emergency Contacts      (Rel.) Home Phone Work Phone Mobile Phone    Leida Rollins (Power of ) [phone] -- --            Insurance Information                [employer] Phone: [phone]    Subscriber: Leah White Subscriber#: [id number]    Group#:  Precert#:           "

## 2019-12-06 NOTE — PROGRESS NOTES
Continued Stay Note  [facility]     Patient Name: Vasile Deng  MRN: [id number]  Today's Date: 12/6/2019    Admit Date: 11/27/2019    Discharge Plan     Row Name 12/06/19 7953       Plan    Plan Comments  PT/SIGN OTHER HAVE DECIDED ON [facility]. SADONA WITH [facility] IS AWARE AND TRYING TO FIND OUT IF THERAPY CAN EVAL OVER WEEKEND. AWAIT RETURN CALL FROM [facility].     Row Name 12/06/19 1946       Plan    Plan Comments  BOTH [place] AND [facility] OFFERED A BED. INFORMED PT AND HIS SIGN OTHER (SIERRA). SIERRA DID GO AND TOUR [facility] AND THEY OFFERED A BED AS WELL. INFORMED PT/SIGN OTHER THAT PT IS READY FOR DC PENDING BED. THEY WILL NOT PROVIDE AN ANSWER AT THE MOMENT. SIERRA STATES SHE IS HEADED TO [facility] TO TOUR BEFORE MAKING A DECISION.         Discharge Codes    No documentation.             BRIANDA Aguirre

## 2019-12-06 NOTE — PROGRESS NOTES
RT Nebulizer Protocol    Assessment tool to be used for patients with existing breathing treatments ordered by hospitalists                                                                  0  1  2  3  4      Respiratory History   No Smoking    Smoking History   x   1 Pack/Day      Pulmonary Disease      Exacerbation        Respiratory Rate   Normal   x   20-25      Dyspneic      Accessory Muscles      Severe Dyspnea        Breath Sounds   Clear   x   Crackles      Crackles/ Rhonchi      Wheezing      Absent/ Severe Wheezing        Chest   X-ray   Clear   x   1 Lobe Infiltration/ Consolidation/ PE      2 Lobe Same Lung Infiltration/ Consolidation/ PE       2 Lobe Infiltration/ Both Lungs/ Consolidation/ PE      Both Lungs/ More Than 1 Lobe/ Atelectasis/ Consolidation/  PE        Cough   Strong Non- Productive   x   Excessive Secretions/ Strong Cough      Excessive Secretions/ Weak Cough      Thick Bronchial Secretions/ Weak Cough      Thick Bronchial Secretions/ No Cough        Total Patient Score =  1  0-4=Q4 PRN  5-9=TID and Q4 PRN  10-14=QID and Q3 PRN  15-19=Q4 and Q2 PRN  20=Q3 and Q2 PRN    Bronchopulmonary Hygiene (CPT)   Q4 ATC Copious secretions, dyspnea, unable to sleep, mucus plug      QID & Q4 PRN Moderate secretions      TID Small amounts of secretions w/ poor cough and history of secretions      BID Unable to deep breathe and cough spontaneously         Lung Expansion Therapy (PEP)   Q4 & PRN at night Severe atelectasis, poor oxygenation      QID  High risk for persistent atelectasis, existence of same      TID At risk for developing atelectasis      BID Unable to deep breathe and cough spontaneously       Instruct, 1 follow up Patients able to perform well on their own

## 2019-12-06 NOTE — PLAN OF CARE
Problem: Patient Care Overview  Goal: Plan of Care Review  Outcome: Ongoing (interventions implemented as appropriate)   12/06/19 1400   Coping/Psychosocial   Plan of Care Reviewed With patient   Plan of Care Review   Progress improving   OTHER   Outcome Summary Pt CGA/min A for bed mobility with HOB elevated and bed rail. Pt sits EOB for grooming tasks after set up. Pt independent with self feeding skills after set up. Pt would benefit from acute rehab at discharge. Continue OT POC

## 2019-12-06 NOTE — PLAN OF CARE
Problem: Patient Care Overview  Goal: Plan of Care Review  Outcome: Ongoing (interventions implemented as appropriate)   12/06/19 6176   Coping/Psychosocial   Plan of Care Reviewed With patient;spouse   Plan of Care Review   Progress no change   OTHER   Outcome Summary Pt O&Ox4. Pt C/O of pain upon turning, refuses pain med. BP did not drop throughout shift. Will continue to monitor       Problem: Fall Risk (Adult)  Goal: Absence of Fall  Outcome: Ongoing (interventions implemented as appropriate)      Problem: Pain, Chronic (Adult)  Goal: Acceptable Pain/Comfort Level and Functional Ability  Outcome: Ongoing (interventions implemented as appropriate)      Problem: Fracture Orthopaedic (Adult)  Goal: Signs and Symptoms of Listed Potential Problems Will be Absent, Minimized or Managed (Fracture Orthopaedic)  Outcome: Ongoing (interventions implemented as appropriate)      Problem: Skin Injury Risk (Adult)  Goal: Skin Health and Integrity  Outcome: Ongoing (interventions implemented as appropriate)      Problem: Wound (Includes Pressure Injury) (Adult)  Goal: Signs and Symptoms of Listed Potential Problems Will be Absent, Minimized or Managed (Wound)  Outcome: Ongoing (interventions implemented as appropriate)      Problem: Urine Elimination Impaired (Adult)  Goal: Effective or Improved Urinary Elimination  Outcome: Ongoing (interventions implemented as appropriate)    Goal: Effective Containment of Urine  Outcome: Ongoing (interventions implemented as appropriate)

## 2019-12-06 NOTE — PLAN OF CARE
Problem: Patient Care Overview  Goal: Plan of Care Review  Outcome: Ongoing (interventions implemented as appropriate)   12/06/19 1135   Coping/Psychosocial   Plan of Care Reviewed With patient   Plan of Care Review   Progress no change   OTHER   Outcome Summary Pt cont to have increased pain and burning w/ movement. Bed mobilty min x1 sit-stand w/ elevated bed mod x1. Pt amb 20'x2 cga follow w/ chair mult standing rests due to increased pain. will need cont PT upon d/c acute rehab

## 2019-12-07 LAB
ALBUMIN SERPL-MCNC: 3 G/DL (ref 3.5–5.2)
ALBUMIN/GLOB SERPL: 0.9 G/DL
ALP SERPL-CCNC: 115 U/L (ref 39–117)
ALT SERPL W P-5'-P-CCNC: 34 U/L (ref 1–41)
ANION GAP SERPL CALCULATED.3IONS-SCNC: 9 MMOL/L (ref 5–15)
AST SERPL-CCNC: 31 U/L (ref 1–40)
BASOPHILS # BLD AUTO: 0.03 10*3/MM3 (ref 0–0.2)
BASOPHILS NFR BLD AUTO: 0.5 % (ref 0–1.5)
BILIRUB SERPL-MCNC: 0.6 MG/DL (ref 0.2–1.2)
BUN BLD-MCNC: 15 MG/DL (ref 8–23)
BUN/CREAT SERPL: 24.6 (ref 7–25)
CALCIUM SPEC-SCNC: 8.4 MG/DL (ref 8.6–10.5)
CHLORIDE SERPL-SCNC: 93 MMOL/L (ref 98–107)
CO2 SERPL-SCNC: 27 MMOL/L (ref 22–29)
CREAT BLD-MCNC: 0.61 MG/DL (ref 0.76–1.27)
DEPRECATED RDW RBC AUTO: 41.9 FL (ref 37–54)
EOSINOPHIL # BLD AUTO: 0.32 10*3/MM3 (ref 0–0.4)
EOSINOPHIL NFR BLD AUTO: 5.6 % (ref 0.3–6.2)
ERYTHROCYTE [DISTWIDTH] IN BLOOD BY AUTOMATED COUNT: 13.7 % (ref 12.3–15.4)
GFR SERPL CREATININE-BSD FRML MDRD: 125 ML/MIN/1.73
GLOBULIN UR ELPH-MCNC: 3.3 GM/DL
GLUCOSE BLD-MCNC: 114 MG/DL (ref 65–99)
HCT VFR BLD AUTO: 31.8 % (ref 37.5–51)
HGB BLD-MCNC: 10.8 G/DL (ref 13–17.7)
IMM GRANULOCYTES # BLD AUTO: 0.06 10*3/MM3 (ref 0–0.05)
IMM GRANULOCYTES NFR BLD AUTO: 1.1 % (ref 0–0.5)
LYMPHOCYTES # BLD AUTO: 1.37 10*3/MM3 (ref 0.7–3.1)
LYMPHOCYTES NFR BLD AUTO: 24 % (ref 19.6–45.3)
MCH RBC QN AUTO: 28.3 PG (ref 26.6–33)
MCHC RBC AUTO-ENTMCNC: 34 G/DL (ref 31.5–35.7)
MCV RBC AUTO: 83.5 FL (ref 79–97)
MONOCYTES # BLD AUTO: 0.61 10*3/MM3 (ref 0.1–0.9)
MONOCYTES NFR BLD AUTO: 10.7 % (ref 5–12)
NEUTROPHILS # BLD AUTO: 3.32 10*3/MM3 (ref 1.7–7)
NEUTROPHILS NFR BLD AUTO: 58.1 % (ref 42.7–76)
NRBC BLD AUTO-RTO: 0 /100 WBC (ref 0–0.2)
PLATELET # BLD AUTO: 190 10*3/MM3 (ref 140–450)
PMV BLD AUTO: 9.3 FL (ref 6–12)
POTASSIUM BLD-SCNC: 3.8 MMOL/L (ref 3.5–5.2)
PROT SERPL-MCNC: 6.3 G/DL (ref 6–8.5)
RBC # BLD AUTO: 3.81 10*6/MM3 (ref 4.14–5.8)
SODIUM BLD-SCNC: 129 MMOL/L (ref 136–145)
WBC NRBC COR # BLD: 5.71 10*3/MM3 (ref 3.4–10.8)

## 2019-12-07 PROCEDURE — 97116 GAIT TRAINING THERAPY: CPT

## 2019-12-07 PROCEDURE — 85025 COMPLETE CBC W/AUTO DIFF WBC: CPT | Performed by: FAMILY MEDICINE

## 2019-12-07 PROCEDURE — 80053 COMPREHEN METABOLIC PANEL: CPT | Performed by: FAMILY MEDICINE

## 2019-12-07 PROCEDURE — 94799 UNLISTED PULMONARY SVC/PX: CPT

## 2019-12-07 PROCEDURE — 97530 THERAPEUTIC ACTIVITIES: CPT

## 2019-12-07 RX ADMIN — SENNOSIDES AND DOCUSATE SODIUM 2 TABLET: 8.6; 5 TABLET ORAL at 17:01

## 2019-12-07 RX ADMIN — ASPIRIN 81 MG: 81 TABLET ORAL at 09:15

## 2019-12-07 RX ADMIN — FINASTERIDE 5 MG: 5 TABLET, FILM COATED ORAL at 09:15

## 2019-12-07 RX ADMIN — TAMSULOSIN HYDROCHLORIDE 0.4 MG: 0.4 CAPSULE ORAL at 21:19

## 2019-12-07 RX ADMIN — PANTOPRAZOLE SODIUM 40 MG: 40 TABLET, DELAYED RELEASE ORAL at 09:14

## 2019-12-07 RX ADMIN — AMIODARONE HYDROCHLORIDE 100 MG: 200 TABLET ORAL at 09:15

## 2019-12-07 RX ADMIN — METOPROLOL SUCCINATE 25 MG: 25 TABLET, FILM COATED, EXTENDED RELEASE ORAL at 09:14

## 2019-12-07 RX ADMIN — SODIUM CHLORIDE, PRESERVATIVE FREE 10 ML: 5 INJECTION INTRAVENOUS at 09:15

## 2019-12-07 RX ADMIN — AMIODARONE HYDROCHLORIDE 100 MG: 200 TABLET ORAL at 21:19

## 2019-12-07 RX ADMIN — ALLOPURINOL 100 MG: 100 TABLET ORAL at 09:15

## 2019-12-07 RX ADMIN — RIVAROXABAN 15 MG: 15 TABLET, FILM COATED ORAL at 17:00

## 2019-12-07 NOTE — PLAN OF CARE
Problem: Patient Care Overview  Goal: Plan of Care Review  Outcome: Ongoing (interventions implemented as appropriate)  Flowsheets (Taken 12/7/2019 140)  Progress: improving  Plan of Care Reviewed With: patient  Outcome Summary: Pt. treatment completed. He sat EOB from supine w/ Tina and immediately reported dizziness. Pt. was instructed to stay seated and wait for the symptoms to subside before standing. He stood w/ Tina and RWX and again reported dizziness. Pt. amb 32'x2 w/ CGA, RWX, and a chair following. He stopped and took 2 brief standing rests but declined all offers to have a seat while up in the hallway. Back in room, pt. lied back supine w/ verbal cues and supervision. Pt. would benefit from continued OOB activity.

## 2019-12-07 NOTE — THERAPY TREATMENT NOTE
Acute Care - Physical Therapy Treatment Note  [facility]     Patient Name: Vasile Deng  : 3/27/1931  MRN: [id number]  Today's Date: 2019  Onset of Illness/Injury or Date of Surgery: 19     Referring Physician: Daniel Carrion MD    Admit Date: 2019    Visit Dx:    ICD-10-CM ICD-9-CM   1. Fall, initial encounter W19.XXXA E888.9   2. Right hip pain M25.551 719.45   3. Intractable pain R52 780.96   4. Closed intertrochanteric fracture of hip, right, initial encounter (CMS/[facility]) S72.141A 820.21   5. Impaired mobility Z74.09 799.89   6. Decreased activities of daily living (ADL) Z78.9 V49.89   7. Impaired mobility and ADLs Z74.09 799.89     Patient Active Problem List   Diagnosis   • Coronary artery disease involving coronary bypass graft of native heart without angina pectoris   • Hx of CABG   • Essential hypertension   • Mixed hyperlipidemia   • Former smoker   • PAF (paroxysmal atrial fibrillation) (CMS/HCC)   • Syncope   • Dyspnea   • Elevated troponin   • NSTEMI (non-ST elevated myocardial infarction) (CMS/HCC)   • Chronic systolic congestive heart failure (CMS/HCC)   • On continuous oral anticoagulation   • Postural dizziness with near syncope   • Fall   • Acute pain of right hip   • Hyponatremia   • Open right hip fracture, type III, initial encounter (CMS/HCC)   • Closed intertrochanteric fracture of hip, right, initial encounter (CMS/HCC)       Therapy Treatment    Rehabilitation Treatment Summary     Row Name 19 1409 19 1044          Treatment Time/Intention    Discipline  physical therapy assistant  -AF  physical therapy assistant  -KJ     Document Type  therapy note (daily note)  -AF2  --     Subjective Information  complains of;pain;dizziness  -AF2  --     Mode of Treatment  physical therapy  -AF2  --     Comment  --  just eating breakfast; will check back after lunch  -KJ     Existing Precautions/Restrictions  fall;hip;right  -AF  --     Recorded by [AF] Isidra Sena,  PTA 12/07/19 1409  [AF2] Isidra Sena, PTA 12/07/19 1457 [KJ] Amaya Osorio, PTA 12/07/19 1045     Row Name 12/07/19 1409             Vital Signs    Intra Systolic BP Rehab  112  -AF      Intra Treatment Diastolic BP  80  -AF      Recorded by [AF] Isidra Sena, PTA 12/07/19 1457      Row Name 12/07/19 1409             Bed Mobility Assessment/Treatment    Scooting/Bridging [place] (Bed Mobility)  verbal cues;supervision  -AF      Supine-Sit [place] (Bed Mobility)  verbal cues;minimum assist (75% patient effort)  -AF      Sit-Supine [place] (Bed Mobility)  verbal cues;supervision  -AF      Recorded by [AF] Isidra Sena, PTA 12/07/19 1457      Row Name 12/07/19 1409             Sit-Stand Transfer    Sit-Stand [place] (Transfers)  verbal cues;minimum assist (75% patient effort)  -AF      Assistive Device (Sit-Stand Transfers)  walker, front-wheeled  -AF      Recorded by [AF] Isidra Sena, PTA 12/07/19 1457      Row Name 12/07/19 1409             Stand-Sit Transfer    Stand-Sit [place] (Transfers)  verbal cues;contact guard  -AF      Assistive Device (Stand-Sit Transfers)  walker, front-wheeled  -AF      Recorded by [AF] Isidra Sena, PTA 12/07/19 1457      Row Name 12/07/19 1409             Gait/Stairs Assessment/Training    [place] Level (Gait)  verbal cues;contact guard  -AF      Assistive Device (Gait)  walker, front-wheeled  -AF      Distance in Feet (Gait)  32 x2  -AF      Pattern (Gait)  step-to  -AF      Deviations/Abnormal Patterns (Gait)  antalgic;venus decreased;gait speed decreased  -AF      Bilateral Gait Deviations  forward flexed posture  -AF      Recorded by [AF] Isidra Sena, PTA 12/07/19 1457      Row Name 12/07/19 1409             Static Sitting Balance    Level of [place] (Unsupported Sitting, Static Balance)  conditional independence  -AF      Sitting Position (Unsupported Sitting, Static Balance)  sitting on edge of bed  -AF       Recorded by [AF] Isidra Sena, MIHAI 12/07/19 1457      Row Name 12/07/19 1409             Positioning and Restraints    Pre-Treatment Position  in bed  -AF      Post Treatment Position  bed  -AF      In Bed  fowlers;call light within reach;encouraged to call for assist;exit alarm on  -AF      Recorded by [AF] Isidra Sena, MIHAI 12/07/19 1457      Row Name 12/07/19 1409             Pain Scale: Numbers Pre/Post-Treatment    Pain Scale: Numbers, Pretreatment  0/10 - no pain  -AF      Pain Scale: Numbers, Post-Treatment  5/10  -AF      Pain Location - Side  Right  -AF      Pain Location  hip  -AF      Pain Intervention(s)  Repositioned;Ambulation/increased activity  -AF      Recorded by [AF] Isidra Sena, PTA 12/07/19 1457      Row Name                Wound 12/02/19 0045 Bilateral gluteal MASD (Moisutre associated skin damage)    Wound - Properties Group Date first assessed: 12/02/19 [EL] Time first assessed: 0045 [EL] Present on Hospital Admission: N [EL] Side: Bilateral [EL] Location: gluteal [EL] Primary Wound Type: MASD [EL] Recorded by:  [EL] Silvana James, RNA 12/03/19 0148    Row Name                Wound 12/03/19 1711 Right hip Incision    Wound - Properties Group Date first assessed: 12/03/19 [SVETLANA] Time first assessed: 1711 [SVETLANA] Side: Right [SVETLANA] Location: hip [SVETLANA] Primary Wound Type: Incision [SVETLANA] Recorded by:  [SVETLANA] Christy Bryan RN 12/03/19 1711      User Key  (r) = Recorded By, (t) = Taken By, (c) = Cosigned By    Initials Name Effective Dates Discipline    Amaya Azevedo, PTA 08/02/16 -  PT    Christy Carpio RN 08/02/16 -  Nurse    Silvana Mcgraw, RNA 06/09/17 -  Nurse    Isidra Wray, MIHAI 02/11/19 -  PT          Wound 12/02/19 0045 Bilateral gluteal MASD (Moisutre associated skin damage) (Active)   Dressing Appearance no drainage;open to air 12/7/2019  8:05 AM   Base dry;red 12/7/2019  8:05 AM   Periwound dry;pink 12/7/2019  8:05 AM   Drainage Amount none  12/7/2019  8:05 AM   Dressing Care, Wound open to air 12/7/2019  8:05 AM   Periwound Care, Wound dry periwound area maintained 12/7/2019  8:05 AM       Wound 12/03/19 1711 Right hip Incision (Active)   Dressing Appearance dry;intact;no drainage 12/7/2019  8:05 AM   Closure BALJINDER 12/7/2019  8:05 AM   Base dressing in place, unable to visualize 12/7/2019  8:05 AM   Drainage Amount none 12/7/2019  8:05 AM   Dressing Care, Wound foam 12/7/2019  8:05 AM               PT Recommendation and Plan     Plan of Care Reviewed With: patient  Progress: improving  Outcome Summary: Pt. treatment completed. He sat EOB from supine w/ Tina and immediately reported dizziness. Pt. was instructed to stay seated and wait for the symptoms to subside before standing. He stood w/ Tina and RWX and again reported dizziness. Pt. amb 32'x2 w/ CGA, RWX, and a chair following. He stopped and took 2 brief standing rests but declined all offers to have a seat while up in the hallway. Back in room, pt. lied back supine w/ verbal cues and supervision. Pt. would benefit from continued OOB activity.  Outcome Measures     Row Name 12/07/19 1409 12/06/19 1300 12/06/19 1100       How much help from another person do you currently need...    Turning from your back to your side while in flat bed without using bedrails?  3  -AF  --  3  -NW    Moving from lying on back to sitting on the side of a flat bed without bedrails?  3  -AF  --  3  -NW    Moving to and from a bed to a chair (including a wheelchair)?  3  -AF  --  3  -NW    Standing up from a chair using your arms (e.g., wheelchair, bedside chair)?  2  -AF  --  2  -NW    Climbing 3-5 steps with a railing?  1  -AF  --  1  -NW    To walk in hospital room?  3  -AF  --  3  -NW    AM-PAC 6 Clicks Score (PT)  15  -AF  --  15  -NW       How much help from another is currently needed...    Putting on and taking off regular lower body clothing?  --  2  -TS  --    Bathing (including washing, rinsing, and drying)  --   2  -TS  --    Toileting (which includes using toilet bed pan or urinal)  --  2  -TS  --    Putting on and taking off regular upper body clothing  --  3  -TS  --    Taking care of personal grooming (such as brushing teeth)  --  3  -TS  --    Eating meals  --  4  -TS  --    AM-PAC 6 Clicks Score (OT)  --  16  -TS  --       Functional Assessment    Outcome Measure Options  AM-PAC 6 Clicks Basic Mobility (PT)  -AF  AM-PAC 6 Clicks Daily Activity (OT)  -TS  AM-[facility] 6 Clicks Basic Mobility (PT)  -NW    Row Name 12/05/19 1200 12/05/19 1000 12/04/19 1600       How much help from another person do you currently need...    Turning from your back to your side while in flat bed without using bedrails?  --  3  -NW  --    Moving from lying on back to sitting on the side of a flat bed without bedrails?  --  2  -NW  --    Moving to and from a bed to a chair (including a wheelchair)?  --  3  -NW  --    Standing up from a chair using your arms (e.g., wheelchair, bedside chair)?  --  2  -NW  --    Climbing 3-5 steps with a railing?  --  1  -NW  --    To walk in hospital room?  --  3  -NW  --    AM-PAC 6 Clicks Score (PT)  --  14  -NW  --       How much help from another is currently needed...    Putting on and taking off regular lower body clothing?  2  -TS  --  2  -MM (r) CN (t) MM (c)    Bathing (including washing, rinsing, and drying)  2  -TS  --  2  -MM (r) CN (t) MM (c)    Toileting (which includes using toilet bed pan or urinal)  2  -TS  --  3  -MM (r) CN (t) MM (c)    Putting on and taking off regular upper body clothing  3  -TS  --  3  -MM (r) CN (t) MM (c)    Taking care of personal grooming (such as brushing teeth)  3  -TS  --  4  -MM (r) CN (t) MM (c)    Eating meals  4  -TS  --  4  -MM (r) CN (t) MM (c)    AM-PAC 6 Clicks Score (OT)  16  -TS  --  18  -MM (r) CN (t)       Functional Assessment    Outcome Measure Options  AM-PAC 6 Clicks Daily Activity (OT)  -TS  AM-PAC 6 Clicks Basic Mobility (PT)  -NW  AM-PAC 6 Clicks Daily  Activity (OT)  -MM (r) CN (t) MM (c)      User Key  (r) = Recorded By, (t) = Taken By, (c) = Cosigned By    Initials Name Provider Type    TS Carmen Mcgraw, DE [place]/L Occupational Therapy Assistant    NW Sherine Myers, PTA Physical Therapy Assistant    MM Ky Holliday, OTR/L Occupational Therapist    AF Ramsey, Isidra, PTA Physical Therapy Assistant    CN Isha Adames, OT Student OT Student         Time Calculation:   PT Charges     Row Name 12/07/19 1502             Time Calculation    Start Time  1409  -AF      Stop Time  1448  -AF      Time Calculation (min)  39 min  -AF      PT Received On  12/07/19  -AF      PT Goal Re-Cert Due Date  12/14/19  -AF         Timed Charges    53629 - PT Therapeutic Exercise Minutes  --  -AF      85420 - Gait Training Minutes   25  -AF      95937 - PT Therapeutic Activity Minutes  14  -AF        User Key  (r) = Recorded By, (t) = Taken By, (c) = Cosigned By    Initials Name Provider Type    AF Ramsey, Isidra, PTA Physical Therapy Assistant        Therapy Charges for Today     Code Description Service Date Service Provider Modifiers Qty    27774768882 HC GAIT TRAINING EA 15 MIN 12/7/2019 Ramsey, Isidra, PTA GP 2    47691711499 HC PT THERAPEUTIC ACT EA 15 MIN 12/7/2019 Ramsey, Isidra, PTA GP 1          PT G-Codes  Outcome Measure Options: AM-PAC 6 Clicks Basic Mobility (PT)  AM-PAC 6 Clicks Score (PT): 15  AM-PAC 6 Clicks Score (OT): 16    Isidra Ramsey, PTA  12/7/2019

## 2019-12-07 NOTE — PLAN OF CARE
Problem: Patient Care Overview  Goal: Plan of Care Review  Outcome: Ongoing (interventions implemented as appropriate)  Flowsheets (Taken 12/7/2019 0446)  Progress: no change  Plan of Care Reviewed With: patient  Outcome Summary: Pt A&O x4, c/o pain upon moving, still refuses pain medication.  Pt rested well throughout night. BP continuing to improve. will continue to monitor  Goal: Individualization and Mutuality  Outcome: Ongoing (interventions implemented as appropriate)  Goal: Discharge Needs Assessment  Outcome: Ongoing (interventions implemented as appropriate)  Goal: Interprofessional Rounds/Family Conf  Outcome: Ongoing (interventions implemented as appropriate)     Problem: Pain, Chronic (Adult)  Goal: Acceptable Pain/Comfort Level and Functional Ability  Outcome: Ongoing (interventions implemented as appropriate)     Problem: Fracture Orthopaedic (Adult)  Goal: Signs and Symptoms of Listed Potential Problems Will be Absent, Minimized or Managed (Fracture Orthopaedic)  Outcome: Ongoing (interventions implemented as appropriate)     Problem: Skin Injury Risk (Adult)  Goal: Skin Health and Integrity  Outcome: Ongoing (interventions implemented as appropriate)     Problem: Wound (Includes Pressure Injury) (Adult)  Goal: Signs and Symptoms of Listed Potential Problems Will be Absent, Minimized or Managed (Wound)  Outcome: Ongoing (interventions implemented as appropriate)     Problem: Urine Elimination Impaired (Adult)  Goal: Effective or Improved Urinary Elimination  Outcome: Ongoing (interventions implemented as appropriate)  Goal: Effective Containment of Urine  Outcome: Ongoing (interventions implemented as appropriate)  Goal: Reduced Incontinence Episodes  Outcome: Ongoing (interventions implemented as appropriate)

## 2019-12-07 NOTE — PLAN OF CARE
Pt A&Ox4. C/o pain when changing positions. Pain medication has been offered numerous times but pt declines need. R hip drsg CDI. PPP. . Tele- Sinus 61. Stool softner given for bowels, no results at this time. Voiding per urinal. VSS. Safety maintained. Will continue to monitor.

## 2019-12-07 NOTE — PROGRESS NOTES
[facility] Medicine Services  INPATIENT PROGRESS NOTE    Patient Name: Vasile Deng  Date of Admission: 11/27/2019  Today's Date: 12/07/19  Length of Stay: 8  Primary Care Physician: Provider, No Known    Subjective   Chief Complaint: Hip pain  HPI   Doing ok.  Pain controlled  No black or bloody stools  No SOA        Review of Systems   Constitutional: Negative for fatigue and fever.   HENT: Negative for congestion and ear pain.    Eyes: Negative for redness and visual disturbance.   Respiratory: Negative for cough, shortness of breath and wheezing.    Cardiovascular: Negative for chest pain and palpitations.   Gastrointestinal: Negative for abdominal pain, diarrhea, nausea and vomiting.   Endocrine: Negative for cold intolerance and heat intolerance.   Genitourinary: Negative for dysuria and frequency.   Musculoskeletal: Positive for arthralgias. Negative for back pain.   Skin: Negative for rash and wound.   Neurological: Negative for dizziness and headaches.   Psychiatric/Behavioral: Negative for confusion. The patient is not nervous/anxious.         All pertinent negatives and positives are as above. All other systems have been reviewed and are negative unless otherwise stated.     Objective    Temp:  [97.7 °F (36.5 °C)-97.8 °F (36.6 °C)] 97.8 °F (36.6 °C)  Heart Rate:  [57-69] 57  Resp:  [16-18] 16  BP: (145-152)/(76-80) 145/76  Physical Exam   Constitutional: He is oriented to person, place, and time. He appears well-developed and well-nourished.   HENT:   Head: Normocephalic and atraumatic.   Right Ear: External ear normal.   Left Ear: External ear normal.   Nose: Nose normal.   Mouth/Throat: Oropharynx is clear and moist.   Eyes: Pupils are equal, round, and reactive to light. Conjunctivae and EOM are normal. Right eye exhibits no discharge. Left eye exhibits no discharge. No scleral icterus.   Neck: Normal range of motion. Neck supple. No tracheal deviation present.  No thyromegaly present.   Cardiovascular: Normal rate, regular rhythm, normal heart sounds and intact distal pulses. Exam reveals no gallop and no friction rub.   No murmur heard.  Pulmonary/Chest: Effort normal and breath sounds normal. No stridor. No respiratory distress. He has no wheezes. He has no rales. He exhibits no tenderness.   Abdominal: Soft. Bowel sounds are normal. He exhibits no distension and no mass. There is no tenderness. There is no rebound and no guarding. No hernia.   Musculoskeletal: Normal range of motion. He exhibits no edema or deformity.   Lymphadenopathy:     He has no cervical adenopathy.   Neurological: He is alert and oriented to person, place, and time. He has normal reflexes. He displays normal reflexes. No cranial nerve deficit. He exhibits normal muscle tone. Coordination normal.   Skin: Skin is warm and dry. No rash noted. No erythema. No pallor.   Psychiatric: He has a normal mood and affect. His behavior is normal. Judgment and thought content normal.   Vitals reviewed.          Results Review:  I have reviewed the labs, radiology results, and diagnostic studies.    Laboratory Data:   Results from last 7 days   Lab Units 12/07/19  0408 12/06/19  0620 12/05/19  0442   WBC 10*3/mm3 5.71 5.31 5.55   HEMOGLOBIN g/dL 10.8* 11.2* 10.8*   HEMATOCRIT % 31.8* 32.7* 32.0*   PLATELETS 10*3/mm3 190 175 177        Results from last 7 days   Lab Units 12/07/19  0408 12/06/19  0620 12/05/19  0442   SODIUM mmol/L 129* 129* 128*   POTASSIUM mmol/L 3.8 3.9 3.6   CHLORIDE mmol/L 93* 94* 93*   CO2 mmol/L 27.0 26.0 26.0   BUN mg/dL 15 14 13   CREATININE mg/dL 0.61* 0.80 0.73*   CALCIUM mg/dL 8.4* 8.5* 8.5*   BILIRUBIN mg/dL 0.6 0.5 0.5   ALK PHOS U/L 115 120* 109   ALT (SGPT) U/L 34 28 24   AST (SGOT) U/L 31 28 20   GLUCOSE mg/dL 114* 133* 103*       Culture Data:   No results found for: BLOODCX, URINECX, WOUNDCX, MRSACX, RESPCX, STOOLCX    Radiology Data:   Imaging Results (Last 24 Hours)     ** No  results found for the last 24 hours. **          I have reviewed the patient's current medications.     Assessment/Plan     Active Hospital Problems    Diagnosis   • **Acute pain of right hip   • Open right hip fracture, type III, initial encounter (CMS/[facility])   • Fall   • Hyponatremia   • Closed intertrochanteric fracture of hip, right, initial encounter (CMS/[facility])     Added automatically from request for surgery [id number]     • Postural dizziness with near syncope   • On continuous oral anticoagulation   • Chronic systolic congestive heart failure (CMS/[facility])   • PAF (paroxysmal atrial fibrillation) (CMS/[facility])   • Essential hypertension   • Coronary artery disease involving coronary bypass graft of native heart without angina pectoris        1.  R hip fracture  -Ortho following  -S/P repair  -PT/OT  -DVT PPX with Xarelto     2.  Hyponatremia  -resolved with IVF     3.  A-fib  -metoprolol  -Amio  -Xarelto     4.  HTN  -Metoprolol     5.  CAD  -ASA  -Metoprolol                 Discharge Planning: I expect the patient to be discharged to rehab on Monday    Bob Haider MD   12/07/19   12:57 PM

## 2019-12-08 LAB
ALBUMIN SERPL-MCNC: 3.1 G/DL (ref 3.5–5.2)
ALBUMIN/GLOB SERPL: 0.9 G/DL
ALP SERPL-CCNC: 126 U/L (ref 39–117)
ALT SERPL W P-5'-P-CCNC: 44 U/L (ref 1–41)
ANION GAP SERPL CALCULATED.3IONS-SCNC: 10 MMOL/L (ref 5–15)
AST SERPL-CCNC: 38 U/L (ref 1–40)
BASOPHILS # BLD AUTO: 0.05 10*3/MM3 (ref 0–0.2)
BASOPHILS NFR BLD AUTO: 0.9 % (ref 0–1.5)
BILIRUB SERPL-MCNC: 0.6 MG/DL (ref 0.2–1.2)
BUN BLD-MCNC: 14 MG/DL (ref 8–23)
BUN/CREAT SERPL: 22.6 (ref 7–25)
CALCIUM SPEC-SCNC: 8.6 MG/DL (ref 8.6–10.5)
CHLORIDE SERPL-SCNC: 94 MMOL/L (ref 98–107)
CO2 SERPL-SCNC: 24 MMOL/L (ref 22–29)
CREAT BLD-MCNC: 0.62 MG/DL (ref 0.76–1.27)
DEPRECATED RDW RBC AUTO: 41.7 FL (ref 37–54)
EOSINOPHIL # BLD AUTO: 0.38 10*3/MM3 (ref 0–0.4)
EOSINOPHIL NFR BLD AUTO: 7 % (ref 0.3–6.2)
ERYTHROCYTE [DISTWIDTH] IN BLOOD BY AUTOMATED COUNT: 13.6 % (ref 12.3–15.4)
GFR SERPL CREATININE-BSD FRML MDRD: 122 ML/MIN/1.73
GLOBULIN UR ELPH-MCNC: 3.3 GM/DL
GLUCOSE BLD-MCNC: 107 MG/DL (ref 65–99)
HCT VFR BLD AUTO: 35.6 % (ref 37.5–51)
HGB BLD-MCNC: 12.1 G/DL (ref 13–17.7)
IMM GRANULOCYTES # BLD AUTO: 0.07 10*3/MM3 (ref 0–0.05)
IMM GRANULOCYTES NFR BLD AUTO: 1.3 % (ref 0–0.5)
LYMPHOCYTES # BLD AUTO: 1.3 10*3/MM3 (ref 0.7–3.1)
LYMPHOCYTES NFR BLD AUTO: 24.1 % (ref 19.6–45.3)
MCH RBC QN AUTO: 28.6 PG (ref 26.6–33)
MCHC RBC AUTO-ENTMCNC: 34 G/DL (ref 31.5–35.7)
MCV RBC AUTO: 84.2 FL (ref 79–97)
MONOCYTES # BLD AUTO: 0.56 10*3/MM3 (ref 0.1–0.9)
MONOCYTES NFR BLD AUTO: 10.4 % (ref 5–12)
NEUTROPHILS # BLD AUTO: 3.04 10*3/MM3 (ref 1.7–7)
NEUTROPHILS NFR BLD AUTO: 56.3 % (ref 42.7–76)
NRBC BLD AUTO-RTO: 0 /100 WBC (ref 0–0.2)
PLATELET # BLD AUTO: 196 10*3/MM3 (ref 140–450)
PMV BLD AUTO: 9.5 FL (ref 6–12)
POTASSIUM BLD-SCNC: 3.8 MMOL/L (ref 3.5–5.2)
PROT SERPL-MCNC: 6.4 G/DL (ref 6–8.5)
RBC # BLD AUTO: 4.23 10*6/MM3 (ref 4.14–5.8)
SODIUM BLD-SCNC: 128 MMOL/L (ref 136–145)
WBC NRBC COR # BLD: 5.4 10*3/MM3 (ref 3.4–10.8)

## 2019-12-08 PROCEDURE — 85025 COMPLETE CBC W/AUTO DIFF WBC: CPT | Performed by: FAMILY MEDICINE

## 2019-12-08 PROCEDURE — 97110 THERAPEUTIC EXERCISES: CPT

## 2019-12-08 PROCEDURE — 80053 COMPREHEN METABOLIC PANEL: CPT | Performed by: FAMILY MEDICINE

## 2019-12-08 PROCEDURE — 97530 THERAPEUTIC ACTIVITIES: CPT

## 2019-12-08 PROCEDURE — 94799 UNLISTED PULMONARY SVC/PX: CPT

## 2019-12-08 PROCEDURE — 94760 N-INVAS EAR/PLS OXIMETRY 1: CPT

## 2019-12-08 RX ORDER — METOPROLOL SUCCINATE 25 MG/1
12.5 TABLET, EXTENDED RELEASE ORAL
Status: DISCONTINUED | OUTPATIENT
Start: 2019-12-09 | End: 2019-12-09 | Stop reason: HOSPADM

## 2019-12-08 RX ADMIN — ASPIRIN 81 MG: 81 TABLET ORAL at 08:03

## 2019-12-08 RX ADMIN — AMIODARONE HYDROCHLORIDE 100 MG: 200 TABLET ORAL at 20:35

## 2019-12-08 RX ADMIN — SENNOSIDES AND DOCUSATE SODIUM 2 TABLET: 8.6; 5 TABLET ORAL at 08:04

## 2019-12-08 RX ADMIN — PANTOPRAZOLE SODIUM 40 MG: 40 TABLET, DELAYED RELEASE ORAL at 08:03

## 2019-12-08 RX ADMIN — METOPROLOL SUCCINATE 25 MG: 25 TABLET, FILM COATED, EXTENDED RELEASE ORAL at 08:03

## 2019-12-08 RX ADMIN — SODIUM CHLORIDE, PRESERVATIVE FREE 10 ML: 5 INJECTION INTRAVENOUS at 08:05

## 2019-12-08 RX ADMIN — AMIODARONE HYDROCHLORIDE 100 MG: 200 TABLET ORAL at 08:05

## 2019-12-08 RX ADMIN — ALLOPURINOL 100 MG: 100 TABLET ORAL at 08:03

## 2019-12-08 RX ADMIN — TAMSULOSIN HYDROCHLORIDE 0.4 MG: 0.4 CAPSULE ORAL at 20:35

## 2019-12-08 RX ADMIN — RIVAROXABAN 15 MG: 15 TABLET, FILM COATED ORAL at 17:52

## 2019-12-08 RX ADMIN — FINASTERIDE 5 MG: 5 TABLET, FILM COATED ORAL at 08:04

## 2019-12-08 NOTE — PLAN OF CARE
Problem: Patient Care Overview  Goal: Plan of Care Review  12/8/2019 1358 by Isidra Sena PTA  Outcome: Ongoing (interventions implemented as appropriate)  Flowsheets (Taken 12/8/2019 1319)  Progress: no change  Plan of Care Reviewed With: patient  Outcome Summary: Pt. performed B LE  AROM and core exercises 10x2 supine. Pt. demonstrated R LE weakness compared to the L LE and reported pain and discomfort. Pt. was able to scoot himself up toward HOB w/ VCs and supervision. Pt. vocalized his constipation frustrations as they are keeping him from D/C to [facility]. Pt. would benefit from increased strengthening and amb.

## 2019-12-08 NOTE — PLAN OF CARE
Problem: Patient Care Overview  Goal: Plan of Care Review  Outcome: Ongoing (interventions implemented as appropriate)  Flowsheets (Taken 12/8/2019 0809)  Progress: no change  Plan of Care Reviewed With: patient  Outcome Summary: Pt. treatment completed. His blood pressure remained decreased among sitting from supine, therefore, decreased amb was performed. Sitting EOB, pt. performed B LE AROM exercises x20. Pt. required Tina for bed mobility. He stood w/ Tina and amb 15' in room w/ CGA and RWX. Pt. demonstrated increased fatigue w/ activity today. Pt. plans to D/C to [facility] tomorrow. He would continue to benefit from increased strengthening and OOB activity as appropriate. Continue to monitor B/P.

## 2019-12-08 NOTE — THERAPY TREATMENT NOTE
Acute Care - Physical Therapy Treatment Note  [facility]     Patient Name: Vasile Deng  : 3/27/1931  MRN: [id number]  Today's Date: 2019  Onset of Illness/Injury or Date of Surgery: 19     Referring Physician: Daniel Carrion MD    Admit Date: 2019    Visit Dx:    ICD-10-CM ICD-9-CM   1. Fall, initial encounter W19.XXXA E888.9   2. Right hip pain M25.551 719.45   3. Intractable pain R52 780.96   4. Closed intertrochanteric fracture of hip, right, initial encounter (CMS/[facility]) S72.141A 820.21   5. Impaired mobility Z74.09 799.89   6. Decreased activities of daily living (ADL) Z78.9 V49.89   7. Impaired mobility and ADLs Z74.09 799.89     Patient Active Problem List   Diagnosis   • Coronary artery disease involving coronary bypass graft of native heart without angina pectoris   • Hx of CABG   • Essential hypertension   • Mixed hyperlipidemia   • Former smoker   • PAF (paroxysmal atrial fibrillation) (CMS/HCC)   • Syncope   • Dyspnea   • Elevated troponin   • NSTEMI (non-ST elevated myocardial infarction) (CMS/HCC)   • Chronic systolic congestive heart failure (CMS/HCC)   • On continuous oral anticoagulation   • Postural dizziness with near syncope   • Fall   • Acute pain of right hip   • Hyponatremia   • Open right hip fracture, type III, initial encounter (CMS/HCC)   • Closed intertrochanteric fracture of hip, right, initial encounter (CMS/HCC)       Therapy Treatment    Rehabilitation Treatment Summary     Row Name 19             Treatment Time/Intention    Discipline  physical therapy assistant  -AF      Document Type  therapy note (daily note)  -AF2      Subjective Information  complains of;pain;dizziness;fatigue;weakness  -AF2      Mode of Treatment  physical therapy  -AF2      Existing Precautions/Restrictions  fall;right;hip  -AF      Recorded by [AF] Isidra Sena, PTA 19  [AF2] Isidra Sena, PTA 19 0858      Row Name 19             Vital  Signs    Pre Systolic BP Rehab  117  -AF      Pre Treatment Diastolic BP  56  -AF      Intra Systolic BP Rehab  93  -AF      Intra Treatment Diastolic BP  48  -AF      Post Systolic BP Rehab  97  -AF      Post Treatment Diastolic BP  47  -AF      Recorded by [AF] Isidra Sena, PTA 12/08/19 0858      Row Name 12/08/19 0809             Bed Mobility Assessment/Treatment    Supine-Sit [place] (Bed Mobility)  verbal cues;minimum assist (75% patient effort)  -AF      Sit-Supine [place] (Bed Mobility)  verbal cues;minimum assist (75% patient effort)  -AF      Assistive Device (Bed Mobility)  head of bed elevated  -AF      Recorded by [AF] Isidra Sena, PTA 12/08/19 0858      Row Name 12/08/19 0809             Sit-Stand Transfer    Sit-Stand [place] (Transfers)  verbal cues;minimum assist (75% patient effort)  -AF      Assistive Device (Sit-Stand Transfers)  walker, front-wheeled  -AF      Recorded by [AF] Isidra Sena, PTA 12/08/19 0858      Row Name 12/08/19 0809             Stand-Sit Transfer    Stand-Sit [place] (Transfers)  verbal cues;contact guard  -AF      Assistive Device (Stand-Sit Transfers)  walker, front-wheeled  -AF      Recorded by [AF] Isidra Sena, PTA 12/08/19 0858      Row Name 12/08/19 0809             Gait/Stairs Assessment/Training    [place] Level (Gait)  contact guard  -AF      Assistive Device (Gait)  walker, front-wheeled  -AF      Distance in Feet (Gait)  15' in room  -AF      Pattern (Gait)  step-to  -AF      Deviations/Abnormal Patterns (Gait)  antalgic;venus decreased;gait speed decreased  -AF      Bilateral Gait Deviations  forward flexed posture  -AF      Recorded by [AF] Isidra Sena, PTA 12/08/19 0858      Row Name 12/08/19 0809             Therapeutic Exercise    Lower Extremity Range of Motion (Therapeutic Exercise)  hip flexion/extension, bilateral;knee flexion/extension, bilateral;ankle dorsiflexion/plantar flexion, bilateral  -AF       "Exercise Type (Therapeutic Exercise)  AROM (active range of motion)  -AF      Position (Therapeutic Exercise)  seated  -AF      Sets/Reps (Therapeutic Exercise)  20  -AF      Recorded by [AF] Isidra Sena, PTA 12/08/19 0858      Row Name 12/08/19 0809             Static Sitting Balance    Level of [place] (Unsupported Sitting, Static Balance)  conditional independence  -AF      Sitting Position (Unsupported Sitting, Static Balance)  sitting on edge of bed  -AF      Recorded by [AF] Isidra Sena, PTA 12/08/19 0858      Row Name 12/08/19 0809             Static Standing Balance    Level of [place] (Supported Standing, Static Balance)  contact guard assist  -AF      Assistive Device Utilized (Supported Standing, Static Balance)  walker, rolling  -AF      Recorded by [AF] Isidra Sena, PTA 12/08/19 0858      Row Name 12/08/19 0809             Positioning and Restraints    Pre-Treatment Position  in bed  -AF      Post Treatment Position  bed  -AF      In Bed  fowlers;call light within reach;encouraged to call for assist;exit alarm on;with family/caregiver  -AF      Recorded by [AF] Isidra Sena, PTA 12/08/19 0858      Row Name 12/08/19 0809             Pain Scale: Numbers Pre/Post-Treatment    Pain Scale: Numbers, Pretreatment  -- \"27/10\"  -AF      Pain Location - Side  Right  -AF      Pain Location  hip  -AF      Pain Intervention(s)  Repositioned;Ambulation/increased activity  -AF      Recorded by [AF] Isidar Sena, PTA 12/08/19 0858      Row Name                Wound 12/02/19 0045 Bilateral gluteal MASD ([place] associated skin damage)    Wound - Properties Group Date first assessed: 12/02/19 [EL] Time first assessed: 0045 [EL] Present on Hospital Admission: N [EL] Side: Bilateral [EL] Location: gluteal [EL] Primary Wound Type: MASD [EL] Recorded by:  [EL] Silvana James RNA 12/03/19 0148    Row Name                Wound 12/03/19 1711 Right hip Incision    Wound - Properties " Group Date first assessed: 12/03/19 [SVETLANA] Time first assessed: 1711 [SVETLANA] Side: Right [SVETLANA] Location: hip [SVETLANA] Primary Wound Type: Incision [SVETLANA] Recorded by:  [SVETLANA] Christy Bryan RN 12/03/19 1711      User Key  (r) = Recorded By, (t) = Taken By, (c) = Cosigned By    Initials Name Effective Dates Discipline    Christy Carpio RN 08/02/16 -  Nurse    Silvana Mcgraw RNA 06/09/17 -  Nurse    Isidra Wray PTA 02/11/19 -  PT          Wound 12/02/19 0045 Bilateral gluteal MASD (M[place] associated skin damage) (Active)   Dressing Appearance open to air 12/7/2019  8:00 PM   Base dry;red 12/7/2019  8:00 PM   Periwound dry;pink 12/7/2019  8:00 PM   Drainage Amount none 12/7/2019  8:00 PM   Dressing Care, Wound open to air 12/7/2019  8:00 PM   Periwound Care, Wound dry periwound area maintained 12/7/2019  8:00 PM       Wound 12/03/19 1711 Right hip Incision (Active)   Dressing Appearance dry;intact;no drainage 12/7/2019  8:00 PM   Closure BALJINDER 12/7/2019  8:00 PM               PT Recommendation and Plan     Plan of Care Reviewed With: patient  Progress: no change  Outcome Summary: Pt. treatment completed. His blood pressure remained decreased among sitting from supine, therefore, decreased amb was performed. Sitting EOB, pt. performed B LE AROM exercises x20. Pt. required Tina for bed mobility. He stood w/ Tina and amb 15' in room w/ CGA and RWX. Pt. demonstrated increased fatigue w/ activity today. Pt. plans to D/C to [facility] tomorrow. He would continue to benefit from increased strengthening and OOB activity as appropriate. Continue to monitor B/P.  Outcome Measures     Row Name 12/08/19 0809 12/07/19 1409 12/06/19 1300       How much help from another person do you currently need...    Turning from your back to your side while in flat bed without using bedrails?  3  -AF  3  -AF  --    Moving from lying on back to sitting on the side of a flat bed without bedrails?  3  -AF  3  -AF  --    Moving  to and from a bed to a chair (including a wheelchair)?  3  -AF  3  -AF  --    Standing up from a chair using your arms (e.g., wheelchair, bedside chair)?  2  -AF  2  -AF  --    Climbing 3-5 steps with a railing?  1  -AF  1  -AF  --    To walk in hospital room?  3  -AF  3  -AF  --    AM-PAC 6 Clicks Score (PT)  15  -AF  15  -AF  --       How much help from another is currently needed...    Putting on and taking off regular lower body clothing?  --  --  2  -TS    Bathing (including washing, rinsing, and drying)  --  --  2  -TS    Toileting (which includes using toilet bed pan or urinal)  --  --  2  -TS    Putting on and taking off regular upper body clothing  --  --  3  -TS    Taking care of personal grooming (such as brushing teeth)  --  --  3  -TS    Eating meals  --  --  4  -TS    AM-PAC 6 Clicks Score (OT)  --  --  16  -TS       Functional Assessment    Outcome Measure Options  AM-PAC 6 Clicks Basic Mobility (PT)  -AF  AM-PAC 6 Clicks Basic Mobility (PT)  -AF  AM-PAC 6 Clicks Daily Activity (OT)  -TS    Row Name 12/06/19 1100 12/05/19 1200 12/05/19 1000       How much help from another person do you currently need...    Turning from your back to your side while in flat bed without using bedrails?  3  -NW  --  3  -NW    Moving from lying on back to sitting on the side of a flat bed without bedrails?  3  -NW  --  2  -NW    Moving to and from a bed to a chair (including a wheelchair)?  3  -NW  --  3  -NW    Standing up from a chair using your arms (e.g., wheelchair, bedside chair)?  2  -NW  --  2  -NW    Climbing 3-5 steps with a railing?  1  -NW  --  1  -NW    To walk in hospital room?  3  -NW  --  3  -NW    AM-PAC 6 Clicks Score (PT)  15  -NW  --  14  -NW       How much help from another is currently needed...    Putting on and taking off regular lower body clothing?  --  2  -TS  --    Bathing (including washing, rinsing, and drying)  --  2  -TS  --    Toileting (which includes using toilet bed pan or urinal)  --   2  -TS  --    Putting on and taking off regular upper body clothing  --  3  -TS  --    Taking care of personal grooming (such as brushing teeth)  --  3  -TS  --    Eating meals  --  4  -TS  --    AM-PAC 6 Clicks Score (OT)  --  16  -TS  --       Functional Assessment    Outcome Measure Options  AM-PAC 6 Clicks Basic Mobility (PT)  -NW  AM-PAC 6 Clicks Daily Activity (OT)  -TS  AM-PAC 6 Clicks Basic Mobility (PT)  -NW      User Key  (r) = Recorded By, (t) = Taken By, (c) = Cosigned By    Initials Name Provider Type    TS Carmen Mcgraw, [place]/L Occupational Therapy Assistant    NW Sherine Myers, MIHAI Physical Therapy Assistant    AF Isidra Sena, MIHAI Physical Therapy Assistant         Time Calculation:   PT Charges     Row Name 12/08/19 0904             Time Calculation    Start Time  0809  -AF      Stop Time  0853  -AF      Time Calculation (min)  44 min  -AF      PT Received On  12/08/19  -AF      PT Goal Re-Cert Due Date  12/14/19  -AF         Timed Charges    65044 - PT Therapeutic Exercise Minutes  14  -AF      04871 - PT Therapeutic Activity Minutes  30  -AF        User Key  (r) = Recorded By, (t) = Taken By, (c) = Cosigned By    Initials Name Provider Type    AF Chisholm, Isidra, PTA Physical Therapy Assistant        Therapy Charges for Today     Code Description Service Date Service Provider Modifiers Qty    32836576039 HC GAIT TRAINING EA 15 MIN 12/7/2019 Chisholm, Isidra, PTA GP 2    67245679404 HC PT THERAPEUTIC ACT EA 15 MIN 12/7/2019 Chisholm, Isidra, PTA GP 1    82204665172 HC PT THER PROC EA 15 MIN 12/8/2019 Chisholm, Isidra, PTA GP 1    99459020795 HC PT THERAPEUTIC ACT EA 15 MIN 12/8/2019 Chisholm, Isidra, PTA GP 2          PT G-Codes  Outcome Measure Options: AM-PAC 6 Clicks Basic Mobility (PT)  AM-PAC 6 Clicks Score (PT): 15  AM-PAC 6 Clicks Score (OT): 16    Isidra Nathen PTA  12/8/2019

## 2019-12-08 NOTE — PLAN OF CARE
A&Ox4 but forgetful @ times. C/o pain. Pain medication offered, but pt declines. R hip drsg is CDI. PPP. . Tele- Sinus 82. BM today. Voiding per urinal. [place]. VSS. Safety maintained. Will continue to monitor.

## 2019-12-08 NOTE — PROGRESS NOTES
RT Nebulizer Protocol    Assessment tool to be used for patients with existing breathing treatments ordered by hospitalists                                                                  0  1  2  3  4      Respiratory History   No Smoking    Smoking History X   1 Pack/Day    Pulmonary Disease    Exacerbation      Respiratory Rate   Normal X   20-25    Dyspneic    Accessory Muscles    Severe Dyspnea      Breath Sounds   Clear X   Crackles    Crackles/ Rhonchi    Wheezing    Absent/ Severe Wheezing      Chest   X-ray   Clear    1 Lobe Infiltration/ Consolidation/ PE    2 Lobe Same Lung Infiltration/ Consolidation/ PE     2 Lobe Infiltration/ Both Lungs/ Consolidation/ PE    Both Lungs/ More Than 1 Lobe/ Atelectasis/ Consolidation/  PE      Cough   Strong Non- Productive X   Excessive Secretions/ Strong Cough    Excessive Secretions/ Weak Cough    Thick Bronchial Secretions/ Weak Cough    Thick Bronchial Secretions/ No Cough      Total Patient Score =  1  0-4=Q4 PRN  5-9=TID and Q4 PRN  10-14=QID and Q3 PRN  15-19=Q4 and Q2 PRN  20=Q3 and Q2 PRN    Bronchopulmonary Hygiene (CPT)   Q4 ATC Copious secretions, dyspnea, unable to sleep, mucus plug    QID & Q4 PRN Moderate secretions    TID Small amounts of secretions w/ poor cough and history of secretions    BID Unable to deep breathe and cough spontaneously       Lung Expansion Therapy (PEP)   Q4 & PRN at night Severe atelectasis, poor oxygenation    QID      TID At risk for developing atelectasis    BID Unable to deep breathe and cough spontaneously     Instruct, 1 follow up Patients able to perform well on their own      NO CURRENT CHEST XRAY.  MAINTAIN CURRENT THERAPY.

## 2019-12-08 NOTE — PROGRESS NOTES
[facility]  INPATIENT PROGRESS NOTE    Patient Name: Vasile Deng  Date of Admission: 11/27/2019  Today's Date: 12/08/19  Length of Stay: 9  Primary Care Physician: Provider, No Known    Subjective   Chief Complaint: doing well  HPI   Doing well.  No complaints.  Pain tolerable.  No SOA.  Laying in bed on RA.  BP a bit low.  Has been light-headed and dizzy.        Review of Systems   Constitutional: Negative for fatigue and fever.   HENT: Negative for congestion and ear pain.    Eyes: Negative for redness and visual disturbance.   Respiratory: Negative for cough, shortness of breath and wheezing.    Cardiovascular: Negative for chest pain and palpitations.   Gastrointestinal: Negative for abdominal pain, diarrhea, nausea and vomiting.   Endocrine: Negative for cold intolerance and heat intolerance.   Genitourinary: Negative for dysuria and frequency.   Musculoskeletal: Negative for arthralgias and back pain.   Skin: Negative for rash and wound.   Neurological: Positive for dizziness and light-headedness. Negative for headaches.   Psychiatric/Behavioral: Negative for confusion. The patient is not nervous/anxious.         All pertinent negatives and positives are as above. All other systems have been reviewed and are negative unless otherwise stated.     Objective    Temp:  [97.4 °F (36.3 °C)-97.6 °F (36.4 °C)] 97.6 °F (36.4 °C)  Heart Rate:  [58-90] 90  Resp:  [16] 16  BP: (121-129)/(55-60) 121/60  Physical Exam   Constitutional: He is oriented to person, place, and time. He appears well-developed and well-nourished.   HENT:   Head: Normocephalic and atraumatic.   Right Ear: External ear normal.   Left Ear: External ear normal.   Nose: Nose normal.   Mouth/Throat: Oropharynx is clear and moist.   Eyes: Pupils are equal, round, and reactive to light. Conjunctivae and EOM are normal. Right eye exhibits no discharge. Left eye exhibits no discharge. No scleral  icterus.   Neck: Normal range of motion. Neck supple. No tracheal deviation present. No thyromegaly present.   Cardiovascular: Normal rate, regular rhythm, normal heart sounds and intact distal pulses. Exam reveals no gallop and no friction rub.   No murmur heard.  Pulmonary/Chest: Effort normal and breath sounds normal. No stridor. No respiratory distress. He has no wheezes. He has no rales. He exhibits no tenderness.   Abdominal: Soft. Bowel sounds are normal. He exhibits no distension and no mass. There is no tenderness. There is no rebound and no guarding. No hernia.   Musculoskeletal: Normal range of motion. He exhibits no edema or deformity.   Lymphadenopathy:     He has no cervical adenopathy.   Neurological: He is alert and oriented to person, place, and time. He has normal reflexes. He displays normal reflexes. No cranial nerve deficit. He exhibits normal muscle tone. Coordination normal.   Skin: Skin is warm and dry. No rash noted. No erythema. No pallor.   Psychiatric: He has a normal mood and affect. His behavior is normal. Judgment and thought content normal.   Vitals reviewed.          Results Review:  I have reviewed the labs, radiology results, and diagnostic studies.    Laboratory Data:   Results from last 7 days   Lab Units 12/08/19  0534 12/07/19  0408 12/06/19  0620   WBC 10*3/mm3 5.40 5.71 5.31   HEMOGLOBIN g/dL 12.1* 10.8* 11.2*   HEMATOCRIT % 35.6* 31.8* 32.7*   PLATELETS 10*3/mm3 196 190 175        Results from last 7 days   Lab Units 12/08/19  0534 12/07/19  0408 12/06/19  0620   SODIUM mmol/L 128* 129* 129*   POTASSIUM mmol/L 3.8 3.8 3.9   CHLORIDE mmol/L 94* 93* 94*   CO2 mmol/L 24.0 27.0 26.0   BUN mg/dL 14 15 14   CREATININE mg/dL 0.62* 0.61* 0.80   CALCIUM mg/dL 8.6 8.4* 8.5*   BILIRUBIN mg/dL 0.6 0.6 0.5   ALK PHOS U/L 126* 115 120*   ALT (SGPT) U/L 44* 34 28   AST (SGOT) U/L 38 31 28   GLUCOSE mg/dL 107* 114* 133*       Culture Data:   No results found for: BLOODCX, URINECX, WOUNDCX,  MRSACX, RESPCX, STOOLCX    Radiology Data:   Imaging Results (Last 24 Hours)     ** No results found for the last 24 hours. **          I have reviewed the patient's current medications.     Assessment/Plan     Active Hospital Problems    Diagnosis   • **Acute pain of right hip   • Open right hip fracture, type III, initial encounter (CMS/[facility])   • Fall   • Hyponatremia   • Closed intertrochanteric fracture of hip, right, initial encounter (CMS/[facility])     Added automatically from request for surgery [id number]     • Postural dizziness with near syncope   • On continuous oral anticoagulation   • Chronic systolic congestive heart failure (CMS/[facility])   • PAF (paroxysmal atrial fibrillation) (CMS/[facility])   • Essential hypertension   • Coronary artery disease involving coronary bypass graft of native heart without angina pectoris          1.  R hip fracture  -Ortho following  -S/P repair  -PT/OT  -DVT PPX with Xarelto     2.  Hyponatremia  -resolved      3.  A-fib  -metoprolol  -Amio  -Xarelto     4.  HTN  -Metoprolol     5.  CAD  -ASA  -Metoprolol    5.  Hypotension  -reduce Metoprolol dose          Discharge Planning: I expect the patient to be discharged to Rehab in 1 day  Bob Haider MD   12/08/19   1:31 PM

## 2019-12-09 ENCOUNTER — HOSPITAL ENCOUNTER (INPATIENT)
Age: 84
LOS: 9 days | Discharge: ANOTHER HOSPITAL | DRG: 559 | End: 2019-12-18
Attending: PSYCHIATRY & NEUROLOGY | Admitting: HOSPITALIST
Payer: MEDICARE

## 2019-12-09 VITALS
BODY MASS INDEX: 24.64 KG/M2 | DIASTOLIC BLOOD PRESSURE: 56 MMHG | SYSTOLIC BLOOD PRESSURE: 122 MMHG | WEIGHT: 162.6 LBS | TEMPERATURE: 97.9 F | OXYGEN SATURATION: 95 % | HEIGHT: 68 IN | HEART RATE: 57 BPM | RESPIRATION RATE: 16 BRPM

## 2019-12-09 PROBLEM — S72.001A CLOSED RIGHT HIP FRACTURE, INITIAL ENCOUNTER (HCC): Status: ACTIVE | Noted: 2019-12-09

## 2019-12-09 LAB
ALBUMIN SERPL-MCNC: 3.1 G/DL (ref 3.5–5.2)
ALBUMIN/GLOB SERPL: 1 G/DL
ALP SERPL-CCNC: 122 U/L (ref 39–117)
ALT SERPL W P-5'-P-CCNC: 42 U/L (ref 1–41)
ANION GAP SERPL CALCULATED.3IONS-SCNC: 9 MMOL/L (ref 5–15)
AST SERPL-CCNC: 35 U/L (ref 1–40)
BACTERIA: NEGATIVE /HPF
BASOPHILS # BLD AUTO: 0.04 10*3/MM3 (ref 0–0.2)
BASOPHILS NFR BLD AUTO: 0.8 % (ref 0–1.5)
BILIRUB SERPL-MCNC: 0.6 MG/DL (ref 0.2–1.2)
BILIRUBIN URINE: NEGATIVE
BLOOD, URINE: ABNORMAL
BUN BLD-MCNC: 12 MG/DL (ref 8–23)
BUN/CREAT SERPL: 16.2 (ref 7–25)
CALCIUM SPEC-SCNC: 8.5 MG/DL (ref 8.6–10.5)
CHLORIDE SERPL-SCNC: 95 MMOL/L (ref 98–107)
CLARITY: ABNORMAL
CO2 SERPL-SCNC: 27 MMOL/L (ref 22–29)
COLOR: ABNORMAL
CREAT BLD-MCNC: 0.74 MG/DL (ref 0.76–1.27)
DEPRECATED RDW RBC AUTO: 42.8 FL (ref 37–54)
EOSINOPHIL # BLD AUTO: 0.28 10*3/MM3 (ref 0–0.4)
EOSINOPHIL NFR BLD AUTO: 5.8 % (ref 0.3–6.2)
EPITHELIAL CELLS, UA: 0 /HPF (ref 0–5)
ERYTHROCYTE [DISTWIDTH] IN BLOOD BY AUTOMATED COUNT: 14 % (ref 12.3–15.4)
GFR SERPL CREATININE-BSD FRML MDRD: 100 ML/MIN/1.73
GLOBULIN UR ELPH-MCNC: 3.2 GM/DL
GLUCOSE BLD-MCNC: 105 MG/DL (ref 65–99)
GLUCOSE URINE: NEGATIVE MG/DL
HCT VFR BLD AUTO: 32.4 % (ref 37.5–51)
HGB BLD-MCNC: 10.9 G/DL (ref 13–17.7)
HYALINE CASTS: 1 /HPF (ref 0–8)
IMM GRANULOCYTES # BLD AUTO: 0.03 10*3/MM3 (ref 0–0.05)
IMM GRANULOCYTES NFR BLD AUTO: 0.6 % (ref 0–0.5)
KETONES, URINE: NEGATIVE MG/DL
LEUKOCYTE ESTERASE, URINE: ABNORMAL
LYMPHOCYTES # BLD AUTO: 1.42 10*3/MM3 (ref 0.7–3.1)
LYMPHOCYTES NFR BLD AUTO: 29.4 % (ref 19.6–45.3)
MCH RBC QN AUTO: 28.3 PG (ref 26.6–33)
MCHC RBC AUTO-ENTMCNC: 33.6 G/DL (ref 31.5–35.7)
MCV RBC AUTO: 84.2 FL (ref 79–97)
MONOCYTES # BLD AUTO: 0.5 10*3/MM3 (ref 0.1–0.9)
MONOCYTES NFR BLD AUTO: 10.4 % (ref 5–12)
NEUTROPHILS # BLD AUTO: 2.56 10*3/MM3 (ref 1.7–7)
NEUTROPHILS NFR BLD AUTO: 53 % (ref 42.7–76)
NITRITE, URINE: NEGATIVE
NRBC BLD AUTO-RTO: 0 /100 WBC (ref 0–0.2)
PH UA: 6 (ref 5–8)
PLATELET # BLD AUTO: 206 10*3/MM3 (ref 140–450)
PMV BLD AUTO: 9.2 FL (ref 6–12)
POTASSIUM BLD-SCNC: 3.6 MMOL/L (ref 3.5–5.2)
PROT SERPL-MCNC: 6.3 G/DL (ref 6–8.5)
PROTEIN UA: NEGATIVE MG/DL
RBC # BLD AUTO: 3.85 10*6/MM3 (ref 4.14–5.8)
RBC UA: 24 /HPF (ref 0–4)
SODIUM BLD-SCNC: 131 MMOL/L (ref 136–145)
SPECIFIC GRAVITY UA: 1.01 (ref 1–1.03)
UROBILINOGEN, URINE: 1 E.U./DL
WBC NRBC COR # BLD: 4.83 10*3/MM3 (ref 3.4–10.8)
WBC UA: 2 /HPF (ref 0–5)

## 2019-12-09 PROCEDURE — 97110 THERAPEUTIC EXERCISES: CPT

## 2019-12-09 PROCEDURE — 85025 COMPLETE CBC W/AUTO DIFF WBC: CPT | Performed by: FAMILY MEDICINE

## 2019-12-09 PROCEDURE — 80053 COMPREHEN METABOLIC PANEL: CPT | Performed by: FAMILY MEDICINE

## 2019-12-09 PROCEDURE — 1180000000 HC REHAB R&B

## 2019-12-09 PROCEDURE — 97530 THERAPEUTIC ACTIVITIES: CPT

## 2019-12-09 PROCEDURE — 6370000000 HC RX 637 (ALT 250 FOR IP): Performed by: PSYCHIATRY & NEUROLOGY

## 2019-12-09 PROCEDURE — 97116 GAIT TRAINING THERAPY: CPT

## 2019-12-09 PROCEDURE — 87086 URINE CULTURE/COLONY COUNT: CPT

## 2019-12-09 PROCEDURE — 81001 URINALYSIS AUTO W/SCOPE: CPT

## 2019-12-09 RX ORDER — ATORVASTATIN CALCIUM 20 MG/1
20 TABLET, FILM COATED ORAL DAILY
Start: 2019-12-09 | End: 2020-06-24 | Stop reason: HOSPADM

## 2019-12-09 RX ORDER — METOPROLOL SUCCINATE 25 MG/1
25 TABLET, EXTENDED RELEASE ORAL DAILY
Status: DISCONTINUED | OUTPATIENT
Start: 2019-12-10 | End: 2019-12-12

## 2019-12-09 RX ORDER — ACETAMINOPHEN 325 MG/1
650 TABLET ORAL EVERY 4 HOURS PRN
Status: DISCONTINUED | OUTPATIENT
Start: 2019-12-09 | End: 2019-12-18 | Stop reason: HOSPADM

## 2019-12-09 RX ORDER — PANTOPRAZOLE SODIUM 40 MG/1
40 TABLET, DELAYED RELEASE ORAL DAILY
Status: ON HOLD | COMMUNITY
End: 2020-01-05 | Stop reason: HOSPADM

## 2019-12-09 RX ORDER — TAMSULOSIN HYDROCHLORIDE 0.4 MG/1
0.4 CAPSULE ORAL NIGHTLY
Qty: 30 CAPSULE
Start: 2019-12-09

## 2019-12-09 RX ORDER — AMIODARONE HYDROCHLORIDE 100 MG/1
100 TABLET ORAL EVERY 12 HOURS
Status: ON HOLD | COMMUNITY
End: 2020-01-05 | Stop reason: SDUPTHER

## 2019-12-09 RX ORDER — ALLOPURINOL 100 MG/1
100 TABLET ORAL DAILY
Status: DISCONTINUED | OUTPATIENT
Start: 2019-12-10 | End: 2019-12-18 | Stop reason: HOSPADM

## 2019-12-09 RX ORDER — NITROGLYCERIN 0.4 MG/1
0.4 TABLET SUBLINGUAL EVERY 5 MIN PRN
Status: ON HOLD | COMMUNITY
End: 2020-01-05 | Stop reason: HOSPADM

## 2019-12-09 RX ORDER — OXYCODONE HYDROCHLORIDE AND ACETAMINOPHEN 5; 325 MG/1; MG/1
1 TABLET ORAL EVERY 6 HOURS PRN
Qty: 20 TABLET | Refills: 0 | Status: SHIPPED | OUTPATIENT
Start: 2019-12-09 | End: 2020-03-05

## 2019-12-09 RX ORDER — METOPROLOL SUCCINATE 25 MG/1
25 TABLET, EXTENDED RELEASE ORAL DAILY
Status: ON HOLD | COMMUNITY
End: 2020-01-05 | Stop reason: HOSPADM

## 2019-12-09 RX ORDER — IPRATROPIUM BROMIDE AND ALBUTEROL SULFATE 2.5; .5 MG/3ML; MG/3ML
1 SOLUTION RESPIRATORY (INHALATION) EVERY 6 HOURS PRN
Status: DISCONTINUED | OUTPATIENT
Start: 2019-12-09 | End: 2019-12-18 | Stop reason: HOSPADM

## 2019-12-09 RX ORDER — IPRATROPIUM BROMIDE AND ALBUTEROL SULFATE 2.5; .5 MG/3ML; MG/3ML
1 SOLUTION RESPIRATORY (INHALATION) EVERY 6 HOURS PRN
Status: ON HOLD | COMMUNITY
End: 2020-01-05 | Stop reason: HOSPADM

## 2019-12-09 RX ORDER — METOPROLOL SUCCINATE 25 MG/1
12.5 TABLET, EXTENDED RELEASE ORAL
Status: ON HOLD
Start: 2019-12-10 | End: 2020-06-22

## 2019-12-09 RX ORDER — ERGOCALCIFEROL 1.25 MG/1
50000 CAPSULE ORAL WEEKLY
Status: DISCONTINUED | OUTPATIENT
Start: 2019-12-15 | End: 2019-12-18 | Stop reason: HOSPADM

## 2019-12-09 RX ORDER — PANTOPRAZOLE SODIUM 40 MG/1
40 TABLET, DELAYED RELEASE ORAL DAILY
Status: DISCONTINUED | OUTPATIENT
Start: 2019-12-10 | End: 2019-12-18 | Stop reason: HOSPADM

## 2019-12-09 RX ORDER — OXYCODONE HYDROCHLORIDE AND ACETAMINOPHEN 5; 325 MG/1; MG/1
1 TABLET ORAL EVERY 6 HOURS PRN
Status: DISCONTINUED | OUTPATIENT
Start: 2019-12-09 | End: 2019-12-18 | Stop reason: HOSPADM

## 2019-12-09 RX ORDER — TAMSULOSIN HYDROCHLORIDE 0.4 MG/1
0.4 CAPSULE ORAL NIGHTLY
Status: DISCONTINUED | OUTPATIENT
Start: 2019-12-09 | End: 2019-12-11

## 2019-12-09 RX ORDER — AMIODARONE HYDROCHLORIDE 100 MG/1
100 TABLET ORAL EVERY 12 HOURS SCHEDULED
Status: ON HOLD
Start: 2019-12-09 | End: 2020-06-22

## 2019-12-09 RX ORDER — SENNA AND DOCUSATE SODIUM 50; 8.6 MG/1; MG/1
2 TABLET, FILM COATED ORAL DAILY PRN
Start: 2019-12-09

## 2019-12-09 RX ORDER — PANTOPRAZOLE SODIUM 40 MG/1
40 TABLET, DELAYED RELEASE ORAL DAILY
Status: ON HOLD
Start: 2019-12-10 | End: 2020-06-22

## 2019-12-09 RX ORDER — NITROGLYCERIN 0.4 MG/1
0.4 TABLET SUBLINGUAL EVERY 5 MIN PRN
Status: DISCONTINUED | OUTPATIENT
Start: 2019-12-09 | End: 2019-12-18 | Stop reason: HOSPADM

## 2019-12-09 RX ORDER — BISACODYL 10 MG
10 SUPPOSITORY, RECTAL RECTAL DAILY PRN
Status: DISCONTINUED | OUTPATIENT
Start: 2019-12-09 | End: 2019-12-18 | Stop reason: HOSPADM

## 2019-12-09 RX ORDER — CALCIUM CARBONATE 200(500)MG
2 TABLET,CHEWABLE ORAL 2 TIMES DAILY PRN
COMMUNITY

## 2019-12-09 RX ORDER — SENNA AND DOCUSATE SODIUM 50; 8.6 MG/1; MG/1
2 TABLET, FILM COATED ORAL DAILY PRN
Status: DISCONTINUED | OUTPATIENT
Start: 2019-12-09 | End: 2019-12-18 | Stop reason: HOSPADM

## 2019-12-09 RX ORDER — CHOLECALCIFEROL (VITAMIN D3) 1250 MCG
1 CAPSULE ORAL WEEKLY
Status: ON HOLD | COMMUNITY
Start: 2019-12-15 | End: 2020-01-05 | Stop reason: HOSPADM

## 2019-12-09 RX ORDER — NITROGLYCERIN 0.4 MG/1
0.4 TABLET SUBLINGUAL
Refills: 12
Start: 2019-12-09

## 2019-12-09 RX ORDER — LANOLIN ALCOHOL/MO/W.PET/CERES
1000 CREAM (GRAM) TOPICAL DAILY
Status: ON HOLD | COMMUNITY
End: 2020-01-05 | Stop reason: SDUPTHER

## 2019-12-09 RX ORDER — FINASTERIDE 5 MG/1
5 TABLET, FILM COATED ORAL DAILY
Start: 2019-12-10

## 2019-12-09 RX ORDER — FINASTERIDE 5 MG/1
5 TABLET, FILM COATED ORAL DAILY
Status: DISCONTINUED | OUTPATIENT
Start: 2019-12-10 | End: 2019-12-18 | Stop reason: HOSPADM

## 2019-12-09 RX ORDER — AMIODARONE HYDROCHLORIDE 100 MG/1
100 TABLET ORAL EVERY 12 HOURS
Status: DISCONTINUED | OUTPATIENT
Start: 2019-12-09 | End: 2019-12-18 | Stop reason: HOSPADM

## 2019-12-09 RX ORDER — CHOLECALCIFEROL (VITAMIN D3) 125 MCG
1000 CAPSULE ORAL DAILY
Status: DISCONTINUED | OUTPATIENT
Start: 2019-12-10 | End: 2019-12-18 | Stop reason: HOSPADM

## 2019-12-09 RX ORDER — SENNA AND DOCUSATE SODIUM 50; 8.6 MG/1; MG/1
2 TABLET, FILM COATED ORAL DAILY PRN
Status: ON HOLD | COMMUNITY
End: 2020-01-05 | Stop reason: HOSPADM

## 2019-12-09 RX ORDER — CALCIUM CARBONATE 200(500)MG
2 TABLET,CHEWABLE ORAL 2 TIMES DAILY PRN
Status: DISCONTINUED | OUTPATIENT
Start: 2019-12-09 | End: 2019-12-18 | Stop reason: HOSPADM

## 2019-12-09 RX ORDER — IPRATROPIUM BROMIDE AND ALBUTEROL SULFATE 2.5; .5 MG/3ML; MG/3ML
3 SOLUTION RESPIRATORY (INHALATION) EVERY 6 HOURS PRN
Qty: 360 ML
Start: 2019-12-09 | End: 2020-06-24 | Stop reason: HOSPADM

## 2019-12-09 RX ORDER — POLYETHYLENE GLYCOL 3350 17 G/17G
17 POWDER, FOR SOLUTION ORAL DAILY
Status: DISCONTINUED | OUTPATIENT
Start: 2019-12-10 | End: 2019-12-18 | Stop reason: HOSPADM

## 2019-12-09 RX ORDER — ASPIRIN 81 MG/1
81 TABLET ORAL DAILY
Start: 2019-12-10

## 2019-12-09 RX ORDER — OXYCODONE HYDROCHLORIDE AND ACETAMINOPHEN 5; 325 MG/1; MG/1
1 TABLET ORAL EVERY 6 HOURS PRN
Status: ON HOLD | COMMUNITY
End: 2020-01-05 | Stop reason: HOSPADM

## 2019-12-09 RX ORDER — DOCUSATE SODIUM 100 MG/1
100 CAPSULE, LIQUID FILLED ORAL 2 TIMES DAILY
Status: DISCONTINUED | OUTPATIENT
Start: 2019-12-09 | End: 2019-12-18 | Stop reason: HOSPADM

## 2019-12-09 RX ORDER — ASPIRIN 81 MG/1
81 TABLET ORAL DAILY
Status: DISCONTINUED | OUTPATIENT
Start: 2019-12-10 | End: 2019-12-18 | Stop reason: HOSPADM

## 2019-12-09 RX ORDER — ATORVASTATIN CALCIUM 20 MG/1
20 TABLET, FILM COATED ORAL DAILY
Status: DISCONTINUED | OUTPATIENT
Start: 2019-12-10 | End: 2019-12-18 | Stop reason: HOSPADM

## 2019-12-09 RX ORDER — ATORVASTATIN CALCIUM 20 MG/1
20 TABLET, FILM COATED ORAL DAILY
Status: ON HOLD | COMMUNITY
End: 2020-01-05 | Stop reason: SDUPTHER

## 2019-12-09 RX ADMIN — ALLOPURINOL 100 MG: 100 TABLET ORAL at 09:06

## 2019-12-09 RX ADMIN — AMIODARONE HYDROCHLORIDE 100 MG: 100 TABLET ORAL at 22:48

## 2019-12-09 RX ADMIN — PANTOPRAZOLE SODIUM 40 MG: 40 TABLET, DELAYED RELEASE ORAL at 09:06

## 2019-12-09 RX ADMIN — FINASTERIDE 5 MG: 5 TABLET, FILM COATED ORAL at 09:06

## 2019-12-09 RX ADMIN — ASPIRIN 81 MG: 81 TABLET ORAL at 09:06

## 2019-12-09 RX ADMIN — DOCUSATE SODIUM 100 MG: 100 CAPSULE, LIQUID FILLED ORAL at 22:48

## 2019-12-09 RX ADMIN — AMIODARONE HYDROCHLORIDE 100 MG: 200 TABLET ORAL at 09:06

## 2019-12-09 RX ADMIN — METOPROLOL SUCCINATE 12.5 MG: 25 TABLET, FILM COATED, EXTENDED RELEASE ORAL at 09:06

## 2019-12-09 RX ADMIN — TAMSULOSIN HYDROCHLORIDE 0.4 MG: 0.4 CAPSULE ORAL at 22:48

## 2019-12-09 RX ADMIN — RIVAROXABAN 15 MG: 15 TABLET, FILM COATED ORAL at 18:48

## 2019-12-09 ASSESSMENT — PAIN DESCRIPTION - FREQUENCY: FREQUENCY: CONTINUOUS

## 2019-12-09 ASSESSMENT — PAIN DESCRIPTION - ONSET: ONSET: ON-GOING

## 2019-12-09 ASSESSMENT — PAIN SCALES - GENERAL
PAINLEVEL_OUTOF10: 6
PAINLEVEL_OUTOF10: 0

## 2019-12-09 ASSESSMENT — PAIN DESCRIPTION - DIRECTION: RADIATING_TOWARDS: LEG

## 2019-12-09 ASSESSMENT — PAIN DESCRIPTION - ORIENTATION: ORIENTATION: RIGHT

## 2019-12-09 ASSESSMENT — PAIN DESCRIPTION - LOCATION: LOCATION: HIP

## 2019-12-09 ASSESSMENT — PAIN - FUNCTIONAL ASSESSMENT: PAIN_FUNCTIONAL_ASSESSMENT: PREVENTS OR INTERFERES SOME ACTIVE ACTIVITIES AND ADLS

## 2019-12-09 ASSESSMENT — PAIN DESCRIPTION - PROGRESSION: CLINICAL_PROGRESSION: NOT CHANGED

## 2019-12-09 ASSESSMENT — PAIN DESCRIPTION - DESCRIPTORS: DESCRIPTORS: ACHING;DULL

## 2019-12-09 ASSESSMENT — PAIN DESCRIPTION - PAIN TYPE: TYPE: SURGICAL PAIN

## 2019-12-09 NOTE — PROGRESS NOTES
RT Nebulizer Protocol    Assessment tool to be used for patients with existing breathing treatments ordered by hospitalists                                                                  0  1  2  3  4      Respiratory History   No Smoking    Smoking History X   1 Pack/Day    Pulmonary Disease    Exacerbation      Respiratory Rate   Normal X   20-25    Dyspneic    Accessory Muscles    Severe Dyspnea      Breath Sounds   Clear X   Crackles    Crackles/ Rhonchi    Wheezing    Absent/ Severe Wheezing      Chest   X-ray   Clear    1 Lobe Infiltration/ Consolidation/ PE    2 Lobe Same Lung Infiltration/ Consolidation/ PE     2 Lobe Infiltration/ Both Lungs/ Consolidation/ PE    Both Lungs/ More Than 1 Lobe/ Atelectasis/ Consolidation/  PE      Cough   Strong Non- Productive X   Excessive Secretions/ Strong Cough    Excessive Secretions/ Weak Cough    Thick Bronchial Secretions/ Weak Cough    Thick Bronchial Secretions/ No Cough      Total Patient Score =  1  0-4=Q4 PRN  5-9=TID and Q4 PRN  10-14=QID and Q3 PRN  15-19=Q4 and Q2 PRN  20=Q3 and Q2 PRN    Bronchopulmonary Hygiene (CPT)   Q4 ATC Copious secretions, dyspnea, unable to sleep, mucus plug    QID & Q4 PRN Moderate secretions    TID Small amounts of secretions w/ poor cough and history of secretions    BID Unable to deep breathe and cough spontaneously       Lung Expansion Therapy (PEP)   Q4 & PRN at night Severe atelectasis, poor oxygenation    QID  High risk for persistent atelectasis, existence of same    TID At risk for developing atelectasis    BID Unable to deep breathe and cough spontaneously     Instruct, 1 follow up Patients able to perform well on their own      NO NEW CHEST XRAY.  MAINTAIN CURRENT THERAPY.

## 2019-12-09 NOTE — DISCHARGE SUMMARY
[facility]  DISCHARGE SUMMARY       Date of Admission: 11/27/2019  Date of Discharge:  12/9/2019  Primary Care Physician: Provider, No Known    Presenting Problem/History of Present Illness:  Fall, initial encounter [W19.XXXA]  Open right hip fracture, type III, initial encounter (CMS/[facility]) [S72.001C]     Final Discharge Diagnoses:  Active Hospital Problems    Diagnosis   • **Acute pain of right hip   • Open right hip fracture, type III, initial encounter (CMS/[facility])   • Fall   • Hyponatremia   • Closed intertrochanteric fracture of hip, right, initial encounter (CMS/[facility])     Added automatically from request for surgery [id number]     • Postural dizziness with near syncope   • On continuous oral anticoagulation   • Chronic systolic congestive heart failure (CMS/[facility])   • PAF (paroxysmal atrial fibrillation) (CMS/[facility])   • Essential hypertension   • Coronary artery disease involving coronary bypass graft of native heart without angina pectoris       Consults: Orthopedics.  Cardiology.    Procedures Performed:   Status post 11/3/2019- Cephalomedullary Nailing Right closed displaced Intertrochanteric hip fracture.    Pertinent Test Results:   Summary: Chest x-ray.  1. Chronic lung changes.    IMPRESSION: MRI right hip.  1. Edema within the intertrochanteric right femoral neck is highly  suggestive of a nondisplaced fracture.  2. There is edema with within the adjacent soft tissues and adjacent  gluteus musculature.  3. There is degenerative change of the right hip joint space with  cartilage loss and spurring.  4. There is a tear of the anterior superior labrum.    Impression: Carotid ultrasound bilateral  1. There is less than 50% stenosis of the right internal carotid artery.  2. There is 50-69% stenosis of the left internal carotid artery.  3. Antegrade flow is demonstrated in bilateral vertebral arteries.    IMPRESSION: Lumbar spine x-ray  1. No CT evidence of acute  "bony injury to the lumbar spine.  2. Extensive degenerative changes throughout the lower thoracic and  lumbar spine as described above in detail.    Impression: CT scan of pelvic  1.  No acute fracture. No dislocation.  2.  Left inguinal hernia, large and bowel-containing without bowel  obstruction or evidence for dilatation.    Impression: CT scan lower extremities  No acute fracture. No dislocation..    Chief Complaint on Day of Discharge: none    History of Present Illness on Day of Discharge:   Vasile Deng is an 88-year-old male with a past medical history of chronic combined congestive heart failure followed by Dr. Wilkins, paroxysmal atrial fibrillation, chronic anticoagulation use, coronary artery disease-inoperable disease, hyperlipidemia, hypertension, chronic dizziness worsened by addition of amlodipine and amiodarone.  No work-up for near syncopal events per patient.  He is followed by [facility].  Patient reports 3 days ago while walking with the intent of getting out TV trays for him and girlfriend to utilize for supper he \"felt funny and suddenly went blind\".  This caused him to turn and he fell landing on his right hip.  He states he did not lose consciousness.  [facility] to [facility] for evaluation, unfortunately there is no ER note however laboratory and imaging studies were completed mild hyponatremia and anemia noted without acute findings.  Patient presented to [facility] via EMS today due to worsening pain and inability to walk.  Laboratory studies reveal worsening hyponatremia and hypochloremia no other acute findings.  Patient reports poor appetite.  He is extremely hard of hearing.  He has no memory of [facility] ER visit 2 days ago.  Girlfriend Leida at bedside assists with HPI.  I do have concerns there is component of dementia.  Patient will not move right lower extremity due to severe pain.  Patient denies chest pain, abdominal " pain, shortness of breathing, pages in bowel or bladder habits.  Delay in admission due to his concerns for [facility] paying for admission however he finally acquisitions.  She reports that he has frequent dizziness upon rising that has worsened since amlodipine and amiodarone initiation by cardiology.  I do have concerns for possible pneumonia or other adverse effect from decreased activity.  He is admitted as observation for further evaluation and treatment.    Hospital Course:  The patient is a 88 y.o. male who presented to [facility] with right hip fracture- chronic CHF/atrial fibrillation/CAD/COPD/dementia.       Right hip fracture/Right hip pain/inability to walk.  Consult orthopedics.  Status post right hip surgery 12/3/2019-DVT prophylaxis with Xarelto, WBAT to right lower extremity, follow-up with orthopedics in 2 weeks.  CT scan lumbar spine-no CT evidence of acute bony injury to the lumbar spine, extensive degenerative changes throughout the lower thoracic and lumbar spine as described above in detail.  CT scan of pelvic-no acute fracture- no dislocation, left inguinal hernia, large and bowel-containing without bowel obstruction or evidence for aeustarkrc-gkjpeeau-mnhaapyvce fat stranding in the right gluteal tissues (image 42, series 2) may reflect contusion.  CT scan of right lower extremity- no acute fracture, no dislocation..  MRI of the right hip/edema of the intertrochanteric right femoral neck highly suggestive of nondisplaced fracture, edema and adjacent tissues.  Follow-up with orthopedics in 2 weeks.     Chronic CHF/atrial fibrillation/coronary artery disease-inoperable disease /hyperlipidemia /hypertension.  Troponin was negative.  Hold Norvasc.  Pt to continue aspirin.  Nitro as needed. Hold lisinopril. Decrease dose of metoprolol.  Pt to continue amiodarone.  Pt to continue Xarelto. Pt to continue Lipitor.    Echocardiogram-ejection fraction 45%, diastolic dysfunction, mild concentric  "hypertrophy, no evidence of pulmonary hypertension, moderate patent foramen ovale present right to left shunt indicated by saline contrast.  Carotid duplex - less than 50% stenosis of the right internal carotid artery, 50-69% stenosis of the left internal carotid artery, antegrade flow is demonstrated in bilateral vertebral arteries.     COPD.  Incentive spirometer.  RT to to initiate breathing treatment protocol.  Duo nebs PRN.     Hyponatremia.  Resolved.     Constipation.  Senokot as needed.     Chronic pain.  Perocet PRN.      Nausea. Protonix.  Zofran as needed.  Tums as needed.     Gout. Pt to continue allopurinol.     Benign prostate hypertrophy.  Pt to continue Proscar, and  Flomax.     Anemia.  Hemoglobin stable.  No sign of acute bleed.     Chronic dizziness.  Patient followed by [facility].      Vital signs stable, afebrile.  Will discharge patient to [facility] rehab.  Follow-up with rehab physician soon as able.  Follow with orthopedic surgeon in 2 weeks.    Condition on Discharge:  Stable    Physical Exam on Discharge:  /56 (BP Location: Right arm, Patient Position: Lying)   Pulse 57   Temp 97.9 °F (36.6 °C)   Resp 16   Ht 172.7 cm (67.99\")   Wt 73.8 kg (162 lb 9.6 oz)   SpO2 95%   BMI 24.73 kg/m²   Physical Exam   Constitutional: He appears well-developed.   HENT:   Head: Normocephalic.   Patient is very hard of hearing.   Eyes: Pupils are equal, round, and reactive to light. Conjunctivae are normal.   Neck: Neck supple. No JVD present.   Cardiovascular: Normal rate, regular rhythm, normal heart sounds and intact distal pulses. Exam reveals no gallop and no friction rub.   No murmur heard.  Pulmonary/Chest: Effort normal and breath sounds normal. No respiratory distress. He has no wheezes. He has no rales. He exhibits no tenderness.   Decrease in breath sound bilateral, clear.   Abdominal: Soft. Bowel sounds are normal. He exhibits no distension. There is no tenderness. There is no rebound " and no guarding.   Musculoskeletal: He exhibits no edema, tenderness or deformity.   Neurological: He is alert. He displays normal reflexes. No cranial nerve deficit. He exhibits abnormal muscle tone. Coordination abnormal.   Skin: Skin is warm and dry. Capillary refill takes 2 to 3 seconds. No rash noted.   Psychiatric: He has a normal mood and affect. His behavior is normal.   Nursing note and vitals reviewed.        Discharge Disposition:  Rehab Facility or Unit (DC - External)    Discharge Medications:     Discharge Medications      New Medications      Instructions Start Date   aspirin 81 MG EC tablet   81 mg, Oral, Daily   Start Date:  December 10, 2019     ipratropium-albuterol 0.5-2.5 mg/3 ml nebulizer  Commonly known as:  DUO-NEB   3 mL, Nebulization, Every 6 Hours PRN      oxyCODONE-acetaminophen 5-325 MG per tablet  Commonly known as:  PERCOCET   1 tablet, Oral, Every 6 Hours PRN      pantoprazole 40 MG EC tablet  Commonly known as:  PROTONIX  Replaces:  omeprazole 40 MG capsule   40 mg, Oral, Daily   Start Date:  December 10, 2019        Changes to Medications      Instructions Start Date   amiodarone 100 MG tablet  Commonly known as:  PACERONE  What changed:    · medication strength  · how much to take   100 mg, Oral, Every 12 Hours Scheduled      atorvastatin 20 MG tablet  Commonly known as:  LIPITOR  What changed:    · medication strength  · how much to take   20 mg, Oral, Daily      metoprolol succinate XL 25 MG 24 hr tablet  Commonly known as:  TOPROL-XL  What changed:    · medication strength  · how much to take  · when to take this  · Another medication with the same name was removed. Continue taking this medication, and follow the directions you see here.   12.5 mg, Oral, Every 24 Hours Scheduled   Start Date:  December 10, 2019     rivaroxaban 15 MG tablet  Commonly known as:  XARELTO  What changed:    · medication strength  · how much to take  · when to take this   15 mg, Oral, Daily With  Dinner      senna-docusate sodium 8.6-50 MG tablet  Commonly known as:  SENOKOT-S  What changed:  when to take this   2 tablets, Oral, Daily PRN         Continue These Medications      Instructions Start Date   allopurinol 100 MG tablet  Commonly known as:  ZYLOPRIM   100 mg, Oral, Daily      calcium carbonate 500 MG chewable tablet  Commonly known as:  TUMS   2 tablets, Oral, 2 Times Daily PRN      finasteride 5 MG tablet  Commonly known as:  PROSCAR   5 mg, Oral, Daily   Start Date:  December 10, 2019     nitroglycerin 0.4 MG SL tablet  Commonly known as:  NITROSTAT   0.4 mg, Sublingual, Every 5 Minutes PRN, Take no more than 3 doses in 15 minutes.      tamsulosin 0.4 MG capsule 24 hr capsule  Commonly known as:  FLOMAX   0.4 mg, Oral, Nightly      vitamin B-12 1000 MCG tablet  Commonly known as:  CYANOCOBALAMIN   1,000 mcg, Oral, Daily      vitamin D 1.25 MG (60380 UT) capsule capsule  Commonly known as:  ERGOCALCIFEROL   50,000 Units, Oral, Weekly, Sunday.          Stop These Medications    amLODIPine 10 MG tablet  Commonly known as:  NORVASC     docusate sodium 100 MG capsule  Commonly known as:  COLACE     furosemide 20 MG tablet  Commonly known as:  LASIX     lisinopril 20 MG tablet  Commonly known as:  PRINIVIL,ZESTRIL     omeprazole 40 MG capsule  Commonly known as:  priLOSEC  Replaced by:  pantoprazole 40 MG EC tablet            Discharge Diet:   Diet Instructions     Advance Diet As Tolerated            Activity at Discharge:   Activity Instructions     Activity as Tolerated            Discharge Care Plan/Instructions: Patient be discharged to [facility] rehab via ambulance.    Follow-up Appointments:   Follow-up with nose rehab physician as soon as able.  Follow-up with orthopedics in 2 weeks.    Daniel Haile MD  12/09/19  12:44 PM    Time: Greater than 30 minutes.

## 2019-12-09 NOTE — PLAN OF CARE
Problem: Patient Care Overview  Goal: Plan of Care Review  Outcome: Ongoing (interventions implemented as appropriate)  Flowsheets (Taken 12/9/2019 0911)  Progress: improving  Plan of Care Reviewed With: patient  Outcome Summary: pt. in bed upon arrival. spouse in room. pt. able to sit up in bed under supervision and verbal cueing but wife insists on pulling/pushing on pt to help him out of bed. encouraged pt and spouse to let him be as independent as possible. pt. transfers sit-stand CGA/VC to straighten hips. pt. takes verbal cueing as rushing him so give him time. pt. ambulated w/ fww from bed to chair to sit up for breakfast. pt. transfers again to chair w/ VC/CGA. pt. left in room sitting in chair w/ wife at his side w/ breakfast. pt.spouse wants him to go to [employer]. pt. c/o pain of 4/10 when laying in bed.

## 2019-12-09 NOTE — DISCHARGE PLACEMENT REQUEST
"Leah White (88 y.o. Male)     Date of Birth Social Security Number Address Home Phone MRN    03/27/1931  [facility] [address] [phone] [id number]    [employer] Marital Status          Other        Admission Date Admission Type Admitting Provider Attending Provider Department, Room/Bed    11/27/19 Emergency Daniel Carrion MD Truong, Khai C, MD [facility] 3A, 356/1    Discharge Date Discharge Disposition Discharge Destination         Rehab Facility or Unit (DC - External)              Attending Provider:  Daniel Haile MD    Allergies:  No Known Allergies    Isolation:  None   Infection:  None   Code Status:  CPR    Ht:  172.7 cm (67.99\")   Wt:  73.8 kg (162 lb 9.6 oz)    Admission Cmt:  None   Principal Problem:  Acute pain of right hip [M25.551]                 Active Insurance as of 11/27/2019     Primary Coverage     Payor Plan Insurance Group Employer/Plan Group    [facility]      Payor Plan Address Payor Plan Phone Number Payor Plan Fax Number Effective Dates    [address] [phone]  6/1/2018 - None Entered    [facility] [id number]       Subscriber Name Subscriber Birth Date Member ID       LEAH WHITE 3/27/1931 [id number]                 Emergency Contacts      (Rel.) Home Phone Work Phone Mobile Phone    Leida Rollins (Power of ) [phone] -- --                 Discharge Summary      Daniel Haile MD at 12/09/19 [address] [place] [address] Medicine Services  DISCHARGE SUMMARY       Date of Admission: 11/27/2019  Date of Discharge:  12/9/2019  Primary Care Physician: Provider, No Known    Presenting Problem/History of Present Illness:  Fall, initial encounter [W19.XXXA]  Open right hip fracture, type III, initial encounter (CMS/[facility]) [S72.001C]     Final Discharge Diagnoses:  Active Hospital Problems    Diagnosis   • **Acute pain of right hip   • Open right " hip fracture, type III, initial encounter (CMS/[facility])   • Fall   • Hyponatremia   • Closed intertrochanteric fracture of hip, right, initial encounter (CMS/[facility])     Added automatically from request for surgery [id number]     • Postural dizziness with near syncope   • On continuous oral anticoagulation   • Chronic systolic congestive heart failure (CMS/[facility])   • PAF (paroxysmal atrial fibrillation) (CMS/[facility])   • Essential hypertension   • Coronary artery disease involving coronary bypass graft of native heart without angina pectoris       Consults: Orthopedics.  Cardiology.    Procedures Performed:   Status post 11/3/2019- Cephalomedullary Nailing Right closed displaced Intertrochanteric hip fracture.    Pertinent Test Results:   Summary: Chest x-ray.  1. Chronic lung changes.    IMPRESSION: MRI right hip.  1. Edema within the intertrochanteric right femoral neck is highly  suggestive of a nondisplaced fracture.  2. There is edema with within the adjacent soft tissues and adjacent  gluteus musculature.  3. There is degenerative change of the right hip joint space with  cartilage loss and spurring.  4. There is a tear of the anterior superior labrum.    Impression: Carotid ultrasound bilateral  1. There is less than 50% stenosis of the right internal carotid artery.  2. There is 50-69% stenosis of the left internal carotid artery.  3. Antegrade flow is demonstrated in bilateral vertebral arteries.    IMPRESSION: Lumbar spine x-ray  1. No CT evidence of acute bony injury to the lumbar spine.  2. Extensive degenerative changes throughout the lower thoracic and  lumbar spine as described above in detail.    Impression: CT scan of pelvic  1.  No acute fracture. No dislocation.  2.  Left inguinal hernia, large and bowel-containing without bowel  obstruction or evidence for dilatation.    Impression: CT scan lower extremities  No acute fracture. No dislocation..    Chief Complaint on Day of Discharge: none    History of Present  "Illness on Day of Discharge:   Vasile Deng is an 88-year-old male with a past medical history of chronic combined congestive heart failure followed by Dr. Wilkins, paroxysmal atrial fibrillation, chronic anticoagulation use, coronary artery disease-inoperable disease, hyperlipidemia, hypertension, chronic dizziness worsened by addition of amlodipine and amiodarone.  No work-up for near syncopal events per patient.  He is followed by [facility].  Patient reports 3 days ago while walking with the intent of getting out TV trays for him and girlfriend to utilize for supper he \"felt funny and suddenly went blind\".  This caused him to turn and he fell landing on his right hip.  He states he did not lose consciousness.  He presented to [facility] on 11/25/2019 for evaluation, unfortunately there is no ER note however laboratory and imaging studies were completed mild hyponatremia and anemia noted without acute findings.  Patient presented to [facility] via EMS today due to worsening pain and inability to walk.  Laboratory studies reveal worsening hyponatremia and hypochloremia no other acute findings.  Patient reports poor appetite.  He is extremely hard of hearing.  He has no memory of [facility] ER visit 2 days ago.  Girlfrienemeka Casarez at bedside assists with HPI.  I do have concerns there is component of dementia.  Patient will not move right lower extremity due to severe pain.  Patient denies chest pain, abdominal pain, shortness of breathing, pages in bowel or bladder habits.  Delay in admission due to his concerns for [facility] paying for admission however he finally acquisitions.  She reports that he has frequent dizziness upon rising that has worsened since amlodipine and amiodarone initiation by cardiology.  I do have concerns for possible pneumonia or other adverse effect from decreased activity.  He is admitted as observation for further evaluation and treatment.    Hospital " Course:  The patient is a 88 y.o. male who presented to [facility] with right hip fracture- chronic CHF/atrial fibrillation/CAD/COPD/dementia.       Right hip fracture/Right hip pain/inability to walk.  Consult orthopedics.  Status post right hip surgery 12/3/2019-DVT prophylaxis with Xarelto, WBAT to right lower extremity, follow-up with orthopedics in 2 weeks.  CT scan lumbar spine-no CT evidence of acute bony injury to the lumbar spine, extensive degenerative changes throughout the lower thoracic and lumbar spine as described above in detail.  CT scan of pelvic-no acute fracture- no dislocation, left inguinal hernia, large and bowel-containing without bowel obstruction or evidence for fokivijtch-swwjlofo-cqizakbuzt fat stranding in the right gluteal tissues (image 42, series 2) may reflect contusion.  CT scan of right lower extremity- no acute fracture, no dislocation..  MRI of the right hip/edema of the intertrochanteric right femoral neck highly suggestive of nondisplaced fracture, edema and adjacent tissues.  Follow-up with orthopedics in 2 weeks.     Chronic CHF/atrial fibrillation/coronary artery disease-inoperable disease /hyperlipidemia /hypertension.  Troponin was negative.  Hold Norvasc.  Pt to continue aspirin.  Nitro as needed. Hold lisinopril. Decrease dose of metoprolol.  Pt to continue amiodarone.  Pt to continue Xarelto. Pt to continue Lipitor.    Echocardiogram-ejection fraction 45%, diastolic dysfunction, mild concentric hypertrophy, no evidence of pulmonary hypertension, moderate patent foramen ovale present right to left shunt indicated by saline contrast.  Carotid duplex - less than 50% stenosis of the right internal carotid artery, 50-69% stenosis of the left internal carotid artery, antegrade flow is demonstrated in bilateral vertebral arteries.     COPD.  Incentive spirometer.  RT to to initiate breathing treatment protocol.  Duo nebs PRN.     Hyponatremia.  " Resolved.     Constipation.  Senokot as needed.     Chronic pain.  Perocet PRN.      Nausea. Protonix.  Zofran as needed.  Tums as needed.     Gout. Pt to continue allopurinol.     Benign prostate hypertrophy.  Pt to continue Proscar, and  Flomax.     Anemia.  Hemoglobin stable.  No sign of acute bleed.     Chronic dizziness.  Patient followed by [facility].      Vital signs stable, afebrile.  Will discharge patient to [facility] rehab.  Follow-up with rehab physician soon as able.  Follow with orthopedic surgeon in 2 weeks.    Condition on Discharge:  Stable    Physical Exam on Discharge:  /56 (BP Location: Right arm, Patient Position: Lying)   Pulse 57   Temp 97.9 °F (36.6 °C)   Resp 16   Ht 172.7 cm (67.99\")   Wt 73.8 kg (162 lb 9.6 oz)   SpO2 95%   BMI 24.73 kg/m²    Physical Exam   Constitutional: He appears well-developed.   HENT:   Head: Normocephalic.   Patient is very hard of hearing.   Eyes: Pupils are equal, round, and reactive to light. Conjunctivae are normal.   Neck: Neck supple. No JVD present.   Cardiovascular: Normal rate, regular rhythm, normal heart sounds and intact distal pulses. Exam reveals no gallop and no friction rub.   No murmur heard.  Pulmonary/Chest: Effort normal and breath sounds normal. No respiratory distress. He has no wheezes. He has no rales. He exhibits no tenderness.   Decrease in breath sound bilateral, clear.   Abdominal: Soft. Bowel sounds are normal. He exhibits no distension. There is no tenderness. There is no rebound and no guarding.   Musculoskeletal: He exhibits no edema, tenderness or deformity.   Neurological: He is alert. He displays normal reflexes. No cranial nerve deficit. He exhibits abnormal muscle tone. Coordination abnormal.   Skin: Skin is warm and dry. Capillary refill takes 2 to 3 seconds. No rash noted.   Psychiatric: He has a normal mood and affect. His behavior is normal.   Nursing note and vitals reviewed.        Discharge " Disposition:  Rehab Facility or Unit (DC - External)    Discharge Medications:     Discharge Medications      New Medications      Instructions Start Date   aspirin 81 MG EC tablet   81 mg, Oral, Daily   Start Date:  December 10, 2019     ipratropium-albuterol 0.5-2.5 mg/3 ml nebulizer  Commonly known as:  DUO-NEB   3 mL, Nebulization, Every 6 Hours PRN      oxyCODONE-acetaminophen 5-325 MG per tablet  Commonly known as:  PERCOCET   1 tablet, Oral, Every 6 Hours PRN      pantoprazole 40 MG EC tablet  Commonly known as:  PROTONIX  Replaces:  omeprazole 40 MG capsule   40 mg, Oral, Daily   Start Date:  December 10, 2019        Changes to Medications      Instructions Start Date   amiodarone 100 MG tablet  Commonly known as:  PACERONE  What changed:    · medication strength  · how much to take   100 mg, Oral, Every 12 Hours Scheduled      atorvastatin 20 MG tablet  Commonly known as:  LIPITOR  What changed:    · medication strength  · how much to take   20 mg, Oral, Daily      metoprolol succinate XL 25 MG 24 hr tablet  Commonly known as:  TOPROL-XL  What changed:    · medication strength  · how much to take  · when to take this  · Another medication with the same name was removed. Continue taking this medication, and follow the directions you see here.   12.5 mg, Oral, Every 24 Hours Scheduled   Start Date:  December 10, 2019     rivaroxaban 15 MG tablet  Commonly known as:  XARELTO  What changed:    · medication strength  · how much to take  · when to take this   15 mg, Oral, Daily With Dinner      senna-docusate sodium 8.6-50 MG tablet  Commonly known as:  SENOKOT-S  What changed:  when to take this   2 tablets, Oral, Daily PRN         Continue These Medications      Instructions Start Date   allopurinol 100 MG tablet  Commonly known as:  ZYLOPRIM   100 mg, Oral, Daily      calcium carbonate 500 MG chewable tablet  Commonly known as:  TUMS   2 tablets, Oral, 2 Times Daily PRN      finasteride 5 MG tablet  Commonly  known as:  PROSCAR   5 mg, Oral, Daily   Start Date:  December 10, 2019     nitroglycerin 0.4 MG SL tablet  Commonly known as:  NITROSTAT   0.4 mg, Sublingual, Every 5 Minutes PRN, Take no more than 3 doses in 15 minutes.      tamsulosin 0.4 MG capsule 24 hr capsule  Commonly known as:  FLOMAX   0.4 mg, Oral, Nightly      vitamin B-12 1000 MCG tablet  Commonly known as:  CYANOCOBALAMIN   1,000 mcg, Oral, Daily      vitamin D 1.25 MG (95309 UT) capsule capsule  Commonly known as:  ERGOCALCIFEROL   50,000 Units, Oral, Weekly, Sunday.          Stop These Medications    amLODIPine 10 MG tablet  Commonly known as:  NORVASC     docusate sodium 100 MG capsule  Commonly known as:  COLACE     furosemide 20 MG tablet  Commonly known as:  LASIX     lisinopril 20 MG tablet  Commonly known as:  PRINIVIL,ZESTRIL     omeprazole 40 MG capsule  Commonly known as:  priLOSEC  Replaced by:  pantoprazole 40 MG EC tablet            Discharge Diet:   Diet Instructions     Advance Diet As Tolerated            Activity at Discharge:   Activity Instructions     Activity as Tolerated            Discharge Care Plan/Instructions: Patient be discharged to [facility] rehab via ambulance.    Follow-up Appointments:   Follow-up with nose rehab physician as soon as able.  Follow-up with orthopedics in 2 weeks.    Mercy Cornejo MD  12/09/19  12:44 PM    Time: Greater than 30 minutes.        Electronically signed by Mercy Cornejo MD at 12/09/19 1244       Discharge Order (From admission, onward)     Start     Ordered    12/09/19 1233  Discharge patient  Once     Expected Discharge Date:  12/09/19    Discharge Disposition:  Rehab Facility or Unit (DC - External)    Physician of Record for Attribution - Please select from Treatment Team:  MERCY CORNEJO [[id number]]    Review needed by CMO to determine Physician of Record:  No       Question Answer Comment   Physician of Record for Attribution - Please select from Treatment Team MERCY CORNEJO    Review needed  by O to determine Physician of Record No        12/09/19 4405

## 2019-12-09 NOTE — PROGRESS NOTES
Continued Stay Note  [facility]     Patient Name: Vasile Deng  MRN: [id number]  Today's Date: 12/9/2019    Admit Date: 11/27/2019    Discharge Plan     Row Name 12/09/19 0853       Plan    Final Discharge Disposition Code  62 - inpatient rehab facility    Final Note  PT HAS BEEN ACCEPTED AT [facility] FOR TODAY. CALL REPORT NUMBER [phone]    Row Name 12/09/19 0849       Plan    Plan Comments  SPOKE WITH SIGNIFICANT OTHER, [place]. PATIENT IS WILLING TO PAY UP FRONT COST, DEDUCTIBLE OF MEDICARE [facility] NOTIFIED. WORKING ON ADMISSION FOR DC TODAY IF MEDICALLY STABLE.  SW UPDATED, SHOULD BE ABLE TO GO TODAY.          Discharge Codes    No documentation.             BRIANDA Aguirre

## 2019-12-09 NOTE — PROGRESS NOTES
Continued Stay Note  [facility]     Patient Name: Vasile Deng  MRN: [id number]  Today's Date: 12/9/2019    Admit Date: 11/27/2019    Discharge Plan     Row Name 12/09/19 1128       Plan    Final Note  DC SUMMARY AND ORDERS WILL NEED TO BE FAXED [phone] AND CALL REPORT NUMBER [phone]    Row Name 12/09/19 0853       Plan    Final Discharge Disposition Code  62 - inpatient rehab facility    Final Note  PT HAS BEEN ACCEPTED AT [facility] FOR TODAY. CALL REPORT NUMBER [phone]    Row Name 12/09/19 0849       Plan    Plan Comments  SPOKE WITH SIGNIFICANT OTHER, [place]. PATIENT IS WILLING TO PAY UP FRONT COST, DEDUCTIBLE OF MEDICARE [facility] NOTIFIED. WORKING ON ADMISSION FOR DC TODAY IF MEDICALLY STABLE.  SW UPDATED, SHOULD BE ABLE TO GO TODAY.          Discharge Codes    No documentation.             BRIANDA Aguirre

## 2019-12-09 NOTE — PROGRESS NOTES
Continued Stay Note   [facility]     Patient Name: Vasile Deng  MRN: [id number]  Today's Date: 12/9/2019    Admit Date: 11/27/2019    Discharge Plan     Row Name 12/09/19 0849       Plan    Plan Comments  SPOKE WITH SIGNIFICANT OTHER, [place]. PATIENT IS WILLING TO PAY UP FRONT COST, DEDUCTIBLE OF MEDICARE AND WANTS TO ACCEPT BED AT [facility]. M[place] WITH [facility] NOTIFIED. WORKING ON ADMISSION FOR DC TODAY IF MEDICALLY STABLE.  SW UPDATED, SHOULD BE ABLE TO GO TODAY.          Discharge Codes    No documentation.             Janeth Guillory RN

## 2019-12-09 NOTE — THERAPY TREATMENT NOTE
Acute Care - Physical Therapy Treatment Note  [facility]     Patient Name: Vasile Deng  : 3/27/1931  MRN: [id number]  Today's Date: 2019  Onset of Illness/Injury or Date of Surgery: 19     Referring Physician: Daniel Carrion MD    Admit Date: 2019    Visit Dx:    ICD-10-CM ICD-9-CM   1. Fall, initial encounter W19.XXXA E888.9   2. Right hip pain M25.551 719.45   3. Intractable pain R52 780.96   4. Closed intertrochanteric fracture of hip, right, initial encounter (CMS/[facility]) S72.141A 820.21   5. Impaired mobility Z74.09 799.89   6. Decreased activities of daily living (ADL) Z78.9 V49.89   7. Impaired mobility and ADLs Z74.09 799.89     Patient Active Problem List   Diagnosis   • Coronary artery disease involving coronary bypass graft of native heart without angina pectoris   • Hx of CABG   • Essential hypertension   • Mixed hyperlipidemia   • Former smoker   • PAF (paroxysmal atrial fibrillation) (CMS/HCC)   • Syncope   • Dyspnea   • Elevated troponin   • NSTEMI (non-ST elevated myocardial infarction) (CMS/HCC)   • Chronic systolic congestive heart failure (CMS/HCC)   • On continuous oral anticoagulation   • Postural dizziness with near syncope   • Fall   • Acute pain of right hip   • Hyponatremia   • Open right hip fracture, type III, initial encounter (CMS/HCC)   • Closed intertrochanteric fracture of hip, right, initial encounter (CMS/HCC)       Therapy Treatment    Rehabilitation Treatment Summary     Row Name 19 1024 19 0840 19 0823       Treatment Time/Intention    Discipline  physical therapy assistant  (Pended)   -MW  physical therapy assistant  (Pended)   -MW  --  (Pended)   -MW    Document Type  therapy note (daily note)  (Pended)   -MW  therapy note (daily note)  (Pended)   -MW  --  (Pended)   -MW    Subjective Information  complains of;pain  (Pended)   -MW2  complains of;fatigue  (Pended)   -MW  --    Mode of Treatment  physical therapy  (Pended)   -MW2  physical  therapy  (Pended)   -MW  --    Patient/Family Observations  wife  (Pended)   -MW2  wife   (Pended)   -MW  --    Patient Effort  good  (Pended)   -MW2  good  (Pended)   -MW2  --    Comment  --  hard of hearing   (Pended)   -MW2  --    Existing Precautions/Restrictions  fall;hip;right  (Pended)   -MW3  fall;right;hip  (Pended)   -MW2  --    Equipment Issued to Patient  walker, front wheeled  (Pended)   -MW3  walker, front wheeled  (Pended)   -MW2  --    Recorded by [MW] Zofia Salcedo [facility] Student 12/09/19 1024  [MW2] [facility] 12/09/19 1108  [MW[facility]] [facility] 12/09/19 1115 [MW] [facility] 12/09/19 0840  [MW[facility]] [facility] 12/09/19 0904 [MW] Zofia Salcedo [facility] Student 12/09/19 0824    Row Name 12/09/19 1024 12/09/19 0840          Safety Issues, Functional Mobility    Safety Issues Affecting Function (Mobility)  safety precautions follow-through/compliance  (Pended)   -MW  safety precaution awareness  (Pended)   -MW     Impairments Affecting Function (Mobility)  strength;endurance/activity tolerance;pain  (Pended)   -MW  endurance/activity tolerance;pain;strength  (Pended)   -MW     Recorded by [MW] Zofia Salcedo [facility] Student 12/09/19 1115 [MW] Zofia Salcedo [facility] Student 12/09/19 0904     Row Name 12/09/19 1024 12/09/19 0840          Mobility Assessment/Intervention    Extremity Weight-bearing Status  right lower extremity  (Pended)   -MW  right lower extremity  (Pended)   -MW     Right Lower Extremity (Weight-bearing Status)  weight-bearing as tolerated (WBAT)  (Pended)   -MW  weight-bearing as tolerated (WBAT)  (Pended)   -MW     Recorded by [MW] Zofia Salcedo [facility] Student 12/09/19 1115 [MW] Zofia Salcedo [facility] Student 12/09/19 0904     Row Name 12/09/19 1024 12/09/19 0840          Bed Mobility Assessment/Treatment    Bed Mobility Assessment/Treatment  bed mobility (all) activities  (Pended)   -MW  --     [place] Level (Bed Mobility)  verbal cues;supervision  (Pended)   -  verbal  cues;supervision  (Pended)   -MW     Assistive Device (Bed Mobility)  head of bed elevated;bed rails;draw sheet  (Pended)   -MW  head of bed elevated  (Pended)   -     Comment (Bed Mobility)  give pt time and VC  (Pended)   -MW  pt wife tugs and pulls on him, encouraged her to let him be as independent as possible. he is fully capable for bed mob. just guide and give him time.   (Pended)   -MW     Recorded by [MW] Zofia Salcedo [facility] Student 12/09/19 1115 [MW] Zofia Salcedo [facility] Student 12/09/19 0904     Row Name 12/09/19 1024 12/09/19 0840          Transfer Assessment/Treatment    Transfer Assessment/Treatment  stand-sit transfer;sit-stand transfer  (Pended)   -MW  sit-stand transfer;stand-sit transfer  (Pended)   -MW     Maintains Weight-bearing Status (Transfers)  able to maintain  (Pended)   -MW  able to maintain  (Pended)   -     Comment (Transfers)  --  he took my directions as me rushing him. give him time before instructions.   (Pended)   -MW     Recorded by [MW] Zofia Salcedo [facility] Student 12/09/19 1115 [MW] Zofia Salcedo, [facility] Student 12/09/19 0904     Row Name 12/09/19 1024 12/09/19 0840          Sit-Stand Transfer    Sit-Stand [place] (Transfers)  verbal cues;contact guard  (Pended)   -  verbal cues;contact guard  (Pended)   -     Assistive Device (Sit-Stand Transfers)  walker, front-wheeled  (Pended)   -  walker, front-wheeled  (Pended)   -MW     Recorded by [MW] Zofia Salcedo [facility] Student 12/09/19 1115 [MW] Zofia Salcedo [facility] Student 12/09/19 0904     Row Name 12/09/19 1024 12/09/19 0840          Stand-Sit Transfer    Stand-Sit [place] (Transfers)  contact guard;verbal cues  (Pended)   -  verbal cues;contact guard  (Pended)   -MW     Assistive Device (Stand-Sit Transfers)  walker, front-wheeled  (Pended)   -MW  walker, front-wheeled  (Pended)   -MW     Recorded by [MW] MatiasZofia cox [facility] Student 12/09/19 1115 [MW] Zofia Salcedo, PTA Student 12/09/19 0904     Row Name 12/09/19 1024 12/09/19 0842           Gait/Stairs Assessment/Training    Gait/Stairs Assessment/Training  gait/ambulation assistive device  (Pended)   -MW  gait/ambulation assistive device  (Pended)   -     [place] Level (Gait)  contact guard;verbal cues  (Pended)   -MW  contact guard  (Pended)   -MW     Assistive Device (Gait)  walker, front-wheeled  (Pended)   -MW  walker, front-wheeled  (Pended)   -MW     Distance in Feet (Gait)  40  (Pended)  x2  -MW  3 feet to chair  (Pended)   -MW     Pattern (Gait)  step-to  (Pended)   -MW  step-to  (Pended)   -MW     Deviations/Abnormal Patterns (Gait)  antalgic;gait speed decreased;stride length decreased;venus decreased  (Pended)   -MW  antalgic  (Pended)   -MW     Bilateral Gait Deviations  forward flexed posture  (Pended)   -MW  forward flexed posture  (Pended)   -MW     Recorded by [MW] Matias ZofiaMIHAI Student 12/09/19 1115 [MW] Zofia Salcedo [facility] Student 12/09/19 0904     Row Name 12/09/19 1024             Therapeutic Exercise    Lower Extremity (Therapeutic Exercise)  heel slides, bilateral;LAQ (long arc quad), bilateral;marching while seated;SLR (straight leg raise), bilateral  (Pended)   -      Lower Extremity Range of Motion (Therapeutic Exercise)  hip abduction/adduction, bilateral  (Pended)   -MW      Exercise Type (Therapeutic Exercise)  AROM (active range of motion)  (Pended)   -MW      Position (Therapeutic Exercise)  seated;supine  (Pended)   -MW      Sets/Reps (Therapeutic Exercise)  10  (Pended)   -MW      Recorded by [MW] Zofia Salcedo PTA Student 12/09/19 1115      Row Name 12/09/19 0840             Balance    Balance  static sitting balance  (Pended)   -MW      Recorded by [MW] Zofia Salcedo PTA Student 12/09/19 0904      Row Name 12/09/19 0840             Static Sitting Balance    Level of [place] (Unsupported Sitting, Static Balance)  supervision  (Pended)   -MW      Sitting Position (Unsupported Sitting, Static Balance)  sitting on edge of bed  (Pended)   -MW      Recorded by  [MW] Zofia Salcedo PTA Student 12/09/19 0905      Row Name 12/09/19 1024 12/09/19 0840          Positioning and Restraints    Pre-Treatment Position  sitting in chair/recliner  (Pended)   -MW  in bed  (Pended)   -MW     Post Treatment Position  bed  (Pended)   -MW  chair  (Pended)   -MW     In Bed  fowlers;call light within reach;encouraged to call for assist;exit alarm on;with family/caregiver;side rails up x2  (Pended)   -MW  --     In Chair  --  sitting;call light within reach;with family/caregiver  (Pended)   -MW     Recorded by [MW] Zofia Salcedo PTA Student 12/09/19 1115 [MW] Zofia Salcedo PTA Student 12/09/19 0905     Row Name 12/09/19 1024 12/09/19 0840          Pain Assessment    Additional Documentation  Pain Scale: Numbers Pre/Post-Treatment (Group)  (Pended)   -MW  Pain Scale: Numbers Pre/Post-Treatment (Group)  (Pended)   -MW     Recorded by [MW] Zofia Salcedo [facility] Student 12/09/19 1115 [MW] Zofia Sacledo [facility] Student 12/09/19 0905     Row Name 12/09/19 1024 12/09/19 0840          Pain Scale: Numbers Pre/Post-Treatment    Pain Scale: Numbers, Pretreatment  6/10  (Pended)   -MW  4/10  (Pended)   -MW     Pain Scale: Numbers, Post-Treatment  10/10  (Pended)   -MW  6/10  (Pended)   -MW     Pain Location - Side  Right  (Pended)   -MW  Right  (Pended)   -MW     Pain Location  hip  (Pended)   -MW  hip  (Pended)   -MW     Recorded by [MW] Zofia Salcedo [facility] Student 12/09/19 1115 [MW] Zofia Salcedo [facility] Student 12/09/19 0905     Row Name                Wound 12/02/19 0045 Bilateral gluteal MASD (Moisutre associated skin damage)    Wound - Properties Group Date first assessed: 12/02/19 [EL] Time first assessed: 0045 [EL] Present on Hospital Admission: N [EL] Side: Bilateral [EL] Location: gluteal [EL] Primary Wound Type: MASD [EL] Recorded by:  [EL] Silvana James RNA 12/03/19 0148    Row Name                Wound 12/03/19 1711 Right hip Incision    Wound - Properties Group Date first assessed: 12/03/19 [SVETLANA] Time first  assessed: 1711 [SVETLANA] Side: Right [SVETLANA] Location: hip [SVETLANA] Primary Wound Type: Incision [SVETLANA] Recorded by:  [SVETLANA] Christy Bryan RN 12/03/19 1711      User Key  (r) = Recorded By, (t) = Taken By, (c) = Cosigned By    Initials Name Effective Dates Discipline    SVETLANA Christy Bryan RN 08/02/16 -  Nurse    Silvana Mcgraw RNA 06/09/17 -  Nurse    Zofia Delaney PTA Student 11/06/19 -  PT          Wound 12/02/19 0045 Bilateral gluteal MASD (Moisutre associated skin damage) (Active)   Dressing Appearance no drainage;open to air 12/9/2019  9:00 AM   Base dry;red 12/8/2019  8:00 PM   Drainage Amount none 12/9/2019  9:00 AM   Care, Wound barrier applied 12/9/2019  9:00 AM       Wound 12/03/19 1711 Right hip Incision (Active)   Dressing Appearance dry;intact;no drainage 12/9/2019  9:00 AM   Closure BALJINDER 12/9/2019  9:00 AM   Base dressing in place, unable to visualize 12/9/2019  9:00 AM   Drainage Amount none 12/9/2019  9:00 AM   Dressing Care, Wound foam 12/9/2019  9:00 AM               PT Recommendation and Plan     Plan of Care Reviewed With: (P) patient  Progress: (P) improving  Outcome Summary: (P) pt. in bed upon arrival. spouse in room. pt. able to sit up in bed under supervision and verbal cueing but wife insists on pulling/pushing on pt to help him out of bed. encouraged pt and spouse to let him be as independent as possible. pt. transfers sit-stand CGA/VC to straighten hips. pt. takes verbal cueing as rushing him so give him time. pt. ambulated w/ fww from bed to chair to sit up for breakfast. pt. transfers again to chair w/ VC/CGA. pt. left in room sitting in chair w/ wife at his side w/ breakfast. pt.spouse wants him to go to [place]. pt. c/o pain of 4/10 when laying in bed.  Outcome Measures     Row Name 12/08/19 0809 12/07/19 1409 12/06/19 1300       How much help from another person do you currently need...    Turning from your back to your side while in flat bed without using bedrails?  3  -AF  3  -AF   --    Moving from lying on back to sitting on the side of a flat bed without bedrails?  3  -AF  3  -AF  --    Moving to and from a bed to a chair (including a wheelchair)?  3  -AF  3  -AF  --    Standing up from a chair using your arms (e.g., wheelchair, bedside chair)?  2  -AF  2  -AF  --    Climbing 3-5 steps with a railing?  1  -AF  1  -AF  --    To walk in hospital room?  3  -AF  3  -AF  --    AM-PAC 6 Clicks Score (PT)  15  -AF  15  -AF  --       How much help from another is currently needed...    Putting on and taking off regular lower body clothing?  --  --  2  -TS    Bathing (including washing, rinsing, and drying)  --  --  2  -TS    Toileting (which includes using toilet bed pan or urinal)  --  --  2  -TS    Putting on and taking off regular upper body clothing  --  --  3  -TS    Taking care of personal grooming (such as brushing teeth)  --  --  3  -TS    Eating meals  --  --  4  -TS    AM-PAC 6 Clicks Score (OT)  --  --  16  -TS       Functional Assessment    Outcome Measure Options  AM-PAC 6 Clicks Basic Mobility (PT)  -AF  AM-PAC 6 Clicks Basic Mobility (PT)  -AF  AM-PAC 6 Clicks Daily Activity (OT)  -TS      User Key  (r) = Recorded By, (t) = Taken By, (c) = Cosigned By    Initials Name Provider Type    TS Carmen Mcgraw, DE [place]/L Occupational Therapy Assistant    Isidra Wray PTA Physical Therapy Assistant         Time Calculation:   PT Charges     Row Name 12/09/19 1116 12/09/19 1115 12/09/19 0905       Time Calculation    Start Time  1024  (Pended)   -MW  1024  (Pended)   -MW  0840  (Pended)   -MW    Stop Time  1106  (Pended)   -MW  1106  (Pended)   -MW  0853  (Pended)   -MW    Time Calculation (min)  42 min  (Pended)   -MW  42 min  (Pended)   -MW  13 min  (Pended)   -MW    PT Received On  12/09/19  (Pended)   -MW  12/09/19  (Pended)   -MW  --    PT Goal Re-Cert Due Date  12/14/19  (Pended)   -MW  12/14/19  (Pended)   -MW  --       Time Calculation- PT    Total Timed Code Minutes- PT   42 minute(s)  (Pended)   -MW  42 minute(s)  (Pended)   -MW  13 minute(s)  (Pended)   -MW       Timed Charges    84065 - PT Therapeutic Exercise Minutes  21  (Pended)   -MW  21  (Pended)   -MW  --    47133 - Gait Training Minutes   21  (Pended)   -MW  21  (Pended)   -MW  --    43120 - PT Therapeutic Activity Minutes  --  --  13  (Pended)   -MW      User Key  (r) = Recorded By, (t) = Taken By, (c) = Cosigned By    Initials Name Provider Type    Zofia Delaney PTA Student PTA Student        Therapy Charges for Today     Code Description Service Date Service Provider Modifiers Qty    26809445661 HC PT THERAPEUTIC ACT EA 15 MIN 12/9/2019 Zofia Salcedo PTA Student GP 1    84808510927 HC GAIT TRAINING EA 15 MIN 12/9/2019 Zofia Salcedo PTA Student GP 2    52235152898 HC PT THER PROC EA 15 MIN 12/9/2019 Zofia Salcedo PTA Student GP 1          PT G-Codes  Outcome Measure Options: AM-PAC 6 Clicks Basic Mobility (PT)  AM-PAC 6 Clicks Score (PT): 15  AM-PAC 6 Clicks Score (OT): 16    Zofia Salcedo PTA Student  12/9/2019

## 2019-12-09 NOTE — PLAN OF CARE
Pt A&O. Denies pain. Encourage turns, no new skin breakdown. Denies problems voiding tonight. Plans on rehab in AM.       Problem: Patient Care Overview  Goal: Plan of Care Review  Outcome: Ongoing (interventions implemented as appropriate)  Flowsheets (Taken 12/9/2019 0429)  Progress: no change  Plan of Care Reviewed With: patient  Goal: Individualization and Mutuality  Outcome: Ongoing (interventions implemented as appropriate)  Goal: Discharge Needs Assessment  Outcome: Ongoing (interventions implemented as appropriate)  Goal: Interprofessional Rounds/Family Conf  Outcome: Ongoing (interventions implemented as appropriate)     Problem: Pain, Chronic (Adult)  Goal: Acceptable Pain/Comfort Level and Functional Ability  Outcome: Ongoing (interventions implemented as appropriate)     Problem: Fracture Orthopaedic (Adult)  Goal: Signs and Symptoms of Listed Potential Problems Will be Absent, Minimized or Managed (Fracture Orthopaedic)  Outcome: Outcome(s) achieved     Problem: Skin Injury Risk (Adult)  Goal: Skin Health and Integrity  Outcome: Ongoing (interventions implemented as appropriate)  Flowsheets (Taken 12/9/2019 0429)  Skin Health and Integrity: making progress toward outcome     Problem: Wound (Includes Pressure Injury) (Adult)  Goal: Signs and Symptoms of Listed Potential Problems Will be Absent, Minimized or Managed (Wound)  Outcome: Ongoing (interventions implemented as appropriate)  Flowsheets (Taken 12/9/2019 0429)  Problems Assessed (Wound): all  Problems Present (Wound): delayed wound healing     Problem: Urine Elimination Impaired (Adult)  Goal: Effective or Improved Urinary Elimination  Outcome: Ongoing (interventions implemented as appropriate)  Flowsheets (Taken 12/9/2019 0429)  Effective or Improved Urinary Elimination: making progress toward outcome  Note:   Voiding per urinal  Goal: Effective Containment of Urine  Outcome: Ongoing (interventions implemented as appropriate)  Flowsheets (Taken  12/9/2019 0429)  Effective Containment of Urine: making progress toward outcome  Goal: Reduced Incontinence Episodes  Outcome: Ongoing (interventions implemented as appropriate)  Flowsheets (Taken 12/9/2019 0429)  Reduced Incontinence Episodes: making progress toward outcome

## 2019-12-10 LAB
BASOPHILS ABSOLUTE: 0 K/UL (ref 0–0.2)
BASOPHILS RELATIVE PERCENT: 0.8 % (ref 0–1)
EOSINOPHILS ABSOLUTE: 0.3 K/UL (ref 0–0.6)
EOSINOPHILS RELATIVE PERCENT: 5.6 % (ref 0–5)
HCT VFR BLD CALC: 33.5 % (ref 42–52)
HEMOGLOBIN: 11 G/DL (ref 14–18)
IMMATURE GRANULOCYTES #: 0 K/UL
LYMPHOCYTES ABSOLUTE: 1.4 K/UL (ref 1.1–4.5)
LYMPHOCYTES RELATIVE PERCENT: 27.2 % (ref 20–40)
MCH RBC QN AUTO: 28.4 PG (ref 27–31)
MCHC RBC AUTO-ENTMCNC: 32.8 G/DL (ref 33–37)
MCV RBC AUTO: 86.3 FL (ref 80–94)
MONOCYTES ABSOLUTE: 0.5 K/UL (ref 0–0.9)
MONOCYTES RELATIVE PERCENT: 9.5 % (ref 0–10)
NEUTROPHILS ABSOLUTE: 2.9 K/UL (ref 1.5–7.5)
NEUTROPHILS RELATIVE PERCENT: 56.1 % (ref 50–65)
PDW BLD-RTO: 13.6 % (ref 11.5–14.5)
PLATELET # BLD: 208 K/UL (ref 130–400)
PMV BLD AUTO: 8.9 FL (ref 9.4–12.4)
PREALBUMIN: 16 MG/DL (ref 20–40)
RBC # BLD: 3.88 M/UL (ref 4.7–6.1)
WBC # BLD: 5.2 K/UL (ref 4.8–10.8)

## 2019-12-10 PROCEDURE — 97530 THERAPEUTIC ACTIVITIES: CPT

## 2019-12-10 PROCEDURE — 99223 1ST HOSP IP/OBS HIGH 75: CPT | Performed by: PSYCHIATRY & NEUROLOGY

## 2019-12-10 PROCEDURE — 97535 SELF CARE MNGMENT TRAINING: CPT

## 2019-12-10 PROCEDURE — 6370000000 HC RX 637 (ALT 250 FOR IP): Performed by: PSYCHIATRY & NEUROLOGY

## 2019-12-10 PROCEDURE — 97110 THERAPEUTIC EXERCISES: CPT

## 2019-12-10 PROCEDURE — 1180000000 HC REHAB R&B

## 2019-12-10 PROCEDURE — 97165 OT EVAL LOW COMPLEX 30 MIN: CPT

## 2019-12-10 PROCEDURE — 84134 ASSAY OF PREALBUMIN: CPT

## 2019-12-10 PROCEDURE — 36415 COLL VENOUS BLD VENIPUNCTURE: CPT

## 2019-12-10 PROCEDURE — 85025 COMPLETE CBC W/AUTO DIFF WBC: CPT

## 2019-12-10 PROCEDURE — 97116 GAIT TRAINING THERAPY: CPT

## 2019-12-10 RX ADMIN — CYANOCOBALAMIN TAB 500 MCG 1000 MCG: 500 TAB at 07:39

## 2019-12-10 RX ADMIN — ALLOPURINOL 100 MG: 100 TABLET ORAL at 07:39

## 2019-12-10 RX ADMIN — DOCUSATE SODIUM 100 MG: 100 CAPSULE, LIQUID FILLED ORAL at 07:39

## 2019-12-10 RX ADMIN — METOPROLOL SUCCINATE 25 MG: 25 TABLET, EXTENDED RELEASE ORAL at 07:43

## 2019-12-10 RX ADMIN — RIVAROXABAN 20 MG: 20 TABLET, FILM COATED ORAL at 17:31

## 2019-12-10 RX ADMIN — AMIODARONE HYDROCHLORIDE 100 MG: 100 TABLET ORAL at 21:16

## 2019-12-10 RX ADMIN — AMIODARONE HYDROCHLORIDE 100 MG: 100 TABLET ORAL at 10:10

## 2019-12-10 RX ADMIN — PANTOPRAZOLE SODIUM 40 MG: 40 TABLET, DELAYED RELEASE ORAL at 07:39

## 2019-12-10 RX ADMIN — TAMSULOSIN HYDROCHLORIDE 0.4 MG: 0.4 CAPSULE ORAL at 21:16

## 2019-12-10 RX ADMIN — FINASTERIDE 5 MG: 5 TABLET, FILM COATED ORAL at 07:40

## 2019-12-10 RX ADMIN — ASPIRIN 81 MG: 81 TABLET, COATED ORAL at 07:40

## 2019-12-10 RX ADMIN — DOCUSATE SODIUM 100 MG: 100 CAPSULE, LIQUID FILLED ORAL at 21:16

## 2019-12-10 ASSESSMENT — PAIN DESCRIPTION - ORIENTATION: ORIENTATION: RIGHT

## 2019-12-10 ASSESSMENT — PAIN DESCRIPTION - DESCRIPTORS: DESCRIPTORS: ACHING;SHOOTING

## 2019-12-10 ASSESSMENT — PAIN DESCRIPTION - LOCATION: LOCATION: HIP

## 2019-12-10 ASSESSMENT — PAIN SCALES - GENERAL
PAINLEVEL_OUTOF10: 0
PAINLEVEL_OUTOF10: 8

## 2019-12-10 NOTE — THERAPY DISCHARGE NOTE
Acute Care - Physical Therapy Discharge Summary  [facility]       Patient Name: Vasile Deng  : 3/27/1931  MRN: [id number]    Today's Date: 12/10/2019  Onset of Illness/Injury or Date of Surgery: 19       Referring Physician: Daniel Carrion MD      Admit Date: 2019      PT Recommendation and Plan    Visit Dx:    ICD-10-CM ICD-9-CM   1. Fall, initial encounter W19.XXXA E888.9   2. Right hip pain M25.551 719.45   3. Intractable pain R52 780.96   4. Closed intertrochanteric fracture of hip, right, initial encounter (CMS/[facility]) S72.141A 820.21   5. Impaired mobility Z74.09 799.89   6. Decreased activities of daily living (ADL) Z78.9 V49.89   7. Impaired mobility and ADLs Z74.09 799.89   8. Coronary artery disease involving coronary bypass graft of native heart without angina pectoris I25.810 414.05   9. Essential hypertension I10 401.9   10. PAF (paroxysmal atrial fibrillation) (CMS/[facility]) I48.0 427.31   11. Chronic systolic congestive heart failure (CMS/HCC) I50.22 428.22     428.0   12. On continuous oral anticoagulation Z79.01 V58.61   13. Postural dizziness with near syncope R42 780.4    R55 780.2   14. Fall, sequela W19.XXXS 909.4     E929.3   15. Acute pain of right hip M25.551 719.45   16. Hyponatremia E87.1 276.1   17. Open right hip fracture, type III, initial encounter (CMS/[facility]) S72.001C 820.9   18. Hx of CABG Z95.1 V45.81   19. Mixed hyperlipidemia E78.2 272.2   20. Former smoker Z87.891 V15.82   21. Syncope, unspecified syncope type R55 780.2   22. Shortness of breath R06.02 786.05   23. Elevated troponin R79.89 790.6   24. NSTEMI (non-ST elevated myocardial infarction) (CMS/[facility]) I21.4 410.70       Outcome Measures     Row Name 19 0809 19 1409          How much help from another person do you currently need...    Turning from your back to your side while in flat bed without using bedrails?  3  -AF  3  -AF     Moving from lying on back to sitting on the side of a flat bed without  bedrails?  3  -AF  3  -AF     Moving to and from a bed to a chair (including a wheelchair)?  3  -AF  3  -AF     Standing up from a chair using your arms (e.g., wheelchair, bedside chair)?  2  -AF  2  -AF     Climbing 3-5 steps with a railing?  1  -AF  1  -AF     To walk in hospital room?  3  -AF  3  -AF     AM-PAC 6 Clicks Score (PT)  15  -AF  15  -AF        Functional Assessment    Outcome Measure Options  AM-PAC 6 Clicks Basic Mobility (PT)  -AF  AM-PAC 6 Clicks Basic Mobility (PT)  -AF       User Key  (r) = Recorded By, (t) = Taken By, (c) = Cosigned By    Initials Name Provider Type    AF Elmore, Isidra, PTA Physical Therapy Assistant              Rehab Goal Summary     Row Name 12/10/19 1241 12/10/19 0700          Bed Mobility Goal 1 (PT)    [place] Level/Cues Needed (Bed Mobility Goal 1, PT)  conditional independence  -AB  --     Time Frame (Bed Mobility Goal 1, PT)  by discharge  -AB  --     Progress/Outcomes (Bed Mobility Goal 1, PT)  goal not met  -AB  --        Transfer Goal 1 (PT)    Activity/Assistive Device (Transfer Goal 1, PT)  sit-to-stand/stand-to-sit;bed-to-chair/chair-to-bed;walker, rolling  -AB  --     [place] Level/Cues Needed (Transfer Goal 1, PT)  supervision required  -AB  --     Time Frame (Transfer Goal 1, PT)  by discharge  -AB  --     Progress/Outcome (Transfer Goal 1, PT)  goal not met  -AB  --        Gait Training Goal 1 (PT)    Activity/Assistive Device (Gait Training Goal 1, PT)  gait (walking locomotion);assistive device use;decrease fall risk;increase endurance/gait distance;normalize weight shifts  -AB  --     [place] Level (Gait Training Goal 1, PT)  supervision required  -AB  --     Distance (Gait Goal 1, PT)  75 feet   -AB  --     Time Frame (Gait Training Goal 1, PT)  by discharge  -AB  --     Progress/Outcome (Gait Training Goal 1, PT)  goal not met  -AB  --        Transfer Goal 1 (OT)    Activity/Assistive Device (Transfer Goal 1, OT)  --   sit-to-stand/stand-to-sit;bed-to-chair/chair-to-bed;toilet;shower chair;walker, rolling  -TS     [place] Level/Cues Needed (Transfer Goal 1, OT)  --  contact guard assist  -TS     Time Frame (Transfer Goal 1, OT)  --  10 days  -TS     Progress/Outcome (Transfer Goal 1, OT)  --  goal not met  -TS        Dressing Goal 1 (OT)    Activity/Assistive Device (Dressing Goal 1, OT)  --  lower body dressing  -TS     [place]/Cues Needed (Dressing Goal 1, OT)  --  minimum assist (75% or more patient effort)  -TS     Time Frame (Dressing Goal 1, OT)  --  10 days  -TS     Progress/Outcome (Dressing Goal 1, OT)  --  goal not met  -TS        Toileting Goal 1 (OT)    Activity/Device (Toileting Goal 1, OT)  --  toileting skills, all;commode  -TS     [place] Level/Cues Needed (Toileting Goal 1, OT)  --  contact guard assist  -TS     Time Frame (Toileting Goal 1, OT)  --  10 days  -TS     Progress/Outcome (Toileting Goal 1, OT)  --  goal not met  -TS       User Key  (r) = Recorded By, (t) = Taken By, (c) = Cosigned By    Initials Name Provider Type Discipline    AB Terri Seth, MIHAI Physical Therapy Assistant PT    TS Carmen Mcgraw, [place]/L Occupational Therapy Assistant OT              PT Discharge Summary  Anticipated Discharge Disposition (PT): home with home health, transitional care  Reason for Discharge: Discharge from facility  Outcomes Achieved: Refer to plan of care for updates on goals achieved  Discharge Destination: Inpatient rehabilitation facility      Terri Seth PTA   12/10/2019

## 2019-12-10 NOTE — THERAPY DISCHARGE NOTE
Acute Care - Occupational Therapy Discharge Summary  [facility]     Patient Name: Vasile Deng  : 3/27/1931  MRN: [id number]    Today's Date: 12/10/2019  Onset of Illness/Injury or Date of Surgery: 19    Date of Referral to OT: 19  Referring Physician: Daniel Carrion MD      Admit Date: 2019        OT Recommendation and Plan    Visit Dx:    ICD-10-CM ICD-9-CM   1. Fall, initial encounter W19.XXXA E888.9   2. Right hip pain M25.551 719.45   3. Intractable pain R52 780.96   4. Closed intertrochanteric fracture of hip, right, initial encounter (CMS/[facility]) S72.141A 820.21   5. Impaired mobility Z74.09 799.89   6. Decreased activities of daily living (ADL) Z78.9 V49.89   7. Impaired mobility and ADLs Z74.09 799.89   8. Coronary artery disease involving coronary bypass graft of native heart without angina pectoris I25.810 414.05   9. Essential hypertension I10 401.9   10. PAF (paroxysmal atrial fibrillation) (CMS/[facility]) I48.0 427.31   11. Chronic systolic congestive heart failure (CMS/[facility]) I50.22 428.22     428.0   12. On continuous oral anticoagulation Z79.01 V58.61   13. Postural dizziness with near syncope R42 780.4    R55 780.2   14. Fall, sequela W19.XXXS 909.4     E929.3   15. Acute pain of right hip M25.551 719.45   16. Hyponatremia E87.1 276.1   17. Open right hip fracture, type III, initial encounter (CMS/[facility]) S72.001C 820.9   18. Hx of CABG Z95.1 V45.81   19. Mixed hyperlipidemia E78.2 272.2   20. Former smoker Z87.891 V15.82   21. Syncope, unspecified syncope type R55 780.2   22. Shortness of breath R06.02 786.05   23. Elevated troponin R79.89 790.6   24. NSTEMI (non-ST elevated myocardial infarction) (CMS/[facility]) I21.4 410.70               Rehab Goal Summary     Row Name 12/10/19 0700             Transfer Goal 1 (OT)    Activity/Assistive Device (Transfer Goal 1, OT)  sit-to-stand/stand-to-sit;bed-to-chair/chair-to-bed;toilet;shower chair;walker, rolling  -TS      [place] Level/Cues  Needed (Transfer Goal 1, OT)  contact guard assist  -TS      Time Frame (Transfer Goal 1, OT)  10 days  -TS      Progress/Outcome (Transfer Goal 1, OT)  goal not met  -TS         Dressing Goal 1 (OT)    Activity/Assistive Device (Dressing Goal 1, OT)  lower body dressing  -TS      [place]/Cues Needed (Dressing Goal 1, OT)  minimum assist (75% or more patient effort)  -TS      Time Frame (Dressing Goal 1, OT)  10 days  -TS      Progress/Outcome (Dressing Goal 1, OT)  goal not met  -TS         Toileting Goal 1 (OT)    Activity/Device (Toileting Goal 1, OT)  toileting skills, all;commode  -TS      [place] Level/Cues Needed (Toileting Goal 1, OT)  contact guard assist  -TS      Time Frame (Toileting Goal 1, OT)  10 days  -TS      Progress/Outcome (Toileting Goal 1, OT)  goal not met  -TS        User Key  (r) = Recorded By, (t) = Taken By, (c) = Cosigned By    Initials Name Provider Type Discipline    TS Carmen Mcgraw COTA/L Occupational Therapy Assistant OT          Outcome Measures     Row Name 12/08/19 0809 12/07/19 8990          How much help from another person do you currently need...    Turning from your back to your side while in flat bed without using bedrails?  3  -AF  3  -AF     Moving from lying on back to sitting on the side of a flat bed without bedrails?  3  -AF  3  -AF     Moving to and from a bed to a chair (including a wheelchair)?  3  -AF  3  -AF     Standing up from a chair using your arms (e.g., wheelchair, bedside chair)?  2  -AF  2  -AF     Climbing 3-5 steps with a railing?  1  -AF  1  -AF     To walk in hospital room?  3  -AF  3  -AF     AM-PAC 6 Clicks Score (PT)  15  -AF  15  -AF        Functional Assessment    Outcome Measure Options  AM-PAC 6 Clicks Basic Mobility (PT)  -AF  AM-PAC 6 Clicks Basic Mobility (PT)  -AF       User Key  (r) = Recorded By, (t) = Taken By, (c) = Cosigned By    Initials Name Provider Type    AF Major, Isidra, PTA Physical Therapy Assistant           Therapy Suggested Charges     Code   Minutes Charges    60865 (CPT®) Hc Ot Neuromusc Re Education Ea 15 Min      42249 (CPT®) Hc Ot Ther Proc Ea 15 Min      01836 (CPT®) Hc Ot Therapeutic Act Ea 15 Min      73501 (CPT®) Hc Ot Manual Therapy Ea 15 Min      15260 (CPT®) Hc Ot Iontophoresis Ea 15 Min      65605 (CPT®) Hc Ot Elec Stim Ea-Per 15 Min      90294 (CPT®) Hc Ot Ultrasound Ea 15 Min      22213 (CPT®) Hc Ot Self Care/Mgmt/Train Ea 15 Min 27 2    Total  27 2              OT Discharge Summary  Reason for Discharge: Discharge from facility  Outcomes Achieved: Refer to plan of care for updates on goals achieved  Discharge Destination: Inpatient rehabilitation facility      NAZ Patterson  12/10/2019

## 2019-12-10 NOTE — PAYOR COMM NOTE
"DC TO ACUTE REHAB 12-9-19      Leah White (88 y.o. Male)     Date of Birth Social Security Number Address Home Phone MRN    03/27/1931  [facility] [address] [address] [phone] [id number]    [employer] Marital Status          Other        Admission Date Admission Type Admitting Provider Attending Provider Department, Room/Bed    11/27/19 Emergency Daniel Carrion MD  [facility] 3A, 356/1    Discharge Date Discharge Disposition Discharge Destination        12/9/2019 Rehab Facility or Unit (DC - External)              Attending Provider:  (none)   Allergies:  No Known Allergies    Isolation:  None   Infection:  None   Code Status:  Prior    Ht:  172.7 cm (67.99\")   Wt:  73.8 kg (162 lb 9.6 oz)    Admission Cmt:  None   Principal Problem:  Acute pain of right hip [M25.551]                 Active Insurance as of 11/27/2019     Primary Coverage     Payor Plan Insurance Group Employer/Plan Group    [facility]      Payor Plan Address Payor Plan Phone Number Payor Plan Fax Number Effective Dates    [address] [phone]  6/1/2018 - None Entered    [facility] [id number]       Subscriber Name Subscriber Birth Date Member ID       LEAH WHITE 3/27/1931 [id number]                 Emergency Contacts      (Rel.) Home Phone Work Phone Mobile Phone    Leida Rollins (Power of ) [phone] -- --               Discharge Summary      Daniel Haile MD at 12/09/19 [address] [place] [address] Medicine Services  DISCHARGE SUMMARY       Date of Admission: 11/27/2019  Date of Discharge:  12/9/2019  Primary Care Physician: Provider, No Known    Presenting Problem/History of Present Illness:  Fall, initial encounter [W19.XXXA]  Open right hip fracture, type III, initial encounter (CMS/[facility]) [S72.001C]     Final Discharge Diagnoses:  Active Hospital Problems    Diagnosis   • **Acute pain of right hip   • " Open right hip fracture, type III, initial encounter (CMS/[facility])   • Fall   • Hyponatremia   • Closed intertrochanteric fracture of hip, right, initial encounter (CMS/[facility])     Added automatically from request for surgery [id number]     • Postural dizziness with near syncope   • On continuous oral anticoagulation   • Chronic systolic congestive heart failure (CMS/[facility])   • PAF (paroxysmal atrial fibrillation) (CMS/[facility])   • Essential hypertension   • Coronary artery disease involving coronary bypass graft of native heart without angina pectoris       Consults: Orthopedics.  Cardiology.    Procedures Performed:   Status post 11/3/2019- Cephalomedullary Nailing Right closed displaced Intertrochanteric hip fracture.    Pertinent Test Results:   Summary: Chest x-ray.  1. Chronic lung changes.    IMPRESSION: MRI right hip.  1. Edema within the intertrochanteric right femoral neck is highly  suggestive of a nondisplaced fracture.  2. There is edema with within the adjacent soft tissues and adjacent  gluteus musculature.  3. There is degenerative change of the right hip joint space with  cartilage loss and spurring.  4. There is a tear of the anterior superior labrum.    Impression: Carotid ultrasound bilateral  1. There is less than 50% stenosis of the right internal carotid artery.  2. There is 50-69% stenosis of the left internal carotid artery.  3. Antegrade flow is demonstrated in bilateral vertebral arteries.    IMPRESSION: Lumbar spine x-ray  1. No CT evidence of acute bony injury to the lumbar spine.  2. Extensive degenerative changes throughout the lower thoracic and  lumbar spine as described above in detail.    Impression: CT scan of pelvic  1.  No acute fracture. No dislocation.  2.  Left inguinal hernia, large and bowel-containing without bowel  obstruction or evidence for dilatation.    Impression: CT scan lower extremities  No acute fracture. No dislocation..    Chief Complaint on Day of Discharge: none    History  "of Present Illness on Day of Discharge:   Vasile Deng is an 88-year-old male with a past medical history of chronic combined congestive heart failure followed by Dr. Wilkins, paroxysmal atrial fibrillation, chronic anticoagulation use, coronary artery disease-inoperable disease, hyperlipidemia, hypertension, chronic dizziness worsened by addition of amlodipine and amiodarone.  No work-up for near syncopal events per patient.  He is followed by [facility].  Patient reports 3 days ago while walking with the intent of getting out TV trays for him and girlfriend to utilize for supper he \"felt funny and suddenly went blind\".  This caused him to turn and he fell landing on his right hip.  He states he did not lose consciousness.  He presented to [facility] on 11/25/2019 for evaluation, unfortunately there is no ER note however laboratory and imaging studies were completed mild hyponatremia and anemia noted without acute findings.  Patient presented to [facility] via EMS today due to worsening pain and inability to walk.  Laboratory studies reveal worsening hyponatremia and hypochloremia no other acute findings.  Patient reports poor appetite.  He is extremely hard of hearing.  He has no memory of [facility] ER visit 2 days ago.  Girlfriend Leida at bedside assists with HPI.  I do have concerns there is component of dementia.  Patient will not move right lower extremity due to severe pain.  Patient denies chest pain, abdominal pain, shortness of breathing, pages in bowel or bladder habits.  Delay in admission due to his concerns for [facility] paying for admission however he finally acquisitions.  She reports that he has frequent dizziness upon rising that has worsened since amlodipine and amiodarone initiation by cardiology.  I do have concerns for possible pneumonia or other adverse effect from decreased activity.  He is admitted as observation for further evaluation and " treatment.    Hospital Course:  The patient is a 88 y.o. male who presented to [facility] with right hip fracture- chronic CHF/atrial fibrillation/CAD/COPD/dementia.       Right hip fracture/Right hip pain/inability to walk.  Consult orthopedics.  Status post right hip surgery 12/3/2019-DVT prophylaxis with Xarelto, WBAT to right lower extremity, follow-up with orthopedics in 2 weeks.  CT scan lumbar spine-no CT evidence of acute bony injury to the lumbar spine, extensive degenerative changes throughout the lower thoracic and lumbar spine as described above in detail.  CT scan of pelvic-no acute fracture- no dislocation, left inguinal hernia, large and bowel-containing without bowel obstruction or evidence for mdxqikkkdp-ojxeemll-jxmdfsfnhv fat stranding in the right gluteal tissues (image 42, series 2) may reflect contusion.  CT scan of right lower extremity- no acute fracture, no dislocation..  MRI of the right hip/edema of the intertrochanteric right femoral neck highly suggestive of nondisplaced fracture, edema and adjacent tissues.  Follow-up with orthopedics in 2 weeks.     Chronic CHF/atrial fibrillation/coronary artery disease-inoperable disease /hyperlipidemia /hypertension.  Troponin was negative.  Hold Norvasc.  Pt to continue aspirin.  Nitro as needed. Hold lisinopril. Decrease dose of metoprolol.  Pt to continue amiodarone.  Pt to continue Xarelto. Pt to continue Lipitor.    Echocardiogram-ejection fraction 45%, diastolic dysfunction, mild concentric hypertrophy, no evidence of pulmonary hypertension, moderate patent foramen ovale present right to left shunt indicated by saline contrast.  Carotid duplex - less than 50% stenosis of the right internal carotid artery, 50-69% stenosis of the left internal carotid artery, antegrade flow is demonstrated in bilateral vertebral arteries.     COPD.  Incentive spirometer.  RT to to initiate breathing treatment protocol.  Duo nebs  "PRN.     Hyponatremia.  Resolved.     Constipation.  Senokot as needed.     Chronic pain.  Perocet PRN.      Nausea. Protonix.  Zofran as needed.  Tums as needed.     Gout. Pt to continue allopurinol.     Benign prostate hypertrophy.  Pt to continue Proscar, and  Flomax.     Anemia.  Hemoglobin stable.  No sign of acute bleed.     Chronic dizziness.  Patient followed by [facility].      Vital signs stable, afebrile.  Will discharge patient to [facility] rehab.  Follow-up with rehab physician soon as able.  Follow with orthopedic surgeon in 2 weeks.    Condition on Discharge:  Stable    Physical Exam on Discharge:  /56 (BP Location: Right arm, Patient Position: Lying)   Pulse 57   Temp 97.9 °F (36.6 °C)   Resp 16   Ht 172.7 cm (67.99\")   Wt 73.8 kg (162 lb 9.6 oz)   SpO2 95%   BMI 24.73 kg/m²    Physical Exam   Constitutional: He appears well-developed.   HENT:   Head: Normocephalic.   Patient is very hard of hearing.   Eyes: Pupils are equal, round, and reactive to light. Conjunctivae are normal.   Neck: Neck supple. No JVD present.   Cardiovascular: Normal rate, regular rhythm, normal heart sounds and intact distal pulses. Exam reveals no gallop and no friction rub.   No murmur heard.  Pulmonary/Chest: Effort normal and breath sounds normal. No respiratory distress. He has no wheezes. He has no rales. He exhibits no tenderness.   Decrease in breath sound bilateral, clear.   Abdominal: Soft. Bowel sounds are normal. He exhibits no distension. There is no tenderness. There is no rebound and no guarding.   Musculoskeletal: He exhibits no edema, tenderness or deformity.   Neurological: He is alert. He displays normal reflexes. No cranial nerve deficit. He exhibits abnormal muscle tone. Coordination abnormal.   Skin: Skin is warm and dry. Capillary refill takes 2 to 3 seconds. No rash noted.   Psychiatric: He has a normal mood and affect. His behavior is normal.   Nursing note and vitals " reviewed.        Discharge Disposition:  Rehab Facility or Unit (DC - External)    Discharge Medications:     Discharge Medications      New Medications      Instructions Start Date   aspirin 81 MG EC tablet   81 mg, Oral, Daily   Start Date:  December 10, 2019     ipratropium-albuterol 0.5-2.5 mg/3 ml nebulizer  Commonly known as:  DUO-NEB   3 mL, Nebulization, Every 6 Hours PRN      oxyCODONE-acetaminophen 5-325 MG per tablet  Commonly known as:  PERCOCET   1 tablet, Oral, Every 6 Hours PRN      pantoprazole 40 MG EC tablet  Commonly known as:  PROTONIX  Replaces:  omeprazole 40 MG capsule   40 mg, Oral, Daily   Start Date:  December 10, 2019        Changes to Medications      Instructions Start Date   amiodarone 100 MG tablet  Commonly known as:  PACERONE  What changed:    · medication strength  · how much to take   100 mg, Oral, Every 12 Hours Scheduled      atorvastatin 20 MG tablet  Commonly known as:  LIPITOR  What changed:    · medication strength  · how much to take   20 mg, Oral, Daily      metoprolol succinate XL 25 MG 24 hr tablet  Commonly known as:  TOPROL-XL  What changed:    · medication strength  · how much to take  · when to take this  · Another medication with the same name was removed. Continue taking this medication, and follow the directions you see here.   12.5 mg, Oral, Every 24 Hours Scheduled   Start Date:  December 10, 2019     rivaroxaban 15 MG tablet  Commonly known as:  XARELTO  What changed:    · medication strength  · how much to take  · when to take this   15 mg, Oral, Daily With Dinner      senna-docusate sodium 8.6-50 MG tablet  Commonly known as:  SENOKOT-S  What changed:  when to take this   2 tablets, Oral, Daily PRN         Continue These Medications      Instructions Start Date   allopurinol 100 MG tablet  Commonly known as:  ZYLOPRIM   100 mg, Oral, Daily      calcium carbonate 500 MG chewable tablet  Commonly known as:  TUMS   2 tablets, Oral, 2 Times Daily PRN       finasteride 5 MG tablet  Commonly known as:  PROSCAR   5 mg, Oral, Daily   Start Date:  December 10, 2019     nitroglycerin 0.4 MG SL tablet  Commonly known as:  NITROSTAT   0.4 mg, Sublingual, Every 5 Minutes PRN, Take no more than 3 doses in 15 minutes.      tamsulosin 0.4 MG capsule 24 hr capsule  Commonly known as:  FLOMAX   0.4 mg, Oral, Nightly      vitamin B-12 1000 MCG tablet  Commonly known as:  CYANOCOBALAMIN   1,000 mcg, Oral, Daily      vitamin D 1.25 MG (78474 UT) capsule capsule  Commonly known as:  ERGOCALCIFEROL   50,000 Units, Oral, Weekly, Sunday.          Stop These Medications    amLODIPine 10 MG tablet  Commonly known as:  NORVASC     docusate sodium 100 MG capsule  Commonly known as:  COLACE     furosemide 20 MG tablet  Commonly known as:  LASIX     lisinopril 20 MG tablet  Commonly known as:  PRINIVIL,ZESTRIL     omeprazole 40 MG capsule  Commonly known as:  priLOSEC  Replaced by:  pantoprazole 40 MG EC tablet            Discharge Diet:   Diet Instructions     Advance Diet As Tolerated            Activity at Discharge:   Activity Instructions     Activity as Tolerated            Discharge Care Plan/Instructions: Patient be discharged to [facility] rehab via ambulance.    Follow-up Appointments:   Follow-up with nose rehab physician as soon as able.  Follow-up with orthopedics in 2 weeks.    Daniel Haile MD  12/09/19  12:44 PM    Time: Greater than 30 minutes.        Electronically signed by Daniel Haile MD at 12/09/19 8702

## 2019-12-11 PROCEDURE — 97535 SELF CARE MNGMENT TRAINING: CPT

## 2019-12-11 PROCEDURE — 6370000000 HC RX 637 (ALT 250 FOR IP): Performed by: PSYCHIATRY & NEUROLOGY

## 2019-12-11 PROCEDURE — 97530 THERAPEUTIC ACTIVITIES: CPT

## 2019-12-11 PROCEDURE — 97116 GAIT TRAINING THERAPY: CPT

## 2019-12-11 PROCEDURE — 97110 THERAPEUTIC EXERCISES: CPT

## 2019-12-11 PROCEDURE — 99233 SBSQ HOSP IP/OBS HIGH 50: CPT | Performed by: PSYCHIATRY & NEUROLOGY

## 2019-12-11 PROCEDURE — 1180000000 HC REHAB R&B

## 2019-12-11 RX ADMIN — RIVAROXABAN 20 MG: 20 TABLET, FILM COATED ORAL at 17:14

## 2019-12-11 RX ADMIN — POLYETHYLENE GLYCOL 3350 17 G: 17 POWDER, FOR SOLUTION ORAL at 09:07

## 2019-12-11 RX ADMIN — ATORVASTATIN CALCIUM 20 MG: 20 TABLET, FILM COATED ORAL at 09:07

## 2019-12-11 RX ADMIN — DOCUSATE SODIUM 100 MG: 100 CAPSULE, LIQUID FILLED ORAL at 21:42

## 2019-12-11 RX ADMIN — AMIODARONE HYDROCHLORIDE 100 MG: 100 TABLET ORAL at 21:42

## 2019-12-11 RX ADMIN — ASPIRIN 81 MG: 81 TABLET, COATED ORAL at 09:07

## 2019-12-11 RX ADMIN — AMIODARONE HYDROCHLORIDE 100 MG: 100 TABLET ORAL at 09:59

## 2019-12-11 RX ADMIN — DOCUSATE SODIUM 100 MG: 100 CAPSULE, LIQUID FILLED ORAL at 09:08

## 2019-12-11 RX ADMIN — PANTOPRAZOLE SODIUM 40 MG: 40 TABLET, DELAYED RELEASE ORAL at 09:08

## 2019-12-11 RX ADMIN — CYANOCOBALAMIN TAB 500 MCG 1000 MCG: 500 TAB at 09:08

## 2019-12-11 RX ADMIN — SENNOSIDES AND DOCUSATE SODIUM 2 TABLET: 8.6; 5 TABLET ORAL at 21:42

## 2019-12-11 RX ADMIN — FINASTERIDE 5 MG: 5 TABLET, FILM COATED ORAL at 09:08

## 2019-12-11 RX ADMIN — METOPROLOL SUCCINATE 25 MG: 25 TABLET, EXTENDED RELEASE ORAL at 09:07

## 2019-12-11 RX ADMIN — ALLOPURINOL 100 MG: 100 TABLET ORAL at 09:07

## 2019-12-12 LAB
ANION GAP SERPL CALCULATED.3IONS-SCNC: 9 MMOL/L (ref 7–19)
BASOPHILS ABSOLUTE: 0.1 K/UL (ref 0–0.2)
BASOPHILS RELATIVE PERCENT: 0.9 % (ref 0–1)
BUN BLDV-MCNC: 13 MG/DL (ref 8–23)
CALCIUM SERPL-MCNC: 8.6 MG/DL (ref 8.8–10.2)
CHLORIDE BLD-SCNC: 94 MMOL/L (ref 98–111)
CO2: 23 MMOL/L (ref 22–29)
CREAT SERPL-MCNC: 0.8 MG/DL (ref 0.5–1.2)
EOSINOPHILS ABSOLUTE: 0.3 K/UL (ref 0–0.6)
EOSINOPHILS RELATIVE PERCENT: 5.2 % (ref 0–5)
GFR NON-AFRICAN AMERICAN: >60
GLUCOSE BLD-MCNC: 106 MG/DL (ref 74–109)
HCT VFR BLD CALC: 35.8 % (ref 42–52)
HEMOGLOBIN: 11.6 G/DL (ref 14–18)
IMMATURE GRANULOCYTES #: 0 K/UL
LYMPHOCYTES ABSOLUTE: 1.5 K/UL (ref 1.1–4.5)
LYMPHOCYTES RELATIVE PERCENT: 23.2 % (ref 20–40)
MCH RBC QN AUTO: 28.1 PG (ref 27–31)
MCHC RBC AUTO-ENTMCNC: 32.4 G/DL (ref 33–37)
MCV RBC AUTO: 86.7 FL (ref 80–94)
MONOCYTES ABSOLUTE: 0.6 K/UL (ref 0–0.9)
MONOCYTES RELATIVE PERCENT: 9.3 % (ref 0–10)
NEUTROPHILS ABSOLUTE: 3.9 K/UL (ref 1.5–7.5)
NEUTROPHILS RELATIVE PERCENT: 60.9 % (ref 50–65)
PDW BLD-RTO: 13.6 % (ref 11.5–14.5)
PLATELET # BLD: 214 K/UL (ref 130–400)
PMV BLD AUTO: 9.1 FL (ref 9.4–12.4)
POTASSIUM REFLEX MAGNESIUM: 4 MMOL/L (ref 3.5–5)
RBC # BLD: 4.13 M/UL (ref 4.7–6.1)
SODIUM BLD-SCNC: 126 MMOL/L (ref 136–145)
URINE CULTURE, ROUTINE: NORMAL
WBC # BLD: 6.4 K/UL (ref 4.8–10.8)

## 2019-12-12 PROCEDURE — 1180000000 HC REHAB R&B

## 2019-12-12 PROCEDURE — 92522 EVALUATE SPEECH PRODUCTION: CPT

## 2019-12-12 PROCEDURE — 97530 THERAPEUTIC ACTIVITIES: CPT

## 2019-12-12 PROCEDURE — 99232 SBSQ HOSP IP/OBS MODERATE 35: CPT | Performed by: PSYCHIATRY & NEUROLOGY

## 2019-12-12 PROCEDURE — 6370000000 HC RX 637 (ALT 250 FOR IP): Performed by: PSYCHIATRY & NEUROLOGY

## 2019-12-12 PROCEDURE — 36415 COLL VENOUS BLD VENIPUNCTURE: CPT

## 2019-12-12 PROCEDURE — 97116 GAIT TRAINING THERAPY: CPT

## 2019-12-12 PROCEDURE — 97110 THERAPEUTIC EXERCISES: CPT

## 2019-12-12 PROCEDURE — 80048 BASIC METABOLIC PNL TOTAL CA: CPT

## 2019-12-12 PROCEDURE — 96125 COGNITIVE TEST BY HC PRO: CPT

## 2019-12-12 PROCEDURE — 85025 COMPLETE CBC W/AUTO DIFF WBC: CPT

## 2019-12-12 PROCEDURE — 97535 SELF CARE MNGMENT TRAINING: CPT

## 2019-12-12 RX ORDER — METOPROLOL SUCCINATE 25 MG/1
12.5 TABLET, EXTENDED RELEASE ORAL DAILY
Status: DISCONTINUED | OUTPATIENT
Start: 2019-12-12 | End: 2019-12-18 | Stop reason: HOSPADM

## 2019-12-12 RX ORDER — TAMSULOSIN HYDROCHLORIDE 0.4 MG/1
0.4 CAPSULE ORAL DAILY
Status: DISCONTINUED | OUTPATIENT
Start: 2019-12-12 | End: 2019-12-16

## 2019-12-12 RX ADMIN — DOCUSATE SODIUM 100 MG: 100 CAPSULE, LIQUID FILLED ORAL at 08:53

## 2019-12-12 RX ADMIN — RIVAROXABAN 20 MG: 20 TABLET, FILM COATED ORAL at 18:01

## 2019-12-12 RX ADMIN — ASPIRIN 81 MG: 81 TABLET, COATED ORAL at 08:50

## 2019-12-12 RX ADMIN — PANTOPRAZOLE SODIUM 40 MG: 40 TABLET, DELAYED RELEASE ORAL at 08:51

## 2019-12-12 RX ADMIN — AMIODARONE HYDROCHLORIDE 100 MG: 100 TABLET ORAL at 21:31

## 2019-12-12 RX ADMIN — TAMSULOSIN HYDROCHLORIDE 0.4 MG: 0.4 CAPSULE ORAL at 08:52

## 2019-12-12 RX ADMIN — METOPROLOL SUCCINATE 12.5 MG: 25 TABLET, EXTENDED RELEASE ORAL at 08:52

## 2019-12-12 RX ADMIN — ALLOPURINOL 100 MG: 100 TABLET ORAL at 08:50

## 2019-12-12 RX ADMIN — ATORVASTATIN CALCIUM 20 MG: 20 TABLET, FILM COATED ORAL at 08:53

## 2019-12-12 RX ADMIN — POLYETHYLENE GLYCOL 3350 17 G: 17 POWDER, FOR SOLUTION ORAL at 09:04

## 2019-12-12 RX ADMIN — DOCUSATE SODIUM 100 MG: 100 CAPSULE, LIQUID FILLED ORAL at 21:30

## 2019-12-12 RX ADMIN — AMIODARONE HYDROCHLORIDE 100 MG: 100 TABLET ORAL at 08:51

## 2019-12-12 RX ADMIN — FINASTERIDE 5 MG: 5 TABLET, FILM COATED ORAL at 08:51

## 2019-12-12 RX ADMIN — CYANOCOBALAMIN TAB 500 MCG 1000 MCG: 500 TAB at 08:53

## 2019-12-12 ASSESSMENT — PAIN DESCRIPTION - ONSET
ONSET: ON-GOING
ONSET: ON-GOING

## 2019-12-12 ASSESSMENT — PAIN DESCRIPTION - ORIENTATION
ORIENTATION: RIGHT
ORIENTATION: RIGHT

## 2019-12-12 ASSESSMENT — PAIN DESCRIPTION - FREQUENCY
FREQUENCY: CONTINUOUS
FREQUENCY: CONTINUOUS

## 2019-12-12 ASSESSMENT — PAIN DESCRIPTION - DESCRIPTORS
DESCRIPTORS: ACHING
DESCRIPTORS: ACHING;SHOOTING

## 2019-12-12 ASSESSMENT — PAIN SCALES - GENERAL
PAINLEVEL_OUTOF10: 4
PAINLEVEL_OUTOF10: 4
PAINLEVEL_OUTOF10: 0
PAINLEVEL_OUTOF10: 0

## 2019-12-12 ASSESSMENT — PAIN DESCRIPTION - PROGRESSION
CLINICAL_PROGRESSION: NOT CHANGED

## 2019-12-12 ASSESSMENT — PAIN DESCRIPTION - PAIN TYPE
TYPE: ACUTE PAIN
TYPE: SURGICAL PAIN

## 2019-12-12 ASSESSMENT — PAIN DESCRIPTION - LOCATION
LOCATION: HIP;LEG
LOCATION: HIP

## 2019-12-13 PROCEDURE — 97116 GAIT TRAINING THERAPY: CPT

## 2019-12-13 PROCEDURE — 97530 THERAPEUTIC ACTIVITIES: CPT

## 2019-12-13 PROCEDURE — 97535 SELF CARE MNGMENT TRAINING: CPT

## 2019-12-13 PROCEDURE — 1180000000 HC REHAB R&B

## 2019-12-13 PROCEDURE — 6370000000 HC RX 637 (ALT 250 FOR IP): Performed by: PSYCHIATRY & NEUROLOGY

## 2019-12-13 PROCEDURE — 97110 THERAPEUTIC EXERCISES: CPT

## 2019-12-13 PROCEDURE — 97127 HC SP THER IVNTJ W/FOCUS COG FUNCJ: CPT

## 2019-12-13 RX ADMIN — CYANOCOBALAMIN TAB 500 MCG 1000 MCG: 500 TAB at 08:17

## 2019-12-13 RX ADMIN — RIVAROXABAN 20 MG: 20 TABLET, FILM COATED ORAL at 17:09

## 2019-12-13 RX ADMIN — PANTOPRAZOLE SODIUM 40 MG: 40 TABLET, DELAYED RELEASE ORAL at 08:16

## 2019-12-13 RX ADMIN — TAMSULOSIN HYDROCHLORIDE 0.4 MG: 0.4 CAPSULE ORAL at 08:16

## 2019-12-13 RX ADMIN — DOCUSATE SODIUM 100 MG: 100 CAPSULE, LIQUID FILLED ORAL at 20:51

## 2019-12-13 RX ADMIN — POLYETHYLENE GLYCOL 3350 17 G: 17 POWDER, FOR SOLUTION ORAL at 08:16

## 2019-12-13 RX ADMIN — ALLOPURINOL 100 MG: 100 TABLET ORAL at 08:17

## 2019-12-13 RX ADMIN — AMIODARONE HYDROCHLORIDE 100 MG: 100 TABLET ORAL at 09:32

## 2019-12-13 RX ADMIN — FINASTERIDE 5 MG: 5 TABLET, FILM COATED ORAL at 08:17

## 2019-12-13 RX ADMIN — ATORVASTATIN CALCIUM 20 MG: 20 TABLET, FILM COATED ORAL at 08:16

## 2019-12-13 RX ADMIN — AMIODARONE HYDROCHLORIDE 100 MG: 100 TABLET ORAL at 20:51

## 2019-12-13 RX ADMIN — DOCUSATE SODIUM 100 MG: 100 CAPSULE, LIQUID FILLED ORAL at 08:16

## 2019-12-13 RX ADMIN — ASPIRIN 81 MG: 81 TABLET, COATED ORAL at 08:16

## 2019-12-13 RX ADMIN — METOPROLOL SUCCINATE 12.5 MG: 25 TABLET, EXTENDED RELEASE ORAL at 08:17

## 2019-12-13 RX ADMIN — SENNOSIDES AND DOCUSATE SODIUM 2 TABLET: 8.6; 5 TABLET ORAL at 18:26

## 2019-12-13 ASSESSMENT — PAIN SCALES - GENERAL
PAINLEVEL_OUTOF10: 0
PAINLEVEL_OUTOF10: 0

## 2019-12-14 PROCEDURE — 51798 US URINE CAPACITY MEASURE: CPT

## 2019-12-14 PROCEDURE — 51701 INSERT BLADDER CATHETER: CPT

## 2019-12-14 PROCEDURE — 6370000000 HC RX 637 (ALT 250 FOR IP): Performed by: PSYCHIATRY & NEUROLOGY

## 2019-12-14 PROCEDURE — 97110 THERAPEUTIC EXERCISES: CPT

## 2019-12-14 PROCEDURE — 97530 THERAPEUTIC ACTIVITIES: CPT

## 2019-12-14 PROCEDURE — 97535 SELF CARE MNGMENT TRAINING: CPT

## 2019-12-14 PROCEDURE — 97116 GAIT TRAINING THERAPY: CPT

## 2019-12-14 PROCEDURE — 1180000000 HC REHAB R&B

## 2019-12-14 RX ADMIN — METOPROLOL SUCCINATE 12.5 MG: 25 TABLET, EXTENDED RELEASE ORAL at 08:33

## 2019-12-14 RX ADMIN — FINASTERIDE 5 MG: 5 TABLET, FILM COATED ORAL at 08:33

## 2019-12-14 RX ADMIN — DOCUSATE SODIUM 100 MG: 100 CAPSULE, LIQUID FILLED ORAL at 08:34

## 2019-12-14 RX ADMIN — RIVAROXABAN 20 MG: 20 TABLET, FILM COATED ORAL at 18:39

## 2019-12-14 RX ADMIN — ALLOPURINOL 100 MG: 100 TABLET ORAL at 08:34

## 2019-12-14 RX ADMIN — ATORVASTATIN CALCIUM 20 MG: 20 TABLET, FILM COATED ORAL at 08:34

## 2019-12-14 RX ADMIN — AMIODARONE HYDROCHLORIDE 100 MG: 100 TABLET ORAL at 09:51

## 2019-12-14 RX ADMIN — POLYETHYLENE GLYCOL 3350 17 G: 17 POWDER, FOR SOLUTION ORAL at 08:33

## 2019-12-14 RX ADMIN — DOCUSATE SODIUM 100 MG: 100 CAPSULE, LIQUID FILLED ORAL at 20:48

## 2019-12-14 RX ADMIN — CYANOCOBALAMIN TAB 500 MCG 1000 MCG: 500 TAB at 08:33

## 2019-12-14 RX ADMIN — ASPIRIN 81 MG: 81 TABLET, COATED ORAL at 08:33

## 2019-12-14 RX ADMIN — AMIODARONE HYDROCHLORIDE 100 MG: 100 TABLET ORAL at 20:48

## 2019-12-14 RX ADMIN — PANTOPRAZOLE SODIUM 40 MG: 40 TABLET, DELAYED RELEASE ORAL at 08:34

## 2019-12-14 RX ADMIN — TAMSULOSIN HYDROCHLORIDE 0.4 MG: 0.4 CAPSULE ORAL at 08:33

## 2019-12-14 ASSESSMENT — PAIN SCALES - GENERAL
PAINLEVEL_OUTOF10: 0
PAINLEVEL_OUTOF10: 4

## 2019-12-14 ASSESSMENT — PAIN DESCRIPTION - PAIN TYPE: TYPE: ACUTE PAIN

## 2019-12-14 ASSESSMENT — PAIN DESCRIPTION - ORIENTATION: ORIENTATION: RIGHT

## 2019-12-14 ASSESSMENT — PAIN DESCRIPTION - LOCATION: LOCATION: HIP

## 2019-12-15 PROCEDURE — 51798 US URINE CAPACITY MEASURE: CPT

## 2019-12-15 PROCEDURE — 6370000000 HC RX 637 (ALT 250 FOR IP): Performed by: PSYCHIATRY & NEUROLOGY

## 2019-12-15 PROCEDURE — 51701 INSERT BLADDER CATHETER: CPT

## 2019-12-15 PROCEDURE — 1180000000 HC REHAB R&B

## 2019-12-15 RX ADMIN — DOCUSATE SODIUM 100 MG: 100 CAPSULE, LIQUID FILLED ORAL at 08:20

## 2019-12-15 RX ADMIN — ERGOCALCIFEROL 50000 UNITS: 1.25 CAPSULE ORAL at 08:30

## 2019-12-15 RX ADMIN — RIVAROXABAN 20 MG: 20 TABLET, FILM COATED ORAL at 17:07

## 2019-12-15 RX ADMIN — TAMSULOSIN HYDROCHLORIDE 0.4 MG: 0.4 CAPSULE ORAL at 08:20

## 2019-12-15 RX ADMIN — AMIODARONE HYDROCHLORIDE 100 MG: 100 TABLET ORAL at 10:46

## 2019-12-15 RX ADMIN — ASPIRIN 81 MG: 81 TABLET, COATED ORAL at 08:22

## 2019-12-15 RX ADMIN — METOPROLOL SUCCINATE 12.5 MG: 25 TABLET, EXTENDED RELEASE ORAL at 08:21

## 2019-12-15 RX ADMIN — CYANOCOBALAMIN TAB 500 MCG 1000 MCG: 500 TAB at 08:22

## 2019-12-15 RX ADMIN — FINASTERIDE 5 MG: 5 TABLET, FILM COATED ORAL at 08:22

## 2019-12-15 RX ADMIN — DOCUSATE SODIUM 100 MG: 100 CAPSULE, LIQUID FILLED ORAL at 21:39

## 2019-12-15 RX ADMIN — AMIODARONE HYDROCHLORIDE 100 MG: 100 TABLET ORAL at 21:39

## 2019-12-15 RX ADMIN — PANTOPRAZOLE SODIUM 40 MG: 40 TABLET, DELAYED RELEASE ORAL at 08:22

## 2019-12-15 RX ADMIN — ALLOPURINOL 100 MG: 100 TABLET ORAL at 08:29

## 2019-12-16 LAB
ANION GAP SERPL CALCULATED.3IONS-SCNC: 11 MMOL/L (ref 7–19)
BACTERIA: ABNORMAL /HPF
BASOPHILS ABSOLUTE: 0 K/UL (ref 0–0.2)
BASOPHILS RELATIVE PERCENT: 0.7 % (ref 0–1)
BILIRUBIN URINE: NEGATIVE
BLOOD, URINE: ABNORMAL
BUN BLDV-MCNC: 10 MG/DL (ref 8–23)
CALCIUM SERPL-MCNC: 8.2 MG/DL (ref 8.8–10.2)
CHLORIDE BLD-SCNC: 88 MMOL/L (ref 98–111)
CLARITY: CLEAR
CO2: 22 MMOL/L (ref 22–29)
COLOR: YELLOW
CREAT SERPL-MCNC: 0.7 MG/DL (ref 0.5–1.2)
EOSINOPHILS ABSOLUTE: 0.3 K/UL (ref 0–0.6)
EOSINOPHILS RELATIVE PERCENT: 5 % (ref 0–5)
EPITHELIAL CELLS, UA: 0 /HPF (ref 0–5)
GFR NON-AFRICAN AMERICAN: >60
GLUCOSE BLD-MCNC: 101 MG/DL (ref 74–109)
GLUCOSE URINE: NEGATIVE MG/DL
HCT VFR BLD CALC: 34.6 % (ref 42–52)
HEMOGLOBIN: 11.7 G/DL (ref 14–18)
HYALINE CASTS: 1 /HPF (ref 0–8)
IMMATURE GRANULOCYTES #: 0 K/UL
KETONES, URINE: NEGATIVE MG/DL
LEUKOCYTE ESTERASE, URINE: NEGATIVE
LYMPHOCYTES ABSOLUTE: 1.5 K/UL (ref 1.1–4.5)
LYMPHOCYTES RELATIVE PERCENT: 25.8 % (ref 20–40)
MCH RBC QN AUTO: 28.6 PG (ref 27–31)
MCHC RBC AUTO-ENTMCNC: 33.8 G/DL (ref 33–37)
MCV RBC AUTO: 84.6 FL (ref 80–94)
MONOCYTES ABSOLUTE: 0.6 K/UL (ref 0–0.9)
MONOCYTES RELATIVE PERCENT: 10.4 % (ref 0–10)
NEUTROPHILS ABSOLUTE: 3.3 K/UL (ref 1.5–7.5)
NEUTROPHILS RELATIVE PERCENT: 57.4 % (ref 50–65)
NITRITE, URINE: NEGATIVE
PDW BLD-RTO: 13.7 % (ref 11.5–14.5)
PH UA: 7 (ref 5–8)
PLATELET # BLD: 182 K/UL (ref 130–400)
PMV BLD AUTO: 9.2 FL (ref 9.4–12.4)
POTASSIUM REFLEX MAGNESIUM: 4.2 MMOL/L (ref 3.5–5)
PROTEIN UA: NEGATIVE MG/DL
RBC # BLD: 4.09 M/UL (ref 4.7–6.1)
RBC UA: 29 /HPF (ref 0–4)
SODIUM BLD-SCNC: 121 MMOL/L (ref 136–145)
SPECIFIC GRAVITY UA: 1.01 (ref 1–1.03)
UROBILINOGEN, URINE: 2 E.U./DL
WBC # BLD: 5.8 K/UL (ref 4.8–10.8)
WBC UA: 1 /HPF (ref 0–5)

## 2019-12-16 PROCEDURE — 51798 US URINE CAPACITY MEASURE: CPT

## 2019-12-16 PROCEDURE — 80048 BASIC METABOLIC PNL TOTAL CA: CPT

## 2019-12-16 PROCEDURE — 97110 THERAPEUTIC EXERCISES: CPT

## 2019-12-16 PROCEDURE — 97530 THERAPEUTIC ACTIVITIES: CPT

## 2019-12-16 PROCEDURE — 97116 GAIT TRAINING THERAPY: CPT

## 2019-12-16 PROCEDURE — 97127 HC SP THER IVNTJ W/FOCUS COG FUNCJ: CPT

## 2019-12-16 PROCEDURE — 51701 INSERT BLADDER CATHETER: CPT

## 2019-12-16 PROCEDURE — 87186 SC STD MICRODIL/AGAR DIL: CPT

## 2019-12-16 PROCEDURE — 1180000000 HC REHAB R&B

## 2019-12-16 PROCEDURE — 97535 SELF CARE MNGMENT TRAINING: CPT

## 2019-12-16 PROCEDURE — 99232 SBSQ HOSP IP/OBS MODERATE 35: CPT | Performed by: PSYCHIATRY & NEUROLOGY

## 2019-12-16 PROCEDURE — 85025 COMPLETE CBC W/AUTO DIFF WBC: CPT

## 2019-12-16 PROCEDURE — 36415 COLL VENOUS BLD VENIPUNCTURE: CPT

## 2019-12-16 PROCEDURE — 6370000000 HC RX 637 (ALT 250 FOR IP): Performed by: PSYCHIATRY & NEUROLOGY

## 2019-12-16 PROCEDURE — 81001 URINALYSIS AUTO W/SCOPE: CPT

## 2019-12-16 PROCEDURE — 87086 URINE CULTURE/COLONY COUNT: CPT

## 2019-12-16 RX ORDER — TAMSULOSIN HYDROCHLORIDE 0.4 MG/1
0.8 CAPSULE ORAL DAILY
Status: DISCONTINUED | OUTPATIENT
Start: 2019-12-17 | End: 2019-12-18 | Stop reason: HOSPADM

## 2019-12-16 RX ORDER — TAMSULOSIN HYDROCHLORIDE 0.4 MG/1
0.4 CAPSULE ORAL ONCE
Status: COMPLETED | OUTPATIENT
Start: 2019-12-16 | End: 2019-12-16

## 2019-12-16 RX ADMIN — DOCUSATE SODIUM 100 MG: 100 CAPSULE, LIQUID FILLED ORAL at 20:35

## 2019-12-16 RX ADMIN — DOCUSATE SODIUM 100 MG: 100 CAPSULE, LIQUID FILLED ORAL at 07:32

## 2019-12-16 RX ADMIN — ALLOPURINOL 100 MG: 100 TABLET ORAL at 07:32

## 2019-12-16 RX ADMIN — PANTOPRAZOLE SODIUM 40 MG: 40 TABLET, DELAYED RELEASE ORAL at 07:31

## 2019-12-16 RX ADMIN — RIVAROXABAN 20 MG: 20 TABLET, FILM COATED ORAL at 17:08

## 2019-12-16 RX ADMIN — AMIODARONE HYDROCHLORIDE 100 MG: 100 TABLET ORAL at 20:35

## 2019-12-16 RX ADMIN — METOPROLOL SUCCINATE 12.5 MG: 25 TABLET, EXTENDED RELEASE ORAL at 07:31

## 2019-12-16 RX ADMIN — TAMSULOSIN HYDROCHLORIDE 0.4 MG: 0.4 CAPSULE ORAL at 20:35

## 2019-12-16 RX ADMIN — CYANOCOBALAMIN TAB 500 MCG 1000 MCG: 500 TAB at 07:31

## 2019-12-16 RX ADMIN — TAMSULOSIN HYDROCHLORIDE 0.4 MG: 0.4 CAPSULE ORAL at 07:31

## 2019-12-16 RX ADMIN — FINASTERIDE 5 MG: 5 TABLET, FILM COATED ORAL at 07:32

## 2019-12-16 RX ADMIN — AMIODARONE HYDROCHLORIDE 100 MG: 100 TABLET ORAL at 10:04

## 2019-12-16 RX ADMIN — ASPIRIN 81 MG: 81 TABLET, COATED ORAL at 07:31

## 2019-12-16 ASSESSMENT — PAIN SCALES - GENERAL
PAINLEVEL_OUTOF10: 0

## 2019-12-17 ENCOUNTER — APPOINTMENT (OUTPATIENT)
Dept: GENERAL RADIOLOGY | Age: 84
DRG: 559 | End: 2019-12-17
Attending: PSYCHIATRY & NEUROLOGY
Payer: MEDICARE

## 2019-12-17 PROBLEM — J18.9 PNEUMONIA DUE TO ORGANISM: Status: ACTIVE | Noted: 2019-12-17

## 2019-12-17 PROBLEM — E87.1 HYPONATREMIA: Status: ACTIVE | Noted: 2019-12-17

## 2019-12-17 PROBLEM — J44.9 COPD (CHRONIC OBSTRUCTIVE PULMONARY DISEASE) (HCC): Status: ACTIVE | Noted: 2019-12-17

## 2019-12-17 PROBLEM — N13.8 BENIGN PROSTATIC HYPERPLASIA WITH URINARY OBSTRUCTION: Status: ACTIVE | Noted: 2017-07-27

## 2019-12-17 PROBLEM — R31.0 GROSS HEMATURIA: Status: ACTIVE | Noted: 2019-12-17

## 2019-12-17 PROBLEM — N30.01 ACUTE CYSTITIS WITH HEMATURIA: Status: ACTIVE | Noted: 2019-12-17

## 2019-12-17 PROBLEM — I50.42 CHRONIC COMBINED SYSTOLIC AND DIASTOLIC CHF (CONGESTIVE HEART FAILURE) (HCC): Status: ACTIVE | Noted: 2019-12-17

## 2019-12-17 PROBLEM — E78.2 MIXED HYPERLIPIDEMIA: Status: ACTIVE | Noted: 2019-12-17

## 2019-12-17 PROBLEM — I48.0 PAROXYSMAL ATRIAL FIBRILLATION (HCC): Status: ACTIVE | Noted: 2019-12-17

## 2019-12-17 PROBLEM — R33.9 URINARY RETENTION: Status: ACTIVE | Noted: 2019-12-17

## 2019-12-17 LAB
ANION GAP SERPL CALCULATED.3IONS-SCNC: 14 MMOL/L (ref 7–19)
BASOPHILS ABSOLUTE: 0 K/UL (ref 0–0.2)
BASOPHILS RELATIVE PERCENT: 0.1 % (ref 0–1)
BUN BLDV-MCNC: 12 MG/DL (ref 8–23)
CALCIUM SERPL-MCNC: 8.4 MG/DL (ref 8.8–10.2)
CHLORIDE BLD-SCNC: 85 MMOL/L (ref 98–111)
CO2: 20 MMOL/L (ref 22–29)
CREAT SERPL-MCNC: 0.8 MG/DL (ref 0.5–1.2)
EOSINOPHILS ABSOLUTE: 0 K/UL (ref 0–0.6)
EOSINOPHILS RELATIVE PERCENT: 0.1 % (ref 0–5)
GFR NON-AFRICAN AMERICAN: >60
GLUCOSE BLD-MCNC: 133 MG/DL (ref 74–109)
HCT VFR BLD CALC: 36.9 % (ref 42–52)
HCT VFR BLD CALC: 37.3 % (ref 42–52)
HEMOGLOBIN: 12.6 G/DL (ref 14–18)
HEMOGLOBIN: 12.6 G/DL (ref 14–18)
IMMATURE GRANULOCYTES #: 0.1 K/UL
LYMPHOCYTES ABSOLUTE: 0.5 K/UL (ref 1.1–4.5)
LYMPHOCYTES RELATIVE PERCENT: 3.6 % (ref 20–40)
MCH RBC QN AUTO: 28.6 PG (ref 27–31)
MCH RBC QN AUTO: 28.8 PG (ref 27–31)
MCHC RBC AUTO-ENTMCNC: 33.8 G/DL (ref 33–37)
MCHC RBC AUTO-ENTMCNC: 34.1 G/DL (ref 33–37)
MCV RBC AUTO: 83.9 FL (ref 80–94)
MCV RBC AUTO: 85.4 FL (ref 80–94)
MONOCYTES ABSOLUTE: 1.1 K/UL (ref 0–0.9)
MONOCYTES RELATIVE PERCENT: 7.5 % (ref 0–10)
NEUTROPHILS ABSOLUTE: 12.4 K/UL (ref 1.5–7.5)
NEUTROPHILS RELATIVE PERCENT: 88.1 % (ref 50–65)
PDW BLD-RTO: 13.4 % (ref 11.5–14.5)
PDW BLD-RTO: 13.6 % (ref 11.5–14.5)
PLATELET # BLD: 181 K/UL (ref 130–400)
PLATELET # BLD: 199 K/UL (ref 130–400)
PMV BLD AUTO: 9.3 FL (ref 9.4–12.4)
PMV BLD AUTO: 9.3 FL (ref 9.4–12.4)
POTASSIUM SERPL-SCNC: 4.6 MMOL/L (ref 3.5–5)
RAPID INFLUENZA  B AGN: NEGATIVE
RAPID INFLUENZA A AGN: NEGATIVE
RBC # BLD: 4.37 M/UL (ref 4.7–6.1)
RBC # BLD: 4.4 M/UL (ref 4.7–6.1)
SODIUM BLD-SCNC: 119 MMOL/L (ref 136–145)
URIC ACID, SERUM: 2.8 MG/DL (ref 3.4–7)
WBC # BLD: 12.3 K/UL (ref 4.8–10.8)
WBC # BLD: 14 K/UL (ref 4.8–10.8)

## 2019-12-17 PROCEDURE — 85027 COMPLETE CBC AUTOMATED: CPT

## 2019-12-17 PROCEDURE — 6360000002 HC RX W HCPCS: Performed by: PHYSICIAN ASSISTANT

## 2019-12-17 PROCEDURE — 2580000003 HC RX 258: Performed by: PHYSICIAN ASSISTANT

## 2019-12-17 PROCEDURE — 87804 INFLUENZA ASSAY W/OPTIC: CPT

## 2019-12-17 PROCEDURE — 6370000000 HC RX 637 (ALT 250 FOR IP): Performed by: INTERNAL MEDICINE

## 2019-12-17 PROCEDURE — 87186 SC STD MICRODIL/AGAR DIL: CPT

## 2019-12-17 PROCEDURE — 1180000000 HC REHAB R&B

## 2019-12-17 PROCEDURE — 99232 SBSQ HOSP IP/OBS MODERATE 35: CPT | Performed by: PSYCHIATRY & NEUROLOGY

## 2019-12-17 PROCEDURE — 85025 COMPLETE CBC W/AUTO DIFF WBC: CPT

## 2019-12-17 PROCEDURE — 94640 AIRWAY INHALATION TREATMENT: CPT

## 2019-12-17 PROCEDURE — 71045 X-RAY EXAM CHEST 1 VIEW: CPT

## 2019-12-17 PROCEDURE — 6370000000 HC RX 637 (ALT 250 FOR IP): Performed by: PSYCHIATRY & NEUROLOGY

## 2019-12-17 PROCEDURE — 87040 BLOOD CULTURE FOR BACTERIA: CPT

## 2019-12-17 PROCEDURE — 51798 US URINE CAPACITY MEASURE: CPT

## 2019-12-17 PROCEDURE — 36415 COLL VENOUS BLD VENIPUNCTURE: CPT

## 2019-12-17 PROCEDURE — 80048 BASIC METABOLIC PNL TOTAL CA: CPT

## 2019-12-17 PROCEDURE — 84550 ASSAY OF BLOOD/URIC ACID: CPT

## 2019-12-17 PROCEDURE — 6370000000 HC RX 637 (ALT 250 FOR IP): Performed by: PHYSICIAN ASSISTANT

## 2019-12-17 PROCEDURE — 99222 1ST HOSP IP/OBS MODERATE 55: CPT | Performed by: UROLOGY

## 2019-12-17 RX ORDER — GUAIFENESIN 600 MG/1
600 TABLET, EXTENDED RELEASE ORAL 2 TIMES DAILY
Status: DISCONTINUED | OUTPATIENT
Start: 2019-12-17 | End: 2019-12-18 | Stop reason: HOSPADM

## 2019-12-17 RX ORDER — SODIUM CHLORIDE 1000 MG
1 TABLET, SOLUBLE MISCELLANEOUS
Status: DISCONTINUED | OUTPATIENT
Start: 2019-12-17 | End: 2019-12-18 | Stop reason: HOSPADM

## 2019-12-17 RX ORDER — IPRATROPIUM BROMIDE AND ALBUTEROL SULFATE 2.5; .5 MG/3ML; MG/3ML
1 SOLUTION RESPIRATORY (INHALATION)
Status: DISCONTINUED | OUTPATIENT
Start: 2019-12-17 | End: 2019-12-18 | Stop reason: HOSPADM

## 2019-12-17 RX ADMIN — DOCUSATE SODIUM 100 MG: 100 CAPSULE, LIQUID FILLED ORAL at 07:29

## 2019-12-17 RX ADMIN — Medication 1 G: at 17:20

## 2019-12-17 RX ADMIN — IPRATROPIUM BROMIDE AND ALBUTEROL SULFATE 1 AMPULE: .5; 3 SOLUTION RESPIRATORY (INHALATION) at 19:36

## 2019-12-17 RX ADMIN — METOPROLOL SUCCINATE 12.5 MG: 25 TABLET, EXTENDED RELEASE ORAL at 07:29

## 2019-12-17 RX ADMIN — GUAIFENESIN 600 MG: 600 TABLET, EXTENDED RELEASE ORAL at 20:58

## 2019-12-17 RX ADMIN — DOCUSATE SODIUM 100 MG: 100 CAPSULE, LIQUID FILLED ORAL at 20:58

## 2019-12-17 RX ADMIN — AMIODARONE HYDROCHLORIDE 100 MG: 100 TABLET ORAL at 21:04

## 2019-12-17 RX ADMIN — ACETAMINOPHEN 650 MG: 325 TABLET ORAL at 06:41

## 2019-12-17 RX ADMIN — TAMSULOSIN HYDROCHLORIDE 0.8 MG: 0.4 CAPSULE ORAL at 20:57

## 2019-12-17 RX ADMIN — FINASTERIDE 5 MG: 5 TABLET, FILM COATED ORAL at 07:30

## 2019-12-17 RX ADMIN — PIPERACILLIN SODIUM AND TAZOBACTAM SODIUM 4.5 G: 4; .5 INJECTION, POWDER, LYOPHILIZED, FOR SOLUTION INTRAVENOUS at 20:56

## 2019-12-17 RX ADMIN — PIPERACILLIN SODIUM AND TAZOBACTAM SODIUM 4.5 G: 4; .5 INJECTION, POWDER, LYOPHILIZED, FOR SOLUTION INTRAVENOUS at 15:44

## 2019-12-17 RX ADMIN — CYANOCOBALAMIN TAB 500 MCG 1000 MCG: 500 TAB at 07:29

## 2019-12-17 RX ADMIN — ASPIRIN 81 MG: 81 TABLET, COATED ORAL at 07:29

## 2019-12-17 RX ADMIN — IPRATROPIUM BROMIDE AND ALBUTEROL SULFATE 1 AMPULE: .5; 3 SOLUTION RESPIRATORY (INHALATION) at 14:36

## 2019-12-17 RX ADMIN — ALLOPURINOL 100 MG: 100 TABLET ORAL at 07:30

## 2019-12-17 RX ADMIN — PANTOPRAZOLE SODIUM 40 MG: 40 TABLET, DELAYED RELEASE ORAL at 07:30

## 2019-12-17 ASSESSMENT — PAIN SCALES - GENERAL
PAINLEVEL_OUTOF10: 0
PAINLEVEL_OUTOF10: 7
PAINLEVEL_OUTOF10: 0
PAINLEVEL_OUTOF10: 5
PAINLEVEL_OUTOF10: 0
PAINLEVEL_OUTOF10: 0

## 2019-12-17 ASSESSMENT — PAIN DESCRIPTION - FREQUENCY: FREQUENCY: INTERMITTENT

## 2019-12-17 ASSESSMENT — PAIN DESCRIPTION - LOCATION: LOCATION: HIP

## 2019-12-17 ASSESSMENT — PAIN - FUNCTIONAL ASSESSMENT: PAIN_FUNCTIONAL_ASSESSMENT: PREVENTS OR INTERFERES SOME ACTIVE ACTIVITIES AND ADLS

## 2019-12-17 ASSESSMENT — PAIN DESCRIPTION - DESCRIPTORS: DESCRIPTORS: ACHING;SORE

## 2019-12-17 ASSESSMENT — PAIN DESCRIPTION - PROGRESSION: CLINICAL_PROGRESSION: NOT CHANGED

## 2019-12-17 ASSESSMENT — PAIN DESCRIPTION - PAIN TYPE: TYPE: SURGICAL PAIN

## 2019-12-17 ASSESSMENT — PAIN DESCRIPTION - ONSET: ONSET: GRADUAL

## 2019-12-17 ASSESSMENT — PAIN DESCRIPTION - ORIENTATION: ORIENTATION: RIGHT

## 2019-12-18 ENCOUNTER — HOSPITAL ENCOUNTER (INPATIENT)
Age: 84
LOS: 5 days | Discharge: NURSING, REHAB OR LONG-TERM CARE | DRG: 871 | End: 2019-12-23
Attending: HOSPITALIST | Admitting: HOSPITALIST
Payer: MEDICARE

## 2019-12-18 VITALS
BODY MASS INDEX: 25.01 KG/M2 | SYSTOLIC BLOOD PRESSURE: 128 MMHG | HEIGHT: 68 IN | TEMPERATURE: 98.2 F | HEART RATE: 62 BPM | OXYGEN SATURATION: 99 % | WEIGHT: 165 LBS | DIASTOLIC BLOOD PRESSURE: 60 MMHG | RESPIRATION RATE: 18 BRPM

## 2019-12-18 LAB
ALBUMIN SERPL-MCNC: 3 G/DL (ref 3.5–5.2)
ALP BLD-CCNC: 136 U/L (ref 40–130)
ALT SERPL-CCNC: 31 U/L (ref 5–41)
ANION GAP SERPL CALCULATED.3IONS-SCNC: 16 MMOL/L (ref 7–19)
AST SERPL-CCNC: 30 U/L (ref 5–40)
BILIRUB SERPL-MCNC: 0.9 MG/DL (ref 0.2–1.2)
BUN BLDV-MCNC: 16 MG/DL (ref 8–23)
CALCIUM SERPL-MCNC: 8 MG/DL (ref 8.8–10.2)
CHLORIDE BLD-SCNC: 85 MMOL/L (ref 98–111)
CO2: 19 MMOL/L (ref 22–29)
CREAT SERPL-MCNC: 0.9 MG/DL (ref 0.5–1.2)
GFR NON-AFRICAN AMERICAN: >60
GLUCOSE BLD-MCNC: 120 MG/DL (ref 74–109)
HCT VFR BLD CALC: 33.6 % (ref 42–52)
HCT VFR BLD CALC: 34.4 % (ref 42–52)
HCT VFR BLD CALC: 35.9 % (ref 42–52)
HEMOGLOBIN: 11.4 G/DL (ref 14–18)
HEMOGLOBIN: 11.5 G/DL (ref 14–18)
HEMOGLOBIN: 12.1 G/DL (ref 14–18)
LACTIC ACID, SEPSIS: 2.5 MG/DL (ref 0.5–1.9)
LACTIC ACID, SEPSIS: 2.5 MG/DL (ref 0.5–1.9)
MCH RBC QN AUTO: 28.5 PG (ref 27–31)
MCH RBC QN AUTO: 29 PG (ref 27–31)
MCH RBC QN AUTO: 29 PG (ref 27–31)
MCHC RBC AUTO-ENTMCNC: 33.4 G/DL (ref 33–37)
MCHC RBC AUTO-ENTMCNC: 33.7 G/DL (ref 33–37)
MCHC RBC AUTO-ENTMCNC: 33.9 G/DL (ref 33–37)
MCV RBC AUTO: 85.1 FL (ref 80–94)
MCV RBC AUTO: 85.5 FL (ref 80–94)
MCV RBC AUTO: 86.1 FL (ref 80–94)
ORGANISM: ABNORMAL
OSMOLALITY URINE: 487 MOSM/KG (ref 250–1200)
PDW BLD-RTO: 13.5 % (ref 11.5–14.5)
PDW BLD-RTO: 13.6 % (ref 11.5–14.5)
PDW BLD-RTO: 13.9 % (ref 11.5–14.5)
PLATELET # BLD: 141 K/UL (ref 130–400)
PLATELET # BLD: 149 K/UL (ref 130–400)
PLATELET # BLD: 154 K/UL (ref 130–400)
PMV BLD AUTO: 9 FL (ref 9.4–12.4)
PMV BLD AUTO: 9.3 FL (ref 9.4–12.4)
PMV BLD AUTO: 9.4 FL (ref 9.4–12.4)
POTASSIUM SERPL-SCNC: 4.4 MMOL/L (ref 3.5–5)
RBC # BLD: 3.93 M/UL (ref 4.7–6.1)
RBC # BLD: 4.04 M/UL (ref 4.7–6.1)
RBC # BLD: 4.17 M/UL (ref 4.7–6.1)
SODIUM BLD-SCNC: 120 MMOL/L (ref 136–145)
SODIUM URINE: 94 MMOL/L
TOTAL PROTEIN: 6.2 G/DL (ref 6.6–8.7)
URINE CULTURE, ROUTINE: ABNORMAL
URINE CULTURE, ROUTINE: ABNORMAL
WBC # BLD: 6.6 K/UL (ref 4.8–10.8)
WBC # BLD: 8.8 K/UL (ref 4.8–10.8)
WBC # BLD: 9.1 K/UL (ref 4.8–10.8)

## 2019-12-18 PROCEDURE — 36415 COLL VENOUS BLD VENIPUNCTURE: CPT

## 2019-12-18 PROCEDURE — 83605 ASSAY OF LACTIC ACID: CPT

## 2019-12-18 PROCEDURE — 94640 AIRWAY INHALATION TREATMENT: CPT

## 2019-12-18 PROCEDURE — 6370000000 HC RX 637 (ALT 250 FOR IP): Performed by: INTERNAL MEDICINE

## 2019-12-18 PROCEDURE — 99232 SBSQ HOSP IP/OBS MODERATE 35: CPT | Performed by: PHYSICIAN ASSISTANT

## 2019-12-18 PROCEDURE — 2580000003 HC RX 258: Performed by: PHYSICIAN ASSISTANT

## 2019-12-18 PROCEDURE — 80053 COMPREHEN METABOLIC PANEL: CPT

## 2019-12-18 PROCEDURE — 2580000003 HC RX 258: Performed by: HOSPITALIST

## 2019-12-18 PROCEDURE — 6370000000 HC RX 637 (ALT 250 FOR IP): Performed by: PHYSICIAN ASSISTANT

## 2019-12-18 PROCEDURE — 1210000000 HC MED SURG R&B

## 2019-12-18 PROCEDURE — 83935 ASSAY OF URINE OSMOLALITY: CPT

## 2019-12-18 PROCEDURE — 6370000000 HC RX 637 (ALT 250 FOR IP): Performed by: PSYCHIATRY & NEUROLOGY

## 2019-12-18 PROCEDURE — 6360000002 HC RX W HCPCS: Performed by: PHYSICIAN ASSISTANT

## 2019-12-18 PROCEDURE — 51702 INSERT TEMP BLADDER CATH: CPT

## 2019-12-18 PROCEDURE — 99233 SBSQ HOSP IP/OBS HIGH 50: CPT | Performed by: PSYCHIATRY & NEUROLOGY

## 2019-12-18 PROCEDURE — 85027 COMPLETE CBC AUTOMATED: CPT

## 2019-12-18 PROCEDURE — 84300 ASSAY OF URINE SODIUM: CPT

## 2019-12-18 RX ORDER — CHOLECALCIFEROL (VITAMIN D3) 125 MCG
1000 CAPSULE ORAL DAILY
Status: CANCELLED | OUTPATIENT
Start: 2019-12-19

## 2019-12-18 RX ORDER — ATORVASTATIN CALCIUM 20 MG/1
20 TABLET, FILM COATED ORAL DAILY
Status: CANCELLED | OUTPATIENT
Start: 2019-12-19

## 2019-12-18 RX ORDER — ERGOCALCIFEROL 1.25 MG/1
50000 CAPSULE ORAL WEEKLY
Status: CANCELLED | OUTPATIENT
Start: 2019-12-22

## 2019-12-18 RX ORDER — POLYETHYLENE GLYCOL 3350 17 G/17G
17 POWDER, FOR SOLUTION ORAL DAILY
Status: CANCELLED | OUTPATIENT
Start: 2019-12-19

## 2019-12-18 RX ORDER — SODIUM CHLORIDE 0.9 % (FLUSH) 0.9 %
10 SYRINGE (ML) INJECTION EVERY 12 HOURS SCHEDULED
Status: DISCONTINUED | OUTPATIENT
Start: 2019-12-18 | End: 2019-12-18 | Stop reason: HOSPADM

## 2019-12-18 RX ORDER — IPRATROPIUM BROMIDE AND ALBUTEROL SULFATE 2.5; .5 MG/3ML; MG/3ML
1 SOLUTION RESPIRATORY (INHALATION)
Status: CANCELLED | OUTPATIENT
Start: 2019-12-18

## 2019-12-18 RX ORDER — CALCIUM CARBONATE 200(500)MG
2 TABLET,CHEWABLE ORAL 2 TIMES DAILY PRN
Status: CANCELLED | OUTPATIENT
Start: 2019-12-18

## 2019-12-18 RX ORDER — BISACODYL 10 MG
10 SUPPOSITORY, RECTAL RECTAL DAILY PRN
Status: CANCELLED | OUTPATIENT
Start: 2019-12-18

## 2019-12-18 RX ORDER — SODIUM CHLORIDE 0.9 % (FLUSH) 0.9 %
10 SYRINGE (ML) INJECTION PRN
Status: DISCONTINUED | OUTPATIENT
Start: 2019-12-18 | End: 2019-12-18 | Stop reason: HOSPADM

## 2019-12-18 RX ORDER — SODIUM CHLORIDE 0.9 % (FLUSH) 0.9 %
10 SYRINGE (ML) INJECTION PRN
Status: CANCELLED | OUTPATIENT
Start: 2019-12-18

## 2019-12-18 RX ORDER — 0.9 % SODIUM CHLORIDE 0.9 %
250 INTRAVENOUS SOLUTION INTRAVENOUS ONCE
Status: DISCONTINUED | OUTPATIENT
Start: 2019-12-18 | End: 2019-12-18 | Stop reason: HOSPADM

## 2019-12-18 RX ORDER — AMIODARONE HYDROCHLORIDE 100 MG/1
100 TABLET ORAL EVERY 12 HOURS
Status: CANCELLED | OUTPATIENT
Start: 2019-12-18

## 2019-12-18 RX ORDER — OXYCODONE HYDROCHLORIDE AND ACETAMINOPHEN 5; 325 MG/1; MG/1
1 TABLET ORAL EVERY 6 HOURS PRN
Status: CANCELLED | OUTPATIENT
Start: 2019-12-18

## 2019-12-18 RX ORDER — FINASTERIDE 5 MG/1
5 TABLET, FILM COATED ORAL DAILY
Status: CANCELLED | OUTPATIENT
Start: 2019-12-19

## 2019-12-18 RX ORDER — TAMSULOSIN HYDROCHLORIDE 0.4 MG/1
0.8 CAPSULE ORAL DAILY
Status: CANCELLED | OUTPATIENT
Start: 2019-12-18

## 2019-12-18 RX ORDER — ALLOPURINOL 100 MG/1
100 TABLET ORAL DAILY
Status: CANCELLED | OUTPATIENT
Start: 2019-12-19

## 2019-12-18 RX ORDER — DOCUSATE SODIUM 100 MG/1
100 CAPSULE, LIQUID FILLED ORAL 2 TIMES DAILY
Status: CANCELLED | OUTPATIENT
Start: 2019-12-18

## 2019-12-18 RX ORDER — METOPROLOL SUCCINATE 25 MG/1
12.5 TABLET, EXTENDED RELEASE ORAL DAILY
Status: CANCELLED | OUTPATIENT
Start: 2019-12-19

## 2019-12-18 RX ORDER — NITROGLYCERIN 0.4 MG/1
0.4 TABLET SUBLINGUAL EVERY 5 MIN PRN
Status: CANCELLED | OUTPATIENT
Start: 2019-12-18

## 2019-12-18 RX ORDER — GUAIFENESIN 600 MG/1
600 TABLET, EXTENDED RELEASE ORAL 2 TIMES DAILY
Status: CANCELLED | OUTPATIENT
Start: 2019-12-18

## 2019-12-18 RX ORDER — IPRATROPIUM BROMIDE AND ALBUTEROL SULFATE 2.5; .5 MG/3ML; MG/3ML
1 SOLUTION RESPIRATORY (INHALATION) EVERY 6 HOURS PRN
Status: CANCELLED | OUTPATIENT
Start: 2019-12-18

## 2019-12-18 RX ORDER — SODIUM CHLORIDE 0.9 % (FLUSH) 0.9 %
10 SYRINGE (ML) INJECTION EVERY 12 HOURS SCHEDULED
Status: CANCELLED | OUTPATIENT
Start: 2019-12-18

## 2019-12-18 RX ORDER — SODIUM CHLORIDE 1000 MG
1 TABLET, SOLUBLE MISCELLANEOUS
Status: CANCELLED | OUTPATIENT
Start: 2019-12-19

## 2019-12-18 RX ORDER — ACETAMINOPHEN 325 MG/1
650 TABLET ORAL EVERY 4 HOURS PRN
Status: CANCELLED | OUTPATIENT
Start: 2019-12-18

## 2019-12-18 RX ORDER — PANTOPRAZOLE SODIUM 40 MG/1
40 TABLET, DELAYED RELEASE ORAL DAILY
Status: CANCELLED | OUTPATIENT
Start: 2019-12-19

## 2019-12-18 RX ORDER — TOLVAPTAN 15 MG/1
15 TABLET ORAL DAILY
Status: DISCONTINUED | OUTPATIENT
Start: 2019-12-18 | End: 2019-12-18 | Stop reason: HOSPADM

## 2019-12-18 RX ORDER — SENNA AND DOCUSATE SODIUM 50; 8.6 MG/1; MG/1
2 TABLET, FILM COATED ORAL DAILY PRN
Status: CANCELLED | OUTPATIENT
Start: 2019-12-18

## 2019-12-18 RX ORDER — TOLVAPTAN 15 MG/1
15 TABLET ORAL DAILY
Status: CANCELLED | OUTPATIENT
Start: 2019-12-19

## 2019-12-18 RX ORDER — ASPIRIN 81 MG/1
81 TABLET ORAL DAILY
Status: CANCELLED | OUTPATIENT
Start: 2019-12-19

## 2019-12-18 RX ADMIN — IPRATROPIUM BROMIDE AND ALBUTEROL SULFATE 1 AMPULE: .5; 3 SOLUTION RESPIRATORY (INHALATION) at 09:56

## 2019-12-18 RX ADMIN — Medication 1 G: at 10:55

## 2019-12-18 RX ADMIN — IPRATROPIUM BROMIDE AND ALBUTEROL SULFATE 1 AMPULE: .5; 3 SOLUTION RESPIRATORY (INHALATION) at 06:27

## 2019-12-18 RX ADMIN — Medication 1 G: at 18:03

## 2019-12-18 RX ADMIN — TOLVAPTAN 15 MG: 15 TABLET ORAL at 10:56

## 2019-12-18 RX ADMIN — ALLOPURINOL 100 MG: 100 TABLET ORAL at 11:08

## 2019-12-18 RX ADMIN — PANTOPRAZOLE SODIUM 40 MG: 40 TABLET, DELAYED RELEASE ORAL at 10:49

## 2019-12-18 RX ADMIN — METOPROLOL SUCCINATE 12.5 MG: 25 TABLET, EXTENDED RELEASE ORAL at 10:50

## 2019-12-18 RX ADMIN — DOCUSATE SODIUM 100 MG: 100 CAPSULE, LIQUID FILLED ORAL at 10:50

## 2019-12-18 RX ADMIN — ASPIRIN 81 MG: 81 TABLET, COATED ORAL at 10:49

## 2019-12-18 RX ADMIN — SODIUM CHLORIDE 250 ML: 9 INJECTION, SOLUTION INTRAVENOUS at 18:45

## 2019-12-18 RX ADMIN — PIPERACILLIN SODIUM AND TAZOBACTAM SODIUM 4.5 G: 4; .5 INJECTION, POWDER, LYOPHILIZED, FOR SOLUTION INTRAVENOUS at 05:46

## 2019-12-18 RX ADMIN — GUAIFENESIN 600 MG: 600 TABLET, EXTENDED RELEASE ORAL at 10:49

## 2019-12-18 RX ADMIN — IPRATROPIUM BROMIDE AND ALBUTEROL SULFATE 1 AMPULE: .5; 3 SOLUTION RESPIRATORY (INHALATION) at 19:38

## 2019-12-18 RX ADMIN — CYANOCOBALAMIN TAB 500 MCG 1000 MCG: 500 TAB at 10:50

## 2019-12-18 RX ADMIN — Medication 1 G: at 13:56

## 2019-12-18 RX ADMIN — FINASTERIDE 5 MG: 5 TABLET, FILM COATED ORAL at 10:49

## 2019-12-18 RX ADMIN — ACETAMINOPHEN 650 MG: 325 TABLET ORAL at 06:25

## 2019-12-18 RX ADMIN — IPRATROPIUM BROMIDE AND ALBUTEROL SULFATE 1 AMPULE: .5; 3 SOLUTION RESPIRATORY (INHALATION) at 14:03

## 2019-12-18 RX ADMIN — AMIODARONE HYDROCHLORIDE 100 MG: 100 TABLET ORAL at 10:49

## 2019-12-18 RX ADMIN — PIPERACILLIN SODIUM AND TAZOBACTAM SODIUM 4.5 G: 4; .5 INJECTION, POWDER, LYOPHILIZED, FOR SOLUTION INTRAVENOUS at 13:56

## 2019-12-18 ASSESSMENT — PAIN SCALES - GENERAL
PAINLEVEL_OUTOF10: 4
PAINLEVEL_OUTOF10: 0
PAINLEVEL_OUTOF10: 0

## 2019-12-19 PROBLEM — A41.9 SEPSIS (HCC): Status: ACTIVE | Noted: 2019-12-19

## 2019-12-19 LAB
ALBUMIN SERPL-MCNC: 3.1 G/DL (ref 3.5–5.2)
ALP BLD-CCNC: 129 U/L (ref 40–130)
ALT SERPL-CCNC: 57 U/L (ref 5–41)
ANION GAP SERPL CALCULATED.3IONS-SCNC: 12 MMOL/L (ref 7–19)
AST SERPL-CCNC: 53 U/L (ref 5–40)
BASOPHILS ABSOLUTE: 0.1 K/UL (ref 0–0.2)
BASOPHILS RELATIVE PERCENT: 0.7 % (ref 0–1)
BILIRUB SERPL-MCNC: 0.5 MG/DL (ref 0.2–1.2)
BLOOD CULTURE, ROUTINE: ABNORMAL
BUN BLDV-MCNC: 14 MG/DL (ref 8–23)
CALCIUM SERPL-MCNC: 8.4 MG/DL (ref 8.8–10.2)
CHLORIDE BLD-SCNC: 94 MMOL/L (ref 98–111)
CO2: 23 MMOL/L (ref 22–29)
CREAT SERPL-MCNC: 0.9 MG/DL (ref 0.5–1.2)
CULTURE, BLOOD 2: ABNORMAL
CULTURE, BLOOD 2: ABNORMAL
EOSINOPHILS ABSOLUTE: 0.1 K/UL (ref 0–0.6)
EOSINOPHILS RELATIVE PERCENT: 1.3 % (ref 0–5)
GFR NON-AFRICAN AMERICAN: >60
GLUCOSE BLD-MCNC: 109 MG/DL (ref 74–109)
HCT VFR BLD CALC: 33.3 % (ref 42–52)
HCT VFR BLD CALC: 34 % (ref 42–52)
HCT VFR BLD CALC: 35.4 % (ref 42–52)
HEMOGLOBIN: 11.1 G/DL (ref 14–18)
HEMOGLOBIN: 11.2 G/DL (ref 14–18)
HEMOGLOBIN: 11.5 G/DL (ref 14–18)
IMMATURE GRANULOCYTES #: 0 K/UL
LACTIC ACID, SEPSIS: 1.2 MG/DL (ref 0.5–1.9)
LYMPHOCYTES ABSOLUTE: 0.9 K/UL (ref 1.1–4.5)
LYMPHOCYTES RELATIVE PERCENT: 13.3 % (ref 20–40)
MCH RBC QN AUTO: 28.5 PG (ref 27–31)
MCH RBC QN AUTO: 28.9 PG (ref 27–31)
MCH RBC QN AUTO: 29 PG (ref 27–31)
MCHC RBC AUTO-ENTMCNC: 32.5 G/DL (ref 33–37)
MCHC RBC AUTO-ENTMCNC: 32.9 G/DL (ref 33–37)
MCHC RBC AUTO-ENTMCNC: 33.3 G/DL (ref 33–37)
MCV RBC AUTO: 86.9 FL (ref 80–94)
MCV RBC AUTO: 87.6 FL (ref 80–94)
MCV RBC AUTO: 87.6 FL (ref 80–94)
MONOCYTES ABSOLUTE: 0.8 K/UL (ref 0–0.9)
MONOCYTES RELATIVE PERCENT: 11 % (ref 0–10)
NEUTROPHILS ABSOLUTE: 5 K/UL (ref 1.5–7.5)
NEUTROPHILS RELATIVE PERCENT: 73.1 % (ref 50–65)
ORGANISM: ABNORMAL
PDW BLD-RTO: 14 % (ref 11.5–14.5)
PDW BLD-RTO: 14.2 % (ref 11.5–14.5)
PDW BLD-RTO: 14.3 % (ref 11.5–14.5)
PLATELET # BLD: 150 K/UL (ref 130–400)
PLATELET # BLD: 155 K/UL (ref 130–400)
PLATELET # BLD: 161 K/UL (ref 130–400)
PMV BLD AUTO: 9 FL (ref 9.4–12.4)
PMV BLD AUTO: 9.2 FL (ref 9.4–12.4)
PMV BLD AUTO: 9.5 FL (ref 9.4–12.4)
POTASSIUM REFLEX MAGNESIUM: 4.2 MMOL/L (ref 3.5–5)
RBC # BLD: 3.83 M/UL (ref 4.7–6.1)
RBC # BLD: 3.88 M/UL (ref 4.7–6.1)
RBC # BLD: 4.04 M/UL (ref 4.7–6.1)
SODIUM BLD-SCNC: 129 MMOL/L (ref 136–145)
TOTAL PROTEIN: 5.9 G/DL (ref 6.6–8.7)
WBC # BLD: 5.5 K/UL (ref 4.8–10.8)
WBC # BLD: 6.4 K/UL (ref 4.8–10.8)
WBC # BLD: 6.9 K/UL (ref 4.8–10.8)

## 2019-12-19 PROCEDURE — 36415 COLL VENOUS BLD VENIPUNCTURE: CPT

## 2019-12-19 PROCEDURE — 85027 COMPLETE CBC AUTOMATED: CPT

## 2019-12-19 PROCEDURE — 6370000000 HC RX 637 (ALT 250 FOR IP): Performed by: PSYCHIATRY & NEUROLOGY

## 2019-12-19 PROCEDURE — 2580000003 HC RX 258: Performed by: HOSPITALIST

## 2019-12-19 PROCEDURE — 2580000003 HC RX 258: Performed by: PSYCHIATRY & NEUROLOGY

## 2019-12-19 PROCEDURE — 6360000002 HC RX W HCPCS: Performed by: PSYCHIATRY & NEUROLOGY

## 2019-12-19 PROCEDURE — 83605 ASSAY OF LACTIC ACID: CPT

## 2019-12-19 PROCEDURE — 6360000002 HC RX W HCPCS: Performed by: HOSPITALIST

## 2019-12-19 PROCEDURE — 85025 COMPLETE CBC W/AUTO DIFF WBC: CPT

## 2019-12-19 PROCEDURE — 80053 COMPREHEN METABOLIC PANEL: CPT

## 2019-12-19 PROCEDURE — 94640 AIRWAY INHALATION TREATMENT: CPT

## 2019-12-19 PROCEDURE — 99232 SBSQ HOSP IP/OBS MODERATE 35: CPT | Performed by: PHYSICIAN ASSISTANT

## 2019-12-19 PROCEDURE — 1210000000 HC MED SURG R&B

## 2019-12-19 RX ORDER — POLYETHYLENE GLYCOL 3350 17 G/17G
17 POWDER, FOR SOLUTION ORAL DAILY
Status: DISCONTINUED | OUTPATIENT
Start: 2019-12-19 | End: 2019-12-23 | Stop reason: HOSPADM

## 2019-12-19 RX ORDER — AMIODARONE HYDROCHLORIDE 200 MG/1
100 TABLET ORAL EVERY 12 HOURS
Status: DISCONTINUED | OUTPATIENT
Start: 2019-12-19 | End: 2019-12-23 | Stop reason: HOSPADM

## 2019-12-19 RX ORDER — TOLVAPTAN 15 MG/1
15 TABLET ORAL DAILY
Status: DISCONTINUED | OUTPATIENT
Start: 2019-12-19 | End: 2019-12-23 | Stop reason: HOSPADM

## 2019-12-19 RX ORDER — ATORVASTATIN CALCIUM 20 MG/1
20 TABLET, FILM COATED ORAL DAILY
Status: DISCONTINUED | OUTPATIENT
Start: 2019-12-19 | End: 2019-12-23 | Stop reason: HOSPADM

## 2019-12-19 RX ORDER — ACETAMINOPHEN 325 MG/1
650 TABLET ORAL EVERY 4 HOURS PRN
Status: DISCONTINUED | OUTPATIENT
Start: 2019-12-19 | End: 2019-12-23 | Stop reason: HOSPADM

## 2019-12-19 RX ORDER — METOPROLOL SUCCINATE 25 MG/1
12.5 TABLET, EXTENDED RELEASE ORAL DAILY
Status: DISCONTINUED | OUTPATIENT
Start: 2019-12-19 | End: 2019-12-23 | Stop reason: HOSPADM

## 2019-12-19 RX ORDER — TAMSULOSIN HYDROCHLORIDE 0.4 MG/1
0.8 CAPSULE ORAL DAILY
Status: DISCONTINUED | OUTPATIENT
Start: 2019-12-19 | End: 2019-12-23 | Stop reason: HOSPADM

## 2019-12-19 RX ORDER — PANTOPRAZOLE SODIUM 40 MG/1
40 TABLET, DELAYED RELEASE ORAL DAILY
Status: DISCONTINUED | OUTPATIENT
Start: 2019-12-19 | End: 2019-12-23 | Stop reason: HOSPADM

## 2019-12-19 RX ORDER — ASPIRIN 81 MG/1
81 TABLET ORAL DAILY
Status: DISCONTINUED | OUTPATIENT
Start: 2019-12-19 | End: 2019-12-23 | Stop reason: HOSPADM

## 2019-12-19 RX ORDER — IPRATROPIUM BROMIDE AND ALBUTEROL SULFATE 2.5; .5 MG/3ML; MG/3ML
1 SOLUTION RESPIRATORY (INHALATION) EVERY 6 HOURS PRN
Status: DISCONTINUED | OUTPATIENT
Start: 2019-12-19 | End: 2019-12-23 | Stop reason: HOSPADM

## 2019-12-19 RX ORDER — CALCIUM CARBONATE 200(500)MG
2 TABLET,CHEWABLE ORAL 2 TIMES DAILY PRN
Status: DISCONTINUED | OUTPATIENT
Start: 2019-12-19 | End: 2019-12-23 | Stop reason: HOSPADM

## 2019-12-19 RX ORDER — IPRATROPIUM BROMIDE AND ALBUTEROL SULFATE 2.5; .5 MG/3ML; MG/3ML
1 SOLUTION RESPIRATORY (INHALATION)
Status: DISCONTINUED | OUTPATIENT
Start: 2019-12-19 | End: 2019-12-23 | Stop reason: HOSPADM

## 2019-12-19 RX ORDER — ERGOCALCIFEROL 1.25 MG/1
50000 CAPSULE ORAL WEEKLY
Status: DISCONTINUED | OUTPATIENT
Start: 2019-12-22 | End: 2019-12-23 | Stop reason: HOSPADM

## 2019-12-19 RX ORDER — SODIUM CHLORIDE 0.9 % (FLUSH) 0.9 %
10 SYRINGE (ML) INJECTION PRN
Status: DISCONTINUED | OUTPATIENT
Start: 2019-12-19 | End: 2019-12-23 | Stop reason: HOSPADM

## 2019-12-19 RX ORDER — GUAIFENESIN 600 MG/1
600 TABLET, EXTENDED RELEASE ORAL 2 TIMES DAILY
Status: DISCONTINUED | OUTPATIENT
Start: 2019-12-19 | End: 2019-12-23 | Stop reason: HOSPADM

## 2019-12-19 RX ORDER — CHOLECALCIFEROL (VITAMIN D3) 125 MCG
1000 CAPSULE ORAL DAILY
Status: DISCONTINUED | OUTPATIENT
Start: 2019-12-19 | End: 2019-12-23 | Stop reason: HOSPADM

## 2019-12-19 RX ORDER — NITROGLYCERIN 0.4 MG/1
0.4 TABLET SUBLINGUAL EVERY 5 MIN PRN
Status: DISCONTINUED | OUTPATIENT
Start: 2019-12-19 | End: 2019-12-23 | Stop reason: HOSPADM

## 2019-12-19 RX ORDER — SENNA AND DOCUSATE SODIUM 50; 8.6 MG/1; MG/1
2 TABLET, FILM COATED ORAL DAILY PRN
Status: DISCONTINUED | OUTPATIENT
Start: 2019-12-19 | End: 2019-12-23 | Stop reason: HOSPADM

## 2019-12-19 RX ORDER — ALLOPURINOL 100 MG/1
100 TABLET ORAL DAILY
Status: DISCONTINUED | OUTPATIENT
Start: 2019-12-19 | End: 2019-12-23 | Stop reason: HOSPADM

## 2019-12-19 RX ORDER — DOCUSATE SODIUM 100 MG/1
100 CAPSULE, LIQUID FILLED ORAL 2 TIMES DAILY
Status: DISCONTINUED | OUTPATIENT
Start: 2019-12-19 | End: 2019-12-23 | Stop reason: HOSPADM

## 2019-12-19 RX ORDER — SODIUM CHLORIDE 0.9 % (FLUSH) 0.9 %
10 SYRINGE (ML) INJECTION EVERY 12 HOURS SCHEDULED
Status: DISCONTINUED | OUTPATIENT
Start: 2019-12-19 | End: 2019-12-23 | Stop reason: HOSPADM

## 2019-12-19 RX ORDER — BISACODYL 10 MG
10 SUPPOSITORY, RECTAL RECTAL DAILY PRN
Status: DISCONTINUED | OUTPATIENT
Start: 2019-12-19 | End: 2019-12-23 | Stop reason: HOSPADM

## 2019-12-19 RX ORDER — FINASTERIDE 5 MG/1
5 TABLET, FILM COATED ORAL DAILY
Status: DISCONTINUED | OUTPATIENT
Start: 2019-12-19 | End: 2019-12-23 | Stop reason: HOSPADM

## 2019-12-19 RX ORDER — OXYCODONE HYDROCHLORIDE AND ACETAMINOPHEN 5; 325 MG/1; MG/1
1 TABLET ORAL EVERY 6 HOURS PRN
Status: DISCONTINUED | OUTPATIENT
Start: 2019-12-19 | End: 2019-12-23 | Stop reason: HOSPADM

## 2019-12-19 RX ORDER — SODIUM CHLORIDE 1000 MG
1 TABLET, SOLUBLE MISCELLANEOUS
Status: DISCONTINUED | OUTPATIENT
Start: 2019-12-19 | End: 2019-12-23 | Stop reason: HOSPADM

## 2019-12-19 RX ADMIN — PANTOPRAZOLE SODIUM 40 MG: 40 TABLET, DELAYED RELEASE ORAL at 09:16

## 2019-12-19 RX ADMIN — DOCUSATE SODIUM 100 MG: 100 CAPSULE, LIQUID FILLED ORAL at 09:17

## 2019-12-19 RX ADMIN — GUAIFENESIN 600 MG: 600 TABLET, EXTENDED RELEASE ORAL at 20:22

## 2019-12-19 RX ADMIN — CYANOCOBALAMIN TAB 500 MCG 1000 MCG: 500 TAB at 09:15

## 2019-12-19 RX ADMIN — IPRATROPIUM BROMIDE AND ALBUTEROL SULFATE 1 AMPULE: .5; 3 SOLUTION RESPIRATORY (INHALATION) at 14:26

## 2019-12-19 RX ADMIN — IPRATROPIUM BROMIDE AND ALBUTEROL SULFATE 1 AMPULE: .5; 3 SOLUTION RESPIRATORY (INHALATION) at 19:34

## 2019-12-19 RX ADMIN — AMIODARONE HYDROCHLORIDE 100 MG: 200 TABLET ORAL at 04:41

## 2019-12-19 RX ADMIN — FINASTERIDE 5 MG: 5 TABLET, FILM COATED ORAL at 09:17

## 2019-12-19 RX ADMIN — SODIUM CHLORIDE, PRESERVATIVE FREE 10 ML: 5 INJECTION INTRAVENOUS at 09:23

## 2019-12-19 RX ADMIN — ASPIRIN 81 MG: 81 TABLET, COATED ORAL at 09:17

## 2019-12-19 RX ADMIN — DOCUSATE SODIUM 100 MG: 100 CAPSULE, LIQUID FILLED ORAL at 20:22

## 2019-12-19 RX ADMIN — TAMSULOSIN HYDROCHLORIDE 0.8 MG: 0.4 CAPSULE ORAL at 04:41

## 2019-12-19 RX ADMIN — SODIUM CHLORIDE, PRESERVATIVE FREE 10 ML: 5 INJECTION INTRAVENOUS at 20:23

## 2019-12-19 RX ADMIN — IPRATROPIUM BROMIDE AND ALBUTEROL SULFATE 1 AMPULE: .5; 3 SOLUTION RESPIRATORY (INHALATION) at 06:21

## 2019-12-19 RX ADMIN — GUAIFENESIN 600 MG: 600 TABLET, EXTENDED RELEASE ORAL at 09:17

## 2019-12-19 RX ADMIN — Medication 1 G: at 13:38

## 2019-12-19 RX ADMIN — Medication 1 G: at 17:14

## 2019-12-19 RX ADMIN — TAMSULOSIN HYDROCHLORIDE 0.8 MG: 0.4 CAPSULE ORAL at 20:22

## 2019-12-19 RX ADMIN — Medication 1 G: at 09:18

## 2019-12-19 RX ADMIN — AMIODARONE HYDROCHLORIDE 100 MG: 200 TABLET ORAL at 17:14

## 2019-12-19 RX ADMIN — PIPERACILLIN SODIUM AND TAZOBACTAM SODIUM 4.5 G: 4; .5 INJECTION, POWDER, LYOPHILIZED, FOR SOLUTION INTRAVENOUS at 04:41

## 2019-12-19 RX ADMIN — SODIUM CHLORIDE, PRESERVATIVE FREE 2 G: 5 INJECTION INTRAVENOUS at 20:21

## 2019-12-19 RX ADMIN — IPRATROPIUM BROMIDE AND ALBUTEROL SULFATE 1 AMPULE: .5; 3 SOLUTION RESPIRATORY (INHALATION) at 10:14

## 2019-12-19 RX ADMIN — RIVAROXABAN 20 MG: 20 TABLET, FILM COATED ORAL at 17:13

## 2019-12-19 RX ADMIN — ALLOPURINOL 100 MG: 100 TABLET ORAL at 09:17

## 2019-12-19 RX ADMIN — PIPERACILLIN SODIUM AND TAZOBACTAM SODIUM 4.5 G: 4; .5 INJECTION, POWDER, LYOPHILIZED, FOR SOLUTION INTRAVENOUS at 13:38

## 2019-12-19 RX ADMIN — TOLVAPTAN 15 MG: 15 TABLET ORAL at 09:17

## 2019-12-20 PROBLEM — Z51.5 PALLIATIVE CARE PATIENT: Status: ACTIVE | Noted: 2019-12-20

## 2019-12-20 LAB
ANION GAP SERPL CALCULATED.3IONS-SCNC: 12 MMOL/L (ref 7–19)
BUN BLDV-MCNC: 15 MG/DL (ref 8–23)
CALCIUM SERPL-MCNC: 8.4 MG/DL (ref 8.8–10.2)
CHLORIDE BLD-SCNC: 97 MMOL/L (ref 98–111)
CO2: 23 MMOL/L (ref 22–29)
CREAT SERPL-MCNC: 0.8 MG/DL (ref 0.5–1.2)
GFR NON-AFRICAN AMERICAN: >60
GLUCOSE BLD-MCNC: 123 MG/DL (ref 74–109)
HCT VFR BLD CALC: 34 % (ref 42–52)
HEMOGLOBIN: 11 G/DL (ref 14–18)
MCH RBC QN AUTO: 28.4 PG (ref 27–31)
MCHC RBC AUTO-ENTMCNC: 32.4 G/DL (ref 33–37)
MCV RBC AUTO: 87.9 FL (ref 80–94)
PDW BLD-RTO: 14.2 % (ref 11.5–14.5)
PLATELET # BLD: 158 K/UL (ref 130–400)
PMV BLD AUTO: 9.2 FL (ref 9.4–12.4)
POTASSIUM SERPL-SCNC: 3.9 MMOL/L (ref 3.5–5)
RBC # BLD: 3.87 M/UL (ref 4.7–6.1)
SODIUM BLD-SCNC: 132 MMOL/L (ref 136–145)
WBC # BLD: 5.2 K/UL (ref 4.8–10.8)

## 2019-12-20 PROCEDURE — 97162 PT EVAL MOD COMPLEX 30 MIN: CPT

## 2019-12-20 PROCEDURE — 36415 COLL VENOUS BLD VENIPUNCTURE: CPT

## 2019-12-20 PROCEDURE — 1210000000 HC MED SURG R&B

## 2019-12-20 PROCEDURE — 80048 BASIC METABOLIC PNL TOTAL CA: CPT

## 2019-12-20 PROCEDURE — 97166 OT EVAL MOD COMPLEX 45 MIN: CPT

## 2019-12-20 PROCEDURE — 97116 GAIT TRAINING THERAPY: CPT

## 2019-12-20 PROCEDURE — 85027 COMPLETE CBC AUTOMATED: CPT

## 2019-12-20 PROCEDURE — 2580000003 HC RX 258: Performed by: HOSPITALIST

## 2019-12-20 PROCEDURE — 6370000000 HC RX 637 (ALT 250 FOR IP): Performed by: PSYCHIATRY & NEUROLOGY

## 2019-12-20 PROCEDURE — 2580000003 HC RX 258: Performed by: PSYCHIATRY & NEUROLOGY

## 2019-12-20 PROCEDURE — 99231 SBSQ HOSP IP/OBS SF/LOW 25: CPT | Performed by: PHYSICIAN ASSISTANT

## 2019-12-20 PROCEDURE — 6360000002 HC RX W HCPCS: Performed by: HOSPITALIST

## 2019-12-20 PROCEDURE — 97535 SELF CARE MNGMENT TRAINING: CPT

## 2019-12-20 PROCEDURE — 94640 AIRWAY INHALATION TREATMENT: CPT

## 2019-12-20 RX ADMIN — IPRATROPIUM BROMIDE AND ALBUTEROL SULFATE 1 AMPULE: .5; 3 SOLUTION RESPIRATORY (INHALATION) at 10:32

## 2019-12-20 RX ADMIN — ASPIRIN 81 MG: 81 TABLET, COATED ORAL at 08:46

## 2019-12-20 RX ADMIN — DOCUSATE SODIUM 100 MG: 100 CAPSULE, LIQUID FILLED ORAL at 08:46

## 2019-12-20 RX ADMIN — SODIUM CHLORIDE, PRESERVATIVE FREE 2 G: 5 INJECTION INTRAVENOUS at 23:29

## 2019-12-20 RX ADMIN — METOPROLOL SUCCINATE 12.5 MG: 25 TABLET, EXTENDED RELEASE ORAL at 08:46

## 2019-12-20 RX ADMIN — GUAIFENESIN 600 MG: 600 TABLET, EXTENDED RELEASE ORAL at 08:46

## 2019-12-20 RX ADMIN — Medication 1 G: at 15:04

## 2019-12-20 RX ADMIN — ALLOPURINOL 100 MG: 100 TABLET ORAL at 08:46

## 2019-12-20 RX ADMIN — IPRATROPIUM BROMIDE AND ALBUTEROL SULFATE 1 AMPULE: .5; 3 SOLUTION RESPIRATORY (INHALATION) at 06:25

## 2019-12-20 RX ADMIN — POLYETHYLENE GLYCOL 3350 17 G: 17 POWDER, FOR SOLUTION ORAL at 08:46

## 2019-12-20 RX ADMIN — CYANOCOBALAMIN TAB 500 MCG 1000 MCG: 500 TAB at 08:47

## 2019-12-20 RX ADMIN — IPRATROPIUM BROMIDE AND ALBUTEROL SULFATE 1 AMPULE: .5; 3 SOLUTION RESPIRATORY (INHALATION) at 19:09

## 2019-12-20 RX ADMIN — AMIODARONE HYDROCHLORIDE 100 MG: 200 TABLET ORAL at 22:14

## 2019-12-20 RX ADMIN — TAMSULOSIN HYDROCHLORIDE 0.8 MG: 0.4 CAPSULE ORAL at 21:47

## 2019-12-20 RX ADMIN — GUAIFENESIN 600 MG: 600 TABLET, EXTENDED RELEASE ORAL at 21:47

## 2019-12-20 RX ADMIN — DOCUSATE SODIUM 100 MG: 100 CAPSULE, LIQUID FILLED ORAL at 21:47

## 2019-12-20 RX ADMIN — TOLVAPTAN 15 MG: 15 TABLET ORAL at 08:47

## 2019-12-20 RX ADMIN — RIVAROXABAN 20 MG: 20 TABLET, FILM COATED ORAL at 22:14

## 2019-12-20 RX ADMIN — Medication 1 G: at 22:14

## 2019-12-20 RX ADMIN — SODIUM CHLORIDE, PRESERVATIVE FREE 10 ML: 5 INJECTION INTRAVENOUS at 21:47

## 2019-12-20 RX ADMIN — PANTOPRAZOLE SODIUM 40 MG: 40 TABLET, DELAYED RELEASE ORAL at 08:46

## 2019-12-20 RX ADMIN — AMIODARONE HYDROCHLORIDE 100 MG: 200 TABLET ORAL at 05:36

## 2019-12-20 RX ADMIN — FINASTERIDE 5 MG: 5 TABLET, FILM COATED ORAL at 08:46

## 2019-12-20 ASSESSMENT — PAIN DESCRIPTION - PROGRESSION: CLINICAL_PROGRESSION: NOT CHANGED

## 2019-12-20 ASSESSMENT — PAIN DESCRIPTION - LOCATION: LOCATION: PERINEUM;SCROTUM

## 2019-12-20 ASSESSMENT — PAIN SCALES - GENERAL
PAINLEVEL_OUTOF10: 10
PAINLEVEL_OUTOF10: 0

## 2019-12-20 ASSESSMENT — PAIN DESCRIPTION - DESCRIPTORS: DESCRIPTORS: SORE;TENDER

## 2019-12-20 ASSESSMENT — PAIN - FUNCTIONAL ASSESSMENT: PAIN_FUNCTIONAL_ASSESSMENT: PREVENTS OR INTERFERES SOME ACTIVE ACTIVITIES AND ADLS

## 2019-12-21 LAB
ANION GAP SERPL CALCULATED.3IONS-SCNC: 14 MMOL/L (ref 7–19)
BUN BLDV-MCNC: 14 MG/DL (ref 8–23)
CALCIUM SERPL-MCNC: 8.7 MG/DL (ref 8.8–10.2)
CHLORIDE BLD-SCNC: 97 MMOL/L (ref 98–111)
CO2: 23 MMOL/L (ref 22–29)
CREAT SERPL-MCNC: 0.7 MG/DL (ref 0.5–1.2)
GFR NON-AFRICAN AMERICAN: >60
GLUCOSE BLD-MCNC: 113 MG/DL (ref 74–109)
HCT VFR BLD CALC: 36.6 % (ref 42–52)
HEMOGLOBIN: 11.2 G/DL (ref 14–18)
MCH RBC QN AUTO: 27.8 PG (ref 27–31)
MCHC RBC AUTO-ENTMCNC: 30.6 G/DL (ref 33–37)
MCV RBC AUTO: 90.8 FL (ref 80–94)
PDW BLD-RTO: 14.2 % (ref 11.5–14.5)
PLATELET # BLD: 180 K/UL (ref 130–400)
PMV BLD AUTO: 9.1 FL (ref 9.4–12.4)
POTASSIUM SERPL-SCNC: 4.1 MMOL/L (ref 3.5–5)
RBC # BLD: 4.03 M/UL (ref 4.7–6.1)
SODIUM BLD-SCNC: 134 MMOL/L (ref 136–145)
WBC # BLD: 5.4 K/UL (ref 4.8–10.8)

## 2019-12-21 PROCEDURE — 85027 COMPLETE CBC AUTOMATED: CPT

## 2019-12-21 PROCEDURE — 2580000003 HC RX 258: Performed by: PSYCHIATRY & NEUROLOGY

## 2019-12-21 PROCEDURE — 6360000002 HC RX W HCPCS: Performed by: HOSPITALIST

## 2019-12-21 PROCEDURE — 1210000000 HC MED SURG R&B

## 2019-12-21 PROCEDURE — 99231 SBSQ HOSP IP/OBS SF/LOW 25: CPT | Performed by: UROLOGY

## 2019-12-21 PROCEDURE — 94640 AIRWAY INHALATION TREATMENT: CPT

## 2019-12-21 PROCEDURE — 6370000000 HC RX 637 (ALT 250 FOR IP): Performed by: PSYCHIATRY & NEUROLOGY

## 2019-12-21 PROCEDURE — 36415 COLL VENOUS BLD VENIPUNCTURE: CPT

## 2019-12-21 PROCEDURE — 2580000003 HC RX 258: Performed by: HOSPITALIST

## 2019-12-21 PROCEDURE — 80048 BASIC METABOLIC PNL TOTAL CA: CPT

## 2019-12-21 RX ADMIN — PANTOPRAZOLE SODIUM 40 MG: 40 TABLET, DELAYED RELEASE ORAL at 09:26

## 2019-12-21 RX ADMIN — FINASTERIDE 5 MG: 5 TABLET, FILM COATED ORAL at 21:47

## 2019-12-21 RX ADMIN — Medication 1 G: at 18:18

## 2019-12-21 RX ADMIN — IPRATROPIUM BROMIDE AND ALBUTEROL SULFATE 1 AMPULE: .5; 3 SOLUTION RESPIRATORY (INHALATION) at 06:29

## 2019-12-21 RX ADMIN — TAMSULOSIN HYDROCHLORIDE 0.8 MG: 0.4 CAPSULE ORAL at 21:47

## 2019-12-21 RX ADMIN — AMIODARONE HYDROCHLORIDE 100 MG: 200 TABLET ORAL at 21:47

## 2019-12-21 RX ADMIN — DOCUSATE SODIUM 100 MG: 100 CAPSULE, LIQUID FILLED ORAL at 21:47

## 2019-12-21 RX ADMIN — AMIODARONE HYDROCHLORIDE 100 MG: 200 TABLET ORAL at 09:28

## 2019-12-21 RX ADMIN — SODIUM CHLORIDE, PRESERVATIVE FREE 10 ML: 5 INJECTION INTRAVENOUS at 14:27

## 2019-12-21 RX ADMIN — Medication 1 G: at 14:26

## 2019-12-21 RX ADMIN — ALLOPURINOL 100 MG: 100 TABLET ORAL at 09:28

## 2019-12-21 RX ADMIN — TOLVAPTAN 15 MG: 15 TABLET ORAL at 09:28

## 2019-12-21 RX ADMIN — IPRATROPIUM BROMIDE AND ALBUTEROL SULFATE 1 AMPULE: .5; 3 SOLUTION RESPIRATORY (INHALATION) at 10:09

## 2019-12-21 RX ADMIN — IPRATROPIUM BROMIDE AND ALBUTEROL SULFATE 1 AMPULE: .5; 3 SOLUTION RESPIRATORY (INHALATION) at 14:47

## 2019-12-21 RX ADMIN — DOCUSATE SODIUM 100 MG: 100 CAPSULE, LIQUID FILLED ORAL at 09:28

## 2019-12-21 RX ADMIN — GUAIFENESIN 600 MG: 600 TABLET, EXTENDED RELEASE ORAL at 21:47

## 2019-12-21 RX ADMIN — IPRATROPIUM BROMIDE AND ALBUTEROL SULFATE 1 AMPULE: .5; 3 SOLUTION RESPIRATORY (INHALATION) at 19:28

## 2019-12-21 RX ADMIN — CYANOCOBALAMIN TAB 500 MCG 1000 MCG: 500 TAB at 09:28

## 2019-12-21 RX ADMIN — Medication 1 G: at 09:27

## 2019-12-21 RX ADMIN — RIVAROXABAN 20 MG: 20 TABLET, FILM COATED ORAL at 18:18

## 2019-12-21 RX ADMIN — SODIUM CHLORIDE, PRESERVATIVE FREE 10 ML: 5 INJECTION INTRAVENOUS at 21:47

## 2019-12-21 RX ADMIN — GUAIFENESIN 600 MG: 600 TABLET, EXTENDED RELEASE ORAL at 09:26

## 2019-12-21 RX ADMIN — CEFTRIAXONE 1 G: 1 INJECTION, POWDER, FOR SOLUTION INTRAMUSCULAR; INTRAVENOUS at 21:57

## 2019-12-21 RX ADMIN — ASPIRIN 81 MG: 81 TABLET, COATED ORAL at 09:28

## 2019-12-21 ASSESSMENT — PAIN SCALES - GENERAL: PAINLEVEL_OUTOF10: 0

## 2019-12-22 LAB
ANION GAP SERPL CALCULATED.3IONS-SCNC: 10 MMOL/L (ref 7–19)
BUN BLDV-MCNC: 12 MG/DL (ref 8–23)
CALCIUM SERPL-MCNC: 8.7 MG/DL (ref 8.8–10.2)
CHLORIDE BLD-SCNC: 100 MMOL/L (ref 98–111)
CO2: 25 MMOL/L (ref 22–29)
CREAT SERPL-MCNC: 0.7 MG/DL (ref 0.5–1.2)
GFR NON-AFRICAN AMERICAN: >60
GLUCOSE BLD-MCNC: 108 MG/DL (ref 74–109)
HCT VFR BLD CALC: 35.7 % (ref 42–52)
HEMOGLOBIN: 11.4 G/DL (ref 14–18)
MCH RBC QN AUTO: 28.5 PG (ref 27–31)
MCHC RBC AUTO-ENTMCNC: 31.9 G/DL (ref 33–37)
MCV RBC AUTO: 89.3 FL (ref 80–94)
PDW BLD-RTO: 14 % (ref 11.5–14.5)
PLATELET # BLD: 183 K/UL (ref 130–400)
PMV BLD AUTO: 9 FL (ref 9.4–12.4)
POTASSIUM SERPL-SCNC: 4.3 MMOL/L (ref 3.5–5)
RBC # BLD: 4 M/UL (ref 4.7–6.1)
SODIUM BLD-SCNC: 135 MMOL/L (ref 136–145)
WBC # BLD: 5.6 K/UL (ref 4.8–10.8)

## 2019-12-22 PROCEDURE — 6370000000 HC RX 637 (ALT 250 FOR IP): Performed by: PSYCHIATRY & NEUROLOGY

## 2019-12-22 PROCEDURE — 36415 COLL VENOUS BLD VENIPUNCTURE: CPT

## 2019-12-22 PROCEDURE — 97110 THERAPEUTIC EXERCISES: CPT

## 2019-12-22 PROCEDURE — 97116 GAIT TRAINING THERAPY: CPT

## 2019-12-22 PROCEDURE — 80048 BASIC METABOLIC PNL TOTAL CA: CPT

## 2019-12-22 PROCEDURE — 94640 AIRWAY INHALATION TREATMENT: CPT

## 2019-12-22 PROCEDURE — 1210000000 HC MED SURG R&B

## 2019-12-22 PROCEDURE — 2580000003 HC RX 258: Performed by: PSYCHIATRY & NEUROLOGY

## 2019-12-22 PROCEDURE — 85027 COMPLETE CBC AUTOMATED: CPT

## 2019-12-22 PROCEDURE — 6370000000 HC RX 637 (ALT 250 FOR IP): Performed by: HOSPITALIST

## 2019-12-22 PROCEDURE — 99231 SBSQ HOSP IP/OBS SF/LOW 25: CPT | Performed by: UROLOGY

## 2019-12-22 RX ORDER — CEFDINIR 300 MG/1
300 CAPSULE ORAL EVERY 12 HOURS SCHEDULED
Status: DISCONTINUED | OUTPATIENT
Start: 2019-12-22 | End: 2019-12-23 | Stop reason: HOSPADM

## 2019-12-22 RX ORDER — CEFDINIR 300 MG/1
300 CAPSULE ORAL EVERY 12 HOURS SCHEDULED
Status: DISCONTINUED | OUTPATIENT
Start: 2019-12-22 | End: 2019-12-22

## 2019-12-22 RX ADMIN — ALLOPURINOL 100 MG: 100 TABLET ORAL at 08:22

## 2019-12-22 RX ADMIN — FINASTERIDE 5 MG: 5 TABLET, FILM COATED ORAL at 20:26

## 2019-12-22 RX ADMIN — Medication 1 G: at 15:00

## 2019-12-22 RX ADMIN — RIVAROXABAN 20 MG: 20 TABLET, FILM COATED ORAL at 18:06

## 2019-12-22 RX ADMIN — Medication 1 G: at 08:22

## 2019-12-22 RX ADMIN — CEFDINIR 300 MG: 300 CAPSULE ORAL at 20:30

## 2019-12-22 RX ADMIN — SODIUM CHLORIDE, PRESERVATIVE FREE 10 ML: 5 INJECTION INTRAVENOUS at 20:27

## 2019-12-22 RX ADMIN — CYANOCOBALAMIN TAB 500 MCG 1000 MCG: 500 TAB at 08:22

## 2019-12-22 RX ADMIN — ASPIRIN 81 MG: 81 TABLET, COATED ORAL at 08:22

## 2019-12-22 RX ADMIN — AMIODARONE HYDROCHLORIDE 100 MG: 200 TABLET ORAL at 20:27

## 2019-12-22 RX ADMIN — DOCUSATE SODIUM 100 MG: 100 CAPSULE, LIQUID FILLED ORAL at 20:27

## 2019-12-22 RX ADMIN — TAMSULOSIN HYDROCHLORIDE 0.8 MG: 0.4 CAPSULE ORAL at 20:26

## 2019-12-22 RX ADMIN — Medication 1 G: at 18:06

## 2019-12-22 RX ADMIN — DOCUSATE SODIUM 100 MG: 100 CAPSULE, LIQUID FILLED ORAL at 08:28

## 2019-12-22 RX ADMIN — SODIUM CHLORIDE, PRESERVATIVE FREE 10 ML: 5 INJECTION INTRAVENOUS at 08:28

## 2019-12-22 RX ADMIN — IPRATROPIUM BROMIDE AND ALBUTEROL SULFATE 1 AMPULE: .5; 3 SOLUTION RESPIRATORY (INHALATION) at 14:11

## 2019-12-22 RX ADMIN — GUAIFENESIN 600 MG: 600 TABLET, EXTENDED RELEASE ORAL at 20:26

## 2019-12-22 RX ADMIN — IPRATROPIUM BROMIDE AND ALBUTEROL SULFATE 1 AMPULE: .5; 3 SOLUTION RESPIRATORY (INHALATION) at 10:04

## 2019-12-22 RX ADMIN — AMIODARONE HYDROCHLORIDE 100 MG: 200 TABLET ORAL at 08:23

## 2019-12-22 RX ADMIN — ERGOCALCIFEROL 50000 UNITS: 1.25 CAPSULE ORAL at 08:22

## 2019-12-22 RX ADMIN — TOLVAPTAN 15 MG: 15 TABLET ORAL at 08:22

## 2019-12-22 RX ADMIN — IPRATROPIUM BROMIDE AND ALBUTEROL SULFATE 1 AMPULE: .5; 3 SOLUTION RESPIRATORY (INHALATION) at 18:25

## 2019-12-22 RX ADMIN — PANTOPRAZOLE SODIUM 40 MG: 40 TABLET, DELAYED RELEASE ORAL at 08:21

## 2019-12-22 RX ADMIN — CEFDINIR 300 MG: 300 CAPSULE ORAL at 15:00

## 2019-12-22 RX ADMIN — IPRATROPIUM BROMIDE AND ALBUTEROL SULFATE 1 AMPULE: .5; 3 SOLUTION RESPIRATORY (INHALATION) at 06:15

## 2019-12-22 RX ADMIN — POLYETHYLENE GLYCOL 3350 17 G: 17 POWDER, FOR SOLUTION ORAL at 08:20

## 2019-12-22 RX ADMIN — METOPROLOL SUCCINATE 12.5 MG: 25 TABLET, EXTENDED RELEASE ORAL at 08:21

## 2019-12-22 RX ADMIN — GUAIFENESIN 600 MG: 600 TABLET, EXTENDED RELEASE ORAL at 08:20

## 2019-12-22 ASSESSMENT — PAIN SCALES - WONG BAKER: WONGBAKER_NUMERICALRESPONSE: 6

## 2019-12-22 ASSESSMENT — PAIN DESCRIPTION - PAIN TYPE: TYPE: ACUTE PAIN

## 2019-12-22 ASSESSMENT — PAIN SCALES - GENERAL: PAINLEVEL_OUTOF10: 0

## 2019-12-23 ENCOUNTER — HOSPITAL ENCOUNTER (INPATIENT)
Age: 84
LOS: 14 days | Discharge: HOME HEALTH | DRG: 871 | End: 2020-01-06
Attending: PSYCHIATRY & NEUROLOGY | Admitting: PSYCHIATRY & NEUROLOGY
Payer: MEDICARE

## 2019-12-23 VITALS
RESPIRATION RATE: 20 BRPM | HEART RATE: 70 BPM | OXYGEN SATURATION: 92 % | TEMPERATURE: 98.1 F | SYSTOLIC BLOOD PRESSURE: 99 MMHG | DIASTOLIC BLOOD PRESSURE: 58 MMHG

## 2019-12-23 PROBLEM — R78.81 BACTEREMIA: Status: ACTIVE | Noted: 2019-12-23

## 2019-12-23 PROBLEM — R53.1 WEAKNESS: Status: ACTIVE | Noted: 2019-12-23

## 2019-12-23 PROBLEM — M79.606 PAIN IN LEG, UNSPECIFIED: Status: ACTIVE | Noted: 2019-12-23

## 2019-12-23 LAB
ANION GAP SERPL CALCULATED.3IONS-SCNC: 13 MMOL/L (ref 7–19)
BUN BLDV-MCNC: 14 MG/DL (ref 8–23)
CALCIUM SERPL-MCNC: 8.7 MG/DL (ref 8.8–10.2)
CHLORIDE BLD-SCNC: 99 MMOL/L (ref 98–111)
CO2: 24 MMOL/L (ref 22–29)
CREAT SERPL-MCNC: 0.8 MG/DL (ref 0.5–1.2)
GFR NON-AFRICAN AMERICAN: >60
GLUCOSE BLD-MCNC: 112 MG/DL (ref 74–109)
HCT VFR BLD CALC: 35.2 % (ref 42–52)
HEMOGLOBIN: 11.3 G/DL (ref 14–18)
MCH RBC QN AUTO: 28.6 PG (ref 27–31)
MCHC RBC AUTO-ENTMCNC: 32.1 G/DL (ref 33–37)
MCV RBC AUTO: 89.1 FL (ref 80–94)
PDW BLD-RTO: 13.9 % (ref 11.5–14.5)
PLATELET # BLD: 204 K/UL (ref 130–400)
PMV BLD AUTO: 9 FL (ref 9.4–12.4)
POTASSIUM SERPL-SCNC: 4.4 MMOL/L (ref 3.5–5)
PREALBUMIN: 19 MG/DL (ref 20–40)
RBC # BLD: 3.95 M/UL (ref 4.7–6.1)
SODIUM BLD-SCNC: 136 MMOL/L (ref 136–145)
T4 FREE: 1.54 NG/DL (ref 0.93–1.7)
TSH SERPL DL<=0.05 MIU/L-ACNC: 1.19 UIU/ML (ref 0.27–4.2)
VITAMIN B-12: 1030 PG/ML (ref 211–946)
WBC # BLD: 6.2 K/UL (ref 4.8–10.8)

## 2019-12-23 PROCEDURE — 97530 THERAPEUTIC ACTIVITIES: CPT

## 2019-12-23 PROCEDURE — 84443 ASSAY THYROID STIM HORMONE: CPT

## 2019-12-23 PROCEDURE — 2580000003 HC RX 258: Performed by: PSYCHIATRY & NEUROLOGY

## 2019-12-23 PROCEDURE — 99231 SBSQ HOSP IP/OBS SF/LOW 25: CPT | Performed by: UROLOGY

## 2019-12-23 PROCEDURE — 6370000000 HC RX 637 (ALT 250 FOR IP): Performed by: HOSPITALIST

## 2019-12-23 PROCEDURE — 85027 COMPLETE CBC AUTOMATED: CPT

## 2019-12-23 PROCEDURE — 51701 INSERT BLADDER CATHETER: CPT

## 2019-12-23 PROCEDURE — 94640 AIRWAY INHALATION TREATMENT: CPT

## 2019-12-23 PROCEDURE — 82607 VITAMIN B-12: CPT

## 2019-12-23 PROCEDURE — 87040 BLOOD CULTURE FOR BACTERIA: CPT

## 2019-12-23 PROCEDURE — 84439 ASSAY OF FREE THYROXINE: CPT

## 2019-12-23 PROCEDURE — 51798 US URINE CAPACITY MEASURE: CPT

## 2019-12-23 PROCEDURE — 80048 BASIC METABOLIC PNL TOTAL CA: CPT

## 2019-12-23 PROCEDURE — 6370000000 HC RX 637 (ALT 250 FOR IP): Performed by: PSYCHIATRY & NEUROLOGY

## 2019-12-23 PROCEDURE — 84134 ASSAY OF PREALBUMIN: CPT

## 2019-12-23 PROCEDURE — 1180000000 HC REHAB R&B

## 2019-12-23 PROCEDURE — 97116 GAIT TRAINING THERAPY: CPT

## 2019-12-23 PROCEDURE — 36415 COLL VENOUS BLD VENIPUNCTURE: CPT

## 2019-12-23 RX ORDER — CALCIUM CARBONATE 200(500)MG
2 TABLET,CHEWABLE ORAL 2 TIMES DAILY PRN
Status: CANCELLED | OUTPATIENT
Start: 2019-12-23

## 2019-12-23 RX ORDER — DOCUSATE SODIUM 100 MG/1
100 CAPSULE, LIQUID FILLED ORAL 2 TIMES DAILY
Status: CANCELLED | OUTPATIENT
Start: 2019-12-23

## 2019-12-23 RX ORDER — POLYETHYLENE GLYCOL 3350 17 G/17G
17 POWDER, FOR SOLUTION ORAL DAILY
Status: DISCONTINUED | OUTPATIENT
Start: 2019-12-24 | End: 2020-01-06 | Stop reason: HOSPADM

## 2019-12-23 RX ORDER — SODIUM CHLORIDE 1000 MG
1 TABLET, SOLUBLE MISCELLANEOUS
Status: CANCELLED | OUTPATIENT
Start: 2019-12-23

## 2019-12-23 RX ORDER — CHOLECALCIFEROL (VITAMIN D3) 125 MCG
1000 CAPSULE ORAL DAILY
Status: CANCELLED | OUTPATIENT
Start: 2019-12-24

## 2019-12-23 RX ORDER — PANTOPRAZOLE SODIUM 40 MG/1
40 TABLET, DELAYED RELEASE ORAL DAILY
Status: DISCONTINUED | OUTPATIENT
Start: 2019-12-24 | End: 2020-01-06 | Stop reason: HOSPADM

## 2019-12-23 RX ORDER — GUAIFENESIN 600 MG/1
600 TABLET, EXTENDED RELEASE ORAL 2 TIMES DAILY
Status: CANCELLED | OUTPATIENT
Start: 2019-12-23

## 2019-12-23 RX ORDER — ERGOCALCIFEROL 1.25 MG/1
50000 CAPSULE ORAL WEEKLY
Status: CANCELLED | OUTPATIENT
Start: 2019-12-29

## 2019-12-23 RX ORDER — ALLOPURINOL 100 MG/1
100 TABLET ORAL DAILY
Status: DISCONTINUED | OUTPATIENT
Start: 2019-12-24 | End: 2020-01-06 | Stop reason: HOSPADM

## 2019-12-23 RX ORDER — AMIODARONE HYDROCHLORIDE 100 MG/1
100 TABLET ORAL EVERY 12 HOURS
Status: DISCONTINUED | OUTPATIENT
Start: 2019-12-23 | End: 2020-01-06 | Stop reason: HOSPADM

## 2019-12-23 RX ORDER — DOCUSATE SODIUM 100 MG/1
100 CAPSULE, LIQUID FILLED ORAL 2 TIMES DAILY
Status: DISCONTINUED | OUTPATIENT
Start: 2019-12-23 | End: 2020-01-06 | Stop reason: HOSPADM

## 2019-12-23 RX ORDER — SODIUM CHLORIDE 0.9 % (FLUSH) 0.9 %
10 SYRINGE (ML) INJECTION PRN
Status: DISCONTINUED | OUTPATIENT
Start: 2019-12-23 | End: 2020-01-06 | Stop reason: HOSPADM

## 2019-12-23 RX ORDER — METOPROLOL SUCCINATE 25 MG/1
12.5 TABLET, EXTENDED RELEASE ORAL DAILY
Status: DISCONTINUED | OUTPATIENT
Start: 2019-12-24 | End: 2020-01-06 | Stop reason: HOSPADM

## 2019-12-23 RX ORDER — CEFDINIR 300 MG/1
300 CAPSULE ORAL EVERY 12 HOURS SCHEDULED
Status: CANCELLED | OUTPATIENT
Start: 2019-12-23 | End: 2019-12-27

## 2019-12-23 RX ORDER — TAMSULOSIN HYDROCHLORIDE 0.4 MG/1
0.8 CAPSULE ORAL DAILY
Status: DISCONTINUED | OUTPATIENT
Start: 2019-12-24 | End: 2020-01-06 | Stop reason: HOSPADM

## 2019-12-23 RX ORDER — FINASTERIDE 5 MG/1
5 TABLET, FILM COATED ORAL DAILY
Status: DISCONTINUED | OUTPATIENT
Start: 2019-12-23 | End: 2020-01-06 | Stop reason: HOSPADM

## 2019-12-23 RX ORDER — ATORVASTATIN CALCIUM 20 MG/1
20 TABLET, FILM COATED ORAL DAILY
Status: CANCELLED | OUTPATIENT
Start: 2019-12-23

## 2019-12-23 RX ORDER — ASPIRIN 81 MG/1
81 TABLET ORAL DAILY
Status: CANCELLED | OUTPATIENT
Start: 2019-12-24

## 2019-12-23 RX ORDER — CALCIUM CARBONATE 200(500)MG
2 TABLET,CHEWABLE ORAL 2 TIMES DAILY PRN
Status: DISCONTINUED | OUTPATIENT
Start: 2019-12-23 | End: 2020-01-06 | Stop reason: HOSPADM

## 2019-12-23 RX ORDER — CHOLECALCIFEROL (VITAMIN D3) 125 MCG
1000 CAPSULE ORAL DAILY
Status: DISCONTINUED | OUTPATIENT
Start: 2019-12-24 | End: 2020-01-06 | Stop reason: HOSPADM

## 2019-12-23 RX ORDER — SODIUM CHLORIDE 1000 MG
1 TABLET, SOLUBLE MISCELLANEOUS
Status: DISCONTINUED | OUTPATIENT
Start: 2019-12-23 | End: 2020-01-06 | Stop reason: HOSPADM

## 2019-12-23 RX ORDER — FINASTERIDE 5 MG/1
5 TABLET, FILM COATED ORAL DAILY
Status: CANCELLED | OUTPATIENT
Start: 2019-12-23

## 2019-12-23 RX ORDER — ERGOCALCIFEROL 1.25 MG/1
50000 CAPSULE ORAL WEEKLY
Status: DISCONTINUED | OUTPATIENT
Start: 2019-12-29 | End: 2020-01-06 | Stop reason: HOSPADM

## 2019-12-23 RX ORDER — ACETAMINOPHEN 325 MG/1
650 TABLET ORAL EVERY 4 HOURS PRN
Status: DISCONTINUED | OUTPATIENT
Start: 2019-12-23 | End: 2019-12-23

## 2019-12-23 RX ORDER — GUAIFENESIN 600 MG/1
600 TABLET, EXTENDED RELEASE ORAL 2 TIMES DAILY
Status: DISCONTINUED | OUTPATIENT
Start: 2019-12-23 | End: 2020-01-06 | Stop reason: HOSPADM

## 2019-12-23 RX ORDER — TAMSULOSIN HYDROCHLORIDE 0.4 MG/1
0.8 CAPSULE ORAL DAILY
Status: DISCONTINUED | OUTPATIENT
Start: 2019-12-23 | End: 2019-12-23

## 2019-12-23 RX ORDER — IPRATROPIUM BROMIDE AND ALBUTEROL SULFATE 2.5; .5 MG/3ML; MG/3ML
1 SOLUTION RESPIRATORY (INHALATION)
Status: DISCONTINUED | OUTPATIENT
Start: 2019-12-23 | End: 2019-12-31

## 2019-12-23 RX ORDER — ACETAMINOPHEN 325 MG/1
650 TABLET ORAL EVERY 4 HOURS PRN
Status: DISCONTINUED | OUTPATIENT
Start: 2019-12-23 | End: 2020-01-06 | Stop reason: HOSPADM

## 2019-12-23 RX ORDER — ASPIRIN 81 MG/1
81 TABLET ORAL DAILY
Status: DISCONTINUED | OUTPATIENT
Start: 2019-12-24 | End: 2020-01-06 | Stop reason: HOSPADM

## 2019-12-23 RX ORDER — NITROGLYCERIN 0.4 MG/1
0.4 TABLET SUBLINGUAL EVERY 5 MIN PRN
Status: CANCELLED | OUTPATIENT
Start: 2019-12-23

## 2019-12-23 RX ORDER — BISACODYL 10 MG
10 SUPPOSITORY, RECTAL RECTAL DAILY PRN
Status: DISCONTINUED | OUTPATIENT
Start: 2019-12-23 | End: 2020-01-06 | Stop reason: HOSPADM

## 2019-12-23 RX ORDER — CEFDINIR 300 MG/1
300 CAPSULE ORAL EVERY 12 HOURS SCHEDULED
Status: COMPLETED | OUTPATIENT
Start: 2019-12-23 | End: 2019-12-26

## 2019-12-23 RX ORDER — SODIUM CHLORIDE 0.9 % (FLUSH) 0.9 %
10 SYRINGE (ML) INJECTION EVERY 12 HOURS SCHEDULED
Status: CANCELLED | OUTPATIENT
Start: 2019-12-23

## 2019-12-23 RX ORDER — ALLOPURINOL 100 MG/1
100 TABLET ORAL DAILY
Status: CANCELLED | OUTPATIENT
Start: 2019-12-24

## 2019-12-23 RX ORDER — IPRATROPIUM BROMIDE AND ALBUTEROL SULFATE 2.5; .5 MG/3ML; MG/3ML
1 SOLUTION RESPIRATORY (INHALATION) EVERY 6 HOURS PRN
Status: DISCONTINUED | OUTPATIENT
Start: 2019-12-23 | End: 2020-01-06 | Stop reason: HOSPADM

## 2019-12-23 RX ORDER — BISACODYL 10 MG
10 SUPPOSITORY, RECTAL RECTAL DAILY PRN
Status: CANCELLED | OUTPATIENT
Start: 2019-12-23

## 2019-12-23 RX ORDER — POLYETHYLENE GLYCOL 3350 17 G/17G
17 POWDER, FOR SOLUTION ORAL DAILY PRN
Status: DISCONTINUED | OUTPATIENT
Start: 2019-12-23 | End: 2020-01-06 | Stop reason: HOSPADM

## 2019-12-23 RX ORDER — ACETAMINOPHEN 325 MG/1
650 TABLET ORAL EVERY 4 HOURS PRN
Status: CANCELLED | OUTPATIENT
Start: 2019-12-23

## 2019-12-23 RX ORDER — NITROGLYCERIN 0.4 MG/1
0.4 TABLET SUBLINGUAL EVERY 5 MIN PRN
Status: DISCONTINUED | OUTPATIENT
Start: 2019-12-23 | End: 2020-01-06 | Stop reason: HOSPADM

## 2019-12-23 RX ORDER — OXYCODONE HYDROCHLORIDE AND ACETAMINOPHEN 5; 325 MG/1; MG/1
1 TABLET ORAL EVERY 6 HOURS PRN
Status: DISCONTINUED | OUTPATIENT
Start: 2019-12-23 | End: 2020-01-06 | Stop reason: HOSPADM

## 2019-12-23 RX ORDER — TAMSULOSIN HYDROCHLORIDE 0.4 MG/1
0.4 CAPSULE ORAL DAILY
Status: COMPLETED | OUTPATIENT
Start: 2019-12-23 | End: 2019-12-23

## 2019-12-23 RX ORDER — IPRATROPIUM BROMIDE AND ALBUTEROL SULFATE 2.5; .5 MG/3ML; MG/3ML
1 SOLUTION RESPIRATORY (INHALATION)
Status: CANCELLED | OUTPATIENT
Start: 2019-12-23

## 2019-12-23 RX ORDER — METOPROLOL SUCCINATE 25 MG/1
12.5 TABLET, EXTENDED RELEASE ORAL DAILY
Status: CANCELLED | OUTPATIENT
Start: 2019-12-24

## 2019-12-23 RX ORDER — SENNA AND DOCUSATE SODIUM 50; 8.6 MG/1; MG/1
2 TABLET, FILM COATED ORAL DAILY PRN
Status: DISCONTINUED | OUTPATIENT
Start: 2019-12-23 | End: 2020-01-06 | Stop reason: HOSPADM

## 2019-12-23 RX ORDER — ATORVASTATIN CALCIUM 20 MG/1
20 TABLET, FILM COATED ORAL DAILY
Status: DISCONTINUED | OUTPATIENT
Start: 2019-12-23 | End: 2020-01-06 | Stop reason: HOSPADM

## 2019-12-23 RX ORDER — OXYCODONE HYDROCHLORIDE AND ACETAMINOPHEN 5; 325 MG/1; MG/1
1 TABLET ORAL EVERY 6 HOURS PRN
Status: CANCELLED | OUTPATIENT
Start: 2019-12-23

## 2019-12-23 RX ORDER — AMIODARONE HYDROCHLORIDE 200 MG/1
100 TABLET ORAL EVERY 12 HOURS
Status: CANCELLED | OUTPATIENT
Start: 2019-12-23

## 2019-12-23 RX ORDER — IPRATROPIUM BROMIDE AND ALBUTEROL SULFATE 2.5; .5 MG/3ML; MG/3ML
1 SOLUTION RESPIRATORY (INHALATION) EVERY 6 HOURS PRN
Status: CANCELLED | OUTPATIENT
Start: 2019-12-23

## 2019-12-23 RX ORDER — POLYETHYLENE GLYCOL 3350 17 G/17G
17 POWDER, FOR SOLUTION ORAL DAILY
Status: CANCELLED | OUTPATIENT
Start: 2019-12-24

## 2019-12-23 RX ORDER — SODIUM CHLORIDE 0.9 % (FLUSH) 0.9 %
10 SYRINGE (ML) INJECTION EVERY 12 HOURS SCHEDULED
Status: DISCONTINUED | OUTPATIENT
Start: 2019-12-23 | End: 2019-12-23

## 2019-12-23 RX ORDER — SENNA AND DOCUSATE SODIUM 50; 8.6 MG/1; MG/1
2 TABLET, FILM COATED ORAL DAILY PRN
Status: CANCELLED | OUTPATIENT
Start: 2019-12-23

## 2019-12-23 RX ORDER — SODIUM CHLORIDE 0.9 % (FLUSH) 0.9 %
10 SYRINGE (ML) INJECTION PRN
Status: CANCELLED | OUTPATIENT
Start: 2019-12-23

## 2019-12-23 RX ORDER — PANTOPRAZOLE SODIUM 40 MG/1
40 TABLET, DELAYED RELEASE ORAL DAILY
Status: CANCELLED | OUTPATIENT
Start: 2019-12-24

## 2019-12-23 RX ORDER — TAMSULOSIN HYDROCHLORIDE 0.4 MG/1
0.8 CAPSULE ORAL DAILY
Status: CANCELLED | OUTPATIENT
Start: 2019-12-23

## 2019-12-23 RX ADMIN — Medication 1 G: at 12:21

## 2019-12-23 RX ADMIN — AMIODARONE HYDROCHLORIDE 100 MG: 100 TABLET ORAL at 21:13

## 2019-12-23 RX ADMIN — SODIUM CHLORIDE, PRESERVATIVE FREE 10 ML: 5 INJECTION INTRAVENOUS at 10:25

## 2019-12-23 RX ADMIN — ATORVASTATIN CALCIUM 20 MG: 20 TABLET, FILM COATED ORAL at 21:13

## 2019-12-23 RX ADMIN — IPRATROPIUM BROMIDE AND ALBUTEROL SULFATE 1 AMPULE: .5; 3 SOLUTION RESPIRATORY (INHALATION) at 06:20

## 2019-12-23 RX ADMIN — DOCUSATE SODIUM 100 MG: 100 CAPSULE, LIQUID FILLED ORAL at 08:49

## 2019-12-23 RX ADMIN — AMIODARONE HYDROCHLORIDE 100 MG: 200 TABLET ORAL at 08:47

## 2019-12-23 RX ADMIN — GUAIFENESIN 600 MG: 600 TABLET, EXTENDED RELEASE ORAL at 08:49

## 2019-12-23 RX ADMIN — SENNOSIDES AND DOCUSATE SODIUM 2 TABLET: 8.6; 5 TABLET ORAL at 21:18

## 2019-12-23 RX ADMIN — DOCUSATE SODIUM 100 MG: 100 CAPSULE, LIQUID FILLED ORAL at 21:13

## 2019-12-23 RX ADMIN — CYANOCOBALAMIN TAB 500 MCG 1000 MCG: 500 TAB at 08:47

## 2019-12-23 RX ADMIN — IPRATROPIUM BROMIDE AND ALBUTEROL SULFATE 1 AMPULE: .5; 3 SOLUTION RESPIRATORY (INHALATION) at 10:10

## 2019-12-23 RX ADMIN — PANTOPRAZOLE SODIUM 40 MG: 40 TABLET, DELAYED RELEASE ORAL at 08:49

## 2019-12-23 RX ADMIN — RIVAROXABAN 20 MG: 20 TABLET, FILM COATED ORAL at 17:16

## 2019-12-23 RX ADMIN — IPRATROPIUM BROMIDE AND ALBUTEROL SULFATE 1 AMPULE: .5; 3 SOLUTION RESPIRATORY (INHALATION) at 18:09

## 2019-12-23 RX ADMIN — FINASTERIDE 5 MG: 5 TABLET, FILM COATED ORAL at 21:13

## 2019-12-23 RX ADMIN — TOLVAPTAN 15 MG: 15 TABLET ORAL at 08:49

## 2019-12-23 RX ADMIN — CEFDINIR 300 MG: 300 CAPSULE ORAL at 08:49

## 2019-12-23 RX ADMIN — Medication 1 G: at 17:16

## 2019-12-23 RX ADMIN — ALLOPURINOL 100 MG: 100 TABLET ORAL at 08:49

## 2019-12-23 RX ADMIN — CEFDINIR 300 MG: 300 CAPSULE ORAL at 21:13

## 2019-12-23 RX ADMIN — Medication 1 G: at 08:48

## 2019-12-23 RX ADMIN — POLYETHYLENE GLYCOL 3350 17 G: 17 POWDER, FOR SOLUTION ORAL at 08:46

## 2019-12-23 RX ADMIN — GUAIFENESIN 600 MG: 600 TABLET, EXTENDED RELEASE ORAL at 21:13

## 2019-12-23 RX ADMIN — METOPROLOL SUCCINATE 12.5 MG: 25 TABLET, EXTENDED RELEASE ORAL at 08:47

## 2019-12-23 RX ADMIN — TAMSULOSIN HYDROCHLORIDE 0.4 MG: 0.4 CAPSULE ORAL at 21:13

## 2019-12-23 RX ADMIN — ASPIRIN 81 MG: 81 TABLET, COATED ORAL at 08:49

## 2019-12-23 ASSESSMENT — PAIN SCALES - GENERAL
PAINLEVEL_OUTOF10: 0

## 2019-12-23 NOTE — PROGRESS NOTES
4 Eyes Skin Assessment    Larissa Trejo is being assessed upon: Admission    I agree that I, Lainey Howard, along with Keturah Vitale, RN (either 2 RN's or 1 LPN and 1 RN) have performed a thorough Head to Toe Skin Assessment on the patient. ALL assessment sites listed below have been assessed. Areas assessed by both nurses:     [x]   Head, Face, and Ears   [x]   Shoulders, Back, and Chest  [x]   Arms, Elbows, and Hands   [x]   Coccyx, Sacrum, and Ischium  [x]   Legs, Feet, and Heels    Does the Patient have Skin Breakdown? Yes, wound(s) noted upon assessment. It is the responsibility of the Primary Nurse to assure that the following documentation, preventions, orders, and consults are complete on the above noted wound(s): Wound LDA initiated. LDA Flowsheet Documentation includes the [place]-wound, Wound Assessment, Measurements, Dressing Treatment, Drainage, and Color.     [place]mo Prevention initiated: Yes  Wound Care Orders initiated: Yes    [address]  nurse consulted for Pressure Injury (Stage 3,4, Unstageable, DTI, NWPT, and Complex wounds) and New or Established Ostomies: Yes        Primary Nurse eSignature: Lainey Howard RN on 12/23/2019 at 3:16 PM      Co-Signer eSignature:Electronically signed by Kristopher Verma RN on 12/23/2019 at 3:21 PM

## 2019-12-23 NOTE — PLAN OF CARE
assistive devices, medication education, O2 saturation management, energy conservation, stress management techniques, fall prevention, alarms protocol, seating and positioning, skin/wound care, pressure relief instruction,dressing changes,  infection protection, DVT prophylaxis, and/or assistance with in room safety with transfers to bed, toilet, wheelchair, shower as well as bathroom activities and hygiene. Patient/caregiver education for:   [x] Disease/sustained injury/management      [x] Medication Use   [] Surgical intervention   [x] Safety   [] Body mechanics and or joint protection   [] Health maintenance       IRF-NORA  Bladder and Bowel Continence  Bladder Continence: Always continent  Bowel Continence: Occassionally incontinent       PHYSICAL THERAPY:  Goals:                  Short term goals  Time Frame for Short term goals: 1 week   Short term goal 1: supervision bed mobility   Short term goal 2: CGA transfer with AD  Short term goal 3: CGA 50 feet ambulation with AD  Short term goal 4: supervision WC propulsion and management 150 feet   Short term goal 5: CGA 4 steps with AD            Long term goals  Time Frame for Long term goals : 2 weeks  Long term goal 1: independent bed mobility   Long term goal 2: independent transfer with AD   Long term goal 3: independent 150 feet ambulation with AD  Long term goal 4: independent wheelchair ambulation 150 feet   Long term goal 5: supervision 4 steps with AD   These goals were reviewed with this patient at the time of assessment and Paola Carlisle is in agreement.      Plan of Care: Frequency:  [x] 5 days per week, 90 minutes per day                             []  5 days per week, 60 minutes per day               Current Treatment Recommendations: Strengthening, ROM, Balance Training, Functional Mobility Training, Endurance Training, Wheelchair Mobility Training, Gait Training, Stair training, Pain Management, Home Exercise Program, Safety Education & 88  Discharge Goal: Not Attempted  Wheel 50 Feet with Two Turns  Assistance Needed: Supervision or touching assistance  CARE Score: 4  Discharge Goal: Independent  Wheel 150 Feet  Reason if not Attempted: Not attempted due to medical condition or safety concerns  CARE Score: 88  Discharge Goal: Independent    OCCUPATIONAL THERAPY:  Goals:             Short term goals  Time Frame for Short term goals: 1 week  Short term goal 1: complete LB dressing with supervision  Short term goal 2: complete overall toileting with supervision  Short term goal 3: complete 1-2 handed standing level task for 3 mins with supervision  Short term goal 4: complete simple ambulatory home making task with supervision   Short term goal 5: complete overall bathing with supervision :  Long term goals  Time Frame for Long term goals : 2 weeks  Long term goal 1: complete overall toileting with modified independence  Long term goal 2: complete overall bathing with modified independence  Long term goal 3: complete overall dressing with modified independence  Long term goal 4: complete simple ambulatory home making task with modified independence  Long term goal 5: complete HEP with independence  Long term goals 6: pt/family verbalize DME :    These goals were reviewed with this patient at the time of assessment and Jesus Barnes is in agreement    Plan of Care: Frequency:   [x] 5 days per week, 90 minutes per day     [] 5 days per week, 60 minutes per day                Plan  Current Treatment Recommendations: Strengthening, Balance Training, Functional Mobility Training, Endurance Training, Patient/Caregiver Education & Training, Equipment Evaluation, Education, & procurement, Safety Education & Training, Self-Care / ADL, Home Management Training            IRF-NORA  Eating  Assistance Needed: Setup or clean-up assistance  CARE Score: 5  Discharge Goal: Independent  Oral Hygiene  Assistance Needed: Setup or clean-up assistance  CARE Score: 5  Discharge Goal: Independent  Toileting Hygiene  Assistance Needed: Partial/moderate assistance  Comment: in/out cath, able to manage clothing and bowel hygiene with min A for balance  CARE Score: 3  Discharge Goal: Independent  Shower/Bathe Self  Assistance Needed: Partial/moderate assistance  Comment: min A  CARE Score: 3  Discharge Goal: Independent  Upper Body Dressing  Assistance Needed: Setup or clean-up assistance  CARE Score: 5  Discharge Goal: Independent  Lower Body Dressing  Assistance Needed: Supervision or touching assistance  Comment: with AE prn  CARE Score: 4  Discharge Goal: Independent  Putting On/Taking Off Footwear  Assistance Needed: Partial/moderate assistance  Comment: min A  CARE Score: 3  Discharge Goal: Independent  Toilet Transfer  Assistance Needed: Partial/moderate assistance  Comment: min A  CARE Score: 3  Discharge Goal: Independent  Picking Up Object  Assistance Needed: Dependent  CARE Score: 1  Discharge Goal: Supervision or touching assistance  Treatments may include IADL retraining, strengthening, safety education and training, patient/caregiver education and training, equipment evaluation/ training/procurement, neuromuscular reeducation, wheelchair mobility training. SPEECH THERAPY:   Plan of Care and Goals:   LTG    FIM Goals: Comprehension:    Expression:    Social:    Problem Solving:    Memory:                                                  IRF-NORA  Hearing, Speech, and Vision  Expression of Ideas and Wants: Without difficulty  Understanding Verbal and Non-Verbal Content: Usually understands  Cognitive Patterns  Cognitive Pattern Assessment Used: BIMS  Repetition of Three Words (First Attempt): 3  Temporal Orientation: Year: Missed by > 5 years or no answer  Temporal Orientation: Month: Accurate within 5 days  Temporal Orientation: Day:  Incorrect or no answer  Able to recall \"sock: Yes, no cue required  Able to recall \"blue\": Yes, no cue required  Able to recall \"bed\": Fluid/Electrolyte/Nutrition Balance   [] Voice Protection   [x] Respiratory  [] Other:    Medical Prognosis: [] Good  [x] Fair    [] Guarded   Total expected IRF days:  13  Anticipated discharge destination:    [] Home Independently   [x] Home with supervision    []SNF     [] Other                                           Physician anticipated functional outcomes: Independent household ambulation with assistive device   IPOC brief synthesis: Acute inpatient rehabilitation with occupational   physical therapy, 180 minutes, 5 every 7   days will address basic and advancing mobility with self-care   instruction and adaptive equipment training. Caregiver education will   be offered. Expected length of stay prior to the supervised level of   functional discharge to home with home health services is 13 days. Assessment and Plan:  1. Sepsis-complete abx  2. H/O gout-on allopurinol  3. Atrial fibrillation-on ASA/Amiodarone/metroprolol/Xarelto  4. Hyperlipidemia-on statin  5. UTI-complete abx  6. Urinary retention-in and out cath  7. BPH-on medications monitor  8. Pneumonia-on nebs/mucinex  9. HTN-on meds monitor  10. GI-PPI/bowel regimen  11. DVT prophylaxis-Lovenox  12. Pain control-Percocet PRN  13. PT/OT  14.  Hyponatremia-monitor             Case Mgmt: Electronically signed by KAILEE Ordoñez on 12/24/2019 at 12:38 PM    OT: Electronically signed by Essence Brothers OT on 12/24/2019 at 3:01 PM    PT: Electronically signed by Phil Martin PT on 12/24/19 at 3:08 PM    RN: Electronically signed by Paola Schwartz RN on 12/23/19 at 3:02 PM    ST: Electronically signed by KATRINA Deluca on 12/24/2019 at 2:17 PM

## 2019-12-24 ENCOUNTER — APPOINTMENT (OUTPATIENT)
Dept: GENERAL RADIOLOGY | Age: 84
DRG: 871 | End: 2019-12-24
Attending: PSYCHIATRY & NEUROLOGY
Payer: MEDICARE

## 2019-12-24 LAB
ALBUMIN SERPL-MCNC: 3.5 G/DL (ref 3.5–5.2)
ALP BLD-CCNC: 156 U/L (ref 40–130)
ALT SERPL-CCNC: 64 U/L (ref 5–41)
ANION GAP SERPL CALCULATED.3IONS-SCNC: 15 MMOL/L (ref 7–19)
AST SERPL-CCNC: 41 U/L (ref 5–40)
BILIRUB SERPL-MCNC: 0.5 MG/DL (ref 0.2–1.2)
BUN BLDV-MCNC: 18 MG/DL (ref 8–23)
CALCIUM SERPL-MCNC: 8.8 MG/DL (ref 8.8–10.2)
CHLORIDE BLD-SCNC: 97 MMOL/L (ref 98–111)
CO2: 21 MMOL/L (ref 22–29)
CREAT SERPL-MCNC: 0.8 MG/DL (ref 0.5–1.2)
GFR NON-AFRICAN AMERICAN: >60
GLUCOSE BLD-MCNC: 108 MG/DL (ref 74–109)
HCT VFR BLD CALC: 43 % (ref 42–52)
HEMOGLOBIN: 13.6 G/DL (ref 14–18)
MCH RBC QN AUTO: 28.9 PG (ref 27–31)
MCHC RBC AUTO-ENTMCNC: 31.6 G/DL (ref 33–37)
MCV RBC AUTO: 91.3 FL (ref 80–94)
PDW BLD-RTO: 14.1 % (ref 11.5–14.5)
PLATELET # BLD: 234 K/UL (ref 130–400)
PMV BLD AUTO: 9 FL (ref 9.4–12.4)
POTASSIUM SERPL-SCNC: 4.3 MMOL/L (ref 3.5–5)
PREALBUMIN: 23 MG/DL (ref 20–40)
RBC # BLD: 4.71 M/UL (ref 4.7–6.1)
SODIUM BLD-SCNC: 133 MMOL/L (ref 136–145)
TOTAL PROTEIN: 7.3 G/DL (ref 6.6–8.7)
WBC # BLD: 6.4 K/UL (ref 4.8–10.8)

## 2019-12-24 PROCEDURE — 94640 AIRWAY INHALATION TREATMENT: CPT

## 2019-12-24 PROCEDURE — 97161 PT EVAL LOW COMPLEX 20 MIN: CPT

## 2019-12-24 PROCEDURE — 97165 OT EVAL LOW COMPLEX 30 MIN: CPT

## 2019-12-24 PROCEDURE — 84134 ASSAY OF PREALBUMIN: CPT

## 2019-12-24 PROCEDURE — 6370000000 HC RX 637 (ALT 250 FOR IP): Performed by: HOSPITALIST

## 2019-12-24 PROCEDURE — 85027 COMPLETE CBC AUTOMATED: CPT

## 2019-12-24 PROCEDURE — 97116 GAIT TRAINING THERAPY: CPT

## 2019-12-24 PROCEDURE — 71046 X-RAY EXAM CHEST 2 VIEWS: CPT

## 2019-12-24 PROCEDURE — 51798 US URINE CAPACITY MEASURE: CPT

## 2019-12-24 PROCEDURE — 97110 THERAPEUTIC EXERCISES: CPT

## 2019-12-24 PROCEDURE — 99232 SBSQ HOSP IP/OBS MODERATE 35: CPT | Performed by: NURSE PRACTITIONER

## 2019-12-24 PROCEDURE — 6370000000 HC RX 637 (ALT 250 FOR IP): Performed by: PSYCHIATRY & NEUROLOGY

## 2019-12-24 PROCEDURE — 1180000000 HC REHAB R&B

## 2019-12-24 PROCEDURE — 97530 THERAPEUTIC ACTIVITIES: CPT

## 2019-12-24 PROCEDURE — 80053 COMPREHEN METABOLIC PANEL: CPT

## 2019-12-24 PROCEDURE — 99223 1ST HOSP IP/OBS HIGH 75: CPT | Performed by: PSYCHIATRY & NEUROLOGY

## 2019-12-24 PROCEDURE — 36415 COLL VENOUS BLD VENIPUNCTURE: CPT

## 2019-12-24 PROCEDURE — 97535 SELF CARE MNGMENT TRAINING: CPT

## 2019-12-24 PROCEDURE — 51701 INSERT BLADDER CATHETER: CPT

## 2019-12-24 RX ADMIN — IPRATROPIUM BROMIDE AND ALBUTEROL SULFATE 1 AMPULE: .5; 3 SOLUTION RESPIRATORY (INHALATION) at 09:24

## 2019-12-24 RX ADMIN — RIVAROXABAN 20 MG: 20 TABLET, FILM COATED ORAL at 17:35

## 2019-12-24 RX ADMIN — FINASTERIDE 5 MG: 5 TABLET, FILM COATED ORAL at 21:19

## 2019-12-24 RX ADMIN — ALLOPURINOL 100 MG: 100 TABLET ORAL at 07:19

## 2019-12-24 RX ADMIN — AMIODARONE HYDROCHLORIDE 100 MG: 100 TABLET ORAL at 21:19

## 2019-12-24 RX ADMIN — GUAIFENESIN 600 MG: 600 TABLET, EXTENDED RELEASE ORAL at 07:19

## 2019-12-24 RX ADMIN — CEFDINIR 300 MG: 300 CAPSULE ORAL at 21:19

## 2019-12-24 RX ADMIN — CEFDINIR 300 MG: 300 CAPSULE ORAL at 07:19

## 2019-12-24 RX ADMIN — PANTOPRAZOLE SODIUM 40 MG: 40 TABLET, DELAYED RELEASE ORAL at 07:19

## 2019-12-24 RX ADMIN — GUAIFENESIN 600 MG: 600 TABLET, EXTENDED RELEASE ORAL at 21:19

## 2019-12-24 RX ADMIN — Medication 1 G: at 07:19

## 2019-12-24 RX ADMIN — Medication 1 G: at 17:35

## 2019-12-24 RX ADMIN — ASPIRIN 81 MG: 81 TABLET, COATED ORAL at 07:19

## 2019-12-24 RX ADMIN — IPRATROPIUM BROMIDE AND ALBUTEROL SULFATE 1 AMPULE: .5; 3 SOLUTION RESPIRATORY (INHALATION) at 06:22

## 2019-12-24 RX ADMIN — TAMSULOSIN HYDROCHLORIDE 0.8 MG: 0.4 CAPSULE ORAL at 21:19

## 2019-12-24 RX ADMIN — DOCUSATE SODIUM 100 MG: 100 CAPSULE, LIQUID FILLED ORAL at 07:19

## 2019-12-24 RX ADMIN — AMIODARONE HYDROCHLORIDE 100 MG: 100 TABLET ORAL at 07:20

## 2019-12-24 RX ADMIN — IPRATROPIUM BROMIDE AND ALBUTEROL SULFATE 1 AMPULE: .5; 3 SOLUTION RESPIRATORY (INHALATION) at 18:41

## 2019-12-24 RX ADMIN — CYANOCOBALAMIN TAB 500 MCG 1000 MCG: 500 TAB at 07:20

## 2019-12-24 RX ADMIN — DOCUSATE SODIUM 100 MG: 100 CAPSULE, LIQUID FILLED ORAL at 21:19

## 2019-12-24 RX ADMIN — Medication 1 G: at 12:27

## 2019-12-24 RX ADMIN — POLYETHYLENE GLYCOL 3350 17 G: 17 POWDER, FOR SOLUTION ORAL at 07:18

## 2019-12-24 RX ADMIN — METOPROLOL SUCCINATE 12.5 MG: 25 TABLET, EXTENDED RELEASE ORAL at 07:20

## 2019-12-24 RX ADMIN — ATORVASTATIN CALCIUM 20 MG: 20 TABLET, FILM COATED ORAL at 21:19

## 2019-12-24 RX ADMIN — IPRATROPIUM BROMIDE AND ALBUTEROL SULFATE 1 AMPULE: .5; 3 SOLUTION RESPIRATORY (INHALATION) at 13:49

## 2019-12-24 ASSESSMENT — PAIN SCALES - GENERAL
PAINLEVEL_OUTOF10: 0

## 2019-12-24 ASSESSMENT — PAIN DESCRIPTION - PROGRESSION: CLINICAL_PROGRESSION: NOT CHANGED

## 2019-12-24 NOTE — PROGRESS NOTES
cm)  Oxygen Therapy  O2 Device: None (Room air)  Social/Functional History  Social/Functional History  Lives With: Alone  Type of Home: House  Home Layout: One level  Entrance Stairs - Number of Steps: 2  Entrance Stairs - Rails: Right  Bathroom Shower/Tub: Tub/Shower unit, Doors  Bathroom Toilet: Standard  ADL Assistance: Independent  Homemaking Assistance: Independent  Homemaking Responsibilities: Yes  Ambulation Assistance: Independent  Transfer Assistance: Independent  Occupation: Retired  Type of occupation:         Objective   Vision: Impaired  Vision Exceptions: Wears glasses for reading  Hearing: Exceptions to [facility]  Hearing Exceptions: Hard of hearing/hearing concerns;Bilateral hearing aid    Orientation  Overall Orientation Status: Within Functional Limits     Balance  Sitting Balance: Supervision              Transfers  Stand Step Transfers: Minimal assistance(bed to w/c)  Sit to stand: Minimal assistance  Stand to sit: Contact guard assistance     Cognition  Cognition Comment: awake, alert, follows simple commands, decreased STM                 LUE PROM (degrees)  LUE PROM: WFL  LUE AROM (degrees)  LUE AROM : WFL  RUE PROM (degrees)  RUE PROM: WFL  RUE AROM (degrees)  RUE AROM : WFL  LUE Strength  Gross LUE Strength: WFL  RUE Strength  Gross RUE Strength: [facility]       12/24/19 0930   Eating   Assistance Needed Setup or clean-up assistance   CARE Score 5   Discharge Goal 6   Oral Hygiene   Assistance Needed Setup or clean-up assistance   CARE Score 5   Discharge Goal 6   [address] assistance   Comment in/out cath, able to manage clothing and bowel hygiene with min A for balance   CARE Score 3   Discharge Goal 6   Shower/Bathe Self   Assistance Needed Partial/moderate assistance   Comment min A   CARE Score 3   Discharge Goal 6   Upper Body Dressing   Assistance Needed Setup or clean-up assistance   CARE Score 5   Discharge Goal 6   Lower Body Dressing

## 2019-12-24 NOTE — PATIENT CARE CONFERENCE
[facility] ACUTE INPATIENT REHABILITATION  TEAM CONFERENCE NOTE    Date: 2019  Patient Name: Onesimo Landeros        MRN: [id number]    : 3/27/1931  (80 y.o.)  Gender: male      Diagnosis: Sepsis      PHYSICAL THERAPY: Evaluate today      SPEECH THERAPY: N/A      OCCUPATIONAL THERAPY: Evaluate today      NUTRITION  Current Wt: Weight: 161 lb 9.6 oz (73.3 kg) / Body mass index is 24.57 kg/m². Admission Wt:    Oral Diet Orders: General, Fluid Restriction   Oral Nutrition Supplement (ONS) Orders: Standard High Calorie Oral Supplement  Please see nutrition note for details. NURSING    FIMS:  Bladder  Level of Assistance: Bladder Level of Assistance: 4- Requires Minimal assistance to manage or place device, patient performs 75% or more of the bladder management tasks  Frequency of Accidents: Bladder Frequency of Accidents: 6 - No accidents,uses device like urinal, bedpan, absorbant pad, or requires medication to manage bladder    Bowel  Level of Assistance: Bowel Level of Assistance: 4- Requires Minimal assistance to manage or place device, helper inserts suppository without digital stimulation, patient performs 75% or more of the bowel management tasks  Frequency of Accidents: Bowel Frequency of Accidents: 6 - No accidents, uses device like bedpan, diaper, bedside commode colostomy, or requires medication to manage bowels    Wounds/Incisions/Ulcers:   Stage II to Coccyx. Right hip incision LUDWIG with steri strips. Scattered bruising. Elmo Scale Score: 15    Pain: Percocet 1 tab q6hrs prn for pain control. Consultations/Labs/X-rays:   Routine labs. Consult Hospitalist and Dr. Matias Dakins    Family Education:  Family has been staying with patient during the day. Fall Risk:  Rasheed Score: 22    Fall in the last week? No falls this shift. Other Nursing Issues:    Patient is Alert and Oriented. Alves removed on 19 and patient on PVR's or cath every 8hrs for PVR's greater than 300 ml. Omnicef po x 14 days for UTI. Assist x 2. C/o constipation and received stool softners. On air mattress. SOCIAL WORK/CASE MANAGEMENT  Assessment: Friend at side, anxious about care and progress    Discharge Plan   Estimated Length of Stay: to be determined  Destination: home health    Pass: No    Services at Discharge: [address], Occupational Therapy and Nursing per evaluations  Equipment at Discharge: to be determined    Progress made in the prior week:  1. N/A, new patient  2.  3.  4.  5.      Goals for following week:  1. Establish treatment plan  2.   3.   4.   5.     Factors facilitating achievement of predicted outcomes: Friend support    Barriers to the achievement of predicted outcomes: Medical complications(recent)    Team Members Present at Conference:  : Ronnie Bautista   Occupational Therapist: Selma Lezama OTR/L  Physical Therapist: Oleg Stacy PT  Speech Therapist: N/A  Nurse: Emanuel Rodríguez, RN   Nurse Manager:  Marlene Singh, RN, BSN  Dietitian:  Conner Martinez, MS, RD, LD  Rehab Director:  Jennifer Antionette approve the established interdisciplinary plan of care as documented within the medical record of Erma Bear.

## 2019-12-24 NOTE — CONSULTS
), Admitted to [facility] inpatient rehab initially on 12/09/2019        [address] [facility]    0822/822-01    Consulting Physician: Letitia Sanchez MD  Reason for Consult: Medical Management  Primacy Care Physician: No primary care provider on file. C/C: Sepsis due to E. coli. Consult for medical management. History Obtained From:  patient, electronic medical record    History of present illness: The patient is a 80 y.o. male who presents with history of CAD, CHF, atrial fibrillation, HTN, right intertrochanteric hip fracture (s/p cephalo-medullary nailing - 11/27/2019), admitted to [facility] on 12/09/2019, and was found to be hyponatremic with a sodium level of 119, on 12/27/2019. Patient was also noted to have acute cystitis with hematuria, as well as pneumonia. Initially was started on piperacillin-tazobactam, to cover pneumonia as well as E. coli positive urine culture. Blood cultures subsequently positive for E. coli as well. Patient was placed on Omnicef on 12/22/2019, based on culture speciation and sensitivity. Commended for patient to complete a 14-day course of antibiotic as recommended by urology. Hematuria was deemed as caused by UTI. Repeat chest x-ray (12/24/2019), reporting partial resolution of the right lower lobe infiltrate. Chronic inflammatory lung changes. Repeat blood cultures reporting no growth to date. Patient was started on tolvaptan, for treatment of hyponatremia. Patient admitted to acute rehab, and medicine is consulted for continued medical management.       Past Medical History:        Diagnosis Date    Atrial fibrillation ([address]     CAD (coronary artery disease)     Chronic combined systolic and diastolic CHF (congestive heart failure) ([address] 12/17/2019    COPD (chronic obstructive pulmonary disease) ([address] 12/17/2019    GERD (gastroesophageal reflux disease)     Gout     Gout     Heart attack ([address]     History of home oxygen therapy     at night    Hypertension (TOPROL XL) extended release tablet 12.5 mg  12.5 mg Oral Daily Drew Rodríguez MD   12.5 mg at 12/24/19 0720    nitroGLYCERIN (NITROSTAT) SL tablet 0.4 mg  0.4 mg Sublingual Q5 Min PRN Drew Rodríguez MD        oxyCODONE-acetaminophen (PERCOCET) 5-325 MG per tablet 1 tablet  1 tablet Oral Q6H PRN Drew Rodríguez MD        pantoprazole (PROTONIX) tablet 40 mg  40 mg Oral Daily Drew Rodríguez MD   40 mg at 12/24/19 0719    rivaroxaban (XARELTO) tablet 20 mg  20 mg Oral Dinner Drew Rodríguez MD   20 mg at 12/23/19 1716    sennosides-docusate sodium (SENOKOT-S) 8.6-50 MG tablet 2 tablet  2 tablet Oral Daily PRN Drew Rodríguez MD   2 tablet at 12/23/19 2118    sodium chloride tablet 1 g  1 g Oral TID WC Drew Rodríguez MD   1 g at 12/24/19 0719    vitamin B-12 (CYANOCOBALAMIN) tablet 1,000 mcg  1,000 mcg Oral Daily Drew Rodríguez MD   1,000 mcg at 12/24/19 0720    [START ON 12/29/2019] vitamin D (ERGOCALCIFEROL) capsule 50,000 Units  50,000 Units Oral Weekly Drew Rodríguez MD        acetaminophen (TYLENOL) tablet 650 mg  650 mg Oral Q4H PRN Keren Severe, MD        polyethylene glycol (GLYCOLAX) packet 17 g  17 g Oral Daily PRN Keren Severe, MD        tamsulosin (FLOMAX) capsule 0.8 mg  0.8 mg Oral Daily Keren Severe, MD           Allergies:  Patient has no known allergies. Social History:   TOBACCO:   reports that he has quit smoking. His smoking use included cigarettes. He has a 195.00 pack-year smoking history. He has quit using smokeless tobacco.  ETOH:   reports no history of alcohol use. Family History:       Problem Relation Age of Onset    No Known Problems Mother     Heart Disease Father        REVIEW OF SYSTEMS:  Review of Systems  ROS: 14 point review of systems is negative except as specifically addressed above.       PHYSICAL EXAM:  /60   Pulse 60   Temp 97.8 °F (36.6 °C) (Temporal)   Resp 16   Ht 5' 8\" (1.727 m)   Wt 159 lb 3.2 oz (72.2 kg) Comment: with the air mattress bed SpO2 90%   BMI 24.21 kg/m²     Physical Exam  General appearance: No apparent distress, appears stated age and cooperative. Well developed and well groomed. HEENT: atraumatic, eyes with clear conjunctiva and normal lids, pupils and irises normal, external ears and nose are normal, lips normal. Hearing . Lips with  blisters. Neck: Supple, with full range of motion. No jugular venous distention. Trachea midline. Thyroid no masses noted. No lympadenopathy or bruit. Lungs: Patient in no acute respiratory distress, no increased work of breathing, \"clear to auscultation bilaterally\" without rales, rhonchi or wheezes  Heart: regular rate and rhythm, S1, S2 normal, no murmur, click, rub or gallop  Abdomen: soft, non-tender; non-distended normal bowel sounds no masses, no organomegaly  Extremities: extremities normal, atraumatic, no cyanosis or edema  Neurologic: No focal neurologic deficits, normal sensation, CN: II-XII intact, grossly non-focal.  Skin: Skin color, texture, turgor normal. No rashes or lesions  Psychiatric: Alert and oriented, affect and mood appropriate, thought content appropriate, normal insight. DIAGNOSTIC DATA:    RADIOLOGY / IMAGING REPORTES:     No results found. CBC:  Recent Labs     12/22/19 0458 12/23/19  0326   WBC 5.6 6.2   HGB 11.4* 11.3*   HCT 35.7* 35.2*    204     BMP:  Recent Labs     12/22/19 0458 12/23/19  0326   * 136   K 4.3 4.4    99   CO2 25 24   BUN 12 14   CREATININE 0.7 0.8   CALCIUM 8.7* 8.7*   No results for input(s): AST, ALT, BILIDIR, BILITOT, ALKPHOS in the last 72 hours. Coag Panel: No results for input(s): INR, PROTIME, APTT in the last 72 hours. Cardiac Enzymes: No results for input(s): Donn Prayer in the last 72 hours.   ABGs:  Lab Results   Component Value Date    PHART 7.410 07/26/2017    PO2ART 63.0 07/26/2017    GCZ2CKS 38.0 07/26/2017     Urinalysis:  Lab Results   Component Value Date    NITRU Negative 12/16/2019    WBCUA There is no evidence of acute fracture or dislocation. Joint space narrowing is seen in both hips. Degenerative changes are also present in the lumbar spine, sacroiliac joint, and pubic symphysis. Diffuse atherosclerosis is noted. A large left inguinal hernia contains multiple loops of colon and small bowel. The bladder is mildly distended. The prostate is enlarged. 1. No evidence of acute osseous injury in the pelvis. 2. Large left inguinal hernia. This contains nonobstructed loops of bowel. 3. Degenerative changes in the spine and pelvis. 4. Prostatomegaly and mild urinary bladder distention. A preliminary report was provided by [facility]. I agree with the preliminary findings. Signed by Dr Jacquelyn Conner on 11/25/2019 7:46 AM    Xr Chest Portable    Result Date: 12/17/2019  EXAMINATION: XR CHEST PORTABLE 12/17/2019 9:23 AM HISTORY: Fever COMPARISON: 11/25/2019 FINDINGS: Heart appears normal in size. The aorta is tortuous with atherosclerotic calcifications. There has been prior median sternotomy. Multiple sternotomy wires are fractured, similar to the prior exam. There is mild diffuse increased interstitial prominence, similar to the prior exam. New right perihilar opacities are noted. There is no appreciable pneumothorax or pleural effusion. There has been prior granulomatous exposure. New right perihilar airspace opacities are concerning for developing pneumonia. Follow-up PA and lateral chest radiographs are recommended in 4-6 weeks to assess for resolution. Signed by Dr Iris Pollock on 12/17/2019 9:25 AM    Xr Chest Portable    Result Date: 11/25/2019  XR CHEST PORTABLE 11/25/2019 12:15 AM HISTORY: Fall, chest pain COMPARISON: 3/23/2018. FINDINGS: Frontal view of the chest was obtained. The lungs are clear. Changes of previous CABG are seen. A few granulomatous calcifications are seen in the melodie. The heart is stable in size.  The osseous structures and surrounding soft tissues treatment of hyponatremia. · Continue current management                   Consults Made:   IP CONSULT TO UROLOGY  IP CONSULT TO SOCIAL WORK  IP CONSULT TO HOSPITALIST      Time Spent on  55 mins in the examination, evaluation/assessment, counseling and review of medications and plan. Thanks, I appreciate the opportunity to care for your patient.   We will gladly follow with   you     Electronically signed by   Ana Christopher MD, MPH  Internal Medicine Hospitalist   12/24/2019 7:48 AM

## 2019-12-24 NOTE — H&P
[facility]   History and Physical        Patient:   Melissa Patrick  MR#:    [id number]  Account Number:                   [id number]      Room:    [facility]671-10   YOB: 1931  Date of Progress Note: 12/24/2019  Time of Note                           8:26 AM  Attending Physician:  Sushma Quintero MD        Admitting diagnosis:Sepsis due to Escherichia coli [E. coli]    Secondary diagnoses:Chronic systolic (congestive) heart failure,Paroxysmal atrial fibrillation,Atherosclerosis of coronary artery bypass graft(s) without angina pectorisEssential (primary) hypertension,,Mixed hyperlipidemia,Displaced intertrochanteric fracture of right femur, subsequent encounter for closed fracture with routine healing,Unspecified Escherichia coli [E. coli] as the cause of diseases classified elsewhere,Urinary tract infection, site not specified,Enlarged prostate without lower urinary tract symptoms,Retention of urine, unspecified,Pneumonia due to other specified infectious organisms    CHIEF COMPLAINT:  sepsis       HISTORY OF PRESENT ILLNESS:   This 80 y.o. male pt admitted to [facility] on 11/27/19 w/ R hip pain. Pt reports that 3 days earlier, he felt dizzy and fell on his R hip. He did not lose consciounsness. He present to [facility] ED on 11/25 for evaluation and per pt they didn't find anything wrong w/him and sent him home. He presented to [facility] ED per EMS on 11/27 with more pain and inability to bear weight on RLE. He also complained of dizziness that has worseded since amlodipine and miodarone was started by cardiology. Xrays & CT of hip were all neg. On 11/29 an MRI of his R hip was done which showd edema within the intertrochanterir R femoral neck highly suggestive of a nondisplaced fx. Dr Richard Olivares was consulted. Pt opted for conservative treatment w/ TTWB to his RLE. Pt was not able to maintain TTWB, so on 12/3/19, he underwent a cephalomedullary nailing of his R displaced intertrochanteric hip fx.  He (OMNICEF) capsule 300 mg, 300 mg, Oral, 2 times per day, Myles Mcgee MD, 300 mg at 12/24/19 0719    finasteride (PROSCAR) tablet 5 mg, 5 mg, Oral, Daily, Myles Mcgee MD, 5 mg at 12/23/19 2113    guaiFENesin [facility]) extended release tablet 600 mg, 600 mg, Oral, BID, Myles Mcgee MD, 600 mg at 12/24/19 0719    ipratropium-albuterol (DUONEB) nebulizer solution 3 mL, 1 vial, Inhalation, Q6H PRN, Myles Mcgee MD    metoprolol succinate (TOPROL XL) extended release tablet 12.5 mg, 12.5 mg, Oral, Daily, Myles Mcgee MD, 12.5 mg at 12/24/19 0720    nitroGLYCERIN (NITROSTAT) SL tablet 0.4 mg, 0.4 mg, Sublingual, Q5 Min PRN, Myles Mcgee MD    oxyCODONE-acetaminophen (PERCOCET) 5-325 MG per tablet 1 tablet, 1 tablet, Oral, Q6H PRN, Myles Mcgee MD    pantoprazole (PROTONIX) tablet 40 mg, 40 mg, Oral, Daily, Myles Mcgee MD, 40 mg at 12/24/19 0719    rivaroxaban (XARELTO) tablet 20 mg, 20 mg, Oral, Dinner, Myles Mcgee MD, 20 mg at 12/23/19 1716    sennosides-docusate sodium (SENOKOT-S) 8.6-50 MG tablet 2 tablet, 2 tablet, Oral, Daily PRN, Myles Mcgee MD, 2 tablet at 12/23/19 2118    sodium chloride tablet 1 g, 1 g, Oral, TID WC, Myles Mcgee MD, 1 g at 12/24/19 0719    vitamin B-12 (CYANOCOBALAMIN) tablet 1,000 mcg, 1,000 mcg, Oral, Daily, Myles Mcgee MD, 1,000 mcg at 12/24/19 0720    [START ON 12/29/2019] vitamin D (ERGOCALCIFEROL) capsule 50,000 Units, 50,000 Units, Oral, Weekly, Myles Mcgee MD    acetaminophen (TYLENOL) tablet 650 mg, 650 mg, Oral, Q4H PRN, Yisel Cheatham MD    polyethylene glycol (GLYCOLAX) packet 17 g, 17 g, Oral, Daily PRN, Yisel Cheatham MD    tamsulosin (FLOMAX) capsule 0.8 mg, 0.8 mg, Oral, Daily, Yisel Cheatham MD    Allergies:  Patient has no known allergies. Social History:   TOBACCO:   reports that he has quit smoking. His smoking use included cigarettes. He has a 195.00 pack-year smoking history.  He has quit using smokeless tobacco.  ETOH: ankles  No Schneider's sign bilateral hands    Tremors- no tremors in hands or head noted    Gait  Not tested    Coordination  Finger to nose-unremarkable        CBC:   Recent Labs     12/22/19  0458 12/23/19  0326   WBC 5.6 6.2   HGB 11.4* 11.3*    204       BMP:    Recent Labs     12/22/19 0458 12/23/19  0326   * 136   K 4.3 4.4    99   CO2 25 24   BUN 12 14   CREATININE 0.7 0.8   GLUCOSE 108 112*       Hepatic: No results for input(s): AST, ALT, ALB, BILITOT, ALKPHOS in the last 72 hours. Lipids: No results for input(s): CHOL, HDL in the last 72 hours. Invalid input(s): LDLCALCU    INR: No results for input(s): INR in the last 72 hours. Assessment and Plan     1. Sepsis-complete abx  2. H/O gout-on allopurinol  3. Atrial fibrillation-on ASA/Amiodarone/metroprolol/Xarelto  4. Hyperlipidemia-on statin  5. UTI-complete abx  6. Urinary retention-in and out cath  7. BPH-on medications monitor  8. Pneumonia-on nebs/mucinex  9. HTN-on meds monitor  10. GI-PPI/bowel regimen  11. DVT prophylaxis-Lovenox  12. Pain control-Percocet PRN  13. PT/OT  14. Hyponatremia-monitor            Patient's functional assessment  Prior to hospitalization the patient was continent of bowel and bladder    Current therapy  Requires PT, OT and/or speech therapy    Anticipated discharge approximately 13 days    Functional assessment  All notes from reeb data were reviewed regarding the patient's functional status.         Anticipated discharge setting  Home with home care    No clear barriers to discharge    The patient appears to be an appropriate candidate for inpatient rehabilitation    Sufficiently stable: Patient's condition is sufficiently stable at the time of admission to allow the patient to actively participate in an intensive rehabilitation program    Close medical supervision: A rehabilitation physician, or other licensed treating physician with specialized training and experience in inpatient

## 2019-12-24 NOTE — PROGRESS NOTES
intermittent catheterization Q6 hr cath as patient is uncomfortable by 8 hours. May cath sooner if needed    2. Hematuria secondary to CAUDI- resolved    3. Acute cystitis- on Omnicef, 14 days adequate. Patient Active Problem List   Diagnosis    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 ([facility] [address]    Chronic hypoxemic respiratory failure (HCC)    Chronic gout without tophus    Gastroesophageal reflux disease without esophagitis    Essential hypertension    Dyslipidemia    Benign prostatic hyperplasia with urinary obstruction    Near syncope    Closed right hip fracture, initial encounter ([facility] [address]    Paroxysmal atrial fibrillation (HCC)    Chronic combined systolic and diastolic CHF (congestive heart failure) ([address]    Mixed hyperlipidemia    COPD (chronic obstructive pulmonary disease) ([facility] [address]    Hyponatremia    Gross hematuria    Pneumonia due to organism    Urinary retention    Acute cystitis with hematuria    BPH without urinary obstruction    Sepsis ([address]    Palliative care patient    Bacteremia    Weakness    Pain in leg, unspecified         [facility]

## 2019-12-24 NOTE — PROGRESS NOTES
Physical Therapy Evaluation Note  DATE:  2019  NAME:  Olga Lidia Conde  :  3/27/1931  (80 y.o.,male)  MRN:  [id number]    HEIGHT:  Height: 5' 8\" (172.7 cm)  WEIGHT:  Weight: 159 lb 3.2 oz (72.2 kg)(with the air mattress bed)    PATIENT DIAGNOSIS(ES):    Diagnosis: Sepsis  Additional Pertinent Hx: CHF, AFIB, HTN, CABG, 12/3 RIGHT HIP CEPHALOMEDULLARY NAILING,   RESTRICTIONS/PRECAUTIONS:    Restrictions/Precautions  Restrictions/Precautions: Fall Risk     OVERALL  ORIENTATION STATUS:     PAIN:  Pain Level: 0                    NEUROLOGICAL                          STRENGTH  Strength RLE  Strength RLE: Exception  Comment: grossly 4-/5  Strength LLE  Strength LLE: Exception  Comment: grossly 4-/5  ROM  AROM RLE (degrees)  RLE AROM: WFL  AROM LLE (degrees)  LLE AROM : WFL  POSTURE/BALANCE  Balance  Sitting - Static: Good  Sitting - Dynamic: Good  Standing - Static: Fair  Standing - Dynamic: Fair       ACTIVITY TOLERANCE         BED MOBILITY  Bed Mobility  Rolling: Stand by assistance  Supine to Sit: Minimal assistance  Sit to Supine: Minimal assistance  Scooting: Minimal assistance  TRANSFERS  Sit to Stand: Minimal Assistance      Bed to Chair: Minimal assistance     WHEELCHAIR PROPULSION 1  Propulsion 1  Propulsion: Manual  Method: CONSTANZA SÁNCHEZ  Level of Assistance: Supervision  Description/ Details: 2 TURNS  Distance: 50  WHEELCHAIR PROPULSION 2  Propulsion 2  Propulsion: Manual  Method: PRINCESS APODACA  Level of Assistance: Supervision  Distance: 75  AMBULATION 1  Ambulation 1  Surface: level tile  Device: Rolling Walker  Other Apparatus: Wheelchair follow  Assistance: Contact guard assistance  Quality of Gait: very slow, forward flexed trunk   Gait Deviations: Decreased step length, Decreased step height  Distance: 10, 10  STAIRS   Not assessed    GOALS:  Short term goals  Time Frame for Short term goals: 1 week   Short term goal 1: supervision bed mobility   Short term goal 2: CGA transfer with AD  Short term goal 3: CGA 50 feet ambulation with AD  Short term goal 4: supervision WC propulsion and management 150 feet   Short term goal 5: CGA 4 steps with AD    Long term goals  Time Frame for Long term goals : 2 weeks  Long term goal 1: independent bed mobility   Long term goal 2: independent transfer with AD   Long term goal 3: independent 150 feet ambulation with AD  Long term goal 4: independent wheelchair ambulation 150 feet   Long term goal 5: supervision 4 steps with AD   HOME LIVING:     Type of Home: House  Home Layout: One level     Entrance Stairs - Number of Steps: 2  Entrance Stairs - Rails: Right  ASSESSMENT (IMPAIRMENTS/BARRIERS): Body structures, Functions, Activity limitations: Decreased functional mobility , Decreased ADL status, Decreased ROM, Decreased strength, Decreased endurance, Decreased balance, Decreased posture  Assessment: BLE exercises in WC. continue to work on walking endurance and LE strength for transfers. and ambulation        PLAN:  Plan  Times per week: 5-6   Times per day: Twice a day  Plan weeks: 2  Current Treatment Recommendations: Strengthening, ROM, Balance Training, Functional Mobility Training, Endurance Training, Wheelchair Mobility Training, Gait Training, Stair training, Pain Management, Home Exercise Program, Safety Education & Training, Patient/Caregiver Education & Training, Equipment Evaluation, Education, & procurement, Transfer Training  Discharge Recommendations: Home with Home health PT  PATIENT REQUIRES AND IS REASONABLY EXPECTED TO ACTIVELY PARTICIPATE IN AT LEAST 3 HOURS OF INTENSIVE THERAPY PER DAY AT LEAST 5 DAYS PER WEEK, AND BE EXPECTED TO MAKE MEASURABLE IMPROVEMENT THAT WILL BE OF PRACTICAL VALUE TO [facility]..    PATIENT GOAL FOR REHAB:  RETURN TO PRIOR LEVEL OF FUNCTION       IRF/NORA  Roll Left and Right  Assistance Needed: Partial/moderate assistance  CARE Score: 3  Discharge Goal: Independent    Sit to condition or safety concerns  CARE Score: 88  Discharge Goal: Independent      LAST TREATMENT TIME  PT Individual Minutes  Time In: 1100  Time Out: 1200  Minutes: 60        Electronically signed by Quique Villatoro SPT on 12/24/19 at 2:53 PM  Electronically signed by Denise Donaldson PT on 12/24/19 at 3:06 PM

## 2019-12-25 LAB
ANION GAP SERPL CALCULATED.3IONS-SCNC: 13 MMOL/L (ref 7–19)
BACTERIA: NEGATIVE /HPF
BILIRUBIN URINE: NEGATIVE
BLOOD, URINE: ABNORMAL
BUN BLDV-MCNC: 17 MG/DL (ref 8–23)
CALCIUM SERPL-MCNC: 8.5 MG/DL (ref 8.8–10.2)
CHLORIDE BLD-SCNC: 97 MMOL/L (ref 98–111)
CLARITY: ABNORMAL
CO2: 22 MMOL/L (ref 22–29)
COLOR: YELLOW
CREAT SERPL-MCNC: 0.8 MG/DL (ref 0.5–1.2)
EPITHELIAL CELLS, UA: 1 /HPF (ref 0–5)
GFR NON-AFRICAN AMERICAN: >60
GLUCOSE BLD-MCNC: 103 MG/DL (ref 74–109)
GLUCOSE URINE: NEGATIVE MG/DL
HCT VFR BLD CALC: 35.6 % (ref 42–52)
HCT VFR BLD CALC: 38.6 % (ref 42–52)
HEMOGLOBIN: 11.5 G/DL (ref 14–18)
HEMOGLOBIN: 12.2 G/DL (ref 14–18)
HYALINE CASTS: 0 /HPF (ref 0–8)
KETONES, URINE: NEGATIVE MG/DL
LEUKOCYTE ESTERASE, URINE: NEGATIVE
MCH RBC QN AUTO: 28.8 PG (ref 27–31)
MCHC RBC AUTO-ENTMCNC: 32.3 G/DL (ref 33–37)
MCV RBC AUTO: 89.2 FL (ref 80–94)
NITRITE, URINE: NEGATIVE
PDW BLD-RTO: 14 % (ref 11.5–14.5)
PH UA: 6.5 (ref 5–8)
PLATELET # BLD: 184 K/UL (ref 130–400)
PMV BLD AUTO: 9 FL (ref 9.4–12.4)
POTASSIUM SERPL-SCNC: 4.2 MMOL/L (ref 3.5–5)
PROTEIN UA: NEGATIVE MG/DL
RBC # BLD: 3.99 M/UL (ref 4.7–6.1)
RBC UA: 321 /HPF (ref 0–4)
SODIUM BLD-SCNC: 132 MMOL/L (ref 136–145)
SODIUM URINE: 127 MMOL/L
SPECIFIC GRAVITY UA: 1.01 (ref 1–1.03)
UROBILINOGEN, URINE: 0.2 E.U./DL
WBC # BLD: 5.2 K/UL (ref 4.8–10.8)
WBC UA: 5 /HPF (ref 0–5)

## 2019-12-25 PROCEDURE — 6370000000 HC RX 637 (ALT 250 FOR IP): Performed by: HOSPITALIST

## 2019-12-25 PROCEDURE — 99232 SBSQ HOSP IP/OBS MODERATE 35: CPT | Performed by: NURSE PRACTITIONER

## 2019-12-25 PROCEDURE — 85027 COMPLETE CBC AUTOMATED: CPT

## 2019-12-25 PROCEDURE — 99232 SBSQ HOSP IP/OBS MODERATE 35: CPT | Performed by: PSYCHIATRY & NEUROLOGY

## 2019-12-25 PROCEDURE — 85018 HEMOGLOBIN: CPT

## 2019-12-25 PROCEDURE — 94640 AIRWAY INHALATION TREATMENT: CPT

## 2019-12-25 PROCEDURE — 51701 INSERT BLADDER CATHETER: CPT

## 2019-12-25 PROCEDURE — 36415 COLL VENOUS BLD VENIPUNCTURE: CPT

## 2019-12-25 PROCEDURE — 51703 INSERT BLADDER CATH COMPLEX: CPT | Performed by: NURSE PRACTITIONER

## 2019-12-25 PROCEDURE — 51702 INSERT TEMP BLADDER CATH: CPT

## 2019-12-25 PROCEDURE — 87086 URINE CULTURE/COLONY COUNT: CPT

## 2019-12-25 PROCEDURE — 84300 ASSAY OF URINE SODIUM: CPT

## 2019-12-25 PROCEDURE — 6370000000 HC RX 637 (ALT 250 FOR IP): Performed by: PSYCHIATRY & NEUROLOGY

## 2019-12-25 PROCEDURE — 1180000000 HC REHAB R&B

## 2019-12-25 PROCEDURE — 81001 URINALYSIS AUTO W/SCOPE: CPT

## 2019-12-25 PROCEDURE — 80048 BASIC METABOLIC PNL TOTAL CA: CPT

## 2019-12-25 PROCEDURE — 85014 HEMATOCRIT: CPT

## 2019-12-25 RX ORDER — LIDOCAINE HYDROCHLORIDE 20 MG/ML
JELLY TOPICAL PRN
Status: DISCONTINUED | OUTPATIENT
Start: 2019-12-25 | End: 2020-01-06 | Stop reason: HOSPADM

## 2019-12-25 RX ADMIN — TAMSULOSIN HYDROCHLORIDE 0.8 MG: 0.4 CAPSULE ORAL at 20:48

## 2019-12-25 RX ADMIN — IPRATROPIUM BROMIDE AND ALBUTEROL SULFATE 1 AMPULE: .5; 3 SOLUTION RESPIRATORY (INHALATION) at 14:06

## 2019-12-25 RX ADMIN — GUAIFENESIN 600 MG: 600 TABLET, EXTENDED RELEASE ORAL at 20:48

## 2019-12-25 RX ADMIN — ASPIRIN 81 MG: 81 TABLET, COATED ORAL at 08:50

## 2019-12-25 RX ADMIN — Medication 1 G: at 14:55

## 2019-12-25 RX ADMIN — CEFDINIR 300 MG: 300 CAPSULE ORAL at 08:50

## 2019-12-25 RX ADMIN — Medication 1 G: at 08:51

## 2019-12-25 RX ADMIN — CYANOCOBALAMIN TAB 500 MCG 1000 MCG: 500 TAB at 08:50

## 2019-12-25 RX ADMIN — DOCUSATE SODIUM 100 MG: 100 CAPSULE, LIQUID FILLED ORAL at 08:50

## 2019-12-25 RX ADMIN — DOCUSATE SODIUM 100 MG: 100 CAPSULE, LIQUID FILLED ORAL at 20:48

## 2019-12-25 RX ADMIN — IPRATROPIUM BROMIDE AND ALBUTEROL SULFATE 1 AMPULE: .5; 3 SOLUTION RESPIRATORY (INHALATION) at 06:15

## 2019-12-25 RX ADMIN — IPRATROPIUM BROMIDE AND ALBUTEROL SULFATE 1 AMPULE: .5; 3 SOLUTION RESPIRATORY (INHALATION) at 19:07

## 2019-12-25 RX ADMIN — POLYETHYLENE GLYCOL 3350 17 G: 17 POWDER, FOR SOLUTION ORAL at 08:50

## 2019-12-25 RX ADMIN — IPRATROPIUM BROMIDE AND ALBUTEROL SULFATE 1 AMPULE: .5; 3 SOLUTION RESPIRATORY (INHALATION) at 10:07

## 2019-12-25 RX ADMIN — METOPROLOL SUCCINATE 12.5 MG: 25 TABLET, EXTENDED RELEASE ORAL at 08:57

## 2019-12-25 RX ADMIN — PANTOPRAZOLE SODIUM 40 MG: 40 TABLET, DELAYED RELEASE ORAL at 08:51

## 2019-12-25 RX ADMIN — CEFDINIR 300 MG: 300 CAPSULE ORAL at 20:48

## 2019-12-25 RX ADMIN — ATORVASTATIN CALCIUM 20 MG: 20 TABLET, FILM COATED ORAL at 20:48

## 2019-12-25 RX ADMIN — GUAIFENESIN 600 MG: 600 TABLET, EXTENDED RELEASE ORAL at 08:50

## 2019-12-25 RX ADMIN — ALLOPURINOL 100 MG: 100 TABLET ORAL at 08:50

## 2019-12-25 RX ADMIN — Medication 1 G: at 18:08

## 2019-12-25 RX ADMIN — AMIODARONE HYDROCHLORIDE 100 MG: 100 TABLET ORAL at 08:51

## 2019-12-25 RX ADMIN — MAGNESIUM CITRATE 148 ML: 1.75 LIQUID ORAL at 14:55

## 2019-12-25 RX ADMIN — FINASTERIDE 5 MG: 5 TABLET, FILM COATED ORAL at 20:48

## 2019-12-25 RX ADMIN — AMIODARONE HYDROCHLORIDE 100 MG: 100 TABLET ORAL at 20:49

## 2019-12-25 NOTE — PROGRESS NOTES
Restriction: 1200 ml  Dietary Nutrition Supplements: Standard High Calorie Oral Supplement    Intake/Output Summary (Last 24 hours) at 12/25/2019 0902  Last data filed at 12/25/2019 0545  Gross per 24 hour   Intake 630 ml   Output 1650 ml   Net -1020 ml       Medications:  Current Facility-Administered Medications   Medication Dose Route Frequency Provider Last Rate Last Dose    sodium chloride flush 0.9 % injection 10 mL  10 mL Intravenous PRN Buddy Nunes MD        ipratropium-albuterol (DUONEB) nebulizer solution 1 ampule  1 ampule Inhalation Q4H WA Buddy Nunes MD   1 ampule at 12/25/19 0615    bisacodyl (DULCOLAX) suppository 10 mg  10 mg Rectal Daily PRN Buddy Nunes MD        docusate sodium (COLACE) capsule 100 mg  100 mg Oral BID Buddy Nunes MD   100 mg at 12/25/19 0850    polyethylene glycol (GLYCOLAX) packet 17 g  17 g Oral Daily Buddy Nunes MD   17 g at 12/25/19 0850    allopurinol (ZYLOPRIM) tablet 100 mg  100 mg Oral Daily Buddy Nunes MD   100 mg at 12/25/19 0850    amiodarone (PACERONE) tablet 100 mg  100 mg Oral Q12H Buddy Nunes MD   100 mg at 12/25/19 0851    aspirin EC tablet 81 mg  81 mg Oral Daily Buddy Nunes MD   81 mg at 12/25/19 0850    atorvastatin (LIPITOR) tablet 20 mg  20 mg Oral Daily Buddy Nunes MD   20 mg at 12/24/19 2119    calcium carbonate (TUMS) chewable tablet 1,000 mg  2 tablet Oral BID PRN Buddy Nunes MD        cefdinir (OMNICEF) capsule 300 mg  300 mg Oral 2 times per day Buddy Nunes MD   300 mg at 12/25/19 0850    finasteride (PROSCAR) tablet 5 mg  5 mg Oral Daily Buddy Nunes MD   5 mg at 12/24/19 2119    guaiFENesin [facility]) extended release tablet 600 mg  600 mg Oral BID Buddy Nunes MD   600 mg at 12/25/19 0850    ipratropium-albuterol (DUONEB) nebulizer solution 3 mL  1 vial Inhalation Q6H PRN Buddy Nunes MD        metoprolol succinate (TOPROL XL) extended release tablet 12.5 mg  12.5 mg Oral Daily Buddy Nunes MD oxyCODONE-acetaminophen, sennosides-docusate sodium, acetaminophen, polyethylene glycol  DIET GENERAL; Daily Fluid Restriction: 1200 ml  Dietary Nutrition Supplements: Standard High Calorie Oral Supplement       Labs:   CBC with DIFF:  Recent Labs     12/23/19  0326 12/24/19  1033 12/25/19  0816   WBC 6.2 6.4 5.2   RBC 3.95* 4.71 3.99*   HGB 11.3* 13.6* 11.5*   HCT 35.2* 43.0 35.6*   MCV 89.1 91.3 89.2   MCH 28.6 28.9 28.8   MCHC 32.1* 31.6* 32.3*   RDW 13.9 14.1 14.0    234 184   MPV 9.0* 9.0* 9.0*       CMP/BMP:  Recent Labs     12/23/19  0326 12/24/19  1033    133*   K 4.4 4.3   CL 99 97*   CO2 24 21*   ANIONGAP 13 15   GLUCOSE 112* 108   BUN 14 18   CREATININE 0.8 0.8   LABGLOM >60 >60   CALCIUM 8.7* 8.8   PROT  --  7.3   LABALBU  --  3.5   BILITOT  --  0.5   ALKPHOS  --  156*   ALT  --  64*   AST  --  41*         CRP:  No results for input(s): CRP in the last 72 hours. Sed Rate:  No results for input(s): SEDRATE in the last 72 hours. HgBA1c:  No components found for: HGBA1C  FLP:    Lab Results   Component Value Date    TRIG 121 07/27/2017    HDL 42 07/27/2017    LDLCALC 124 07/27/2017     TSH:    Lab Results   Component Value Date    TSH 1.190 12/23/2019     Troponin T: No results for input(s): TROPONINI in the last 72 hours. Pro-BNP: No results for input(s): BNP in the last 72 hours. INR: No results for input(s): INR in the last 72 hours. ABGs:   Lab Results   Component Value Date    PHART 7.410 07/26/2017    PO2ART 63.0 07/26/2017    DMZ3YNE 38.0 07/26/2017     UA:  Recent Labs     12/25/19  0545   COLORU YELLOW   PHUR 6.5   WBCUA 5   RBCUA 321*   BACTERIA NEGATIVE   CLARITYU CLOUDY*   SPECGRAV 1.014   LEUKOCYTESUR Negative   UROBILINOGEN 0.2   BILIRUBINUR Negative   BLOODU LARGE*   GLUCOSEU Negative         Culture Results:    No results for input(s): CXSURG in the last 720 hours.     Blood Culture Recent:   Recent Labs     12/23/19  1518 12/17/19  1252 12/17/19  0801   BC No Growth to date.  Any change in status will be called. Bottle volume = 7 ml*  Isolated from Aerobic and Anaerobic bottle  No further workup  Refer to (blood culture collected [email or web address]) for sensitivit    Bottle volume = 6 ml*  Isolated from Aerobic and Anaerobic bottle  No further workup  Refer to (blood culture collected [email or web address]) for sensitivit         Cultures:   No results for input(s): CULTURE in the last 72 hours. Recent Labs     12/23/19  1518   BC No Growth to date. Any change in status will be called. No results for input(s): CXSURG in the last 72 hours. No results for input(s): MG, PHOS in the last 72 hours. Recent Labs     12/24/19  1033   AST 41*   ALT 64*   BILITOT 0.5   ALKPHOS 156*         RAD:   Xr Chest Standard (2 Vw)    Result Date: 12/24/2019  Examination. XR CHEST (2 VW) 12/24/2019 5:00 AM History: Pneumonia. The frontal and lateral views of the chest are compared with the previous study dated 12/17/2019. There is partial resolution of the right lower lung infiltrate since the previous study. There are coarse interstitial changes in the left parahilar and lower lung bilaterally representing a chronic interstitial process. There is no pleural effusion, pulmonary congestion or pneumothorax. Heart size in the normal range. The atheromatous changes of thoracic aorta are noted. A compression deformity of a lower thoracic/proximal lumbar vertebra which probably represent a chronic process. Partial resolution of the right lower lung infiltrate. Chronic inflammatory lung changes. Signed by Dr Refugio Simons on 12/24/2019 9:25 AM    Xr Chest Portable    Result Date: 12/17/2019  EXAMINATION: XR CHEST PORTABLE 12/17/2019 9:23 AM HISTORY: Fever COMPARISON: 11/25/2019 FINDINGS: Heart appears normal in size. The aorta is tortuous with atherosclerotic calcifications. There has been prior median sternotomy.  Multiple sternotomy wires are fractured, similar to the prior exam. There is mild diffuse examination, evaluation, counseling and review of medications, assessment and plan.      Electronically signed by   Yolanda Hernández MD, MPH,   Internal Medicine Hospitalist   12/25/2019 9:02 AM

## 2019-12-25 NOTE — PLAN OF CARE
Problem: Falls - Risk of:  Goal: Will remain free from falls  Description  Will remain free from falls  Outcome: Ongoing  Goal: Absence of physical injury  Description  Absence of physical injury  Outcome: Ongoing     Problem: Risk for Impaired Skin Integrity  Goal: Tissue integrity - skin and mucous membranes  Description  Structural intactness and normal physiological function of skin and  mucous membranes. Outcome: Ongoing     Problem: SAFETY  Goal: Free from accidental physical injury  Outcome: Ongoing     Problem: DAILY CARE  Goal: Daily care needs are met  Outcome: Ongoing     Problem: PAIN  Goal: Patient's pain/discomfort is manageable  Outcome: Ongoing     Problem: SKIN INTEGRITY  Goal: Skin integrity is maintained or improved  Outcome: Ongoing     Problem: KNOWLEDGE DEFICIT  Goal: Patient/S.O. demonstrates understanding of disease process, treatment plan, medications, and discharge instructions.   Outcome: Ongoing     Problem: DISCHARGE BARRIERS  Goal: Patient's continuum of care needs are met  Outcome: Ongoing     Problem: Gas Exchange - Impaired:  Goal: Levels of oxygenation will improve  Description  Levels of oxygenation will improve  Outcome: Ongoing     Problem: Infection, Septic Shock:  Goal: Will show no infection signs and symptoms  Description  Will show no infection signs and symptoms  Outcome: Ongoing     Problem: Infection - Ventilator-Associated Pneumonia:  Goal: Absence of pulmonary infection  Description  Absence of pulmonary infection  Outcome: Ongoing     Problem: Mobility - Impaired:  Goal: Mobility will improve  Description  Mobility will improve  Outcome: Ongoing

## 2019-12-25 NOTE — PROGRESS NOTES
Patient:   Jesus Barnes  MR#:    [id number]   Room:    [id number]   YOB: 1931  Date of Progress Note: 12/25/2019  Time of Note                           11:03 AM  Consulting Physician:   Mitchell Lindsay M.D. Attending Physician:  Mitchell Lindsay MD     Chief complaint Sepsis due to Escherichia coli [E. coli]    S:This 80 y.o. male  pt admitted to [facility] on 11/27/19 w/ R hip pain. Pt reports that 3 days earlier, he felt dizzy and fell on his R hip. He did not lose consciounsness. He present to [facility] ED on 11/25 for evaluation and per pt they didn't find anything wrong w/him and sent him home. He presented to [facility] ED per EMS on 11/27 with more pain and inability to bear weight on RLE. He also complained of dizziness that has worseded since amlodipine and miodarone was started by cardiology. Xrays & CT of hip were all neg. On 11/29 an MRI of his R hip was done which showd edema within the intertrochanterir R femoral neck highly suggestive of a nondisplaced fx. Dr Demetrius Ortiz was consulted. Pt opted for conservative treatment w/ TTWB to his RLE. Pt was not able to maintain TTWB, so on 12/3/19, he underwent a cephalomedullary nailing of his R displaced intertrochanteric hip fx. He tolerated procedure well and will be WBAT in his R LE. He was participating in both PT/OT and was admitted to the [address] [place] [address] on 12/8/19. He had considerable medical complications up in the rehab unit. He developed gram-negative bacteremia with preliminary cultures consistent with E. coli which also grew out of his urine. He was seen by infectious disease and the hospitalist and placed on appropriate antibiotics. He also had acute on chronic hyponatremia as low as 119. Due to his medical complications the decision was made for him to move to acute care on 12/18/2019. He had a positive blood culture for GNR and was felt to have sepsis d/t E-Coli UTI. CXR done revealed pneumonia and he was placed on Zosyn. He was being followed by urology for urinary retention and acute hematuria. He had CBI in place but this has now been discontinued. Alves was removed on 12/23/19 for voiding trial with in & out catheterization as needed. He was placed on Omnicef on 12/22/19 by urology and will need  for 14 days. Order for repeat blood cultures and for repeat CXR on 12/24/19. He has improved overall. He is participating in both PT/OT. He is felt to need a return to Rehab to work towards his goal of returning home with assist of his girlfriend.  He is now felt ready to start the Rehab program.       REVIEW OF SYSTEMS:  Constitutional: No fevers No chills  Neck:No stiffness  Respiratory: No shortness of breath  Cardiovascular: No chest pain No palpitations  Gastrointestinal: No abdominal pain    Genitourinary: No Dysuria  Neurological: No headache, no confusion    Past Medical History:      Diagnosis Date    Atrial [facility]     CAD (coronary artery disease)     Chronic combined systolic and diastolic CHF (congestive heart failure) ([facility] 12/17/2019    COPD (chronic obstructive pulmonary disease) ([facility] 12/17/2019    GERD (gastroesophageal reflux disease)     Gout     Gout     Heart attack ([facility]     History of home oxygen therapy     at night    Hypertension     Hyponatremia 12/17/2019    Mixed hyperlipidemia 12/17/2019    Palliative care patient 12/20/2019       Past Surgical History:      Procedure Laterality Date    CARDIAC SURGERY      cabg 2 bypasss    CORONARY ARTERY BYPASS GRAFT      x 2 vessels    INTERTROCHANTERIC HIP FRACTURE SURGERY Right     KNEE SURGERY         Medications in Hospital:      Current Facility-Administered Medications:     sodium chloride flush 0.9 % injection 10 mL, 10 mL, Intravenous, PRN, Drew Rodríguez MD    ipratropium-albuterol (DUONEB) nebulizer solution 1 ampule, 1 ampule, Inhalation, Q4H WA, Drew Rodríguez MD, 1 ampule at 12/25/19 1007    bisacodyl (DULCOLAX) suppository 10 mg, 10 mg, Rectal, Daily PRN, Dre Adams MD    docusate sodium (COLACE) capsule 100 mg, 100 mg, Oral, BID, Dre Adams MD, 100 mg at 12/25/19 0850    polyethylene glycol (GLYCOLAX) packet 17 g, 17 g, Oral, Daily, Dre Adams MD, 17 g at 12/25/19 0850    allopurinol (ZYLOPRIM) tablet 100 mg, 100 mg, Oral, Daily, Dre Adams MD, 100 mg at 12/25/19 0850    amiodarone (PACERONE) tablet 100 mg, 100 mg, Oral, Q12H, Dre Adams MD, 100 mg at 12/25/19 0851    aspirin EC tablet 81 mg, 81 mg, Oral, Daily, Dre Adams MD, 81 mg at 12/25/19 0850    atorvastatin (LIPITOR) tablet 20 mg, 20 mg, Oral, Daily, Dre Adams MD, 20 mg at 12/24/19 2119    calcium carbonate (TUMS) chewable tablet 1,000 mg, 2 tablet, Oral, BID PRN, Dre Adams MD    cefdinir (OMNICEF) capsule 300 mg, 300 mg, Oral, 2 times per day, Dre Adams MD, 300 mg at 12/25/19 0850    finasteride (PROSCAR) tablet 5 mg, 5 mg, Oral, Daily, Dre Adams MD, 5 mg at 12/24/19 2119    guaiFENesin [facility]) extended release tablet 600 mg, 600 mg, Oral, BID, Dre Adams MD, 600 mg at 12/25/19 0850    ipratropium-albuterol (DUONEB) nebulizer solution 3 mL, 1 vial, Inhalation, Q6H PRN, Dre Adams MD    metoprolol succinate (TOPROL XL) extended release tablet 12.5 mg, 12.5 mg, Oral, Daily, Dre Adams MD, 12.5 mg at 12/25/19 0857    nitroGLYCERIN (NITROSTAT) SL tablet 0.4 mg, 0.4 mg, Sublingual, Q5 Min PRN, Dre Adams MD    oxyCODONE-acetaminophen (PERCOCET) 5-325 MG per tablet 1 tablet, 1 tablet, Oral, Q6H PRN, Dre Adams MD    pantoprazole (PROTONIX) tablet 40 mg, 40 mg, Oral, Daily, Dre Adams MD, 40 mg at 12/25/19 5350    rivaroxaban (XARELTO) tablet 20 mg, 20 mg, Oral, Dinner, Dre Adasm MD, 20 mg at 12/24/19 1735    sennosides-docusate sodium (SENOKOT-S) 8.6-50 MG tablet 2 tablet, 2 tablet, Oral, Daily PRN, Dre Adams MD, 2 tablet at 12/23/19 1905    sodium chloride tablet 1 g, 1 g, Oral, TID WC, Felisa Bullock MD, 1 g at 12/25/19 3669    vitamin B-12 (CYANOCOBALAMIN) tablet 1,000 mcg, 1,000 mcg, Oral, Daily, Felisa Bullock MD, 1,000 mcg at 12/25/19 0850    [START ON 12/29/2019] vitamin D (ERGOCALCIFEROL) capsule 50,000 Units, 50,000 Units, Oral, Weekly, Felisa Bullock MD    acetaminophen (TYLENOL) tablet 650 mg, 650 mg, Oral, Q4H PRN, Bernice Jurado MD    polyethylene glycol (GLYCOLAX) packet 17 g, 17 g, Oral, Daily PRN, Bernice Jurado MD    tamsulosin (FLOMAX) capsule 0.8 mg, 0.8 mg, Oral, Daily, Bernice Jurado MD, 0.8 mg at 12/24/19 2119    Allergies:  Patient has no known allergies. Social History:   TOBACCO:   reports that he has quit smoking. His smoking use included cigarettes. He has a 195.00 pack-year smoking history. He has quit using smokeless tobacco.  ETOH:   reports no history of alcohol use. Family History:       Problem Relation Age of Onset    No Known Problems Mother     Heart Disease Father          PHYSICAL EXAM:  /80   Pulse 64   Temp 97.4 °F (36.3 °C) (Temporal)   Resp 16   Ht 5' 8\" (1.727 m)   Wt 161 lb 9.6 oz (73.3 kg)   SpO2 91%   BMI 24.57 kg/m²     Constitutional - well developed, well nourished. Eyes - conjunctiva normal.   Ear, nose, throat - No scars, masses, or lesions over external nose or ears, no atrophy of tongue  Neck-symmetric, no masses noted, no jugular vein distension  Respiration- chest wall appears symmetric, good expansion,   normal effort without use of accessory muscles  Musculoskeletal - no significant wasting of muscles noted, no bony deformities  Extremities-no clubbing, cyanosis or edema  Skin - warm, dry, and intact. No rash, erythema, or pallor.   Psychiatric - mood, affect, and behavior appear normal.      Neurological exam  Awake, alert, fluent oriented appropriate affect  Attention and concentration appear appropriate  Recent and remote memory appears unremarkable  Speech normal without dysarthria  No clear issues with language

## 2019-12-25 NOTE — PROGRESS NOTES
Reported to Dr. Robyn Hall that had blood coming from his penis area. See orders. He stated to do hemaglobin and hemacrit at 1800 today and every 12 hours for 2 days and not to do in and out cath. Informed that attempted to contact Dr. Kathleen Deras and was reported he is not on call.

## 2019-12-26 LAB
ALBUMIN SERPL-MCNC: 3.2 G/DL (ref 3.5–5.2)
ALP BLD-CCNC: 141 U/L (ref 40–130)
ALT SERPL-CCNC: 48 U/L (ref 5–41)
ANION GAP SERPL CALCULATED.3IONS-SCNC: 14 MMOL/L (ref 7–19)
AST SERPL-CCNC: 28 U/L (ref 5–40)
BASOPHILS ABSOLUTE: 0 K/UL (ref 0–0.2)
BASOPHILS RELATIVE PERCENT: 0.5 % (ref 0–1)
BILIRUB SERPL-MCNC: 0.5 MG/DL (ref 0.2–1.2)
BUN BLDV-MCNC: 18 MG/DL (ref 8–23)
CALCIUM SERPL-MCNC: 8.6 MG/DL (ref 8.8–10.2)
CHLORIDE BLD-SCNC: 98 MMOL/L (ref 98–111)
CO2: 20 MMOL/L (ref 22–29)
CREAT SERPL-MCNC: 0.7 MG/DL (ref 0.5–1.2)
EOSINOPHILS ABSOLUTE: 0.3 K/UL (ref 0–0.6)
EOSINOPHILS RELATIVE PERCENT: 4.2 % (ref 0–5)
GFR NON-AFRICAN AMERICAN: >60
GLUCOSE BLD-MCNC: 114 MG/DL (ref 74–109)
HCT VFR BLD CALC: 37 % (ref 42–52)
HCT VFR BLD CALC: 37.1 % (ref 42–52)
HEMOGLOBIN: 12.1 G/DL (ref 14–18)
HEMOGLOBIN: 12.2 G/DL (ref 14–18)
IMMATURE GRANULOCYTES #: 0 K/UL
LYMPHOCYTES ABSOLUTE: 1.3 K/UL (ref 1.1–4.5)
LYMPHOCYTES RELATIVE PERCENT: 21.9 % (ref 20–40)
MCH RBC QN AUTO: 28.8 PG (ref 27–31)
MCHC RBC AUTO-ENTMCNC: 32.9 G/DL (ref 33–37)
MCV RBC AUTO: 87.5 FL (ref 80–94)
MONOCYTES ABSOLUTE: 0.5 K/UL (ref 0–0.9)
MONOCYTES RELATIVE PERCENT: 8.1 % (ref 0–10)
NEUTROPHILS ABSOLUTE: 3.9 K/UL (ref 1.5–7.5)
NEUTROPHILS RELATIVE PERCENT: 64.6 % (ref 50–65)
PDW BLD-RTO: 13.9 % (ref 11.5–14.5)
PLATELET # BLD: 198 K/UL (ref 130–400)
PMV BLD AUTO: 9 FL (ref 9.4–12.4)
POTASSIUM REFLEX MAGNESIUM: 4.5 MMOL/L (ref 3.5–5)
RBC # BLD: 4.24 M/UL (ref 4.7–6.1)
SODIUM BLD-SCNC: 132 MMOL/L (ref 136–145)
TOTAL PROTEIN: 6.6 G/DL (ref 6.6–8.7)
WBC # BLD: 6 K/UL (ref 4.8–10.8)

## 2019-12-26 PROCEDURE — 85018 HEMOGLOBIN: CPT

## 2019-12-26 PROCEDURE — 97535 SELF CARE MNGMENT TRAINING: CPT

## 2019-12-26 PROCEDURE — 6370000000 HC RX 637 (ALT 250 FOR IP): Performed by: PSYCHIATRY & NEUROLOGY

## 2019-12-26 PROCEDURE — 99231 SBSQ HOSP IP/OBS SF/LOW 25: CPT | Performed by: UROLOGY

## 2019-12-26 PROCEDURE — 94640 AIRWAY INHALATION TREATMENT: CPT

## 2019-12-26 PROCEDURE — 85014 HEMATOCRIT: CPT

## 2019-12-26 PROCEDURE — 6370000000 HC RX 637 (ALT 250 FOR IP): Performed by: HOSPITALIST

## 2019-12-26 PROCEDURE — 97110 THERAPEUTIC EXERCISES: CPT

## 2019-12-26 PROCEDURE — 85025 COMPLETE CBC W/AUTO DIFF WBC: CPT

## 2019-12-26 PROCEDURE — 80053 COMPREHEN METABOLIC PANEL: CPT

## 2019-12-26 PROCEDURE — 51702 INSERT TEMP BLADDER CATH: CPT

## 2019-12-26 PROCEDURE — 97116 GAIT TRAINING THERAPY: CPT

## 2019-12-26 PROCEDURE — 97530 THERAPEUTIC ACTIVITIES: CPT

## 2019-12-26 PROCEDURE — 99232 SBSQ HOSP IP/OBS MODERATE 35: CPT | Performed by: PSYCHIATRY & NEUROLOGY

## 2019-12-26 PROCEDURE — 36415 COLL VENOUS BLD VENIPUNCTURE: CPT

## 2019-12-26 PROCEDURE — 1180000000 HC REHAB R&B

## 2019-12-26 RX ADMIN — IPRATROPIUM BROMIDE AND ALBUTEROL SULFATE 1 AMPULE: .5; 3 SOLUTION RESPIRATORY (INHALATION) at 14:31

## 2019-12-26 RX ADMIN — AMIODARONE HYDROCHLORIDE 100 MG: 100 TABLET ORAL at 08:15

## 2019-12-26 RX ADMIN — ATORVASTATIN CALCIUM 20 MG: 20 TABLET, FILM COATED ORAL at 21:06

## 2019-12-26 RX ADMIN — FINASTERIDE 5 MG: 5 TABLET, FILM COATED ORAL at 21:06

## 2019-12-26 RX ADMIN — Medication 1 G: at 17:02

## 2019-12-26 RX ADMIN — METOPROLOL SUCCINATE 12.5 MG: 25 TABLET, EXTENDED RELEASE ORAL at 08:15

## 2019-12-26 RX ADMIN — ALLOPURINOL 100 MG: 100 TABLET ORAL at 08:15

## 2019-12-26 RX ADMIN — CYANOCOBALAMIN TAB 500 MCG 1000 MCG: 500 TAB at 08:14

## 2019-12-26 RX ADMIN — DOCUSATE SODIUM 100 MG: 100 CAPSULE, LIQUID FILLED ORAL at 08:14

## 2019-12-26 RX ADMIN — Medication 1 G: at 12:19

## 2019-12-26 RX ADMIN — DOCUSATE SODIUM 100 MG: 100 CAPSULE, LIQUID FILLED ORAL at 21:06

## 2019-12-26 RX ADMIN — AMIODARONE HYDROCHLORIDE 100 MG: 100 TABLET ORAL at 21:06

## 2019-12-26 RX ADMIN — PANTOPRAZOLE SODIUM 40 MG: 40 TABLET, DELAYED RELEASE ORAL at 08:15

## 2019-12-26 RX ADMIN — POLYETHYLENE GLYCOL 3350 17 G: 17 POWDER, FOR SOLUTION ORAL at 08:15

## 2019-12-26 RX ADMIN — GUAIFENESIN 600 MG: 600 TABLET, EXTENDED RELEASE ORAL at 21:06

## 2019-12-26 RX ADMIN — IPRATROPIUM BROMIDE AND ALBUTEROL SULFATE 1 AMPULE: .5; 3 SOLUTION RESPIRATORY (INHALATION) at 19:50

## 2019-12-26 RX ADMIN — CEFDINIR 300 MG: 300 CAPSULE ORAL at 21:05

## 2019-12-26 RX ADMIN — TAMSULOSIN HYDROCHLORIDE 0.8 MG: 0.4 CAPSULE ORAL at 21:05

## 2019-12-26 RX ADMIN — ASPIRIN 81 MG: 81 TABLET, COATED ORAL at 08:15

## 2019-12-26 RX ADMIN — CEFDINIR 300 MG: 300 CAPSULE ORAL at 08:15

## 2019-12-26 RX ADMIN — IPRATROPIUM BROMIDE AND ALBUTEROL SULFATE 1 AMPULE: .5; 3 SOLUTION RESPIRATORY (INHALATION) at 09:46

## 2019-12-26 RX ADMIN — GUAIFENESIN 600 MG: 600 TABLET, EXTENDED RELEASE ORAL at 08:14

## 2019-12-26 RX ADMIN — Medication 1 G: at 08:15

## 2019-12-26 RX ADMIN — IPRATROPIUM BROMIDE AND ALBUTEROL SULFATE 1 AMPULE: .5; 3 SOLUTION RESPIRATORY (INHALATION) at 06:17

## 2019-12-26 ASSESSMENT — PAIN SCALES - GENERAL
PAINLEVEL_OUTOF10: 0

## 2019-12-26 NOTE — PROGRESS NOTES
Physical Therapy  Claudia Olsen  [id number]     12/26/19 1021   Subjective   Subjective Agrees to work with therapy. Bed Mobility   Supine to Sit Minimal assistance;Contact guard assistance   Transfers   Sit to Stand Contact guard assistance   Stand to sit Contact guard assistance   Ambulation   Ambulation? Yes   WB Status WBAT   More Ambulation? Yes   Ambulation 1   Surface level tile   Device Rolling Walker   Assistance Contact guard assistance   Quality of Gait able to steps from bed to w/c with CGA   Distance 3'   Ambulation 2   Surface - 2 level tile   Device 2 Rolling Walker   Other Apparatus 2 Wheelchair follow   Assistance 2 Contact guard assistance   Distance 75'   Exercises   Hip Flexion 20   Hip Abduction 20   Knee Long Arc Quad 20   Ankle Pumps 20   Comments B LE ex's in sitting and HS curls with red theraband and ball squeezes to increase strength and functional mobility   Other Activities   Comment Patient did well with therapy. Patient went to OT after PT treatment. Short term goals   Time Frame for Short term goals 1 week    Short term goal 1 supervision bed mobility    Short term goal 2 CGA transfer with AD   Short term goal 3 CGA 50 feet ambulation with AD   Short term goal 4 supervision WC propulsion and management 150 feet    Short term goal 5 CGA 4 steps with AD   Long term goals   Time Frame for Long term goals  2 weeks   Long term goal 1 independent bed mobility    Long term goal 2 independent transfer with AD    Long term goal 3 independent 150 feet ambulation with AD   Long term goal 4 independent wheelchair ambulation 150 feet    Long term goal 5 supervision 4 steps with AD    Activity Tolerance   Activity Tolerance Patient Tolerated treatment well   Safety Devices   Type of devices Chair alarm in place; Left in chair   Electronically signed by Aram Champion PTA on 12/26/2019 at 11:03 AM

## 2019-12-26 NOTE — PROGRESS NOTES
assistance  Transfers  Stand Step Transfers: Stand by assistance(w/c to recliner)  Sit to stand: Stand by assistance  Stand to sit: Stand by assistance  Type of ROM/Therapeutic Exercise  Type of ROM/Therapeutic Exercise: Free weights(2# all planes [id number])     Plan   Plan  Current Treatment Recommendations: Strengthening, Balance Training, Functional Mobility Training, Endurance Training, Patient/Caregiver Education & Training, Equipment Evaluation, Education, & procurement, Safety Education & Training, Self-Care / ADL, Home Management Training    Goals  Short term goals  Time Frame for Short term goals: 1 week  Short term goal 1: complete LB dressing with supervision  Short term goal 2: complete overall toileting with supervision  Short term goal 3: complete 1-2 handed standing level task for 3 mins with supervision  Short term goal 4: complete simple ambulatory home making task with supervision   Short term goal 5: complete overall bathing with supervision  Long term goals  Time Frame for Long term goals : 2 weeks  Long term goal 1: complete overall toileting with modified independence  Long term goal 2: complete overall bathing with modified independence  Long term goal 3: complete overall dressing with modified independence  Long term goal 4: complete simple ambulatory home making task with modified independence  Long term goal 5: complete HEP with independence  Long term goals 6: pt/family verbalize DME  Patient Goals   Patient goals : Return home independently         12/26/19 1100   OT Individual Minutes   Time In 1100   Time Out 1200   Minutes 60   Time Code Minutes    Timed Code Treatment Minutes 60 Minutes     Electronically signed by Nicholas Crowley OT on 12/26/2019 at 3:42 PM

## 2019-12-26 NOTE — PROGRESS NOTES
Occupational Therapy     12/26/19 1345   General   Diagnosis R hip fx s/p cephalo-medullary nailing 11/27, pneumonia   Pain Assessment   Patient Currently in Pain No   Pain Assessment 0-10   Pain Level 0   ADL   Grooming Setup   Additional Comments Hair washed. Balance   Sitting Balance Supervision   Standing Balance Stand by assistance   Functional Mobility   Functional - Mobility Device Rolling Walker   Activity Other   Assist Level Stand by assistance   Transfers   Sit to stand Stand by assistance   Stand to sit Stand by assistance   Transfer Comments w/c and recliner. Type of ROM/Therapeutic Exercise   Type of ROM/Therapeutic Exercise Julius   Comment Julius: JOSUÉ 10# 3 sets x 15 reps. Assessment   Performance deficits / Impairments Decreased functional mobility ; Decreased strength;Decreased high-level IADLs;Decreased endurance;Decreased ADL status; Decreased balance   Treatment Diagnosis R hip fx s/p cephalo-medullary nailing    Prognosis Good   Timed Code Treatment Minutes 45 Minutes   Activity Tolerance   Activity Tolerance Patient Tolerated treatment well   Safety Devices   Safety Devices in place Yes   Type of devices Call light within reach; Chair alarm in place; Left in chair   Plan   Current Treatment Recommendations Strengthening;Balance Training;Functional Mobility Training; Endurance Training;Patient/Caregiver Education & Training;Equipment Evaluation, Education, & procurement; Safety Education & Training;Self-Care / ADL; Home Management Training

## 2019-12-26 NOTE — PROGRESS NOTES
Patient:   Paola Carlisle  MR#:    [id number]   Room:    5909/593-   YOB: 1931  Date of Progress Note: 12/26/2019  Time of Note                           8:30 AM  Consulting Physician:   Zakia Arellano M.D. Attending Physician:  Zakia Arellano MD     Chief complaint Sepsis due to Escherichia coli [E. coli]    S:This 80 y.o. male  pt admitted to [facility] on 11/27/19 w/ R hip pain. Pt reports that 3 days earlier, he felt dizzy and fell on his R hip. He did not lose consciounsness. He present to [facility] ED on 11/25 for evaluation and per pt they didn't find anything wrong w/him and sent him home. He presented to [facility] ED per EMS on 11/27 with more pain and inability to bear weight on RLE. He also complained of dizziness that has worseded since amlodipine and miodarone was started by cardiology. Xrays & CT of hip were all neg. On 11/29 an MRI of his R hip was done which showd edema within the intertrochanterir R femoral neck highly suggestive of a nondisplaced fx. Dr Vaughn William was consulted. Pt opted for conservative treatment w/ TTWB to his RLE. Pt was not able to maintain TTWB, so on 12/3/19, he underwent a cephalomedullary nailing of his R displaced intertrochanteric hip fx. He tolerated procedure well and will be WBAT in his R LE. He was participating in both PT/OT and was admitted to the [address] [place] [address] on 12/8/19. He had considerable medical complications up in the rehab unit. He developed gram-negative bacteremia with preliminary cultures consistent with E. coli which also grew out of his urine. He was seen by infectious disease and the hospitalist and placed on appropriate antibiotics. He also had acute on chronic hyponatremia as low as 119. Due to his medical complications the decision was made for him to move to acute care on 12/18/2019. He had a positive blood culture for GNR and was felt to have sepsis d/t E-Coli UTI. CXR done revealed pneumonia and he was placed on Zosyn.  He QUESTIONS  [phone] CELL  Dr Amber Johnson

## 2019-12-26 NOTE — PROGRESS NOTES
Urology Progress Note      SUBJECTIVE: Patient asleep in bed. Nurses report no problems with catheter overnight    OBJECTIVE:      REVIEW OF SYSTEMS:   Review of Systems      Physical  VITALS:  BP (!) 107/59   Pulse 60   Temp 97.2 °F (36.2 °C) (Temporal)   Resp 18   Ht 5' 8\" (1.727 m)   Wt 161 lb 9.6 oz (73.3 kg)   SpO2 95%   BMI 24.57 kg/m²   TEMPERATURE:  Current - Temp: 97.2 °F (36.2 °C); Max - Temp  Av.5 °F (36.4 °C)  Min: 97.2 °F (36.2 °C)  Max: 97.7 °F (36.5 °C)   24 HR I&O     Intake/Output Summary (Last 24 hours) at 2019 0708  Last data filed at 2019 0523  Gross per 24 hour   Intake 720 ml   Output 1100 ml   Net -380 ml     BACK: not done  ABDOMEN:  soft and non-distended  HEART:  normal rate  CHEST: Normal respiratory effort  GENITAL/URINARY: Alves catheter draining clear urine    Data       CBC:   Recent Labs     19  1033 19  0816 19  1740 19  0605   WBC 6.4 5.2  --  6.0   HGB 13.6* 11.5* 12.2* 12.2*   HCT 43.0 35.6* 38.6* 37.1*    184  --  198     BMP:    Recent Labs     19  1033 19  0816 19  0605   * 132* 132*   K 4.3 4.2 4.5   CL 97* 97* 98   CO2 21* 22 20*   BUN 18 17 18   CREATININE 0.8 0.8 0.7   GLUCOSE 108 103 114*       No results for input(s): LABURIN in the last 72 hours. Recent Labs     19  1518   BC No Growth to date. Any change in status will be called. No results for input(s): Alexander Saint in the last 72 hours.     Radiology:      Imaging studies:      ASSESSMENT AND PLAN    Patient Active Problem List   Diagnosis    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 ([address]    Chronic hypoxemic respiratory failure (HCC)    Chronic gout without tophus    Gastroesophageal reflux disease without esophagitis    Essential hypertension    Dyslipidemia    Benign prostatic hyperplasia with urinary obstruction    Near syncope    Closed right hip fracture, initial encounter ([facility]    Paroxysmal atrial fibrillation (HCC)    Chronic combined systolic and diastolic CHF (congestive heart failure) ([facility]    Mixed hyperlipidemia    COPD (chronic obstructive pulmonary disease) ([facility]    Hyponatremia    Gross hematuria    Pneumonia due to organism    Urinary retention    Acute cystitis with hematuria    BPH without urinary obstruction    Sepsis ([facility]    Palliative care patient    Bacteremia    Weakness    Pain in leg, unspecified       1. Traumatic catheterization. Alves catheter was placed. Would leave Alves catheter for now. Depending on patient's length of stay in rehab may go home with Alves catheter. 2.  BPH with urinary retention. Is on maximal medical therapy repeat voiding trial with clinically appropriate    3.   Cystitis, on Hayden Rojo MD

## 2019-12-26 NOTE — PROGRESS NOTES
[facility]    Patient:  Jameel Miller  YOB: 1931  Date of Service: 12/26/2019  MRN: [id number]   Acct: [de-identified]   Primary Care Physician: No primary care provider on file. Advance Directive: Full Code  Admit Date: 12/23/2019       Hospital Day: 3      CHIEF COMPLAINT: Sepsis due to E. coli. Consult for medical management. 12/26/2019 10:12 AM  Subjective / Interval History:   Doing well. No acute overnight events reported. Urinary catheter in place. Patient endorses overall improvement. Denies any further hematuria. Denies any other acute complaints or distress at this time. 12/25/2019  No acute changes or acute overnight event reported. Patient reportedly with traumatic Alves insertion, resulting in gross hematuria. Endorses overall improvement. Denies any acute complaints or distress at this time. Brief Hx  The patient is a 80 y.o. male who presents with history of CAD, CHF, atrial fibrillation, HTN, right intertrochanteric hip fracture (s/p cephalo-medullary nailing - 11/27/2019), admitted to [facility] on 12/09/2019, and was found to be hyponatremic with a sodium level of 119, on 12/27/2019. Patient was also noted to have acute cystitis with hematuria, as well as pneumonia. Initially was started on piperacillin-tazobactam, to cover pneumonia as well as E. coli positive urine culture.     Blood cultures subsequently positive for E. coli as well. Patient was placed on Omnicef on 12/22/2019, based on culture speciation and sensitivity. Commended for patient to complete a 14-day course of antibiotic as recommended by urology. Hematuria was deemed as caused by UTI. Repeat chest x-ray (12/24/2019), reporting partial resolution of the right lower lobe infiltrate. Chronic inflammatory lung changes.     Repeat blood cultures reporting no growth to date.   Patient was started on tolvaptan, for treatment of hyponatremia.     Patient admitted to acute rehab, and medicine is consulted for continued medical management. Review of Systems:   Review of Systems  ROS: 14 point review of systems is negative except as specifically addressed above.     DIET GENERAL; Daily Fluid Restriction: 1200 ml  Dietary Nutrition Supplements: Standard High Calorie Oral Supplement    Intake/Output Summary (Last 24 hours) at 12/26/2019 1012  Last data filed at 12/26/2019 0953  Gross per 24 hour   Intake 600 ml   Output 1100 ml   Net -500 ml       Medications:  Current Facility-Administered Medications   Medication Dose Route Frequency Provider Last Rate Last Dose    lidocaine (XYLOCAINE) 2 % uro-jet   Topical PRN Leonore Spurling, APRN        sodium chloride flush 0.9 % injection 10 mL  10 mL Intravenous PRN Teo Given, MD        ipratropium-albuterol (DUONEB) nebulizer solution 1 ampule  1 ampule Inhalation Q4H WA Teo Given, MD   1 ampule at 12/26/19 0946    bisacodyl (DULCOLAX) suppository 10 mg  10 mg Rectal Daily PRN Teo Given, MD        docusate sodium (COLACE) capsule 100 mg  100 mg Oral BID Teo Given, MD   100 mg at 12/26/19 0814    polyethylene glycol (GLYCOLAX) packet 17 g  17 g Oral Daily Teo Given, MD   17 g at 12/26/19 0815    allopurinol (ZYLOPRIM) tablet 100 mg  100 mg Oral Daily Teo Given, MD   100 mg at 12/26/19 0815    amiodarone (PACERONE) tablet 100 mg  100 mg Oral Q12H Teo Given, MD   100 mg at 12/26/19 0815    aspirin EC tablet 81 mg  81 mg Oral Daily Teo Given, MD   81 mg at 12/26/19 0815    atorvastatin (LIPITOR) tablet 20 mg  20 mg Oral Daily Teo Given, MD   20 mg at 12/25/19 2048    calcium carbonate (TUMS) chewable tablet 1,000 mg  2 tablet Oral BID PRN Teo Given, MD        cefdinir (OMNICEF) capsule 300 mg  300 mg Oral 2 times per day Teo Given, MD   300 mg at 12/26/19 0815    finasteride (PROSCAR) tablet 5 mg  5 mg Oral Daily Teo Given, MD   5 mg at 12/25/19 2048    guaiFENesin [facility]) extended release tablet 600 mg  600 mg Oral BID Stas Deng MD   600 mg at 12/26/19 0814    ipratropium-albuterol (DUONEB) nebulizer solution 3 mL  1 vial Inhalation Q6H PRN Stas Deng MD        metoprolol succinate (TOPROL XL) extended release tablet 12.5 mg  12.5 mg Oral Daily Stas Deng MD   12.5 mg at 12/26/19 0815    nitroGLYCERIN (NITROSTAT) SL tablet 0.4 mg  0.4 mg Sublingual Q5 Min PRN Stas Deng MD        oxyCODONE-acetaminophen (PERCOCET) 5-325 MG per tablet 1 tablet  1 tablet Oral Q6H PRN Stas Deng MD        pantoprazole (PROTONIX) tablet 40 mg  40 mg Oral Daily Stas Deng MD   40 mg at 12/26/19 0815    [Held by provider] rivaroxaban (XARELTO) tablet 20 mg  20 mg Oral Dinner Stas Deng MD   20 mg at 12/24/19 1735    sennosides-docusate sodium (SENOKOT-S) 8.6-50 MG tablet 2 tablet  2 tablet Oral Daily PRN Stas Deng MD   2 tablet at 12/23/19 2118    sodium chloride tablet 1 g  1 g Oral TID WC Stas Deng MD   1 g at 12/26/19 0815    vitamin B-12 (CYANOCOBALAMIN) tablet 1,000 mcg  1,000 mcg Oral Daily Stas Deng MD   1,000 mcg at 12/26/19 0814    [START ON 12/29/2019] vitamin D (ERGOCALCIFEROL) capsule 50,000 Units  50,000 Units Oral Weekly Stas Deng MD        acetaminophen (TYLENOL) tablet 650 mg  650 mg Oral Q4H PRN Alona Gomez MD        polyethylene glycol (GLYCOLAX) packet 17 g  17 g Oral Daily PRN Alona Gomez MD        tamsulosin (FLOMAX) capsule 0.8 mg  0.8 mg Oral Daily Alona Gomez MD   0.8 mg at 12/25/19 2048           ipratropium-albuterol  1 ampule Inhalation Q4H WA    docusate sodium  100 mg Oral BID    polyethylene glycol  17 g Oral Daily    allopurinol  100 mg Oral Daily    amiodarone  100 mg Oral Q12H    aspirin  81 mg Oral Daily    atorvastatin  20 mg Oral Daily    cefdinir  300 mg Oral 2 times per day    finasteride  5 mg Oral Daily    guaiFENesin  600 mg Oral BID    metoprolol succinate  12.5 mg Oral Daily    pantoprazole  40 mg lung bilaterally representing a chronic interstitial process. There is no pleural effusion, pulmonary congestion or pneumothorax. Heart size in the normal range. The atheromatous changes of thoracic aorta are noted. A compression deformity of a lower thoracic/proximal lumbar vertebra which probably represent a chronic process. Partial resolution of the right lower lung infiltrate. Chronic inflammatory lung changes. Signed by Dr Lonny Kuhn on 12/24/2019 9:25 AM    Xr Chest Portable    Result Date: 12/17/2019  EXAMINATION: XR CHEST PORTABLE 12/17/2019 9:23 AM HISTORY: Fever COMPARISON: 11/25/2019 FINDINGS: Heart appears normal in size. The aorta is tortuous with atherosclerotic calcifications. There has been prior median sternotomy. Multiple sternotomy wires are fractured, similar to the prior exam. There is mild diffuse increased interstitial prominence, similar to the prior exam. New right perihilar opacities are noted. There is no appreciable pneumothorax or pleural effusion. There has been prior granulomatous exposure. New right perihilar airspace opacities are concerning for developing pneumonia. Follow-up PA and lateral chest radiographs are recommended in 4-6 weeks to assess for resolution. Signed by Dr Alexus Hinojosa on 12/17/2019 9:25 AM        Objective:   Vitals: BP (!) 107/59   Pulse 60   Temp 97.2 °F (36.2 °C) (Temporal)   Resp 18   Ht 5' 8\" (1.727 m)   Wt 161 lb 9.6 oz (73.3 kg)   SpO2 95%   BMI 24.57 kg/m²   24HR INTAKE/OUTPUT:      Intake/Output Summary (Last 24 hours) at 12/26/2019 1012  Last data filed at 12/26/2019 0953  Gross per 24 hour   Intake 600 ml   Output 1100 ml   Net -500 ml       Physical Exam  Nursing notes and vital signs reviewed  General appearance: No apparent distress, appears stated age and cooperative. Well developed and well groomed.   HEENT: atraumatic, eyes with clear conjunctiva and normal lids, pupils and irises normal, external ears and nose are normal, lips following hematuria was deemed as caused by UTI. · Further management as per urology recommendation. · Rivaroxaban held briefly (12/25/2019)  · No further hematuria reported. · Resume anticoagulation (12/26/2019) given no further hematuria noted. · Monitor H&H every 8 hours  · H&H remained stable. · Repeat chest x-ray (12/24/2019), reporting partial resolution of the right lower lobe infiltrate. Chronic inflammatory lung changes. · Repeat blood cultures reporting no growth to date. · Patient was started on tolvaptan, for treatment of hyponatremia. · Continue current management      Continue management of other chronic medical conditions - see above and orders. Advance Directive: Full Code    DIET GENERAL; Daily Fluid Restriction: 1200 ml  Dietary Nutrition Supplements: Standard High Calorie Oral Supplement     DVT prophylaxis: Rivaroxaban    Consults Made:   IP CONSULT TO UROLOGY  IP CONSULT TO SOCIAL WORK  IP CONSULT TO HOSPITALIST    Discharge planning: tbd    Time Spent is 25 mins in the examination, evaluation, counseling and review of medications, assessment and plan.      Electronically signed by   Tamara Arredondo MD, MPH,   Internal Medicine Hospitalist   12/26/2019 10:12 AM

## 2019-12-26 NOTE — PROGRESS NOTES
Kamilla Stone  [id number]     12/26/19 1318 12/26/19 1321   General   Response To Previous Treatment Patient with no complaints from previous session. --    Family / Caregiver Present No  --    Subjective   Subjective Pt agreed to therapy this afternoon. --    Pain Screening   Patient Currently in Pain No  --    Pain Assessment   Pain Assessment 0-10  --    Pain Level 0  --    Pre Treatment Pain Screening   Pain at present 0  --    Scale Used Numeric Score  --    Intervention List Patient able to continue with treatment  --    Bed Mobility   Rolling Stand by assistance  --    Supine to Sit Stand by assistance  --    Transfers   Sit to Stand Contact guard assistance  --    Stand to sit Contact guard assistance  --    Bed to Chair Contact guard assistance  --    Ambulation   Ambulation? Yes  --    WB Status WBAT  --    Ambulation 1   Surface level tile  --    Device Rolling Walker  --    Assistance Contact guard assistance  --    Quality of Gait Pt showed forward flex posture and needed VC's to stay inside RW.    --    Distance 75'  --    Exercises   Comments  --  SItting Angel LE ther ex x 15 reps. Conditions Requiring Skilled Therapeutic Intervention   Body structures, Functions, Activity limitations  --  Decreased functional mobility ; Decreased ADL status; Decreased ROM; Decreased strength;Decreased endurance;Decreased balance;Decreased posture   Assessment  --  Pt tolerated amb and sitting ther ex well w/ minimal fatigue or pain. Pt improved w/ bed mobility and was able to roll and go Supine-Sit w/ only minor cues for technique this afternoon. Pt improved w/ tolerating increase in amb distance well.    Prognosis  --  Good   Activity Tolerance   Activity Tolerance  --  Patient Tolerated treatment well   Electronically signed by Olga Lidia Gr PTA on 12/26/2019 at 1:46 PM

## 2019-12-27 LAB
ANION GAP SERPL CALCULATED.3IONS-SCNC: 14 MMOL/L (ref 7–19)
BUN BLDV-MCNC: 18 MG/DL (ref 8–23)
CALCIUM SERPL-MCNC: 8.2 MG/DL (ref 8.8–10.2)
CHLORIDE BLD-SCNC: 94 MMOL/L (ref 98–111)
CO2: 22 MMOL/L (ref 22–29)
CREAT SERPL-MCNC: 0.8 MG/DL (ref 0.5–1.2)
GFR NON-AFRICAN AMERICAN: >60
GLUCOSE BLD-MCNC: 106 MG/DL (ref 74–109)
HCT VFR BLD CALC: 36.5 % (ref 42–52)
HCT VFR BLD CALC: 37.9 % (ref 42–52)
HEMOGLOBIN: 11.9 G/DL (ref 14–18)
HEMOGLOBIN: 12.1 G/DL (ref 14–18)
MCH RBC QN AUTO: 28.5 PG (ref 27–31)
MCHC RBC AUTO-ENTMCNC: 32.6 G/DL (ref 33–37)
MCV RBC AUTO: 87.3 FL (ref 80–94)
PDW BLD-RTO: 13.8 % (ref 11.5–14.5)
PLATELET # BLD: 196 K/UL (ref 130–400)
PMV BLD AUTO: 9.1 FL (ref 9.4–12.4)
POTASSIUM SERPL-SCNC: 4.4 MMOL/L (ref 3.5–5)
RBC # BLD: 4.18 M/UL (ref 4.7–6.1)
SODIUM BLD-SCNC: 130 MMOL/L (ref 136–145)
URINE CULTURE, ROUTINE: NORMAL
WBC # BLD: 6 K/UL (ref 4.8–10.8)

## 2019-12-27 PROCEDURE — 1180000000 HC REHAB R&B

## 2019-12-27 PROCEDURE — 99232 SBSQ HOSP IP/OBS MODERATE 35: CPT | Performed by: PSYCHIATRY & NEUROLOGY

## 2019-12-27 PROCEDURE — 97110 THERAPEUTIC EXERCISES: CPT

## 2019-12-27 PROCEDURE — 94640 AIRWAY INHALATION TREATMENT: CPT

## 2019-12-27 PROCEDURE — 6370000000 HC RX 637 (ALT 250 FOR IP): Performed by: NURSE PRACTITIONER

## 2019-12-27 PROCEDURE — 6370000000 HC RX 637 (ALT 250 FOR IP): Performed by: HOSPITALIST

## 2019-12-27 PROCEDURE — 85027 COMPLETE CBC AUTOMATED: CPT

## 2019-12-27 PROCEDURE — 99232 SBSQ HOSP IP/OBS MODERATE 35: CPT | Performed by: NURSE PRACTITIONER

## 2019-12-27 PROCEDURE — 97116 GAIT TRAINING THERAPY: CPT

## 2019-12-27 PROCEDURE — 85018 HEMOGLOBIN: CPT

## 2019-12-27 PROCEDURE — 85014 HEMATOCRIT: CPT

## 2019-12-27 PROCEDURE — 97530 THERAPEUTIC ACTIVITIES: CPT

## 2019-12-27 PROCEDURE — 97535 SELF CARE MNGMENT TRAINING: CPT

## 2019-12-27 PROCEDURE — 6370000000 HC RX 637 (ALT 250 FOR IP): Performed by: PSYCHIATRY & NEUROLOGY

## 2019-12-27 PROCEDURE — 36415 COLL VENOUS BLD VENIPUNCTURE: CPT

## 2019-12-27 PROCEDURE — 80048 BASIC METABOLIC PNL TOTAL CA: CPT

## 2019-12-27 RX ORDER — CEFDINIR 300 MG/1
300 CAPSULE ORAL EVERY 12 HOURS SCHEDULED
Status: COMPLETED | OUTPATIENT
Start: 2019-12-27 | End: 2019-12-31

## 2019-12-27 RX ADMIN — POLYETHYLENE GLYCOL 3350 17 G: 17 POWDER, FOR SOLUTION ORAL at 08:44

## 2019-12-27 RX ADMIN — IPRATROPIUM BROMIDE AND ALBUTEROL SULFATE 1 AMPULE: .5; 3 SOLUTION RESPIRATORY (INHALATION) at 19:01

## 2019-12-27 RX ADMIN — AMIODARONE HYDROCHLORIDE 100 MG: 100 TABLET ORAL at 21:20

## 2019-12-27 RX ADMIN — IPRATROPIUM BROMIDE AND ALBUTEROL SULFATE 1 AMPULE: .5; 3 SOLUTION RESPIRATORY (INHALATION) at 06:13

## 2019-12-27 RX ADMIN — PANTOPRAZOLE SODIUM 40 MG: 40 TABLET, DELAYED RELEASE ORAL at 08:43

## 2019-12-27 RX ADMIN — Medication 1 G: at 11:55

## 2019-12-27 RX ADMIN — TAMSULOSIN HYDROCHLORIDE 0.8 MG: 0.4 CAPSULE ORAL at 21:20

## 2019-12-27 RX ADMIN — CEFDINIR 300 MG: 300 CAPSULE ORAL at 08:43

## 2019-12-27 RX ADMIN — GUAIFENESIN 600 MG: 600 TABLET, EXTENDED RELEASE ORAL at 08:43

## 2019-12-27 RX ADMIN — CEFDINIR 300 MG: 300 CAPSULE ORAL at 21:20

## 2019-12-27 RX ADMIN — AMIODARONE HYDROCHLORIDE 100 MG: 100 TABLET ORAL at 08:42

## 2019-12-27 RX ADMIN — Medication 1 G: at 08:43

## 2019-12-27 RX ADMIN — ATORVASTATIN CALCIUM 20 MG: 20 TABLET, FILM COATED ORAL at 21:20

## 2019-12-27 RX ADMIN — ALLOPURINOL 100 MG: 100 TABLET ORAL at 08:43

## 2019-12-27 RX ADMIN — GUAIFENESIN 600 MG: 600 TABLET, EXTENDED RELEASE ORAL at 21:20

## 2019-12-27 RX ADMIN — ASPIRIN 81 MG: 81 TABLET, COATED ORAL at 08:42

## 2019-12-27 RX ADMIN — CYANOCOBALAMIN TAB 500 MCG 1000 MCG: 500 TAB at 08:42

## 2019-12-27 RX ADMIN — FINASTERIDE 5 MG: 5 TABLET, FILM COATED ORAL at 21:21

## 2019-12-27 RX ADMIN — Medication 1 G: at 17:09

## 2019-12-27 RX ADMIN — RIVAROXABAN 20 MG: 20 TABLET, FILM COATED ORAL at 17:10

## 2019-12-27 RX ADMIN — IPRATROPIUM BROMIDE AND ALBUTEROL SULFATE 1 AMPULE: .5; 3 SOLUTION RESPIRATORY (INHALATION) at 10:03

## 2019-12-27 RX ADMIN — DOCUSATE SODIUM 100 MG: 100 CAPSULE, LIQUID FILLED ORAL at 08:43

## 2019-12-27 RX ADMIN — METOPROLOL SUCCINATE 12.5 MG: 25 TABLET, EXTENDED RELEASE ORAL at 08:44

## 2019-12-27 ASSESSMENT — PAIN SCALES - GENERAL
PAINLEVEL_OUTOF10: 0

## 2019-12-27 NOTE — PROGRESS NOTES
[facility]   Progress Note      SUBJECTIVE:     OBJECTIVE:  Resting in bed, no acute distress. REVIEW OF SYSTEMS:   Review of Systems   Constitutional: Negative for chills and fever. Genitourinary: Positive for scrotal swelling. Negative for difficulty urinating and hematuria. All other systems reviewed and are negative. Physical  VITALS:  /66   Pulse 65   Temp 97 °F (36.1 °C) (Temporal)   Resp 17   Ht 5' 8\" (1.727 m)   Wt 161 lb 9.6 oz (73.3 kg)   SpO2 94%   BMI 24.57 kg/m²   Physical Exam  Vitals signs reviewed. Constitutional:       General: He is not in acute distress. Appearance: He is well-developed. HENT:      Head: Normocephalic and atraumatic. Ears:      Comments: [place]  Eyes:      Conjunctiva/sclera: Conjunctivae normal.      Pupils: Pupils are equal, round, and reactive to light. Neck:      Musculoskeletal: Normal range of motion and neck supple. Cardiovascular:      Rate and Rhythm: Normal rate. Pulmonary:      Effort: Pulmonary effort is normal. No respiratory distress. Abdominal:      Palpations: Abdomen is soft. Genitourinary:     Penis: Uncircumcised. Tenderness and discharge present. Scrotum/Testes:         Left: Swelling present. Mass or tenderness not present. Comments: Alves catheter in place draining clear yellow urine. Musculoskeletal: Normal range of motion. Skin:     General: Skin is warm and dry. Neurological:      Mental Status: He is alert and oriented to person, place, and time. Psychiatric:         Behavior: Behavior normal.         Thought Content: Thought content normal.         Judgment: Judgment normal.         Data     CBC:   Recent Labs     12/25/19  0816  12/26/19  0605 12/26/19  1953 12/27/19  0459   WBC 5.2  --  6.0  --  6.0   HGB 11.5*   < > 12.2* 12.1* 11.9*   HCT 35.6*   < > 37.1* 37.0* 36.5*     --  198  --  196    < > = values in this interval not displayed.      BMP:    Recent Labs

## 2019-12-27 NOTE — PROGRESS NOTES
Jorje Powers  [id number]     12/27/19 1448 12/27/19 1458 12/27/19 1501   General   Response To Previous Treatment Patient with no complaints from previous session. --   --    Family / Caregiver Present No  --   --    Subjective   Subjective Pt agreed to therapy this afternoon. --   --    Pain Screening   Patient Currently in Pain No  --   --    Pain Assessment   Pain Assessment 0-10  --   --    Pain Level 0  --   --    Pre Treatment Pain Screening   Pain at present 0  --   --    Scale Used Numeric Score  --   --    Intervention List Patient able to continue with treatment  --   --    Transfers   Sit to Stand  --  Stand by assistance  --    Stand to sit  --  Stand by assistance  --    Ambulation   Ambulation?  --   --  Yes   Ambulation 1   Surface  --   --  level tile   Device  --   --  Rolling Walker   Other Apparatus  --   --    (Cath Bag)   Assistance  --   --  Contact guard assistance   Quality of Gait  --   --  Pt continues to need VC's to stay inside RW and posture. Pt was more fatigued this afternoon. Pt also showed decreased heel strike and foot clearence on RLE as well due to fatigue.    Distance  --   --  100'   Exercises   Comments  --   --   --    Conditions Requiring Skilled Therapeutic Intervention   Body structures, Functions, Activity limitations  --   --   --    Assessment  --   --   --    Prognosis  --   --   --    Activity Tolerance   Activity Tolerance  --   --   --       12/27/19 1503 12/27/19 1504   General   Response To Previous Treatment  --   --    Family / Caregiver Present  --   --    Subjective   Subjective  --   --    Pain Screening   Patient Currently in Pain  --   --    Pain Assessment   Pain Assessment  --   --    Pain Level  --   --    Pre Treatment Pain Screening   Pain at present  --   --    Scale Used  --   --    Intervention List  --   --    Transfers   Sit to Stand  --   --    Stand to sit  --   --    Ambulation   Ambulation?  --   --    Ambulation 1   Surface  --   -- Device  --   --    Other Apparatus  --   --    Assistance  --   --    Quality of Gait  --   --    Distance  --   --    Exercises   Comments Sitting Angel LE ther ex x 15 reps. --    Conditions Requiring Skilled Therapeutic Intervention   Body structures, Functions, Activity limitations  --  Decreased functional mobility ; Decreased ADL status; Decreased ROM; Decreased strength;Decreased endurance;Decreased balance;Decreased posture   Assessment  --  Pt was more fatigued this afternoon. Pt is improving w/ Tf's up to SBA this afternoon. Pt continues to need VC's during amb for technique and posture.      Prognosis  --  Good   Activity Tolerance   Activity Tolerance  --  Patient limited by fatigue;Patient limited by endurance   Electronically signed by Craig Salinas PTA on 12/27/2019 at 3:30 PM

## 2019-12-27 NOTE — PROGRESS NOTES
inflammatory lung changes.     Repeat blood cultures reporting no growth to date. Patient was started on tolvaptan, for treatment of hyponatremia.     Patient admitted to acute rehab, and medicine is consulted for continued medical management. Review of Systems:   Review of Systems  ROS: 14 point review of systems is negative except as specifically addressed above.     DIET GENERAL; Daily Fluid Restriction: 1200 ml  Dietary Nutrition Supplements: Standard High Calorie Oral Supplement    Intake/Output Summary (Last 24 hours) at 12/27/2019 7675  Last data filed at 12/27/2019 0532  Gross per 24 hour   Intake 1000 ml   Output 1525 ml   Net -525 ml       Medications:  Current Facility-Administered Medications   Medication Dose Route Frequency Provider Last Rate Last Dose    cefdinir (OMNICEF) capsule 300 mg  300 mg Oral 2 times per day ZURI Camara        lidocaine (XYLOCAINE) 2 % uro-jet   Topical PRN ZURI Camara        sodium chloride flush 0.9 % injection 10 mL  10 mL Intravenous PRN Francisco Desir MD        ipratropium-albuterol (DUONEB) nebulizer solution 1 ampule  1 ampule Inhalation Q4H WA Francisco Desir MD   1 ampule at 12/27/19 0023    bisacodyl (DULCOLAX) suppository 10 mg  10 mg Rectal Daily PRN Francisco Desir MD        docusate sodium (COLACE) capsule 100 mg  100 mg Oral BID Francisco Desir MD   100 mg at 12/26/19 2106    polyethylene glycol (GLYCOLAX) packet 17 g  17 g Oral Daily Francisco Desir MD   17 g at 12/26/19 0815    allopurinol (ZYLOPRIM) tablet 100 mg  100 mg Oral Daily Francisco Desir MD   100 mg at 12/26/19 0815    amiodarone (PACERONE) tablet 100 mg  100 mg Oral Q12H Francisco Desir MD   100 mg at 12/26/19 2106    aspirin EC tablet 81 mg  81 mg Oral Daily Francisco Desir MD   81 mg at 12/26/19 0815    atorvastatin (LIPITOR) tablet 20 mg  20 mg Oral Daily Francisco Desir MD   20 mg at 12/26/19 2106    calcium carbonate (TUMS) chewable tablet 1,000 mg  2 tablet Oral mg Oral Daily    finasteride  5 mg Oral Daily    guaiFENesin  600 mg Oral BID    metoprolol succinate  12.5 mg Oral Daily    pantoprazole  40 mg Oral Daily    rivaroxaban  20 mg Oral Dinner    sodium chloride  1 g Oral TID     vitamin B-12  1,000 mcg Oral Daily    [START ON 12/29/2019] vitamin D  50,000 Units Oral Weekly    tamsulosin  0.8 mg Oral Daily     lidocaine, sodium chloride flush, bisacodyl, calcium carbonate, ipratropium-albuterol, nitroGLYCERIN, oxyCODONE-acetaminophen, sennosides-docusate sodium, acetaminophen, polyethylene glycol  DIET GENERAL; Daily Fluid Restriction: 1200 ml  Dietary Nutrition Supplements: Standard High Calorie Oral Supplement       Labs:   CBC with DIFF:  Recent Labs     12/25/19  0816  12/26/19  0605 12/26/19  1953 12/27/19  0459   WBC 5.2  --  6.0  --  6.0   RBC 3.99*  --  4.24*  --  4.18*   HGB 11.5*   < > 12.2* 12.1* 11.9*   HCT 35.6*   < > 37.1* 37.0* 36.5*   MCV 89.2  --  87.5  --  87.3   MCH 28.8  --  28.8  --  28.5   MCHC 32.3*  --  32.9*  --  32.6*   RDW 14.0  --  13.9  --  13.8     --  198  --  196   MPV 9.0*  --  9.0*  --  9.1*   NEUTOPHILPCT  --   --  64.6  --   --    LYMPHOPCT  --   --  21.9  --   --    MONOPCT  --   --  8.1  --   --    EOSRELPCT  --   --  4.2  --   --    BASOPCT  --   --  0.5  --   --    NEUTROABS  --   --  3.9  --   --    LYMPHSABS  --   --  1.3  --   --    MONOSABS  --   --  0.50  --   --    EOSABS  --   --  0.30  --   --    BASOSABS  --   --  0.00  --   --     < > = values in this interval not displayed.        CMP/BMP:  Recent Labs     12/24/19  1033 12/25/19  0816 12/26/19  0605 12/27/19  0459   * 132* 132* 130*   K 4.3 4.2 4.5 4.4   CL 97* 97* 98 94*   CO2 21* 22 20* 22   ANIONGAP 15 13 14 14   GLUCOSE 108 103 114* 106   BUN 18 17 18 18   CREATININE 0.8 0.8 0.7 0.8   LABGLOM >60 >60 >60 >60   CALCIUM 8.8 8.5* 8.6* 8.2*   PROT 7.3  --  6.6  --    LABALBU 3.5  --  3.2*  --    BILITOT 0.5  --  0.5  --    ALKPHOS 156*  --  141* 12/24/2019  Examination. XR CHEST (2 VW) 12/24/2019 5:00 AM History: Pneumonia. The frontal and lateral views of the chest are compared with the previous study dated 12/17/2019. There is partial resolution of the right lower lung infiltrate since the previous study. There are coarse interstitial changes in the left parahilar and lower lung bilaterally representing a chronic interstitial process. There is no pleural effusion, pulmonary congestion or pneumothorax. Heart size in the normal range. The atheromatous changes of thoracic aorta are noted. A compression deformity of a lower thoracic/proximal lumbar vertebra which probably represent a chronic process. Partial resolution of the right lower lung infiltrate. Chronic inflammatory lung changes. Signed by Dr Vince Okeefe on 12/24/2019 9:25 AM    Xr Chest Portable    Result Date: 12/17/2019  EXAMINATION: XR CHEST PORTABLE 12/17/2019 9:23 AM HISTORY: Fever COMPARISON: 11/25/2019 FINDINGS: Heart appears normal in size. The aorta is tortuous with atherosclerotic calcifications. There has been prior median sternotomy. Multiple sternotomy wires are fractured, similar to the prior exam. There is mild diffuse increased interstitial prominence, similar to the prior exam. New right perihilar opacities are noted. There is no appreciable pneumothorax or pleural effusion. There has been prior granulomatous exposure. New right perihilar airspace opacities are concerning for developing pneumonia. Follow-up PA and lateral chest radiographs are recommended in 4-6 weeks to assess for resolution.  Signed by Dr Aishwarya Gaming on 12/17/2019 9:25 AM        Objective:   Vitals: /66   Pulse 65   Temp 97 °F (36.1 °C) (Temporal)   Resp 17   Ht 5' 8\" (1.727 m)   Wt 161 lb 9.6 oz (73.3 kg)   SpO2 94%   BMI 24.57 kg/m²   24HR INTAKE/OUTPUT:      Intake/Output Summary (Last 24 hours) at 12/27/2019 0828  Last data filed at 12/27/2019 0532  Gross per 24 hour   Intake 1000 ml Urinary retention    Bacteremia    Weakness    Pain in leg, unspecified  Resolved Problems:    * No resolved hospital problems. *      Plan  · Blood cultures subsequently positive for E. coli as well. · Repeat blood cultures, no growth to date  · Continue Omnicef on 12/22/2019, based on culture speciation and sensitivity. · Urology following hematuria was deemed as caused by UTI. · Further management as per urology recommendation. · Rivaroxaban held briefly (12/25/2019)  · No further hematuria reported. · Resumed anticoagulation (12/26/2019) given no further hematuria noted. · Monitor H&H every 8 hours  · H&H remained stable. · Repeat chest x-ray (12/24/2019), reporting partial resolution of the right lower lobe infiltrate. Chronic inflammatory lung changes. · Repeat blood cultures reporting no growth to date. · Patient initially received tolvaptan, for treatment of hyponatremia. However currently on sodium chloride tablets (1 g p.o. 3 times daily) for hyponatremia. · Continue current management      Continue management of other chronic medical conditions - see above and orders. Advance Directive: Full Code    DIET GENERAL; Daily Fluid Restriction: 1200 ml  Dietary Nutrition Supplements: Standard High Calorie Oral Supplement     DVT prophylaxis: Rivaroxaban    Consults Made:   IP CONSULT TO UROLOGY  IP CONSULT TO SOCIAL WORK  IP CONSULT TO HOSPITALIST    Discharge planning: tbd    Time Spent is 25 mins in the examination, evaluation, counseling and review of medications, assessment and plan.      Electronically signed by   Gloria Horner MD, MPH,   Internal Medicine Hospitalist   12/27/2019 8:28 AM

## 2019-12-27 NOTE — PROGRESS NOTES
Wound Care  Follow up with patient today. He is lying on the hospital emy bed. Right heel blanchable erythema, silicone dressing in place. Left heel is patient skin color, no redness. Patient able to turn to left side, mepilex dressing to coccyx. Gently pulled back to see blanchable erythema, skin dry and scaly. Patient states this feels better. Recommend to continue pressure injury prevention and wound care order sets as skin appears to be healing. Discussed with nursing elevating heels with pillows to float off the matress.

## 2019-12-27 NOTE — PLAN OF CARE
Nelia Davis RN  Outcome: Ongoing  12/26/2019 1137 by Artemio Lewis RN  Outcome: Ongoing  Goal: LTG - Patient will be free from infection  12/26/2019 2322 by Simon South RN  Outcome: Ongoing  12/26/2019 1137 by Artemio Lewis RN  Outcome: Ongoing  Goal: STG - Patient demonstrates skin care/treatment/dressing change  12/26/2019 2322 by Simon South RN  Outcome: Ongoing  12/26/2019 1137 by Artemio Lewis RN  Outcome: Ongoing     Problem: KNOWLEDGE DEFICIT  Goal: Patient/S.O. demonstrates understanding of disease process, treatment plan, medications, and discharge instructions.   12/26/2019 2322 by Simon South RN  Outcome: Ongoing  12/26/2019 1137 by Artemio Lewis RN  Outcome: Ongoing     Problem: DISCHARGE BARRIERS  Goal: Patient's continuum of care needs are met  12/26/2019 2322 by Simon South RN  Outcome: Ongoing  12/26/2019 1137 by Artemio Lewis RN  Outcome: Ongoing     Problem: Gas Exchange - Impaired:  Goal: Levels of oxygenation will improve  Description  Levels of oxygenation will improve  12/26/2019 2322 by Simon South RN  Outcome: Ongoing  12/26/2019 1137 by Artemio Lewis RN  Outcome: Ongoing     Problem: Infection, Septic Shock:  Goal: Will show no infection signs and symptoms  Description  Will show no infection signs and symptoms  12/26/2019 2322 by Simon South RN  Outcome: Ongoing  12/26/2019 1137 by Artemio Lewis RN  Outcome: Ongoing     Problem: Infection - Ventilator-Associated Pneumonia:  Goal: Absence of pulmonary infection  Description  Absence of pulmonary infection  12/26/2019 2322 by Simon South RN  Outcome: Ongoing  12/26/2019 1137 by Artemio Lewis RN  Outcome: Ongoing     Problem: Mobility - Impaired:  Goal: Mobility will improve  Description  Mobility will improve  12/26/2019 2322 by Simon South RN  Outcome: Ongoing  12/26/2019 1137 by Artemio Lewis RN  Outcome: Ongoing     Problem: IP BLADDER/VOIDING  Goal: LTG - Patient will utilize adaptive techniques/equipment to complete bladder elimination  12/26/2019 2322 by Lakesha Ye RN  Outcome: Ongoing  12/26/2019 1137 by Lorelei Quiroga RN  Outcome: Ongoing     Problem: IP BOWEL ELIMINATION  Goal: LTG - patient will have regular and routine bowel evacuation  12/26/2019 2322 by Lakesha Ye RN  Outcome: Ongoing  12/26/2019 1137 by Lorelei Quiroga RN  Outcome: Ongoing     Problem: NUTRITION  Goal: Patient maintains adequate hydration  12/26/2019 2322 by Lakesha Ye RN  Outcome: Ongoing  12/26/2019 1137 by Lorelei Quiroga RN  Outcome: Ongoing

## 2019-12-27 NOTE — PROGRESS NOTES
Occupational Therapy  Facility/Department: [facility] 8 REHAB UNIT  Daily Treatment Note  NAME: Paola Carlisle  : 3/27/1931  MRN: [id number]    Date of Service: 2019    Discharge Recommendations:  IP Rehab       Assessment      OT Education: ADL Adaptive Strategies  Activity Tolerance  Activity Tolerance: Patient Tolerated treatment well  Safety Devices  Safety Devices in place: Yes  Type of devices: Left in chair;Chair alarm in place;Call light within reach         Patient Diagnosis(es): There were no encounter diagnoses. has a past medical history of Atrial fibrillation ([address] CAD (coronary artery disease), Chronic combined systolic and diastolic CHF (congestive heart failure) ([address] COPD (chronic obstructive pulmonary disease) ([facility] GERD (gastroesophageal reflux disease), Gout, Gout, Heart attack ([facility] History of home oxygen therapy, Hypertension, Hyponatremia, Mixed hyperlipidemia, and Palliative care patient. has a past surgical history that includes knee surgery; Cardiac surgery; Coronary artery bypass graft; and Intertrochanteric hip fract. Surgery (Right).     Restrictions  Restrictions/Precautions  Restrictions/Precautions: Fall Risk  Subjective   General  Chart Reviewed: Yes  Patient assessed for rehabilitation services?: Yes  Family / Caregiver Present: Yes(spouse)  Diagnosis: R hip fx s/p cephalo-medullary nailing , pneumonia   Objective     Transfers  Stand Step Transfers: Stand by assistance  Sit to stand: Stand by assistance  Stand to sit: Stand by assistance            19 1105   Shower/Bathe Self   Assistance Needed Supervision or touching assistance   Comment vc, CGA, AE prn   CARE Score 4   Upper Body Dressing   Assistance Needed Setup or clean-up assistance   CARE Score 5   Lower Body Dressing   Assistance Needed Supervision or touching assistance   Comment CGA, AE prn   CARE Score 4     Plan   Plan  Current Treatment Recommendations: Strengthening, Balance Training, Functional Mobility Training, Endurance Training, Patient/Caregiver Education & Training, Equipment Evaluation, Education, & procurement, Safety Education & Training, Self-Care / ADL, Home Management Training    Goals  Short term goals  Time Frame for Short term goals: 1 week  Short term goal 1: complete LB dressing with supervision  Short term goal 2: complete overall toileting with supervision  Short term goal 3: complete 1-2 handed standing level task for 3 mins with supervision  Short term goal 4: complete simple ambulatory home making task with supervision   Short term goal 5: complete overall bathing with supervision  Long term goals  Time Frame for Long term goals : 2 weeks  Long term goal 1: complete overall toileting with modified independence  Long term goal 2: complete overall bathing with modified independence  Long term goal 3: complete overall dressing with modified independence  Long term goal 4: complete simple ambulatory home making task with modified independence  Long term goal 5: complete HEP with independence  Long term goals 6: pt/family verbalize DME  Patient Goals   Patient goals : Return home independently        Therapy Time   Individual Concurrent Group Co-treatment   Time In 2268         Time Out 1200         Minutes 55         Timed Code Treatment Minutes: 55 Minutes  Electronically signed by Ping Espinoza OT on 12/27/2019 at 2:43 PM

## 2019-12-27 NOTE — PROGRESS NOTES
Patient:   Prosper Moore  MR#:    [id number]   Room:    [facility]/216-   YOB: 1931  Date of Progress Note: 12/27/2019  Time of Note                           7:55 AM  Consulting Physician:   Lisa Zeng M.D. Attending Physician:  Lisa Zeng MD     Chief complaint Sepsis due to Escherichia coli [E. coli]    S:This 80 y.o. male  pt admitted to [facility] on 11/27/19 w/ R hip pain. Pt reports that 3 days earlier, he felt dizzy and fell on his R hip. He did not lose consciounsness. He present to [facility] ED on 11/25 for evaluation and per pt they didn't find anything wrong w/him and sent him home. He presented to [facility] ED per EMS on 11/27 with more pain and inability to bear weight on RLE. He also complained of dizziness that has worseded since amlodipine and miodarone was started by cardiology. Xrays & CT of hip were all neg. On 11/29 an MRI of his R hip was done which showd edema within the intertrochanterir R femoral neck highly suggestive of a nondisplaced fx. Dr Mary Short was consulted. Pt opted for conservative treatment w/ TTWB to his RLE. Pt was not able to maintain TTWB, so on 12/3/19, he underwent a cephalomedullary nailing of his R displaced intertrochanteric hip fx. He tolerated procedure well and will be WBAT in his R LE. He was participating in both PT/OT and was admitted to the [address] [place] [address] on 12/8/19. He had considerable medical complications up in the rehab unit. He developed gram-negative bacteremia with preliminary cultures consistent with E. coli which also grew out of his urine. He was seen by infectious disease and the hospitalist and placed on appropriate antibiotics. He also had acute on chronic hyponatremia as low as 119. Due to his medical complications the decision was made for him to move to acute care on 12/18/2019. He had a positive blood culture for GNR and was felt to have sepsis d/t E-Coli UTI. CXR done revealed pneumonia and he was placed on Zosyn.  He was being followed by urology for urinary retention and acute hematuria. He had CBI in place but this has now been discontinued. Alves was removed on 12/23/19 for voiding trial with in & out catheterization as needed. He was placed on Omnicef on 12/22/19 by urology and will need  for 14 days. Order for repeat blood cultures and for repeat CXR on 12/24/19. He has improved overall. He is participating in both PT/OT. He is felt to need a return to Rehab to work towards his goal of returning home with assist of his girlfriend.  He is now felt ready to start the Rehab program. No acute issues.       REVIEW OF SYSTEMS:  Constitutional: No fevers No chills  Neck:No stiffness  Respiratory: No shortness of breath  Cardiovascular: No chest pain No palpitations  Gastrointestinal: No abdominal pain    Genitourinary: No Dysuria  Neurological: No headache, no confusion    Past Medical History:      Diagnosis Date    Atrial fibrillation ([address]     CAD (coronary artery disease)     Chronic combined systolic and diastolic CHF (congestive heart failure) ([facility] 12/17/2019    COPD (chronic obstructive pulmonary disease) ([facility] 12/17/2019    GERD (gastroesophageal reflux disease)     Gout     Gout     Heart attack ([facility]     History of home oxygen therapy     at night    Hypertension     Hyponatremia 12/17/2019    Mixed hyperlipidemia 12/17/2019    Palliative care patient 12/20/2019       Past Surgical History:      Procedure Laterality Date    CARDIAC SURGERY      cabg 2 bypasss    CORONARY ARTERY BYPASS GRAFT      x 2 vessels    INTERTROCHANTERIC HIP FRACTURE SURGERY Right     KNEE SURGERY         Medications in Hospital:      Current Facility-Administered Medications:     cefdinir (OMNICEF) capsule 300 mg, 300 mg, Oral, 2 times per day, ZURI Molina    lidocaine (XYLOCAINE) 2 % uro-jet, , Topical, PRN, ZURI Molina    sodium chloride flush 0.9 % injection 10 mL, 10 mL, Intravenous, PRN, Mayelin Doty MD    ipratropium-albuterol (DUONEB) nebulizer solution 1 ampule, 1 ampule, Inhalation, Q4H WA, Rousevillecarl Lew MD, 1 ampule at 12/27/19 8140    bisacodyl (DULCOLAX) suppository 10 mg, 10 mg, Rectal, Daily PRN, [place] Hence, MD    docusate sodium (COLACE) capsule 100 mg, 100 mg, Oral, BID, [place] Hence, MD, 100 mg at 12/26/19 2106    polyethylene glycol (GLYCOLAX) packet 17 g, 17 g, Oral, Daily, [place] Hence, MD, 17 g at 12/26/19 0815    allopurinol (ZYLOPRIM) tablet 100 mg, 100 mg, Oral, Daily, [place] Hence, MD, 100 mg at 12/26/19 0815    amiodarone (PACERONE) tablet 100 mg, 100 mg, Oral, Q12H, [place] Hence, MD, 100 mg at 12/26/19 2106    aspirin EC tablet 81 mg, 81 mg, Oral, Daily, [place] Hence, MD, 81 mg at 12/26/19 0815    atorvastatin (LIPITOR) tablet 20 mg, 20 mg, Oral, Daily, [place] Hence, MD, 20 mg at 12/26/19 2106    calcium carbonate (TUMS) chewable tablet 1,000 mg, 2 tablet, Oral, BID PRN, [place] MD Vipin    finasteride (PROSCAR) tablet 5 mg, 5 mg, Oral, Daily, [place] Hence, MD, 5 mg at 12/26/19 2106    guaiFENesin [facility]) extended release tablet 600 mg, 600 mg, Oral, BID, [place] Hence, MD, 600 mg at 12/26/19 2106    ipratropium-albuterol (DUONEB) nebulizer solution 3 mL, 1 vial, Inhalation, Q6H PRN, Rousevillecarl Lew MD    metoprolol succinate (TOPROL XL) extended release tablet 12.5 mg, 12.5 mg, Oral, Daily, [place] Hence, MD, 12.5 mg at 12/26/19 0815    nitroGLYCERIN (NITROSTAT) SL tablet 0.4 mg, 0.4 mg, Sublingual, Q5 Min PRN, [place] MD Vipin    oxyCODONE-acetaminophen (PERCOCET) 5-325 MG per tablet 1 tablet, 1 tablet, Oral, Q6H PRN, [place] MD Vipin    pantoprazole (PROTONIX) tablet 40 mg, 40 mg, Oral, Daily, [place] MD Vipin, 40 mg at 12/26/19 3315    [Held by provider] rivaroxaban (XARELTO) tablet 20 mg, 20 mg, Oral, Dinner, [place] MD Vipin, 20 mg at 12/24/19 7038    sennosides-docusate sodium (SENOKOT-S) 8.6-50 MG tablet 2 tablet, 2 tablet, Oral, Daily PRN, Debora Skinner MD Williams, 2 tablet at 12/23/19 2118    sodium chloride tablet 1 g, 1 g, Oral, TID WC, Emma Lew MD, 1 g at 12/26/19 1702    vitamin B-12 (CYANOCOBALAMIN) tablet 1,000 mcg, 1,000 mcg, Oral, Daily, Emma Lew MD, 1,000 mcg at 12/26/19 0814    [START ON 12/29/2019] vitamin D (ERGOCALCIFEROL) capsule 50,000 Units, 50,000 Units, Oral, Weekly, Emma Lew MD    acetaminophen (TYLENOL) tablet 650 mg, 650 mg, Oral, Q4H PRN, Letitia Sanchez MD    polyethylene glycol (GLYCOLAX) packet 17 g, 17 g, Oral, Daily PRN, Letitia Sanchez MD    tamsulosin (FLOMAX) capsule 0.8 mg, 0.8 mg, Oral, Daily, Letitia Sanchez MD, 0.8 mg at 12/26/19 2105    Allergies:  Patient has no known allergies. Social History:   TOBACCO:   reports that he has quit smoking. His smoking use included cigarettes. He has a 195.00 pack-year smoking history. He has quit using smokeless tobacco.  ETOH:   reports no history of alcohol use. Family History:       Problem Relation Age of Onset    No Known Problems Mother     Heart Disease Father          PHYSICAL EXAM:  /66   Pulse 65   Temp 97 °F (36.1 °C) (Temporal)   Resp 17   Ht 5' 8\" (1.727 m)   Wt 161 lb 9.6 oz (73.3 kg)   SpO2 94%   BMI 24.57 kg/m²     Constitutional - well developed, well nourished. Eyes - conjunctiva normal.   Ear, nose, throat - No scars, masses, or lesions over external nose or ears, no atrophy of tongue  Neck-symmetric, no masses noted, no jugular vein distension  Respiration- chest wall appears symmetric, good expansion,   normal effort without use of accessory muscles  Musculoskeletal - no significant wasting of muscles noted, no bony deformities  Extremities-no clubbing, cyanosis or edema  Skin - warm, dry, and intact. No rash, erythema, or pallor.   Psychiatric - mood, affect, and behavior appear normal.      Neurological exam  Awake, alert, fluent oriented appropriate affect  Attention and concentration appear appropriate  Recent and remote memory appears unremarkable  Speech normal without dysarthria  No clear issues with language of fund of knowledge     Cranial Nerve Exam     CN III, IV,VI-EOMI, No nystagmus, conjugate eye movements, no ptosis    CN VII-no facial assymetry       Motor Exam  antigravity throughout upper and lower extremities bilaterally      Tremors- no tremors in hands or head noted     Gait  Not tested      Nursing/pcp notes, imaging,labs and vitals reviewed. PT,OT and/or speech notes reviewed    Lab Results   Component Value Date    WBC 6.0 12/27/2019    HGB 11.9 (L) 12/27/2019    HCT 36.5 (L) 12/27/2019    MCV 87.3 12/27/2019     12/27/2019     Lab Results   Component Value Date     (L) 12/27/2019    K 4.4 12/27/2019    CL 94 (L) 12/27/2019    CO2 22 12/27/2019    BUN 18 12/27/2019    CREATININE 0.8 12/27/2019    GLUCOSE 106 12/27/2019    CALCIUM 8.2 (L) 12/27/2019    PROT 6.6 12/26/2019    LABALBU 3.2 (L) 12/26/2019    BILITOT 0.5 12/26/2019    ALKPHOS 141 (H) 12/26/2019    AST 28 12/26/2019    ALT 48 (H) 12/26/2019    LABGLOM >60 12/27/2019    GLOB 2.5 04/11/2016     Lab Results   Component Value Date    INR 1.21 (H) 11/25/2019    INR 1.14 07/25/2017    PROTIME 14.7 (H) 11/25/2019    PROTIME 14.5 07/25/2017       Jimbo Skinner PTA   Therapy Assistant   Physical Therapy   Progress Notes   Cosign Needed   Date of Service:  12/26/2019  1:46 PM               Cosign Needed             Show:Clear all  []Manual[x]Template[]Copied    Added by:  [x]Rodrigue Waters PTA    []Kajal for details  Leslie Mortimer  [id number]       12/26/19 1318 12/26/19 1321   General   Response To Previous Treatment Patient with no complaints from previous session. --    Family / Caregiver Present No  --    Subjective   Subjective Pt agreed to therapy this afternoon.     --    Pain Screening   Patient Currently in Pain No  --    Pain Assessment   Pain Assessment 0-10  --    Pain Level 0  --    Pre Treatment Pain Screening   Pain at

## 2019-12-28 LAB
ANION GAP SERPL CALCULATED.3IONS-SCNC: 12 MMOL/L (ref 7–19)
BLOOD CULTURE, ROUTINE: NORMAL
BUN BLDV-MCNC: 17 MG/DL (ref 8–23)
CALCIUM SERPL-MCNC: 8.6 MG/DL (ref 8.8–10.2)
CHLORIDE BLD-SCNC: 99 MMOL/L (ref 98–111)
CO2: 22 MMOL/L (ref 22–29)
CREAT SERPL-MCNC: 0.7 MG/DL (ref 0.5–1.2)
GFR NON-AFRICAN AMERICAN: >60
GLUCOSE BLD-MCNC: 99 MG/DL (ref 74–109)
HCT VFR BLD CALC: 36.3 % (ref 42–52)
HEMOGLOBIN: 11.6 G/DL (ref 14–18)
MCH RBC QN AUTO: 28.4 PG (ref 27–31)
MCHC RBC AUTO-ENTMCNC: 32 G/DL (ref 33–37)
MCV RBC AUTO: 88.8 FL (ref 80–94)
PDW BLD-RTO: 13.7 % (ref 11.5–14.5)
PLATELET # BLD: 190 K/UL (ref 130–400)
PMV BLD AUTO: 9.3 FL (ref 9.4–12.4)
POTASSIUM SERPL-SCNC: 4.3 MMOL/L (ref 3.5–5)
RBC # BLD: 4.09 M/UL (ref 4.7–6.1)
SODIUM BLD-SCNC: 133 MMOL/L (ref 136–145)
WBC # BLD: 5.5 K/UL (ref 4.8–10.8)

## 2019-12-28 PROCEDURE — 94640 AIRWAY INHALATION TREATMENT: CPT

## 2019-12-28 PROCEDURE — 6370000000 HC RX 637 (ALT 250 FOR IP): Performed by: NURSE PRACTITIONER

## 2019-12-28 PROCEDURE — 6370000000 HC RX 637 (ALT 250 FOR IP): Performed by: HOSPITALIST

## 2019-12-28 PROCEDURE — 6370000000 HC RX 637 (ALT 250 FOR IP): Performed by: UROLOGY

## 2019-12-28 PROCEDURE — 80048 BASIC METABOLIC PNL TOTAL CA: CPT

## 2019-12-28 PROCEDURE — 97116 GAIT TRAINING THERAPY: CPT

## 2019-12-28 PROCEDURE — 6370000000 HC RX 637 (ALT 250 FOR IP): Performed by: PSYCHIATRY & NEUROLOGY

## 2019-12-28 PROCEDURE — 99232 SBSQ HOSP IP/OBS MODERATE 35: CPT | Performed by: PSYCHIATRY & NEUROLOGY

## 2019-12-28 PROCEDURE — 85027 COMPLETE CBC AUTOMATED: CPT

## 2019-12-28 PROCEDURE — 1180000000 HC REHAB R&B

## 2019-12-28 PROCEDURE — 36415 COLL VENOUS BLD VENIPUNCTURE: CPT

## 2019-12-28 PROCEDURE — 99231 SBSQ HOSP IP/OBS SF/LOW 25: CPT | Performed by: UROLOGY

## 2019-12-28 PROCEDURE — 97530 THERAPEUTIC ACTIVITIES: CPT

## 2019-12-28 PROCEDURE — 97535 SELF CARE MNGMENT TRAINING: CPT

## 2019-12-28 PROCEDURE — 97110 THERAPEUTIC EXERCISES: CPT

## 2019-12-28 RX ADMIN — GUAIFENESIN 600 MG: 600 TABLET, EXTENDED RELEASE ORAL at 07:26

## 2019-12-28 RX ADMIN — CEFDINIR 300 MG: 300 CAPSULE ORAL at 21:42

## 2019-12-28 RX ADMIN — DOCUSATE SODIUM 100 MG: 100 CAPSULE, LIQUID FILLED ORAL at 21:42

## 2019-12-28 RX ADMIN — Medication 1 G: at 13:02

## 2019-12-28 RX ADMIN — IPRATROPIUM BROMIDE AND ALBUTEROL SULFATE 1 AMPULE: .5; 3 SOLUTION RESPIRATORY (INHALATION) at 15:20

## 2019-12-28 RX ADMIN — AMIODARONE HYDROCHLORIDE 100 MG: 100 TABLET ORAL at 21:41

## 2019-12-28 RX ADMIN — ATORVASTATIN CALCIUM 20 MG: 20 TABLET, FILM COATED ORAL at 21:42

## 2019-12-28 RX ADMIN — DOCUSATE SODIUM 100 MG: 100 CAPSULE, LIQUID FILLED ORAL at 07:24

## 2019-12-28 RX ADMIN — Medication 1 G: at 17:16

## 2019-12-28 RX ADMIN — ALLOPURINOL 100 MG: 100 TABLET ORAL at 07:26

## 2019-12-28 RX ADMIN — PANTOPRAZOLE SODIUM 40 MG: 40 TABLET, DELAYED RELEASE ORAL at 07:26

## 2019-12-28 RX ADMIN — CYANOCOBALAMIN TAB 500 MCG 1000 MCG: 500 TAB at 07:26

## 2019-12-28 RX ADMIN — TAMSULOSIN HYDROCHLORIDE 0.8 MG: 0.4 CAPSULE ORAL at 21:42

## 2019-12-28 RX ADMIN — FINASTERIDE 5 MG: 5 TABLET, FILM COATED ORAL at 21:42

## 2019-12-28 RX ADMIN — Medication 1 G: at 07:26

## 2019-12-28 RX ADMIN — METOPROLOL SUCCINATE 12.5 MG: 25 TABLET, EXTENDED RELEASE ORAL at 07:25

## 2019-12-28 RX ADMIN — POLYETHYLENE GLYCOL 3350 17 G: 17 POWDER, FOR SOLUTION ORAL at 07:24

## 2019-12-28 RX ADMIN — CEFDINIR 300 MG: 300 CAPSULE ORAL at 07:25

## 2019-12-28 RX ADMIN — IPRATROPIUM BROMIDE AND ALBUTEROL SULFATE 1 AMPULE: .5; 3 SOLUTION RESPIRATORY (INHALATION) at 06:29

## 2019-12-28 RX ADMIN — GUAIFENESIN 600 MG: 600 TABLET, EXTENDED RELEASE ORAL at 21:41

## 2019-12-28 RX ADMIN — AMIODARONE HYDROCHLORIDE 100 MG: 100 TABLET ORAL at 07:27

## 2019-12-28 RX ADMIN — RIVAROXABAN 20 MG: 20 TABLET, FILM COATED ORAL at 17:16

## 2019-12-28 RX ADMIN — ASPIRIN 81 MG: 81 TABLET, COATED ORAL at 07:27

## 2019-12-28 ASSESSMENT — PAIN SCALES - GENERAL
PAINLEVEL_OUTOF10: 0
PAINLEVEL_OUTOF10: 0

## 2019-12-28 NOTE — PROGRESS NOTES
Consciousness  --    Pre Treatment Pain Screening   Pain at present  --    Scale Used  --    Intervention List  --    Bed Mobility   Rolling  --    Supine to Sit  --    Sit to Supine  --    Transfers   Sit to Stand  --    Stand to sit  --    Bed to Chair  --    Ambulation   Ambulation?  --    WB Status  --    Ambulation 1   Surface  --    Device  --    Other Apparatus  --    Assistance  --    Quality of Gait  --    Distance  --    Exercises   Comments  --    Conditions Requiring Skilled Therapeutic Intervention   Body structures, Functions, Activity limitations Decreased functional mobility ; Decreased ADL status; Decreased ROM; Decreased strength;Decreased endurance;Decreased balance;Decreased posture   Assessment Pt has improved w/ bed mobility becoming Independent with it. Pt has also improved w/ TF's only needing minor cues for safety. Pt has also improved w/ amb only needing VC's for gait techniques. Pt tolerated all activites well w/ minimal fatigue.    Prognosis Good   Activity Tolerance   Activity Tolerance Patient Tolerated treatment well   Electronically signed by Sirena Schmidt PTA on 12/28/2019 at 9:52 AM

## 2019-12-28 NOTE — PROGRESS NOTES
Erma Bear  [id number]     12/28/19 4926 12/28/19 0948 12/28/19 0949   General   Response To Previous Treatment  --  Patient with no complaints from previous session. --    Family / Caregiver Present  --  No  --    Subjective   Subjective  --  Pt agreed to therapy this morning.   --    Pain Screening   Patient Currently in Pain  --  No  --    Pain Assessment   Pain Assessment  --  0-10  --    Pain Level  --  0  --    Vital Signs   Level of Consciousness 0  --   --    Pre Treatment Pain Screening   Pain at present  --  0  --    Scale Used  --  Numeric Score  --    Intervention List  --  Patient able to continue with treatment  --    Bed Mobility   Rolling  --  Modified independent  --    Supine to Sit  --  Modified independent  --    Sit to Supine  --  Modified independent  --    Transfers   Sit to Stand  --  Stand by assistance  --    Stand to sit  --  Stand by assistance  --    Bed to Chair  --  Stand by assistance  --    Ambulation   Ambulation?  --   --  Yes   WB Status  --   --  WBAT   Ambulation 1   Surface  --   --  level tile   Device  --   --  Rolling Walker   Other Apparatus  --   --    (jackson Cath)   Assistance  --   --  Stand by assistance   Quality of Gait  --   --  Pt continues to show forward flex posture and excessive weight bearing through UE's at times. Pt also needed VC's to increase step height on RLE. Distance  --   --  150'   Wheelchair Activities   Propulsion  --   --  Yes   Propulsion 1   Propulsion  --   --  Manual   Level  --   --  Level Tile   Method  --   --  RUE;LUE   Level of Assistance  --   --  Stand by assistance   Description/ Details  --   --  Pt kept veering to R side needing minor cues for technique to correct. Distance  --   --  76'   Exercises   Comments  --   --  [place] LE ther ex x 20 reps.    Conditions Requiring Skilled Therapeutic Intervention   Body structures, Functions, Activity limitations  --   --   --    Assessment  --   --   --    Prognosis  --   --   --

## 2019-12-28 NOTE — PROGRESS NOTES
Patient:   Kamilla Stone  MR#:    [id number]   Room:    5741/503-93   YOB: 1931  Date of Progress Note: 12/28/2019  Time of Note                           11:43 AM  Consulting Physician:   Osei Parry M.D. Attending Physician:  Osei Parry MD     Chief complaint Sepsis due to Escherichia coli [E. coli]    S:This 80 y.o. male  pt admitted to [facility] on 11/27/19 w/ R hip pain. Pt reports that 3 days earlier, he felt dizzy and fell on his R hip. He did not lose consciounsness. He present to [facility] ED on 11/25 for evaluation and per pt they didn't find anything wrong w/him and sent him home. He presented to [facility] ED per EMS on 11/27 with more pain and inability to bear weight on RLE. He also complained of dizziness that has worseded since amlodipine and miodarone was started by cardiology. Xrays & CT of hip were all neg. On 11/29 an MRI of his R hip was done which showd edema within the intertrochanterir R femoral neck highly suggestive of a nondisplaced fx. Dr Stormy Mccurdy was consulted. Pt opted for conservative treatment w/ TTWB to his RLE. Pt was not able to maintain TTWB, so on 12/3/19, he underwent a cephalomedullary nailing of his R displaced intertrochanteric hip fx. He tolerated procedure well and will be WBAT in his R LE. He was participating in both PT/OT and was admitted to the [address] [place] [address] on 12/8/19. He had considerable medical complications up in the rehab unit. He developed gram-negative bacteremia with preliminary cultures consistent with E. coli which also grew out of his urine. He was seen by infectious disease and the hospitalist and placed on appropriate antibiotics. He also had acute on chronic hyponatremia as low as 119. Due to his medical complications the decision was made for him to move to acute care on 12/18/2019. He had a positive blood culture for GNR and was felt to have sepsis d/t E-Coli UTI. CXR done revealed pneumonia and he was placed on Zosyn. He was being followed by urology for urinary retention and acute hematuria. He had CBI in place but this has now been discontinued. Alves was removed on 12/23/19 for voiding trial with in & out catheterization as needed. He was placed on Omnicef on 12/22/19 by urology and will need  for 14 days. Order for repeat blood cultures and for repeat CXR on 12/24/19. He has improved overall. He is participating in both PT/OT. He is felt to need a return to Rehab to work towards his goal of returning home with assist of his girlfriend.  He is now felt ready to start the Rehab program. No new issues.       REVIEW OF SYSTEMS:  Constitutional: No fevers No chills  Neck:No stiffness  Respiratory: No shortness of breath  Cardiovascular: No chest pain No palpitations  Gastrointestinal: No abdominal pain    Genitourinary: No Dysuria  Neurological: No headache, no confusion    Past Medical History:      Diagnosis Date    Atrial fibrillation ([address]     CAD (coronary artery disease)     Chronic combined systolic and diastolic CHF (congestive heart failure) [facility] 12/17/2019    COPD (chronic obstructive pulmonary disease) ([facility] 12/17/2019    GERD (gastroesophageal reflux disease)     Gout     Gout     Heart attack ([facility]     History of home oxygen therapy     at night    Hypertension     Hyponatremia 12/17/2019    Mixed hyperlipidemia 12/17/2019    Palliative care patient 12/20/2019       Past Surgical History:      Procedure Laterality Date    CARDIAC SURGERY      cabg 2 bypasss    CORONARY ARTERY BYPASS GRAFT      x 2 vessels    INTERTROCHANTERIC HIP FRACTURE SURGERY Right     KNEE SURGERY         Medications in Hospital:      Current Facility-Administered Medications:     cefdinir (OMNICEF) capsule 300 mg, 300 mg, Oral, 2 times per day, Kristen Avalos MD, 300 mg at 12/28/19 0725    lidocaine (XYLOCAINE) 2 % uro-jet, , Topical, PRN, ZURI Guillen    sodium chloride flush 0.9 % injection 10 mL, 10 mL, Daily PRN, Kenzie Briscoe MD, 2 tablet at 12/23/19 2118    sodium chloride tablet 1 g, 1 g, Oral, TID WC, Kenzie Briscoe MD, 1 g at 12/28/19 0012    vitamin B-12 (CYANOCOBALAMIN) tablet 1,000 mcg, 1,000 mcg, Oral, Daily, Kenzie Briscoe MD, 1,000 mcg at 12/28/19 0726    [START ON 12/29/2019] vitamin D (ERGOCALCIFEROL) capsule 50,000 Units, 50,000 Units, Oral, Weekly, Kenzie Briscoe MD    acetaminophen (TYLENOL) tablet 650 mg, 650 mg, Oral, Q4H PRN, Bert Persaud MD    polyethylene glycol (GLYCOLAX) packet 17 g, 17 g, Oral, Daily PRN, Bert Persaud MD    tamsulosin (FLOMAX) capsule 0.8 mg, 0.8 mg, Oral, Daily, Bert Persaud MD, 0.8 mg at 12/27/19 2120    Allergies:  Patient has no known allergies. Social History:   TOBACCO:   reports that he has quit smoking. His smoking use included cigarettes. He has a 195.00 pack-year smoking history. He has quit using smokeless tobacco.  ETOH:   reports no history of alcohol use. Family History:       Problem Relation Age of Onset    No Known Problems Mother     Heart Disease Father          PHYSICAL EXAM:  /60   Pulse 60   Temp 97 °F (36.1 °C) (Temporal)   Resp 16   Ht 5' 8\" (1.727 m)   Wt 161 lb 9.6 oz (73.3 kg)   SpO2 90%   BMI 24.57 kg/m²     Constitutional - well developed, well nourished. Eyes - conjunctiva normal.   Ear, nose, throat - No scars, masses, or lesions over external nose or ears, no atrophy of tongue  Neck-symmetric, no masses noted, no jugular vein distension  Respiration- chest wall appears symmetric, good expansion,   normal effort without use of accessory muscles  Musculoskeletal - no significant wasting of muscles noted, no bony deformities  Extremities-no clubbing, cyanosis or edema  Skin - warm, dry, and intact. No rash, erythema, or pallor.   Psychiatric - mood, affect, and behavior appear normal.      Neurological exam  Awake, alert, fluent oriented appropriate affect  Attention and concentration appear appropriate  Recent Screening   Pain at present 0  --    Scale Used Numeric Score  --    Intervention List Patient able to continue with treatment  --    Bed Mobility   Rolling Stand by assistance  --    Supine to Sit Stand by assistance  --    Transfers   Sit to Stand Contact guard assistance  --    Stand to sit Contact guard assistance  --    Bed to Chair Contact guard assistance  --    Ambulation   Ambulation? Yes  --    WB Status WBAT  --    Ambulation 1   Surface level tile  --    Device Rolling Walker  --    Assistance Contact guard assistance  --    Quality of Gait Pt showed forward flex posture and needed VC's to stay inside RW.    --    Distance 75'  --    Exercises   Comments  --  SItting Angel LE ther ex x 15 reps. Conditions Requiring Skilled Therapeutic Intervention   Body structures, Functions, Activity limitations  --  Decreased functional mobility ; Decreased ADL status; Decreased ROM; Decreased strength;Decreased endurance;Decreased balance;Decreased posture   Assessment  --  Pt tolerated amb and sitting ther ex well w/ minimal fatigue or pain. Pt improved w/ bed mobility and was able to roll and go Supine-Sit w/ only minor cues for technique this afternoon. Pt improved w/ tolerating increase in amb distance well. Prognosis  --  Good   Activity Tolerance   Activity Tolerance  --  Patient Tolerated treatment well   Electronically signed by Priscila Cox PTA on 12/26/2019 at 1:46 PM                       RECORD REVIEW: Previous medical records, medications were reviewed at today's visit    IMPRESSION:   1. Sepsis-complete abx  2. H/O gout-on allopurinol  3. Atrial fibrillation-on ASA/Amiodarone/metroprolol/Xarelto-  4. Hyperlipidemia-on statin  5. UTI-complete abx   6. Urinary retention-jackson in place-appreciate urology input  7. BPH-on medications monitor  8. Pneumonia-on nebs/mucinex  9. HTN-on meds monitor  10. GI-PPI/bowel regimen  11. DVT prophylaxis-Lovenox  12. Pain control-Percocet PRN  13. PT/OT  14. Hyponatremia-monitor     Continue current care      Expected duration and frequency therapy: 180 minutes per day, 5 days per week    Mary Glass  [phone] CELL  Dr Alona Gomez

## 2019-12-28 NOTE — PROGRESS NOTES
Urology Progress Note      SUBJECTIVE: Patient asleep in bed. Nurses report no problems with Alves urine nely-colored to gravity off CBI    OBJECTIVE: Patient has completed a total of 11 days of antibiotic treatment. REVIEW OF SYSTEMS:   Review of Systems      Physical  VITALS:  /60   Pulse 60   Temp 97 °F (36.1 °C) (Temporal)   Resp 16   Ht 5' 8\" (1.727 m)   Wt 161 lb 9.6 oz (73.3 kg)   SpO2 90%   BMI 24.57 kg/m²   TEMPERATURE:  Current - Temp: 97 °F (36.1 °C); Max - Temp  Av.9 °F (36.6 °C)  Min: 97 °F (36.1 °C)  Max: 98.7 °F (37.1 °C)   24 HR I&O     Intake/Output Summary (Last 24 hours) at 2019 1113  Last data filed at 2019 1108  Gross per 24 hour   Intake 1590 ml   Output 750 ml   Net 840 ml     BACK: no tenderness in spine or flanks  ABDOMEN:  soft and non-distended  HEART:  normal rate  CHEST: Normal effort  GENITAL/URINARY: Alves catheter draining clear nely urine. Left inguinal hernia    Data       CBC:   Recent Labs     19  0619   WBC 6.0  --  6.0  --  5.5   HGB 12.2*   < > 11.9* 12.1* 11.6*   HCT 37.1*   < > 36.5* 37.9* 36.3*     --  196  --  190    < > = values in this interval not displayed. BMP:    Recent Labs     19  0619   * 130* 133*   K 4.5 4.4 4.3   CL 98 94* 99   CO2 20* 22 22   BUN 18 18 17   CREATININE 0.7 0.8 0.7   GLUCOSE 114* 106 99       No results for input(s): LABURIN in the last 72 hours. No results for input(s): BC in the last 72 hours. No results for input(s): Dao Larger in the last 72 hours.     Radiology:      Imaging studies: None    ASSESSMENT AND PLAN    Patient Active Problem List   Diagnosis    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 ([facility]    Chronic hypoxemic respiratory failure (HCC)    Chronic gout without tophus    Gastroesophageal reflux disease without esophagitis

## 2019-12-28 NOTE — PROGRESS NOTES
[facility] Progress Note    Patient:  Darell Joseph  YOB: 1931  Date of Service: 12/28/2019  MRN: [id number]   Acct: [de-identified]   Primary Care Physician: No primary care provider on file. Advance Directive: Full Code  Admit Date: 12/23/2019       Hospital Day: 5      CHIEF COMPLAINT: Sepsis due to E. coli. Consult for medical management. 12/28/2019 11:28 AM  Subjective / Interval History:   No acute overnight event reported. Currently denies any further hematuria. Endorses overall improvement. Denies any acute complaints or distress at this time. Laying comfortably in bed, in no acute distress. Case discussed with RN.    12/27/2019  Doing well. No further hematuria reported. No acute overnight event reported. Endorses overall improvement. Denies any acute complaints at this time. Tolerating rehab.    12/26/2019  Doing well. No acute overnight events reported. Urinary catheter in place. Patient endorses overall improvement. Denies any further hematuria. Denies any other acute complaints or distress at this time. 12/25/2019  No acute changes or acute overnight event reported. Patient reportedly with traumatic Alves insertion, resulting in gross hematuria. Endorses overall improvement. Denies any acute complaints or distress at this time. Brief Hx  The patient is a 80 y.o. male who presents with history of CAD, CHF, atrial fibrillation, HTN, right intertrochanteric hip fracture (s/p cephalo-medullary nailing - 11/27/2019), admitted to [facility] on 12/09/2019, and was found to be hyponatremic with a sodium level of 119, on 12/27/2019. Patient was also noted to have acute cystitis with hematuria, as well as pneumonia. Initially was started on piperacillin-tazobactam, to cover pneumonia as well as E. coli positive urine culture.     Blood cultures subsequently positive for E. coli as well.   Patient was placed on Omnicef on 12/22/2019, based on culture speciation and sensitivity. Commended for patient to complete a 14-day course of antibiotic as recommended by urology. Hematuria was deemed as caused by UTI. Repeat chest x-ray (12/24/2019), reporting partial resolution of the right lower lobe infiltrate. Chronic inflammatory lung changes.     Repeat blood cultures reporting no growth to date. Patient was started on tolvaptan, for treatment of hyponatremia.     Patient admitted to acute rehab, and medicine is consulted for continued medical management. Review of Systems:   Review of Systems  ROS: 14 point review of systems is negative except as specifically addressed above.     DIET GENERAL; Daily Fluid Restriction: 1200 ml  Dietary Nutrition Supplements: Standard High Calorie Oral Supplement    Intake/Output Summary (Last 24 hours) at 12/28/2019 1128  Last data filed at 12/28/2019 1108  Gross per 24 hour   Intake 1590 ml   Output 750 ml   Net 840 ml       Medications:  Current Facility-Administered Medications   Medication Dose Route Frequency Provider Last Rate Last Dose    cefdinir (OMNICEF) capsule 300 mg  300 mg Oral 2 times per day Michael Delarosa MD   300 mg at 12/28/19 0725    lidocaine (XYLOCAINE) 2 % uro-jet   Topical PRN ZURI Rhoades        sodium chloride flush 0.9 % injection 10 mL  10 mL Intravenous PRN Felisa Bullock MD        ipratropium-albuterol (DUONEB) nebulizer solution 1 ampule  1 ampule Inhalation Q4H WA Felisa Bullock MD   1 ampule at 12/28/19 1286    bisacodyl (DULCOLAX) suppository 10 mg  10 mg Rectal Daily PRN Felisa Bullock MD        docusate sodium (COLACE) capsule 100 mg  100 mg Oral BID Felisa Bullock MD   100 mg at 12/28/19 0724    polyethylene glycol (GLYCOLAX) packet 17 g  17 g Oral Daily Felisa Bullock MD   17 g at 12/28/19 0724    allopurinol (ZYLOPRIM) tablet 100 mg  100 mg Oral Daily Felisa Bullock MD   100 mg at 12/28/19 0726    amiodarone (PACERONE) tablet 100 mg  100 mg Oral Q12H Felisa Bullock MD   100 mg at mg Oral 2 times per day    ipratropium-albuterol  1 ampule Inhalation Q4H WA    docusate sodium  100 mg Oral BID    polyethylene glycol  17 g Oral Daily    allopurinol  100 mg Oral Daily    amiodarone  100 mg Oral Q12H    aspirin  81 mg Oral Daily    atorvastatin  20 mg Oral Daily    finasteride  5 mg Oral Daily    guaiFENesin  600 mg Oral BID    metoprolol succinate  12.5 mg Oral Daily    pantoprazole  40 mg Oral Daily    rivaroxaban  20 mg Oral Dinner    sodium chloride  1 g Oral TID     vitamin B-12  1,000 mcg Oral Daily    [START ON 12/29/2019] vitamin D  50,000 Units Oral Weekly    tamsulosin  0.8 mg Oral Daily     lidocaine, sodium chloride flush, bisacodyl, calcium carbonate, ipratropium-albuterol, nitroGLYCERIN, oxyCODONE-acetaminophen, sennosides-docusate sodium, acetaminophen, polyethylene glycol  DIET GENERAL; Daily Fluid Restriction: 1200 ml  Dietary Nutrition Supplements: Standard High Calorie Oral Supplement       Labs:   CBC with DIFF:  Recent Labs     12/26/19  0605  12/27/19  0459 12/27/19 2035 12/28/19 0619   WBC 6.0  --  6.0  --  5.5   RBC 4.24*  --  4.18*  --  4.09*   HGB 12.2*   < > 11.9* 12.1* 11.6*   HCT 37.1*   < > 36.5* 37.9* 36.3*   MCV 87.5  --  87.3  --  88.8   MCH 28.8  --  28.5  --  28.4   MCHC 32.9*  --  32.6*  --  32.0*   RDW 13.9  --  13.8  --  13.7     --  196  --  190   MPV 9.0*  --  9.1*  --  9.3*   NEUTOPHILPCT 64.6  --   --   --   --    LYMPHOPCT 21.9  --   --   --   --    MONOPCT 8.1  --   --   --   --    EOSRELPCT 4.2  --   --   --   --    BASOPCT 0.5  --   --   --   --    NEUTROABS 3.9  --   --   --   --    LYMPHSABS 1.3  --   --   --   --    MONOSABS 0.50  --   --   --   --    EOSABS 0.30  --   --   --   --    BASOSABS 0.00  --   --   --   --     < > = values in this interval not displayed.        CMP/BMP:  Recent Labs     12/26/19  0605 12/27/19  0459 12/28/19  0619   * 130* 133*   K 4.5 4.4 4.3   CL 98 94* 99   CO2 20* 22 22   ANIONGAP 14 14 12   GLUCOSE 114* 106 99   BUN 18 18 17   CREATININE 0.7 0.8 0.7   LABGLOM >60 >60 >60   CALCIUM 8.6* 8.2* 8.6*   PROT 6.6  --   --    LABALBU 3.2*  --   --    BILITOT 0.5  --   --    ALKPHOS 141*  --   --    ALT 48*  --   --    AST 28  --   --          CRP:  No results for input(s): CRP in the last 72 hours. Sed Rate:  No results for input(s): SEDRATE in the last 72 hours. HgBA1c:  No components found for: HGBA1C  FLP:    Lab Results   Component Value Date    TRIG 121 07/27/2017    HDL 42 07/27/2017    LDLCALC 124 07/27/2017     TSH:    Lab Results   Component Value Date    TSH 1.190 12/23/2019     Troponin T: No results for input(s): TROPONINI in the last 72 hours. Pro-BNP: No results for input(s): BNP in the last 72 hours. INR: No results for input(s): INR in the last 72 hours. ABGs:   Lab Results   Component Value Date    PHART 7.410 07/26/2017    PO2ART 63.0 07/26/2017    RRQ3IGE 38.0 07/26/2017     UA:  No results for input(s): NITRITE, COLORU, PHUR, LABCAST, WBCUA, RBCUA, MUCUS, TRICHOMONAS, YEAST, BACTERIA, CLARITYU, SPECGRAV, LEUKOCYTESUR, UROBILINOGEN, BILIRUBINUR, BLOODU, GLUCOSEU, AMORPHOUS in the last 72 hours. Invalid input(s): Carnella Face      Culture Results:    No results for input(s): CXSURG in the last 720 hours. Blood Culture Recent:   Recent Labs     12/23/19  1518 12/17/19  1252 12/17/19  0801   BC No Growth to date. Any change in status will be called. Bottle volume = 7 ml*  Isolated from Aerobic and Anaerobic bottle  No further workup  Refer to (blood culture collected [email or web address]) for sensitivit    Bottle volume = 6 ml*  Isolated from Aerobic and Anaerobic bottle  No further workup  Refer to (blood culture collected [email or web address]) for sensitivit         Cultures:   No results for input(s): CULTURE in the last 72 hours. No results for input(s): BC, Briones Hinkley in the last 72 hours. No results for input(s): CXSURG in the last 72 hours.       No results for input(s): MG, PHOS in the last 72 hours. Recent Labs     12/26/19  0605   AST 28   ALT 48*   BILITOT 0.5   ALKPHOS 141*         RAD:   Xr Chest Standard (2 Vw)    Result Date: 12/24/2019  Examination. XR CHEST (2 VW) 12/24/2019 5:00 AM History: Pneumonia. The frontal and lateral views of the chest are compared with the previous study dated 12/17/2019. There is partial resolution of the right lower lung infiltrate since the previous study. There are coarse interstitial changes in the left parahilar and lower lung bilaterally representing a chronic interstitial process. There is no pleural effusion, pulmonary congestion or pneumothorax. Heart size in the normal range. The atheromatous changes of thoracic aorta are noted. A compression deformity of a lower thoracic/proximal lumbar vertebra which probably represent a chronic process. Partial resolution of the right lower lung infiltrate. Chronic inflammatory lung changes. Signed by Dr Ирина Stern on 12/24/2019 9:25 AM    Xr Chest Portable    Result Date: 12/17/2019  EXAMINATION: XR CHEST PORTABLE 12/17/2019 9:23 AM HISTORY: Fever COMPARISON: 11/25/2019 FINDINGS: Heart appears normal in size. The aorta is tortuous with atherosclerotic calcifications. There has been prior median sternotomy. Multiple sternotomy wires are fractured, similar to the prior exam. There is mild diffuse increased interstitial prominence, similar to the prior exam. New right perihilar opacities are noted. There is no appreciable pneumothorax or pleural effusion. There has been prior granulomatous exposure. New right perihilar airspace opacities are concerning for developing pneumonia. Follow-up PA and lateral chest radiographs are recommended in 4-6 weeks to assess for resolution.  Signed by Dr Irvin Morrow on 12/17/2019 9:25 AM        Objective:   Vitals: /60   Pulse 60   Temp 97 °F (36.1 °C) (Temporal)   Resp 16   Ht 5' 8\" (1.727 m)   Wt 161 lb 9.6 oz (73.3 kg)   SpO2 90%   BMI 24.57 kg/m² 24HR INTAKE/OUTPUT:      Intake/Output Summary (Last 24 hours) at 12/28/2019 1128  Last data filed at 12/28/2019 1108  Gross per 24 hour   Intake 1590 ml   Output 750 ml   Net 840 ml       Physical Exam  Nursing notes and vital signs reviewed  General appearance: No apparent distress, appears stated age and cooperative. Well developed and well groomed. HEENT: atraumatic, eyes with clear conjunctiva and normal lids, pupils and irises normal, external ears and nose are normal, lips normal. Hearing Intact. Lips with No blisters. Neck: Supple, with full range of motion. No jugular venous distention. Trachea midline. Thyroid no masses noted. No lympadenopathy or bruit. Lungs: Patient in no acute respiratory distress, no increased work of breathing, \"clear to auscultation bilaterally\" without rales, rhonchi or wheezes  Heart: regular rate and rhythm, S1, S2 normal, no murmur, click, rub or gallop  Abdomen: soft, non-tender; non-distended normal bowel sounds no masses, no organomegaly  Musculoskeletal: ROM Intact in B/L Upper and LE. No joint tenderness or Deformity throughout. Extremities: extremities normal, atraumatic, no cyanosis or edema  Neurologic: No focal neurologic deficits, normal sensation, CN: II-XII intact, grossly non-focal.  Skin: Skin color, texture, turgor normal. No rashes or lesions  Psychiatric: Alert and oriented, affect and mood appropriate, thought content appropriate, normal insight.      Assessment/plan:           Principal Problem:    Sepsis ([address]  Active Problems:    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 (HCC)    Gastroesophageal reflux disease without esophagitis    Essential hypertension    Dyslipidemia    Benign prostatic hyperplasia with urinary obstruction    Closed right hip fracture, initial encounter ([facility])    Paroxysmal atrial fibrillation (HCC)    Chronic combined systolic and diastolic CHF (congestive heart failure)

## 2019-12-28 NOTE — PROGRESS NOTES
12/28/19 1400   Restrictions/Precautions   Restrictions/Precautions Fall Risk   General   Chart Reviewed Yes   Diagnosis R hip fx s/p cephalo-medullary nailing 11/27, pneumonia   Pain Assessment   Patient Currently in Pain Yes   Pain Assessment 0-10   Pain Level 0   Pre Treatment Pain Screening   Pain at present 0   Scale Used Numeric Score   Intervention List Patient able to continue with treatment   Balance   Standing Balance Stand by assistance  (for 1-2 handed tasks)   Functional Mobility   Functional - Mobility Device Rolling Walker   Assist Level Stand by assistance   Functional Mobility Comments light ambulatory ADL   Type of ROM/Therapeutic Exercise   Comment blair with 15# x 3 sets of 15   Assessment   Assessment Continuing to work on balance and mobility for ADL   Treatment Diagnosis R hip fx s/p cephalo-medullary nailing    Activity Tolerance   Activity Tolerance Patient Tolerated treatment well   Occupational Therapy  Electronically signed by Iftikhar Dickerson OT on 12/28/2019 at 5:10 PM

## 2019-12-28 NOTE — PLAN OF CARE
Problem: Falls - Risk of:  Goal: Will remain free from falls  Description  Will remain free from falls  Outcome: Ongoing  Goal: Absence of physical injury  Description  Absence of physical injury  Outcome: Ongoing     Problem: Risk for Impaired Skin Integrity  Goal: Tissue integrity - skin and mucous membranes  Description  Structural intactness and normal physiological function of skin and  mucous membranes. Outcome: Ongoing     Problem: SAFETY  Goal: Free from accidental physical injury  Outcome: Ongoing     Problem: DAILY CARE  Goal: Daily care needs are met  Outcome: Ongoing     Problem: PAIN  Goal: Patient's pain/discomfort is manageable  Outcome: Ongoing  Goal: STG - pain is manageable through therapies  Outcome: Ongoing  Goal: STG - Patient will verbalize an acceptable level of pain  Outcome: Ongoing  Goal: STG - patients pain is managed to allow active participation in daily activities  Outcome: Ongoing     Problem: SKIN INTEGRITY  Goal: Skin integrity is maintained or improved  Outcome: Ongoing  Goal: LTG - Patient will be free from infection  Outcome: Ongoing  Goal: STG - Patient demonstrates skin care/treatment/dressing change  Outcome: Ongoing     Problem: KNOWLEDGE DEFICIT  Goal: Patient/S.O. demonstrates understanding of disease process, treatment plan, medications, and discharge instructions.   Outcome: Ongoing     Problem: DISCHARGE BARRIERS  Goal: Patient's continuum of care needs are met  Outcome: Ongoing     Problem: Gas Exchange - Impaired:  Goal: Levels of oxygenation will improve  Description  Levels of oxygenation will improve  Outcome: Ongoing     Problem: Infection, Septic Shock:  Goal: Will show no infection signs and symptoms  Description  Will show no infection signs and symptoms  Outcome: Ongoing     Problem: Infection - Ventilator-Associated Pneumonia:  Goal: Absence of pulmonary infection  Description  Absence of pulmonary infection  Outcome: Ongoing     Problem: Mobility -

## 2019-12-29 LAB
ANION GAP SERPL CALCULATED.3IONS-SCNC: 14 MMOL/L (ref 7–19)
BUN BLDV-MCNC: 17 MG/DL (ref 8–23)
CALCIUM SERPL-MCNC: 8.8 MG/DL (ref 8.8–10.2)
CHLORIDE BLD-SCNC: 96 MMOL/L (ref 98–111)
CO2: 21 MMOL/L (ref 22–29)
CREAT SERPL-MCNC: 0.9 MG/DL (ref 0.5–1.2)
GFR NON-AFRICAN AMERICAN: >60
GLUCOSE BLD-MCNC: 125 MG/DL (ref 74–109)
HCT VFR BLD CALC: 38.2 % (ref 42–52)
HEMOGLOBIN: 12 G/DL (ref 14–18)
MCH RBC QN AUTO: 28.3 PG (ref 27–31)
MCHC RBC AUTO-ENTMCNC: 31.4 G/DL (ref 33–37)
MCV RBC AUTO: 90.1 FL (ref 80–94)
PDW BLD-RTO: 13.9 % (ref 11.5–14.5)
PLATELET # BLD: 190 K/UL (ref 130–400)
PMV BLD AUTO: 9.3 FL (ref 9.4–12.4)
POTASSIUM SERPL-SCNC: 4.2 MMOL/L (ref 3.5–5)
RBC # BLD: 4.24 M/UL (ref 4.7–6.1)
SODIUM BLD-SCNC: 131 MMOL/L (ref 136–145)
WBC # BLD: 5.6 K/UL (ref 4.8–10.8)

## 2019-12-29 PROCEDURE — 1180000000 HC REHAB R&B

## 2019-12-29 PROCEDURE — 99231 SBSQ HOSP IP/OBS SF/LOW 25: CPT | Performed by: UROLOGY

## 2019-12-29 PROCEDURE — 6370000000 HC RX 637 (ALT 250 FOR IP): Performed by: PSYCHIATRY & NEUROLOGY

## 2019-12-29 PROCEDURE — 80048 BASIC METABOLIC PNL TOTAL CA: CPT

## 2019-12-29 PROCEDURE — 36415 COLL VENOUS BLD VENIPUNCTURE: CPT

## 2019-12-29 PROCEDURE — 6370000000 HC RX 637 (ALT 250 FOR IP): Performed by: HOSPITALIST

## 2019-12-29 PROCEDURE — 85027 COMPLETE CBC AUTOMATED: CPT

## 2019-12-29 PROCEDURE — 99232 SBSQ HOSP IP/OBS MODERATE 35: CPT | Performed by: PSYCHIATRY & NEUROLOGY

## 2019-12-29 PROCEDURE — 94640 AIRWAY INHALATION TREATMENT: CPT

## 2019-12-29 PROCEDURE — 6370000000 HC RX 637 (ALT 250 FOR IP): Performed by: UROLOGY

## 2019-12-29 RX ADMIN — IPRATROPIUM BROMIDE AND ALBUTEROL SULFATE 1 AMPULE: .5; 3 SOLUTION RESPIRATORY (INHALATION) at 10:12

## 2019-12-29 RX ADMIN — Medication 1 G: at 13:46

## 2019-12-29 RX ADMIN — ASPIRIN 81 MG: 81 TABLET, COATED ORAL at 08:34

## 2019-12-29 RX ADMIN — GUAIFENESIN 600 MG: 600 TABLET, EXTENDED RELEASE ORAL at 08:33

## 2019-12-29 RX ADMIN — FINASTERIDE 5 MG: 5 TABLET, FILM COATED ORAL at 21:18

## 2019-12-29 RX ADMIN — TAMSULOSIN HYDROCHLORIDE 0.8 MG: 0.4 CAPSULE ORAL at 21:18

## 2019-12-29 RX ADMIN — ERGOCALCIFEROL 50000 UNITS: 1.25 CAPSULE ORAL at 08:34

## 2019-12-29 RX ADMIN — DOCUSATE SODIUM 100 MG: 100 CAPSULE, LIQUID FILLED ORAL at 21:18

## 2019-12-29 RX ADMIN — IPRATROPIUM BROMIDE AND ALBUTEROL SULFATE 1 AMPULE: .5; 3 SOLUTION RESPIRATORY (INHALATION) at 14:03

## 2019-12-29 RX ADMIN — DOCUSATE SODIUM 100 MG: 100 CAPSULE, LIQUID FILLED ORAL at 08:33

## 2019-12-29 RX ADMIN — PANTOPRAZOLE SODIUM 40 MG: 40 TABLET, DELAYED RELEASE ORAL at 08:34

## 2019-12-29 RX ADMIN — AMIODARONE HYDROCHLORIDE 100 MG: 100 TABLET ORAL at 08:33

## 2019-12-29 RX ADMIN — CEFDINIR 300 MG: 300 CAPSULE ORAL at 08:34

## 2019-12-29 RX ADMIN — IPRATROPIUM BROMIDE AND ALBUTEROL SULFATE 1 AMPULE: .5; 3 SOLUTION RESPIRATORY (INHALATION) at 06:13

## 2019-12-29 RX ADMIN — ATORVASTATIN CALCIUM 20 MG: 20 TABLET, FILM COATED ORAL at 21:18

## 2019-12-29 RX ADMIN — CEFDINIR 300 MG: 300 CAPSULE ORAL at 21:18

## 2019-12-29 RX ADMIN — Medication 1 G: at 08:34

## 2019-12-29 RX ADMIN — CYANOCOBALAMIN TAB 500 MCG 1000 MCG: 500 TAB at 08:33

## 2019-12-29 RX ADMIN — RIVAROXABAN 20 MG: 20 TABLET, FILM COATED ORAL at 16:49

## 2019-12-29 RX ADMIN — IPRATROPIUM BROMIDE AND ALBUTEROL SULFATE 1 AMPULE: .5; 3 SOLUTION RESPIRATORY (INHALATION) at 20:00

## 2019-12-29 RX ADMIN — Medication 1 G: at 16:49

## 2019-12-29 RX ADMIN — ALLOPURINOL 100 MG: 100 TABLET ORAL at 08:34

## 2019-12-29 RX ADMIN — AMIODARONE HYDROCHLORIDE 100 MG: 100 TABLET ORAL at 21:18

## 2019-12-29 RX ADMIN — GUAIFENESIN 600 MG: 600 TABLET, EXTENDED RELEASE ORAL at 21:18

## 2019-12-29 NOTE — PROGRESS NOTES
Intravenous, PRN, Laura Clemente MD    ipratropium-albuterol (DUONEB) nebulizer solution 1 ampule, 1 ampule, Inhalation, Q4H WA, Laura Clemente MD, 1 ampule at 12/29/19 1012    bisacodyl (DULCOLAX) suppository 10 mg, 10 mg, Rectal, Daily PRN, Laura Clemente MD    docusate sodium (COLACE) capsule 100 mg, 100 mg, Oral, BID, Laura Clemente MD, 100 mg at 12/29/19 2698    polyethylene glycol (GLYCOLAX) packet 17 g, 17 g, Oral, Daily, Laura Clemente MD, 17 g at 12/28/19 0724    allopurinol (ZYLOPRIM) tablet 100 mg, 100 mg, Oral, Daily, Laura Clemente MD, 100 mg at 12/29/19 0834    amiodarone (PACERONE) tablet 100 mg, 100 mg, Oral, Q12H, Laura Clemente MD, 100 mg at 12/29/19 8575    aspirin EC tablet 81 mg, 81 mg, Oral, Daily, Laura Clemente MD, 81 mg at 12/29/19 0834    atorvastatin (LIPITOR) tablet 20 mg, 20 mg, Oral, Daily, Laura Clemente MD, 20 mg at 12/28/19 2142    calcium carbonate (TUMS) chewable tablet 1,000 mg, 2 tablet, Oral, BID PRN, Laura Clemente MD    finasteride (PROSCAR) tablet 5 mg, 5 mg, Oral, Daily, Laura Clemente MD, 5 mg at 12/28/19 2142    guaiFENesin [facility]) extended release tablet 600 mg, 600 mg, Oral, BID, Laura Clemente MD, 600 mg at 12/29/19 0833    ipratropium-albuterol (DUONEB) nebulizer solution 3 mL, 1 vial, Inhalation, Q6H PRN, Laura Clemente MD    metoprolol succinate (TOPROL XL) extended release tablet 12.5 mg, 12.5 mg, Oral, Daily, Laura Clemente MD, Stopped at 12/29/19 0833    nitroGLYCERIN (NITROSTAT) SL tablet 0.4 mg, 0.4 mg, Sublingual, Q5 Min PRN, Laura Clemente MD    oxyCODONE-acetaminophen (PERCOCET) 5-325 MG per tablet 1 tablet, 1 tablet, Oral, Q6H PRN, Laura Clemente MD    pantoprazole (PROTONIX) tablet 40 mg, 40 mg, Oral, Daily, Laura Clemente MD, 40 mg at 12/29/19 0834    rivaroxaban (XARELTO) tablet 20 mg, 20 mg, Oral, Dinner, Laura Clemente MD, 20 mg at 12/28/19 1716    sennosides-docusate sodium (SENOKOT-S) 8.6-50 MG tablet 2 tablet, 2 tablet, Oral, Daily PRN, Gurinder Eli MD, 2 tablet at 12/23/19 2118    sodium chloride tablet 1 g, 1 g, Oral, TID WC, Gurinder Eli MD, 1 g at 12/29/19 0834    vitamin B-12 (CYANOCOBALAMIN) tablet 1,000 mcg, 1,000 mcg, Oral, Daily, Gurinder Eli MD, 1,000 mcg at 12/29/19 8456    vitamin D (ERGOCALCIFEROL) capsule 50,000 Units, 50,000 Units, Oral, Weekly, Gurinder Eli MD, 50,000 Units at 12/29/19 0834    acetaminophen (TYLENOL) tablet 650 mg, 650 mg, Oral, Q4H PRN, Alba Paul MD    polyethylene glycol (GLYCOLAX) packet 17 g, 17 g, Oral, Daily PRN, Alba Paul MD    tamsulosin (FLOMAX) capsule 0.8 mg, 0.8 mg, Oral, Daily, Alba Paul MD, 0.8 mg at 12/28/19 2142    Allergies:  Patient has no known allergies. Social History:   TOBACCO:   reports that he has quit smoking. His smoking use included cigarettes. He has a 195.00 pack-year smoking history. He has quit using smokeless tobacco.  ETOH:   reports no history of alcohol use. Family History:       Problem Relation Age of Onset    No Known Problems Mother     Heart Disease Father          PHYSICAL EXAM:  BP 95/60   Pulse 61   Temp 97.2 °F (36.2 °C) (Temporal)   Resp 16   Ht 5' 8\" (1.727 m)   Wt 161 lb 12.8 oz (73.4 kg)   SpO2 93%   BMI 24.60 kg/m²     Constitutional - well developed, well nourished. Eyes - conjunctiva normal.   Ear, nose, throat - No scars, masses, or lesions over external nose or ears, no atrophy of tongue  Neck-symmetric, no masses noted, no jugular vein distension  Respiration- chest wall appears symmetric, good expansion,   normal effort without use of accessory muscles  Musculoskeletal - no significant wasting of muscles noted, no bony deformities  Extremities-no clubbing, cyanosis or edema  Skin - warm, dry, and intact. No rash, erythema, or pallor.   Psychiatric - mood, affect, and behavior appear normal.      Neurological exam  Awake, alert, fluent oriented appropriate affect  Attention and concentration appear appropriate  Recent and remote memory appears unremarkable  Speech normal without dysarthria  No clear issues with language of fund of knowledge     Cranial Nerve Exam     CN III, IV,VI-EOMI, No nystagmus, conjugate eye movements, no ptosis    CN VII-no facial assymetry       Motor Exam  antigravity throughout upper and lower extremities bilaterally      Tremors- no tremors in hands or head noted     Gait  Not tested      Nursing/pcp notes, imaging,labs and vitals reviewed. PT,OT and/or speech notes reviewed    Lab Results   Component Value Date    WBC 5.6 12/29/2019    HGB 12.0 (L) 12/29/2019    HCT 38.2 (L) 12/29/2019    MCV 90.1 12/29/2019     12/29/2019     Lab Results   Component Value Date     (L) 12/29/2019    K 4.2 12/29/2019    CL 96 (L) 12/29/2019    CO2 21 (L) 12/29/2019    BUN 17 12/29/2019    CREATININE 0.9 12/29/2019    GLUCOSE 125 (H) 12/29/2019    CALCIUM 8.8 12/29/2019    PROT 6.6 12/26/2019    LABALBU 3.2 (L) 12/26/2019    BILITOT 0.5 12/26/2019    ALKPHOS 141 (H) 12/26/2019    AST 28 12/26/2019    ALT 48 (H) 12/26/2019    LABGLOM >60 12/29/2019    GLOB 2.5 04/11/2016     Lab Results   Component Value Date    INR 1.21 (H) 11/25/2019    INR 1.14 07/25/2017    PROTIME 14.7 (H) 11/25/2019    PROTIME 14.5 07/25/2017       Saw Forman PTA   Therapy Assistant   Physical Therapy   Progress Notes   Cosign Needed   Date of Service:  12/26/2019  1:46 PM               Cosign Needed             Show:Clear all  []Manual[x]Template[]Copied    Added by:  [x]Rodrigue Waters PTA    []Kajal for details  Claudia Olsen  [id number]       12/26/19 1318 12/26/19 1321   General   Response To Previous Treatment Patient with no complaints from previous session. --    Family / Caregiver Present No  --    Subjective   Subjective Pt agreed to therapy this afternoon.     --    Pain Screening   Patient Currently in Pain No  --    Pain Assessment   Pain Assessment 0-10  --    Pain Level 0  --

## 2019-12-29 NOTE — PROGRESS NOTES
Chronic combined systolic and diastolic CHF (congestive heart failure) (HCC)    Mixed hyperlipidemia    COPD (chronic obstructive pulmonary disease) (HCC)    Hyponatremia    Gross hematuria    Pneumonia due to organism    Urinary retention    Acute cystitis with hematuria    BPH without urinary obstruction    Sepsis ([facility] [address]    Palliative care patient    Bacteremia    Weakness    Pain in leg, unspecified     1. BPH with urinary retention. Patient essentially failed voiding trial he has a very large prostate and TURP may not be appropriate. We will continue tamsulosin and finasteride. He did not do well with in and out catheterization secondary to trauma from passing the catheter leading to hematuria. He may need to go home with Alves catheter with repeat attempted voiding trial as an outpatient. Okay to dismiss with Alves catheter and follow-up in office at any time     2. Cystitis with hematuria. Patient has been on antibiotics since 12/17 now on [address] He is completed 12 days.   continue Omnicef 2 more days and then MANUELA Desai MD

## 2019-12-29 NOTE — PROGRESS NOTES
Message placed to Dr. Garland Ortega regarding patient output of urine in a 6 hour period.  Electronically signed by Blanco Donato RN on 12/29/2019 at 12:30 PM

## 2019-12-29 NOTE — PROGRESS NOTES
[facility] Progress Note    Patient:  Ghulam Feldman  YOB: 1931  Date of Service: 12/29/2019  MRN: [id number]   Acct: [de-identified]   Primary Care Physician: No primary care provider on file. Advance Directive: Full Code  Admit Date: 12/23/2019       Hospital Day: 6      CHIEF COMPLAINT: Sepsis due to E. coli. Consult for medical management. 12/29/2019 7:56 AM  Subjective / Interval History:   Doing well. No further hematuria reported. No acute overnight event reported. Patient endorses overall improvement. Denies any acute complaints or distress at this time. 12/28/2019  No acute overnight event reported. Currently denies any further hematuria. Endorses overall improvement. Denies any acute complaints or distress at this time. Laying comfortably in bed, in no acute distress. Case discussed with RN.    12/27/2019  Doing well. No further hematuria reported. No acute overnight event reported. Endorses overall improvement. Denies any acute complaints at this time. Tolerating rehab.    12/26/2019  Doing well. No acute overnight events reported. Urinary catheter in place. Patient endorses overall improvement. Denies any further hematuria. Denies any other acute complaints or distress at this time. 12/25/2019  No acute changes or acute overnight event reported. Patient reportedly with traumatic Alves insertion, resulting in gross hematuria. Endorses overall improvement. Denies any acute complaints or distress at this time. Brief Hx  The patient is a 80 y.o. male who presents with history of CAD, CHF, atrial fibrillation, HTN, right intertrochanteric hip fracture (s/p cephalo-medullary nailing - 11/27/2019), admitted to [facility] on 12/09/2019, and was found to be hyponatremic with a sodium level of 119, on 12/27/2019. Patient was also noted to have acute cystitis with hematuria, as well as pneumonia.   Initially was started on piperacillin-tazobactam, to cover pneumonia as well as E. coli positive urine culture.     Blood cultures subsequently positive for E. coli as well. Patient was placed on Omnicef on 12/22/2019, based on culture speciation and sensitivity. Commended for patient to complete a 14-day course of antibiotic as recommended by urology. Hematuria was deemed as caused by UTI. Repeat chest x-ray (12/24/2019), reporting partial resolution of the right lower lobe infiltrate. Chronic inflammatory lung changes.     Repeat blood cultures reporting no growth to date. Patient was started on tolvaptan, for treatment of hyponatremia.     Patient admitted to acute rehab, and medicine is consulted for continued medical management. Review of Systems:   Review of Systems  ROS: 14 point review of systems is negative except as specifically addressed above.     DIET GENERAL; Daily Fluid Restriction: 1200 ml  Dietary Nutrition Supplements: Standard High Calorie Oral Supplement    Intake/Output Summary (Last 24 hours) at 12/29/2019 0756  Last data filed at 12/29/2019 0450  Gross per 24 hour   Intake 1260 ml   Output 1150 ml   Net 110 ml       Medications:  Current Facility-Administered Medications   Medication Dose Route Frequency Provider Last Rate Last Dose    cefdinir (OMNICEF) capsule 300 mg  300 mg Oral 2 times per day Ranee Carrel, MD   300 mg at 12/28/19 2142    lidocaine (XYLOCAINE) 2 % uro-jet   Topical PRN ZURI Greene        sodium chloride flush 0.9 % injection 10 mL  10 mL Intravenous PRN Gurinder Eli MD        ipratropium-albuterol (DUONEB) nebulizer solution 1 ampule  1 ampule Inhalation Q4H WA Gurinder Eli MD   1 ampule at 12/29/19 4011    bisacodyl (DULCOLAX) suppository 10 mg  10 mg Rectal Daily PRN Gurinder Eli MD        docusate sodium (COLACE) capsule 100 mg  100 mg Oral BID Gurinder Eli MD   100 mg at 12/28/19 0592    polyethylene glycol (GLYCOLAX) packet 17 g  17 g Oral Daily Gurinder Eli MD   17 g at 12/28/19 7468  allopurinol (ZYLOPRIM) tablet 100 mg  100 mg Oral Daily Francisco Desir MD   100 mg at 12/28/19 0726    amiodarone (PACERONE) tablet 100 mg  100 mg Oral Q12H Francisco Desir MD   100 mg at 12/28/19 2141    aspirin EC tablet 81 mg  81 mg Oral Daily Francisco Desir MD   81 mg at 12/28/19 7633    atorvastatin (LIPITOR) tablet 20 mg  20 mg Oral Daily Francisco Desir MD   20 mg at 12/28/19 2142    calcium carbonate (TUMS) chewable tablet 1,000 mg  2 tablet Oral BID PRN Francisco Desir MD        finasteride (PROSCAR) tablet 5 mg  5 mg Oral Daily Francisco Desir MD   5 mg at 12/28/19 2142    guaiFENesin [facility]) extended release tablet 600 mg  600 mg Oral BID Francisco Desir MD   600 mg at 12/28/19 2141    ipratropium-albuterol (DUONEB) nebulizer solution 3 mL  1 vial Inhalation Q6H PRN Francisco Desir MD        metoprolol succinate (TOPROL XL) extended release tablet 12.5 mg  12.5 mg Oral Daily Francisco Desir MD   12.5 mg at 12/28/19 0725    nitroGLYCERIN (NITROSTAT) SL tablet 0.4 mg  0.4 mg Sublingual Q5 Min PRN Francisco Desir MD        oxyCODONE-acetaminophen (PERCOCET) 5-325 MG per tablet 1 tablet  1 tablet Oral Q6H PRN Francisco Desir MD        pantoprazole (PROTONIX) tablet 40 mg  40 mg Oral Daily Francisco Desir MD   40 mg at 12/28/19 0726    rivaroxaban (XARELTO) tablet 20 mg  20 mg Oral Dinner Francisco Desir MD   20 mg at 12/28/19 1716    sennosides-docusate sodium (SENOKOT-S) 8.6-50 MG tablet 2 tablet  2 tablet Oral Daily PRN Francisco Desir MD   2 tablet at 12/23/19 2118    sodium chloride tablet 1 g  1 g Oral TID WC Francisco Desir MD   1 g at 12/28/19 1716    vitamin B-12 (CYANOCOBALAMIN) tablet 1,000 mcg  1,000 mcg Oral Daily Francisco Desir MD   1,000 mcg at 12/28/19 0964    vitamin D (ERGOCALCIFEROL) capsule 50,000 Units  50,000 Units Oral Weekly Francisco Desir MD        acetaminophen (TYLENOL) tablet 650 mg  650 mg Oral Q4H PRN Andrés Woodward MD        polyethylene glycol (GLYCOLAX) packet 17 g 17 g Oral Daily PRN Sushma Quintero MD        tamsulosin (FLOMAX) capsule 0.8 mg  0.8 mg Oral Daily Sushma Quintero MD   0.8 mg at 12/28/19 2142           cefdinir  300 mg Oral 2 times per day    ipratropium-albuterol  1 ampule Inhalation Q4H WA    docusate sodium  100 mg Oral BID    polyethylene glycol  17 g Oral Daily    allopurinol  100 mg Oral Daily    amiodarone  100 mg Oral Q12H    aspirin  81 mg Oral Daily    atorvastatin  20 mg Oral Daily    finasteride  5 mg Oral Daily    guaiFENesin  600 mg Oral BID    metoprolol succinate  12.5 mg Oral Daily    pantoprazole  40 mg Oral Daily    rivaroxaban  20 mg Oral Dinner    sodium chloride  1 g Oral TID     vitamin B-12  1,000 mcg Oral Daily    vitamin D  50,000 Units Oral Weekly    tamsulosin  0.8 mg Oral Daily     lidocaine, sodium chloride flush, bisacodyl, calcium carbonate, ipratropium-albuterol, nitroGLYCERIN, oxyCODONE-acetaminophen, sennosides-docusate sodium, acetaminophen, polyethylene glycol  DIET GENERAL; Daily Fluid Restriction: 1200 ml  Dietary Nutrition Supplements: Standard High Calorie Oral Supplement       Labs:   CBC with DIFF:  Recent Labs     12/27/19  0459 12/27/19 2035 12/28/19  0619   WBC 6.0  --  5.5   RBC 4.18*  --  4.09*   HGB 11.9* 12.1* 11.6*   HCT 36.5* 37.9* 36.3*   MCV 87.3  --  88.8   MCH 28.5  --  28.4   MCHC 32.6*  --  32.0*   RDW 13.8  --  13.7     --  190   MPV 9.1*  --  9.3*       CMP/BMP:  Recent Labs     12/27/19  0459 12/28/19  0619   * 133*   K 4.4 4.3   CL 94* 99   CO2 22 22   ANIONGAP 14 12   GLUCOSE 106 99   BUN 18 17   CREATININE 0.8 0.7   LABGLOM >60 >60   CALCIUM 8.2* 8.6*         CRP:  No results for input(s): CRP in the last 72 hours. Sed Rate:  No results for input(s): SEDRATE in the last 72 hours.       HgBA1c:  No components found for: HGBA1C  FLP:    Lab Results   Component Value Date    TRIG 121 07/27/2017    HDL 42 07/27/2017    LDLCALC 124 07/27/2017     TSH:    Lab Results Component Value Date    TSH 1.190 12/23/2019     Troponin T: No results for input(s): TROPONINI in the last 72 hours. Pro-BNP: No results for input(s): BNP in the last 72 hours. INR: No results for input(s): INR in the last 72 hours. ABGs:   Lab Results   Component Value Date    PHART 7.410 07/26/2017    PO2ART 63.0 07/26/2017    MNR7PNO 38.0 07/26/2017     UA:  No results for input(s): NITRITE, COLORU, PHUR, LABCAST, WBCUA, RBCUA, MUCUS, TRICHOMONAS, YEAST, BACTERIA, CLARITYU, SPECGRAV, LEUKOCYTESUR, UROBILINOGEN, BILIRUBINUR, BLOODU, GLUCOSEU, AMORPHOUS in the last 72 hours. Invalid input(s): Artur Lemming      Culture Results:    No results for input(s): CXSURG in the last 720 hours. Blood Culture Recent:   Recent Labs     12/23/19  1518 12/17/19  1252 12/17/19  0801   BC No growth after 5 days of incubation. Bottle volume = 7 ml*  Isolated from Aerobic and Anaerobic bottle  No further workup  Refer to (blood culture collected [email or web address]) for sensitivit    Bottle volume = 6 ml*  Isolated from Aerobic and Anaerobic bottle  No further workup  Refer to (blood culture collected [employer]) for sensitivit         Cultures:   No results for input(s): CULTURE in the last 72 hours. No results for input(s): BC, Deetta Pleasure in the last 72 hours. No results for input(s): CXSURG in the last 72 hours. No results for input(s): MG, PHOS in the last 72 hours. No results for input(s): AST, ALT, ALB, BILITOT, ALKPHOS, ALB in the last 72 hours. RAD:   Xr Chest Standard (2 Vw)    Result Date: 12/24/2019  Examination. XR CHEST (2 VW) 12/24/2019 5:00 AM History: Pneumonia. The frontal and lateral views of the chest are compared with the previous study dated 12/17/2019. There is partial resolution of the right lower lung infiltrate since the previous study. There are coarse interstitial changes in the left parahilar and lower lung bilaterally representing a chronic interstitial process.  There is no pleural urology recommendation. · Rivaroxaban held briefly (12/25/2019)  · No further hematuria reported. · Resumed anticoagulation (12/26/2019) given no further hematuria noted. · Monitor H&H every 8 hours  · H&H remained stable. · Repeat chest x-ray (12/24/2019), reporting partial resolution of the right lower lobe infiltrate. Chronic inflammatory lung changes. · Repeat blood cultures reporting no growth to date. · Patient initially received tolvaptan, for treatment of hyponatremia. However currently on sodium chloride tablets (1 g p.o. 3 times daily) for hyponatremia (improving). · Continue current management      Continue management of other chronic medical conditions - see above and orders. Advance Directive: Full Code    DIET GENERAL; Daily Fluid Restriction: 1200 ml  Dietary Nutrition Supplements: Standard High Calorie Oral Supplement     DVT prophylaxis: Rivaroxaban    Consults Made:   IP CONSULT TO UROLOGY  IP CONSULT TO SOCIAL WORK  IP CONSULT TO HOSPITALIST    Discharge planning: tbd    Time Spent is 25 mins in the examination, evaluation, counseling and review of medications, assessment and plan.      Electronically signed by   Naomie Wade MD, MPH,   Internal Medicine Hospitalist   12/29/2019 7:56 AM

## 2019-12-30 LAB
ALBUMIN SERPL-MCNC: 3 G/DL (ref 3.5–5.2)
ALP BLD-CCNC: 136 U/L (ref 40–130)
ALT SERPL-CCNC: 38 U/L (ref 5–41)
ANION GAP SERPL CALCULATED.3IONS-SCNC: 13 MMOL/L (ref 7–19)
AST SERPL-CCNC: 27 U/L (ref 5–40)
BASOPHILS ABSOLUTE: 0 K/UL (ref 0–0.2)
BASOPHILS RELATIVE PERCENT: 0.4 % (ref 0–1)
BILIRUB SERPL-MCNC: 0.5 MG/DL (ref 0.2–1.2)
BUN BLDV-MCNC: 17 MG/DL (ref 8–23)
CALCIUM SERPL-MCNC: 8.7 MG/DL (ref 8.8–10.2)
CHLORIDE BLD-SCNC: 96 MMOL/L (ref 98–111)
CO2: 21 MMOL/L (ref 22–29)
CREAT SERPL-MCNC: 0.8 MG/DL (ref 0.5–1.2)
EOSINOPHILS ABSOLUTE: 0.2 K/UL (ref 0–0.6)
EOSINOPHILS RELATIVE PERCENT: 4.7 % (ref 0–5)
GFR NON-AFRICAN AMERICAN: >60
GLUCOSE BLD-MCNC: 106 MG/DL (ref 74–109)
HCT VFR BLD CALC: 36 % (ref 42–52)
HEMOGLOBIN: 11.8 G/DL (ref 14–18)
IMMATURE GRANULOCYTES #: 0 K/UL
LYMPHOCYTES ABSOLUTE: 1.4 K/UL (ref 1.1–4.5)
LYMPHOCYTES RELATIVE PERCENT: 26.5 % (ref 20–40)
MCH RBC QN AUTO: 29.1 PG (ref 27–31)
MCHC RBC AUTO-ENTMCNC: 32.8 G/DL (ref 33–37)
MCV RBC AUTO: 88.9 FL (ref 80–94)
MONOCYTES ABSOLUTE: 0.4 K/UL (ref 0–0.9)
MONOCYTES RELATIVE PERCENT: 7.1 % (ref 0–10)
NEUTROPHILS ABSOLUTE: 3.1 K/UL (ref 1.5–7.5)
NEUTROPHILS RELATIVE PERCENT: 60.9 % (ref 50–65)
PDW BLD-RTO: 13.7 % (ref 11.5–14.5)
PLATELET # BLD: 180 K/UL (ref 130–400)
PMV BLD AUTO: 9.3 FL (ref 9.4–12.4)
POTASSIUM REFLEX MAGNESIUM: 4.3 MMOL/L (ref 3.5–5)
RBC # BLD: 4.05 M/UL (ref 4.7–6.1)
SODIUM BLD-SCNC: 130 MMOL/L (ref 136–145)
TOTAL PROTEIN: 6.4 G/DL (ref 6.6–8.7)
WBC # BLD: 5.1 K/UL (ref 4.8–10.8)

## 2019-12-30 PROCEDURE — 99232 SBSQ HOSP IP/OBS MODERATE 35: CPT | Performed by: PSYCHIATRY & NEUROLOGY

## 2019-12-30 PROCEDURE — 1180000000 HC REHAB R&B

## 2019-12-30 PROCEDURE — 36415 COLL VENOUS BLD VENIPUNCTURE: CPT

## 2019-12-30 PROCEDURE — 99231 SBSQ HOSP IP/OBS SF/LOW 25: CPT | Performed by: PHYSICIAN ASSISTANT

## 2019-12-30 PROCEDURE — 85025 COMPLETE CBC W/AUTO DIFF WBC: CPT

## 2019-12-30 PROCEDURE — 6370000000 HC RX 637 (ALT 250 FOR IP): Performed by: UROLOGY

## 2019-12-30 PROCEDURE — 97530 THERAPEUTIC ACTIVITIES: CPT

## 2019-12-30 PROCEDURE — 6370000000 HC RX 637 (ALT 250 FOR IP): Performed by: HOSPITALIST

## 2019-12-30 PROCEDURE — 97110 THERAPEUTIC EXERCISES: CPT

## 2019-12-30 PROCEDURE — 97116 GAIT TRAINING THERAPY: CPT

## 2019-12-30 PROCEDURE — 6370000000 HC RX 637 (ALT 250 FOR IP): Performed by: PSYCHIATRY & NEUROLOGY

## 2019-12-30 PROCEDURE — 80053 COMPREHEN METABOLIC PANEL: CPT

## 2019-12-30 PROCEDURE — 94640 AIRWAY INHALATION TREATMENT: CPT

## 2019-12-30 RX ADMIN — PANTOPRAZOLE SODIUM 40 MG: 40 TABLET, DELAYED RELEASE ORAL at 07:53

## 2019-12-30 RX ADMIN — GUAIFENESIN 600 MG: 600 TABLET, EXTENDED RELEASE ORAL at 07:51

## 2019-12-30 RX ADMIN — DOCUSATE SODIUM 100 MG: 100 CAPSULE, LIQUID FILLED ORAL at 21:12

## 2019-12-30 RX ADMIN — GUAIFENESIN 600 MG: 600 TABLET, EXTENDED RELEASE ORAL at 21:12

## 2019-12-30 RX ADMIN — RIVAROXABAN 20 MG: 20 TABLET, FILM COATED ORAL at 17:38

## 2019-12-30 RX ADMIN — ALLOPURINOL 100 MG: 100 TABLET ORAL at 07:53

## 2019-12-30 RX ADMIN — IPRATROPIUM BROMIDE AND ALBUTEROL SULFATE 1 AMPULE: .5; 3 SOLUTION RESPIRATORY (INHALATION) at 10:42

## 2019-12-30 RX ADMIN — CEFDINIR 300 MG: 300 CAPSULE ORAL at 07:53

## 2019-12-30 RX ADMIN — IPRATROPIUM BROMIDE AND ALBUTEROL SULFATE 1 AMPULE: .5; 3 SOLUTION RESPIRATORY (INHALATION) at 07:34

## 2019-12-30 RX ADMIN — IPRATROPIUM BROMIDE AND ALBUTEROL SULFATE 1 AMPULE: .5; 3 SOLUTION RESPIRATORY (INHALATION) at 15:30

## 2019-12-30 RX ADMIN — DOCUSATE SODIUM 100 MG: 100 CAPSULE, LIQUID FILLED ORAL at 07:50

## 2019-12-30 RX ADMIN — CEFDINIR 300 MG: 300 CAPSULE ORAL at 21:12

## 2019-12-30 RX ADMIN — FINASTERIDE 5 MG: 5 TABLET, FILM COATED ORAL at 21:12

## 2019-12-30 RX ADMIN — ATORVASTATIN CALCIUM 20 MG: 20 TABLET, FILM COATED ORAL at 21:12

## 2019-12-30 RX ADMIN — AMIODARONE HYDROCHLORIDE 100 MG: 100 TABLET ORAL at 21:12

## 2019-12-30 RX ADMIN — Medication 1 G: at 07:50

## 2019-12-30 RX ADMIN — CYANOCOBALAMIN TAB 500 MCG 1000 MCG: 500 TAB at 07:52

## 2019-12-30 RX ADMIN — IPRATROPIUM BROMIDE AND ALBUTEROL SULFATE 1 AMPULE: .5; 3 SOLUTION RESPIRATORY (INHALATION) at 17:49

## 2019-12-30 RX ADMIN — Medication 1 G: at 11:57

## 2019-12-30 RX ADMIN — POLYETHYLENE GLYCOL 3350 17 G: 17 POWDER, FOR SOLUTION ORAL at 07:53

## 2019-12-30 RX ADMIN — TAMSULOSIN HYDROCHLORIDE 0.8 MG: 0.4 CAPSULE ORAL at 21:12

## 2019-12-30 RX ADMIN — ASPIRIN 81 MG: 81 TABLET, COATED ORAL at 07:50

## 2019-12-30 RX ADMIN — Medication 1 G: at 17:38

## 2019-12-30 RX ADMIN — METOPROLOL SUCCINATE 12.5 MG: 25 TABLET, EXTENDED RELEASE ORAL at 21:00

## 2019-12-30 RX ADMIN — AMIODARONE HYDROCHLORIDE 100 MG: 100 TABLET ORAL at 07:53

## 2019-12-30 ASSESSMENT — PAIN SCALES - GENERAL
PAINLEVEL_OUTOF10: 3
PAINLEVEL_OUTOF10: 0

## 2019-12-30 ASSESSMENT — PAIN DESCRIPTION - LOCATION: LOCATION: HIP

## 2019-12-30 ASSESSMENT — PAIN DESCRIPTION - ORIENTATION: ORIENTATION: RIGHT;LEFT

## 2019-12-30 NOTE — PATIENT CARE CONFERENCE
NICOJESSE GUZMAN OF [facility] ACUTE INPATIENT REHABILITATION  TEAM CONFERENCE NOTE    Date: 2019  Patient Name: Larissa Trejo        MRN: [id number]    : 3/27/1931  (80 y.o.)  Gender: male      Diagnosis: Sepsis      PHYSICAL THERAPY  STRENGTH  Strength RLE  Strength RLE: Exception  Comment: grossly 4-/5  Strength LLE  Strength LLE: Exception  Comment: grossly 4-/5  ROM  AROM RLE (degrees)  RLE AROM: WFL  AROM LLE (degrees)  LLE AROM : WFL  BED MOBILITY  Bed Mobility  Rolling: Modified independent  Supine to Sit: Modified independent  Sit to Supine: Modified independent  Scooting: Minimal assistance  TRANSFERS  Transfers  Sit to Stand: Moderate Assistance(Patient on air bed had difficulty standing from bed)  Stand to sit: Stand by assistance  Bed to Chair: Stand by assistance  Car Transfer: Contact guard assistance  WHEELCHAIR PROPULSION  Propulsion 1  Propulsion: Manual  Level: Level Tile  Method: RUE, LUE  Level of Assistance: Stand by assistance  Description/ Details: Pt kept veering to R side needing minor cues for technique to correct. Distance: 76'  AMBULATION  Ambulation 1  Surface: level tile  Device: Rolling Walker  Other Apparatus: Wheelchair follow  Assistance: Stand by assistance, Contact guard assistance  Quality of Gait: Flexed posture R  LE shuffling step  Gait Deviations: Decreased step length, Decreased step height  Distance: 125'  Comments: Patient walked until fatigued then sat in [address] [place] [address] Road  # Steps : 8  Rails: Bilateral  Device: No Device  Assistance: Contact guard assistance  Comment: Pt amb well up and down stairs leading w/ RLE ascending and LLE decending. Pt is more stable w/ that method due to a weak L knee.     GOALS:  Short term goals  Time Frame for Short term goals: 1 week   Short term goal 1: supervision bed mobility   Short term goal 2: CGA transfer with AD  Short term goal 3: CGA 50 feet ambulation with AD  Short term goal 4: supervision WC propulsion and management 150 feet Short term goal 5: CGA 4 steps with AD    Long term goals  Time Frame for Long term goals : 2 weeks  Long term goal 1: independent bed mobility   Long term goal 2: independent transfer with AD   Long term goal 3: independent 150 feet ambulation with AD  Long term goal 4: independent wheelchair ambulation 150 feet   Long term goal 5: supervision 4 steps with AD     ASSESSMENT:  Assessment: Pt tolerated amb well w/ minimal fatigue or pain. SPEECH THERAPY: N/A      OCCUPATIONAL THERAPY    CURRENT IRF-NORA SCORES  Eating: CARE Score: 5  Oral Hygiene: CARE Score: 5  Toileting: CARE Score: 3  Shower/Bathe: CARE Score: 4  Upper Body Dressing: CARE Score: 5  Lower Body Dressing: CARE Score: 4  Footwear: CARE Score: 3  Toilet Transfers: CARE Score: 4  Picking Up Object:  CARE Score: 1      UE Functioning:  WFLs    Pain Assessment:  Pain Level: 0  Pain Location: (from catheter)    STGs:  Short term goals  Time Frame for Short term goals: 1 week  Short term goal 1: complete LB dressing with supervision  Short term goal 2: complete overall toileting with supervision  Short term goal 3: MET  Short term goal 4: complete simple ambulatory home making task with supervision   Short term goal 5: complete overall bathing with supervision    LTGs:  Long term goals  Time Frame for Long term goals : 2 weeks  Long term goal 1: complete overall toileting with modified independence  Long term goal 2: complete overall bathing with modified independence  Long term goal 3: complete overall dressing with modified independence  Long term goal 4: complete simple ambulatory home making task with modified independence  Long term goal 5: complete HEP with independence  Long term goals 6: pt/family verbalize DME    Assessment:  Decreased functional mobility ; Decreased ADL status;  Decreased strength; Decreased endurance; Decreased balance; Decreased high-level IADLs    NUTRITION  Current Wt: Weight: 161 lb 12.8 oz (73.4 kg) / Body mass index is 24.6 kg/m². Admission Wt:    Oral Diet Orders: General, Fluid Restriction   Oral Nutrition Supplement (ONS) Orders: Standard High Calorie Oral Supplement  Please see nutrition note for details. NURSING    FIMS:  Bladder  Level of Assistance: Bladder Level of Assistance: 1- requires 75%+ assistance for bladder management tasks, helper changes soiled linens, clothes, absorbant pads, helper cares for and empties jackson catheter  Frequency of Accidents: Bladder Frequency of Accidents: 6 - No accidents,uses device like urinal, bedpan, absorbant pad, or requires medication to manage bladder    Bowel  Level of Assistance: Bowel Level of Assistance: 4- Requires Minimal assistance to manage or place device, helper inserts suppository without digital stimulation, patient performs 75% or more of the bowel management tasks  Frequency of Accidents: Bowel Frequency of Accidents: 6 - No accidents, uses device like bedpan, diaper, bedside commode colostomy, or requires medication to manage bowels    Wounds/Incisions/Ulcers: Incision healing well     Elmo Scale Score: 20    Pain: Patient's pain is currently controlled with Percocet q 4 hour PRN    Consultations/Labs/X-rays: Hospitalist,     Family Education: Family available and participating in education     Fall Risk:  Rasheed Score: 25    Fall in the last week? NONE      Other Nursing Issues: alert and oriented x4, Jackson catheter in place, assist x1 with transfers, meds whole with water, BM 29, General diet,         SOCIAL WORK/CASE MANAGEMENT  Assessment: He is pleasant but either due to hearing or memory-does not make any decisions-relies on his friend/POA Demarcus Luis Antonio for all of that.     Discharge Plan   Estimated Length of Stay: 1/4/20  Destination: Needs home health care services if he goes home - will have to reach out to his PCP- which is [address] [facility] in [place] to follow    Pass: No    Services at Discharge: [address] [facility], Occupational Therapy and Nursing per evaluations    Equipment at Discharge: walker, bath bench and hospital bed    Progress made in the prior week:  1. SBA TRANSFERS  2. SBA for toilet t/fs  3.  4.  5.      Goals for following week:  1. UP AD BENSON IN ROOM  2. Set up only MRADLs  3.   4.   5.     Factors facilitating achievement of predicted outcomes: Cooperative and Pleasant    Barriers to the achievement of predicted outcomes: bothered by [facility]    Team Members Present at Conference:  : Ender Justin [facility]   Occupational Therapist: Jennifer Miles OTR/L  Physical Therapist: Memo Moss PT  Speech Therapist: N/A  Nurse: Nora Espinoza RN   Nurse Manager:  Jesus Ortiz, RN, BSN  Dietitian:  Felicia Espana MS, RD, LD  Rehab Director:  Milton Rosenthal approve the established interdisciplinary plan of care as documented within the medical record of Gracie Magallon.

## 2019-12-30 NOTE — PROGRESS NOTES
He was being followed by urology for urinary retention and acute hematuria. He had CBI in place but this has now been discontinued. Alves was removed on 12/23/19 for voiding trial with in & out catheterization as needed. He was placed on Omnicef on 12/22/19 by urology and will need  for 14 days. Order for repeat blood cultures and for repeat CXR on 12/24/19. He has improved overall. He is participating in both PT/OT. He is felt to need a return to Rehab to work towards his goal of returning home with assist of his girlfriend.  He is now felt ready to start the Rehab program. No new issues.       REVIEW OF SYSTEMS:  Constitutional: No fevers No chills  Neck:No stiffness  Respiratory: No shortness of breath  Cardiovascular: No chest pain No palpitations  Gastrointestinal: No abdominal pain    Genitourinary: No Dysuria  Neurological: No headache, no confusion    Past Medical History:      Diagnosis Date    Atrial fibrillation ([address]     CAD (coronary artery disease)     Chronic combined systolic and diastolic CHF (congestive heart failure) [facility] 12/17/2019    COPD (chronic obstructive pulmonary disease) ([facility] 12/17/2019    GERD (gastroesophageal reflux disease)     Gout     Gout     Heart attack ([facility]     History of home oxygen therapy     at night    Hypertension     Hyponatremia 12/17/2019    Mixed hyperlipidemia 12/17/2019    Palliative care patient 12/20/2019       Past Surgical History:      Procedure Laterality Date    CARDIAC SURGERY      cabg 2 bypasss    CORONARY ARTERY BYPASS GRAFT      x 2 vessels    INTERTROCHANTERIC HIP FRACTURE SURGERY Right     KNEE SURGERY         Medications in Hospital:      Current Facility-Administered Medications:     cefdinir (OMNICEF) capsule 300 mg, 300 mg, Oral, 2 times per day, Kathe Scott MD, 300 mg at 12/30/19 0753    lidocaine (XYLOCAINE) 2 % uro-jet, , Topical, PRN, ZURI Hill    sodium chloride flush 0.9 % injection 10 mL, 10 mL, Intravenous, PRN, Claudell Cambridge, MD    ipratropium-albuterol (DUONEB) nebulizer solution 1 ampule, 1 ampule, Inhalation, Q4H WA, Claudell Cambridge, MD, 1 ampule at 12/30/19 1042    bisacodyl (DULCOLAX) suppository 10 mg, 10 mg, Rectal, Daily PRN, Claudell Cambridge, MD    docusate sodium (COLACE) capsule 100 mg, 100 mg, Oral, BID, Claudell Cambridge, MD, 100 mg at 12/30/19 0750    polyethylene glycol (GLYCOLAX) packet 17 g, 17 g, Oral, Daily, Claudell Cambridge, MD, 17 g at 12/30/19 0753    allopurinol (ZYLOPRIM) tablet 100 mg, 100 mg, Oral, Daily, Claudell Cambridge, MD, 100 mg at 12/30/19 0753    amiodarone (PACERONE) tablet 100 mg, 100 mg, Oral, Q12H, Claudell Cambridge, MD, 100 mg at 12/30/19 0753    aspirin EC tablet 81 mg, 81 mg, Oral, Daily, Claudell Cambridge, MD, 81 mg at 12/30/19 0750    atorvastatin (LIPITOR) tablet 20 mg, 20 mg, Oral, Daily, Claudell Cambridge, MD, 20 mg at 12/29/19 2118    calcium carbonate (TUMS) chewable tablet 1,000 mg, 2 tablet, Oral, BID PRN, Claudell Cambridge, MD    finasteride (PROSCAR) tablet 5 mg, 5 mg, Oral, Daily, Claudell Cambridge, MD, 5 mg at 12/29/19 2118    guaiFENesin [facility]) extended release tablet 600 mg, 600 mg, Oral, BID, Claudell Cambridge, MD, 600 mg at 12/30/19 0751    ipratropium-albuterol (DUONEB) nebulizer solution 3 mL, 1 vial, Inhalation, Q6H PRN, Claudell Cambridge, MD    metoprolol succinate (TOPROL XL) extended release tablet 12.5 mg, 12.5 mg, Oral, Daily, Claudell Cambridge, MD, Stopped at 12/29/19 0833    nitroGLYCERIN (NITROSTAT) SL tablet 0.4 mg, 0.4 mg, Sublingual, Q5 Min PRN, Claudell Cambridge, MD    oxyCODONE-acetaminophen (PERCOCET) 5-325 MG per tablet 1 tablet, 1 tablet, Oral, Q6H PRN, Claudell Cambridge, MD    pantoprazole (PROTONIX) tablet 40 mg, 40 mg, Oral, Daily, Claudell Cambridge, MD, 40 mg at 12/30/19 0753    rivaroxaban (XARELTO) tablet 20 mg, 20 mg, Oral, Dinner, Claudell Cambridge, MD, 20 mg at 12/29/19 1649    sennosides-docusate sodium (SENOKOT-S) 8.6-50 MG tablet 2 tablet, 2 tablet, Oral, Currently in Pain No   Intervention List Patient able to continue with treatment   Oxygen Therapy   O2 Device None (Room air)   Orientation   Overall Orientation Status WNL   Orientation Level Oriented to person;Oriented to place;Oriented to time   Transfers   Sit to Stand Stand by assistance   Stand to sit Stand by assistance   Ambulation   Ambulation? Yes   WB Status WBAT   More Ambulation? Yes   Ambulation 1   Surface level tile   Device Rolling Walker   Other Apparatus Wheelchair follow   Assistance Stand by assistance   Quality of Gait Flexed posture R  LE shuffling step   Gait Deviations Decreased step length;Decreased step height   Distance 125'   Comments Patient walked until fatigued then sat in [address]   Ambulation 2   Surface - 2 level tile   Device 2 Rolling Walker   Assistance 2 Stand by assistance;Contact guard assistance   Quality of Gait 2 Increased step height with R LE untill near end when patient fatigued   Gait Deviations Decreased step height   Distance 125'   Comments Patient BTB   Exercises   Heelslides 25   Hip Flexion 25   Hip Abduction 25   Knee Long Arc Quad 25   Ankle Pumps 25   Comments BL LE EX AROM  in sitting   x 25  reps   x 2 sets   Conditions Requiring Skilled Therapeutic Intervention   Body structures, Functions, Activity limitations Decreased functional mobility ; Decreased ADL status; Decreased ROM; Decreased strength;Decreased endurance;Decreased balance;Decreased posture   Activity Tolerance   Activity Tolerance Patient Tolerated treatment well   Safety Devices   Type of devices Left in bed;Call light within reach; Bed alarm in place   Occupational Therapy     Electronically signed by Edd Resendiz PTA on 12/30/2019 at 9:55 AM                      RECORD REVIEW: Previous medical records, medications were reviewed at today's visit    IMPRESSION:   1. Sepsis-complete abx  2. H/O gout-n allopurinol  3. Atrial fibrillation-on ASA/Amiodarone/metroprolol/Xarelto-  4.  Hyperlipidemia-on statin  5. UTI-complete abx   6. Urinary retention-jackson in place-appreciate urology input  7. BPH-on medications monitor  8. Pneumonia-on nebs/mucinex  9. HTN-on meds monitor  10. GI-PPI/bowel regimen  11. DVT prophylaxis-Lovenox  12. Pain control-Percocet PRN  13. PT/OT  14.  Hyponatremia-monitor     Continue current care      Expected duration and frequency therapy: 180 minutes per day, 5 days per week    [address] [facility]  [phone] CELL  Dr Stewart Severe

## 2019-12-30 NOTE — PROGRESS NOTES
12/30/19 1502   Restrictions/Precautions   Restrictions/Precautions Fall Risk   Lower Extremity Weight Bearing Restrictions   Right Lower Extremity Weight Bearing Weight Bearing As Tolerated   Left Lower Extremity Weight Bearing Weight Bearing As Tolerated   General   Chart Reviewed Yes   Subjective   Subjective Patient in bed agrees to therapy   Pain Screening   Patient Currently in Pain Yes   Intervention List Patient able to continue with treatment   Pain Assessment   Pain Level 3   Pain Location Hip   Pain Orientation Right;Left   Bed Mobility   Supine to Sit Modified independent   Transfers   Sit to Stand Moderate Assistance  (Patient on air bed had difficulty standing  on bed)   Stand to sit Stand by assistance   Ambulation   Ambulation? Yes   WB Status WBAT   Ambulation 1   Surface level tile   Device Rolling Walker   Other Apparatus Wheelchair follow   Assistance Stand by assistance;Contact guard assistance   Quality of Gait Flexed posture R  LE shuffling step   Gait Deviations Decreased step length;Decreased step height   Distance 125'   Comments Patient walked until fatigued then sat in [facility]   Ambulation 2   Surface - 2 level tile   Device 2 Rolling Walker   Assistance 2 Stand by assistance   Quality of Gait 2 Increased step height with R LE untill near end when patient fatigued   Gait Deviations Decreased step height   Distance 76'   Comments Patient up in chair with Personal alarm attached   Exercises   Heelslides 20   Hip Flexion 20   Hip Abduction 20   Knee Long Arc Quad 20   Knee Active Range of Motion yes   Ankle Pumps 20   Comments BL LE EX x  20 reps x 2 sets   Conditions Requiring Skilled Therapeutic Intervention   Body structures, Functions, Activity limitations Decreased functional mobility ; Decreased ADL status; Decreased ROM; Decreased strength;Decreased endurance;Decreased balance;Decreased posture   Activity Tolerance   Activity Tolerance Patient Tolerated treatment well   Occupational

## 2019-12-30 NOTE — PROGRESS NOTES
Department of Urology  Physician Assistant Progress Note  Conception GENESIS Rossi      SUBJECTIVE: No  complaints    OBJECTIVE: Total antibiotic day #13    REVIEW OF SYSTEMS:   Review of Systems      Physical  VITALS:  /60   Pulse 59   Temp 97 °F (36.1 °C) (Temporal)   Resp 16   Ht 5' 8\" (1.727 m)   Wt 161 lb 12.8 oz (73.4 kg)   SpO2 97%   BMI 24.60 kg/m²   TEMPERATURE:  Current - Temp: 97 °F (36.1 °C);  Max - Temp  Av.1 °F (36.7 °C)  Min: 97 °F (36.1 °C)  Max: 99.1 °F (37.3 °C)  BACK: no tenderness in spine or flanks  ABDOMEN:  soft and non-distended  HEART:  normal rate  CHEST: Normal effort  GENITAL/URINARY: Alves catheter draining clear urine     Data  CBC:   Lab Results   Component Value Date    WBC 5.1 2019    RBC 4.05 2019    HGB 11.8 2019    HCT 36.0 2019    MCV 88.9 2019    MCH 29.1 2019    MCHC 32.8 2019    RDW 13.7 2019     2019    MPV 9.3 2019     CMP:    Lab Results   Component Value Date     2019    K 4.3 2019    CL 96 2019    CO2 21 2019    BUN 17 2019    CREATININE 0.8 2019    LABGLOM >60 2019    GLUCOSE 106 2019    PROT 6.4 2019    LABALBU 3.0 2019    CALCIUM 8.7 2019    BILITOT 0.5 2019    ALKPHOS 136 2019    AST 27 2019    ALT 38 2019     Intake: 600  Output: 775 ml    ASSESSMENT AND PLAN    Patient Active Problem List   Diagnosis    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 [facility]    Chronic hypoxemic respiratory failure (HCC)    Chronic gout without tophus    Gastroesophageal reflux disease without esophagitis    Essential hypertension    Dyslipidemia    Benign prostatic hyperplasia with urinary obstruction    Near syncope    Closed right [facility] fracture, initial encounter [facility]    Paroxysmal atrial fibrillation (HCC)    Chronic combined systolic and diastolic CHF (congestive heart failure) (HCC)    Mixed hyperlipidemia    COPD (chronic obstructive pulmonary disease) (HCC)    Hyponatremia    Gross hematuria    Pneumonia due to organism    Urinary retention    Acute cystitis with hematuria    BPH without urinary obstruction    Sepsis ([facility]    Palliative care patient    Bacteremia    Weakness    Pain in leg, unspecified     1. BPH with urinary retention. Patient failed voiding trial he has a very large prostate and a TURP may not be the most appropriate treatment option. We will continue tamsulosin and finasteride. Patient did not do well with in and out catheterization secondary to trauma from passing the catheter which led to hematuria. If patient is to be discharged soon he may go home with a catheter and return to [address] [place] [address] urology as an outpatient for voiding trial or if still in hospital can attempt voiding trial in the future. 2.  Cystitis with hematuria.   Has been on antibiotics since 1217 now on Omnicef completed now 12 days of antibiotics 1 more day of Omnicef tomorrow will be 14 days total.    Signed  Chito Carlin PA-C.

## 2019-12-30 NOTE — PROGRESS NOTES
[facility]    Patient:  Barney Mackey  YOB: 1931  Date of Service: 12/30/2019  MRN: [id number]   Acct: [de-identified]   Primary Care Physician: No primary care provider on file. Advance Directive: Full Code  Admit Date: 12/23/2019       Hospital Day: 7      CHIEF COMPLAINT: Sepsis due to E. coli. Consult for medical management. 12/30/2019 9:51 AM  Subjective / Interval History:   No acute changes or acute overnight event reported. Patient endorses overall improvement. Denies any further hematuria. Family at bedside. No acute overnight event reported. 12/29/2019  Doing well. No further hematuria reported. No acute overnight event reported. Patient endorses overall improvement. Denies any acute complaints or distress at this time. 12/28/2019  No acute overnight event reported. Currently denies any further hematuria. Endorses overall improvement. Denies any acute complaints or distress at this time. Laying comfortably in bed, in no acute distress. Case discussed with RN.    12/27/2019  Doing well. No further hematuria reported. No acute overnight event reported. Endorses overall improvement. Denies any acute complaints at this time. Tolerating rehab.    12/26/2019  Doing well. No acute overnight events reported. Urinary catheter in place. Patient endorses overall improvement. Denies any further hematuria. Denies any other acute complaints or distress at this time. 12/25/2019  No acute changes or acute overnight event reported. Patient reportedly with traumatic Alves insertion, resulting in gross hematuria. Endorses overall improvement. Denies any acute complaints or distress at this time.     Brief Hx  Mr Billy Hashimoto, a 80 y.o. male who presents with history of CAD, CHF, atrial fibrillation, HTN, right intertrochanteric hip fracture (s/p cephalo-medullary nailing - 11/27/2019), admitted to [facility] on 12/09/2019, and was found to be hyponatremic with a sodium level of 119, on 12/27/2019. Patient was also noted to have acute cystitis with hematuria, as well as pneumonia. Initially was started on piperacillin-tazobactam, to cover pneumonia as well as E. coli positive urine culture.     Blood cultures subsequently positive for E. coli as well. Patient was placed on Omnicef on 12/22/2019, based on culture speciation and sensitivity. Commended for patient to complete a 14-day course of antibiotic as recommended by urology. Hematuria was deemed as caused by UTI. Repeat chest x-ray (12/24/2019), reporting partial resolution of the right lower lobe infiltrate. Chronic inflammatory lung changes.     Repeat blood cultures reporting no growth to date. Patient was started on tolvaptan, for treatment of hyponatremia.     Patient admitted to acute rehab, and medicine is consulted for continued medical management. Review of Systems:   Review of Systems  ROS: 14 point review of systems is negative except as specifically addressed above.     Dietary Nutrition Supplements: Standard High Calorie Oral Supplement  DIET GENERAL; Daily Fluid Restriction: 1800 ml    Intake/Output Summary (Last 24 hours) at 12/30/2019 0951  Last data filed at 12/30/2019 0110  Gross per 24 hour   Intake 360 ml   Output 775 ml   Net -415 ml       Medications:  Current Facility-Administered Medications   Medication Dose Route Frequency Provider Last Rate Last Dose    cefdinir (OMNICEF) capsule 300 mg  300 mg Oral 2 times per day Paramjit Hammer MD   300 mg at 12/30/19 0753    lidocaine (XYLOCAINE) 2 % uro-jet   Topical PRN ZURI Guaman        sodium chloride flush 0.9 % injection 10 mL  10 mL Intravenous PRN Dre Adams MD        ipratropium-albuterol (DUONEB) nebulizer solution 1 ampule  1 ampule Inhalation Q4H WA Dre Adams MD   1 ampule at 12/30/19 0711    bisacodyl (DULCOLAX) suppository 10 mg  10 mg Rectal Daily PRN Dre Adams MD        docusate sodium (COLACE) 50,000 Units Oral Weekly Buddy Nunes MD   50,000 Units at 12/29/19 1758    acetaminophen (TYLENOL) tablet 650 mg  650 mg Oral Q4H PRN Zakia Arellano MD        polyethylene glycol (GLYCOLAX) packet 17 g  17 g Oral Daily PRN Zakia Arellano MD        tamsulosin (FLOMAX) capsule 0.8 mg  0.8 mg Oral Daily Zakia Arellano MD   0.8 mg at 12/29/19 2118           cefdinir  300 mg Oral 2 times per day    ipratropium-albuterol  1 ampule Inhalation Q4H WA    docusate sodium  100 mg Oral BID    polyethylene glycol  17 g Oral Daily    allopurinol  100 mg Oral Daily    amiodarone  100 mg Oral Q12H    aspirin  81 mg Oral Daily    atorvastatin  20 mg Oral Daily    finasteride  5 mg Oral Daily    guaiFENesin  600 mg Oral BID    metoprolol succinate  12.5 mg Oral Daily    pantoprazole  40 mg Oral Daily    rivaroxaban  20 mg Oral Dinner    sodium chloride  1 g Oral TID     vitamin B-12  1,000 mcg Oral Daily    vitamin D  50,000 Units Oral Weekly    tamsulosin  0.8 mg Oral Daily     lidocaine, sodium chloride flush, bisacodyl, calcium carbonate, ipratropium-albuterol, nitroGLYCERIN, oxyCODONE-acetaminophen, sennosides-docusate sodium, acetaminophen, polyethylene glycol  Dietary Nutrition Supplements: Standard High Calorie Oral Supplement  DIET GENERAL; Daily Fluid Restriction: 1800 ml       Labs:   CBC with DIFF:  Recent Labs     12/28/19  0619 12/29/19  0831 12/30/19  0447   WBC 5.5 5.6 5.1   RBC 4.09* 4.24* 4.05*   HGB 11.6* 12.0* 11.8*   HCT 36.3* 38.2* 36.0*   MCV 88.8 90.1 88.9   MCH 28.4 28.3 29.1   MCHC 32.0* 31.4* 32.8*   RDW 13.7 13.9 13.7    190 180   MPV 9.3* 9.3* 9.3*   NEUTOPHILPCT  --   --  60.9   LYMPHOPCT  --   --  26.5   MONOPCT  --   --  7.1   EOSRELPCT  --   --  4.7   BASOPCT  --   --  0.4   NEUTROABS  --   --  3.1   LYMPHSABS  --   --  1.4   MONOSABS  --   --  0.40   EOSABS  --   --  0.20   BASOSABS  --   --  0.00       CMP/BMP:  Recent Labs     12/28/19  0619 12/29/19  0831 12/30/19  0447   * 131* 130*   K 4.3 4.2 4.3   CL 99 96* 96*   CO2 22 21* 21*   ANIONGAP 12 14 13   GLUCOSE 99 125* 106   BUN 17 17 17   CREATININE 0.7 0.9 0.8   LABGLOM >60 >60 >60   CALCIUM 8.6* 8.8 8.7*   PROT  --   --  6.4*   LABALBU  --   --  3.0*   BILITOT  --   --  0.5   ALKPHOS  --   --  136*   ALT  --   --  38   AST  --   --  27         CRP:  No results for input(s): CRP in the last 72 hours. Sed Rate:  No results for input(s): SEDRATE in the last 72 hours. HgBA1c:  No components found for: HGBA1C  FLP:    Lab Results   Component Value Date    TRIG 121 07/27/2017    HDL 42 07/27/2017    LDLCALC 124 07/27/2017     TSH:    Lab Results   Component Value Date    TSH 1.190 12/23/2019     Troponin T: No results for input(s): TROPONINI in the last 72 hours. Pro-BNP: No results for input(s): BNP in the last 72 hours. INR: No results for input(s): INR in the last 72 hours. ABGs:   Lab Results   Component Value Date    PHART 7.410 07/26/2017    PO2ART 63.0 07/26/2017    WDM8KQV 38.0 07/26/2017     UA:  No results for input(s): NITRITE, COLORU, PHUR, LABCAST, WBCUA, RBCUA, MUCUS, TRICHOMONAS, YEAST, BACTERIA, CLARITYU, SPECGRAV, LEUKOCYTESUR, UROBILINOGEN, BILIRUBINUR, BLOODU, GLUCOSEU, AMORPHOUS in the last 72 hours. Invalid input(s): Manda Jeans      Culture Results:    No results for input(s): CXSURG in the last 720 hours. Blood Culture Recent:   Recent Labs     12/23/19  1518 12/17/19  1252 12/17/19  0801   BC No growth after 5 days of incubation. Bottle volume = 7 ml*  Isolated from Aerobic and Anaerobic bottle  No further workup  Refer to (blood culture collected [email or web address]) for sensitivit    Bottle volume = 6 ml*  Isolated from Aerobic and Anaerobic bottle  No further workup  Refer to (blood culture collected [email or web address]) for sensitivit         Cultures:   No results for input(s): CULTURE in the last 72 hours. No results for input(s): Calvin SIFUENTES in the last 72 hours.   No results Ht 5' 8\" (1.727 m)   Wt 161 lb 12.8 oz (73.4 kg)   SpO2 97%   BMI 24.60 kg/m²   24HR INTAKE/OUTPUT:      Intake/Output Summary (Last 24 hours) at 12/30/2019 0951  Last data filed at 12/30/2019 0110  Gross per 24 hour   Intake 360 ml   Output 775 ml   Net -415 ml       Physical Exam  Nursing notes and vital signs reviewed  General appearance: No apparent distress, appears stated age and cooperative. Well developed and well groomed. HEENT: atraumatic, eyes with clear conjunctiva and normal lids, pupils and irises normal, external ears and nose are normal, lips normal. Hearing Intact. Lips with No blisters. Neck: Supple, with full range of motion. No jugular venous distention. Trachea midline. Thyroid no masses noted. No lympadenopathy or bruit. Lungs: Patient in no acute respiratory distress, no increased work of breathing, \"clear to auscultation bilaterally\" without rales, rhonchi or wheezes  Heart: regular rate and rhythm, S1, S2 normal, no murmur, click, rub or gallop  Abdomen: soft, non-tender; non-distended normal bowel sounds no masses, no organomegaly  Musculoskeletal: ROM Intact in B/L Upper and LE. No joint tenderness or Deformity throughout. Extremities: extremities normal, atraumatic, no cyanosis or edema  Neurologic: No focal neurologic deficits, normal sensation, CN: II-XII intact, grossly non-focal.  Skin: Skin color, texture, turgor normal. No rashes or lesions  Psychiatric: Alert and oriented, affect and mood appropriate, thought content appropriate, normal insight.      Assessment/plan:           Principal Problem:    Sepsis ([facility] [address]  Active Problems:    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 (HCC)    Gastroesophageal reflux disease without esophagitis    Essential hypertension    Dyslipidemia    Benign prostatic hyperplasia with urinary obstruction    Closed right hip fracture, initial encounter ([facility])    Paroxysmal atrial fibrillation

## 2019-12-31 LAB
ANION GAP SERPL CALCULATED.3IONS-SCNC: 12 MMOL/L (ref 7–19)
BUN BLDV-MCNC: 17 MG/DL (ref 8–23)
CALCIUM SERPL-MCNC: 8.8 MG/DL (ref 8.8–10.2)
CHLORIDE BLD-SCNC: 97 MMOL/L (ref 98–111)
CO2: 22 MMOL/L (ref 22–29)
CREAT SERPL-MCNC: 0.8 MG/DL (ref 0.5–1.2)
GFR NON-AFRICAN AMERICAN: >60
GLUCOSE BLD-MCNC: 106 MG/DL (ref 74–109)
POTASSIUM SERPL-SCNC: 4.5 MMOL/L (ref 3.5–5)
SODIUM BLD-SCNC: 131 MMOL/L (ref 136–145)

## 2019-12-31 PROCEDURE — 97116 GAIT TRAINING THERAPY: CPT

## 2019-12-31 PROCEDURE — 97110 THERAPEUTIC EXERCISES: CPT

## 2019-12-31 PROCEDURE — 1180000000 HC REHAB R&B

## 2019-12-31 PROCEDURE — 6370000000 HC RX 637 (ALT 250 FOR IP): Performed by: UROLOGY

## 2019-12-31 PROCEDURE — 6370000000 HC RX 637 (ALT 250 FOR IP): Performed by: PSYCHIATRY & NEUROLOGY

## 2019-12-31 PROCEDURE — 99231 SBSQ HOSP IP/OBS SF/LOW 25: CPT | Performed by: PHYSICIAN ASSISTANT

## 2019-12-31 PROCEDURE — 6370000000 HC RX 637 (ALT 250 FOR IP): Performed by: HOSPITALIST

## 2019-12-31 PROCEDURE — 97530 THERAPEUTIC ACTIVITIES: CPT

## 2019-12-31 PROCEDURE — 99233 SBSQ HOSP IP/OBS HIGH 50: CPT | Performed by: PSYCHIATRY & NEUROLOGY

## 2019-12-31 PROCEDURE — 94640 AIRWAY INHALATION TREATMENT: CPT

## 2019-12-31 PROCEDURE — 80048 BASIC METABOLIC PNL TOTAL CA: CPT

## 2019-12-31 PROCEDURE — 36415 COLL VENOUS BLD VENIPUNCTURE: CPT

## 2019-12-31 PROCEDURE — 97535 SELF CARE MNGMENT TRAINING: CPT

## 2019-12-31 PROCEDURE — 2580000003 HC RX 258: Performed by: HOSPITALIST

## 2019-12-31 RX ADMIN — AMIODARONE HYDROCHLORIDE 100 MG: 100 TABLET ORAL at 08:59

## 2019-12-31 RX ADMIN — AMIODARONE HYDROCHLORIDE 100 MG: 100 TABLET ORAL at 20:34

## 2019-12-31 RX ADMIN — GUAIFENESIN 600 MG: 600 TABLET, EXTENDED RELEASE ORAL at 08:59

## 2019-12-31 RX ADMIN — POLYETHYLENE GLYCOL 3350 17 G: 17 POWDER, FOR SOLUTION ORAL at 08:58

## 2019-12-31 RX ADMIN — FINASTERIDE 5 MG: 5 TABLET, FILM COATED ORAL at 20:34

## 2019-12-31 RX ADMIN — DOCUSATE SODIUM 100 MG: 100 CAPSULE, LIQUID FILLED ORAL at 20:34

## 2019-12-31 RX ADMIN — GUAIFENESIN 600 MG: 600 TABLET, EXTENDED RELEASE ORAL at 20:34

## 2019-12-31 RX ADMIN — CYANOCOBALAMIN TAB 500 MCG 1000 MCG: 500 TAB at 08:59

## 2019-12-31 RX ADMIN — DOCUSATE SODIUM 100 MG: 100 CAPSULE, LIQUID FILLED ORAL at 08:59

## 2019-12-31 RX ADMIN — TAMSULOSIN HYDROCHLORIDE 0.8 MG: 0.4 CAPSULE ORAL at 20:34

## 2019-12-31 RX ADMIN — ASPIRIN 81 MG: 81 TABLET, COATED ORAL at 08:59

## 2019-12-31 RX ADMIN — SODIUM CHLORIDE, PRESERVATIVE FREE 10 ML: 5 INJECTION INTRAVENOUS at 08:59

## 2019-12-31 RX ADMIN — ATORVASTATIN CALCIUM 20 MG: 20 TABLET, FILM COATED ORAL at 20:35

## 2019-12-31 RX ADMIN — METOPROLOL SUCCINATE 12.5 MG: 25 TABLET, EXTENDED RELEASE ORAL at 08:59

## 2019-12-31 RX ADMIN — ALLOPURINOL 100 MG: 100 TABLET ORAL at 08:59

## 2019-12-31 RX ADMIN — IPRATROPIUM BROMIDE AND ALBUTEROL SULFATE 1 AMPULE: .5; 3 SOLUTION RESPIRATORY (INHALATION) at 11:18

## 2019-12-31 RX ADMIN — RIVAROXABAN 20 MG: 20 TABLET, FILM COATED ORAL at 17:46

## 2019-12-31 RX ADMIN — CEFDINIR 300 MG: 300 CAPSULE ORAL at 08:59

## 2019-12-31 RX ADMIN — Medication 1 G: at 17:46

## 2019-12-31 RX ADMIN — Medication 1 G: at 13:19

## 2019-12-31 RX ADMIN — IPRATROPIUM BROMIDE AND ALBUTEROL SULFATE 1 AMPULE: .5; 3 SOLUTION RESPIRATORY (INHALATION) at 06:36

## 2019-12-31 RX ADMIN — Medication 1 G: at 08:58

## 2019-12-31 RX ADMIN — PANTOPRAZOLE SODIUM 40 MG: 40 TABLET, DELAYED RELEASE ORAL at 08:59

## 2019-12-31 ASSESSMENT — PAIN DESCRIPTION - PROGRESSION
CLINICAL_PROGRESSION: NOT CHANGED
CLINICAL_PROGRESSION: NOT CHANGED

## 2019-12-31 ASSESSMENT — PAIN SCALES - GENERAL
PAINLEVEL_OUTOF10: 2
PAINLEVEL_OUTOF10: 0

## 2019-12-31 NOTE — PROGRESS NOTES
Physical Therapy  Name: Carter Galvez  MRN:  [id number]  Date of service:  12/31/2019 12/31/19 0955   Subjective   Subjective Patient ready to work with therapy   Bed Mobility   Supine to Sit Modified independent   Sit to Supine Modified independent   Transfers   Sit to Stand Contact guard assistance   Stand to sit Stand by assistance   Bed to Chair Stand by assistance   Ambulation   WB Status WBAT   Ambulation 1   Surface level tile   Device Rolling Walker   Assistance Stand by assistance;Contact guard assistance   Quality of Gait Flexed posture R  LE shuffling step, increased with fatigue   Gait Deviations Decreased step length;Decreased step height;Slow Allison   Distance 200 feet   Exercises   Comments Bilateral LE ex's seated x 25 reps, red t-band and ball squeezes x 30 reps   Patient Goals    Patient goals  get better   Short term goals   Time Frame for Short term goals 1 week    Short term goal 1 supervision bed mobility    Short term goal 2 CGA transfer with AD   Short term goal 3 CGA 50 feet ambulation with AD   Short term goal 4 supervision WC propulsion and management 150 feet    Short term goal 5 CGA 4 steps with AD   Long term goals   Time Frame for Long term goals  2 weeks   Long term goal 1 independent bed mobility    Long term goal 2 independent transfer with AD    Long term goal 3 independent 150 feet ambulation with AD   Long term goal 4 independent wheelchair ambulation 150 feet    Long term goal 5 supervision 4 steps with AD    Conditions Requiring Skilled Therapeutic Intervention   Body structures, Functions, Activity limitations Decreased functional mobility ; Decreased ADL status; Decreased ROM; Decreased strength;Decreased endurance;Decreased balance;Decreased posture   Assessment Patient doing very well with mobility, assisted patient to OT and left with all needs in reach.    Treatment Diagnosis interference with activity   Prognosis Good       Electronically signed by Rosa Narvaez PTA

## 2019-12-31 NOTE — PROGRESS NOTES
was being followed by urology for urinary retention and acute hematuria. He had CBI in place but this has now been discontinued. Alves was removed on 12/23/19 for voiding trial with in & out catheterization as needed. He was placed on Omnicef on 12/22/19 by urology and will need  for 14 days. Order for repeat blood cultures and for repeat CXR on 12/24/19. He has improved overall. He is participating in both PT/OT. He is felt to need a return to Rehab to work towards his goal of returning home with assist of his girlfriend.  He is now felt ready to start the Rehab program. No acute issues.       REVIEW OF SYSTEMS:  Constitutional: No fevers No chills  Neck:No stiffness  Respiratory: No shortness of breath  Cardiovascular: No chest pain No palpitations  Gastrointestinal: No abdominal pain    Genitourinary: No Dysuria  Neurological: No headache, no confusion    Past Medical History:      Diagnosis Date    Atrial fibrillation ([address]     CAD (coronary artery disease)     Chronic combined systolic and diastolic CHF (congestive heart failure) ([facility] [address] 12/17/2019    COPD (chronic obstructive pulmonary disease) ([facility] [address] 12/17/2019    GERD (gastroesophageal reflux disease)     Gout     Gout     Heart attack ([facility] [address]     History of home oxygen therapy     at night    Hypertension     Hyponatremia 12/17/2019    Mixed hyperlipidemia 12/17/2019    Palliative care patient 12/20/2019       Past Surgical History:      Procedure Laterality Date    CARDIAC SURGERY      cabg 2 bypasss    CORONARY ARTERY BYPASS GRAFT      x 2 vessels    INTERTROCHANTERIC HIP FRACTURE SURGERY Right     KNEE SURGERY         Medications in Hospital:      Current Facility-Administered Medications:     lidocaine (XYLOCAINE) 2 % uro-jet, , Topical, PRN, Loral ZURI Ni    sodium chloride flush 0.9 % injection 10 mL, 10 mL, Intravenous, PRN, Edi Maldonado MD, 10 mL at 12/31/19 0859    ipratropium-albuterol (DUONEB) nebulizer solution 1 ampule, 1 ampule, Inhalation, Q4H WA, Claudell Cambridge, MD, 1 ampule at 12/31/19 0636    bisacodyl (DULCOLAX) suppository 10 mg, 10 mg, Rectal, Daily PRN, Claudell Cambridge, MD    docusate sodium (COLACE) capsule 100 mg, 100 mg, Oral, BID, Claudell Cambridge, MD, 100 mg at 12/31/19 0859    polyethylene glycol (GLYCOLAX) packet 17 g, 17 g, Oral, Daily, Claudell Cambridge, MD, 17 g at 12/31/19 0858    allopurinol (ZYLOPRIM) tablet 100 mg, 100 mg, Oral, Daily, Claudell Cambridge, MD, 100 mg at 12/31/19 0859    amiodarone (PACERONE) tablet 100 mg, 100 mg, Oral, Q12H, Claudell Cambridge, MD, 100 mg at 12/31/19 0859    aspirin EC tablet 81 mg, 81 mg, Oral, Daily, Claudell Cambridge, MD, 81 mg at 12/31/19 0859    atorvastatin (LIPITOR) tablet 20 mg, 20 mg, Oral, Daily, Claudell Cambridge, MD, 20 mg at 12/30/19 2112    calcium carbonate (TUMS) chewable tablet 1,000 mg, 2 tablet, Oral, BID PRN, Claudell Cambridge, MD    finasteride (PROSCAR) tablet 5 mg, 5 mg, Oral, Daily, Claudell Cambridge, MD, 5 mg at 12/30/19 2112    guaiFENesin [facility]) extended release tablet 600 mg, 600 mg, Oral, BID, Claudell Cambridge, MD, 600 mg at 12/31/19 0859    ipratropium-albuterol (DUONEB) nebulizer solution 3 mL, 1 vial, Inhalation, Q6H PRN, Claudell Cambridge, MD    metoprolol succinate (TOPROL XL) extended release tablet 12.5 mg, 12.5 mg, Oral, Daily, Claudell Cambridge, MD, 12.5 mg at 12/31/19 0859    nitroGLYCERIN (NITROSTAT) SL tablet 0.4 mg, 0.4 mg, Sublingual, Q5 Min PRN, Claudell Cambridge, MD    oxyCODONE-acetaminophen (PERCOCET) 5-325 MG per tablet 1 tablet, 1 tablet, Oral, Q6H PRN, Claudell Cambridge, MD    pantoprazole (PROTONIX) tablet 40 mg, 40 mg, Oral, Daily, Claudell Cambridge, MD, 40 mg at 12/31/19 0859    rivaroxaban (XARELTO) tablet 20 mg, 20 mg, Oral, Dinner, Claudell Cambridge, MD, 20 mg at 12/30/19 1738    sennosides-docusate sodium (SENOKOT-S) 8.6-50 MG tablet 2 tablet, 2 tablet, Oral, Daily PRN, Claudell Cambridge, MD, 2 tablet at 12/23/19 9288    sodium chloride tablet 1 g, 1 g, Oral, TID Jonh Pagan MD, 1 g at 12/31/19 8996    vitamin B-12 (CYANOCOBALAMIN) tablet 1,000 mcg, 1,000 mcg, Oral, Daily, Buddy Nunes MD, 1,000 mcg at 12/31/19 0859    vitamin D (ERGOCALCIFEROL) capsule 50,000 Units, 50,000 Units, Oral, Weekly, Buddy Nunes MD, 50,000 Units at 12/29/19 0834    acetaminophen (TYLENOL) tablet 650 mg, 650 mg, Oral, Q4H PRN, Zakia Arellano MD    polyethylene glycol (GLYCOLAX) packet 17 g, 17 g, Oral, Daily PRN, Zakia Arellano MD    tamsulosin (FLOMAX) capsule 0.8 mg, 0.8 mg, Oral, Daily, Zakia Arellano MD, 0.8 mg at 12/30/19 2112    Allergies:  Patient has no known allergies. Social History:   TOBACCO:   reports that he has quit smoking. His smoking use included cigarettes. He has a 195.00 pack-year smoking history. He has quit using smokeless tobacco.  ETOH:   reports no history of alcohol use. Family History:       Problem Relation Age of Onset    No Known Problems Mother     Heart Disease Father          PHYSICAL EXAM:  BP (!) 114/58   Pulse 60   Temp 98 °F (36.7 °C) (Temporal)   Resp 17   Ht 5' 8\" (1.727 m)   Wt 161 lb 12.8 oz (73.4 kg)   SpO2 91%   BMI 24.60 kg/m²     Constitutional - well developed, well nourished. Eyes - conjunctiva normal.   Ear, nose, throat - No scars, masses, or lesions over external nose or ears, no atrophy of tongue  Neck-symmetric, no masses noted, no jugular vein distension  Respiration- chest wall appears symmetric, good expansion,   normal effort without use of accessory muscles  Musculoskeletal - no significant wasting of muscles noted, no bony deformities  Extremities-no clubbing, cyanosis or edema  Skin - warm, dry, and intact. No rash, erythema, or pallor.   Psychiatric - mood, affect, and behavior appear normal.      Neurological exam  Awake, alert, fluent oriented appropriate affect  Attention and concentration appear appropriate  Recent and remote memory appears unremarkable  Speech normal without dysarthria  No clear Device None (Room air)   Orientation   Overall Orientation Status WNL   Orientation Level Oriented to person;Oriented to place;Oriented to time   Transfers   Sit to Stand Stand by assistance   Stand to sit Stand by assistance   Ambulation   Ambulation? Yes   WB Status WBAT   More Ambulation? Yes   Ambulation 1   Surface level tile   Device Rolling Walker   Other Apparatus Wheelchair follow   Assistance Stand by assistance   Quality of Gait Flexed posture R  LE shuffling step   Gait Deviations Decreased step length;Decreased step height   Distance 125'   Comments Patient walked until fatigued then sat in [facility]   Ambulation 2   Surface - 2 level tile   Device 2 Rolling Walker   Assistance 2 Stand by assistance;Contact guard assistance   Quality of Gait 2 Increased step height with R LE untill near end when patient fatigued   Gait Deviations Decreased step height   Distance 125'   Comments Patient BTB   Exercises   Heelslides 25   Hip Flexion 25   Hip Abduction 25   Knee Long Arc Quad 25   Ankle Pumps 25   Comments BL LE EX AROM  in sitting   x 25  reps   x 2 sets   Conditions Requiring Skilled Therapeutic Intervention   Body structures, Functions, Activity limitations Decreased functional mobility ; Decreased ADL status; Decreased ROM; Decreased strength;Decreased endurance;Decreased balance;Decreased posture   Activity Tolerance   Activity Tolerance Patient Tolerated treatment well   Safety Devices   Type of devices Left in bed;Call light within reach; Bed alarm in place   Occupational Therapy     Electronically signed by Dane Almazan PTA on 12/30/2019 at 9:55 AM                      RECORD REVIEW: Previous medical records, medications were reviewed at today's visit    IMPRESSION:   1. Sepsis-complete abx  2. H/O gout-n allopurinol  3. Atrial fibrillation-on ASA/Amiodarone/metroprolol/Xarelto-  4. Hyperlipidemia-on statin  5. UTI-complete abx   6. Urinary retention-jackson in place-appreciate urology input  7.  BPH-on

## 2019-12-31 NOTE — PROGRESS NOTES
Durable Medical Equipment   Physician Order     Patient Name Susanna Ellison     Address  [address] [address]. Phone  [phone] (Home)  [phone] (Mobile)     Patient Height Height: 5' 8\" (172.7 cm)  Patient Weight 161 lb 12.8 oz (73.4 kg)   3/27/1931     F/O Payor/Plan Precert #   MEDICARE/MEDICARE PART A AND B    Subscriber Subscriber Apoorva Vera [id number]   Address Phone   [address] [place] [address] [address] [phone]       DME NEEDS:  **16\" MANUAL WHEELCHAIR WITH ANTI-TIPPERS, STANDARD WHEELCHAIR CUSHION, AND SWING-AWAY FOOT RESTS.  **TRANSFER TUB BENCH.  **[address] BED. DIAGNOSIS:  Sepsis ([facility] [address] A41.9     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 (HCC) I25.700     Benign prostatic hyperplasia with urinary obstruction N40.1, N13.8     Closed right hip fracture, initial encounter ([facility] [address] S72.001A       HISTORY OF PRESENT ILLNESS:   This 80 y.o. male pt admitted to [facility] on 19 w/ R hip pain. Pt reports that 3 days earlier, he felt dizzy and fell on his R hip. He did not lose consciounsness. He present to [facility] ED on  for evaluation and per pt they didn't find anything wrong w/him and sent him home. He presented to [facility] ED per EMS on  with more pain and inability to bear weight on RLE. He also complained of dizziness that has worseded since amlodipine and miodarone was started by cardiology. Xrays & CT of hip were all neg. On  an MRI of his R hip was done which showd edema within the intertrochanterir R femoral neck highly suggestive of a nondisplaced fx. Dr Carmen Lu was consulted. Pt opted for conservative treatment w/ TTWB to his RLE. Pt was not able to maintain TTWB, so on 12/3/19, he underwent a cephalomedullary nailing of his R displaced intertrochanteric hip fx. He tolerated procedure well and will be WBAT in his R LE.  He was participating in both PT/OT and

## 2019-12-31 NOTE — PROGRESS NOTES
assistance   CARE Score 4   Upper Body Dressing   Assistance Needed Setup or clean-up assistance   CARE Score 5   Lower Body Dressing   Assistance Needed Supervision or touching assistance   CARE Score 4   Putting On/Taking Off Footwear   Assistance Needed Supervision or touching assistance   CARE Score 4

## 2019-12-31 NOTE — PROGRESS NOTES
12/31/19 1445   Ambulation 1   Device [address] Stand by assistance   Quality of Gait decreased right step length, increased left step length.  gradually increasing flexed forward posture. improves with cues   Distance 250 ft   Stairs   # Steps  9   Rails Bilateral   Assistance Contact guard assistance   Comment UP WITH RIGHT, DOWN WITH LEFT   Other exercises   Other exercises 1 sitting bilat le hip flex/ext/abd/add; knee ext; pf/df x10 ea   Conditions Requiring Skilled Therapeutic Intervention   Body structures, Functions, Activity limitations Decreased functional mobility ; Decreased ADL status; Decreased ROM; Decreased strength;Decreased endurance;Decreased balance;Decreased posture   Assessment  CONTINUED FLEXED FORWARD POSTURE. ANTALGIC WEIGHTBEARING RIGHT LE BUT C/O LEFT KNEE PAIN, \"LOCKING UP\".

## 2020-01-01 LAB
ANION GAP SERPL CALCULATED.3IONS-SCNC: 13 MMOL/L (ref 7–19)
BUN BLDV-MCNC: 15 MG/DL (ref 8–23)
CALCIUM SERPL-MCNC: 8.6 MG/DL (ref 8.8–10.2)
CHLORIDE BLD-SCNC: 96 MMOL/L (ref 98–111)
CO2: 22 MMOL/L (ref 22–29)
CREAT SERPL-MCNC: 0.8 MG/DL (ref 0.5–1.2)
GFR NON-AFRICAN AMERICAN: >60
GLUCOSE BLD-MCNC: 99 MG/DL (ref 74–109)
POTASSIUM SERPL-SCNC: 4.6 MMOL/L (ref 3.5–5)
SODIUM BLD-SCNC: 131 MMOL/L (ref 136–145)

## 2020-01-01 PROCEDURE — 99231 SBSQ HOSP IP/OBS SF/LOW 25: CPT | Performed by: PHYSICIAN ASSISTANT

## 2020-01-01 PROCEDURE — 97110 THERAPEUTIC EXERCISES: CPT

## 2020-01-01 PROCEDURE — 97530 THERAPEUTIC ACTIVITIES: CPT

## 2020-01-01 PROCEDURE — 99232 SBSQ HOSP IP/OBS MODERATE 35: CPT | Performed by: PSYCHIATRY & NEUROLOGY

## 2020-01-01 PROCEDURE — 97116 GAIT TRAINING THERAPY: CPT

## 2020-01-01 PROCEDURE — 36415 COLL VENOUS BLD VENIPUNCTURE: CPT

## 2020-01-01 PROCEDURE — 6370000000 HC RX 637 (ALT 250 FOR IP): Performed by: HOSPITALIST

## 2020-01-01 PROCEDURE — 6370000000 HC RX 637 (ALT 250 FOR IP): Performed by: PSYCHIATRY & NEUROLOGY

## 2020-01-01 PROCEDURE — 1180000000 HC REHAB R&B

## 2020-01-01 PROCEDURE — 80048 BASIC METABOLIC PNL TOTAL CA: CPT

## 2020-01-01 RX ADMIN — FINASTERIDE 5 MG: 5 TABLET, FILM COATED ORAL at 20:48

## 2020-01-01 RX ADMIN — TAMSULOSIN HYDROCHLORIDE 0.8 MG: 0.4 CAPSULE ORAL at 20:48

## 2020-01-01 RX ADMIN — PANTOPRAZOLE SODIUM 40 MG: 40 TABLET, DELAYED RELEASE ORAL at 07:55

## 2020-01-01 RX ADMIN — ATORVASTATIN CALCIUM 20 MG: 20 TABLET, FILM COATED ORAL at 20:48

## 2020-01-01 RX ADMIN — AMIODARONE HYDROCHLORIDE 100 MG: 100 TABLET ORAL at 07:55

## 2020-01-01 RX ADMIN — ASPIRIN 81 MG: 81 TABLET, COATED ORAL at 07:54

## 2020-01-01 RX ADMIN — Medication 1 G: at 07:54

## 2020-01-01 RX ADMIN — METOPROLOL SUCCINATE 12.5 MG: 25 TABLET, EXTENDED RELEASE ORAL at 07:55

## 2020-01-01 RX ADMIN — CYANOCOBALAMIN TAB 500 MCG 1000 MCG: 500 TAB at 07:54

## 2020-01-01 RX ADMIN — ALLOPURINOL 100 MG: 100 TABLET ORAL at 07:54

## 2020-01-01 RX ADMIN — AMIODARONE HYDROCHLORIDE 100 MG: 100 TABLET ORAL at 20:48

## 2020-01-01 RX ADMIN — Medication 1 G: at 17:25

## 2020-01-01 RX ADMIN — GUAIFENESIN 600 MG: 600 TABLET, EXTENDED RELEASE ORAL at 20:48

## 2020-01-01 RX ADMIN — DOCUSATE SODIUM 100 MG: 100 CAPSULE, LIQUID FILLED ORAL at 07:55

## 2020-01-01 RX ADMIN — RIVAROXABAN 20 MG: 20 TABLET, FILM COATED ORAL at 17:25

## 2020-01-01 RX ADMIN — POLYETHYLENE GLYCOL 3350 17 G: 17 POWDER, FOR SOLUTION ORAL at 07:53

## 2020-01-01 RX ADMIN — DOCUSATE SODIUM 100 MG: 100 CAPSULE, LIQUID FILLED ORAL at 20:48

## 2020-01-01 RX ADMIN — Medication 1 G: at 12:10

## 2020-01-01 RX ADMIN — GUAIFENESIN 600 MG: 600 TABLET, EXTENDED RELEASE ORAL at 07:55

## 2020-01-01 ASSESSMENT — PAIN DESCRIPTION - PROGRESSION: CLINICAL_PROGRESSION: NOT CHANGED

## 2020-01-01 ASSESSMENT — PAIN SCALES - GENERAL
PAINLEVEL_OUTOF10: 0

## 2020-01-01 NOTE — PROGRESS NOTES
Gómez Guo  [id number]     01/01/20 1036 01/01/20 1037 01/01/20 1038   General   Response To Previous Treatment Patient with no complaints from previous session. --   --    Family / Caregiver Present No  --   --    Subjective   Subjective Pt agreed to therapy this morning. --   --    Pain Screening   Patient Currently in Pain No  --   --    Pain Assessment   Pain Assessment 0-10  --   --    Pain Level 0  --   --    Pre Treatment Pain Screening   Pain at present 0  --   --    Scale Used Numeric Score  --   --    Intervention List Patient able to continue with treatment  --   --    Bed Mobility   Rolling Modified independent  --   --    Supine to Sit Modified independent  --   --    Sit to Supine Modified independent  --   --    Scooting   (.)  --   --    Transfers   Sit to Stand Stand by assistance  --   --    Stand to sit Stand by assistance  --   --    Bed to Chair Stand by assistance  --   --    Ambulation   Ambulation?  --  Yes  --    WB Status  --  WBAT  --    Ambulation 1   Surface  --  level tile  --    Device  --  Rolling Walker  --    Other Apparatus  --    (Cath Bag)  --    Assistance  --  Stand by assistance  --    Quality of Gait  --  Pt showed forward flex posture, decreased step length on RLE, decreased step height on LLE during swing phase.  --    Distance  --  250'  --    Exercises   Comments  --   --  Standing Angel LE ther ex x 10-15 reps in parallel bars. Conditions Requiring Skilled Therapeutic Intervention   Body structures, Functions, Activity limitations  --   --  Decreased functional mobility ; Decreased ADL status; Decreased strength;Decreased endurance   Assessment  --   --  Pt tolerated Standing ther ex w/ slight increase in fatigue. Pt tolerated amb well w/ minimal fatigue. Pt is doing well w/ bed mobility, but continues to show slight unsteadiness during TF's still needing SBA.    Prognosis  --   --  Good   Activity Tolerance   Activity Tolerance  --   --  Patient Tolerated treatment well   Electronically signed by Ailyn Blood PTA on 1/1/2020 at 10:46 AM

## 2020-01-01 NOTE — PROGRESS NOTES
Occupational Therapy     01/01/20 1400   General   Diagnosis R hip fx s/p cephalo-medullary nailing 11/27, pneumonia   Subjective   Subjective Pt. stated that he feels dizzy when standing today. Pain Assessment   Patient Currently in Pain No   Pain Assessment 0-10   Pain Level 0   Vital Signs   /63   Balance   Sitting Balance Independent   Standing Balance Supervision   Functional Mobility   Functional - Mobility Device Rolling Walker  (And w/c.)   Activity Retrieve items; Other;Transport items   Assist Level Stand by assistance   Functional Mobility Comments Cues for avoiding obstacles when navigating tight spaces with w/c during laundry task. Light food prep task in kitchen utalizing RW. Transfers   Sit to stand Supervision   Stand to sit Supervision   Assessment   Performance deficits / Impairments Decreased functional mobility ; Decreased ADL status; Decreased strength;Decreased endurance;Decreased balance;Decreased high-level IADLs   Treatment Diagnosis R hip fx s/p cephalo-medullary nailing    Prognosis Good   Timed Code Treatment Minutes 45 Minutes   Activity Tolerance   Activity Tolerance Patient Tolerated treatment well   Safety Devices   Safety Devices in place Yes   Type of devices Gait belt  (Given to PT.)   Plan   Current Treatment Recommendations Strengthening;Balance Training;Functional Mobility Training; Endurance Training;Patient/Caregiver Education & Training;Equipment Evaluation, Education, & procurement; Safety Education & Training;Self-Care / ADL; Home Management Training normal (ped)...

## 2020-01-01 NOTE — PROGRESS NOTES
Department of Urology  Physician Assistant Progress Note  Nina Cooper PA-C.      SUBJECTIVE: Patient in therapy     OBJECTIVE: Urine remains clear. REVIEW OF SYSTEMS:   Review of Systems    Physical  VITALS:  /60   Pulse 62   Temp 98.7 °F (37.1 °C) (Temporal)   Resp 16   Ht 5' 8\" (1.727 m)   Wt 161 lb 12.8 oz (73.4 kg)   SpO2 98%   BMI 24.60 kg/m²   TEMPERATURE:  Current - Temp: 98.7 °F (37.1 °C);  Max - Temp  Av.3 °F (36.8 °C)  Min: 97.9 °F (36.6 °C)  Max: 98.7 °F (37.1 °C)  BACK: no tenderness in spine or flanks  ABDOMEN:  soft and non-distended  HEART:  normal rate  CHEST: Normal effort  GENITAL/URINARY: Alves catheter draining clear urine    Data  CBC:   Lab Results   Component Value Date    WBC 5.1 2019    RBC 4.05 2019    HGB 11.8 2019    HCT 36.0 2019    MCV 88.9 2019    MCH 29.1 2019    MCHC 32.8 2019    RDW 13.7 2019     2019    MPV 9.3 2019     CMP:    Lab Results   Component Value Date     2020    K 4.6 2020    K 4.3 2019    CL 96 2020    CO2 22 2020    BUN 15 2020    CREATININE 0.8 2020    LABGLOM >60 2020    GLUCOSE 99 2020    PROT 6.4 2019    LABALBU 3.0 2019    CALCIUM 8.6 2020    BILITOT 0.5 2019    ALKPHOS 136 2019    AST 27 2019    ALT 38 2019     Intake: 800  Output 2000 mL    ASSESSMENT AND PLAN  Patient Active Problem List   Diagnosis    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

## 2020-01-01 NOTE — PROGRESS NOTES
extended release tablet 600 mg  600 mg Oral BID No Medina MD   600 mg at 01/01/20 0755    ipratropium-albuterol (DUONEB) nebulizer solution 3 mL  1 vial Inhalation Q6H PRN No Medina MD        metoprolol succinate (TOPROL XL) extended release tablet 12.5 mg  12.5 mg Oral Daily No Medina MD   12.5 mg at 01/01/20 0755    nitroGLYCERIN (NITROSTAT) SL tablet 0.4 mg  0.4 mg Sublingual Q5 Min PRN No Medina MD        oxyCODONE-acetaminophen (PERCOCET) 5-325 MG per tablet 1 tablet  1 tablet Oral Q6H PRN No Medina MD        pantoprazole (PROTONIX) tablet 40 mg  40 mg Oral Daily No Medina MD   40 mg at 01/01/20 0755    rivaroxaban (XARELTO) tablet 20 mg  20 mg Oral Dinner No Medina MD   20 mg at 12/31/19 1746    sennosides-docusate sodium (SENOKOT-S) 8.6-50 MG tablet 2 tablet  2 tablet Oral Daily PRN No Medina MD   2 tablet at 12/23/19 2118    sodium chloride tablet 1 g  1 g Oral TID WC No Medina MD   1 g at 01/01/20 1210    vitamin B-12 (CYANOCOBALAMIN) tablet 1,000 mcg  1,000 mcg Oral Daily No Medina MD   1,000 mcg at 01/01/20 0754    vitamin D (ERGOCALCIFEROL) capsule 50,000 Units  50,000 Units Oral Weekly No Medina MD   50,000 Units at 12/29/19 0834    acetaminophen (TYLENOL) tablet 650 mg  650 mg Oral Q4H PRN Osei Parry MD        polyethylene glycol (GLYCOLAX) packet 17 g  17 g Oral Daily PRN Osei Parry MD        tamsulosin (FLOMAX) capsule 0.8 mg  0.8 mg Oral Daily Osei Parry MD   0.8 mg at 12/31/19 2034        Labs:     Recent Labs     12/30/19  0447   WBC 5.1   RBC 4.05*   HGB 11.8*   HCT 36.0*   MCV 88.9   MCH 29.1   MCHC 32.8*        Recent Labs     12/30/19  0447 12/31/19  0546 01/01/20  0608   * 131* 131*   K 4.3 4.5 4.6   ANIONGAP 13 12 13   CL 96* 97* 96*   CO2 21* 22 22   BUN 17 17 15   CREATININE 0.8 0.8 0.8   GLUCOSE 106 106 99   CALCIUM 8.7* 8.8 8.6*     No results for input(s): MG, PHOS in the last 72 hours.   Recent Labs     12/30/19  0447   AST 27   ALT 38   BILITOT 0.5   ALKPHOS 136*     ABGs:No results for input(s): PH, PO2, PCO2, HCO3, BE, O2SAT in the last 72 hours. Troponin T: No results for input(s): TROPONINI in the last 72 hours. INR: No results for input(s): INR in the last 72 hours. Lactic Acid: No results for input(s): LACTA in the last 72 hours. Objective:   Vitals: /63   Pulse 62   Temp 98.7 °F (37.1 °C) (Temporal)   Resp 16   Ht 5' 8\" (1.727 m)   Wt 161 lb 12.8 oz (73.4 kg)   SpO2 98%   BMI 24.60 kg/m²   24HR INTAKE/OUTPUT:      Intake/Output Summary (Last 24 hours) at 1/1/2020 1504  Last data filed at 1/1/2020 1356  Gross per 24 hour   Intake 920 ml   Output 2000 ml   Net -1080 ml     General appearance: Alert  HEENT: AT/NC  Lungs: BLAE  Heart: regular rate and rhythm  Abdomen: soft, non-tender; bowel sounds normal; no masses,  no organomegaly  Extremities: extremities normal, atraumatic, no cyanosis or edema  Neurologic: Alert, gross motor and sensory function intact   Skin: warm    Assessment and Plan:   Principal Problem:    Sepsis ([address]  Active Problems:    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 ([facility])    Gastroesophageal reflux disease without esophagitis    Essential hypertension    Dyslipidemia    Benign prostatic hyperplasia with urinary obstruction    Closed right hip fracture, initial encounter ([facility])    Paroxysmal atrial fibrillation ([facility])    Chronic combined systolic and diastolic CHF (congestive heart failure) ([facility])    Mixed hyperlipidemia    COPD (chronic obstructive pulmonary disease) ([facility])    Hyponatremia    Gross hematuria    Pneumonia due to organism    Urinary retention    Bacteremia    Weakness    Pain in leg, unspecified  Resolved Problems:    * No resolved hospital problems. *    Afebrile without leukocytosis - has completed 14 days of antibiotics for UTI/Sepsis.   Urology on board for urinary retention/Alves  Hyponatremia stable. Sodium today 131. Supportive management.     Signed:  James Tobar MD 1/1/2020 3:04 PM  Rounding Hospitalist

## 2020-01-01 NOTE — PROGRESS NOTES
Patient:   Tristan Tipton  MR#:    [id number]   Room:    3683/501-77   YOB: 1931  Date of Progress Note: 1/1/2020  Time of Note                           8:08 AM  Consulting Physician:   Simón Reece M.D. Attending Physician:  Simón Reece MD     Chief complaint Sepsis due to Escherichia coli [E. coli]    S:This 80 y.o. male  pt admitted to [facility] on 11/27/19 w/ R hip pain. Pt reports that 3 days earlier, he felt dizzy and fell on his R hip. He did not lose consciounsness. He present to [facility] ED on 11/25 for evaluation and per pt they didn't find anything wrong w/him and sent him home. He presented to [facility] ED per EMS on 11/27 with more pain and inability to bear weight on RLE. He also complained of dizziness that has worseded since amlodipine and miodarone was started by cardiology. Xrays & CT of hip were all neg. On 11/29 an MRI of his R hip was done which showd edema within the intertrochanterir R femoral neck highly suggestive of a nondisplaced fx. Dr Andreea Tejeda was consulted. Pt opted for conservative treatment w/ TTWB to his RLE. Pt was not able to maintain TTWB, so on 12/3/19, he underwent a cephalomedullary nailing of his R displaced intertrochanteric hip fx. He tolerated procedure well and will be WBAT in his R LE. He was participating in both PT/OT and was admitted to the [address] [place] [address] on 12/8/19. He had considerable medical complications up in the rehab unit. He developed gram-negative bacteremia with preliminary cultures consistent with E. coli which also grew out of his urine. He was seen by infectious disease and the hospitalist and placed on appropriate antibiotics. He also had acute on chronic hyponatremia as low as 119. Due to his medical complications the decision was made for him to move to acute care on 12/18/2019. He had a positive blood culture for GNR and was felt to have sepsis d/t E-Coli UTI. CXR done revealed pneumonia and he was placed on Zosyn.  He PRN, Felisa Bullock MD    docusate sodium (COLACE) capsule 100 mg, 100 mg, Oral, BID, Felisa Bullock MD, 100 mg at 01/01/20 0755    polyethylene glycol (GLYCOLAX) packet 17 g, 17 g, Oral, Daily, Felisa Bullock MD, 17 g at 01/01/20 0753    allopurinol (ZYLOPRIM) tablet 100 mg, 100 mg, Oral, Daily, Felisa Bullock MD, 100 mg at 01/01/20 0754    amiodarone (PACERONE) tablet 100 mg, 100 mg, Oral, Q12H, Felisa Bullock MD, 100 mg at 01/01/20 0755    aspirin EC tablet 81 mg, 81 mg, Oral, Daily, Felisa Bullock MD, 81 mg at 01/01/20 0754    atorvastatin (LIPITOR) tablet 20 mg, 20 mg, Oral, Daily, Felisa Bullock MD, 20 mg at 12/31/19 2035    calcium carbonate (TUMS) chewable tablet 1,000 mg, 2 tablet, Oral, BID PRN, Felisa Bullock MD    finasteride (PROSCAR) tablet 5 mg, 5 mg, Oral, Daily, Felisa Bullock MD, 5 mg at 12/31/19 2034    guaiFENesin [facility]) extended release tablet 600 mg, 600 mg, Oral, BID, Felisa Bullock MD, 600 mg at 01/01/20 0755    ipratropium-albuterol (DUONEB) nebulizer solution 3 mL, 1 vial, Inhalation, Q6H PRN, Felisa Bullock MD    metoprolol succinate (TOPROL XL) extended release tablet 12.5 mg, 12.5 mg, Oral, Daily, Felisa Bullock MD, 12.5 mg at 01/01/20 0755    nitroGLYCERIN (NITROSTAT) SL tablet 0.4 mg, 0.4 mg, Sublingual, Q5 Min PRN, Felisa Bullock MD    oxyCODONE-acetaminophen (PERCOCET) 5-325 MG per tablet 1 tablet, 1 tablet, Oral, Q6H PRN, Felisa Bullock MD    pantoprazole (PROTONIX) tablet 40 mg, 40 mg, Oral, Daily, Felisa Bullock MD, 40 mg at 01/01/20 0755    rivaroxaban (XARELTO) tablet 20 mg, 20 mg, Oral, Dinner, Felisa Bullock MD, 20 mg at 12/31/19 1746    sennosides-docusate sodium (SENOKOT-S) 8.6-50 MG tablet 2 tablet, 2 tablet, Oral, Daily PRN, Felisa Bullock MD, 2 tablet at 12/23/19 2118    sodium chloride tablet 1 g, 1 g, Oral, TID WC, Felisa Bullock MD, 1 g at 01/01/20 0754    vitamin B-12 (CYANOCOBALAMIN) tablet 1,000 mcg, 1,000 mcg, Oral, Daily, Felisa Bullock MD, VII-no facial assymetry       Motor Exam  antigravity throughout upper and lower extremities bilaterally      Tremors- no tremors in hands or head noted     Gait  Not tested      Nursing/pcp notes, imaging,labs and vitals reviewed. PT,OT and/or speech notes reviewed    Lab Results   Component Value Date    WBC 5.1 12/30/2019    HGB 11.8 (L) 12/30/2019    HCT 36.0 (L) 12/30/2019    MCV 88.9 12/30/2019     12/30/2019     Lab Results   Component Value Date     (L) 01/01/2020    K 4.6 01/01/2020    CL 96 (L) 01/01/2020    CO2 22 01/01/2020    BUN 15 01/01/2020    CREATININE 0.8 01/01/2020    GLUCOSE 99 01/01/2020    CALCIUM 8.6 (L) 01/01/2020    PROT 6.4 (L) 12/30/2019    LABALBU 3.0 (L) 12/30/2019    BILITOT 0.5 12/30/2019    ALKPHOS 136 (H) 12/30/2019    AST 27 12/30/2019    ALT 38 12/30/2019    LABGLOM >60 01/01/2020    GLOB 2.5 04/11/2016     Lab Results   Component Value Date    INR 1.21 (H) 11/25/2019    INR 1.14 07/25/2017    PROTIME 14.7 (H) 11/25/2019    PROTIME 14.5 07/25/2017         Valjean Dubin, PT   Physical Therapist   Physical Therapy   Progress Notes   Signed   Date of Service:  12/31/2019  3:26 PM               Signed             Show:Clear all  []Manual[x]Template[]Copied    Added by:  [x]Iggy Mcnair, PT    []Kajal for details       12/31/19 1445   Ambulation 1   Device Rolling Walker   Assistance Stand by assistance   Quality of Gait decreased right step length, increased left step length.  gradually increasing flexed forward posture. improves with cues   Distance 250 ft   Stairs   # Steps  9   Rails Bilateral   Assistance Contact guard assistance   Comment UP WITH RIGHT, DOWN WITH LEFT   Other exercises   Other exercises 1 sitting bilat le hip flex/ext/abd/add; knee ext; pf/df x10 ea   Conditions Requiring Skilled Therapeutic Intervention   Body structures, Functions, Activity limitations Decreased functional mobility ; Decreased ADL status; Decreased ROM; Decreased strength;Decreased endurance;Decreased balance;Decreased posture   Assessment  CONTINUED FLEXED FORWARD POSTURE. ANTALGIC WEIGHTBEARING RIGHT LE BUT C/O LEFT KNEE PAIN, \"LOCKING UP\".                    RECORD REVIEW: Previous medical records, medications were reviewed at today's visit    IMPRESSION:   1. Sepsis-complete abx  2. H/O gout-n allopurinol  3. Atrial fibrillation-on ASA/Amiodarone/metroprolol/Xarelto-  4. Hyperlipidemia-on statin  5. UTI-complete abx   6. Urinary retention-jackson in place-appreciate urology input  7. BPH-on medications monitor  8. Pneumonia-on nebs/mucinex  9. HTN-on meds monitor  10. GI-PPI/bowel regimen  11. DVT prophylaxis-Lovenox  12. Pain control-Percocet PRN  13. PT/OT  14.  Hyponatremia-monitor     Continue present care      ELOS January 4 th saturday      Expected duration and frequency therapy: 180 minutes per day, 5 days per week    Radha Arciniega  [phone] CELL  Dr Simón Reece

## 2020-01-01 NOTE — PROGRESS NOTES
Pt is doing well w/ bed mobility, but continues to show slight unsteadiness during TF's still needing SBA.    Prognosis  --   --  Good   Activity Tolerance   Activity Tolerance  --   --  Patient Tolerated treatment well   Electronically signed by Priscila Cox PTA on 1/1/2020 at 10:55 AM

## 2020-01-02 LAB
ALBUMIN SERPL-MCNC: 3.3 G/DL (ref 3.5–5.2)
ALP BLD-CCNC: 141 U/L (ref 40–130)
ALT SERPL-CCNC: 34 U/L (ref 5–41)
ANION GAP SERPL CALCULATED.3IONS-SCNC: 13 MMOL/L (ref 7–19)
AST SERPL-CCNC: 24 U/L (ref 5–40)
BASOPHILS ABSOLUTE: 0 K/UL (ref 0–0.2)
BASOPHILS RELATIVE PERCENT: 0.8 % (ref 0–1)
BILIRUB SERPL-MCNC: 0.5 MG/DL (ref 0.2–1.2)
BUN BLDV-MCNC: 18 MG/DL (ref 8–23)
CALCIUM SERPL-MCNC: 8.5 MG/DL (ref 8.8–10.2)
CHLORIDE BLD-SCNC: 95 MMOL/L (ref 98–111)
CO2: 23 MMOL/L (ref 22–29)
CREAT SERPL-MCNC: 0.9 MG/DL (ref 0.5–1.2)
EOSINOPHILS ABSOLUTE: 0.3 K/UL (ref 0–0.6)
EOSINOPHILS RELATIVE PERCENT: 6 % (ref 0–5)
GFR NON-AFRICAN AMERICAN: >60
GLUCOSE BLD-MCNC: 97 MG/DL (ref 74–109)
HCT VFR BLD CALC: 37.3 % (ref 42–52)
HEMOGLOBIN: 11.8 G/DL (ref 14–18)
IMMATURE GRANULOCYTES #: 0 K/UL
LYMPHOCYTES ABSOLUTE: 1.6 K/UL (ref 1.1–4.5)
LYMPHOCYTES RELATIVE PERCENT: 31.2 % (ref 20–40)
MCH RBC QN AUTO: 28.6 PG (ref 27–31)
MCHC RBC AUTO-ENTMCNC: 31.6 G/DL (ref 33–37)
MCV RBC AUTO: 90.5 FL (ref 80–94)
MONOCYTES ABSOLUTE: 0.6 K/UL (ref 0–0.9)
MONOCYTES RELATIVE PERCENT: 10.8 % (ref 0–10)
NEUTROPHILS ABSOLUTE: 2.7 K/UL (ref 1.5–7.5)
NEUTROPHILS RELATIVE PERCENT: 50.8 % (ref 50–65)
PDW BLD-RTO: 14 % (ref 11.5–14.5)
PLATELET # BLD: 184 K/UL (ref 130–400)
PMV BLD AUTO: 9.4 FL (ref 9.4–12.4)
POTASSIUM REFLEX MAGNESIUM: 4.3 MMOL/L (ref 3.5–5)
RBC # BLD: 4.12 M/UL (ref 4.7–6.1)
SODIUM BLD-SCNC: 131 MMOL/L (ref 136–145)
TOTAL PROTEIN: 6.3 G/DL (ref 6.6–8.7)
WBC # BLD: 5.2 K/UL (ref 4.8–10.8)

## 2020-01-02 PROCEDURE — 97110 THERAPEUTIC EXERCISES: CPT

## 2020-01-02 PROCEDURE — 36415 COLL VENOUS BLD VENIPUNCTURE: CPT

## 2020-01-02 PROCEDURE — 80053 COMPREHEN METABOLIC PANEL: CPT

## 2020-01-02 PROCEDURE — 99232 SBSQ HOSP IP/OBS MODERATE 35: CPT | Performed by: PSYCHIATRY & NEUROLOGY

## 2020-01-02 PROCEDURE — 85025 COMPLETE CBC W/AUTO DIFF WBC: CPT

## 2020-01-02 PROCEDURE — 6370000000 HC RX 637 (ALT 250 FOR IP): Performed by: PSYCHIATRY & NEUROLOGY

## 2020-01-02 PROCEDURE — 97535 SELF CARE MNGMENT TRAINING: CPT

## 2020-01-02 PROCEDURE — 6370000000 HC RX 637 (ALT 250 FOR IP): Performed by: HOSPITALIST

## 2020-01-02 PROCEDURE — 97116 GAIT TRAINING THERAPY: CPT

## 2020-01-02 PROCEDURE — 1180000000 HC REHAB R&B

## 2020-01-02 PROCEDURE — 97530 THERAPEUTIC ACTIVITIES: CPT

## 2020-01-02 RX ADMIN — DOCUSATE SODIUM 100 MG: 100 CAPSULE, LIQUID FILLED ORAL at 20:54

## 2020-01-02 RX ADMIN — Medication 1 G: at 11:40

## 2020-01-02 RX ADMIN — Medication 1 G: at 07:39

## 2020-01-02 RX ADMIN — AMIODARONE HYDROCHLORIDE 100 MG: 100 TABLET ORAL at 20:54

## 2020-01-02 RX ADMIN — AMIODARONE HYDROCHLORIDE 100 MG: 100 TABLET ORAL at 07:39

## 2020-01-02 RX ADMIN — METOPROLOL SUCCINATE 12.5 MG: 25 TABLET, EXTENDED RELEASE ORAL at 07:39

## 2020-01-02 RX ADMIN — GUAIFENESIN 600 MG: 600 TABLET, EXTENDED RELEASE ORAL at 07:38

## 2020-01-02 RX ADMIN — GUAIFENESIN 600 MG: 600 TABLET, EXTENDED RELEASE ORAL at 20:54

## 2020-01-02 RX ADMIN — ALLOPURINOL 100 MG: 100 TABLET ORAL at 07:39

## 2020-01-02 RX ADMIN — ASPIRIN 81 MG: 81 TABLET, COATED ORAL at 07:38

## 2020-01-02 RX ADMIN — CYANOCOBALAMIN TAB 500 MCG 1000 MCG: 500 TAB at 07:39

## 2020-01-02 RX ADMIN — DOCUSATE SODIUM 100 MG: 100 CAPSULE, LIQUID FILLED ORAL at 07:38

## 2020-01-02 RX ADMIN — RIVAROXABAN 20 MG: 20 TABLET, FILM COATED ORAL at 17:06

## 2020-01-02 RX ADMIN — Medication 1 G: at 17:06

## 2020-01-02 RX ADMIN — TAMSULOSIN HYDROCHLORIDE 0.8 MG: 0.4 CAPSULE ORAL at 20:54

## 2020-01-02 RX ADMIN — FINASTERIDE 5 MG: 5 TABLET, FILM COATED ORAL at 20:54

## 2020-01-02 RX ADMIN — PANTOPRAZOLE SODIUM 40 MG: 40 TABLET, DELAYED RELEASE ORAL at 07:39

## 2020-01-02 ASSESSMENT — PAIN SCALES - GENERAL
PAINLEVEL_OUTOF10: 7
PAINLEVEL_OUTOF10: 4

## 2020-01-02 ASSESSMENT — PAIN DESCRIPTION - DESCRIPTORS
DESCRIPTORS: ACHING;TIGHTNESS
DESCRIPTORS: ACHING

## 2020-01-02 ASSESSMENT — PAIN DESCRIPTION - ONSET
ONSET: ON-GOING
ONSET: GRADUAL

## 2020-01-02 ASSESSMENT — PAIN DESCRIPTION - ORIENTATION
ORIENTATION: LEFT
ORIENTATION: LEFT

## 2020-01-02 ASSESSMENT — PAIN DESCRIPTION - LOCATION
LOCATION: HIP
LOCATION: NECK;KNEE

## 2020-01-02 ASSESSMENT — PAIN DESCRIPTION - PAIN TYPE
TYPE: ACUTE PAIN
TYPE: ACUTE PAIN

## 2020-01-02 ASSESSMENT — PAIN DESCRIPTION - PROGRESSION
CLINICAL_PROGRESSION: NOT CHANGED
CLINICAL_PROGRESSION: GRADUALLY WORSENING

## 2020-01-02 ASSESSMENT — PAIN DESCRIPTION - FREQUENCY
FREQUENCY: CONTINUOUS
FREQUENCY: CONTINUOUS

## 2020-01-02 NOTE — PROGRESS NOTES
Occupational Therapy     01/02/20 0815   General   Diagnosis R hip fx s/p cephalo-medullary nailing 11/27, pneumonia   Pain Assessment   Patient Currently in Pain Yes   Pain Assessment 0-10   Pain Level 7   Pain Type Acute pain   Pain Location Neck;Knee   Pain Orientation Left   Pain Descriptors Aching;Tightness   Pain Frequency Continuous   Clinical Progression Gradually worsening   Response to Pain Intervention None;Patient Satisfied   Balance   Sitting Balance Independent   Standing Balance Supervision   Functional Mobility   Functional - Mobility Device Rolling Walker   Activity To/from bathroom   Assist Level Stand by assistance   Functional Mobility Comments x4 trials. Bed mobility   Sit to Supine Modified independent   Transfers   Sit to stand Stand by assistance   Stand to sit Supervision   Toilet Transfers   Toilet - Technique Ambulating   Equipment Used Grab bars   Toilet Transfer Stand by assistance   Tub Transfers   Tub - Transfer From [employer] walker   Tub - Transfer Type To and From   Tub - Transfer To Transfer tub bench   Tub - Technique Ambulating   Tub Transfers Stand by assistance   Type of ROM/Therapeutic Exercise   Type of ROM/Therapeutic Exercise Rickshaw;Free weights   Comment Rickshaw: 15# BUE 2 sets x 20 reps. Free weights: 2# 1 set x 10 reps, all planes, independent with HEP. Long term goals   Long term goal 5 MET   Assessment   Performance deficits / Impairments Decreased functional mobility ; Decreased ADL status; Decreased strength;Decreased endurance;Decreased balance;Decreased high-level IADLs   Treatment Diagnosis R hip fx s/p cephalo-medullary nailing    Timed Code Treatment Minutes 90 Minutes   Activity Tolerance   Activity Tolerance Patient limited by pain   Safety Devices   Safety Devices in place Yes   Type of devices Call light within reach; Bed alarm in place   Plan   Current Treatment Recommendations Strengthening;Balance Training;Functional Mobility Training; Endurance Training;Patient/Caregiver Education & Training;Equipment Evaluation, Education, & procurement; Safety Education & Training;Self-Care / ADL; Home Management Training      01/02/20 0815   Oral Hygiene   Assistance Needed Independent   CARE Score 6   Toileting Hygiene   Assistance Needed Supervision or touching assistance   CARE Score 4   Shower/Bathe Self   Assistance Needed Supervision or touching assistance   CARE Score 4   Upper Body Dressing   Assistance Needed Setup or clean-up assistance   CARE Score 5   Lower Body Dressing   Assistance Needed Supervision or touching assistance   CARE Score 4

## 2020-01-02 NOTE — PROGRESS NOTES
Name: Marcial Hurley  MRN:  [id number]  Date of service:  1/2/2020 01/02/20 1012   Subjective   Subjective Pt states he is ready for therapy   Transfers   Sit to Stand Stand by assistance   Stand to sit Stand by assistance   Ambulation   Ambulation? Yes   WB Status WBAT   More Ambulation? Yes   Ambulation 1   Surface level tile   Device Rolling Walker   Assistance Stand by assistance   Quality of Gait forward flex trunk   Gait Deviations Decreased step length;Decreased step height   Distance 150', 250'   Comments sat in wheelchair and pushed wheelchair to PT gym    Exercises   Straight Leg Raise 20x   Quad Sets 20x   Heelslides 20x   Gluteal Sets 20x   Hip Abduction 20x   Knee Short Arc Quad 20x   Ankle Pumps 20x   Comments hip add squeeze 20x   Short term goals   Time Frame for Short term goals 1 week    Short term goal 1 supervision bed mobility    Short term goal 2 CGA transfer with AD   Short term goal 3 CGA 50 feet ambulation with AD   Short term goal 4 supervision WC propulsion and management 150 feet    Short term goal 5 CGA 4 steps with AD   Long term goals   Time Frame for Long term goals  2 weeks   Long term goal 1 independent bed mobility    Long term goal 2 independent transfer with AD    Long term goal 3 independent 150 feet ambulation with AD   Long term goal 4 independent wheelchair ambulation 150 feet    Long term goal 5 supervision 4 steps with AD    Conditions Requiring Skilled Therapeutic Intervention   Body structures, Functions, Activity limitations Decreased functional mobility ; Decreased ADL status; Decreased strength;Decreased endurance   Assessment Pt did well with amb and transfers, states he is going home tomorrow   Activity Tolerance   Activity Tolerance Patient Tolerated treatment well;Patient limited by endurance   Safety Devices   Type of devices Left in bed;Call light within reach; Bed alarm in place       Electronically signed by Mervat Mcmillan PTA on 1/2/2020 at 10:55 AM

## 2020-01-02 NOTE — PROGRESS NOTES
Durable Medical Equipment   Physician Order     Patient Name Claudia Olsen            Address  [address] Phone  [phone] (Home)  [phone] (Mobile)      Patient Height Height: 5' 8\" (172.7 cm)          Patient Weight 161 lb 12.8 oz (73.4 kg)           3/27/1931           F/O Payor/Plan Precert #   MEDICARE/MEDICARE PART A AND B     Subscriber Subscriber #   Anirudh Abdullahi [id number]   Address Phone   [address] [place] [address] [phone]         DME NEEDS:  **(RENTAL) 16\" MANUAL WHEELCHAIR WITH ANTI-TIPPERS, STANDARD WHEELCHAIR CUSHION, AND SWING-AWAY FOOT RESTS.       DIAGNOSIS:  Sepsis ([facility] [address] A41. 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 o. male pt admitted to [facility] on 19 w/ R hip pain. Pt reports that 3 days earlier, he felt dizzy and fell on his R hip. He did not lose consciounsness.  He present to [facility] ED on  for evaluation and per pt they didn't find anything wrong w/him and sent him home. He presented to [facility] ED per EMS on  with more pain and inability to bear weight on RLE. He also complained of dizziness that has worseded since amlodipine and miodarone was started by cardiology. Xrays & CT of hip were all neg.  On  an MRI of his R hip was done which showd edema within the intertrochanterir R femoral neck highly suggestive of a nondisplaced fx. Dr Morelia Tran was consulted.  Pt opted for conservative treatment w/ TTWB to his RLE. Pt was not able to maintain TTWB, so on 12/3/19, he underwent a cephalomedullary nailing of his R displaced intertrochanteric hip fx. He tolerated procedure well and will be WBAT in his R LE.  He was participating in both PT/OT and was

## 2020-01-02 NOTE — PROGRESS NOTES
Patient:   Prosper Moore  MR#:    [id number]   Room:    [address] [place], [place] [address]   YOB: 1931  Date of Progress Note: 1/2/2020  Time of Note                           8:33 AM  Consulting Physician:   Lisa Zeng M.D. Attending Physician:  Lisa Zeng MD     Chief complaint Sepsis due to Escherichia coli [E. coli]    S:This 80 y.o. male  pt admitted to [facility] on 11/27/19 w/ R hip pain. Pt reports that 3 days earlier, he felt dizzy and fell on his R hip. He did not lose consciounsness. He present to [facility] ED on 11/25 for evaluation and per pt they didn't find anything wrong w/him and sent him home. He presented to [facility] ED per EMS on 11/27 with more pain and inability to bear weight on RLE. He also complained of dizziness that has worseded since amlodipine and miodarone was started by cardiology. Xrays & CT of hip were all neg. On 11/29 an MRI of his R hip was done which showd edema within the intertrochanterir R femoral neck highly suggestive of a nondisplaced fx. Dr Mary Short was consulted. Pt opted for conservative treatment w/ TTWB to his RLE. Pt was not able to maintain TTWB, so on 12/3/19, he underwent a cephalomedullary nailing of his R displaced intertrochanteric hip fx. He tolerated procedure well and will be WBAT in his R LE. He was participating in both PT/OT and was admitted to the [address] [place] [address] on 12/8/19. He had considerable medical complications up in the rehab unit. He developed gram-negative bacteremia with preliminary cultures consistent with E. coli which also grew out of his urine. He was seen by infectious disease and the hospitalist and placed on appropriate antibiotics. He also had acute on chronic hyponatremia as low as 119. Due to his medical complications the decision was made for him to move to acute care on 12/18/2019. He had a positive blood culture for GNR and was felt to have sepsis d/t E-Coli UTI. CXR done revealed pneumonia and he was placed on Zosyn.  He PRN, Abigail Espinoza MD    docusate sodium (COLACE) capsule 100 mg, 100 mg, Oral, BID, Abigail Espinoza MD, 100 mg at 01/02/20 0738    polyethylene glycol (GLYCOLAX) packet 17 g, 17 g, Oral, Daily, Abigail Espinoza MD, 17 g at 01/01/20 0753    allopurinol (ZYLOPRIM) tablet 100 mg, 100 mg, Oral, Daily, Abigail Espinoza MD, 100 mg at 01/02/20 0739    amiodarone (PACERONE) tablet 100 mg, 100 mg, Oral, Q12H, Abigail Espinoza MD, 100 mg at 01/02/20 0739    aspirin EC tablet 81 mg, 81 mg, Oral, Daily, Abigail Espinoza MD, 81 mg at 01/02/20 2862    atorvastatin (LIPITOR) tablet 20 mg, 20 mg, Oral, Daily, Abigail Espinoza MD, 20 mg at 01/01/20 2048    calcium carbonate (TUMS) chewable tablet 1,000 mg, 2 tablet, Oral, BID PRN, Abigail Espinoza MD    finasteride (PROSCAR) tablet 5 mg, 5 mg, Oral, Daily, Abigail Espinoza MD, 5 mg at 01/01/20 2048    guaiFENesin [facility]) extended release tablet 600 mg, 600 mg, Oral, BID, Abigail Espinoza MD, 600 mg at 01/02/20 0738    ipratropium-albuterol (DUONEB) nebulizer solution 3 mL, 1 vial, Inhalation, Q6H PRN, Abigail Espinoza MD    metoprolol succinate (TOPROL XL) extended release tablet 12.5 mg, 12.5 mg, Oral, Daily, Abigail Espinoza MD, 12.5 mg at 01/02/20 0739    nitroGLYCERIN (NITROSTAT) SL tablet 0.4 mg, 0.4 mg, Sublingual, Q5 Min PRN, Abigail Espinoza MD    oxyCODONE-acetaminophen (PERCOCET) 5-325 MG per tablet 1 tablet, 1 tablet, Oral, Q6H PRN, Abigail Espinoza MD    pantoprazole (PROTONIX) tablet 40 mg, 40 mg, Oral, Daily, Abigail Espinoza MD, 40 mg at 01/02/20 6742    rivaroxaban (XARELTO) tablet 20 mg, 20 mg, Oral, Dinner, Abigail Espinoza MD, 20 mg at 01/01/20 1725    sennosides-docusate sodium (SENOKOT-S) 8.6-50 MG tablet 2 tablet, 2 tablet, Oral, Daily PRN, Abigail Espinoza MD, 2 tablet at 12/23/19 2118    sodium chloride tablet 1 g, 1 g, Oral, TID WC, Abigail Espinoza MD, 1 g at 01/02/20 0739    vitamin B-12 (CYANOCOBALAMIN) tablet 1,000 mcg, 1,000 mcg, Oral, Daily, Abigail Espinoza MD,

## 2020-01-02 NOTE — PLAN OF CARE
Ongoing  Goal: LTG - Patient will be free from infection  1/2/2020 1116 by Gamaliel Vazquez RN  Outcome: Ongoing  1/1/2020 2333 by Aurther Leyden, RN  Outcome: Ongoing  Goal: STG - Patient demonstrates skin care/treatment/dressing change  1/2/2020 1116 by Gamaliel Vazquez RN  Outcome: Ongoing  1/1/2020 2333 by Aurther Leyden, RN  Outcome: Ongoing     Problem: KNOWLEDGE DEFICIT  Goal: Patient/S.O. demonstrates understanding of disease process, treatment plan, medications, and discharge instructions.   1/2/2020 1116 by Gamaliel Vazquez RN  Outcome: Ongoing  1/1/2020 2333 by Aurther Leyden, RN  Outcome: Ongoing     Problem: DISCHARGE BARRIERS  Goal: Patient's continuum of care needs are met  1/2/2020 1116 by Gamaliel Vazquez RN  Outcome: Ongoing  1/1/2020 2333 by Aurther Leyden, RN  Outcome: Ongoing     Problem: Gas Exchange - Impaired:  Goal: Levels of oxygenation will improve  Description  Levels of oxygenation will improve  1/2/2020 1116 by Gamaliel Vazquez RN  Outcome: Ongoing  1/1/2020 2333 by Aurther Leyden, RN  Outcome: Ongoing     Problem: Infection, Septic Shock:  Goal: Will show no infection signs and symptoms  Description  Will show no infection signs and symptoms  1/2/2020 1116 by Gamaliel Vazquez RN  Outcome: Ongoing  1/1/2020 2333 by Aurther Leyden, RN  Outcome: Ongoing     Problem: Infection - Ventilator-Associated Pneumonia:  Goal: Absence of pulmonary infection  Description  Absence of pulmonary infection  1/2/2020 1116 by Gamaliel Vazquez RN  Outcome: Ongoing  1/1/2020 2333 by Aurther Leyden, RN  Outcome: Ongoing     Problem: Mobility - Impaired:  Goal: Mobility will improve  Description  Mobility will improve  1/2/2020 1116 by Gamaliel Vazquez RN  Outcome: Ongoing  1/1/2020 2333 by Aurther Leyden, RN  Outcome: Ongoing     Problem: IP BLADDER/VOIDING  Goal: LTG - Patient will utilize adaptive techniques/equipment to complete bladder elimination  1/2/2020 1116 by Gamaliel Vazquez RN  Outcome:

## 2020-01-03 LAB
ANION GAP SERPL CALCULATED.3IONS-SCNC: 12 MMOL/L (ref 7–19)
BUN BLDV-MCNC: 18 MG/DL (ref 8–23)
CALCIUM SERPL-MCNC: 8.4 MG/DL (ref 8.8–10.2)
CHLORIDE BLD-SCNC: 95 MMOL/L (ref 98–111)
CO2: 22 MMOL/L (ref 22–29)
CREAT SERPL-MCNC: 0.8 MG/DL (ref 0.5–1.2)
GFR NON-AFRICAN AMERICAN: >60
GLUCOSE BLD-MCNC: 90 MG/DL (ref 74–109)
POTASSIUM SERPL-SCNC: 4.2 MMOL/L (ref 3.5–5)
SODIUM BLD-SCNC: 129 MMOL/L (ref 136–145)

## 2020-01-03 PROCEDURE — 6370000000 HC RX 637 (ALT 250 FOR IP): Performed by: HOSPITALIST

## 2020-01-03 PROCEDURE — 97110 THERAPEUTIC EXERCISES: CPT

## 2020-01-03 PROCEDURE — 99232 SBSQ HOSP IP/OBS MODERATE 35: CPT | Performed by: PSYCHIATRY & NEUROLOGY

## 2020-01-03 PROCEDURE — 99232 SBSQ HOSP IP/OBS MODERATE 35: CPT | Performed by: NURSE PRACTITIONER

## 2020-01-03 PROCEDURE — 97116 GAIT TRAINING THERAPY: CPT

## 2020-01-03 PROCEDURE — 36415 COLL VENOUS BLD VENIPUNCTURE: CPT

## 2020-01-03 PROCEDURE — 51702 INSERT TEMP BLADDER CATH: CPT

## 2020-01-03 PROCEDURE — 80048 BASIC METABOLIC PNL TOTAL CA: CPT

## 2020-01-03 PROCEDURE — 1180000000 HC REHAB R&B

## 2020-01-03 PROCEDURE — 97530 THERAPEUTIC ACTIVITIES: CPT

## 2020-01-03 PROCEDURE — 6370000000 HC RX 637 (ALT 250 FOR IP): Performed by: PSYCHIATRY & NEUROLOGY

## 2020-01-03 RX ADMIN — RIVAROXABAN 20 MG: 20 TABLET, FILM COATED ORAL at 16:55

## 2020-01-03 RX ADMIN — GUAIFENESIN 600 MG: 600 TABLET, EXTENDED RELEASE ORAL at 08:07

## 2020-01-03 RX ADMIN — DOCUSATE SODIUM 100 MG: 100 CAPSULE, LIQUID FILLED ORAL at 21:11

## 2020-01-03 RX ADMIN — AMIODARONE HYDROCHLORIDE 100 MG: 100 TABLET ORAL at 08:07

## 2020-01-03 RX ADMIN — Medication 1 G: at 08:09

## 2020-01-03 RX ADMIN — PANTOPRAZOLE SODIUM 40 MG: 40 TABLET, DELAYED RELEASE ORAL at 08:07

## 2020-01-03 RX ADMIN — CYANOCOBALAMIN TAB 500 MCG 1000 MCG: 500 TAB at 08:07

## 2020-01-03 RX ADMIN — GUAIFENESIN 600 MG: 600 TABLET, EXTENDED RELEASE ORAL at 21:11

## 2020-01-03 RX ADMIN — DOCUSATE SODIUM 100 MG: 100 CAPSULE, LIQUID FILLED ORAL at 08:07

## 2020-01-03 RX ADMIN — METOPROLOL SUCCINATE 12.5 MG: 25 TABLET, EXTENDED RELEASE ORAL at 08:07

## 2020-01-03 RX ADMIN — POLYETHYLENE GLYCOL 3350 17 G: 17 POWDER, FOR SOLUTION ORAL at 10:55

## 2020-01-03 RX ADMIN — AMIODARONE HYDROCHLORIDE 100 MG: 100 TABLET ORAL at 21:11

## 2020-01-03 RX ADMIN — Medication 1 G: at 11:43

## 2020-01-03 RX ADMIN — Medication 1 G: at 16:56

## 2020-01-03 RX ADMIN — ASPIRIN 81 MG: 81 TABLET, COATED ORAL at 08:07

## 2020-01-03 RX ADMIN — FINASTERIDE 5 MG: 5 TABLET, FILM COATED ORAL at 21:11

## 2020-01-03 RX ADMIN — TAMSULOSIN HYDROCHLORIDE 0.8 MG: 0.4 CAPSULE ORAL at 21:11

## 2020-01-03 RX ADMIN — ALLOPURINOL 100 MG: 100 TABLET ORAL at 08:09

## 2020-01-03 RX ADMIN — ATORVASTATIN CALCIUM 20 MG: 20 TABLET, FILM COATED ORAL at 21:11

## 2020-01-03 ASSESSMENT — PAIN SCALES - GENERAL
PAINLEVEL_OUTOF10: 0

## 2020-01-03 NOTE — PLAN OF CARE
1116 by Vini Glass RN  Outcome: Ongoing  Goal: LTG - Patient will be free from infection  1/2/2020 2240 by Skyla Mccabe RN  Outcome: Ongoing  1/2/2020 1116 by Vini Glass RN  Outcome: Ongoing  Goal: STG - Patient demonstrates skin care/treatment/dressing change  1/2/2020 2240 by Skyla Mccabe RN  Outcome: Ongoing  1/2/2020 1116 by Vini Glass RN  Outcome: Ongoing     Problem: KNOWLEDGE DEFICIT  Goal: Patient/S.O. demonstrates understanding of disease process, treatment plan, medications, and discharge instructions.   1/2/2020 2240 by Skyla Mccabe RN  Outcome: Ongoing  1/2/2020 1116 by Vini Glass RN  Outcome: Ongoing     Problem: DISCHARGE BARRIERS  Goal: Patient's continuum of care needs are met  1/2/2020 2240 by Skyla Mccabe RN  Outcome: Ongoing  1/2/2020 1116 by Vini Glass RN  Outcome: Ongoing     Problem: Gas Exchange - Impaired:  Goal: Levels of oxygenation will improve  Description  Levels of oxygenation will improve  1/2/2020 2240 by Skyla Mccabe RN  Outcome: Ongoing  1/2/2020 1116 by Vini Glass RN  Outcome: Ongoing     Problem: Infection, Septic Shock:  Goal: Will show no infection signs and symptoms  Description  Will show no infection signs and symptoms  1/2/2020 2240 by Skyla Mccabe RN  Outcome: Ongoing  1/2/2020 1116 by Vini Glass RN  Outcome: Ongoing     Problem: Infection - Ventilator-Associated Pneumonia:  Goal: Absence of pulmonary infection  Description  Absence of pulmonary infection  1/2/2020 2240 by Skyla Mccabe RN  Outcome: Ongoing  1/2/2020 1116 by Vini Glass RN  Outcome: Ongoing     Problem: Mobility - Impaired:  Goal: Mobility will improve  Description  Mobility will improve  1/2/2020 2240 by Skyla Mccabe RN  Outcome: Ongoing  1/2/2020 1116 by Vini Glass RN  Outcome: Ongoing     Problem: IP BLADDER/VOIDING  Goal: LTG - Patient will utilize adaptive techniques/equipment to complete bladder elimination  1/2/2020 2240 by Jose Rossi RN  Outcome: Ongoing  1/2/2020 1116 by Kana Mccoy RN  Outcome: Ongoing     Problem: IP BOWEL ELIMINATION  Goal: LTG - patient will have regular and routine bowel evacuation  1/2/2020 2240 by Jose Rossi RN  Outcome: Ongoing  1/2/2020 1116 by Kana Mccoy RN  Outcome: Ongoing     Problem: NUTRITION  Goal: Patient maintains adequate hydration  1/2/2020 2240 by Jose Rossi RN  Outcome: Ongoing  1/2/2020 1116 by Kana Mccoy RN  Outcome: Ongoing     Problem: Pain:  Goal: Pain level will decrease  Description  Pain level will decrease  1/2/2020 2240 by Jose Rossi RN  Outcome: Ongoing  1/2/2020 1116 by Kana Mccoy RN  Outcome: Ongoing  Goal: Control of acute pain  Description  Control of acute pain  1/2/2020 2240 by Jose Rossi RN  Outcome: Ongoing  1/2/2020 1116 by Kana Mccoy RN  Outcome: Ongoing  Goal: Control of chronic pain  Description  Control of chronic pain  1/2/2020 2240 by Jose Rossi RN  Outcome: Ongoing  1/2/2020 1116 by Kana Mccoy RN  Outcome: Ongoing

## 2020-01-03 NOTE — PROGRESS NOTES
Educated patient about leg bag and offered to change jackson bag to leg but patient states he does not want to at this time. Continued to provide education to patient about jackson catheter care.

## 2020-01-03 NOTE — PROGRESS NOTES
Provided education regarding jackson catheter care the difference between a leg bag and regular jackson catheter bag. Patient voices understanding regarding jackson catheter care.

## 2020-01-03 NOTE — PROGRESS NOTES
01/03/20 1439 01/03/20 1440 01/03/20 1441   Pain Screening   Patient Currently in Pain No  --   --    Bed Mobility   Rolling  --  Modified independent  --    Supine to Sit  --  Modified independent  --    Sit to Supine  --  Modified independent  --    Scooting  --  Independent  --    Transfers   Sit to Stand  --   --  Stand by assistance; Independent   Stand to sit  --   --  Stand by assistance; Independent   Bed to Chair  --   --  Stand by assistance; Independent   Ambulation   Ambulation?  --   --   --    WB Status  --   --   --    Ambulation 1   Surface  --   --   --    Device  --   --   --    Other Apparatus  --   --   --    Assistance  --   --   --    Quality of Gait  --   --   --    Distance  --   --   --    Comments  --   --   --    Exercises   Comments  --   --   --    Conditions Requiring Skilled Therapeutic Intervention   Assessment  --   --   --    Activity Tolerance   Activity Tolerance  --   --   --    Safety Devices   Type of devices  --   --   --       01/03/20 1448 01/03/20 1515   Pain Screening   Patient Currently in Pain  --   --    Bed Mobility   Rolling  --   --    Supine to Sit  --   --    Sit to Supine  --   --    Scooting  --   --    Transfers   Sit to Stand  --   --    Stand to sit  --   --    Bed to Chair  --   --    Ambulation   Ambulation? Yes  --    WB Status WBAT  --    Ambulation 1   Surface level tile  --    Device Rolling Walker  --    Other Apparatus   (Catheter Bag)  --    Assistance Stand by assistance  --    Quality of Gait Pt showed forward flex posture and kept RW too far in front of him at times. --    Distance 150'x2  --    Comments [employer] turns. --    Exercises   Comments  --  Sitting BLE ex x10 reps. Conditions Requiring Skilled Therapeutic Intervention   Assessment  --  Shola session with rests. Activity Tolerance   Activity Tolerance  --  Patient limited by endurance; Patient Tolerated treatment well   Safety Devices   Type of devices  --  Call light within reach; Bed alarm in place   Electronically signed by Arturo Dinh PTA on 1/3/2020 at 4:04 PM

## 2020-01-03 NOTE — CARE COORDINATION
Preparation for discharge - Have communicated with [facility] [address] [place] [address], Dr. Odell Avilez. DME was ordered through the clinic- they have contacted his [employer] and indicated the equipment will be there by Monday. Though offered, they prefer not to get any temporary/rental items until the arrival.  Referral initiated to [facility], with Dr. Pramod Chávez agreeing to follow- request for high level homecare because of urological needs and medication reconciliation (with [facility] and home meds) being complicated. He has been resistent to learning about indwelling catheter and wants to rely on Ms. Pinon for all decisions-though eager to go home.

## 2020-01-03 NOTE — PROGRESS NOTES
Patient:   Yamil Blackman  MR#:    [id number]   Room:    [facility]/975-   YOB: 1931  Date of Progress Note: 1/3/2020  Time of Note                           1:59 PM  Consulting Physician:   Yisel Cheatham M.D. Attending Physician:  Yisel Cheatham MD     Chief complaint Sepsis due to Escherichia coli [E. coli]    S:This 80 y.o. male  pt admitted to [facility] on 11/27/19 w/ R hip pain. Pt reports that 3 days earlier, he felt dizzy and fell on his R hip. He did not lose consciounsness. He present to [address] [place] [address] ED on 11/25 for evaluation and per pt they didn't find anything wrong w/him and sent him home. He presented to [facility] ED per EMS on 11/27 with more pain and inability to bear weight on RLE. He also complained of dizziness that has worseded since amlodipine and miodarone was started by cardiology. Xrays & CT of hip were all neg. On 11/29 an MRI of his R hip was done which showd edema within the intertrochanterir R femoral neck highly suggestive of a nondisplaced fx. Dr Veena Rush was consulted. Pt opted for conservative treatment w/ TTWB to his RLE. Pt was not able to maintain TTWB, so on 12/3/19, he underwent a cephalomedullary nailing of his R displaced intertrochanteric hip fx. He tolerated procedure well and will be WBAT in his R LE. He was participating in both PT/OT and was admitted to the [facility] on 12/8/19. He had considerable medical complications up in the rehab unit. He developed gram-negative bacteremia with preliminary cultures consistent with E. coli which also grew out of his urine. He was seen by infectious disease and the hospitalist and placed on appropriate antibiotics. He also had acute on chronic hyponatremia as low as 119. Due to his medical complications the decision was made for him to move to acute care on 12/18/2019. He had a positive blood culture for GNR and was felt to have sepsis d/t E-Coli UTI. CXR done revealed pneumonia and he was placed on Zosyn.  He PRN, Buddy Nunes MD    docusate sodium (COLACE) capsule 100 mg, 100 mg, Oral, BID, Buddy Nunes MD, 100 mg at 01/03/20 0807    polyethylene glycol (GLYCOLAX) packet 17 g, 17 g, Oral, Daily, Buddy Nunes MD, 17 g at 01/03/20 1055    allopurinol (ZYLOPRIM) tablet 100 mg, 100 mg, Oral, Daily, Buddy Nunes MD, 100 mg at 01/03/20 0809    amiodarone (PACERONE) tablet 100 mg, 100 mg, Oral, Q12H, Buddy Nunes MD, 100 mg at 01/03/20 0807    aspirin EC tablet 81 mg, 81 mg, Oral, Daily, Buddy Nunes MD, 81 mg at 01/03/20 0807    atorvastatin (LIPITOR) tablet 20 mg, 20 mg, Oral, Daily, Buddy Nunes MD, 20 mg at 01/01/20 2048    calcium carbonate (TUMS) chewable tablet 1,000 mg, 2 tablet, Oral, BID PRN, Buddy Nunes MD    finasteride (PROSCAR) tablet 5 mg, 5 mg, Oral, Daily, Buddy Nunes MD, 5 mg at 01/02/20 2054    guaiFENesin [facility]) extended release tablet 600 mg, 600 mg, Oral, BID, Buddy Nunes MD, 600 mg at 01/03/20 0807    ipratropium-albuterol (DUONEB) nebulizer solution 3 mL, 1 vial, Inhalation, Q6H PRN, Buddy Nunes MD    metoprolol succinate (TOPROL XL) extended release tablet 12.5 mg, 12.5 mg, Oral, Daily, Buddy Nunes MD, 12.5 mg at 01/03/20 0807    nitroGLYCERIN (NITROSTAT) SL tablet 0.4 mg, 0.4 mg, Sublingual, Q5 Min PRN, Buddy Nunes MD    oxyCODONE-acetaminophen (PERCOCET) 5-325 MG per tablet 1 tablet, 1 tablet, Oral, Q6H PRN, Buddy Nunes MD    pantoprazole (PROTONIX) tablet 40 mg, 40 mg, Oral, Daily, Buddy Nunes MD, 40 mg at 01/03/20 3615    rivaroxaban (XARELTO) tablet 20 mg, 20 mg, Oral, Dinner, Buddy Nunes MD, 20 mg at 01/02/20 1706    sennosides-docusate sodium (SENOKOT-S) 8.6-50 MG tablet 2 tablet, 2 tablet, Oral, Daily PRN, Buddy Nunes MD, 2 tablet at 12/23/19 2118    sodium chloride tablet 1 g, 1 g, Oral, TID WC, Buddy Nunes MD, 1 g at 01/03/20 1143    vitamin B-12 (CYANOCOBALAMIN) tablet 1,000 mcg, 1,000 mcg, Oral, Daily, Buddy Nunes MD, 1,000 mcg at 01/03/20 0807    vitamin D (ERGOCALCIFEROL) capsule 50,000 Units, 50,000 Units, Oral, Weekly, Virginia Reece MD, 50,000 Units at 12/29/19 0834    acetaminophen (TYLENOL) tablet 650 mg, 650 mg, Oral, Q4H PRN, Johanny Morelos MD    polyethylene glycol (GLYCOLAX) packet 17 g, 17 g, Oral, Daily PRN, Johanny Morelos MD    tamsulosin (FLOMAX) capsule 0.8 mg, 0.8 mg, Oral, Daily, Johanny Morelos MD, 0.8 mg at 01/02/20 2054    Allergies:  Patient has no known allergies. Social History:   TOBACCO:   reports that he has quit smoking. His smoking use included cigarettes. He has a 195.00 pack-year smoking history. He has quit using smokeless tobacco.  ETOH:   reports no history of alcohol use. Family History:       Problem Relation Age of Onset    No Known Problems Mother     Heart Disease Father          PHYSICAL EXAM:  BP (!) 111/56   Pulse 59   Temp 97.2 °F (36.2 °C) (Temporal)   Resp 16   Ht 5' 8\" (1.727 m)   Wt 161 lb 12.8 oz (73.4 kg)   SpO2 96%   BMI 24.60 kg/m²     Constitutional - well developed, well nourished. Eyes - conjunctiva normal.   Ear, nose, throat - No scars, masses, or lesions over external nose or ears, no atrophy of tongue  Neck-symmetric, no masses noted, no jugular vein distension  Respiration- chest wall appears symmetric, good expansion,   normal effort without use of accessory muscles  Musculoskeletal - no significant wasting of muscles noted, no bony deformities  Extremities-no clubbing, cyanosis or edema  Skin - warm, dry, and intact. No rash, erythema, or pallor.   Psychiatric - mood, affect, and behavior appear normal.      Neurological exam  Awake, alert, fluent oriented appropriate affect  Attention and concentration appear appropriate  Recent and remote memory appears unremarkable  Speech normal without dysarthria  No clear issues with language of fund of knowledge     Cranial Nerve Exam     CN III, IV,VI-EOMI, No nystagmus, conjugate eye movements, no

## 2020-01-03 NOTE — PLAN OF CARE
by Pritesh Mauro RN  Outcome: Ongoing  Goal: LTG - Patient will be free from infection  1/3/2020 1029 by Kevin Richards RN  Outcome: Ongoing  1/2/2020 2240 by Pritesh Mauro RN  Outcome: Ongoing  Goal: STG - Patient demonstrates skin care/treatment/dressing change  1/3/2020 1029 by Kevin Richards RN  Outcome: Ongoing  1/2/2020 2240 by Pritesh Mauro RN  Outcome: Ongoing     Problem: KNOWLEDGE DEFICIT  Goal: Patient/S.O. demonstrates understanding of disease process, treatment plan, medications, and discharge instructions.   1/3/2020 1029 by Kevin Richards RN  Outcome: Ongoing  1/2/2020 2240 by Pritesh Mauro RN  Outcome: Ongoing     Problem: DISCHARGE BARRIERS  Goal: Patient's continuum of care needs are met  1/3/2020 1029 by Kevin Richards RN  Outcome: Ongoing  1/2/2020 2240 by Pritesh Mauro RN  Outcome: Ongoing     Problem: Gas Exchange - Impaired:  Goal: Levels of oxygenation will improve  Description  Levels of oxygenation will improve  1/3/2020 1029 by Kevin Richards RN  Outcome: Ongoing  1/2/2020 2240 by Pritesh Mauro RN  Outcome: Ongoing     Problem: Infection, Septic Shock:  Goal: Will show no infection signs and symptoms  Description  Will show no infection signs and symptoms  1/3/2020 1029 by Kevin Richards RN  Outcome: Ongoing  1/2/2020 2240 by Pritesh Mauro RN  Outcome: Ongoing     Problem: Infection - Ventilator-Associated Pneumonia:  Goal: Absence of pulmonary infection  Description  Absence of pulmonary infection  1/3/2020 1029 by Kevin Richards RN  Outcome: Ongoing  1/2/2020 2240 by Pritesh Mauro RN  Outcome: Ongoing     Problem: Mobility - Impaired:  Goal: Mobility will improve  Description  Mobility will improve  1/3/2020 1029 by Kevin Richards RN  Outcome: Ongoing  1/2/2020 2240 by Pritesh Mauro RN  Outcome: Ongoing     Problem: IP BLADDER/VOIDING  Goal: LTG - Patient will utilize adaptive techniques/equipment to complete bladder elimination  1/3/2020 1029 by Edith Clark RN  Outcome: Ongoing  1/2/2020 2240 by Haley Lr RN  Outcome: Ongoing     Problem: IP BOWEL ELIMINATION  Goal: LTG - patient will have regular and routine bowel evacuation  1/3/2020 1029 by Edith Clark RN  Outcome: Ongoing  1/2/2020 2240 by Haley Lr RN  Outcome: Ongoing     Problem: NUTRITION  Goal: Patient maintains adequate hydration  1/3/2020 1029 by Edith Clark RN  Outcome: Ongoing  1/2/2020 2240 by Haley Lr RN  Outcome: Ongoing     Problem: Pain:  Goal: Pain level will decrease  Description  Pain level will decrease  1/3/2020 1029 by Edith Clark RN  Outcome: Ongoing  1/2/2020 2240 by Haley Lr RN  Outcome: Ongoing  Goal: Control of acute pain  Description  Control of acute pain  1/3/2020 1029 by Edith Clark RN  Outcome: Ongoing  1/2/2020 2240 by Haley Lr RN  Outcome: Ongoing  Goal: Control of chronic pain  Description  Control of chronic pain  1/3/2020 1029 by Edith Clark RN  Outcome: Ongoing  1/2/2020 2240 by Haley Lr RN  Outcome: Ongoing

## 2020-01-03 NOTE — PROGRESS NOTES
Occupational Therapy  Facility/Department: [facility] 8 REHAB UNIT  Daily Treatment Note  NAME: Claudia Olsen  : 3/27/1931  MRN: [id number]    Date of Service: 1/3/2020    Discharge Recommendations:  Home with Home health OT, Home independently       Assessment      Activity Tolerance  Activity Tolerance: Patient Tolerated treatment well  Safety Devices  Safety Devices in place: Yes  Type of devices: Left in chair;Call light within reach; Chair alarm in place         Patient Diagnosis(es): There were no encounter diagnoses. has a past medical history of Atrial fibrillation ([address] CAD (coronary artery disease), Chronic combined systolic and diastolic CHF (congestive heart failure) ([address] COPD (chronic obstructive pulmonary disease) ([facility] GERD (gastroesophageal reflux disease), Gout, Gout, Heart attack ([facility] [address] History of home oxygen therapy, Hypertension, Hyponatremia, Mixed hyperlipidemia, and Palliative care patient. has a past surgical history that includes knee surgery; Cardiac surgery; Coronary artery bypass graft; and Intertrochanteric hip fract. Surgery (Right). Restrictions  Restrictions/Precautions  Restrictions/Precautions: Fall Risk  Lower Extremity Weight Bearing Restrictions  Right Lower Extremity Weight Bearing: Weight Bearing As Tolerated  Left Lower Extremity Weight Bearing: Weight Bearing As Tolerated  Subjective   General  Chart Reviewed: Yes  Patient assessed for rehabilitation services?: Yes  Family / Caregiver Present: Yes(spouse)  Diagnosis: R hip fx s/p cephalo-medullary nailing , pneumonia  Subjective  Subjective: Pt. stated that he feels dizzy when standing today.   Pain Assessment  Pain Assessment: 0-10  Pain Level: 0  Vital Signs  Pulse: 59  BP: (!) 111/56  BP Location: Left upper arm  Patient Currently in Pain: No   Objective    Type of ROM/Therapeutic Exercise  Type of ROM/Therapeutic Exercise: Free weights(2# 15reps)          20 1300   Balance   Sitting Balance Independent

## 2020-01-03 NOTE — PROGRESS NOTES
Occupational Therapy  Facility/Department: [facility] 8 REHAB UNIT  Daily Treatment Note  NAME: Melissa Patrick  : 3/27/1931  MRN: [id number]    Date of Service: 1/3/2020    Discharge Recommendations:  Home with Home health OT, Home independently       Assessment      Activity Tolerance  Activity Tolerance: Patient Tolerated treatment well  Safety Devices  Safety Devices in place: Yes  Type of devices: (left with PT)         Patient Diagnosis(es): There were no encounter diagnoses. has a past medical history of Atrial fibrillation ([facility] CAD (coronary artery disease), Chronic combined systolic and diastolic CHF (congestive heart failure) ([facility] COPD (chronic obstructive pulmonary disease) ([facility] GERD (gastroesophageal reflux disease), Gout, Gout, Heart attack ([facility] History of home oxygen therapy, Hypertension, Hyponatremia, Mixed hyperlipidemia, and Palliative care patient. has a past surgical history that includes knee surgery; Cardiac surgery; Coronary artery bypass graft; and Intertrochanteric hip fract. Surgery (Right). Restrictions  Restrictions/Precautions  Restrictions/Precautions: Fall Risk  Lower Extremity Weight Bearing Restrictions  Right Lower Extremity Weight Bearing: Weight Bearing As Tolerated  Left Lower Extremity Weight Bearing: Weight Bearing As Tolerated  Subjective   General  Chart Reviewed: Yes  Patient assessed for rehabilitation services?: Yes  Family / Caregiver Present: Yes(spouse)  Diagnosis: R hip fx s/p cephalo-medullary nailing , pneumonia  Subjective  Subjective: Pt. stated that he feels dizzy when standing today.   Pain Assessment  Pain Assessment: 0-10  Pain Level: 0  Vital Signs  Pulse: 59  BP: (!) 111/56  BP Location: Left upper arm  Patient Currently in Pain: No   Objective    Transfers  Stand Step Transfers: Stand by assistance(bed to w/c, Rw)  Sit to stand: Stand by assistance(extra effort due to dizziness)  Type of ROM/Therapeutic Exercise  Type of ROM/Therapeutic Exercise: Selvinjennifer(15# BUE)        01/03/20 0815   OT Education   OT Education Equipment     Plan   Plan  Current Treatment Recommendations: Strengthening, Balance Training, Functional Mobility Training, Endurance Training, Patient/Caregiver Education & Training, Equipment Evaluation, Education, & procurement, Safety Education & Training, Self-Care / ADL, Home Management Training    Goals  Short term goals  Time Frame for Short term goals: 1 week  Short term goal 1: MET  Short term goal 2: MET  Short term goal 3: MET  Short term goal 4: MET  Short term goal 5: MET  Long term goals  Time Frame for Long term goals : 2 weeks  Long term goal 1: complete overall toileting with modified independence  Long term goal 2: complete overall bathing with modified independence  Long term goal 3: complete overall dressing with modified independence  Long term goal 4: complete simple ambulatory home making task with modified independence  Long term goal 5: MET  Long term goals 6: MET  Patient Goals   Patient goals : Return home independently        Therapy Time   Individual Concurrent Group Co-treatment   Time In 0815         Time Out 0900         Minutes 45         Timed Code Treatment Minutes: 45 Minutes     Electronically signed by Essence Brothers OT on 1/3/2020 at 1:08 PM

## 2020-01-04 LAB
ALBUMIN SERPL-MCNC: 3.5 G/DL (ref 3.5–5.2)
ALP BLD-CCNC: 141 U/L (ref 40–130)
ALT SERPL-CCNC: 34 U/L (ref 5–41)
ANION GAP SERPL CALCULATED.3IONS-SCNC: 11 MMOL/L (ref 7–19)
AST SERPL-CCNC: 27 U/L (ref 5–40)
BILIRUB SERPL-MCNC: 0.4 MG/DL (ref 0.2–1.2)
BUN BLDV-MCNC: 17 MG/DL (ref 8–23)
CALCIUM SERPL-MCNC: 8.7 MG/DL (ref 8.8–10.2)
CHLORIDE BLD-SCNC: 94 MMOL/L (ref 98–111)
CO2: 22 MMOL/L (ref 22–29)
CREAT SERPL-MCNC: 0.8 MG/DL (ref 0.5–1.2)
GFR NON-AFRICAN AMERICAN: >60
GLUCOSE BLD-MCNC: 147 MG/DL (ref 74–109)
POTASSIUM SERPL-SCNC: 4 MMOL/L (ref 3.5–5)
SODIUM BLD-SCNC: 127 MMOL/L (ref 136–145)
TOTAL PROTEIN: 6.8 G/DL (ref 6.6–8.7)

## 2020-01-04 PROCEDURE — 80053 COMPREHEN METABOLIC PANEL: CPT

## 2020-01-04 PROCEDURE — 99232 SBSQ HOSP IP/OBS MODERATE 35: CPT | Performed by: PSYCHIATRY & NEUROLOGY

## 2020-01-04 PROCEDURE — 1180000000 HC REHAB R&B

## 2020-01-04 PROCEDURE — 6370000000 HC RX 637 (ALT 250 FOR IP): Performed by: HOSPITALIST

## 2020-01-04 PROCEDURE — 6370000000 HC RX 637 (ALT 250 FOR IP): Performed by: PSYCHIATRY & NEUROLOGY

## 2020-01-04 PROCEDURE — 36415 COLL VENOUS BLD VENIPUNCTURE: CPT

## 2020-01-04 RX ADMIN — GUAIFENESIN 600 MG: 600 TABLET, EXTENDED RELEASE ORAL at 08:22

## 2020-01-04 RX ADMIN — RIVAROXABAN 20 MG: 20 TABLET, FILM COATED ORAL at 17:42

## 2020-01-04 RX ADMIN — AMIODARONE HYDROCHLORIDE 100 MG: 100 TABLET ORAL at 20:43

## 2020-01-04 RX ADMIN — ASPIRIN 81 MG: 81 TABLET, COATED ORAL at 08:22

## 2020-01-04 RX ADMIN — Medication 1 G: at 13:38

## 2020-01-04 RX ADMIN — METOPROLOL SUCCINATE 12.5 MG: 25 TABLET, EXTENDED RELEASE ORAL at 08:21

## 2020-01-04 RX ADMIN — DOCUSATE SODIUM 100 MG: 100 CAPSULE, LIQUID FILLED ORAL at 20:42

## 2020-01-04 RX ADMIN — FINASTERIDE 5 MG: 5 TABLET, FILM COATED ORAL at 20:42

## 2020-01-04 RX ADMIN — CYANOCOBALAMIN TAB 500 MCG 1000 MCG: 500 TAB at 08:22

## 2020-01-04 RX ADMIN — ALLOPURINOL 100 MG: 100 TABLET ORAL at 08:22

## 2020-01-04 RX ADMIN — ATORVASTATIN CALCIUM 20 MG: 20 TABLET, FILM COATED ORAL at 20:42

## 2020-01-04 RX ADMIN — TAMSULOSIN HYDROCHLORIDE 0.8 MG: 0.4 CAPSULE ORAL at 20:42

## 2020-01-04 RX ADMIN — GUAIFENESIN 600 MG: 600 TABLET, EXTENDED RELEASE ORAL at 20:42

## 2020-01-04 RX ADMIN — AMIODARONE HYDROCHLORIDE 100 MG: 100 TABLET ORAL at 08:22

## 2020-01-04 RX ADMIN — PANTOPRAZOLE SODIUM 40 MG: 40 TABLET, DELAYED RELEASE ORAL at 08:22

## 2020-01-04 RX ADMIN — Medication 1 G: at 17:42

## 2020-01-04 RX ADMIN — DOCUSATE SODIUM 100 MG: 100 CAPSULE, LIQUID FILLED ORAL at 08:22

## 2020-01-04 RX ADMIN — Medication 1 G: at 08:21

## 2020-01-04 ASSESSMENT — PAIN SCALES - GENERAL: PAINLEVEL_OUTOF10: 0

## 2020-01-04 NOTE — PROGRESS NOTES
Patient:   Jorje Powers  MR#:    [id number]   Room:    5647/522-75   YOB: 1931  Date of Progress Note: 1/4/2020  Time of Note                           11:59 AM  Consulting Physician:   Jon Quintana M.D. Attending Physician:  Jon Quintana MD     Chief complaint Sepsis due to Escherichia coli [E. coli]    S:This 80 y.o. male  pt admitted to [facility] on 11/27/19 w/ R hip pain. Pt reports that 3 days earlier, he felt dizzy and fell on his R hip. He did not lose consciounsness. He present to [facility] ED on 11/25 for evaluation and per pt they didn't find anything wrong w/him and sent him home. He presented to [facility] ED per EMS on 11/27 with more pain and inability to bear weight on RLE. He also complained of dizziness that has worseded since amlodipine and miodarone was started by cardiology. Xrays & CT of hip were all neg. On 11/29 an MRI of his R hip was done which showd edema within the intertrochanterir R femoral neck highly suggestive of a nondisplaced fx. Dr Mckeon Later was consulted. Pt opted for conservative treatment w/ TTWB to his RLE. Pt was not able to maintain TTWB, so on 12/3/19, he underwent a cephalomedullary nailing of his R displaced intertrochanteric hip fx. He tolerated procedure well and will be WBAT in his R LE. He was participating in both PT/OT and was admitted to the [facility] on 12/8/19. He had considerable medical complications up in the rehab unit. He developed gram-negative bacteremia with preliminary cultures consistent with E. coli which also grew out of his urine. He was seen by infectious disease and the hospitalist and placed on appropriate antibiotics. He also had acute on chronic hyponatremia as low as 119. Due to his medical complications the decision was made for him to move to acute care on 12/18/2019. He had a positive blood culture for GNR and was felt to have sepsis d/t E-Coli UTI. CXR done revealed pneumonia and he was placed on Zosyn.  He PRN, Claudell Cambridge, MD    docusate sodium (COLACE) capsule 100 mg, 100 mg, Oral, BID, Claudell Cambridge, MD, 100 mg at 01/04/20 8373    polyethylene glycol (GLYCOLAX) packet 17 g, 17 g, Oral, Daily, Claudell Cambridge, MD, 17 g at 01/03/20 1055    allopurinol (ZYLOPRIM) tablet 100 mg, 100 mg, Oral, Daily, Claudell Cambridge, MD, 100 mg at 01/04/20 9839    amiodarone (PACERONE) tablet 100 mg, 100 mg, Oral, Q12H, Claudell Cambridge, MD, 100 mg at 01/04/20 0822    aspirin EC tablet 81 mg, 81 mg, Oral, Daily, Claudell Cambridge, MD, 81 mg at 01/04/20 5030    atorvastatin (LIPITOR) tablet 20 mg, 20 mg, Oral, Daily, Claudell Cambridge, MD, 20 mg at 01/03/20 2111    calcium carbonate (TUMS) chewable tablet 1,000 mg, 2 tablet, Oral, BID PRN, Claudell Cambridge, MD    finasteride (PROSCAR) tablet 5 mg, 5 mg, Oral, Daily, Claudell Cambridge, MD, 5 mg at 01/03/20 2111    guaiFENesin [facility]) extended release tablet 600 mg, 600 mg, Oral, BID, Claudell Cambridge, MD, 600 mg at 01/04/20 1436    ipratropium-albuterol (DUONEB) nebulizer solution 3 mL, 1 vial, Inhalation, Q6H PRN, Claudell Cambridge, MD    metoprolol succinate (TOPROL XL) extended release tablet 12.5 mg, 12.5 mg, Oral, Daily, Claudell Cambridge, MD, 12.5 mg at 01/04/20 2126    nitroGLYCERIN (NITROSTAT) SL tablet 0.4 mg, 0.4 mg, Sublingual, Q5 Min PRN, Claudell Cambridge, MD    oxyCODONE-acetaminophen (PERCOCET) 5-325 MG per tablet 1 tablet, 1 tablet, Oral, Q6H PRN, Claudell Cambridge, MD    pantoprazole (PROTONIX) tablet 40 mg, 40 mg, Oral, Daily, Claudell Cambridge, MD, 40 mg at 01/04/20 0450    rivaroxaban (XARELTO) tablet 20 mg, 20 mg, Oral, Dinner, Claudell Cambridge, MD, 20 mg at 01/03/20 1655    sennosides-docusate sodium (SENOKOT-S) 8.6-50 MG tablet 2 tablet, 2 tablet, Oral, Daily PRN, Claudell Cambridge, MD, 2 tablet at 12/23/19 2118    sodium chloride tablet 1 g, 1 g, Oral, TID WC, Claudell Cambridge, MD, 1 g at 01/04/20 8173    vitamin B-12 (CYANOCOBALAMIN) tablet 1,000 mcg, 1,000 mcg, Oral, Daily, Claudell Cambridge, MD, Modified independent  --    Transfers   Sit to Stand Stand by assistance  --    Stand to sit Stand by assistance  --    Bed to Chair Stand by assistance  --    Car Transfer Stand by assistance  --    Ambulation   Ambulation? Yes  --    WB Status WBAT  --    Ambulation 1   Surface level tile  --    Device Rolling Walker  --    Other Apparatus    (Cath Bag)  --    Assistance Stand by assistance  --    Quality of Gait Pt showed forward flex posture and kept RW too far in front of him at times. --    Distance 150'x2  --    Stairs/Curb   Stairs? Yes  --    Stairs   # Steps  12  --    Rails Bilateral  --    Device No Device  --    Assistance Contact guard assistance  --    Comment Pt needed VC's for step-to instead of reciprocating.  --    Wheelchair Activities   Propulsion Yes  --    Propulsion 1   Propulsion Manual  --    Level Level Tile  --    Method RUE;LUE  --    Level of Assistance Modified independent  --    Description/ Details Pt kept veering to R side needing minor cues for technique to correct. --    Distance 80'  --    Exercises   Comments  --  Practiced Car TF and stairs training. Conditions Requiring Skilled Therapeutic Intervention   Body structures, Functions, Activity limitations  --  Decreased functional mobility ; Decreased ADL status; Decreased strength;Decreased endurance   Assessment  --  Pt continues to need VC's for safety during TF's, amb, and Stairs training. Pt tolerated all activites w/ minimal fatigue or pain. Prognosis  --  Good   Activity Tolerance   Activity Tolerance  --  Patient limited by fatigue;Patient limited by endurance   Electronically signed by Jorgito Lackey PTA on 1/3/2020 at 9:56 AM                     RECORD REVIEW: Previous medical records, medications were reviewed at today's visit    IMPRESSION:   1. Sepsis-complete abx  2. H/O gout-n allopurinol  3. Atrial fibrillation-on ASA/Amiodarone/metroprolol/Xarelto-  4. Hyperlipidemia-on statin  5.  UTI-completed

## 2020-01-04 NOTE — PLAN OF CARE
Problem: Falls - Risk of:  Goal: Will remain free from falls  Description  Will remain free from falls  1/4/2020 0834 by Vamshi Isaac RN  Outcome: Ongoing  1/4/2020 0127 by Yaz Kumar RN  Outcome: Ongoing  Goal: Absence of physical injury  Description  Absence of physical injury  1/4/2020 0834 by Vamshi Isaac RN  Outcome: Ongoing  1/4/2020 0127 by Yaz Kumar RN  Outcome: Ongoing     Problem: Risk for Impaired Skin Integrity  Goal: Tissue integrity - skin and mucous membranes  Description  Structural intactness and normal physiological function of skin and  mucous membranes.   1/4/2020 0834 by Vamshi Isaac RN  Outcome: Ongoing  1/4/2020 0127 by Yaz Kumar RN  Outcome: Ongoing     Problem: SAFETY  Goal: Free from accidental physical injury  1/4/2020 0834 by Vamshi Isaac RN  Outcome: Ongoing  1/4/2020 0127 by Yaz Kumar RN  Outcome: Ongoing     Problem: DAILY CARE  Goal: Daily care needs are met  1/4/2020 0834 by Vamshi Isaac RN  Outcome: Ongoing  1/4/2020 0127 by Yaz Kumar RN  Outcome: Ongoing     Problem: PAIN  Goal: Patient's pain/discomfort is manageable  1/4/2020 0834 by Vamshi Isaac RN  Outcome: Ongoing  1/4/2020 0127 by Yaz Kumar RN  Outcome: Ongoing  Goal: STG - pain is manageable through therapies  1/4/2020 0834 by Vamshi Isaac RN  Outcome: Ongoing  1/4/2020 0127 by Yaz Kumar RN  Outcome: Ongoing  Goal: STG - Patient will verbalize an acceptable level of pain  1/4/2020 0834 by Vamshi Isaac RN  Outcome: Ongoing  1/4/2020 0127 by Yaz Kumar RN  Outcome: Ongoing  Goal: STG - patients pain is managed to allow active participation in daily activities  1/4/2020 0834 by Vamshi Isaac RN  Outcome: Ongoing  1/4/2020 0127 by Yaz Kumar RN  Outcome: Ongoing     Problem: SKIN INTEGRITY  Goal: Skin integrity is maintained or improved  1/4/2020 0834 by Vamshi Isaac RN  Outcome: Ongoing  1/4/2020 0127 by Yaz Kumar RN  Outcome: Ongoing  Goal: LTG - Patient will be free

## 2020-01-04 NOTE — PROGRESS NOTES
extended release tablet 600 mg  600 mg Oral BID Dre Adams MD   600 mg at 01/03/20 0807    ipratropium-albuterol (DUONEB) nebulizer solution 3 mL  1 vial Inhalation Q6H PRN Dre Adams MD        metoprolol succinate (TOPROL XL) extended release tablet 12.5 mg  12.5 mg Oral Daily Dre Adams MD   12.5 mg at 01/03/20 4004    nitroGLYCERIN (NITROSTAT) SL tablet 0.4 mg  0.4 mg Sublingual Q5 Min PRN Dre Adams MD        oxyCODONE-acetaminophen (PERCOCET) 5-325 MG per tablet 1 tablet  1 tablet Oral Q6H PRN Dre Adams MD        pantoprazole (PROTONIX) tablet 40 mg  40 mg Oral Daily Dre Adams MD   40 mg at 01/03/20 7896    rivaroxaban (XARELTO) tablet 20 mg  20 mg Oral Dinner Dre Adams MD   20 mg at 01/03/20 1655    sennosides-docusate sodium (SENOKOT-S) 8.6-50 MG tablet 2 tablet  2 tablet Oral Daily PRN Dre Adams MD   2 tablet at 12/23/19 2118    sodium chloride tablet 1 g  1 g Oral TID WC Dre Adams MD   1 g at 01/03/20 1656    vitamin B-12 (CYANOCOBALAMIN) tablet 1,000 mcg  1,000 mcg Oral Daily Dre Adams MD   1,000 mcg at 01/03/20 4313    vitamin D (ERGOCALCIFEROL) capsule 50,000 Units  50,000 Units Oral Weekly Dre Adams MD   50,000 Units at 12/29/19 0834    acetaminophen (TYLENOL) tablet 650 mg  650 mg Oral Q4H PRN Mitchell Lindsay MD        polyethylene glycol (GLYCOLAX) packet 17 g  17 g Oral Daily PRN Mitchell Lindsay MD        tamsulosin (FLOMAX) capsule 0.8 mg  0.8 mg Oral Daily Mitchell Lindsay MD   0.8 mg at 01/02/20 2054        Labs:     Recent Labs     01/02/20  0531   WBC 5.2   RBC 4.12*   HGB 11.8*   HCT 37.3*   MCV 90.5   MCH 28.6   MCHC 31.6*        Recent Labs     01/01/20  0608 01/02/20  0531 01/03/20  0440   * 131* 129*   K 4.6 4.3 4.2   ANIONGAP 13 13 12   CL 96* 95* 95*   CO2 22 23 22   BUN 15 18 18   CREATININE 0.8 0.9 0.8   GLUCOSE 99 97 90   CALCIUM 8.6* 8.5* 8.4*     No results for input(s): MG, PHOS in the last 72 hours.   Recent Labs     01/02/20  0531   AST 24   ALT 34   BILITOT 0.5   ALKPHOS 141*     ABGs:No results for input(s): PH, PO2, PCO2, HCO3, BE, O2SAT in the last 72 hours. Troponin T: No results for input(s): TROPONINI in the last 72 hours. INR: No results for input(s): INR in the last 72 hours. Lactic Acid: No results for input(s): LACTA in the last 72 hours. Objective:   Vitals: BP (!) 111/56   Pulse 59   Temp 97.2 °F (36.2 °C) (Temporal)   Resp 16   Ht 5' 8\" (1.727 m)   Wt 161 lb 12.8 oz (73.4 kg)   SpO2 96%   BMI 24.60 kg/m²   24HR INTAKE/OUTPUT:      Intake/Output Summary (Last 24 hours) at 1/3/2020 1808  Last data filed at 1/3/2020 1659  Gross per 24 hour   Intake 800 ml   Output 1050 ml   Net -250 ml     General appearance: Alert  HEENT: AT/NC  Lungs: BLAE  Heart: regular rate and rhythm  Abdomen: soft, non-tender; bowel sounds normal; no masses,  no organomegaly  Extremities: extremities normal, atraumatic, no cyanosis or edema  Neurologic: Alert, gross motor and sensory function intact   Skin: warm    Assessment and Plan:   Principal Problem:    Sepsis ([address]  Active Problems:    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 ([facility])    Gastroesophageal reflux disease without esophagitis    Essential hypertension    Dyslipidemia    Benign prostatic hyperplasia with urinary obstruction    Closed right hip fracture, initial encounter ([facility])    Paroxysmal atrial fibrillation ([facility])    Chronic combined systolic and diastolic CHF (congestive heart failure) ([facility])    Mixed hyperlipidemia    COPD (chronic obstructive pulmonary disease) ([facility])    Hyponatremia    Gross hematuria    Pneumonia due to organism    Urinary retention    Bacteremia    Weakness    Pain in leg, unspecified  Resolved Problems:    * No resolved hospital problems. *    Afebrile without leukocytosis - has completed 14 days of antibiotics for UTI/Sepsis.   Urology on board for urinary

## 2020-01-05 LAB
ALBUMIN SERPL-MCNC: 3.4 G/DL (ref 3.5–5.2)
ALP BLD-CCNC: 146 U/L (ref 40–130)
ALT SERPL-CCNC: 32 U/L (ref 5–41)
ANION GAP SERPL CALCULATED.3IONS-SCNC: 13 MMOL/L (ref 7–19)
AST SERPL-CCNC: 26 U/L (ref 5–40)
BILIRUB SERPL-MCNC: 0.4 MG/DL (ref 0.2–1.2)
BUN BLDV-MCNC: 15 MG/DL (ref 8–23)
CALCIUM SERPL-MCNC: 8.5 MG/DL (ref 8.8–10.2)
CHLORIDE BLD-SCNC: 93 MMOL/L (ref 98–111)
CO2: 25 MMOL/L (ref 22–29)
CREAT SERPL-MCNC: 0.9 MG/DL (ref 0.5–1.2)
GFR NON-AFRICAN AMERICAN: >60
GLUCOSE BLD-MCNC: 130 MG/DL (ref 74–109)
HCT VFR BLD CALC: 39.8 % (ref 42–52)
HEMOGLOBIN: 13.1 G/DL (ref 14–18)
MCH RBC QN AUTO: 29.8 PG (ref 27–31)
MCHC RBC AUTO-ENTMCNC: 32.9 G/DL (ref 33–37)
MCV RBC AUTO: 90.7 FL (ref 80–94)
PDW BLD-RTO: 14.1 % (ref 11.5–14.5)
PLATELET # BLD: 190 K/UL (ref 130–400)
PMV BLD AUTO: 9.3 FL (ref 9.4–12.4)
POTASSIUM SERPL-SCNC: 4.5 MMOL/L (ref 3.5–5)
RBC # BLD: 4.39 M/UL (ref 4.7–6.1)
SODIUM BLD-SCNC: 131 MMOL/L (ref 136–145)
TOTAL PROTEIN: 6.7 G/DL (ref 6.6–8.7)
WBC # BLD: 4.1 K/UL (ref 4.8–10.8)

## 2020-01-05 PROCEDURE — 99233 SBSQ HOSP IP/OBS HIGH 50: CPT | Performed by: PSYCHIATRY & NEUROLOGY

## 2020-01-05 PROCEDURE — 80053 COMPREHEN METABOLIC PANEL: CPT

## 2020-01-05 PROCEDURE — 1180000000 HC REHAB R&B

## 2020-01-05 PROCEDURE — 6370000000 HC RX 637 (ALT 250 FOR IP): Performed by: HOSPITALIST

## 2020-01-05 PROCEDURE — 6370000000 HC RX 637 (ALT 250 FOR IP): Performed by: PSYCHIATRY & NEUROLOGY

## 2020-01-05 PROCEDURE — 85027 COMPLETE CBC AUTOMATED: CPT

## 2020-01-05 PROCEDURE — 36415 COLL VENOUS BLD VENIPUNCTURE: CPT

## 2020-01-05 RX ORDER — AMIODARONE HYDROCHLORIDE 100 MG/1
100 TABLET ORAL EVERY 12 HOURS
Qty: 60 TABLET | Refills: 0 | Status: SHIPPED | OUTPATIENT
Start: 2020-01-05

## 2020-01-05 RX ORDER — FINASTERIDE 5 MG/1
5 TABLET, FILM COATED ORAL DAILY
Qty: 30 TABLET | Refills: 0 | Status: SHIPPED | OUTPATIENT
Start: 2020-01-05

## 2020-01-05 RX ORDER — LANOLIN ALCOHOL/MO/W.PET/CERES
1000 CREAM (GRAM) TOPICAL DAILY
Qty: 30 TABLET | Refills: 0 | Status: SHIPPED | OUTPATIENT
Start: 2020-01-05

## 2020-01-05 RX ORDER — GUAIFENESIN 600 MG/1
600 TABLET, EXTENDED RELEASE ORAL 2 TIMES DAILY
Qty: 60 TABLET | Refills: 0 | Status: SHIPPED | OUTPATIENT
Start: 2020-01-05

## 2020-01-05 RX ORDER — LIDOCAINE HYDROCHLORIDE 20 MG/ML
JELLY TOPICAL PRN
Qty: 10 SYRINGE | Refills: 0 | Status: SHIPPED | OUTPATIENT
Start: 2020-01-05

## 2020-01-05 RX ORDER — SODIUM CHLORIDE 1000 MG
1 TABLET, SOLUBLE MISCELLANEOUS
Qty: 90 TABLET | Refills: 0 | Status: SHIPPED | OUTPATIENT
Start: 2020-01-05

## 2020-01-05 RX ORDER — PANTOPRAZOLE SODIUM 40 MG/1
40 TABLET, DELAYED RELEASE ORAL DAILY
Qty: 30 TABLET | Refills: 0 | Status: SHIPPED | OUTPATIENT
Start: 2020-01-06

## 2020-01-05 RX ORDER — ERGOCALCIFEROL 1.25 MG/1
50000 CAPSULE ORAL WEEKLY
Qty: 5 CAPSULE | Refills: 0 | Status: SHIPPED | OUTPATIENT
Start: 2020-01-12

## 2020-01-05 RX ORDER — ATORVASTATIN CALCIUM 20 MG/1
20 TABLET, FILM COATED ORAL DAILY
Qty: 30 TABLET | Refills: 0 | Status: SHIPPED | OUTPATIENT
Start: 2020-01-05

## 2020-01-05 RX ORDER — TAMSULOSIN HYDROCHLORIDE 0.4 MG/1
0.8 CAPSULE ORAL DAILY
Qty: 60 CAPSULE | Refills: 0 | Status: SHIPPED | OUTPATIENT
Start: 2020-01-05

## 2020-01-05 RX ORDER — ALLOPURINOL 100 MG/1
100 TABLET ORAL DAILY
Qty: 30 TABLET | Refills: 0 | Status: SHIPPED | OUTPATIENT
Start: 2020-01-05

## 2020-01-05 RX ORDER — METOPROLOL SUCCINATE 25 MG/1
12.5 TABLET, EXTENDED RELEASE ORAL DAILY
Qty: 30 TABLET | Refills: 0 | Status: SHIPPED | OUTPATIENT
Start: 2020-01-06

## 2020-01-05 RX ADMIN — METOPROLOL SUCCINATE 12.5 MG: 25 TABLET, EXTENDED RELEASE ORAL at 08:48

## 2020-01-05 RX ADMIN — FINASTERIDE 5 MG: 5 TABLET, FILM COATED ORAL at 20:10

## 2020-01-05 RX ADMIN — RIVAROXABAN 20 MG: 20 TABLET, FILM COATED ORAL at 17:10

## 2020-01-05 RX ADMIN — ERGOCALCIFEROL 50000 UNITS: 1.25 CAPSULE ORAL at 08:58

## 2020-01-05 RX ADMIN — ALLOPURINOL 100 MG: 100 TABLET ORAL at 08:47

## 2020-01-05 RX ADMIN — AMIODARONE HYDROCHLORIDE 100 MG: 100 TABLET ORAL at 20:14

## 2020-01-05 RX ADMIN — DOCUSATE SODIUM 100 MG: 100 CAPSULE, LIQUID FILLED ORAL at 20:14

## 2020-01-05 RX ADMIN — AMIODARONE HYDROCHLORIDE 100 MG: 100 TABLET ORAL at 08:48

## 2020-01-05 RX ADMIN — CYANOCOBALAMIN TAB 500 MCG 1000 MCG: 500 TAB at 08:47

## 2020-01-05 RX ADMIN — Medication 1 G: at 12:12

## 2020-01-05 RX ADMIN — GUAIFENESIN 600 MG: 600 TABLET, EXTENDED RELEASE ORAL at 08:47

## 2020-01-05 RX ADMIN — TAMSULOSIN HYDROCHLORIDE 0.8 MG: 0.4 CAPSULE ORAL at 20:14

## 2020-01-05 RX ADMIN — Medication 1 G: at 17:10

## 2020-01-05 RX ADMIN — Medication 1 G: at 08:47

## 2020-01-05 RX ADMIN — PANTOPRAZOLE SODIUM 40 MG: 40 TABLET, DELAYED RELEASE ORAL at 08:48

## 2020-01-05 RX ADMIN — DOCUSATE SODIUM 100 MG: 100 CAPSULE, LIQUID FILLED ORAL at 08:48

## 2020-01-05 RX ADMIN — ASPIRIN 81 MG: 81 TABLET, COATED ORAL at 08:47

## 2020-01-05 RX ADMIN — GUAIFENESIN 600 MG: 600 TABLET, EXTENDED RELEASE ORAL at 20:15

## 2020-01-05 ASSESSMENT — PAIN SCALES - GENERAL
PAINLEVEL_OUTOF10: 0
PAINLEVEL_OUTOF10: 0

## 2020-01-05 ASSESSMENT — PAIN DESCRIPTION - PROGRESSION: CLINICAL_PROGRESSION: NOT CHANGED

## 2020-01-05 ASSESSMENT — PAIN SCALES - WONG BAKER: WONGBAKER_NUMERICALRESPONSE: 6

## 2020-01-05 NOTE — PLAN OF CARE
Ongoing     Problem: NUTRITION  Goal: Patient maintains adequate hydration  1/5/2020 1034 by Mariaa Fam RN  Outcome: Ongoing  1/4/2020 2304 by Sury Morales RN  Outcome: Ongoing     Problem: Pain:  Goal: Pain level will decrease  Description  Pain level will decrease  1/5/2020 1034 by Mariaa Fam RN  Outcome: Ongoing  1/4/2020 2304 by Sury Morales RN  Outcome: Ongoing  Goal: Control of acute pain  Description  Control of acute pain  Outcome: Ongoing  Goal: Control of chronic pain  Description  Control of chronic pain  Outcome: Ongoing

## 2020-01-05 NOTE — DISCHARGE SUMMARY
Neurology Discharge Summary     Patient Identification:  Ai Cook is a 80 y.o. male. :  3/27/1931  Admit Date:  2019  Discharge date : 2020   Attending Provider: Jaziel Grimes MD     Account Number: [de-identified]                                   Admission Diagnoses:   Sepsis [facility]) [A41.9]    Discharge Diagnoses:  Principal Problem:    Sepsis [facility])  Active Problems:    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 ([facility]    Gastroesophageal reflux disease without esophagitis    Essential hypertension    Dyslipidemia    Benign prostatic hyperplasia with urinary obstruction    Closed right hip fracture, initial encounter ([facility]    Paroxysmal atrial fibrillation ([facility])    Chronic combined systolic and diastolic CHF (congestive heart failure) ([facility]    Mixed hyperlipidemia    COPD (chronic obstructive pulmonary disease) ([facility]    Hyponatremia    Gross hematuria    Pneumonia due to organism    Urinary retention    Bacteremia    Weakness    Pain in leg, unspecified  Resolved Problems:    * No resolved hospital problems.  *      Discharge Medications:    Current Discharge Medication List           Details   guaiFENesin (MUCINEX) 600 MG extended release tablet Take 1 tablet by mouth 2 times daily  Qty: 60 tablet, Refills: 0      lidocaine (XYLOCAINE) 2 % uro-jet Apply topically as needed for Pain  Qty: 10 Syringe, Refills: 0      sodium chloride 1 g tablet Take 1 tablet by mouth 3 times daily (with meals)  Qty: 90 tablet, Refills: 0      vitamin D (ERGOCALCIFEROL) 1.25 MG (40896 UT) CAPS capsule Take 1 capsule by mouth once a week  Qty: 5 capsule, Refills: 0              Details   amiodarone (PACERONE) 100 MG tablet Take 1 tablet by mouth every 12 hours  Qty: 60 tablet, Refills: 0      rivaroxaban (XARELTO) 20 MG TABS tablet Take 1 tablet by mouth Daily with supper  Qty: 30 tablet, Refills: 0      atorvastatin (LIPITOR) 20 MG tablet Take 1 tablet by mouth daily  Qty: 30 tablet, Refills: 0      metoprolol succinate (TOPROL XL) 25 MG extended release tablet Take 0.5 tablets by mouth daily  Qty: 30 tablet, Refills: 0      allopurinol (ZYLOPRIM) 100 MG tablet Take 1 tablet by mouth daily  Qty: 30 tablet, Refills: 0      vitamin B-12 (CYANOCOBALAMIN) 1000 MCG tablet Take 1 tablet by mouth daily  Qty: 30 tablet, Refills: 0      finasteride (PROSCAR) 5 MG tablet Take 1 tablet by mouth daily  Qty: 30 tablet, Refills: 0      tamsulosin (FLOMAX) 0.4 MG capsule Take 2 capsules by mouth daily  Qty: 60 capsule, Refills: 0      pantoprazole (PROTONIX) 40 MG tablet Take 1 tablet by mouth daily  Qty: 30 tablet, Refills: 0              Details   aspirin 81 MG tablet Take 81 mg by mouth daily      calcium carbonate (TUMS) 500 MG chewable tablet Take 2 tablets by mouth 2 times daily as needed for Heartburn           Current Discharge Medication List      STOP taking these medications       ipratropium-albuterol (DUONEB) 0.5-2.5 (3) MG/3ML SOLN nebulizer solution Comments:   Reason for Stopping:         oxyCODONE-acetaminophen (PERCOCET) 5-325 MG per tablet Comments:   Reason for Stopping:         sennosides-docusate sodium (SENOKOT-S) 8.6-50 MG tablet Comments:   Reason for Stopping:         nitroGLYCERIN (NITROSTAT) 0.4 MG SL tablet Comments:   Reason for Stopping:         Cholecalciferol (VITAMIN D3) 1.25 MG (07665 UT) CAPS Comments:   Reason for Stopping:                 Consults:   Urology and [facility] Course: This 80 y.o. male pt admitted to [facility] on 11/27/19 w/ R hip pain. Pt reports that 3 days earlier, he felt dizzy and fell on his R hip. He did not lose consciounsness. He present to [facility] ED on 11/25 for evaluation and per pt they didn't find anything wrong w/him and sent him home. He presented to [facility] ED per EMS on 11/27 with more pain and inability to bear weight on RLE.  He also complained of dizziness that has worseded since amlodipine and miodarone was started by cardiology. Xrays & CT of hip were all neg. On 11/29 an MRI of his R hip was done which showd edema within the intertrochanterir R femoral neck highly suggestive of a nondisplaced fx. Dr Stormy Mccurdy was consulted. Pt opted for conservative treatment w/ TTWB to his RLE. Pt was not able to maintain TTWB, so on 12/3/19, he underwent a cephalomedullary nailing of his R displaced intertrochanteric hip fx. He tolerated procedure well and will be WBAT in his R LE. He was participating in both PT/OT and was admitted to the [address] [place] [address] on 12/8/19. He had considerable medical complications up in the rehab unit. He developed gram-negative bacteremia with preliminary cultures consistent with E. coli which also grew out of his urine. He was seen by infectious disease and the hospitalist and placed on appropriate antibiotics. He also had acute on chronic hyponatremia as low as 119. Due to his medical complications the decision was made for him to move to acute care on 12/18/2019. He had a positive blood culture for GNR and was felt to have sepsis d/t E-Coli UTI. CXR done revealed pneumonia and he was placed on Zosyn. He was being followed by urology for urinary retention and acute hematuria. He had CBI in place but this has now been discontinued. Jackson was removed on 12/23/19 for voiding trial with in & out catheterization as needed. He was placed on Omnicef on 12/22/19 by urology and will need  for 14 days. Order for repeat blood cultures and for repeat CXR on 12/24/19. He has improved overall. He is participating in both PT/OT. He was admitted to inpatient rehab. He was seen by urology and was ultimately determine that jackson catheter would be placed and discharged home with outpatient follow up with urology. He did have issues with hyponatremia and at the time of this discharge summary his serum sodium was 132 and his HCT was stable.  Plan DC with home health        Discharge Instructions Patient Instructions:   Home  Therapy orders: PT, OT and nursing   Discharge lab work: none  Code status: Full Code   Activity: activity as tolerated  Diet: Dietary Nutrition Supplements: Standard High Calorie Oral Supplement  DIET GENERAL; Daily Fluid Restriction: 1200 ml    Wound Care: as directed  Equipment: as per therapy      Sushma Quintero MD    At least 35 minutes were spent in discharging the patient

## 2020-01-05 NOTE — PROGRESS NOTES
Daily PRN, Seferino Canada MD    docusate sodium (COLACE) capsule 100 mg, 100 mg, Oral, BID, Seferino Canada MD, 100 mg at 01/05/20 0848    polyethylene glycol (GLYCOLAX) packet 17 g, 17 g, Oral, Daily, Seferino Canada MD, 17 g at 01/03/20 1055    allopurinol (ZYLOPRIM) tablet 100 mg, 100 mg, Oral, Daily, Seferino Canada MD, 100 mg at 01/05/20 0847    amiodarone (PACERONE) tablet 100 mg, 100 mg, Oral, Q12H, Seferino Canada MD, 100 mg at 01/05/20 0848    aspirin EC tablet 81 mg, 81 mg, Oral, Daily, Seferino Canada MD, 81 mg at 01/05/20 0847    atorvastatin (LIPITOR) tablet 20 mg, 20 mg, Oral, Daily, Seferino Canada MD, 20 mg at 01/04/20 2042    calcium carbonate (TUMS) chewable tablet 1,000 mg, 2 tablet, Oral, BID PRN, Seferino Canada MD    finasteride (PROSCAR) tablet 5 mg, 5 mg, Oral, Daily, Seferino Canada MD, 5 mg at 01/04/20 2042    guaiFENesin [facility]) extended release tablet 600 mg, 600 mg, Oral, BID, Seferino Canada MD, 600 mg at 01/05/20 0847    ipratropium-albuterol (DUONEB) nebulizer solution 3 mL, 1 vial, Inhalation, Q6H PRN, Seferino Canada MD    metoprolol succinate (TOPROL XL) extended release tablet 12.5 mg, 12.5 mg, Oral, Daily, Seferino Canada MD, 12.5 mg at 01/05/20 0848    nitroGLYCERIN (NITROSTAT) SL tablet 0.4 mg, 0.4 mg, Sublingual, Q5 Min PRN, Seferino Canada MD    oxyCODONE-acetaminophen (PERCOCET) 5-325 MG per tablet 1 tablet, 1 tablet, Oral, Q6H PRN, Seferino Canada MD    pantoprazole (PROTONIX) tablet 40 mg, 40 mg, Oral, Daily, Seferino Canada MD, 40 mg at 01/05/20 0848    rivaroxaban (XARELTO) tablet 20 mg, 20 mg, Oral, Dinner, Seferino Canada MD, 20 mg at 01/04/20 1742    sennosides-docusate sodium (SENOKOT-S) 8.6-50 MG tablet 2 tablet, 2 tablet, Oral, Daily PRN, Seferino Canada MD, 2 tablet at 12/23/19 2118    sodium chloride tablet 1 g, 1 g, Oral, TID WC, Seferino Canada MD, 1 g at 01/05/20 0847    vitamin B-12 (CYANOCOBALAMIN) tablet 1,000 mcg, 1,000 mcg, Oral, Daily, Seferino Canada, ptosis    CN VII-no facial assymetry       Motor Exam  antigravity throughout upper and lower extremities bilaterally      Tremors- no tremors in hands or head noted     Gait  Not tested      Nursing/pcp notes, imaging,labs and vitals reviewed. PT,OT and/or speech notes reviewed    Lab Results   Component Value Date    WBC 5.2 01/02/2020    HGB 11.8 (L) 01/02/2020    HCT 37.3 (L) 01/02/2020    MCV 90.5 01/02/2020     01/02/2020     Lab Results   Component Value Date     (L) 01/04/2020    K 4.0 01/04/2020    CL 94 (L) 01/04/2020    CO2 22 01/04/2020    BUN 17 01/04/2020    CREATININE 0.8 01/04/2020    GLUCOSE 147 (H) 01/04/2020    CALCIUM 8.7 (L) 01/04/2020    PROT 6.8 01/04/2020    LABALBU 3.5 01/04/2020    BILITOT 0.4 01/04/2020    ALKPHOS 141 (H) 01/04/2020    AST 27 01/04/2020    ALT 34 01/04/2020    LABGLOM >60 01/04/2020    GLOB 2.5 04/11/2016     Lab Results   Component Value Date    INR 1.21 (H) 11/25/2019    INR 1.14 07/25/2017    PROTIME 14.7 (H) 11/25/2019    PROTIME 14.5 07/25/2017     Salty Tomlin PTA   Therapy Assistant   Physical Therapy   Progress Notes   Cosign Needed   Date of Service:  1/3/2020  9:56 AM               Cosign Needed             Show:Clear all  []Manual[x]Template[]Copied    Added by:  [x]Rodrigue Waters PTA    []Kajal for details  Desi Azevedo  [id number]       01/03/20 2362 01/03/20 6591   General   Response To Previous Treatment Patient with no complaints from previous session.   --    Family / Caregiver Present No  --    Subjective   Subjective Pt agreed to therapy this morning.    --    Pain Screening   Patient Currently in Pain No  --    Pain Assessment   Pain Assessment 0-10  --    Pain Level 0  --    Pre Treatment Pain Screening   Pain at present 0  --    Scale Used Numeric Score  --    Intervention List Patient able to continue with treatment  --    Bed Mobility   Rolling Modified independent  --    Supine to Sit Modified independent  -- statin  5. UTI-completed abx   6. Urinary retention-jackson in place-appreciate urology input  7. BPH-on medications monitor  8. Pneumonia-onmucinex  9. HTN-on meds monitor  10. GI-PPI/bowel regimen  11. DVT prophylaxis-Lovenox  12. Pain control-Percocet PRN  13. PT/OT  14.  Hyponatremia-monitor     Continue current care    Hold discharge for now to monitor hyponatremia and coffee colored   On salt tablets  Increase fluid restriction to 1200 cc  CMP pending  Check CMP and CBC in am and if stable plan DC    ELOS pending      Expected duration and frequency therapy: 180 minutes per day, 5 days per week    CALL WITH ANY QUESTIONS  [phone] CELL  Dr Garcia Moh

## 2020-01-05 NOTE — PROGRESS NOTES
[facility]        Hospitalist Progress Note  1/5/2020 11:34 AM  Subjective:   Admit Date: 12/23/2019  PCP: No primary care provider on file. Chief Complaint: Medicine consult for medical management while on inpatient rehab. Subjective: Patient seen and examined at bedside. Denies chest pain or shortness of breath. Feels well. Asking to go home. ROS: 14 point review of systems is negative except as specifically addressed above.     Dietary Nutrition Supplements: Standard High Calorie Oral Supplement  DIET GENERAL; Daily Fluid Restriction: 1200 ml    Intake/Output Summary (Last 24 hours) at 1/5/2020 1134  Last data filed at 1/5/2020 1043  Gross per 24 hour   Intake 600 ml   Output 725 ml   Net -125 ml     Medications:  Current Facility-Administered Medications   Medication Dose Route Frequency Provider Last Rate Last Dose    lidocaine (XYLOCAINE) 2 % uro-jet   Topical PRN Amantrinidad Escalante, APRJACQUE        sodium chloride flush 0.9 % injection 10 mL  10 mL Intravenous PRN Morelia Colin MD   10 mL at 12/31/19 0859    bisacodyl (DULCOLAX) suppository 10 mg  10 mg Rectal Daily PRN Morelia Colin MD        docusate sodium (COLACE) capsule 100 mg  100 mg Oral BID Morelia Colin MD   100 mg at 01/05/20 0848    polyethylene glycol (GLYCOLAX) packet 17 g  17 g Oral Daily Morelia Colin MD   17 g at 01/03/20 1055    allopurinol (ZYLOPRIM) tablet 100 mg  100 mg Oral Daily Morelia Colin MD   100 mg at 01/05/20 0847    amiodarone (PACERONE) tablet 100 mg  100 mg Oral Q12H Morelia Colin MD   100 mg at 01/05/20 0848    aspirin EC tablet 81 mg  81 mg Oral Daily Morelia Colin MD   81 mg at 01/05/20 0847    atorvastatin (LIPITOR) tablet 20 mg  20 mg Oral Daily Morelia Colin MD   20 mg at 01/04/20 2042    calcium carbonate (TUMS) chewable tablet 1,000 mg  2 tablet Oral BID PRN Morelia Colin MD        finasteride (PROSCAR) tablet 5 mg  5 mg Oral Daily Morelia Colin MD   5 mg at 01/04/20 2042    guaiFENesin (MUCINEX) extended release tablet 600 mg  600 mg Oral BID Mayelin Doty MD   600 mg at 01/05/20 0847    ipratropium-albuterol (DUONEB) nebulizer solution 3 mL  1 vial Inhalation Q6H PRN Mayelin Doty MD        metoprolol succinate (TOPROL XL) extended release tablet 12.5 mg  12.5 mg Oral Daily Mayelin Doty MD   12.5 mg at 01/05/20 0848    nitroGLYCERIN (NITROSTAT) SL tablet 0.4 mg  0.4 mg Sublingual Q5 Min PRN Mayelin Doty MD        oxyCODONE-acetaminophen (PERCOCET) 5-325 MG per tablet 1 tablet  1 tablet Oral Q6H PRN Mayelin Doty MD        pantoprazole (PROTONIX) tablet 40 mg  40 mg Oral Daily Mayelin Doty MD   40 mg at 01/05/20 0848    rivaroxaban (XARELTO) tablet 20 mg  20 mg Oral Dinner Mayelin Doty MD   20 mg at 01/04/20 1742    sennosides-docusate sodium (SENOKOT-S) 8.6-50 MG tablet 2 tablet  2 tablet Oral Daily PRN Mayelin Doty MD   2 tablet at 12/23/19 2118    sodium chloride tablet 1 g  1 g Oral TID WC Mayelin Doty MD   1 g at 01/05/20 0847    vitamin B-12 (CYANOCOBALAMIN) tablet 1,000 mcg  1,000 mcg Oral Daily Mayelin Doty MD   1,000 mcg at 01/05/20 0847    vitamin D (ERGOCALCIFEROL) capsule 50,000 Units  50,000 Units Oral Weekly Mayelin Doty MD   50,000 Units at 01/05/20 0858    acetaminophen (TYLENOL) tablet 650 mg  650 mg Oral Q4H PRN Angela Lopez MD        polyethylene glycol (GLYCOLAX) packet 17 g  17 g Oral Daily PRN Angela Lopez MD        tamsulosin (FLOMAX) capsule 0.8 mg  0.8 mg Oral Daily Angela Lopez MD   0.8 mg at 01/04/20 2042        Labs:     No results for input(s): WBC, RBC, HGB, HCT, MCV, MCH, MCHC, PLT in the last 72 hours. Recent Labs     01/03/20  0440 01/04/20  0942   * 127*   K 4.2 4.0   ANIONGAP 12 11   CL 95* 94*   CO2 22 22   BUN 18 17   CREATININE 0.8 0.8   GLUCOSE 90 147*   CALCIUM 8.4* 8.7*     No results for input(s): MG, PHOS in the last 72 hours.   Recent Labs     01/04/20  0942   AST 27   ALT 34   BILITOT 0.4   ALKPHOS 141*     ABGs:No results for input(s): PH, PO2, PCO2, HCO3, BE, O2SAT in the last 72 hours. Troponin T: No results for input(s): TROPONINI in the last 72 hours. INR: No results for input(s): INR in the last 72 hours. Lactic Acid: No results for input(s): LACTA in the last 72 hours. Objective:   Vitals: BP (!) 119/56   Pulse 56   Temp 97.2 °F (36.2 °C) (Temporal)   Resp 18   Ht 5' 8\" (1.727 m)   Wt 150 lb 0.1 oz (68 kg)   SpO2 90%   BMI 22.81 kg/m²   24HR INTAKE/OUTPUT:      Intake/Output Summary (Last 24 hours) at 1/5/2020 1134  Last data filed at 1/5/2020 1043  Gross per 24 hour   Intake 600 ml   Output 725 ml   Net -125 ml     General appearance: Alert  HEENT: AT/NC  Lungs: BLAE  Heart: regular rate and rhythm  Abdomen: soft, non-tender; bowel sounds normal; no masses,  no organomegaly  Extremities: extremities normal, atraumatic, no cyanosis or edema  Neurologic: Alert, gross motor and sensory function intact   Skin: warm    Assessment and Plan:   Principal Problem:    Sepsis ([facility] [address]  Active Problems:    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 ([facility])    Gastroesophageal reflux disease without esophagitis    Essential hypertension    Dyslipidemia    Benign prostatic hyperplasia with urinary obstruction    Closed right hip fracture, initial encounter ([facility])    Paroxysmal atrial fibrillation ([facility])    Chronic combined systolic and diastolic CHF (congestive heart failure) ([facility])    Mixed hyperlipidemia    COPD (chronic obstructive pulmonary disease) ([facility])    Hyponatremia    Gross hematuria    Pneumonia due to organism    Urinary retention    Bacteremia    Weakness    Pain in leg, unspecified  Resolved Problems:    * No resolved hospital problems. *    Afebrile without leukocytosis - has completed 14 days of antibiotics for UTI/Sepsis.   Urology on board for urinary retention/Alves  Hyponatremia - continue salt tabs/fluid restriction  Supportive management.     Signed:  Collette Suero MD 1/5/2020 11:34 AM  Rounding Hospitalist

## 2020-01-06 VITALS
TEMPERATURE: 97.6 F | HEART RATE: 52 BPM | BODY MASS INDEX: 22.73 KG/M2 | RESPIRATION RATE: 16 BRPM | OXYGEN SATURATION: 94 % | DIASTOLIC BLOOD PRESSURE: 61 MMHG | SYSTOLIC BLOOD PRESSURE: 106 MMHG | HEIGHT: 68 IN | WEIGHT: 150.01 LBS

## 2020-01-06 LAB
ALBUMIN SERPL-MCNC: 3.2 G/DL (ref 3.5–5.2)
ALP BLD-CCNC: 134 U/L (ref 40–130)
ALT SERPL-CCNC: 28 U/L (ref 5–41)
ANION GAP SERPL CALCULATED.3IONS-SCNC: 16 MMOL/L (ref 7–19)
AST SERPL-CCNC: 22 U/L (ref 5–40)
BASOPHILS ABSOLUTE: 0 K/UL (ref 0–0.2)
BASOPHILS RELATIVE PERCENT: 0.9 % (ref 0–1)
BILIRUB SERPL-MCNC: 0.4 MG/DL (ref 0.2–1.2)
BUN BLDV-MCNC: 16 MG/DL (ref 8–23)
CALCIUM SERPL-MCNC: 8.4 MG/DL (ref 8.8–10.2)
CHLORIDE BLD-SCNC: 96 MMOL/L (ref 98–111)
CO2: 20 MMOL/L (ref 22–29)
CREAT SERPL-MCNC: 0.7 MG/DL (ref 0.5–1.2)
EOSINOPHILS ABSOLUTE: 0.2 K/UL (ref 0–0.6)
EOSINOPHILS RELATIVE PERCENT: 5.1 % (ref 0–5)
GFR NON-AFRICAN AMERICAN: >60
GLUCOSE BLD-MCNC: 101 MG/DL (ref 74–109)
HCT VFR BLD CALC: 37.7 % (ref 42–52)
HEMOGLOBIN: 12.2 G/DL (ref 14–18)
IMMATURE GRANULOCYTES #: 0 K/UL
LYMPHOCYTES ABSOLUTE: 1.4 K/UL (ref 1.1–4.5)
LYMPHOCYTES RELATIVE PERCENT: 32.8 % (ref 20–40)
MCH RBC QN AUTO: 28.8 PG (ref 27–31)
MCHC RBC AUTO-ENTMCNC: 32.4 G/DL (ref 33–37)
MCV RBC AUTO: 89.1 FL (ref 80–94)
MONOCYTES ABSOLUTE: 0.5 K/UL (ref 0–0.9)
MONOCYTES RELATIVE PERCENT: 10.5 % (ref 0–10)
NEUTROPHILS ABSOLUTE: 2.2 K/UL (ref 1.5–7.5)
NEUTROPHILS RELATIVE PERCENT: 50.5 % (ref 50–65)
PDW BLD-RTO: 14 % (ref 11.5–14.5)
PLATELET # BLD: 170 K/UL (ref 130–400)
PMV BLD AUTO: 9.5 FL (ref 9.4–12.4)
POTASSIUM REFLEX MAGNESIUM: 4.5 MMOL/L (ref 3.5–5)
POTASSIUM SERPL-SCNC: 4.5 MMOL/L (ref 3.5–5)
RBC # BLD: 4.23 M/UL (ref 4.7–6.1)
SODIUM BLD-SCNC: 132 MMOL/L (ref 136–145)
TOTAL PROTEIN: 6.3 G/DL (ref 6.6–8.7)
WBC # BLD: 4.3 K/UL (ref 4.8–10.8)

## 2020-01-06 PROCEDURE — 6370000000 HC RX 637 (ALT 250 FOR IP): Performed by: HOSPITALIST

## 2020-01-06 PROCEDURE — 36415 COLL VENOUS BLD VENIPUNCTURE: CPT

## 2020-01-06 PROCEDURE — 99239 HOSP IP/OBS DSCHRG MGMT >30: CPT | Performed by: PSYCHIATRY & NEUROLOGY

## 2020-01-06 PROCEDURE — 99231 SBSQ HOSP IP/OBS SF/LOW 25: CPT | Performed by: PHYSICIAN ASSISTANT

## 2020-01-06 PROCEDURE — 80053 COMPREHEN METABOLIC PANEL: CPT

## 2020-01-06 PROCEDURE — 85025 COMPLETE CBC W/AUTO DIFF WBC: CPT

## 2020-01-06 RX ADMIN — PANTOPRAZOLE SODIUM 40 MG: 40 TABLET, DELAYED RELEASE ORAL at 08:14

## 2020-01-06 RX ADMIN — DOCUSATE SODIUM 100 MG: 100 CAPSULE, LIQUID FILLED ORAL at 08:13

## 2020-01-06 RX ADMIN — ALLOPURINOL 100 MG: 100 TABLET ORAL at 08:14

## 2020-01-06 RX ADMIN — GUAIFENESIN 600 MG: 600 TABLET, EXTENDED RELEASE ORAL at 08:13

## 2020-01-06 RX ADMIN — METOPROLOL SUCCINATE 12.5 MG: 25 TABLET, EXTENDED RELEASE ORAL at 08:14

## 2020-01-06 RX ADMIN — ASPIRIN 81 MG: 81 TABLET, COATED ORAL at 08:14

## 2020-01-06 RX ADMIN — Medication 1 G: at 08:14

## 2020-01-06 RX ADMIN — CYANOCOBALAMIN TAB 500 MCG 1000 MCG: 500 TAB at 08:14

## 2020-01-06 RX ADMIN — AMIODARONE HYDROCHLORIDE 100 MG: 100 TABLET ORAL at 08:14

## 2020-01-06 ASSESSMENT — PAIN SCALES - GENERAL
PAINLEVEL_OUTOF10: 0

## 2020-01-06 NOTE — DISCHARGE SUMMARY
Occupational Therapy Discharge Summary         Date: 2020  Patient Name: Colton Valdes        MRN: [id number]    : 3/27/1931  (80 y.o.)  Gender: male      Diagnosis: Sepsis  Restrictions/Precautions  Restrictions/Precautions: Fall Risk      Discharge Date: 2020  BUE AROM WFL     UE Functioning:  BUE AROM WFL    Home Management:  Functional Mobility  Functional - Mobility Device: Rolling Walker  Activity: Other  Assist Level: Stand by assistance  Functional Mobility Comments: ambulatory act of ~75 feet    Adaptive Equipment/DME Status:  Semi-electric hospital bed, standard wheelchair, Transfer tub bench     Pain Assessment:  Pain Level: 0  Pain Location: Neck, Knee    Remaining Problems:  Decreased functional mobility ; Decreased ADL status;  Decreased strength; Decreased endurance; Decreased balance; Decreased high-level IADLs    STGs:  Short term goals  Time Frame for Short term goals: 1 week  Short term goal 1: complete LB dressing with supervision  Short term goal 2: complete overall toileting with supervision  Short term goal 3: complete 1-2 handed standing level task for 3 mins with supervision  Short term goal 4: complete simple ambulatory home making task with supervision   Short term goal 5: complete overall bathing with supervision  MET 5/5 Short Term Goals    LTGs:  Long term goals  Time Frame for Long term goals : 2 weeks  Long term goal 1: complete overall toileting with modified independence  Long term goal 2: complete overall bathing with modified independence  Long term goal 3: complete overall dressing with modified independence  Long term goal 4: complete simple ambulatory home making task with modified independence  Long term goal 5: complete HEP with independence  Long term goals 6: pt/family verbalize DME  MET 2/6 Long Term Goals    Discharge Setting and Recommendations:  Home with Home Health OT       Discharge Care Scores  Eating: CARE Score: 6  Oral Hygiene: CARE Score:

## 2020-01-06 NOTE — PROGRESS NOTES
Patient:   Ghulam Feldman  MR#:    [id number]   Room:    9934/180-49   YOB: 1931  Date of Progress Note: 1/6/2020  Time of Note                           8:49 AM  Consulting Physician:   Keren Severe, M.D. Attending Physician:  Keren Severe, MD     Chief complaint Sepsis due to Escherichia coli [E. coli]    S:This 80 y.o. male  pt admitted to [facility] on 11/27/19 w/ R hip pain. Pt reports that 3 days earlier, he felt dizzy and fell on his R hip. He did not lose consciounsness. He present to [facility] ED on 11/25 for evaluation and per pt they didn't find anything wrong w/him and sent him home. He presented to [facility] ED per EMS on 11/27 with more pain and inability to bear weight on RLE. He also complained of dizziness that has worseded since amlodipine and miodarone was started by cardiology. Xrays & CT of hip were all neg. On 11/29 an MRI of his R hip was done which showd edema within the intertrochanterir R femoral neck highly suggestive of a nondisplaced fx. Dr Marifer Henry was consulted. Pt opted for conservative treatment w/ TTWB to his RLE. Pt was not able to maintain TTWB, so on 12/3/19, he underwent a cephalomedullary nailing of his R displaced intertrochanteric hip fx. He tolerated procedure well and will be WBAT in his R LE. He was participating in both PT/OT and was admitted to the [facility] [address] [facility] on 12/8/19. He had considerable medical complications up in the rehab unit. He developed gram-negative bacteremia with preliminary cultures consistent with E. coli which also grew out of his urine. He was seen by infectious disease and the hospitalist and placed on appropriate antibiotics. He also had acute on chronic hyponatremia as low as 119. Due to his medical complications the decision was made for him to move to acute care on 12/18/2019. He had a positive blood culture for GNR and was felt to have sepsis d/t E-Coli UTI. CXR done revealed pneumonia and he was placed on Zosyn.  He 1,000 mcg at 01/06/20 1967    vitamin D (ERGOCALCIFEROL) capsule 50,000 Units, 50,000 Units, Oral, Weekly, Kenzie Briscoe MD, 50,000 Units at 01/05/20 0858    acetaminophen (TYLENOL) tablet 650 mg, 650 mg, Oral, Q4H PRN, Bert Persaud MD    polyethylene glycol (GLYCOLAX) packet 17 g, 17 g, Oral, Daily PRN, Bert Persaud MD    tamsulosin (FLOMAX) capsule 0.8 mg, 0.8 mg, Oral, Daily, Bert Persaud MD, 0.8 mg at 01/05/20 2014    Allergies:  Patient has no known allergies. Social History:   TOBACCO:   reports that he has quit smoking. His smoking use included cigarettes. He has a 195.00 pack-year smoking history. He has quit using smokeless tobacco.  ETOH:   reports no history of alcohol use. Family History:       Problem Relation Age of Onset    No Known Problems Mother     Heart Disease Father          PHYSICAL EXAM:  /61   Pulse 52   Temp 97.6 °F (36.4 °C) (Temporal)   Resp 16   Ht 5' 8\" (1.727 m)   Wt 150 lb 0.1 oz (68 kg)   SpO2 94%   BMI 22.81 kg/m²     Constitutional - well developed, well nourished. Eyes - conjunctiva normal.   Ear, nose, throat - No scars, masses, or lesions over external nose or ears, no atrophy of tongue  Neck-symmetric, no masses noted, no jugular vein distension  Respiration- chest wall appears symmetric, good expansion,   normal effort without use of accessory muscles  Musculoskeletal - no significant wasting of muscles noted, no bony deformities  Extremities-no clubbing, cyanosis or edema  Skin - warm, dry, and intact. No rash, erythema, or pallor.   Psychiatric - mood, affect, and behavior appear normal.      Neurological exam  Awake, alert, fluent oriented appropriate affect  Attention and concentration appear appropriate  Recent and remote memory appears unremarkable  Speech normal without dysarthria  No clear issues with language of fund of knowledge     Cranial Nerve Exam     CN III, IV,VI-EOMI, No nystagmus, conjugate eye movements, no ptosis    CN regimen  11. DVT prophylaxis-Lovenox  12. Pain control-Percocet PRN  13. PT/OT  14.  Hyponatremia-monitor     Serum sodium increased to 132  HCT stable   Plan DC home with home health      Expected duration and frequency therapy: 180 minutes per day, 5 days per week    [address] [facility]  [phone] CELL  Dr Daniel Rahman

## 2020-01-06 NOTE — PROGRESS NOTES
Discharge instructions and med info given and explained to patient and POA; including use of catheter leg bag. Instructed patient to not lie in bed when leg bag is on, to encourage drainage of urine, verbalized understanding.

## 2020-01-06 NOTE — PROGRESS NOTES
fracture, initial encounter ([facility] [address]    Paroxysmal atrial fibrillation (HCC)    Chronic combined systolic and diastolic CHF (congestive heart failure) ([facility] [address]    Mixed hyperlipidemia    COPD (chronic obstructive pulmonary disease) ([facility] [address]    Hyponatremia    Gross hematuria    Pneumonia due to organism    Urinary retention    Acute cystitis with hematuria    BPH without urinary obstruction    Sepsis ([facility] [address]    Palliative care patient    Bacteremia    Weakness    Pain in leg, unspecified     1. BPH with urinary retention. Alves catheter in place. Patient failed voiding trial he is not a candidate for in and out catheterization. Has a large prostate. Surgery is to be considered patient would need transrectal ultrasound of the prostate at [address] [place] [address] as an outpatient to determine prostate size. Patient has follow-up appointment at [address] [place] [address] for voiding trial as an outpatient when he is discharged. Patient had concerns about going home with a large catheter bag and I reassured him he would be sent home with a smaller leg bag. He was discharged by [facility] yesterday but patient developed hyponatremia. Sodium is now 132.    2.  Cystitis with hematuria. Sure he has resolved he completed 14-day course of antibiotics 12/31/2019. 3.  No further  recommendations during this hospitalization patient can be discharged from urologic standpoint we will see patient as an outpatient.     Signed  Basil Standard PA-C

## 2020-01-07 NOTE — DISCHARGE SUMMARY
Physical Therapy DISCHARGE Note  DATE:  2020  NAME:  Onesimo Landeros  :  3/27/1931  (80 y.o.,male)  MRN:  [id number]    HEIGHT:  Height: 5' 8\" (172.7 cm)  WEIGHT:  Weight: 150 lb 0.1 oz (68 kg)    PATIENT DIAGNOSIS(ES):    Diagnosis: Sepsis  Additional Pertinent Hx: CHF, AFIB, HTN, CABG, 12/3 RIGHT HIP CEPHALOMEDULLARY NAILING,   RESTRICTIONS/PRECAUTIONS:    Restrictions/Precautions  Restrictions/Precautions: Fall Risk     OVERALL  ORIENTATION STATUS:  Overall Orientation Status: Within Normal Limits  PAIN:  Pain Level: 0  Pain Type: Acute pain    Pain Location: Hip     Pain Orientation: Left      NEUROLOGICAL                          STRENGTH  Strength RLE  Strength RLE: Exception  Comment: grossly 4-/5  Strength LLE  Strength LLE: Exception  Comment: grossly 4-/5  ROM  AROM RLE (degrees)  RLE AROM: WFL  AROM LLE (degrees)  LLE AROM : WFL  POSTURE/BALANCE  Balance  Sitting - Static: Good  Sitting - Dynamic: Good  Standing - Static: Fair  Standing - Dynamic: Fair       ACTIVITY TOLERANCE  Activity Tolerance  Activity Tolerance: Patient limited by endurance, Patient Tolerated treatment well      BED MOBILITY  Bed Mobility  Rolling: Modified independent  Supine to Sit: Modified independent  Sit to Supine: Modified independent  Scooting: Independent  TRANSFERS  Sit to Stand: Stand by assistance, Independent      Bed to Chair: Stand by assistance, Independent  Car Transfer: Stand by assistance  WHEELCHAIR PROPULSION 1  Propulsion 1  Propulsion: Manual  Level: Level Tile  Method: PRINCESS APODACA  Level of Assistance: Modified independent  Description/ Details: Pt kept veering to R side needing minor cues for technique to correct. Distance: 175'  AMBULATION 1  Ambulation 1  Surface: level tile  Device: Rolling Walker  Other Apparatus: (Catheter Bag)  Assistance: Stand by assistance  Quality of Gait: Pt showed forward flex posture and kept RW too far in front of him at times.     Gait Deviations: Decreased step length, Decreased step height  Distance: 150'x2  Comments: Incorporated turns. AMBULATION 2  Ambulation 2  Surface - 2: level tile  Device 2: Rolling Walker  Other Apparatus 2: Wheelchair follow  Assistance 2: Stand by assistance  Quality of Gait 2: Increased step height with R LE untill near end when patient fatigued  Gait Deviations: Decreased step height  Distance: 76'  Comments: Patient up in chair with Personal alarm attached  STAIRS  Stairs  # Steps : 12  Rails: Bilateral  Device: No Device  Assistance: Contact guard assistance  Comment: Pt needed VC's for step-to instead of reciprocating. GOALS:  Short term goals  Time Frame for Short term goals: 1 week   Short term goal 1: supervision bed mobility   Short term goal 2: CGA transfer with AD  Short term goal 3: CGA 50 feet ambulation with AD  Short term goal 4: supervision WC propulsion and management 150 feet   Short term goal 5: CGA 4 steps with AD    Long term goals  Time Frame for Long term goals : 2 weeks  Long term goal 1: independent bed mobility   Long term goal 2: independent transfer with AD   Long term goal 3: independent 150 feet ambulation with AD  Long term goal 4: independent wheelchair ambulation 150 feet   Long term goal 5: supervision 4 steps with AD   HOME LIVING:     Type of Home: House  Home Layout: One level     Entrance Stairs - Number of Steps: 2  Entrance Stairs - Rails: Right  ASSESSMENT (IMPAIRMENTS/BARRIERS): Body structures, Functions, Activity limitations: Decreased functional mobility , Decreased ADL status, Decreased strength, Decreased endurance  Assessment: Shola session with rests.   Activity Tolerance: Patient limited by endurance, Patient Tolerated treatment well     PLAN:  Plan  Times per week: 5-6   Times per day: Twice a day  Plan weeks: 2  Current Treatment Recommendations: Strengthening, ROM, Balance Training, Functional Mobility Training, Endurance Training, Wheelchair Mobility Training, Gait Training, Stair training, Pain Uneven Surfaces  Reason if not Attempted: Not attempted due to medical condition or safety concerns  CARE Score: 88  Discharge Goal: Supervision or touching assistance    1 Step (Curb)  Assistance Needed: Supervision or touching assistance  Reason if not Attempted: Not attempted due to medical condition or safety concerns  CARE Score: 4  Discharge Goal: Supervision or touching assistance    4 Steps  Assistance Needed: Supervision or touching assistance  Reason if not Attempted: Not attempted due to medical condition or safety concerns  CARE Score: 4  Discharge Goal: Supervision or touching assistance    12 Steps  Assistance Needed: Supervision or touching assistance  Reason if not Attempted: Not attempted due to medical condition or safety concerns  CARE Score: 4  Discharge Goal: Not Attempted    Wheel 50 Feet with Two Turns  Assistance Needed: Independent  CARE Score: 6  Discharge Goal: Independent    Wheel 150 Feet  Assistance Needed: Independent  Reason if not Attempted: Not attempted due to medical condition or safety concerns  CARE Score: 6  Discharge Goal: Independent      LAST TREATMENT TIME  PT Individual Minutes  Time In: 1430  Time Out: 6638  Minutes: 45

## 2020-01-10 ENCOUNTER — OFFICE VISIT (OUTPATIENT)
Dept: UROLOGY | Age: 85
End: 2020-01-10
Payer: MEDICARE

## 2020-01-10 VITALS
TEMPERATURE: 98.2 F | SYSTOLIC BLOOD PRESSURE: 114 MMHG | HEART RATE: 55 BPM | HEIGHT: 68 IN | WEIGHT: 157 LBS | BODY MASS INDEX: 23.79 KG/M2 | DIASTOLIC BLOOD PRESSURE: 85 MMHG

## 2020-01-10 PROCEDURE — 1123F ACP DISCUSS/DSCN MKR DOCD: CPT | Performed by: PHYSICIAN ASSISTANT

## 2020-01-10 PROCEDURE — G8427 DOCREV CUR MEDS BY ELIG CLIN: HCPCS | Performed by: PHYSICIAN ASSISTANT

## 2020-01-10 PROCEDURE — G8420 CALC BMI NORM PARAMETERS: HCPCS | Performed by: PHYSICIAN ASSISTANT

## 2020-01-10 PROCEDURE — G8484 FLU IMMUNIZE NO ADMIN: HCPCS | Performed by: PHYSICIAN ASSISTANT

## 2020-01-10 PROCEDURE — 4040F PNEUMOC VAC/ADMIN/RCVD: CPT | Performed by: PHYSICIAN ASSISTANT

## 2020-01-10 PROCEDURE — 1111F DSCHRG MED/CURRENT MED MERGE: CPT | Performed by: PHYSICIAN ASSISTANT

## 2020-01-10 PROCEDURE — 99214 OFFICE O/P EST MOD 30 MIN: CPT | Performed by: PHYSICIAN ASSISTANT

## 2020-01-10 PROCEDURE — 1036F TOBACCO NON-USER: CPT | Performed by: PHYSICIAN ASSISTANT

## 2020-01-10 RX ORDER — NITROGLYCERIN 0.4 MG/1
0.4 TABLET SUBLINGUAL
COMMUNITY
Start: 2019-12-09

## 2020-01-10 RX ORDER — SENNA AND DOCUSATE SODIUM 50; 8.6 MG/1; MG/1
2 TABLET, FILM COATED ORAL
COMMUNITY
Start: 2019-12-09

## 2020-01-10 ASSESSMENT — ENCOUNTER SYMPTOMS
BACK PAIN: 0
EYE DISCHARGE: 0
EYE REDNESS: 0
COUGH: 0
CONSTIPATION: 0
ABDOMINAL PAIN: 0
SHORTNESS OF BREATH: 0
WHEEZING: 0
DIARRHEA: 0

## 2020-01-10 NOTE — PROGRESS NOTES
tongue      amiodarone (PACERONE) 100 MG tablet Take 1 tablet by mouth every 12 hours 60 tablet 0    rivaroxaban (XARELTO) 20 MG TABS tablet Take 1 tablet by mouth Daily with supper 30 tablet 0    atorvastatin (LIPITOR) 20 MG tablet Take 1 tablet by mouth daily 30 tablet 0    metoprolol succinate (TOPROL XL) 25 MG extended release tablet Take 0.5 tablets by mouth daily 30 tablet 0    guaiFENesin (MUCINEX) 600 MG extended release tablet Take 1 tablet by mouth 2 times daily 60 tablet 0    allopurinol (ZYLOPRIM) 100 MG tablet Take 1 tablet by mouth daily 30 tablet 0    vitamin B-12 (CYANOCOBALAMIN) 1000 MCG tablet Take 1 tablet by mouth daily 30 tablet 0    sodium chloride 1 g tablet Take 1 tablet by mouth 3 times daily (with meals) 90 tablet 0    finasteride (PROSCAR) 5 MG tablet Take 1 tablet by mouth daily 30 tablet 0    tamsulosin (FLOMAX) 0.4 MG capsule Take 2 capsules by mouth daily 60 capsule 0    pantoprazole (PROTONIX) 40 MG tablet Take 1 tablet by mouth daily 30 tablet 0    [START ON 1/12/2020] vitamin D (ERGOCALCIFEROL) 1.25 MG (73235 UT) CAPS capsule Take 1 capsule by mouth once a week 5 capsule 0    aspirin 81 MG tablet Take 81 mg by mouth daily      calcium carbonate (TUMS) 500 MG chewable tablet Take 2 tablets by mouth 2 times daily as needed for Heartburn      lidocaine (XYLOCAINE) 2 % uro-jet Apply topically as needed for Pain (Patient not taking: Reported on 1/10/2020) 10 Syringe 0     No current facility-administered medications for this visit. No Known Allergies       Social History     Socioeconomic History    Marital status:       Spouse name: None    Number of children: None    Years of education: None    Highest education level: None   Occupational History    None   Social Needs    Financial resource strain: None    Food insecurity:     Worry: None     Inability: None    Transportation needs:     Medical: None     Non-medical: None   Tobacco Use    Smoking Pulse 55   Temp 98.2 °F (36.8 °C) (Temporal)   Ht 5' 8\" (1.727 m)   Wt 157 lb (71.2 kg)   BMI 23.87 kg/m²   Physical Exam  Vitals signs and nursing note reviewed. Constitutional:       General: He is not in acute distress. HENT:      Head: Normocephalic. Nose: Nose normal.   Eyes:      Conjunctiva/sclera: Conjunctivae normal.   Cardiovascular:      Rate and Rhythm: Normal rate. Pulmonary:      Effort: Pulmonary effort is normal.   Genitourinary:     Penis: Normal.           Comments: Has Alves catheter inserted within urethral meatus  Skin:     General: Skin is warm and dry. Neurological:      Mental Status: He is alert. 1. Retention of urine  Patient has large prostate per digital rectal exam done by Dr. Coral Gonzalez patient is going to be scheduled transrectal ultrasound of the biopsy to determine size. Help Dr. Coral Gonzalez decide on best surgery course for his prostate. After discussion we will leave catheter in place for now he will return next week in the a.m. for voiding trial catheter may have to be replaced. 2. Gross hematuria  Patient had a look like old blood in his catheter had my assistant hand irrigate and urine at the end of irrigation was light pink-tinged with no clots noted. 3.  Prostatic hypertrophy with urinary retention  He is on maximum medical therapy also see # 1. No orders of the defined types were placed in this encounter. No orders of the defined types were placed in this encounter. Plan:  Cath removal voiding trial next week or catheter replacement patient is to be scheduled for transrectal ultrasound of the prostate to determine prostate size.

## 2020-01-15 ENCOUNTER — TELEPHONE (OUTPATIENT)
Dept: UROLOGY | Age: 85
End: 2020-01-15

## 2020-01-15 NOTE — TELEPHONE ENCOUNTER
Nurse for PT reported some blood in cath bag. Do they need to do anything? PT on blood thinner.   Shmuel Chavarria # [phone]  F: [phone] if any orders need to be sent

## 2020-01-20 ENCOUNTER — TELEPHONE (OUTPATIENT)
Dept: UROLOGY | Age: 85
End: 2020-01-20

## 2020-01-27 ENCOUNTER — PROCEDURE VISIT (OUTPATIENT)
Dept: UROLOGY | Age: 85
End: 2020-01-27
Payer: MEDICARE

## 2020-01-27 PROCEDURE — 76872 US TRANSRECTAL: CPT | Performed by: UROLOGY

## 2020-01-27 PROCEDURE — 51700 IRRIGATION OF BLADDER: CPT | Performed by: UROLOGY

## 2020-01-27 PROCEDURE — 51798 US URINE CAPACITY MEASURE: CPT | Performed by: UROLOGY

## 2020-01-27 NOTE — PROGRESS NOTES
Ai Cook is a 80 y.o. male who presents today   Chief Complaint   Patient presents with    Follow-up     I'm here for a measurement of my prostate per Darryl Larry for enlarged prostate       HPI  Patient seen in the hospital for BPH urinary retention and hematuria. He was noted on MELLISA to have a very large prostate. He failed voiding trials in the hospital and had his Alves cath replaced he was sent home on his Alves. He also had aspirin and Xarelto. He also was treated with a 14-day course of antibiotics for cystitis. His urine has cleared he still has catheter in place. He comes in now for ultrasound measurement of prostate volume to see if he be a candidate for TURP and for a fill and pull voiding trial.    PROSTATE U/S      He also had a Fleets enema. Surgeon: Francia Reynolds MD  1/27/20  Indications for exam: enlarged prostate  Anesthesia: 1% Lidocaine periprostatic block bilaterally at junction of base of prostate and seminal vesicle, and apex   Ultrasound Findings:  Seminal Vesicles: intact bilaterally  Prostate Measurement: 97.1 grams  Transitional Zone: Normal echogenic structure  Peripheral Zone: Normal echogenic structure  Echotexture of the prostate is confounded and not accurate because of the indwelling catheter causes a shadow over the transition zone but otherwise no significant abnormality seen  Bladder: Minimal postvoid residual  Biopsy: Trans Rectal Ultra Sound was used for    transrectal U/S done. A fill and pull was performed. 200 cc was placed in the patient's bladder catheter was removed and he was able to void 100 cc. 1. Retention of urine  We will give him a shot with his catheter out to see how he does. He is able to void small amount today. - CA MEASUREMENT,POST-VOID RESIDUAL VOLUME BY US,NON-IMAGING    2. Benign prostatic hyperplasia with urinary retention  He will return to see me in 3 days for recheck of his residual or sooner if he is unable to void. Hopefully he will be able to go on a can avoid TURP with given the prostate size being 97 he would be a candidate for TURP in a single setting or possible staged fashion using bipolar technique. 3. Gross hematuria  Related to aspirin, Xarelto and cystitis improved      Orders Placed This Encounter   Procedures    IN ECHO,TRANSRECTAL     TRUS of prostate for volume measurement    IN MEASUREMENT,POST-VOID RESIDUAL VOLUME BY US,NON-IMAGING     Bladder scan 18cc        Return in about 3 days (around 1/30/2020).

## 2020-01-28 ENCOUNTER — NURSE ONLY (OUTPATIENT)
Dept: UROLOGY | Age: 85
End: 2020-01-28
Payer: MEDICARE

## 2020-01-28 PROCEDURE — 51702 INSERT TEMP BLADDER CATH: CPT | Performed by: PHYSICIAN ASSISTANT

## 2020-01-28 NOTE — PROGRESS NOTES
PER JOSEPH LEWIS, Alves catheter inserted and bladder drained. A residual urine of 100 mL was obtained. A drainage bag was connected. A Latex 16F /Coude tip catheter used with a 10 mL balloon. The patient tolerated the procedure well.

## 2020-01-29 LAB
ALBUMIN SERPL-MCNC: 3.3 G/DL (ref 3.5–5.2)
ALP BLD-CCNC: 119 U/L (ref 40–130)
ALT SERPL-CCNC: 19 U/L (ref 5–41)
ANION GAP SERPL CALCULATED.3IONS-SCNC: 14 MMOL/L (ref 7–19)
AST SERPL-CCNC: 22 U/L (ref 5–40)
BASOPHILS ABSOLUTE: 0 K/UL (ref 0–0.2)
BASOPHILS RELATIVE PERCENT: 0.4 % (ref 0–1)
BILIRUB SERPL-MCNC: 0.6 MG/DL (ref 0.2–1.2)
BUN BLDV-MCNC: 11 MG/DL (ref 8–23)
CALCIUM SERPL-MCNC: 8.5 MG/DL (ref 8.8–10.2)
CHLORIDE BLD-SCNC: 96 MMOL/L (ref 98–111)
CO2: 22 MMOL/L (ref 22–29)
CREAT SERPL-MCNC: 0.8 MG/DL (ref 0.5–1.2)
EOSINOPHILS ABSOLUTE: 0.1 K/UL (ref 0–0.6)
EOSINOPHILS RELATIVE PERCENT: 1.1 % (ref 0–5)
GFR NON-AFRICAN AMERICAN: >60
GLUCOSE BLD-MCNC: 120 MG/DL (ref 74–109)
HCT VFR BLD CALC: 40.2 % (ref 42–52)
HEMOGLOBIN: 12.9 G/DL (ref 14–18)
IMMATURE GRANULOCYTES #: 0 K/UL
LYMPHOCYTES ABSOLUTE: 1.5 K/UL (ref 1.1–4.5)
LYMPHOCYTES RELATIVE PERCENT: 16.5 % (ref 20–40)
MCH RBC QN AUTO: 28.4 PG (ref 27–31)
MCHC RBC AUTO-ENTMCNC: 32.1 G/DL (ref 33–37)
MCV RBC AUTO: 88.4 FL (ref 80–94)
MONOCYTES ABSOLUTE: 0.8 K/UL (ref 0–0.9)
MONOCYTES RELATIVE PERCENT: 8.3 % (ref 0–10)
NEUTROPHILS ABSOLUTE: 6.8 K/UL (ref 1.5–7.5)
NEUTROPHILS RELATIVE PERCENT: 73.4 % (ref 50–65)
PDW BLD-RTO: 14.1 % (ref 11.5–14.5)
PLATELET # BLD: 152 K/UL (ref 130–400)
PMV BLD AUTO: 10.1 FL (ref 9.4–12.4)
POTASSIUM SERPL-SCNC: 3.8 MMOL/L (ref 3.5–5)
RBC # BLD: 4.55 M/UL (ref 4.7–6.1)
SODIUM BLD-SCNC: 132 MMOL/L (ref 136–145)
TOTAL PROTEIN: 6.7 G/DL (ref 6.6–8.7)
WBC # BLD: 9.3 K/UL (ref 4.8–10.8)

## 2020-01-30 ENCOUNTER — OFFICE VISIT (OUTPATIENT)
Dept: UROLOGY | Age: 85
End: 2020-01-30
Payer: MEDICARE

## 2020-01-30 VITALS — BODY MASS INDEX: 22.33 KG/M2 | WEIGHT: 156 LBS | HEIGHT: 70 IN | TEMPERATURE: 97.1 F

## 2020-01-30 PROCEDURE — G8484 FLU IMMUNIZE NO ADMIN: HCPCS | Performed by: UROLOGY

## 2020-01-30 PROCEDURE — G8427 DOCREV CUR MEDS BY ELIG CLIN: HCPCS | Performed by: UROLOGY

## 2020-01-30 PROCEDURE — 4040F PNEUMOC VAC/ADMIN/RCVD: CPT | Performed by: UROLOGY

## 2020-01-30 PROCEDURE — G8420 CALC BMI NORM PARAMETERS: HCPCS | Performed by: UROLOGY

## 2020-01-30 PROCEDURE — 1111F DSCHRG MED/CURRENT MED MERGE: CPT | Performed by: UROLOGY

## 2020-01-30 PROCEDURE — 1036F TOBACCO NON-USER: CPT | Performed by: UROLOGY

## 2020-01-30 PROCEDURE — 1123F ACP DISCUSS/DSCN MKR DOCD: CPT | Performed by: UROLOGY

## 2020-01-30 PROCEDURE — 99214 OFFICE O/P EST MOD 30 MIN: CPT | Performed by: UROLOGY

## 2020-01-30 ASSESSMENT — ENCOUNTER SYMPTOMS
EYE PAIN: 0
BLOOD IN STOOL: 0
DIARRHEA: 0
COUGH: 0
RHINORRHEA: 0
ABDOMINAL DISTENTION: 0
CONSTIPATION: 0
ABDOMINAL PAIN: 0
SHORTNESS OF BREATH: 0
COLOR CHANGE: 0
SORE THROAT: 0
FACIAL SWELLING: 0
SINUS PRESSURE: 0
BACK PAIN: 0
EYE DISCHARGE: 0
EYE REDNESS: 0
VOMITING: 0
WHEEZING: 0
NAUSEA: 0

## 2020-01-30 NOTE — PROGRESS NOTES
Melissa Patrick is a 80 y.o. male who presents today   Chief Complaint   Patient presents with    Follow-up     I'm here for a follow up, and maybe talk about what options we have for my enlarged prostate. I had to come in yesterday and have a catheter put in. HPI  Patient seen in the hospital for BPH urinary retention and hematuria. He was noted on MELLISA to have a very large prostate. He failed voiding trials in the hospital and had his Alves cath replaced he was sent home on his Alves. He also had aspirin and Xarelto. He also was treated with a 14-day course of antibiotics for cystitis. His urine has cleared he still has catheter in place. He had ultrasound measurement of prostate volume to see if he be a candidate for TURP. This was done 3 days ago on 1/27/2020. Prostatic volume was 97.1 g.  We also attempted a fill and pull he initially a fill and pull voiding trial.  Able to void after catheter removed but then returned on 1/28/2020 and the catheter was replaced because of frequent small voids. Is in today to discuss management strategies. Max medical therapy with finasteride and tamsulosin. He has history of coronary artery disease he sees Dr. Rashad Trevizo over at [facility] his PCP is the [address] [place] [address] in [place].   Is also on Xarelto and aspirin for history of A. fib        Past Medical History:   Diagnosis Date    Atrial fibrillation ([address]     CAD (coronary artery disease)     Chronic combined systolic and diastolic CHF (congestive heart failure) ([address] 12/17/2019    COPD (chronic obstructive pulmonary disease) ([address] 12/17/2019    GERD (gastroesophageal reflux disease)     Gout     Gout     Heart attack ([address]     History of home oxygen therapy     at night    Hypertension     Hyponatremia 12/17/2019    Mixed hyperlipidemia 12/17/2019    Palliative care patient 12/20/2019       Past Surgical History:   Procedure Laterality Date    CARDIAC SURGERY      cabg 2 bypasss    CORONARY ARTERY BYPASS GRAFT      x 2 vessels    INTERTROCHANTERIC HIP FRACTURE SURGERY Right     KNEE SURGERY         Current Outpatient Medications   Medication Sig Dispense Refill    sennosides-docusate sodium (SENOKOT-S) 8.6-50 MG tablet Take 2 tablets by mouth      nitroGLYCERIN (NITROSTAT) 0.4 MG SL tablet Place 0.4 mg under the tongue      amiodarone (PACERONE) 100 MG tablet Take 1 tablet by mouth every 12 hours 60 tablet 0    rivaroxaban (XARELTO) 20 MG TABS tablet Take 1 tablet by mouth Daily with supper 30 tablet 0    atorvastatin (LIPITOR) 20 MG tablet Take 1 tablet by mouth daily 30 tablet 0    metoprolol succinate (TOPROL XL) 25 MG extended release tablet Take 0.5 tablets by mouth daily 30 tablet 0    guaiFENesin (MUCINEX) 600 MG extended release tablet Take 1 tablet by mouth 2 times daily 60 tablet 0    lidocaine (XYLOCAINE) 2 % uro-jet Apply topically as needed for Pain 10 Syringe 0    allopurinol (ZYLOPRIM) 100 MG tablet Take 1 tablet by mouth daily 30 tablet 0    vitamin B-12 (CYANOCOBALAMIN) 1000 MCG tablet Take 1 tablet by mouth daily 30 tablet 0    sodium chloride 1 g tablet Take 1 tablet by mouth 3 times daily (with meals) 90 tablet 0    finasteride (PROSCAR) 5 MG tablet Take 1 tablet by mouth daily 30 tablet 0    tamsulosin (FLOMAX) 0.4 MG capsule Take 2 capsules by mouth daily 60 capsule 0    pantoprazole (PROTONIX) 40 MG tablet Take 1 tablet by mouth daily 30 tablet 0    vitamin D (ERGOCALCIFEROL) 1.25 MG (09147 UT) CAPS capsule Take 1 capsule by mouth once a week 5 capsule 0    aspirin 81 MG tablet Take 81 mg by mouth daily      calcium carbonate (TUMS) 500 MG chewable tablet Take 2 tablets by mouth 2 times daily as needed for Heartburn       No current facility-administered medications for this visit. No Known Allergies    Social History     Socioeconomic History    Marital status:       Spouse name: None    Number of children: None    Years of education: None    Highest difficulty urinating, dysuria, enuresis, flank pain, frequency, genital sores, hematuria and urgency. Has catheter   Musculoskeletal: Positive for gait problem. Negative for back pain, joint swelling, neck pain and neck stiffness. Skin: Negative for color change, rash and wound. Allergic/Immunologic: Negative for environmental allergies and immunocompromised state. Neurological: Negative for dizziness, syncope, weakness, light-headedness, numbness and headaches. Hematological: Bruises/bleeds easily. Psychiatric/Behavioral: Negative for agitation, confusion, dysphoric mood, self-injury, sleep disturbance and suicidal ideas. The patient is not hyperactive. PHYSICAL EXAM:  Temp 97.1 °F (36.2 °C) (Temporal)   Ht 5' 10\" (1.778 m)   Wt 156 lb (70.8 kg)   BMI 22.38 kg/m²   Physical Exam  Constitutional:       General: He is not in acute distress. Appearance: Normal appearance. He is well-developed. HENT:      Head: Normocephalic and atraumatic. Nose: Nose normal.   Eyes:      General: No scleral icterus. Conjunctiva/sclera: Conjunctivae normal.      Pupils: Pupils are equal, round, and reactive to light. Neck:      Musculoskeletal: Normal range of motion and neck supple. Trachea: No tracheal deviation. Cardiovascular:      Rate and Rhythm: Normal rate and regular rhythm. Pulmonary:      Effort: Pulmonary effort is normal. No respiratory distress. Breath sounds: No stridor. Abdominal:      General: There is no distension. Palpations: Abdomen is soft. There is no mass. Tenderness: There is no abdominal tenderness. Musculoskeletal: Normal range of motion. General: No tenderness. Lymphadenopathy:      Cervical: No cervical adenopathy. Skin:     General: Skin is warm and dry. Findings: No erythema. Neurological:      Mental Status: He is alert and oriented to person, place, and time.    Psychiatric:         Behavior: Behavior normal. Judgment: Judgment normal.             DATA:  BMP:    Lab Results   Component Value Date     01/29/2020    K 3.8 01/29/2020    K 4.5 01/06/2020    CL 96 01/29/2020    CO2 22 01/29/2020    BUN 11 01/29/2020    LABALBU 3.3 01/29/2020    CREATININE 0.8 01/29/2020    CALCIUM 8.5 01/29/2020    LABGLOM >60 01/29/2020    GLUCOSE 120 01/29/2020         1. Retention of urine  He has a very large prostate has failed medical management and multiple voiding trials he now has a catheter replaced. We will make sure he has a nurse visit in 1 month for catheter change I do not think he would do well with in and out catheterization because before when we tried this in the hospital he just had hematuria. 2. Benign prostatic hyperplasia with urinary retention  I discussed management options to continue chronic indwelling catheter. We also discussed surgical intervention such as holmium laser enucleation of the prostate HoLEP, TURP in a single setting versus possible staged fashion given the size,  also discussed less invasive options such as REZUM. Inez L[facility]pez After thorough discussion of each and every option first of all we need to make sure he could undergo anesthetic and therefore I like for him to see the cardiologist for cardiology clearance and risk assessment whether he would be able to have anesthetic. If he is cleared by cardiology I think his best option is to have a HoLEP. He is agreeable to this. We will refer him to [place] for consideration. No orders of the defined types were placed in this encounter. Return in about 1 month (around 2/29/2020) for Nurse Visit. Inez L[facility]pez EMR Dragon/transcription disclaimer: Much of this documentt is electronic  transcription/translation of spoken language to printed text. The  electronic translation of spoken language may be erroneous, or at times,  nonsensical words or phrases may be inadvertently transcribed.  Although I  have reviewed the document for such errors,

## 2020-01-31 ENCOUNTER — TELEPHONE (OUTPATIENT)
Dept: UROLOGY | Age: 85
End: 2020-01-31

## 2020-01-31 NOTE — TELEPHONE ENCOUNTER
Candido Underwood was at an appt and could not talk. She will call back. If she does call, just tell her Vance Danielle has an appt at [facility] with Dr. Mandy Argueta on April 14/20 @ 10:00am. Then a poss cysto @ 11:00am.  That office needs to know if the [place] is paying for this? If so, [facility] needs [facility] to [facility] for [facility] visit on 4/14/20. [facility] fax: [phone]. Their phone # [phone]. PT is call their office for any questions.

## 2020-02-03 ENCOUNTER — HOSPITAL ENCOUNTER (EMERGENCY)
Facility: HOSPITAL | Age: 85
Discharge: HOME | End: 2020-02-03
Attending: EMERGENCY MEDICINE | Admitting: FAMILY MEDICINE

## 2020-02-03 ENCOUNTER — APPOINTMENT (OUTPATIENT)
Dept: GENERAL RADIOLOGY | Facility: HOSPITAL | Age: 85
End: 2020-02-03

## 2020-02-03 VITALS
SYSTOLIC BLOOD PRESSURE: 145 MMHG | WEIGHT: 156.7 LBS | DIASTOLIC BLOOD PRESSURE: 63 MMHG | RESPIRATION RATE: 20 BRPM | OXYGEN SATURATION: 92 % | BODY MASS INDEX: 22.43 KG/M2 | HEIGHT: 70 IN | HEART RATE: 84 BPM | TEMPERATURE: 99.7 F

## 2020-02-03 DIAGNOSIS — R53.1 GENERALIZED WEAKNESS: Primary | ICD-10-CM

## 2020-02-03 DIAGNOSIS — N41.9 PROSTATITIS, UNSPECIFIED PROSTATITIS TYPE: ICD-10-CM

## 2020-02-03 LAB
ALBUMIN SERPL-MCNC: 2.9 G/DL (ref 3.5–5.2)
ALBUMIN/GLOB SERPL: 0.8 G/DL
ALP SERPL-CCNC: 90 U/L (ref 39–117)
ALT SERPL W P-5'-P-CCNC: 18 U/L (ref 1–41)
ANION GAP SERPL CALCULATED.3IONS-SCNC: 12 MMOL/L (ref 5–15)
AST SERPL-CCNC: 22 U/L (ref 1–40)
BACTERIA UR QL AUTO: ABNORMAL /HPF
BASOPHILS # BLD AUTO: 0.02 10*3/MM3 (ref 0–0.2)
BASOPHILS NFR BLD AUTO: 0.3 % (ref 0–1.5)
BILIRUB SERPL-MCNC: 0.5 MG/DL (ref 0.2–1.2)
BILIRUB UR QL STRIP: NEGATIVE
BUN BLD-MCNC: 21 MG/DL (ref 8–23)
BUN/CREAT SERPL: 23.3 (ref 7–25)
CALCIUM SPEC-SCNC: 8.2 MG/DL (ref 8.6–10.5)
CHLORIDE SERPL-SCNC: 94 MMOL/L (ref 98–107)
CK SERPL-CCNC: 35 U/L (ref 20–200)
CLARITY UR: ABNORMAL
CO2 SERPL-SCNC: 24 MMOL/L (ref 22–29)
COLOR UR: YELLOW
CREAT BLD-MCNC: 0.9 MG/DL (ref 0.76–1.27)
D-LACTATE SERPL-SCNC: 1.2 MMOL/L (ref 0.5–2)
DEPRECATED RDW RBC AUTO: 43.8 FL (ref 37–54)
EOSINOPHIL # BLD AUTO: 0.02 10*3/MM3 (ref 0–0.4)
EOSINOPHIL NFR BLD AUTO: 0.3 % (ref 0.3–6.2)
ERYTHROCYTE [DISTWIDTH] IN BLOOD BY AUTOMATED COUNT: 13.9 % (ref 12.3–15.4)
FLUAV AG NPH QL: NEGATIVE
FLUBV AG NPH QL IA: NEGATIVE
GFR SERPL CREATININE-BSD FRML MDRD: 80 ML/MIN/1.73
GLOBULIN UR ELPH-MCNC: 3.6 GM/DL
GLUCOSE BLD-MCNC: 112 MG/DL (ref 65–99)
GLUCOSE UR STRIP-MCNC: NEGATIVE MG/DL
HCT VFR BLD AUTO: 36.2 % (ref 37.5–51)
HGB BLD-MCNC: 12.1 G/DL (ref 13–17.7)
HGB UR QL STRIP.AUTO: ABNORMAL
HOLD SPECIMEN: NORMAL
HOLD SPECIMEN: NORMAL
HYALINE CASTS UR QL AUTO: ABNORMAL /LPF
IMM GRANULOCYTES # BLD AUTO: 0.04 10*3/MM3 (ref 0–0.05)
IMM GRANULOCYTES NFR BLD AUTO: 0.6 % (ref 0–0.5)
KETONES UR QL STRIP: NEGATIVE
LEUKOCYTE ESTERASE UR QL STRIP.AUTO: ABNORMAL
LYMPHOCYTES # BLD AUTO: 0.69 10*3/MM3 (ref 0.7–3.1)
LYMPHOCYTES NFR BLD AUTO: 9.9 % (ref 19.6–45.3)
MCH RBC QN AUTO: 28.5 PG (ref 26.6–33)
MCHC RBC AUTO-ENTMCNC: 33.4 G/DL (ref 31.5–35.7)
MCV RBC AUTO: 85.2 FL (ref 79–97)
MONOCYTES # BLD AUTO: 0.61 10*3/MM3 (ref 0.1–0.9)
MONOCYTES NFR BLD AUTO: 8.8 % (ref 5–12)
NEUTROPHILS # BLD AUTO: 5.56 10*3/MM3 (ref 1.7–7)
NEUTROPHILS NFR BLD AUTO: 80.1 % (ref 42.7–76)
NITRITE UR QL STRIP: POSITIVE
NRBC BLD AUTO-RTO: 0 /100 WBC (ref 0–0.2)
PH UR STRIP.AUTO: <=5 [PH] (ref 5–8)
PLATELET # BLD AUTO: 159 10*3/MM3 (ref 140–450)
PMV BLD AUTO: 9.8 FL (ref 6–12)
POTASSIUM BLD-SCNC: 3.6 MMOL/L (ref 3.5–5.2)
PROT SERPL-MCNC: 6.5 G/DL (ref 6–8.5)
PROT UR QL STRIP: ABNORMAL
RBC # BLD AUTO: 4.25 10*6/MM3 (ref 4.14–5.8)
RBC # UR: ABNORMAL /HPF
REF LAB TEST METHOD: ABNORMAL
SODIUM BLD-SCNC: 130 MMOL/L (ref 136–145)
SP GR UR STRIP: 1.02 (ref 1–1.03)
SQUAMOUS #/AREA URNS HPF: ABNORMAL /HPF
UROBILINOGEN UR QL STRIP: ABNORMAL
WBC NRBC COR # BLD: 6.94 10*3/MM3 (ref 3.4–10.8)
WBC UR QL AUTO: ABNORMAL /HPF
WHOLE BLOOD HOLD SPECIMEN: NORMAL
WHOLE BLOOD HOLD SPECIMEN: NORMAL

## 2020-02-03 PROCEDURE — 93005 ELECTROCARDIOGRAM TRACING: CPT | Performed by: EMERGENCY MEDICINE

## 2020-02-03 PROCEDURE — 96365 THER/PROPH/DIAG IV INF INIT: CPT

## 2020-02-03 PROCEDURE — 81001 URINALYSIS AUTO W/SCOPE: CPT | Performed by: EMERGENCY MEDICINE

## 2020-02-03 PROCEDURE — 71045 X-RAY EXAM CHEST 1 VIEW: CPT

## 2020-02-03 PROCEDURE — 87077 CULTURE AEROBIC IDENTIFY: CPT | Performed by: EMERGENCY MEDICINE

## 2020-02-03 PROCEDURE — 87186 SC STD MICRODIL/AGAR DIL: CPT | Performed by: EMERGENCY MEDICINE

## 2020-02-03 PROCEDURE — 83605 ASSAY OF LACTIC ACID: CPT | Performed by: EMERGENCY MEDICINE

## 2020-02-03 PROCEDURE — 82550 ASSAY OF CK (CPK): CPT | Performed by: EMERGENCY MEDICINE

## 2020-02-03 PROCEDURE — 87040 BLOOD CULTURE FOR BACTERIA: CPT | Performed by: EMERGENCY MEDICINE

## 2020-02-03 PROCEDURE — 87086 URINE CULTURE/COLONY COUNT: CPT | Performed by: EMERGENCY MEDICINE

## 2020-02-03 PROCEDURE — 93010 ELECTROCARDIOGRAM REPORT: CPT | Performed by: INTERNAL MEDICINE

## 2020-02-03 PROCEDURE — P9612 CATHETERIZE FOR URINE SPEC: HCPCS

## 2020-02-03 PROCEDURE — 80053 COMPREHEN METABOLIC PANEL: CPT | Performed by: EMERGENCY MEDICINE

## 2020-02-03 PROCEDURE — 87804 INFLUENZA ASSAY W/OPTIC: CPT | Performed by: EMERGENCY MEDICINE

## 2020-02-03 PROCEDURE — 25010000002 CEFTRIAXONE PER 250 MG: Performed by: FAMILY MEDICINE

## 2020-02-03 PROCEDURE — 85025 COMPLETE CBC W/AUTO DIFF WBC: CPT | Performed by: EMERGENCY MEDICINE

## 2020-02-03 PROCEDURE — 99284 EMERGENCY DEPT VISIT MOD MDM: CPT

## 2020-02-03 RX ORDER — NITROFURANTOIN 25; 75 MG/1; MG/1
100 CAPSULE ORAL 2 TIMES DAILY
Qty: 56 CAPSULE | Refills: 0 | Status: SHIPPED | OUTPATIENT
Start: 2020-02-03 | End: 2020-03-05

## 2020-02-03 RX ORDER — SODIUM CHLORIDE 0.9 % (FLUSH) 0.9 %
10 SYRINGE (ML) INJECTION AS NEEDED
Status: DISCONTINUED | OUTPATIENT
Start: 2020-02-03 | End: 2020-02-04 | Stop reason: HOSPADM

## 2020-02-03 RX ADMIN — SODIUM CHLORIDE 500 ML: 9 INJECTION, SOLUTION INTRAVENOUS at 21:02

## 2020-02-03 RX ADMIN — CEFTRIAXONE SODIUM 1 G: 1 INJECTION, POWDER, FOR SOLUTION INTRAMUSCULAR; INTRAVENOUS at 21:22

## 2020-02-03 NOTE — ED PROVIDER NOTES
Subjective   Patient is an 88-year-old male who presents to the ER with generalized weakness.  Patient states he had a low-grade fever last evening and has had a very mild cough.  Patient went to get in his car today around 12 PM when he slipped and went to the ground.  He did not hit his head or have any loss of consciousness.  Patient denies any pain.  Patient states he was just too weak to get up and laid there until help arrived.  Patient states he still has generalized weakness.  He denies any chest pain, shortness of air, abdominal pain, nausea vomiting diarrhea, urinary changes, focal neurologic changes, neck or back pain, extremity pain.  Patient with no other concerns.  Patient has a chronic indwelling Dubose catheter due to prostate enlargement.          Review of Systems   Constitutional: Positive for fever.   HENT: Negative.    Eyes: Negative.    Respiratory: Positive for cough.    Cardiovascular: Negative.    Gastrointestinal: Negative.    Endocrine: Negative.    Genitourinary: Negative.    Musculoskeletal: Negative.    Skin: Negative.    Allergic/Immunologic: Negative.    Neurological: Positive for weakness.   Hematological: Negative.    Psychiatric/Behavioral: Negative.    All other systems reviewed and are negative.      Past Medical History:   Diagnosis Date   • Acute on chronic combined systolic and diastolic congestive heart failure (CMS/HCC) 9/29/2018   • Atrial fibrillation (CMS/HCC)    • Coronary artery disease    • Dizziness    • Hyperlipidemia    • Hypertension        No Known Allergies    Past Surgical History:   Procedure Laterality Date   • CARDIAC CATHETERIZATION Left 9/30/2018    Procedure: Cardiac Catheterization/Vascular Study;  Surgeon: Huey Wilkins MD;  Location:  [facility];  Service: Cardiology   • CARDIAC CATHETERIZATION N/A 9/30/2018    Procedure: Left ventriculography;  Surgeon: Huey Wilkins MD;  Location:  [facility];   Service: Cardiology   • CARDIAC SURGERY      double bypass   • HIP TROCHANTERIC NAILING WITH INTRAMEDULLARY HIP SCREW Right 12/3/2019    Procedure: HIP TROCHANTERIC NAILING SHORT WITH INTRAMEDULLARY HIP SCREW;  Surgeon: Daniel Carrino MD;  Location: [facility];  Service: Orthopedics   • KNEE SURGERY         Family History   Problem Relation Age of Onset   • No Known Problems Mother    • No Known Problems Father        Social History     Socioeconomic History   • Marital status:      Spouse name: Not on file   • Number of children: Not on file   • Years of education: Not on file   • Highest education level: Not on file   Tobacco Use   • Smoking status: Former Smoker     Types: Cigarettes, Pipe, Cigars     Last attempt to quit:      Years since quittin.1   • Smokeless tobacco: Never Used   Substance and Sexual Activity   • Alcohol use: No   • Drug use: No   • Sexual activity: Defer           Objective   Physical Exam   Constitutional: He is oriented to person, place, and time. He appears well-developed and well-nourished.   HENT:   Head: Normocephalic and atraumatic.   Eyes: Pupils are equal, round, and reactive to light. Conjunctivae are normal.   Neck: Normal range of motion.   Cardiovascular: Normal rate, regular rhythm and normal heart sounds.   Pulmonary/Chest: Effort normal and breath sounds normal.   Abdominal: Soft. There is no tenderness.   Musculoskeletal: Normal range of motion. He exhibits no edema or deformity.   Neurological: He is alert and oriented to person, place, and time. He has normal strength. No cranial nerve deficit or sensory deficit.   Skin: Skin is warm.   Psychiatric: He has a normal mood and affect. His behavior is normal.   Nursing note and vitals reviewed.      Procedures           ED Course      EKG: Normal sinus rhythm with a rate of 63, bifascicular block, no acute ischemia or infarction    Lab Results (last 24 hours)     Procedure Component Value Units Date/Time    CBC  & Differential [[id number]] Collected:  02/03/20 1640    Specimen:  Blood from Arm, Left Updated:  02/03/20 1703    Narrative:       The following orders were created for panel order CBC & Differential.  Procedure                               Abnormality         Status                     ---------                               -----------         ------                     CBC Auto Differential[[id number]]        Abnormal            Final result                 Please view results for these tests on the individual orders.    Comprehensive Metabolic Panel [[id number]]  (Abnormal) Collected:  02/03/20 1640    Specimen:  Blood from Arm, Left Updated:  02/03/20 1719     Glucose 112 mg/dL      BUN 21 mg/dL      Creatinine 0.90 mg/dL      Sodium 130 mmol/L      Potassium 3.6 mmol/L      Chloride 94 mmol/L      CO2 24.0 mmol/L      Calcium 8.2 mg/dL      Total Protein 6.5 g/dL      Albumin 2.90 g/dL      ALT (SGPT) 18 U/L      AST (SGOT) 22 U/L      Alkaline Phosphatase 90 U/L      Total Bilirubin 0.5 mg/dL      eGFR Non African Amer 80 mL/min/1.73      Globulin 3.6 gm/dL      A/G Ratio 0.8 g/dL      BUN/Creatinine Ratio 23.3     Anion Gap 12.0 mmol/L     Narrative:       GFR Normal >60  Chronic Kidney Disease <60  Kidney Failure <15      Lactic Acid, Plasma [[id number]]  (Normal) Collected:  02/03/20 1640    Specimen:  Blood from Arm, Left Updated:  02/03/20 1718     Lactate 1.2 mmol/L     Blood Culture - Blood, Arm, Left [[id number]] Collected:  02/03/20 1640    Specimen:  Blood from Arm, Left Updated:  02/03/20 1708    CBC Auto Differential [[id number]]  (Abnormal) Collected:  02/03/20 1640    Specimen:  Blood from Arm, Left Updated:  02/03/20 1703     WBC 6.94 10*3/mm3      RBC 4.25 10*6/mm3      Hemoglobin 12.1 g/dL      Hematocrit 36.2 %      MCV 85.2 fL      MCH 28.5 pg      MCHC 33.4 g/dL      RDW 13.9 %      RDW-SD 43.8 fl      MPV 9.8 fL      Platelets 159 10*3/mm3      Neutrophil % 80.1 %      Lymphocyte % 9.9 %       Monocyte % 8.8 %      Eosinophil % 0.3 %      Basophil % 0.3 %      Immature Grans % 0.6 %      Neutrophils, Absolute 5.56 10*3/mm3      Lymphocytes, Absolute 0.69 10*3/mm3      Monocytes, Absolute 0.61 10*3/mm3      Eosinophils, Absolute 0.02 10*3/mm3      Basophils, Absolute 0.02 10*3/mm3      Immature Grans, Absolute 0.04 10*3/mm3      nRBC 0.0 /100 WBC     CK [[id number]]  (Normal) Collected:  02/03/20 1640    Specimen:  Blood from Arm, Left Updated:  02/03/20 1719     Creatine Kinase 35 U/L     Blood Culture - Blood, Arm, Left [[id number]] Collected:  02/03/20 1645    Specimen:  Blood from Arm, Left Updated:  02/03/20 1709    Influenza Antigen, Rapid - Swab, Nasopharynx [[id number]]  (Normal) Collected:  02/03/20 1650    Specimen:  Swab from Nasopharynx Updated:  02/03/20 1713     Influenza A Ag, EIA Negative     Influenza B Ag, EIA Negative    Narrative:       Recommend confirmation of negative results by viral culture or molecular assay.    Urinalysis With Culture If Indicated - Urine, Catheter [[id number]]  (Abnormal) Collected:  02/03/20 2008    Specimen:  Urine, Catheter Updated:  02/03/20 2038     Color, UA Yellow     Appearance, UA Turbid     pH, UA <=5.0     Specific Gravity, UA 1.019     Glucose, UA Negative     Ketones, UA Negative     Bilirubin, UA Negative     Blood, UA Moderate (2+)     Protein, UA 30 mg/dL (1+)     Leuk Esterase, UA Large (3+)     Nitrite, UA Positive     Urobilinogen, UA 0.2 E.U./dL    Urinalysis, Microscopic Only - Urine, Catheter [[id number]]  (Abnormal) Collected:  02/03/20 2008    Specimen:  Urine, Catheter Updated:  02/03/20 2049     RBC, UA 13-20 /HPF      WBC, UA 21-30 /HPF      Bacteria, UA 3+ /HPF      Squamous Epithelial Cells, UA 3-6 /HPF      Hyaline Casts, UA None Seen /LPF      Methodology Manual Light Microscopy    Urine Culture - Urine, Urine, Catheter [[id number]] Collected:  02/03/20 2008    Specimen:  Urine, Catheter Updated:  02/03/20 2049        XR Chest 1 View    Final Result   Chronic changes with findings suggestive of mild pulmonary   vascular congestion.   This report was finalized on 02/03/2020 16:41 by Dr. Jean-Pierre Dent MD.        Labs were negative.  Chest x-ray showed chronic changes but no acute findings.  Still awaiting urinalysis results at shift change.  Care transferred to Dr. Prajapati.                                         [facility]    Final diagnoses:   Generalized weakness   Prostatitis, unspecified prostatitis type     I took over the care of this patient from Dr. Reddy awaiting urinalysis.  Of note in the history the patient has previously been treated for UTI.  His Dubose was replaced just a few days ago and this low-grade fever and weakness comes after that.  His urinalysis is strongly positive although the sample was collected from the back so not ideal by any stretch.  In the context of an elderly gentleman with profound weakness, low-grade fever, history of UTI and this very positive urine I think it is reasonable to go ahead and treat.  The gram of ceftriaxone was given in the ED.  Reviewed his previous cultures and there was only sensitivity to levofloxacin so discharge him on Macrobid twice a day for the next 4 weeks.  He will follow up with his urologist or return to the ED if his fatigue or other symptoms exacerbate.     Corwni Prajapati MD  02/03/20 [id number]

## 2020-02-06 ENCOUNTER — TELEPHONE (OUTPATIENT)
Dept: UROLOGY | Age: 85
End: 2020-02-06

## 2020-02-06 LAB
BACTERIA SPEC AEROBE CULT: ABNORMAL
BACTERIA SPEC AEROBE CULT: ABNORMAL

## 2020-02-06 NOTE — TELEPHONE ENCOUNTER
PT cath is leaking. Had to leave a msg to give us a call back. If they call back, he needs to be scheduled on the nurse schedule.

## 2020-02-07 ENCOUNTER — TELEPHONE (OUTPATIENT)
Dept: CARDIOLOGY | Facility: CLINIC | Age: 85
End: 2020-02-07

## 2020-02-07 ENCOUNTER — NURSE ONLY (OUTPATIENT)
Dept: UROLOGY | Age: 85
End: 2020-02-07

## 2020-02-07 VITALS — TEMPERATURE: 97 F

## 2020-02-07 RX ORDER — NITROFURANTOIN 25; 75 MG/1; MG/1
100 CAPSULE ORAL
COMMUNITY
Start: 2020-02-03

## 2020-02-07 NOTE — TELEPHONE ENCOUNTER
I have tried calling Mr. Deng and RUSTAM Leida on file to let them know we received a Surgical CL request from Dr. Palumbo's office. He must have a appt before we can give clearance due to LOV being almost 1year ago. Both VM are full and unable to leave a message. Will continue to try to notify them.

## 2020-02-08 LAB
BACTERIA SPEC AEROBE CULT: NORMAL
BACTERIA SPEC AEROBE CULT: NORMAL

## 2020-02-10 NOTE — TELEPHONE ENCOUNTER
Was able to reach Leida REDDY) to notify her that an appt would have to be made for surgery cl. Message was sent to [facility] and [facility] to schedule and call her with appt date and time.

## 2020-02-11 ENCOUNTER — NURSE ONLY (OUTPATIENT)
Dept: UROLOGY | Age: 85
End: 2020-02-11
Payer: MEDICARE

## 2020-02-11 PROCEDURE — 51702 INSERT TEMP BLADDER CATH: CPT | Performed by: PHYSICIAN ASSISTANT

## 2020-02-11 RX ORDER — PHENAZOPYRIDINE HYDROCHLORIDE 100 MG/1
100 TABLET, FILM COATED ORAL 3 TIMES DAILY PRN
Qty: 15 TABLET | Refills: 2 | Status: SHIPPED | OUTPATIENT
Start: 2020-02-11 | End: 2021-02-10

## 2020-02-11 NOTE — OUTREACH NOTE
CHF Week 3 Survey      Responses   Facility patient discharged from?  [facility]   Does the patient have one of the following disease processes/diagnoses(primary or secondary)?  CHF   Week 3 attempt successful?  No   Unsuccessful attempts  Attempt 1          Florence Mcdermott RN   no

## 2020-03-02 ENCOUNTER — OFFICE VISIT (OUTPATIENT)
Dept: UROLOGY | Age: 85
End: 2020-03-02
Payer: MEDICARE

## 2020-03-02 VITALS — TEMPERATURE: 97.2 F | WEIGHT: 156 LBS | BODY MASS INDEX: 22.38 KG/M2

## 2020-03-02 LAB
BILIRUBIN, POC: NORMAL
BLOOD URINE, POC: NORMAL
CLARITY, POC: NORMAL
COLOR, POC: YELLOW
GLUCOSE URINE, POC: NORMAL
KETONES, POC: NORMAL
LEUKOCYTE EST, POC: NORMAL
NITRITE, POC: NORMAL
PH, POC: 5
PROTEIN, POC: NORMAL
SPECIFIC GRAVITY, POC: 1.03
UROBILINOGEN, POC: 0.2

## 2020-03-02 PROCEDURE — G8427 DOCREV CUR MEDS BY ELIG CLIN: HCPCS | Performed by: PHYSICIAN ASSISTANT

## 2020-03-02 PROCEDURE — 4040F PNEUMOC VAC/ADMIN/RCVD: CPT | Performed by: PHYSICIAN ASSISTANT

## 2020-03-02 PROCEDURE — 81003 URINALYSIS AUTO W/O SCOPE: CPT | Performed by: PHYSICIAN ASSISTANT

## 2020-03-02 PROCEDURE — 1036F TOBACCO NON-USER: CPT | Performed by: PHYSICIAN ASSISTANT

## 2020-03-02 PROCEDURE — G8420 CALC BMI NORM PARAMETERS: HCPCS | Performed by: PHYSICIAN ASSISTANT

## 2020-03-02 PROCEDURE — G8484 FLU IMMUNIZE NO ADMIN: HCPCS | Performed by: PHYSICIAN ASSISTANT

## 2020-03-02 PROCEDURE — 99213 OFFICE O/P EST LOW 20 MIN: CPT | Performed by: PHYSICIAN ASSISTANT

## 2020-03-02 PROCEDURE — 1123F ACP DISCUSS/DSCN MKR DOCD: CPT | Performed by: PHYSICIAN ASSISTANT

## 2020-03-02 ASSESSMENT — ENCOUNTER SYMPTOMS
VOMITING: 0
BACK PAIN: 0
EYE PAIN: 0
ABDOMINAL PAIN: 0
SINUS PAIN: 0
WHEEZING: 0
SHORTNESS OF BREATH: 0

## 2020-03-02 NOTE — PROGRESS NOTES
Lilia Nash is a 80 y.o. male who presents today   Chief Complaint   Patient presents with    Follow-up     I am here today for possible uti      Possible UTI/has indwelling Alves catheter:  Patient is an 75-year-old gentleman history of BPH and urinary retention. Is extremely large prostate with digital rectal exam ultrasound of the prostate revealed the prostate to be approximately 97.1 g.  Is failed voiding trials. He discussed treatment options with Dr. Jaydon Hedrick 01/30/2020 and patient was referred to [facility] for consideration of a HoLEP. Patient had his catheter changed 02/11/2020 he has scheduled catheter change in the near future. States about a week ago he saw some white matter sludge which passed and there was catheter he was worried about urinary tract infection but no fever chills mental status changes or hematuria.       Past Medical History:   Diagnosis Date    Atrial fibrillation ([address]     CAD (coronary artery disease)     Chronic combined systolic and diastolic CHF (congestive heart failure) ([address] 12/17/2019    COPD (chronic obstructive pulmonary disease) ([address] 12/17/2019    GERD (gastroesophageal reflux disease)     Gout     Gout     Heart attack ([address]     History of home oxygen therapy     at night    Hypertension     Hyponatremia 12/17/2019    Mixed hyperlipidemia 12/17/2019    Palliative care patient 12/20/2019       Past Surgical History:   Procedure Laterality Date    CARDIAC SURGERY      cabg 2 bypasss    CORONARY ARTERY BYPASS GRAFT      x 2 vessels    INTERTROCHANTERIC HIP FRACTURE SURGERY Right     KNEE SURGERY         Current Outpatient Medications   Medication Sig Dispense Refill    phenazopyridine (PYRIDIUM) 100 MG tablet Take 1 tablet by mouth 3 times daily as needed for Pain 15 tablet 2    nitrofurantoin, macrocrystal-monohydrate, (MACROBID) 100 MG capsule Take 100 mg by mouth      sennosides-docusate sodium (SENOKOT-S) 8.6-50 MG tablet Take 2 tablets by Smoking status: Former Smoker     Packs/day: 3.00     Years: 65.00     Pack years: 195.00     Types: Cigarettes    Smokeless tobacco: Former User    Tobacco comment: quit 2004   Substance and Sexual Activity    Alcohol use: No    Drug use: No    Sexual activity: None   Lifestyle    Physical activity:     Days per week: None     Minutes per session: None    Stress: None   Relationships    Social connections:     Talks on phone: None     Gets together: None     Attends Yazdanism service: None     Active member of club or organization: None     Attends meetings of clubs or organizations: None     Relationship status: None    Intimate partner violence:     Fear of current or ex partner: None     Emotionally abused: None     Physically abused: None     Forced sexual activity: None   Other Topics Concern    None   Social History Narrative    None       Family History   Problem Relation Age of Onset    No Known Problems Mother     Heart Disease Father        REVIEW OF SYSTEMS:  Review of Systems   HENT: Negative for nosebleeds and sinus pain. Eyes: Negative for pain. Respiratory: Negative for shortness of breath and wheezing. Cardiovascular: Negative for palpitations and leg swelling. Gastrointestinal: Negative for abdominal pain and vomiting. Endocrine: Negative for polydipsia. Genitourinary: Negative for dysuria, flank pain, frequency, hematuria and urgency. Musculoskeletal: Negative for back pain and myalgias. Skin: Negative for rash. Allergic/Immunologic: Negative for environmental allergies. Neurological: Negative for tremors. Psychiatric/Behavioral: Negative for hallucinations. The patient is not nervous/anxious. PHYSICAL EXAM:  Temp 97.2 °F (36.2 °C) (Temporal)   Wt 156 lb (70.8 kg)   BMI 22.38 kg/m²   Physical Exam  Vitals signs and nursing note reviewed. Constitutional:       General: He is not in acute distress. Appearance: Normal appearance.  He is not

## 2020-03-05 ENCOUNTER — OFFICE VISIT (OUTPATIENT)
Dept: CARDIOLOGY | Facility: CLINIC | Age: 85
End: 2020-03-05

## 2020-03-05 VITALS
SYSTOLIC BLOOD PRESSURE: 114 MMHG | WEIGHT: 149 LBS | OXYGEN SATURATION: 98 % | HEIGHT: 70 IN | HEART RATE: 68 BPM | DIASTOLIC BLOOD PRESSURE: 68 MMHG | BODY MASS INDEX: 21.33 KG/M2

## 2020-03-05 DIAGNOSIS — I10 ESSENTIAL HYPERTENSION: Chronic | ICD-10-CM

## 2020-03-05 DIAGNOSIS — Z79.01 ON CONTINUOUS ORAL ANTICOAGULATION: ICD-10-CM

## 2020-03-05 DIAGNOSIS — Z95.1 HX OF CABG: ICD-10-CM

## 2020-03-05 DIAGNOSIS — E78.2 MIXED HYPERLIPIDEMIA: Chronic | ICD-10-CM

## 2020-03-05 DIAGNOSIS — I25.810 CORONARY ARTERY DISEASE INVOLVING CORONARY BYPASS GRAFT OF NATIVE HEART WITHOUT ANGINA PECTORIS: Primary | Chronic | ICD-10-CM

## 2020-03-05 DIAGNOSIS — I50.22 CHRONIC SYSTOLIC CONGESTIVE HEART FAILURE (HCC): ICD-10-CM

## 2020-03-05 DIAGNOSIS — I48.0 PAF (PAROXYSMAL ATRIAL FIBRILLATION) (HCC): ICD-10-CM

## 2020-03-05 PROCEDURE — 99213 OFFICE O/P EST LOW 20 MIN: CPT | Performed by: NURSE PRACTITIONER

## 2020-03-05 NOTE — PROGRESS NOTES
Subjective:     Encounter Date:03/05/2020      Patient ID: Vasile Deng is a 88 y.o. male with a history of coronary artery disease s/p CABG, hypertension, hyperlipidemia, chronic systolic heart failure, and paroxsymal atrial fibrillation.    Chief Complaint: need for surgical clearance  Coronary Artery Disease   Presents for follow-up visit. Symptoms include muscle weakness. Pertinent negatives include no chest pain, chest pressure, chest tightness, dizziness, leg swelling, palpitations, shortness of breath or weight gain. His past medical history is significant for CHF. The symptoms have been stable.   Congestive Heart Failure   Presents for follow-up visit. Associated symptoms include muscle weakness. Pertinent negatives include no chest pain, chest pressure, edema, fatigue, near-syncope, nocturia, orthopnea, palpitations, paroxysmal nocturnal dyspnea, shortness of breath or unexpected weight change. The symptoms have been stable. His past medical history is significant for CAD.   Atrial Fibrillation   Presents for follow-up visit. Symptoms are negative for chest pain, dizziness, palpitations, shortness of breath, syncope, tachycardia and weakness. Past medical history includes atrial fibrillation, CAD and CHF.     Patient presents today for a follow up and need for surgical clearance. He states he is doing well from a cardiac standpoint. He denies chest pain, dyspnea, orthopnea, PND, palpitations, edema and weight gain. He states he is needing a prostate ablation that will be preformed at [facility]. They have an initial consultation in April. His wife notes bilateral leg weakness since his hip arthroplasty. He has been unable to complete therapy due to recurring UTIs.   The following portions of the patient's history were reviewed and updated as appropriate: allergies, current medications, past family history, past medical history, past social history, past surgical history and problem list.    No Known  Allergies    Current Outpatient Medications:   •  allopurinol (ZYLOPRIM) 100 MG tablet, Take 100 mg by mouth Daily., Disp: , Rfl:   •  amiodarone (PACERONE) 100 MG tablet, Take 1 tablet by mouth Every 12 (Twelve) Hours., Disp: , Rfl:   •  aspirin 81 MG EC tablet, Take 1 tablet by mouth Daily., Disp: , Rfl:   •  atorvastatin (LIPITOR) 20 MG tablet, Take 1 tablet by mouth Daily., Disp: , Rfl:   •  calcium carbonate (TUMS) 500 MG chewable tablet, Chew 1,000 mg 2 (Two) Times a Day As Needed for Heartburn., Disp: 60 tablet, Rfl: 2  •  finasteride (PROSCAR) 5 MG tablet, Take 1 tablet by mouth Daily., Disp: , Rfl:   •  ipratropium-albuterol (DUO-NEB) 0.5-2.5 mg/3 ml nebulizer, Take 3 mL by nebulization Every 6 (Six) Hours As Needed for Wheezing or Shortness of Air., Disp: 360 mL, Rfl:   •  metoprolol succinate XL (TOPROL-XL) 25 MG 24 hr tablet, Take 0.5 tablets by mouth Daily., Disp: , Rfl:   •  nitrofurantoin, macrocrystal-monohydrate, (MACROBID) 100 MG capsule, Take 1 capsule by mouth 2 (Two) Times a Day., Disp: 56 capsule, Rfl: 0  •  nitroglycerin (NITROSTAT) 0.4 MG SL tablet, Place 1 tablet under the tongue Every 5 (Five) Minutes As Needed for Chest Pain. Take no more than 3 doses in 15 minutes., Disp: , Rfl: 12  •  oxyCODONE-acetaminophen (PERCOCET) 5-325 MG per tablet, Take 1 tablet by mouth Every 6 (Six) Hours As Needed for Severe Pain ., Disp: 20 tablet, Rfl: 0  •  pantoprazole (PROTONIX) 40 MG EC tablet, Take 1 tablet by mouth Daily., Disp: , Rfl:   •  rivaroxaban (XARELTO) 15 MG tablet, Take 1 tablet by mouth Daily With Dinner. Indications: Atrial Fibrillation, Disp: 42 tablet, Rfl:   •  senna-docusate sodium (SENOKOT-S) 8.6-50 MG tablet, Take 2 tablets by mouth Daily As Needed for Constipation., Disp: , Rfl:   •  tamsulosin (FLOMAX) 0.4 MG capsule 24 hr capsule, Take 1 capsule by mouth Every Night., Disp: 30 capsule, Rfl:   •  vitamin B-12 (CYANOCOBALAMIN) 1000 MCG tablet, Take 1,000 mcg by mouth Daily., Disp: ,  "Rfl:   •  vitamin D (ERGOCALCIFEROL) 1.25 MG (74396 UT) capsule capsule, Take 50,000 Units by mouth 1 (One) Time Per Week. ., Disp: , Rfl:   Past Medical History:   Diagnosis Date   • Acute on chronic combined systolic and diastolic congestive heart failure (CMS/HCC) 2018   • Atrial fibrillation (CMS/HCC)    • Coronary artery disease    • Dizziness    • Hyperlipidemia    • Hypertension        Social History     Socioeconomic History   • Marital status:      Spouse name: Not on file   • Number of children: Not on file   • Years of education: Not on file   • Highest education level: Not on file   Tobacco Use   • Smoking status: Former Smoker     Types: Cigarettes, Pipe, Cigars     Last attempt to quit: 2004     Years since quittin.1   • Smokeless tobacco: Never Used   Substance and Sexual Activity   • Alcohol use: No   • Drug use: No   • Sexual activity: Defer       Review of Systems   Constitution: Negative for fatigue, malaise/fatigue, unexpected weight change, weight gain and weight loss.   Cardiovascular: Negative for chest pain, dyspnea on exertion, irregular heartbeat, leg swelling, near-syncope, orthopnea, palpitations, paroxysmal nocturnal dyspnea and syncope.   Respiratory: Negative for chest tightness, cough, shortness of breath, sleep disturbances due to breathing, sputum production and wheezing.    Skin: Negative for dry skin, flushing, itching and rash.   Musculoskeletal: Positive for muscle weakness.   Gastrointestinal: Negative for hematemesis and hematochezia.   Genitourinary: Negative for nocturia.   Neurological: Negative for dizziness, light-headedness, loss of balance and weakness.   All other systems reviewed and are negative.      Procedures  Ht 177.8 cm (70\")   Wt 67.6 kg (149 lb)   BMI 21.38 kg/m²        Objective:     Physical Exam   Constitutional: He is oriented to person, place, and time. He appears well-developed and well-nourished. No distress.   HENT:   Head: " Normocephalic.   Mouth/Throat: No oropharyngeal exudate.   Eyes: Pupils are equal, round, and reactive to light. Conjunctivae and EOM are normal. No scleral icterus.   Neck: Normal range of motion. Neck supple.   Cardiovascular: Normal rate, regular rhythm, normal heart sounds and intact distal pulses.   No murmur heard.  Pulmonary/Chest: Effort normal. No respiratory distress. He has no wheezes. He has rhonchi. He has no rales. He exhibits no tenderness.   Abdominal: Soft. Bowel sounds are normal. He exhibits no distension. There is no tenderness.   Musculoskeletal: Normal range of motion. He exhibits no edema.   Neurological: He is alert and oriented to person, place, and time. He has normal reflexes.   Skin: Skin is warm and dry. No rash noted. He is not diaphoretic. No erythema. No pallor.   Psychiatric: He has a normal mood and affect. His behavior is normal.   Vitals reviewed.      Lab Review:   I have reviewed previous office notes, most recent labs, and most recent cardiac testing.     2D echo:   Interpretation Summary     · Estimated EF = 45%.  · Left ventricular diastolic dysfunction.  · Left ventricular wall thickness is consistent with mild concentric hypertrophy.  · No evidence of pulmonary hypertension is present.  · Moderate patent foramen ovale present with right to left shunting indicated by saline contrast     Lab Results   Component Value Date    CHOL 189 11/28/2019    TRIG 111 11/28/2019    HDL 45 11/28/2019     (H) 11/28/2019             Assessment:          Diagnosis Plan   1. Coronary artery disease involving coronary bypass graft of native heart without angina pectoris     2. Hx of CABG     3. Chronic systolic congestive heart failure (CMS/HCC)     4. PAF (paroxysmal atrial fibrillation) (CMS/HCC)     5. Essential hypertension     6. Mixed hyperlipidemia     7. On continuous oral anticoagulation            Plan:       1. CAD- stable. No clinical signs of ischemia. On ASA, ranexa,  "statin, ACEI and BB.   2. Hx CABG- remote  3. CHF- euvolemic. Last EF noted at 45%. On ACEI and BB. Reviewed signs and symptoms of CHF and what to report with the patient. Patient instructed to restrict sodium and weigh daily. Report weight gain of greater than 2 lbs overnight or 5 lbs in 1 week. Pt verbalized understanding of instructions and plan of care.   4. PAF-NSR today. on xarelto  5. HTN- controlled. Followed by PCP.   6. HLD- elevated at 122. On statin. Followed by PCP. Recommend increasing lipitor to 40mg  7. Anticoagulation- on xarelto. Denies bleeding. Ok to hold Xarelto 48 hrs prior to procedure    Risk assessment- from a revised cardiac risk index standpoint, patient is high risk (>11%)for a major cardiac event with this surgery. This is primarily because he has a known history of coronary/ischemic heart disease and congestive heart failure.  However, this is non-modifiable because she has no signs or symptoms of the following \"active cardiac conditions\"- acute coronary syndrome, acutely decompensated congestive heart failure, uncontrolled arrhythmias, or significant valvular disease. Therefore, recommend proceeding with the planned surgery without further delay.    Follow up in 6 months or sooner if symptoms worsen.     Advance Care Planning   ACP discussion was held with the patient during this visit. Patient does not have an advance directive, information provided.         "

## 2020-04-14 ENCOUNTER — TELEPHONE (OUTPATIENT)
Dept: UROLOGY | Age: 85
End: 2020-04-14

## 2020-04-20 ENCOUNTER — NURSE ONLY (OUTPATIENT)
Dept: UROLOGY | Age: 85
End: 2020-04-20
Payer: MEDICARE

## 2020-04-20 PROCEDURE — 51702 INSERT TEMP BLADDER CATH: CPT | Performed by: UROLOGY

## 2020-04-28 ENCOUNTER — TELEPHONE (OUTPATIENT)
Dept: UROLOGY | Age: 85
End: 2020-04-28

## 2020-05-01 ENCOUNTER — TELEPHONE (OUTPATIENT)
Dept: UROLOGY | Age: 85
End: 2020-05-01

## 2020-06-02 ENCOUNTER — NURSE ONLY (OUTPATIENT)
Dept: UROLOGY | Age: 85
End: 2020-06-02
Payer: MEDICARE

## 2020-06-02 PROCEDURE — 51702 INSERT TEMP BLADDER CATH: CPT | Performed by: PHYSICIAN ASSISTANT

## 2020-06-15 ENCOUNTER — APPOINTMENT (OUTPATIENT)
Dept: GENERAL RADIOLOGY | Facility: HOSPITAL | Age: 85
End: 2020-06-15

## 2020-06-15 ENCOUNTER — HOSPITAL ENCOUNTER (INPATIENT)
Facility: HOSPITAL | Age: 85
LOS: 9 days | Discharge: NURSING, REHAB OR LONG-TERM CARE | End: 2020-06-24
Attending: FAMILY MEDICINE | Admitting: INTERNAL MEDICINE

## 2020-06-15 ENCOUNTER — APPOINTMENT (OUTPATIENT)
Dept: CT IMAGING | Facility: HOSPITAL | Age: 85
End: 2020-06-15

## 2020-06-15 DIAGNOSIS — R26.9 GAIT ABNORMALITY: ICD-10-CM

## 2020-06-15 DIAGNOSIS — Z78.9 DECREASED ACTIVITIES OF DAILY LIVING (ADL): ICD-10-CM

## 2020-06-15 DIAGNOSIS — S72.115A CLOSED NONDISPLACED FRACTURE OF GREATER TROCHANTER OF LEFT FEMUR, INITIAL ENCOUNTER (HCC): Primary | ICD-10-CM
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
## 2020-06-15 PROBLEM — M25.552 ACUTE PAIN OF LEFT HIP: Status: ACTIVE | Noted: 2019-11-27

## 2020-06-15 PROBLEM — Z87.81 HISTORY OF FRACTURE OF RIGHT HIP: Status: ACTIVE | Noted: 2020-06-15

## 2020-06-15 PROBLEM — S22.39XA RIB FRACTURE: Status: ACTIVE | Noted: 2020-06-15

## 2020-06-15 LAB
ALBUMIN SERPL-MCNC: 3.1 G/DL (ref 3.5–5.2)
ALBUMIN/GLOB SERPL: 0.8 G/DL
ALP SERPL-CCNC: 91 U/L (ref 39–117)
ALT SERPL W P-5'-P-CCNC: 8 U/L (ref 1–41)
ANION GAP SERPL CALCULATED.3IONS-SCNC: 12 MMOL/L (ref 5–15)
APTT PPP: 37.2 SECONDS (ref 24.1–35)
AST SERPL-CCNC: 11 U/L (ref 1–40)
BASOPHILS # BLD AUTO: 0.05 10*3/MM3 (ref 0–0.2)
BASOPHILS NFR BLD AUTO: 0.6 % (ref 0–1.5)
BILIRUB SERPL-MCNC: 0.6 MG/DL (ref 0.2–1.2)
BUN BLD-MCNC: 19 MG/DL (ref 8–23)
BUN/CREAT SERPL: 21.1 (ref 7–25)
CALCIUM SPEC-SCNC: 8.5 MG/DL (ref 8.6–10.5)
CHLORIDE SERPL-SCNC: 98 MMOL/L (ref 98–107)
CO2 SERPL-SCNC: 22 MMOL/L (ref 22–29)
CREAT BLD-MCNC: 0.9 MG/DL (ref 0.76–1.27)
DEPRECATED RDW RBC AUTO: 52.6 FL (ref 37–54)
EOSINOPHIL # BLD AUTO: 0.39 10*3/MM3 (ref 0–0.4)
EOSINOPHIL NFR BLD AUTO: 5 % (ref 0.3–6.2)
ERYTHROCYTE [DISTWIDTH] IN BLOOD BY AUTOMATED COUNT: 16.2 % (ref 12.3–15.4)
GFR SERPL CREATININE-BSD FRML MDRD: 79 ML/MIN/1.73
GLOBULIN UR ELPH-MCNC: 3.7 GM/DL
GLUCOSE BLD-MCNC: 117 MG/DL (ref 65–99)
HCT VFR BLD AUTO: 38.9 % (ref 37.5–51)
HGB BLD-MCNC: 12.7 G/DL (ref 13–17.7)
IMM GRANULOCYTES # BLD AUTO: 0.03 10*3/MM3 (ref 0–0.05)
IMM GRANULOCYTES NFR BLD AUTO: 0.4 % (ref 0–0.5)
INR PPP: 1.15 (ref 0.91–1.09)
LYMPHOCYTES # BLD AUTO: 1.83 10*3/MM3 (ref 0.7–3.1)
LYMPHOCYTES NFR BLD AUTO: 23.4 % (ref 19.6–45.3)
MCH RBC QN AUTO: 29 PG (ref 26.6–33)
MCHC RBC AUTO-ENTMCNC: 32.6 G/DL (ref 31.5–35.7)
MCV RBC AUTO: 88.8 FL (ref 79–97)
MONOCYTES # BLD AUTO: 0.69 10*3/MM3 (ref 0.1–0.9)
MONOCYTES NFR BLD AUTO: 8.8 % (ref 5–12)
NEUTROPHILS # BLD AUTO: 4.83 10*3/MM3 (ref 1.7–7)
NEUTROPHILS NFR BLD AUTO: 61.8 % (ref 42.7–76)
NRBC BLD AUTO-RTO: 0 /100 WBC (ref 0–0.2)
PLATELET # BLD AUTO: 149 10*3/MM3 (ref 140–450)
PMV BLD AUTO: 9.5 FL (ref 6–12)
POTASSIUM BLD-SCNC: 4.3 MMOL/L (ref 3.5–5.2)
PROT SERPL-MCNC: 6.8 G/DL (ref 6–8.5)
PROTHROMBIN TIME: 14.3 SECONDS (ref 11.9–14.6)
RBC # BLD AUTO: 4.38 10*6/MM3 (ref 4.14–5.8)
SARS-COV-2 RNA PNL SPEC NAA+PROBE: NOT DETECTED
SODIUM BLD-SCNC: 132 MMOL/L (ref 136–145)
WBC NRBC COR # BLD: 7.82 10*3/MM3 (ref 3.4–10.8)

## 2020-06-15 PROCEDURE — 73502 X-RAY EXAM HIP UNI 2-3 VIEWS: CPT

## 2020-06-15 PROCEDURE — 94799 UNLISTED PULMONARY SVC/PX: CPT

## 2020-06-15 PROCEDURE — 99285 EMERGENCY DEPT VISIT HI MDM: CPT

## 2020-06-15 PROCEDURE — 71101 X-RAY EXAM UNILAT RIBS/CHEST: CPT

## 2020-06-15 PROCEDURE — 85025 COMPLETE CBC W/AUTO DIFF WBC: CPT | Performed by: FAMILY MEDICINE

## 2020-06-15 PROCEDURE — 87635 SARS-COV-2 COVID-19 AMP PRB: CPT | Performed by: FAMILY MEDICINE

## 2020-06-15 PROCEDURE — 85610 PROTHROMBIN TIME: CPT | Performed by: FAMILY MEDICINE

## 2020-06-15 PROCEDURE — 85730 THROMBOPLASTIN TIME PARTIAL: CPT | Performed by: FAMILY MEDICINE

## 2020-06-15 PROCEDURE — 72192 CT PELVIS W/O DYE: CPT

## 2020-06-15 PROCEDURE — 70450 CT HEAD/BRAIN W/O DYE: CPT

## 2020-06-15 PROCEDURE — 80053 COMPREHEN METABOLIC PANEL: CPT | Performed by: FAMILY MEDICINE

## 2020-06-15 RX ORDER — ASPIRIN 81 MG/1
81 TABLET ORAL DAILY
Status: DISCONTINUED | OUTPATIENT
Start: 2020-06-16 | End: 2020-06-24 | Stop reason: HOSPADM

## 2020-06-15 RX ORDER — ATORVASTATIN CALCIUM 10 MG/1
20 TABLET, FILM COATED ORAL DAILY
Status: DISCONTINUED | OUTPATIENT
Start: 2020-06-16 | End: 2020-06-22

## 2020-06-15 RX ORDER — SODIUM CHLORIDE 0.9 % (FLUSH) 0.9 %
10 SYRINGE (ML) INJECTION EVERY 12 HOURS SCHEDULED
Status: DISCONTINUED | OUTPATIENT
Start: 2020-06-16 | End: 2020-06-24 | Stop reason: HOSPADM

## 2020-06-15 RX ORDER — ONDANSETRON 2 MG/ML
4 INJECTION INTRAMUSCULAR; INTRAVENOUS EVERY 6 HOURS PRN
Status: DISCONTINUED | OUTPATIENT
Start: 2020-06-15 | End: 2020-06-24 | Stop reason: HOSPADM

## 2020-06-15 RX ORDER — IPRATROPIUM BROMIDE AND ALBUTEROL SULFATE 2.5; .5 MG/3ML; MG/3ML
3 SOLUTION RESPIRATORY (INHALATION) EVERY 6 HOURS PRN
Status: DISCONTINUED | OUTPATIENT
Start: 2020-06-15 | End: 2020-06-24 | Stop reason: HOSPADM

## 2020-06-15 RX ORDER — HYDROCODONE BITARTRATE AND ACETAMINOPHEN 5; 325 MG/1; MG/1
1 TABLET ORAL EVERY 6 HOURS PRN
Status: DISCONTINUED | OUTPATIENT
Start: 2020-06-15 | End: 2020-06-24 | Stop reason: HOSPADM

## 2020-06-15 RX ORDER — CHOLECALCIFEROL (VITAMIN D3) 125 MCG
1000 CAPSULE ORAL DAILY
Status: DISCONTINUED | OUTPATIENT
Start: 2020-06-16 | End: 2020-06-24 | Stop reason: HOSPADM

## 2020-06-15 RX ORDER — TAMSULOSIN HYDROCHLORIDE 0.4 MG/1
0.4 CAPSULE ORAL NIGHTLY
Status: DISCONTINUED | OUTPATIENT
Start: 2020-06-16 | End: 2020-06-24 | Stop reason: HOSPADM

## 2020-06-15 RX ORDER — NITROGLYCERIN 0.4 MG/1
0.4 TABLET SUBLINGUAL
Status: DISCONTINUED | OUTPATIENT
Start: 2020-06-15 | End: 2020-06-24 | Stop reason: HOSPADM

## 2020-06-15 RX ORDER — PANTOPRAZOLE SODIUM 40 MG/1
40 TABLET, DELAYED RELEASE ORAL DAILY
Status: DISCONTINUED | OUTPATIENT
Start: 2020-06-16 | End: 2020-06-24 | Stop reason: HOSPADM

## 2020-06-15 RX ORDER — SODIUM CHLORIDE 9 MG/ML
125 INJECTION, SOLUTION INTRAVENOUS CONTINUOUS
Status: DISCONTINUED | OUTPATIENT
Start: 2020-06-15 | End: 2020-06-15

## 2020-06-15 RX ORDER — AMIODARONE HYDROCHLORIDE 200 MG/1
100 TABLET ORAL DAILY
Status: DISCONTINUED | OUTPATIENT
Start: 2020-06-16 | End: 2020-06-24 | Stop reason: HOSPADM

## 2020-06-15 RX ORDER — ACETAMINOPHEN 325 MG/1
650 TABLET ORAL EVERY 4 HOURS PRN
Status: DISCONTINUED | OUTPATIENT
Start: 2020-06-15 | End: 2020-06-24 | Stop reason: HOSPADM

## 2020-06-15 RX ORDER — ALLOPURINOL 100 MG/1
100 TABLET ORAL DAILY
Status: DISCONTINUED | OUTPATIENT
Start: 2020-06-16 | End: 2020-06-24 | Stop reason: HOSPADM

## 2020-06-15 RX ORDER — SODIUM CHLORIDE 0.9 % (FLUSH) 0.9 %
10 SYRINGE (ML) INJECTION AS NEEDED
Status: DISCONTINUED | OUTPATIENT
Start: 2020-06-15 | End: 2020-06-24 | Stop reason: HOSPADM

## 2020-06-15 RX ORDER — FINASTERIDE 5 MG/1
5 TABLET, FILM COATED ORAL DAILY
Status: DISCONTINUED | OUTPATIENT
Start: 2020-06-16 | End: 2020-06-24 | Stop reason: HOSPADM

## 2020-06-15 RX ORDER — METOPROLOL SUCCINATE 25 MG/1
12.5 TABLET, EXTENDED RELEASE ORAL
Status: DISCONTINUED | OUTPATIENT
Start: 2020-06-16 | End: 2020-06-24 | Stop reason: HOSPADM

## 2020-06-15 RX ORDER — SODIUM CHLORIDE 9 MG/ML
50 INJECTION, SOLUTION INTRAVENOUS CONTINUOUS
Status: DISCONTINUED | OUTPATIENT
Start: 2020-06-16 | End: 2020-06-17

## 2020-06-15 RX ADMIN — SODIUM CHLORIDE 125 ML/HR: 9 INJECTION, SOLUTION INTRAVENOUS at 17:41

## 2020-06-16 PROBLEM — R63.4 WEIGHT LOSS: Status: ACTIVE | Noted: 2020-06-16

## 2020-06-16 LAB
ANION GAP SERPL CALCULATED.3IONS-SCNC: 11 MMOL/L (ref 5–15)
BACTERIA UR QL AUTO: ABNORMAL /HPF
BILIRUB UR QL STRIP: NEGATIVE
BUN BLD-MCNC: 16 MG/DL (ref 8–23)
BUN/CREAT SERPL: 17.6 (ref 7–25)
CALCIUM SPEC-SCNC: 8.4 MG/DL (ref 8.6–10.5)
CHLORIDE SERPL-SCNC: 100 MMOL/L (ref 98–107)
CLARITY UR: ABNORMAL
CO2 SERPL-SCNC: 23 MMOL/L (ref 22–29)
COLOR UR: YELLOW
CREAT BLD-MCNC: 0.91 MG/DL (ref 0.76–1.27)
DEPRECATED RDW RBC AUTO: 52.9 FL (ref 37–54)
ERYTHROCYTE [DISTWIDTH] IN BLOOD BY AUTOMATED COUNT: 16.1 % (ref 12.3–15.4)
GFR SERPL CREATININE-BSD FRML MDRD: 78 ML/MIN/1.73
GLUCOSE BLD-MCNC: 99 MG/DL (ref 65–99)
GLUCOSE UR STRIP-MCNC: NEGATIVE MG/DL
HCT VFR BLD AUTO: 34 % (ref 37.5–51)
HGB BLD-MCNC: 11.2 G/DL (ref 13–17.7)
HGB UR QL STRIP.AUTO: ABNORMAL
HYALINE CASTS UR QL AUTO: ABNORMAL /LPF
KETONES UR QL STRIP: NEGATIVE
LEUKOCYTE ESTERASE UR QL STRIP.AUTO: ABNORMAL
MCH RBC QN AUTO: 29.4 PG (ref 26.6–33)
MCHC RBC AUTO-ENTMCNC: 32.9 G/DL (ref 31.5–35.7)
MCV RBC AUTO: 89.2 FL (ref 79–97)
NITRITE UR QL STRIP: POSITIVE
PH UR STRIP.AUTO: 7 [PH] (ref 5–8)
PLATELET # BLD AUTO: 136 10*3/MM3 (ref 140–450)
PMV BLD AUTO: 10 FL (ref 6–12)
POTASSIUM BLD-SCNC: 4.2 MMOL/L (ref 3.5–5.2)
PROT UR QL STRIP: ABNORMAL
RBC # BLD AUTO: 3.81 10*6/MM3 (ref 4.14–5.8)
RBC # UR: ABNORMAL /HPF
REF LAB TEST METHOD: ABNORMAL
SODIUM BLD-SCNC: 134 MMOL/L (ref 136–145)
SP GR UR STRIP: 1.01 (ref 1–1.03)
SQUAMOUS #/AREA URNS HPF: ABNORMAL /HPF
UROBILINOGEN UR QL STRIP: ABNORMAL
WBC NRBC COR # BLD: 6.25 10*3/MM3 (ref 3.4–10.8)
WBC UR QL AUTO: ABNORMAL /HPF

## 2020-06-16 PROCEDURE — 97530 THERAPEUTIC ACTIVITIES: CPT

## 2020-06-16 PROCEDURE — 85027 COMPLETE CBC AUTOMATED: CPT | Performed by: INTERNAL MEDICINE

## 2020-06-16 PROCEDURE — 81001 URINALYSIS AUTO W/SCOPE: CPT | Performed by: INTERNAL MEDICINE

## 2020-06-16 PROCEDURE — 80048 BASIC METABOLIC PNL TOTAL CA: CPT | Performed by: INTERNAL MEDICINE

## 2020-06-16 PROCEDURE — 87086 URINE CULTURE/COLONY COUNT: CPT | Performed by: INTERNAL MEDICINE

## 2020-06-16 PROCEDURE — 25010000002 ENOXAPARIN PER 10 MG: Performed by: INTERNAL MEDICINE

## 2020-06-16 PROCEDURE — 97110 THERAPEUTIC EXERCISES: CPT

## 2020-06-16 PROCEDURE — 97162 PT EVAL MOD COMPLEX 30 MIN: CPT

## 2020-06-16 PROCEDURE — 25010000002 CEFTRIAXONE PER 250 MG: Performed by: INTERNAL MEDICINE

## 2020-06-16 PROCEDURE — 97166 OT EVAL MOD COMPLEX 45 MIN: CPT | Performed by: OCCUPATIONAL THERAPIST

## 2020-06-16 RX ADMIN — TAMSULOSIN HYDROCHLORIDE 0.4 MG: 0.4 CAPSULE ORAL at 00:17

## 2020-06-16 RX ADMIN — FINASTERIDE 5 MG: 5 TABLET, FILM COATED ORAL at 08:37

## 2020-06-16 RX ADMIN — CEFTRIAXONE SODIUM 1 G: 1 INJECTION, POWDER, FOR SOLUTION INTRAMUSCULAR; INTRAVENOUS at 15:27

## 2020-06-16 RX ADMIN — SODIUM CHLORIDE 50 ML/HR: 9 INJECTION, SOLUTION INTRAVENOUS at 00:17

## 2020-06-16 RX ADMIN — SODIUM CHLORIDE, PRESERVATIVE FREE 10 ML: 5 INJECTION INTRAVENOUS at 08:38

## 2020-06-16 RX ADMIN — SODIUM CHLORIDE, PRESERVATIVE FREE 10 ML: 5 INJECTION INTRAVENOUS at 20:20

## 2020-06-16 RX ADMIN — ASPIRIN 81 MG: 81 TABLET ORAL at 08:37

## 2020-06-16 RX ADMIN — POLYETHYLENE GLYCOL (3350) 17 G: 17 POWDER, FOR SOLUTION ORAL at 08:37

## 2020-06-16 RX ADMIN — ATORVASTATIN CALCIUM 20 MG: 10 TABLET, FILM COATED ORAL at 08:37

## 2020-06-16 RX ADMIN — PANTOPRAZOLE SODIUM 40 MG: 40 TABLET, DELAYED RELEASE ORAL at 06:38

## 2020-06-16 RX ADMIN — ALLOPURINOL 100 MG: 100 TABLET ORAL at 08:37

## 2020-06-16 RX ADMIN — ENOXAPARIN SODIUM 40 MG: 40 INJECTION SUBCUTANEOUS at 15:27

## 2020-06-16 RX ADMIN — AMIODARONE HYDROCHLORIDE 100 MG: 200 TABLET ORAL at 08:39

## 2020-06-16 RX ADMIN — Medication 1000 MCG: at 08:37

## 2020-06-16 RX ADMIN — SODIUM CHLORIDE, PRESERVATIVE FREE 10 ML: 5 INJECTION INTRAVENOUS at 00:17

## 2020-06-16 RX ADMIN — TAMSULOSIN HYDROCHLORIDE 0.4 MG: 0.4 CAPSULE ORAL at 20:20

## 2020-06-17 LAB — BACTERIA SPEC AEROBE CULT: NORMAL

## 2020-06-17 PROCEDURE — 97530 THERAPEUTIC ACTIVITIES: CPT

## 2020-06-17 PROCEDURE — 25010000002 ENOXAPARIN PER 10 MG: Performed by: INTERNAL MEDICINE

## 2020-06-17 PROCEDURE — 97535 SELF CARE MNGMENT TRAINING: CPT

## 2020-06-17 RX ORDER — AMOXICILLIN 250 MG
1 CAPSULE ORAL 2 TIMES DAILY
Status: DISCONTINUED | OUTPATIENT
Start: 2020-06-17 | End: 2020-06-24 | Stop reason: HOSPADM

## 2020-06-17 RX ORDER — BISACODYL 10 MG
10 SUPPOSITORY, RECTAL RECTAL DAILY PRN
Status: DISCONTINUED | OUTPATIENT
Start: 2020-06-17 | End: 2020-06-24 | Stop reason: HOSPADM

## 2020-06-17 RX ADMIN — SODIUM CHLORIDE, PRESERVATIVE FREE 10 ML: 5 INJECTION INTRAVENOUS at 21:47

## 2020-06-17 RX ADMIN — ASPIRIN 81 MG: 81 TABLET ORAL at 09:42

## 2020-06-17 RX ADMIN — PANTOPRAZOLE SODIUM 40 MG: 40 TABLET, DELAYED RELEASE ORAL at 07:03

## 2020-06-17 RX ADMIN — ALLOPURINOL 100 MG: 100 TABLET ORAL at 09:42

## 2020-06-17 RX ADMIN — FINASTERIDE 5 MG: 5 TABLET, FILM COATED ORAL at 07:03

## 2020-06-17 RX ADMIN — Medication 1000 MCG: at 09:43

## 2020-06-17 RX ADMIN — POLYETHYLENE GLYCOL (3350) 17 G: 17 POWDER, FOR SOLUTION ORAL at 09:43

## 2020-06-17 RX ADMIN — TAMSULOSIN HYDROCHLORIDE 0.4 MG: 0.4 CAPSULE ORAL at 21:47

## 2020-06-17 RX ADMIN — ENOXAPARIN SODIUM 40 MG: 40 INJECTION SUBCUTANEOUS at 19:35

## 2020-06-17 RX ADMIN — SODIUM CHLORIDE, PRESERVATIVE FREE 10 ML: 5 INJECTION INTRAVENOUS at 09:43

## 2020-06-17 RX ADMIN — ATORVASTATIN CALCIUM 20 MG: 10 TABLET, FILM COATED ORAL at 09:42

## 2020-06-17 RX ADMIN — AMIODARONE HYDROCHLORIDE 100 MG: 200 TABLET ORAL at 09:42

## 2020-06-17 RX ADMIN — DOCUSATE SODIUM 50 MG AND SENNOSIDES 8.6 MG 1 TABLET: 8.6; 5 TABLET, FILM COATED ORAL at 21:47

## 2020-06-18 PROCEDURE — 25010000002 ENOXAPARIN PER 10 MG: Performed by: INTERNAL MEDICINE

## 2020-06-18 PROCEDURE — 97530 THERAPEUTIC ACTIVITIES: CPT

## 2020-06-18 RX ADMIN — SODIUM CHLORIDE, PRESERVATIVE FREE 10 ML: 5 INJECTION INTRAVENOUS at 10:12

## 2020-06-18 RX ADMIN — POLYETHYLENE GLYCOL (3350) 17 G: 17 POWDER, FOR SOLUTION ORAL at 10:10

## 2020-06-18 RX ADMIN — TAMSULOSIN HYDROCHLORIDE 0.4 MG: 0.4 CAPSULE ORAL at 21:04

## 2020-06-18 RX ADMIN — ENOXAPARIN SODIUM 40 MG: 40 INJECTION SUBCUTANEOUS at 15:57

## 2020-06-18 RX ADMIN — ATORVASTATIN CALCIUM 20 MG: 10 TABLET, FILM COATED ORAL at 10:11

## 2020-06-18 RX ADMIN — AMIODARONE HYDROCHLORIDE 100 MG: 200 TABLET ORAL at 10:11

## 2020-06-18 RX ADMIN — Medication 1000 MCG: at 10:11

## 2020-06-18 RX ADMIN — ALLOPURINOL 100 MG: 100 TABLET ORAL at 10:11

## 2020-06-18 RX ADMIN — METOPROLOL SUCCINATE 12.5 MG: 25 TABLET, EXTENDED RELEASE ORAL at 10:12

## 2020-06-18 RX ADMIN — DOCUSATE SODIUM 50 MG AND SENNOSIDES 8.6 MG 1 TABLET: 8.6; 5 TABLET, FILM COATED ORAL at 10:12

## 2020-06-18 RX ADMIN — FINASTERIDE 5 MG: 5 TABLET, FILM COATED ORAL at 05:48

## 2020-06-18 RX ADMIN — ASPIRIN 81 MG: 81 TABLET ORAL at 10:11

## 2020-06-18 RX ADMIN — BISACODYL 10 MG: 10 SUPPOSITORY RECTAL at 15:57

## 2020-06-18 RX ADMIN — PANTOPRAZOLE SODIUM 40 MG: 40 TABLET, DELAYED RELEASE ORAL at 05:48

## 2020-06-18 RX ADMIN — SODIUM CHLORIDE, PRESERVATIVE FREE 10 ML: 5 INJECTION INTRAVENOUS at 21:04

## 2020-06-19 PROCEDURE — 97535 SELF CARE MNGMENT TRAINING: CPT

## 2020-06-19 PROCEDURE — 97530 THERAPEUTIC ACTIVITIES: CPT

## 2020-06-19 PROCEDURE — 97110 THERAPEUTIC EXERCISES: CPT

## 2020-06-19 PROCEDURE — 25010000002 ENOXAPARIN PER 10 MG: Performed by: INTERNAL MEDICINE

## 2020-06-19 RX ORDER — HYDROCODONE BITARTRATE AND ACETAMINOPHEN 5; 325 MG/1; MG/1
1 TABLET ORAL EVERY 6 HOURS PRN
Qty: 12 TABLET | Refills: 0 | Status: SHIPPED | OUTPATIENT
Start: 2020-06-19 | End: 2020-06-24 | Stop reason: HOSPADM

## 2020-06-19 RX ORDER — BISACODYL 10 MG
10 SUPPOSITORY, RECTAL RECTAL DAILY PRN
Start: 2020-06-19

## 2020-06-19 RX ORDER — ACETAMINOPHEN 325 MG/1
650 TABLET ORAL EVERY 4 HOURS PRN
Start: 2020-06-19

## 2020-06-19 RX ORDER — POLYETHYLENE GLYCOL 3350 17 G/17G
17 POWDER, FOR SOLUTION ORAL DAILY
Start: 2020-06-20 | End: 2020-09-09

## 2020-06-19 RX ADMIN — SODIUM CHLORIDE, PRESERVATIVE FREE 10 ML: 5 INJECTION INTRAVENOUS at 08:43

## 2020-06-19 RX ADMIN — TAMSULOSIN HYDROCHLORIDE 0.4 MG: 0.4 CAPSULE ORAL at 20:43

## 2020-06-19 RX ADMIN — ASPIRIN 81 MG: 81 TABLET ORAL at 08:42

## 2020-06-19 RX ADMIN — Medication 1000 MCG: at 08:42

## 2020-06-19 RX ADMIN — SODIUM CHLORIDE, PRESERVATIVE FREE 10 ML: 5 INJECTION INTRAVENOUS at 20:43

## 2020-06-19 RX ADMIN — ENOXAPARIN SODIUM 40 MG: 40 INJECTION SUBCUTANEOUS at 16:48

## 2020-06-19 RX ADMIN — FINASTERIDE 5 MG: 5 TABLET, FILM COATED ORAL at 06:08

## 2020-06-19 RX ADMIN — ALLOPURINOL 100 MG: 100 TABLET ORAL at 08:43

## 2020-06-19 RX ADMIN — DOCUSATE SODIUM 50 MG AND SENNOSIDES 8.6 MG 1 TABLET: 8.6; 5 TABLET, FILM COATED ORAL at 20:43

## 2020-06-19 RX ADMIN — AMIODARONE HYDROCHLORIDE 100 MG: 200 TABLET ORAL at 08:42

## 2020-06-19 RX ADMIN — PANTOPRAZOLE SODIUM 40 MG: 40 TABLET, DELAYED RELEASE ORAL at 06:08

## 2020-06-19 RX ADMIN — ATORVASTATIN CALCIUM 20 MG: 10 TABLET, FILM COATED ORAL at 08:42

## 2020-06-20 PROCEDURE — 25010000002 ENOXAPARIN PER 10 MG: Performed by: INTERNAL MEDICINE

## 2020-06-20 PROCEDURE — 97530 THERAPEUTIC ACTIVITIES: CPT

## 2020-06-20 PROCEDURE — 97110 THERAPEUTIC EXERCISES: CPT

## 2020-06-20 RX ADMIN — ALLOPURINOL 100 MG: 100 TABLET ORAL at 09:15

## 2020-06-20 RX ADMIN — ASPIRIN 81 MG: 81 TABLET ORAL at 09:13

## 2020-06-20 RX ADMIN — METOPROLOL SUCCINATE 12.5 MG: 25 TABLET, EXTENDED RELEASE ORAL at 09:15

## 2020-06-20 RX ADMIN — AMIODARONE HYDROCHLORIDE 100 MG: 200 TABLET ORAL at 09:14

## 2020-06-20 RX ADMIN — ENOXAPARIN SODIUM 40 MG: 40 INJECTION SUBCUTANEOUS at 17:49

## 2020-06-20 RX ADMIN — PANTOPRAZOLE SODIUM 40 MG: 40 TABLET, DELAYED RELEASE ORAL at 06:23

## 2020-06-20 RX ADMIN — ATORVASTATIN CALCIUM 20 MG: 10 TABLET, FILM COATED ORAL at 09:16

## 2020-06-20 RX ADMIN — SODIUM CHLORIDE, PRESERVATIVE FREE 10 ML: 5 INJECTION INTRAVENOUS at 21:53

## 2020-06-20 RX ADMIN — POLYETHYLENE GLYCOL (3350) 17 G: 17 POWDER, FOR SOLUTION ORAL at 09:13

## 2020-06-20 RX ADMIN — FINASTERIDE 5 MG: 5 TABLET, FILM COATED ORAL at 06:23

## 2020-06-20 RX ADMIN — DOCUSATE SODIUM 50 MG AND SENNOSIDES 8.6 MG 1 TABLET: 8.6; 5 TABLET, FILM COATED ORAL at 21:53

## 2020-06-20 RX ADMIN — DOCUSATE SODIUM 50 MG AND SENNOSIDES 8.6 MG 1 TABLET: 8.6; 5 TABLET, FILM COATED ORAL at 09:13

## 2020-06-20 RX ADMIN — TAMSULOSIN HYDROCHLORIDE 0.4 MG: 0.4 CAPSULE ORAL at 21:53

## 2020-06-20 RX ADMIN — Medication 1000 MCG: at 09:15

## 2020-06-20 RX ADMIN — SODIUM CHLORIDE, PRESERVATIVE FREE 10 ML: 5 INJECTION INTRAVENOUS at 09:13

## 2020-06-21 PROCEDURE — 97110 THERAPEUTIC EXERCISES: CPT

## 2020-06-21 PROCEDURE — 97530 THERAPEUTIC ACTIVITIES: CPT

## 2020-06-21 PROCEDURE — 25010000002 ENOXAPARIN PER 10 MG: Performed by: INTERNAL MEDICINE

## 2020-06-21 RX ADMIN — DOCUSATE SODIUM 50 MG AND SENNOSIDES 8.6 MG 1 TABLET: 8.6; 5 TABLET, FILM COATED ORAL at 20:08

## 2020-06-21 RX ADMIN — ALLOPURINOL 100 MG: 100 TABLET ORAL at 08:29

## 2020-06-21 RX ADMIN — SODIUM CHLORIDE, PRESERVATIVE FREE 10 ML: 5 INJECTION INTRAVENOUS at 20:09

## 2020-06-21 RX ADMIN — Medication 1000 MCG: at 08:28

## 2020-06-21 RX ADMIN — POLYETHYLENE GLYCOL (3350) 17 G: 17 POWDER, FOR SOLUTION ORAL at 08:26

## 2020-06-21 RX ADMIN — TAMSULOSIN HYDROCHLORIDE 0.4 MG: 0.4 CAPSULE ORAL at 20:08

## 2020-06-21 RX ADMIN — SODIUM CHLORIDE, PRESERVATIVE FREE 10 ML: 5 INJECTION INTRAVENOUS at 08:30

## 2020-06-21 RX ADMIN — FINASTERIDE 5 MG: 5 TABLET, FILM COATED ORAL at 05:01

## 2020-06-21 RX ADMIN — ENOXAPARIN SODIUM 40 MG: 40 INJECTION SUBCUTANEOUS at 16:11

## 2020-06-21 RX ADMIN — ATORVASTATIN CALCIUM 20 MG: 10 TABLET, FILM COATED ORAL at 08:28

## 2020-06-21 RX ADMIN — PANTOPRAZOLE SODIUM 40 MG: 40 TABLET, DELAYED RELEASE ORAL at 05:01

## 2020-06-21 RX ADMIN — METOPROLOL SUCCINATE 12.5 MG: 25 TABLET, EXTENDED RELEASE ORAL at 08:24

## 2020-06-21 RX ADMIN — DOCUSATE SODIUM 50 MG AND SENNOSIDES 8.6 MG 1 TABLET: 8.6; 5 TABLET, FILM COATED ORAL at 08:24

## 2020-06-21 RX ADMIN — ASPIRIN 81 MG: 81 TABLET ORAL at 08:26

## 2020-06-21 RX ADMIN — AMIODARONE HYDROCHLORIDE 100 MG: 200 TABLET ORAL at 08:28

## 2020-06-22 LAB — SARS-COV-2 RNA RESP QL NAA+PROBE: NOT DETECTED

## 2020-06-22 PROCEDURE — 25010000002 ENOXAPARIN PER 10 MG: Performed by: INTERNAL MEDICINE

## 2020-06-22 PROCEDURE — 87635 SARS-COV-2 COVID-19 AMP PRB: CPT | Performed by: INTERNAL MEDICINE

## 2020-06-22 PROCEDURE — 97530 THERAPEUTIC ACTIVITIES: CPT

## 2020-06-22 RX ORDER — OMEPRAZOLE 40 MG/1
40 CAPSULE, DELAYED RELEASE ORAL AS NEEDED
COMMUNITY

## 2020-06-22 RX ORDER — AMIODARONE HYDROCHLORIDE 100 MG/1
50 TABLET ORAL DAILY
Start: 2020-06-22 | End: 2020-06-24 | Stop reason: HOSPADM

## 2020-06-22 RX ORDER — AMIODARONE HYDROCHLORIDE 100 MG/1
100 TABLET ORAL DAILY
Status: ON HOLD | COMMUNITY
End: 2020-06-22 | Stop reason: SDUPTHER

## 2020-06-22 RX ORDER — METOPROLOL SUCCINATE 50 MG/1
25 TABLET, EXTENDED RELEASE ORAL DAILY
COMMUNITY

## 2020-06-22 RX ORDER — ALENDRONATE SODIUM 70 MG/1
70 TABLET ORAL
COMMUNITY

## 2020-06-22 RX ADMIN — ALLOPURINOL 100 MG: 100 TABLET ORAL at 08:32

## 2020-06-22 RX ADMIN — DOCUSATE SODIUM 50 MG AND SENNOSIDES 8.6 MG 1 TABLET: 8.6; 5 TABLET, FILM COATED ORAL at 08:32

## 2020-06-22 RX ADMIN — SODIUM CHLORIDE, PRESERVATIVE FREE 10 ML: 5 INJECTION INTRAVENOUS at 21:36

## 2020-06-22 RX ADMIN — POLYETHYLENE GLYCOL (3350) 17 G: 17 POWDER, FOR SOLUTION ORAL at 08:32

## 2020-06-22 RX ADMIN — SODIUM CHLORIDE, PRESERVATIVE FREE 10 ML: 5 INJECTION INTRAVENOUS at 08:33

## 2020-06-22 RX ADMIN — ATORVASTATIN CALCIUM 20 MG: 10 TABLET, FILM COATED ORAL at 08:32

## 2020-06-22 RX ADMIN — ASPIRIN 81 MG: 81 TABLET ORAL at 08:32

## 2020-06-22 RX ADMIN — PANTOPRAZOLE SODIUM 40 MG: 40 TABLET, DELAYED RELEASE ORAL at 05:19

## 2020-06-22 RX ADMIN — DOCUSATE SODIUM 50 MG AND SENNOSIDES 8.6 MG 1 TABLET: 8.6; 5 TABLET, FILM COATED ORAL at 21:36

## 2020-06-22 RX ADMIN — Medication 1000 MCG: at 08:32

## 2020-06-22 RX ADMIN — FINASTERIDE 5 MG: 5 TABLET, FILM COATED ORAL at 05:19

## 2020-06-22 RX ADMIN — ENOXAPARIN SODIUM 40 MG: 40 INJECTION SUBCUTANEOUS at 15:30

## 2020-06-22 RX ADMIN — TAMSULOSIN HYDROCHLORIDE 0.4 MG: 0.4 CAPSULE ORAL at 21:36

## 2020-06-23 PROCEDURE — 97530 THERAPEUTIC ACTIVITIES: CPT

## 2020-06-23 PROCEDURE — 25010000002 ENOXAPARIN PER 10 MG: Performed by: INTERNAL MEDICINE

## 2020-06-23 PROCEDURE — 97110 THERAPEUTIC EXERCISES: CPT

## 2020-06-23 RX ADMIN — DOCUSATE SODIUM 50 MG AND SENNOSIDES 8.6 MG 1 TABLET: 8.6; 5 TABLET, FILM COATED ORAL at 08:50

## 2020-06-23 RX ADMIN — TAMSULOSIN HYDROCHLORIDE 0.4 MG: 0.4 CAPSULE ORAL at 21:25

## 2020-06-23 RX ADMIN — FINASTERIDE 5 MG: 5 TABLET, FILM COATED ORAL at 05:30

## 2020-06-23 RX ADMIN — POLYETHYLENE GLYCOL (3350) 17 G: 17 POWDER, FOR SOLUTION ORAL at 08:49

## 2020-06-23 RX ADMIN — ENOXAPARIN SODIUM 40 MG: 40 INJECTION SUBCUTANEOUS at 15:44

## 2020-06-23 RX ADMIN — PANTOPRAZOLE SODIUM 40 MG: 40 TABLET, DELAYED RELEASE ORAL at 05:30

## 2020-06-23 RX ADMIN — SODIUM CHLORIDE, PRESERVATIVE FREE 10 ML: 5 INJECTION INTRAVENOUS at 21:25

## 2020-06-23 RX ADMIN — ALLOPURINOL 100 MG: 100 TABLET ORAL at 08:50

## 2020-06-23 RX ADMIN — DOCUSATE SODIUM 50 MG AND SENNOSIDES 8.6 MG 1 TABLET: 8.6; 5 TABLET, FILM COATED ORAL at 21:25

## 2020-06-23 RX ADMIN — Medication 1000 MCG: at 08:50

## 2020-06-23 RX ADMIN — SODIUM CHLORIDE, PRESERVATIVE FREE 10 ML: 5 INJECTION INTRAVENOUS at 08:53

## 2020-06-23 RX ADMIN — ASPIRIN 81 MG: 81 TABLET ORAL at 08:50

## 2020-06-24 VITALS
BODY MASS INDEX: 20.45 KG/M2 | RESPIRATION RATE: 18 BRPM | WEIGHT: 134.92 LBS | SYSTOLIC BLOOD PRESSURE: 105 MMHG | OXYGEN SATURATION: 94 % | DIASTOLIC BLOOD PRESSURE: 52 MMHG | HEIGHT: 68 IN | TEMPERATURE: 97.6 F | HEART RATE: 68 BPM

## 2020-06-24 PROBLEM — Z97.8 CHRONIC INDWELLING FOLEY CATHETER: Status: ACTIVE | Noted: 2020-06-24

## 2020-06-24 RX ADMIN — ASPIRIN 81 MG: 81 TABLET ORAL at 10:59

## 2020-06-24 RX ADMIN — ALLOPURINOL 100 MG: 100 TABLET ORAL at 10:58

## 2020-06-24 RX ADMIN — SODIUM CHLORIDE, PRESERVATIVE FREE 10 ML: 5 INJECTION INTRAVENOUS at 10:52

## 2020-06-24 RX ADMIN — Medication 1000 MCG: at 10:56

## 2020-06-24 RX ADMIN — METOPROLOL SUCCINATE 12.5 MG: 25 TABLET, EXTENDED RELEASE ORAL at 10:53

## 2020-06-24 RX ADMIN — PANTOPRAZOLE SODIUM 40 MG: 40 TABLET, DELAYED RELEASE ORAL at 05:35

## 2020-06-24 RX ADMIN — FINASTERIDE 5 MG: 5 TABLET, FILM COATED ORAL at 05:35

## 2020-07-23 ENCOUNTER — NURSE ONLY (OUTPATIENT)
Dept: UROLOGY | Age: 85
End: 2020-07-23
Payer: MEDICARE

## 2020-07-23 PROCEDURE — 52000 CYSTOURETHROSCOPY: CPT | Performed by: UROLOGY

## 2020-07-24 NOTE — PROGRESS NOTES
Urinary retention, BPH, complex catheter placement  Patient is an 79-year-old gentleman with longstanding BPH with urinary retention this is been present since seen in hospital consultation back in December 2019. Because of failed multiple voiding trials he was sent home on chronic indwelling catheter. He gets this change in the office at least once a month. He had a very large prostate on MELLISA and underwent a transrectal ultrasound for measurement on 1/27/2020 which revealed 97.1 g sized prostate. He is on maximal medical therapy and has failed multiple voiding trials therefore it is felt that his age and comorbidity he was not a good candidate for open suprapubic prostatectomy and was referred to [facility] to undergo a HoLEP and has an appointment to see  [facility] on August 4, 2020. He came in today for routine catheter change. His last catheter change was on Marie 3 here in the office 18 Venezuelan Alves was placed without difficulty. Today the nurses removed his indwelling catheter and attempted to pass an 18 Western Katerin Alves. I was told the Alves went in easily but they were unable to irrigate the catheter. Therefore he is now to undergo complex catheter insertion with aid of cystoscopy to place a [place] tip catheter over guidewire    Cystoscopy Procedure Note    Indications: Diagnosis    Pre-operative Diagnosis: Urinary retention, BPH, complex catheter insertion    Post-operative Diagnosis: Same    Surgeon: Arabella Muller MD     Assistants: staff    Anesthesia: Local anesthesia topical 2% lidocaine gel    Procedure Details   The risks, benefits, complications, treatment options, and expected outcomes were discussed with the patient. The patient concurred with the proposed plan, giving informed consent. Cystoscopy was performed today under local anesthesia, using sterile technique. The patient was placed in the supine position, prepped with Hibiclens, and draped in the usual sterile fashion.  A 16 Occitan sheath flexible cystoscope was used to inspect both the urethra and bladder using the flexible scope. Findings:  Anterior urethra: normal without strictures and without scarring. Prostate:  Prostatic urethra: 3+ trilobar prostatic hyperplasia. Bladder: 3+ trabeculation, without lesions urine was cloudy. Ureteral orifice(s) was/were not seen bilaterally. The flexible scope was advanced without difficulty also no faults passed, no stricture had no problem passing scope under direct vision through the prostate and into the patient's bladder. A super stiff Amplatz guidewire was then inserted and confirmed this to be in the bladder through the cystoscope. The cystoscope was removed leaving the guidewire in place and then a 18 Western Katerin [place] tip catheter was inserted over the guidewire and into the patient's bladder without difficulty. His hand irrigated easily with a catheter tip syringe. 10 cc was placed in the balloon is seated against the bladder neck in good position and again was hand irrigated easily. There was no clots only slight pink-tinged and cleared easily with irrigation. Successful placement of [place] tip catheter over guidewire without difficulty                            Complications:  None; patient tolerated the procedure well. Disposition: To home after observation. Condition: stable      1. Alves catheter problem, initial encounter ([address]  I had no problems placing Alves catheter over guidewire. And I did not see anything cystoscopically that would result in him having problems having catheter placed. He has had catheter changes in the past without difficulty. Recommend in the future using a 18-20 Western Katerin coudé catheter for catheter changes. - Cystoscopy  - ME INSERT,TEMP INDWELLING BLAD CATH,COMP    2.  Retention of urine  Continue indwelling catheter continue max medical therapy he does have appointment on August 4 at [facility] for consideration of HoLEP  - FL INSERT,TEMP INDWELLING BLAD CATH,COMP    3. Benign prostatic hyperplasia with urinary retention  Hopefully will be a candidate for HoLEP        Orders Placed This Encounter   Procedures    FL INSERT,TEMP INDWELLING BLAD CATH,COMP    Cystoscopy     Done today     Scheduling Instructions:      today        [facility] Alves catheter [facility].

## 2020-09-03 ENCOUNTER — HOSPITAL ENCOUNTER (OUTPATIENT)
Dept: PHYSICAL THERAPY | Age: 85
Setting detail: THERAPIES SERIES
Discharge: HOME | End: 2020-09-03
Payer: MEDICARE

## 2020-09-09 ENCOUNTER — OFFICE VISIT (OUTPATIENT)
Dept: CARDIOLOGY | Facility: CLINIC | Age: 85
End: 2020-09-09

## 2020-09-09 VITALS
BODY MASS INDEX: 23.23 KG/M2 | WEIGHT: 148 LBS | HEART RATE: 68 BPM | HEIGHT: 67 IN | SYSTOLIC BLOOD PRESSURE: 128 MMHG | OXYGEN SATURATION: 98 % | DIASTOLIC BLOOD PRESSURE: 60 MMHG | RESPIRATION RATE: 18 BRPM

## 2020-09-09 DIAGNOSIS — I25.810 CORONARY ARTERY DISEASE INVOLVING CORONARY BYPASS GRAFT OF NATIVE HEART WITHOUT ANGINA PECTORIS: Chronic | ICD-10-CM

## 2020-09-09 DIAGNOSIS — E78.2 MIXED HYPERLIPIDEMIA: Chronic | ICD-10-CM

## 2020-09-09 DIAGNOSIS — R42 POSTURAL DIZZINESS WITH NEAR SYNCOPE: ICD-10-CM
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
## 2020-09-09 DIAGNOSIS — I10 ESSENTIAL HYPERTENSION: Chronic | ICD-10-CM

## 2020-09-09 DIAGNOSIS — R55 POSTURAL DIZZINESS WITH NEAR SYNCOPE: ICD-10-CM

## 2020-09-09 DIAGNOSIS — I50.22 CHRONIC SYSTOLIC CONGESTIVE HEART FAILURE (HCC): ICD-10-CM

## 2020-09-09 DIAGNOSIS — I48.0 PAF (PAROXYSMAL ATRIAL FIBRILLATION) (HCC): Primary | ICD-10-CM

## 2020-09-09 PROCEDURE — 93000 ELECTROCARDIOGRAM COMPLETE: CPT | Performed by: NURSE PRACTITIONER

## 2020-09-09 PROCEDURE — G9903 PT SCRN TBCO ID AS NON USER: HCPCS | Performed by: NURSE PRACTITIONER

## 2020-09-09 PROCEDURE — 99214 OFFICE O/P EST MOD 30 MIN: CPT | Performed by: NURSE PRACTITIONER

## 2020-09-09 PROCEDURE — G8420 CALC BMI NORM PARAMETERS: HCPCS | Performed by: NURSE PRACTITIONER

## 2020-09-09 NOTE — PROGRESS NOTES
Subjective:     Encounter Date:09/09/2020      Patient ID: Vasile Deng is a 89 y.o. male     Chief Complaint:  History of Present Illness  Patient presents today for routine follow up coronary artery disease with CABG, chronic systolic congestive heart failure, paroxysmal atrial fibrillation, hypertension, hyperlipidemia, and GERD. Patient was admitted recently for fall and hip fracture. At that time Xarelto and Amiodarone were stopped. He was advised to take Toprol daily but hold if blood pressure was low. Patient overall has been doing well. He continues to have issues with balance. He was immobile for roughly 6 weeks and this has made his leg weakness worse. Overall from a cardiac standpoint patient has been doing well. LDL is poorly controlled. Patient's wife states that he was previously on crestor but this was stopped due to muscle weakness several months ago. However muscle weakness has gotten no better.     The following portions of the patient's history were reviewed and updated as appropriate: allergies, current medications, past medical history, past social history, past and problem list.    No Known Allergies    Current Outpatient Medications:   •  acetaminophen (TYLENOL) 325 MG tablet, Take 2 tablets by mouth Every 4 (Four) Hours As Needed for Mild Pain ., Disp: , Rfl:   •  alendronate (Fosamax) 70 MG tablet, Take 70 mg by mouth Every 7 (Seven) Days. sunday, Disp: , Rfl:   •  allopurinol (ZYLOPRIM) 100 MG tablet, Take 100 mg by mouth Daily., Disp: , Rfl:   •  aspirin 81 MG EC tablet, Take 1 tablet by mouth Daily., Disp: , Rfl:   •  bisacodyl (DULCOLAX) 10 MG suppository, Insert 1 suppository into the rectum Daily As Needed for Constipation., Disp: , Rfl:   •  calcium carbonate (TUMS) 500 MG chewable tablet, Chew 1,000 mg 2 (Two) Times a Day As Needed for Heartburn., Disp: 60 tablet, Rfl: 2  •  finasteride (PROSCAR) 5 MG tablet, Take 1 tablet by mouth Daily., Disp: , Rfl:   •  metoprolol succinate  XL (TOPROL-XL) 50 MG 24 hr tablet, Take 25 mg by mouth Daily., Disp: , Rfl:   •  nitroglycerin (NITROSTAT) 0.4 MG SL tablet, Place 1 tablet under the tongue Every 5 (Five) Minutes As Needed for Chest Pain. Take no more than 3 doses in 15 minutes., Disp: , Rfl: 12  •  omeprazole (priLOSEC) 40 MG capsule, Take 40 mg by mouth As Needed., Disp: , Rfl:   •  senna-docusate sodium (SENOKOT-S) 8.6-50 MG tablet, Take 2 tablets by mouth Daily As Needed for Constipation. (Patient taking differently: Take 2 tablets by mouth Daily.), Disp: , Rfl:   •  tamsulosin (FLOMAX) 0.4 MG capsule 24 hr capsule, Take 1 capsule by mouth Every Night., Disp: 30 capsule, Rfl:   •  vitamin B-12 (CYANOCOBALAMIN) 1000 MCG tablet, Take 1,000 mcg by mouth Daily., Disp: , Rfl:   •  vitamin D (ERGOCALCIFEROL) 1.25 MG (83884 UT) capsule capsule, Take 50,000 Units by mouth 1 (One) Time Per Week. ., Disp: , Rfl:     Social History     Socioeconomic History   • Marital status:      Spouse name: Not on file   • Number of children: Not on file   • Years of education: Not on file   • Highest education level: Not on file   Tobacco Use   • Smoking status: Former Smoker     Types: Cigarettes, Pipe, Cigars     Last attempt to quit: 2004     Years since quittin.7   • Smokeless tobacco: Never Used   Substance and Sexual Activity   • Alcohol use: No   • Drug use: No   • Sexual activity: Defer       Review of Systems   Constitution: Positive for malaise/fatigue. Negative for chills, decreased appetite, fever, weight gain and weight loss.   HENT: Positive for hearing loss. Negative for nosebleeds.    Eyes: Negative for visual disturbance.   Cardiovascular: Negative for chest pain, dyspnea on exertion, leg swelling, near-syncope, orthopnea, palpitations, paroxysmal nocturnal dyspnea and syncope.   Respiratory: Negative for cough, hemoptysis, shortness of breath and snoring.    Endocrine: Negative for cold intolerance and heat intolerance.  "  Hematologic/Lymphatic: Negative for bleeding problem. Does not bruise/bleed easily.   Skin: Negative for rash.   Musculoskeletal: Positive for falls, joint pain and muscle weakness. Negative for back pain.   Gastrointestinal: Negative for abdominal pain, constipation, diarrhea, heartburn, melena, nausea and vomiting.   Genitourinary: Negative for hematuria.   Neurological: Negative for dizziness, headaches and light-headedness.   Psychiatric/Behavioral: Negative for altered mental status.   Allergic/Immunologic: Negative for persistent infections.              Objective:     Physical Exam   Constitutional: He is oriented to person, place, and time. He appears well-developed and well-nourished.   Frail, wheelchair    HENT:   Head: Normocephalic and atraumatic.   Eyes: Pupils are equal, round, and reactive to light.   Neck: Normal range of motion. Neck supple. No JVD present. Carotid bruit is not present.   Cardiovascular: Normal rate, normal heart sounds and intact distal pulses. An irregular rhythm present.   Pulmonary/Chest: Effort normal and breath sounds normal.   Abdominal: Soft. Bowel sounds are normal.   Musculoskeletal: Normal range of motion.   Neurological: He is alert and oriented to person, place, and time. He has normal reflexes.   Skin: Skin is warm and dry.   Psychiatric: He has a normal mood and affect. His behavior is normal. Judgment and thought content normal.           ECG 12 Lead  Date/Time: 9/9/2020 2:13 PM  Performed by: Abraham Leonard APRN  Authorized by: Abraham Leonard APRN   Comparison: compared with previous ECG from 2/3/2020  Similar to previous ECG  Rhythm: sinus rhythm  Ectopy: unifocal PVCs          /60 (BP Location: Right arm, Patient Position: Sitting, Cuff Size: Adult)   Pulse 68   Resp 18   Ht 170.2 cm (67\")   Wt 67.1 kg (148 lb)   SpO2 98%   BMI 23.18 kg/m²   Lab Review:   I have reviewed       Lab Results   Component Value Date    CHOL 189 11/28/2019    TRIG 111 " 11/28/2019    HDL 45 11/28/2019     (H) 11/28/2019      Results for orders placed during the hospital encounter of 11/27/19   Adult Transthoracic Echo Complete With Contrast if Necessary Per Protocol (With Agitated Saline)    Narrative · Estimated EF = 45%.  · Left ventricular diastolic dysfunction.  · Left ventricular wall thickness is consistent with mild concentric   hypertrophy.  · No evidence of pulmonary hypertension is present.  · Moderate patent foramen ovale present with right to left shunting   indicated by saline contrast.         Assessment:          Diagnosis Plan   1. PAF (paroxysmal atrial fibrillation) (CMS/[facility])     2. Coronary artery disease involving coronary bypass graft of native heart without angina pectoris     3. Chronic systolic congestive heart failure (CMS/[facility])     4. Essential hypertension     5. Mixed hyperlipidemia     6. Postural dizziness with near syncope     7. Fall, sequela            Plan:       1. Paroxysmal Atrial Fibrillation - maintaining NSR. Off Xarelto following frequent falls with hip fractures. Long discussion had with patient and wife about risks and benefits.   2. Coronary Artery Disease - No angina. On aspirin. Will trial statin  3. Chronic systolic congestive heart failure - euvolemic. On Toprol.   4. Blood pressure - blood pressure controlled  5. Mixed Hyperlipidemia - poorly controlled LDL. Was previosuly tolerating statin it was stopped due to muscle weakness several months ago. However this did not help his symptoms. Will resume Lipitor for trial. She believes she has some at home- he will call back to let us know dose.   6. Dizziness - issues with weakness and dizziness. Plans to start PT  7. Fall- frequent. Discussed with wife and patient. Due to frequent falls and issues with balance it is felt best not to resume Xarelto at this time.

## 2020-09-10 ENCOUNTER — TELEPHONE (OUTPATIENT)
Dept: CARDIOLOGY | Facility: CLINIC | Age: 85
End: 2020-09-10

## 2020-09-10 NOTE — TELEPHONE ENCOUNTER
Leida Rollins, caregiver, called about meds patient is presently taking that they have on hand. Please call her back at [phone].

## 2020-09-11 RX ORDER — ATORVASTATIN CALCIUM 20 MG/1
20 TABLET, FILM COATED ORAL DAILY
Qty: 30 TABLET | Refills: 11 | Status: SHIPPED | OUTPATIENT
Start: 2020-09-11

## 2020-09-30 ENCOUNTER — NURSE ONLY (OUTPATIENT)
Dept: UROLOGY | Age: 85
End: 2020-09-30
Payer: MEDICARE

## 2020-09-30 PROCEDURE — 51702 INSERT TEMP BLADDER CATH: CPT | Performed by: PHYSICIAN ASSISTANT

## 2020-09-30 NOTE — PROGRESS NOTES
Chronic Indwelling Jackson Catheter  Patient presents today with a history of a chronic indwelling jackson catheter for several months. The reason for the indwelling jackson is due to: urinary retention. Overall, the problem(s) are unchanged. Any problems since last jackson catheter change: no    Procedure Note (9/30/20): The patient's jackson catheter was removed. After cleaning him off with Hibiclens and sterile water, 2% xylocaine jelly was instilled into the urethra for local anesthesia. A 18  [place] catheter was inserted into the bladder and the catheter hooked up to a drainage bag. 10 mL of sterile water was used to fill up the balloon. The patient tolerated the procedure well.

## 2020-10-27 ENCOUNTER — TELEPHONE (OUTPATIENT)
Dept: UROLOGY | Age: 85
End: 2020-10-27

## 2020-10-28 ENCOUNTER — NURSE ONLY (OUTPATIENT)
Dept: UROLOGY | Age: 85
End: 2020-10-28
Payer: MEDICARE

## 2020-10-28 PROCEDURE — 51702 INSERT TEMP BLADDER CATH: CPT | Performed by: UROLOGY

## 2020-10-28 NOTE — PROGRESS NOTES
Chronic Indwelling Jackson Catheter  Patient presents today with a history of a chronic indwelling jackson catheter for several months. The reason for the indwelling jackson is due to: urinary retention. Overall, the problem(s) are unchanged. Any problems since last jackson catheter change: yes - patient states urine has been very dark for the last week and seeing what looks like mucus. No signs of infection. No dysuria, fever or chills. Urine appeared yellow and cloudy today, but was collected after cath change to be sent for culture. Hand irrigated with sterile water until urine was clear. Procedure Note (10/28/20): The patient's jackson catheter was removed. After cleaning him off with Hibiclens and sterile water, 2% xylocaine jelly was instilled into the urethra for local anesthesia. A 18 coude Western Katerin catheter was inserted into the bladder and the catheter hooked up to a drainage bag. 10 mL of sterile water was used to fill up the balloon. The patient tolerated the procedure well. Patient will follow up in 1 month for next cath change and visit.

## 2020-10-30 ENCOUNTER — TELEPHONE (OUTPATIENT)
Dept: UROLOGY | Age: 85
End: 2020-10-30

## 2020-10-30 LAB
ORGANISM: ABNORMAL
URINE CULTURE, ROUTINE: ABNORMAL
URINE CULTURE, ROUTINE: ABNORMAL

## 2020-10-30 RX ORDER — CEFDINIR 300 MG/1
300 CAPSULE ORAL 2 TIMES DAILY
Qty: 20 CAPSULE | Refills: 0 | Status: SHIPPED | OUTPATIENT
Start: 2020-10-30 | End: 2020-11-09

## 2020-10-30 NOTE — TELEPHONE ENCOUNTER
----- Message from Ivanna Sarabia PA-C sent at 10/30/2020  9:22 AM CDT -----  Regarding: urine culture  Urine culture positive for E. coli it would be sensitive to Omnicef 300 mg twice daily for 10 days #20.  ----- Message -----  From: Vaughn Mcconnell Incoming Lab Results From [facility]  Sent: 10/29/2020   9:04 AM CDT  To:  Ivanna Sarabia PA-C

## 2020-12-01 ENCOUNTER — OFFICE VISIT (OUTPATIENT)
Dept: UROLOGY | Age: 85
End: 2020-12-01
Payer: MEDICARE

## 2020-12-01 PROCEDURE — 51700 IRRIGATION OF BLADDER: CPT | Performed by: NURSE PRACTITIONER

## 2020-12-01 ASSESSMENT — ENCOUNTER SYMPTOMS
BACK PAIN: 0
COUGH: 0
VOMITING: 0
EYE PAIN: 0
WHEEZING: 0
SORE THROAT: 0
NAUSEA: 0

## 2020-12-01 NOTE — PROGRESS NOTES
Chronic Indwelling Jackson Catheter  Patient presents today with a history of a chronic indwelling jackson catheter for several months. The reason for the indwelling jackson is due to: urinary retention. Overall, the problem(s) are unchanged. Any problems since last jackson catheter change: no    Procedure Note (12/1/20): The patient's jackson catheter was removed. After cleaning him off with Hibiclens and sterile water,  a 18F coude Western Akterin catheter was inserted into the bladder, the bladder was irrigated to wash out any bladder sediment or material. After urine ran clear after irrigation, the catheter hooked up to a drainage bag. 10 mL of sterile water was used to fill up the balloon. The patient tolerated the procedure well.

## 2020-12-01 NOTE — PROGRESS NOTES
Susana Charles is a 80 y.o. male who presents today   Chief Complaint   Patient presents with    Follow-up     retention of urine       HPI   Patient presents for monthly catheter change. He has a long history of BPH with urinary retention and has been an issue since December 2019. He has failed numerous voiding trials and is now being managed with chronic indwelling Alves. He has a very large prostate measuring 97.1 g on ultrasound done January 2020. He is on maximal medical therapy and was referred to [facility] for consultation for HoLEP. His wife reports he was cleared for surgery at [facility], however, patient does not wish to undergo anesthesia to have the procedure done.       Past Medical History:   Diagnosis Date    Atrial fibrillation ([address]     CAD (coronary artery disease)     Chronic combined systolic and diastolic [facility]F (congestive heart failure) [facility] 12/17/2019    COPD (chronic obstructive pulmonary disease) ([facility] 12/17/2019    GERD (gastroesophageal reflux disease)     Gout     Gout     Heart attack ([facility]     History of home oxygen therapy     at night    Hypertension     Hyponatremia 12/17/2019    Mixed hyperlipidemia 12/17/2019    Palliative care patient 12/20/2019       Past Surgical History:   Procedure Laterality Date    CARDIAC SURGERY      cabg 2 bypasss    CORONARY ARTERY BYPASS GRAFT      x 2 vessels    INTERTROCHANTERIC HIP FRACTURE SURGERY Right     KNEE SURGERY         Current Outpatient Medications   Medication Sig Dispense Refill    phenazopyridine (PYRIDIUM) 100 MG tablet Take 1 tablet by mouth 3 times daily as needed for Pain 15 tablet 2    nitrofurantoin, macrocrystal-monohydrate, (MACROBID) 100 MG capsule Take 100 mg by mouth      sennosides-docusate sodium (SENOKOT-S) 8.6-50 MG tablet Take 2 tablets by mouth      nitroGLYCERIN (NITROSTAT) 0.4 MG SL tablet Place 0.4 mg under the tongue      amiodarone (PACERONE) 100 MG tablet Take 1 tablet by mouth every comment: quit 2004   Substance and Sexual Activity    Alcohol use: No    Drug use: No    Sexual activity: None   Lifestyle    Physical activity     Days per week: None     Minutes per session: None    Stress: None   Relationships    Social connections     Talks on phone: None     Gets together: None     Attends Islam service: None     Active member of club or organization: None     Attends meetings of clubs or organizations: None     Relationship status: None    Intimate partner violence     Fear of current or ex partner: None     Emotionally abused: None     Physically abused: None     Forced sexual activity: None   Other Topics Concern    None   Social History Narrative    None       Family History   Problem Relation Age of Onset    No Known Problems Mother     Heart Disease Father        REVIEW OF SYSTEMS:  Review of Systems   Constitutional: Negative for chills and fever. HENT: Positive for hearing loss. Negative for congestion and sore throat. Eyes: Negative for pain and visual disturbance. Respiratory: Negative for cough and wheezing. Cardiovascular: Negative for chest pain and palpitations. Gastrointestinal: Negative for nausea and vomiting. Endocrine: Negative for polyphagia and polyuria. Genitourinary: Positive for difficulty urinating. Negative for decreased urine volume, dysuria, enuresis, flank pain, frequency, genital sores, hematuria and urgency. Musculoskeletal: Positive for arthralgias and gait problem. Negative for back pain and neck pain. Skin: Negative for rash and wound. Allergic/Immunologic: Negative for environmental allergies and food allergies. Neurological: Positive for weakness. Negative for dizziness and headaches. Hematological: Negative for adenopathy. Does not bruise/bleed easily. Psychiatric/Behavioral: Negative for confusion and hallucinations. All other systems reviewed and are negative.     PHYSICAL EXAM:  There were no vitals taken for this visit. Physical Exam  Vitals signs and nursing note reviewed. Constitutional:       General: He is not in acute distress. Appearance: Normal appearance. He is not ill-appearing. HENT:      Head: Normocephalic and atraumatic. Nose: Nose normal.      Mouth/Throat:      Pharynx: Oropharynx is clear. Eyes:      General:         Right eye: No discharge. Left eye: No discharge. Extraocular Movements: Extraocular movements intact. Neck:      Musculoskeletal: Normal range of motion. Cardiovascular:      Pulses: Normal pulses. Pulmonary:      Effort: Pulmonary effort is normal. No respiratory distress. Abdominal:      General: There is no distension. Tenderness: There is no abdominal tenderness. There is no right CVA tenderness or left CVA tenderness. Hernia: A hernia is present. Hernia is present in the left inguinal area (Large left inguinal hernia extending into scrotal sac. Patient has had prior evaluation, but does not wish to undergo surgical intervention. ). Musculoskeletal:         General: No swelling. Right lower leg: No edema. Left lower leg: No edema. Skin:     General: Skin is warm and dry. Neurological:      Mental Status: He is alert and oriented to person, place, and time. Mental status is at baseline. Motor: Weakness present. Gait: Gait abnormal.   Psychiatric:         Mood and Affect: Mood normal.         Behavior: Behavior normal.       1. Benign prostatic hyperplasia with urinary obstruction  Patient presents for monthly catheter change. Patient and his wife voiced concern as his urine did appear to have a lot of sediment and was very cloudy. Patient emptied his leg bag into a jar from home and brought the sample for me to examine. It did appear very cloudy, however, there was no sick drainage. I do believe it is significantly cloudy secondary to white cells in the urine secondary to chronic indwelling Alves.   Patient is experiencing no suprapubic pain no fevers, chills, nausea, vomiting, leakage around the catheter. I spoke with the patient and his wife about the importance of not treating asymptomatic bacteriuria, especially in patients with chronic Alves's. They voiced understanding. During catheter change, the patient's bladder was irrigated several times and did not reveal any significant debris or purulent drainage. He will follow up in 1 month for catheter change. - IRRIGATION OF BLADDER  - CHANGE OF BLADDER TUBE,SIMPLE      Orders Placed This Encounter   Procedures    IRRIGATION OF BLADDER    CHANGE OF BLADDER [place]        Return in about 1 month (around 1/1/2021). GRAYSON MILLER, ZURI - CNP    All information inputted into the note by the MA to include chief complaint, past medical history, past surgical history, medications, allergies, social and family history and review of systems has been reviewed and updated as needed by me. EMR Dragon/transcription disclaimer: Much of this document is electronic transcription/translation of spoken language to printed text. The electronic translation of spoken language may be erroneous or, at times, nonsensical words or phrases may be inadvertently transcribed.  Although I have reviewed the document for such errors, some may still exist.

## 2021-01-05 ENCOUNTER — NURSE ONLY (OUTPATIENT)
Dept: UROLOGY | Age: 86
End: 2021-01-05
Payer: MEDICARE

## 2021-01-05 DIAGNOSIS — R33.9 URINARY RETENTION: Primary | ICD-10-CM

## 2021-01-05 PROCEDURE — 51702 INSERT TEMP BLADDER CATH: CPT | Performed by: NURSE PRACTITIONER

## 2021-01-05 NOTE — PROGRESS NOTES
Chronic Indwelling Jackson Catheter  Patient presents today with a history of a chronic indwelling jackson catheter for several months. The reason for the indwelling jackson is due to: urinary retention. Overall, the problem(s) are unchanged. Any problems since last jackson catheter change: no    Procedure Note (01/05/2021): The patient's jackson catheter was removed. After cleaning him off with Hibiclens and sterile water,  a 18F coude Western Katerin catheter was inserted into the bladder, the bladder was irrigated to wash out any bladder sediment or material. After urine ran clear after irrigation, the catheter hooked up to a drainage bag. 10 mL of sterile water was used to fill up the balloon. The patient tolerated the procedure well. Patient will return in 1 month for next cath change.

## 2021-02-10 ENCOUNTER — TELEPHONE (OUTPATIENT)
Dept: UROLOGY | Age: 86
End: 2021-02-10

## 2021-02-10 NOTE — TELEPHONE ENCOUNTER
Called patient left voicemail that appt tomro 2/11/21 for cath chg has been cancelled due to weather.  I moved to [facility] Feb 16 at 10:00am.

## 2021-03-02 ENCOUNTER — NURSE ONLY (OUTPATIENT)
Dept: UROLOGY | Age: 86
End: 2021-03-02
Payer: MEDICARE

## 2021-03-02 DIAGNOSIS — R33.9 URINARY RETENTION: Primary | ICD-10-CM

## 2021-03-02 PROCEDURE — 51703 INSERT BLADDER CATH COMPLEX: CPT | Performed by: NURSE PRACTITIONER

## 2021-04-07 ENCOUNTER — TELEPHONE (OUTPATIENT)
Dept: UROLOGY | Age: 86
End: 2021-04-07

## 2021-04-08 ENCOUNTER — NURSE ONLY (OUTPATIENT)
Dept: UROLOGY | Age: 86
End: 2021-04-08
Payer: MEDICARE

## 2021-04-08 DIAGNOSIS — R33.9 URINARY RETENTION: ICD-10-CM

## 2021-04-08 PROCEDURE — 51702 INSERT TEMP BLADDER CATH: CPT | Performed by: NURSE PRACTITIONER

## 2021-04-08 NOTE — PROGRESS NOTES
Chronic Indwelling Jackson Catheter  Patient presents today with a history of a chronic indwelling jackson catheter for several months. The reason for the indwelling jackson is due to: urinary retention. Overall, the problem(s) are unchanged. Any problems since last jackson catheter change: no    Procedure Note (04/08/2021): The patient's jackson catheter was removed. After cleaning him off with Hibiclens and sterile water,  a 18F coude Western Katerin catheter was inserted into the bladder, the bladder was irrigated to wash out any bladder sediment or material. After urine ran clear after irrigation, the catheter hooked up to a drainage bag. 10 mL of sterile water was used to fill up the balloon. The patient tolerated the procedure well. Patient will return in 1 month for next cath change.

## 2021-04-13 ENCOUNTER — TELEPHONE (OUTPATIENT)
Dept: UROLOGY | Age: 86
End: 2021-04-13

## 2021-05-19 ENCOUNTER — NURSE ONLY (OUTPATIENT)
Dept: UROLOGY | Age: 86
End: 2021-05-19
Payer: MEDICARE

## 2021-05-19 DIAGNOSIS — K40.90 INGUINAL HERNIA WITHOUT OBSTRUCTION OR GANGRENE, RECURRENCE NOT SPECIFIED, UNSPECIFIED LATERALITY: Primary | ICD-10-CM

## 2021-05-19 DIAGNOSIS — R33.9 URINARY RETENTION: ICD-10-CM

## 2021-05-19 PROCEDURE — 51700 IRRIGATION OF BLADDER: CPT | Performed by: NURSE PRACTITIONER

## 2021-05-21 ENCOUNTER — TELEPHONE (OUTPATIENT)
Dept: UROLOGY | Age: 86
End: 2021-05-21

## 2021-06-11 ENCOUNTER — TELEPHONE (OUTPATIENT)
Dept: UROLOGY | Age: 86
End: 2021-06-11

## 2021-06-22 ENCOUNTER — NURSE ONLY (OUTPATIENT)
Dept: UROLOGY | Age: 86
End: 2021-06-22
Payer: MEDICARE

## 2021-06-22 DIAGNOSIS — R33.9 URINARY RETENTION: ICD-10-CM

## 2021-06-22 PROCEDURE — 51703 INSERT BLADDER CATH COMPLEX: CPT | Performed by: NURSE PRACTITIONER

## 2021-06-22 NOTE — PROGRESS NOTES
Chronic Indwelling Jackson Catheter  Patient presents today with a history of a chronic indwelling jackson catheter for several months. The reason for the indwelling jackson is due to: urinary retention. Overall, the problem(s) are unchanged. Any problems since last jackson catheter change: no    Procedure Note (06/22/2021): The patient's jackson catheter was removed. After cleaning him off with Hibiclens and sterile water,  a 18F coude Western Katerin catheter was inserted into the bladder. the catheter was hooked up to a drainage bag. 10 mL of sterile water was used to fill up the balloon. The patient tolerated the procedure well. Patient will return in 1 month for next cath change.

## 2021-08-03 ENCOUNTER — NURSE ONLY (OUTPATIENT)
Dept: UROLOGY | Age: 86
End: 2021-08-03
Payer: MEDICARE

## 2021-08-03 DIAGNOSIS — R33.9 URINARY RETENTION: ICD-10-CM

## 2021-08-03 PROCEDURE — 51703 INSERT BLADDER CATH COMPLEX: CPT | Performed by: NURSE PRACTITIONER

## 2021-09-07 ENCOUNTER — NURSE ONLY (OUTPATIENT)
Dept: UROLOGY | Age: 86
End: 2021-09-07
Payer: MEDICARE

## 2021-09-07 DIAGNOSIS — N40.1 BENIGN PROSTATIC HYPERPLASIA WITH URINARY OBSTRUCTION: ICD-10-CM

## 2021-09-07 DIAGNOSIS — N13.8 BENIGN PROSTATIC HYPERPLASIA WITH URINARY OBSTRUCTION: ICD-10-CM

## 2021-09-07 PROCEDURE — 51700 IRRIGATION OF BLADDER: CPT | Performed by: NURSE PRACTITIONER

## 2021-09-07 NOTE — PROGRESS NOTES
Chronic Indwelling Jackson Catheter  Patient presents today with a history of a chronic indwelling urethral jackson catheter for several months. The reason for the indwelling jackson is due to: urinary retention. Overall, the problem(s) are unchanged. Any problems since last jackson catheter change: no    Procedure Note (9/7/21): The patient's jackson catheter was removed. Using sterile technique, patient was cleaned with Hibiclens and sterile water solution. A 18 f coude catheter was inserted into the bladder. 10 mL of sterile water was used to fill up the balloon. The catheter was then hooked up to a drainage bag. The patient tolerated the procedure well. Patient should follow up in 1 month for next catheter change.

## 2021-10-12 ENCOUNTER — NURSE ONLY (OUTPATIENT)
Dept: UROLOGY | Age: 86
End: 2021-10-12
Payer: MEDICARE

## 2021-10-12 DIAGNOSIS — R33.9 URINARY RETENTION: ICD-10-CM

## 2021-10-12 PROCEDURE — 51700 IRRIGATION OF BLADDER: CPT | Performed by: NURSE PRACTITIONER

## 2021-10-12 NOTE — PROGRESS NOTES
Chronic Indwelling Jackson Catheter  Patient presents today with a history of a chronic indwelling urethral jackson catheter for several months. The reason for the indwelling jackson is due to: urinary retention. Overall, the problem(s) are unchanged. Any problems since last jackson catheter change: no    Procedure Note (10/12/21): The patient's jackson catheter was removed. Using sterile technique, patient was cleaned with Hibiclens and sterile water solution. A 18 f coude catheter was inserted into the bladder. 10 mL of sterile water was used to fill up the balloon. The bladder was hand irrigated with sterile water due to blood. The catheter was then hooked up to a drainage bag. The patient tolerated the procedure well. Patient should follow up in 1 month for next catheter change.

## 2021-11-16 ENCOUNTER — NURSE ONLY (OUTPATIENT)
Dept: UROLOGY | Age: 86
End: 2021-11-16
Payer: MEDICARE

## 2021-11-16 DIAGNOSIS — R33.9 URINARY RETENTION: ICD-10-CM

## 2021-11-16 PROCEDURE — 51700 IRRIGATION OF BLADDER: CPT | Performed by: NURSE PRACTITIONER

## 2021-11-16 NOTE — PROGRESS NOTES
Chronic Indwelling Jackson Catheter  Patient presents today with a history of a chronic indwelling urethral jackson catheter for several months. The reason for the indwelling jackson is due to: urinary retention. Overall, the problem(s) are unchanged. Any problems since last jackson catheter change: no    Procedure Note (11/16/21): The patient's jackson catheter was removed. Using sterile technique, patient was cleaned with Hibiclens and sterile water solution. A 18 f coude catheter was inserted into the bladder. Bladder was then hand irrigated with sterile water due to bladder debris. 10 mL of sterile water was used to fill up the balloon. The catheter was then hooked up to a drainage bag. The patient tolerated the procedure well. Patient should follow up in 1 month for next catheter change and also due to see APRN.

## 2021-12-15 ENCOUNTER — TELEPHONE (OUTPATIENT)
Dept: UROLOGY | Age: 86
End: 2021-12-15

## 2021-12-15 NOTE — TELEPHONE ENCOUNTER
Pt caretaker Helio Bryant called to let us know that the patient has passed and to cancel his appt. Thank you.

## 2023-01-09 NOTE — ED PROVIDER NOTES
[Time Spent: ___ minutes] : I have spent [unfilled] minutes of time on the encounter. PHYSICAL EXAM    (up to 7 for level 4, 8 or more for level 5)     ED Triage Vitals [03/23/18 1916]   BP Temp Temp Source Pulse Resp SpO2 Height Weight   130/76 97.9 °F (36.6 °C) Oral 52 17 93 % 5' 8.5\" (1.74 m) 156 lb (70.8 kg)       Physical Exam   Constitutional: He is oriented to person, place, and time. He appears well-developed and well-nourished. No distress. HENT:   Head: Normocephalic and atraumatic. Mouth/Throat: Oropharynx is clear and moist.   Eyes: Conjunctivae and EOM are normal. Pupils are equal, round, and reactive to light. Neck: Normal range of motion. Neck supple. Cardiovascular: Normal rate, regular rhythm and normal heart sounds. Exam reveals no gallop and no friction rub. No murmur heard. Pulmonary/Chest: Effort normal and breath sounds normal. He has no wheezes. He has no rales. Abdominal: Soft. Bowel sounds are normal. He exhibits no distension. There is no tenderness. There is no rebound and no guarding. Musculoskeletal: Normal range of motion. He exhibits no edema. Neurological: He is alert and oriented to person, place, and time. He has normal strength. He is not disoriented. No cranial nerve deficit or sensory deficit. Coordination normal. GCS eye subscore is 4. GCS verbal subscore is 5. GCS motor subscore is 6. Skin: Skin is warm and dry. No rash noted. Psychiatric: He has a normal mood and affect. His behavior is normal. Thought content normal.       DIAGNOSTIC RESULTS     EKG: All EKG's are interpreted by the Emergency Department Physician who either signs or Co-signs this chart in the absence of a cardiologist.    NSR with rate of 53, RBBB, no acute ST elev/depression    RADIOLOGY:   Non-plain film images such as CT, Ultrasound and MRI are read by the radiologist. Plain radiographic images are visualized and preliminarily interpreted by the emergency physician with the below findings:    XR CHEST PORTABLE   Final Result   1.  No radiographic evidence of

## 2023-06-27 NOTE — PROGRESS NOTES
Discharge Planning Assessment  [facility]     Patient Name: Vasile Deng  MRN: [id number]  Today's Date: 10/2/2018    Admit Date: 9/28/2018          Discharge Needs Assessment     Row Name 10/02/18 1408       Living Environment    Lives With alone    Current Living Arrangements home/apartment/condo    Primary Care Provided by self    Provides Primary Care For no one    Family Caregiver if Needed none    Able to Return to Prior Arrangements yes       Resource/Environmental Concerns    Resource/Environmental Concerns none    Transportation Concerns car, none       Transition Planning    Patient/Family Anticipates Transition to home    Patient/Family Anticipated Services at Transition none    Transportation Anticipated family or friend will provide       Discharge Needs Assessment    Readmission Within the Last 30 Days no previous admission in last 30 days    Concerns to be Addressed denies needs/concerns at this time    Equipment Currently Used at Home cane, straight    Anticipated Changes Related to Illness none    Equipment Needed After Discharge none    Current Discharge Risk lives alone            Discharge Plan     Row Name 10/02/18 1411       Plan    Plan Home    Patient/Family in Agreement with Plan yes    Plan Comments Spoke with pt to assess for home needs, chadd' at bedside.  Pt does live alone at present time.  He says he does not feel HH needed for him at d/c,  denies need for further DME at present time.  He does say he gets meds via [facility], d/c summary will need faxed there at d/c.  MD saying O2 will be needed at d/c, please order to see if he qualifies so this can be set up. Pt is currently on room air. Will follow.         Destination     No service coordination in this encounter.      Durable Medical Equipment     No service coordination in this encounter.      Dialysis/Infusion     No service coordination in this encounter.      Home Medical Care     No service coordination in this  encounter.      Social Care     No service coordination in this encounter.                Demographic Summary    No documentation.           Functional Status    No documentation.           Psychosocial    No documentation.           Abuse/Neglect    No documentation.           Legal    No documentation.           Substance Abuse    No documentation.           Patient Forms    No documentation.         ATA Garcia     Rituxan Counseling:  I discussed with the patient the risks of Rituxan infusions. Side effects can include infusion reactions, severe drug rashes including mucocutaneous reactions, reactivation of latent hepatitis and other infections and rarely progressive multifocal leukoencephalopathy.  All of the patient's questions and concerns were addressed.

## 2025-04-09 NOTE — PLAN OF CARE
[facility], [address] [place] [address] Entrance, [facility], [address] [place] [phone]  Prep: npo after midnight /colon prep. Informed dad that colon prep instructions will be sent via my chart.     Dad v/u   Note: At least 1 legal guardian must be present to sign consents prior to the procedure.    Visitor Policy:  All family members are allowed to wait in the waiting room. Only two adults are allowed in the preoperative rooms or post anesthesia care unit (Recovery room). Children under 12 must be accompanied by an adult in the waiting area and cannot be in the waiting area alone.     Problem: Patient Care Overview  Goal: Plan of Care Review  Outcome: Ongoing (interventions implemented as appropriate)   10/02/18 0327   Coping/Psychosocial   Plan of Care Reviewed With patient   Plan of Care Review   Progress no change   OTHER   Outcome Summary overnight sleep study in progress. poor wave form on pulse ox. head probe applied per RT in place of finger probe. wave for still poor periodically. false readings in the 70's and 80's as sats increase to high 90's and no respiratory distress noted or reported. cont to monitor     Goal: Individualization and Mutuality  Outcome: Ongoing (interventions implemented as appropriate)      Problem: Cardiac: Heart Failure (Adult)  Goal: Signs and Symptoms of Listed Potential Problems Will be Absent, Minimized or Managed (Cardiac: Heart Failure)  Outcome: Ongoing (interventions implemented as appropriate)      Problem: Pneumonia (Adult)  Goal: Signs and Symptoms of Listed Potential Problems Will be Absent, Minimized or Managed (Pneumonia)  Outcome: Ongoing (interventions implemented as appropriate)      Problem: Fall Risk (Adult)  Goal: Absence of Fall  Outcome: Ongoing (interventions implemented as appropriate)

## (undated) DEVICE — Device

## (undated) DEVICE — GW FOR TROCH NAIL 3.2X400MM

## (undated) DEVICE — PINNACLE INTRODUCER SHEATH: Brand: PINNACLE

## (undated) DEVICE — BIT DRL 3FLUT QC CALIB 4.2X330X100MM STRL

## (undated) DEVICE — GW STARTER FXD CORE J .035 3X150CM 3MM

## (undated) DEVICE — MASK,OXYGEN,MED CONC,ADLT,7' TUB, UC: Brand: PENDING

## (undated) DEVICE — MODEL BT2000 P/N 700287-012KIT CONTENTS: MANIFOLD WITH SALINE AND CONTRAST PORTS, SALINE TUBING WITH SPIKE AND HAND SYRINGE, TRANSDUCER: Brand: BT2000 AUTOMATED MANIFOLD KIT

## (undated) DEVICE — CANN CO2/O2 NASL A/

## (undated) DEVICE — GLV SURG SENSICARE PI LF PF 8 GRN STRL

## (undated) DEVICE — DRSNG PRESS SAFEGUARD

## (undated) DEVICE — BNDG ELAS CO-FLEX SLF ADHR 6IN 5YD LF STRL

## (undated) DEVICE — CATH DIAG IMPULSE MPA1 5F 100CM

## (undated) DEVICE — CATH DIAG IMPULSE IMT 5F 100CM

## (undated) DEVICE — DRSNG WND SIL OPTIFOAM GENTLE BRDR ADHS W/SA 4X4IN

## (undated) DEVICE — PK CATH CARD 30

## (undated) DEVICE — PK TURNOVER RM ADV

## (undated) DEVICE — MODEL AT P65, P/N 701554-001KIT CONTENTS: HAND CONTROLLER, 3-WAY HIGH-PRESSURE STOPCOCK WITH ROTATING END AND PREMIUM HIGH-PRESSURE TUBING: Brand: ANGIOTOUCH® KIT

## (undated) DEVICE — SOL IRR NACL 0.9PCT BT 1000ML

## (undated) DEVICE — 4-PORT MANIFOLD: Brand: NEPTUNE 2

## (undated) DEVICE — CATH DIAG IMPULSE M/ PK 145 5FR

## (undated) DEVICE — PK HIP ORIF FX TABL 30

## (undated) DEVICE — PROXIMATE RH ROTATING HEAD SKIN STAPLERS (35 WIDE) CONTAINS 35 STAINLESS STEEL STAPLES: Brand: PROXIMATE

## (undated) DEVICE — DRSNG BRDR MEPILEXLITE SLFADHR SIL 2X5

## (undated) DEVICE — ANGIO-SEAL VIP VASCULAR CLOSURE DEVICE: Brand: ANGIO-SEAL

## (undated) DEVICE — GLV SURG TRIUMPH PF LTX 7.5 STRL

## (undated) DEVICE — SOLIDIFIER LIQUI LOC PLUS 2000CC

## (undated) DEVICE — ELECTRD PAD DEFIB A/

## (undated) DEVICE — ANTIBACTERIAL UNDYED BRAIDED (POLYGLACTIN 910), SYNTHETIC ABSORBABLE SUTURE: Brand: COATED VICRYL

## (undated) DEVICE — GW STARTER FXD CORE J .035 3X260CM 3MM